# Patient Record
Sex: MALE | Race: WHITE | NOT HISPANIC OR LATINO | Employment: FULL TIME | URBAN - METROPOLITAN AREA
[De-identification: names, ages, dates, MRNs, and addresses within clinical notes are randomized per-mention and may not be internally consistent; named-entity substitution may affect disease eponyms.]

---

## 2017-01-05 ENCOUNTER — TELEPHONE (OUTPATIENT)
Dept: PULMONOLOGY | Facility: CLINIC | Age: 32
End: 2017-01-05

## 2017-01-05 NOTE — TELEPHONE ENCOUNTER
Prior Authorization Retail Medication Request  Medication/Dose: Vancomycin nebs  Diagnosis and ICD code: E84.0 CF   New/Renewal/Insurance Change PA:   Previously Tried and Failed Therapies:     Insurance ID (if provided):   Insurance Phone (if provided):     Any additional info from fax request:    Prior auth due to  the beginning of February. Patient has asked that we work on prior auth now so there is no interruption in care.    If you received a fax notification from an outside Pharmacy:  Pharmacy Name:  Pharmacy #:  Pharmacy Fax:

## 2017-01-10 NOTE — TELEPHONE ENCOUNTER
[facility] Prior Authorization Team   Phone: [phone]  Fax: [phone]    PA Initiation    Medication: Vancomycin nebs  Insurance Company: [employer]  Fax Number: [phone]    Phone Number: [phone]  Pharmacy Filling the Rx: [address]  Filling Pharmacy Phone: [phone]  Filling Pharmacy Fax: [phone]  Start Date: 1/10/2017    ALSO FAXED CLINIC NOTES.

## 2017-01-11 NOTE — TELEPHONE ENCOUNTER
Prior Authorization Approval    Authorization Effective Date: 9/1/2016  Authorization Expiration Date: 9/1/2017  Medication: Vancomycin nebs- approved  Approved Dose/Quantity:   Reference #:     Insurance Company: [employer]  Expected CoPay: $50.00     CoPay Card Available:      Foundation Assistance Needed:    Which Pharmacy is filling the prescription (Not needed for infusion/clinic administered): [facility] - [place] [address]      Pt has been notified of approval and he will be contacting his pharmacy to set up delivery

## 2017-01-16 ENCOUNTER — TRANSFERRED RECORDS (OUTPATIENT)
Dept: HEALTH INFORMATION MANAGEMENT | Facility: CLINIC | Age: 32
End: 2017-01-16

## 2017-02-03 DIAGNOSIS — E84.8 DIABETES MELLITUS RELATED TO CF (CYSTIC FIBROSIS) (H): Primary | ICD-10-CM

## 2017-02-03 DIAGNOSIS — E08.9 DIABETES MELLITUS RELATED TO CF (CYSTIC FIBROSIS) (H): Primary | ICD-10-CM

## 2017-04-13 DIAGNOSIS — E84.0 CYSTIC FIBROSIS WITH PULMONARY MANIFESTATIONS (H): ICD-10-CM

## 2017-04-14 RX ORDER — PANCRELIPASE 60000; 12000; 38000 [USP'U]/1; [USP'U]/1; [USP'U]/1
CAPSULE, DELAYED RELEASE PELLETS ORAL
Qty: 900 EACH | Refills: 3 | Status: SHIPPED | OUTPATIENT
Start: 2017-04-14 | End: 2017-04-17

## 2017-04-14 NOTE — PROGRESS NOTES
[facility]  April 17, 2017         Assessment and Plan:   Dilan Nassar is a 31 year old male with cystic fibrosis, pancreatic insufficiency and CFRD who is seen today in clinic for follow up. In the past few months, has completed course of vancomycin, imipenem and bactrim. Started on fluconazole at last visit.      1. CF lung disease: although PFTs are decreased, patient notes he has not symptoms other than the shortness of breath and chest tightness he gets each month he's on vancomycin. He is sure that PFTs reflect these symptoms and not illness. Vesting BID. Previous cultures + for MRSA, Klebsiella, Steno and Achromobacter, most recently Steno only. Discussed treatment with oral antibiotics with the patient and at this time, he would like to stop the vanco nebs only and f/u in one month and he does not feel he is sick.   - Continue nebulizers (albuterol and pulmozymeand vest therapy  - On chronic azithromycin     2. Exocrine pancreatic insufficiency: denies symptoms of malabsorption. Weight continues to improve, BMI 20.2 today.  - Continue pancreatic enzymes and vitamin supplementation     3. CFRD: AIC of 6.7 on 12/20/16. Saw Endocrine and has been keeping better track of BS, typically 90s int he am and 140-160s two hours post prandial.   - Continue Lantus 13 U  - F/u with Endocrine as scheduled  - Routine diabetic eye exam in 2017     4. Anxiety/depression: mood is stable/better. Not currently on medication. Doesn't feel ill today.      5. Elevated alk phos: X 1 year in EPIC. No imaging. Was supposed to get US previously, but has had to reschedule.   - Liver US at next f/u  - Continue Ursodiol     RTC: in 1 month with routine spirometry  Annual studies due: September 2017     Jyothi Warren PA-C  Pulmonary, Allergy, Critical Care and Sleep Medicine        Interval History:   Patient notes that every month he does vanco nebs he has shortness of breath and  chest tightness. Is pretty sure that's why his PFTs are down today. Feels good otherwise. On the vanco months, notices shortness of breath at rest and with stairs. Cough at baseline, minimal sputum, light green/gray. No blood. Denies chest pain, fever, chills or sinus pain/congestions.     No new GI symptoms and stools at his baseline.          Review of Systems:   Please see HPI. Otherwise, complete 10 point ROS negative.           Past Medical and Surgical History:     Past Medical History:   Diagnosis Date     Cystic fibrosis with pulmonary manifestations (H)     /[id number]     Past Surgical History:   Procedure Laterality Date     APPENDECTOMY OPEN  1999    Appendicitis      SINUS SURGERY  1990's    h/o many sinus infections           Family History:     Family History   Problem Relation Age of Onset     DIABETES Mother      Unknown type     Prostate Cancer Paternal Grandfather      LUNG DISEASE Other      Negative     DIABETES Maternal Aunt      unknown type     DIABETES Maternal Uncle      unknown type     DIABETES Maternal Grandmother      unknown type     Coronary Artery Disease Paternal Grandmother             Social History:     Social History     Social History     Marital status: Single     Spouse name: N/A     Number of children: N/A     Years of education: N/A     Occupational History     State Representative [employer]     Financial Counselor      Social History Main Topics     Smoking status: Never Smoker     Smokeless tobacco: Former User     Alcohol use 6.0 - 8.4 oz/week     10 - 14 Standard drinks or equivalent per week      Comment: 2 drinks per night 5X/wk     Drug use: No     Sexual activity: Yes     Partners: Female     Birth control/ protection: Pull-out method, Condom     Other Topics Concern     Parent/Sibling W/ Cabg, Mi Or Angioplasty Before 65f 55m? Yes     Social History Narrative    Lives alone in apartment in [place], [place]. 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         Nov 2015.  His main political issue is higher education.  His girlfriend is looking for work as an .  He eats 3 square meals per day, most of which are cooked by him or his girlfriend.  Gets outside to walk or run 20 minutes about 5x per week.  Works fairly regular daytime hours, often from home.            Medications:     Current Outpatient Prescriptions   Medication     amylase-lipase-protease (CREON) 58969 UNITS CPEP     CREON 01161 UNITS CPEP per EC capsule     insulin pen needle (CLICKFINE PEN NEEDLES) 32G X 4 MM     multivitamins CF formula (MVW COMPLETE FORMULATION) CAPS capsule     albuterol (2.5 MG/3ML) 0.083% neb solution     azithromycin (ZITHROMAX) 500 MG tablet     Cholecalciferol 5000 UNITS TABS     fluticasone (FLOVENT HFA) 110 MCG/ACT Inhaler     fluticasone (FLONASE) 50 MCG/ACT spray     insulin glargine (LANTUS SOLOSTAR) 100 UNIT/ML injection     phytonadione (MEPHYTON) 5 MG tablet     ursodiol (ACTIGALL) 300 MG capsule     albuterol (PROAIR HFA/PROVENTIL HFA/VENTOLIN HFA) 108 (90 BASE) MCG/ACT Inhaler     dornase alpha (PULMOZYME) 1 MG/ML nebulizer solution     ranitidine (ZANTAC) 75 MG tablet     water for injection sterile SOLN     Syringe/Needle, Disp, 18G X 1\" 6 ML MISC     order for DME     loratadine (CLARITIN) 10 MG tablet     blood glucose monitoring (FREESTYLE LITE) test strip     blood glucose monitoring (FREESTYLE) lancets     No current facility-administered medications for this visit.             Physical Exam:   /79  Pulse 93  Temp 97.8  F (36.6  C) (Oral)  Resp 16  Ht 1.626 m (5' 4\")  Wt 53.7 kg (118 lb 6.2 oz)  SpO2 93%  BMI 20.32 kg/m2    GENERAL: alert, NAD  HEENT: NCAT, EOMI, anicteric sclera, dried blood in left nare, mild bilateral edema; canals and TMs clear; no oral mucosal edema or erythema  Neck: no cervical or supraclavicular adenopathy  Respiratory: moderate airflow  CV: " RRR, S1S2, no murmurs noted  Abdomen: normoactive BS, soft and non tender without organomegaly  Lymph: no edema, + digital clubbing  Neuro: AAO X 3, CN 2-12 grossly intact  Psychiatric: normal affect, good eye contact  Skin: no rash, jaundice or lesions on limited exam         Data:   All laboratory and imaging data reviewed.      Cystic Fibrosis Culture  Specimen Description   Date Value Ref Range Status   12/22/2016 Sputum  Final   12/22/2016 Sputum  Final   11/11/2016 Sputum  Final    Culture Micro   Date Value Ref Range Status   12/22/2016 (A)  Final    Heavy growth Normal mami  Light growth Stenotrophomonas maltophilia  Light growth Aspergillus fumigatus     12/22/2016 (A)  Final    Aspergillus fumigatus isolated  No additional fungi cultured after 4 weeks incubation     11/11/2016 (A)  Final    Moderate growth Candida albicans / dubliniensis Candida albicans and Candida   dubliniensis are not routinely speciated Susceptibility testing not routinely   done  Light growth Aspergillus fumigatus  Moderate growth Stenotrophomonas maltophilia          PFT interpretation:  Maneuver: valid and meets ATS guidelines  Severe obstruction with decreased FEV1 and FEV1/FVC  FEV1 decreased 400 cc from previous  The decrease in FVC may represent air trapping v. restrictive physiology.  Lung volumes would be necessary to determine.

## 2017-04-17 ENCOUNTER — OFFICE VISIT (OUTPATIENT)
Dept: PULMONOLOGY | Facility: CLINIC | Age: 32
End: 2017-04-17
Attending: PHYSICIAN ASSISTANT
Payer: COMMERCIAL

## 2017-04-17 VITALS
DIASTOLIC BLOOD PRESSURE: 79 MMHG | RESPIRATION RATE: 16 BRPM | HEIGHT: 64 IN | TEMPERATURE: 97.8 F | SYSTOLIC BLOOD PRESSURE: 119 MMHG | WEIGHT: 118.39 LBS | OXYGEN SATURATION: 93 % | BODY MASS INDEX: 20.21 KG/M2 | HEART RATE: 93 BPM

## 2017-04-17 DIAGNOSIS — A49.02 MRSA INFECTION: ICD-10-CM

## 2017-04-17 DIAGNOSIS — K86.81 EXOCRINE PANCREATIC INSUFFICIENCY: ICD-10-CM

## 2017-04-17 DIAGNOSIS — E84.0 CYSTIC FIBROSIS WITH PULMONARY MANIFESTATIONS (H): ICD-10-CM

## 2017-04-17 DIAGNOSIS — E08.9 DIABETES MELLITUS DUE TO CYSTIC FIBROSIS (H): ICD-10-CM

## 2017-04-17 DIAGNOSIS — E84.9 CF (CYSTIC FIBROSIS) (H): ICD-10-CM

## 2017-04-17 DIAGNOSIS — E84.9 CYSTIC FIBROSIS (H): ICD-10-CM

## 2017-04-17 DIAGNOSIS — E84.9 DIABETES MELLITUS DUE TO CYSTIC FIBROSIS (H): ICD-10-CM

## 2017-04-17 LAB
ERYTHROCYTE [DISTWIDTH] IN BLOOD BY AUTOMATED COUNT: 12.8 % (ref 10–15)
HCT VFR BLD AUTO: 40.7 % (ref 40–53)
HGB BLD-MCNC: 14.3 G/DL (ref 13.3–17.7)
MCH RBC QN AUTO: 30.8 PG (ref 26.5–33)
MCHC RBC AUTO-ENTMCNC: 35.1 G/DL (ref 31.5–36.5)
MCV RBC AUTO: 88 FL (ref 78–100)
PLATELET # BLD AUTO: 214 10E9/L (ref 150–450)
RBC # BLD AUTO: 4.65 10E12/L (ref 4.4–5.9)
WBC # BLD AUTO: 9.4 10E9/L (ref 4–11)

## 2017-04-17 PROCEDURE — 87077 CULTURE AEROBIC IDENTIFY: CPT | Performed by: INTERNAL MEDICINE

## 2017-04-17 PROCEDURE — 85027 COMPLETE CBC AUTOMATED: CPT | Performed by: PHYSICIAN ASSISTANT

## 2017-04-17 PROCEDURE — 36415 COLL VENOUS BLD VENIPUNCTURE: CPT | Performed by: PHYSICIAN ASSISTANT

## 2017-04-17 PROCEDURE — 87186 SC STD MICRODIL/AGAR DIL: CPT | Performed by: INTERNAL MEDICINE

## 2017-04-17 PROCEDURE — 87070 CULTURE OTHR SPECIMN AEROBIC: CPT | Performed by: INTERNAL MEDICINE

## 2017-04-17 PROCEDURE — 99212 OFFICE O/P EST SF 10 MIN: CPT | Mod: ZF

## 2017-04-17 PROCEDURE — 87107 FUNGI IDENTIFICATION MOLD: CPT | Performed by: INTERNAL MEDICINE

## 2017-04-17 RX ORDER — PANCRELIPASE 60000; 12000; 38000 [USP'U]/1; [USP'U]/1; [USP'U]/1
5-6 CAPSULE, DELAYED RELEASE PELLETS ORAL
Qty: 900 CAPSULE | Refills: 3 | Status: SHIPPED | OUTPATIENT
Start: 2017-04-17 | End: 2018-01-03

## 2017-04-17 ASSESSMENT — PAIN SCALES - GENERAL: PAINLEVEL: NO PAIN (0)

## 2017-04-17 NOTE — PATIENT INSTRUCTIONS
Cystic Fibrosis Self-Care Plan       Patient: Dilan aNssar   MRN: [id number]   Clinic Date: April 17, 2017     RECOMMENDATIONS:  1. Continue nebulizers and vest therapy, twice daily vesting is okay.  2. Stop the vancomycin nebs.  3. Call if you start to have symptoms before your next f/u.  4. You'll need to fast (nothing to eat/drink) prior to your ultrasound.     Annual Studies:   IGG   Date Value Ref Range Status   10/20/2016 1500 695 - 1620 mg/dL Final     No results found for: INS  There are no preventive care reminders to display for this patient.    Pulmonary Function Tests  FEV1: amount of air you can blow out in 1 second  FVC: total amount of air you can take in and blow out    Your Goals:         PFT Latest Ref Rng & Units 4/17/2017   FVC L 2.84   FEV1 L 1.51   FVC% % 65   FEV1% % 41          Airway Clearance: The Most Important Way to Keep Your Lungs Healthy  Vest Settings:    Hill-Rom Frequencies: 8, 9, 10 Pressure 10 Then, Frequencies 18, 19, 20 Pressure 6      RespirTech: Quick Start with Pressure of     Do each frequency for 5 minutes; Deflate vest after each frequency & cough 3 times before beginning the next setting.    Vest and Neb Therapy should be done 2 times/day.    Good Nutrition Can Improve Lung Function and Overall Health     Take ALL of your vitamins with food     Take 1/2 of your enzymes before EVERY meal/snack and the other 1/2 mid-meal/snack    Wt Readings from Last 3 Encounters:   04/17/17 53.7 kg (118 lb 6.2 oz)   12/22/16 52.7 kg (116 lb 3.2 oz)   12/20/16 52.2 kg (115 lb)       Body mass index is 20.32 kg/(m^2).         National CF Foundation Recommendations for BMI in CF Adults: Women: at least 22 Men: at least 23        Controlling Blood Sugars Helps Prevent Lung Infections & Improves Nutrition  Test blood sugar:     In the morning before eating (goal is )     2 hours after a meal (goal is less than 150)     When pre-meal glucose is greater than 150 add correction      At bedtime (if less than 100 eat a snack with 15 grams of carbohydrates  Last A1C Results:   Hemoglobin A1C   Date Value Ref Range Status   12/20/2016 6.7 % Final         If diabetic, measure A1C every 6 months. Goal is under 7%.    Staying Healthy    Research: If you are interested in learning about research opportunities or have questions, please contact Sonali Altamirano at [phone] or [email or web address].       Foundation: Comp[employer] is a personalized resource service to help you with the insurance, financial, legal and other issues you are facing.  It's free, confidential and available to anyone with CF.  Ask your  for more information or contact [employer] directly at 718-COMPASS (036-8368) or compass@cff.org, or learn more at cff.org/compass.       CF Nurse Line:  Giovanny Pimentel: [phone]   Audra Smart, RT: [phone]     Daniela Whitt and Tamika Perez , Dieticians: [phone]   Meliza Cueva, Diabetes Nurse: [phone]    Areli Hodgson: [phone] or Carisa Berumen [phone], Social Workers   [email or web address]

## 2017-04-17 NOTE — NURSING NOTE
Chief Complaint   Patient presents with     Cystic Fibrosis     Follow up on Guillermo and his CF     Tino Kenney CMA at 9:54 AM on 4/17/2017

## 2017-04-17 NOTE — MR AVS SNAPSHOT
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 Continue nebulizers and vest therapy, twice daily vesting is okay.  2. Stop the vancomycin nebs.  3. Call if you start to have symptoms before your next f/u.  4. You'll need to fast (nothing to eat/drink) prior to your ultrasound.     Annual Studies:   IGG   Date Value Ref Range Status   10/20/2016 1500 695 - 1620 mg/dL Final     No results found for: INS  There are no preventive care reminders to display for this patient.    Pulmonary Function Tests  FEV1: amount of air you can blow out in 1 second  FVC: total amount of air you can take in and blow out    Your Goals:         PFT Latest Ref Rng & Units 4/17/2017   FVC L 2.84   FEV1 L 1.51   FVC% % 65   FEV1% % 41          Airway Clearance: The Most Important Way to Keep Your Lungs Healthy  Vest Settings:    Hill-Rom Frequencies: 8, 9, 10 Pressure 10 Then, Frequencies 18, 19, 20 Pressure 6      RespirTech: Quick Start with Pressure of     Do each frequency for 5 minutes; Deflate vest after each frequency & cough 3 times before beginning the next setting.    Vest and Neb Therapy should be done 2 times/day.    Good Nutrition Can Improve Lung Function and Overall Health     Take ALL of your vitamins with food      Take 1/2 of your enzymes before EVERY meal/snack and the other 1/2 mid-meal/snack    Wt Readings from Last 3 Encounters:   04/17/17 53.7 kg (118 lb 6.2 oz)   12/22/16 52.7 kg (116 lb 3.2 oz)   12/20/16 52.2 kg (115 lb)       Body mass index is 20.32 kg/(m^2).         National CF Foundation Recommendations for BMI in CF Adults: Women: at least 22 Men: at least 23        Controlling Blood Sugars Helps Prevent Lung Infections & Improves Nutrition  Test blood sugar:     In the morning before eating (goal is )     2 hours after a meal (goal is less than 150)     When pre-meal glucose is greater than 150 add correction     At bedtime (if less than 100 eat a snack with 15 grams of carbohydrates  Last A1C Results:   Hemoglobin A1C   Date Value Ref Range Status   12/20/2016 6.7 % Final         If diabetic, measure A1C every 6 months. Goal is under 7%.    Staying Healthy    Research: If you are interested in learning about research opportunities or have questions, please contact Sonali Altamirano at [phone] or [email or web address].      CF Foundation: Compass is a personalized resource service to help you with the insurance, financial, legal and other issues you are facing.  It's free, confidential and available to anyone with CF.  Ask your  for more information or contact Compass directly at 549-COMPASS ([phone]) or compass@cff.org, or learn more at cff.org/compass.       CF Nurse Line:  Giovanny Pimentel: [phone]   Audra Smart, RT: [phone]     Daniela Perez , Dieticians: [phone]   Meliza Cueva, Diabetes Nurse: [phone]    Areli Hodgson: [phone] or Carisa Berumen [phone], Social Workers   [email or web address]                        Follow-ups after your visit        Follow-up notes from your care team     Return in about 4 weeks (around 5/15/2017), or Please schedule liver ultrasound prior to appointment. .      Your next 10 appointments  already scheduled     May 24, 2017  9:00 AM CDT   US ABDOMEN/PELVIS DUPLEX COMPLETE with UCUS2   [facility] US ([facility])    [address] [facility] [address] [phone]           Please bring a list of your medicines (including vitamins, minerals and over-the-counter drugs). Also, tell your doctor about any allergies you may have. Wear comfortable clothes and leave your valuables at home.  Adults: No eating or drinking for 8 hours before the exam. You may take medicine with a small sip of water.  Children: - Children 6+ years: No food or drink for 6 hours before exam. - Children 1-5 years: No food or drink for 4 hours before exam. - Infants, breast-fed: may have breast milk up to 2 hours before exam. - Infants, formula: may have bottle until 4 hours before exam.  Please call the Imaging Department at your exam site with any questions.            May 24, 2017 10:00 AM CDT   CF LOOP with  PFL CF   [facility] Pulmonary Function Testing ([facility])    [address] [address]   [phone]            May 24, 2017 10:20 AM CDT   (Arrive by 10:05 AM)   RETURN CYSTIC FIBROSIS VISIT with Jyothi Warren PA-C   [facility] ([facility])    [address] [place] [address]   [phone]            May 30, 2017  8:00 AM CDT   (Arrive by 7:45 AM)   RETURN CYSTIC FIBROSIS VISIT with Edison Miranda MD   [facility] ([facility])    [address] [place] [address]   [phone]              Future tests that were ordered for you today     Open Future Orders        Priority Expected Expires Ordered    Cystic Fibrosis Culture Aerob Bacterial Routine 5/1/2017 5/22/2017 4/17/2017    Spirometry, Breathing Capacity Routine 5/1/2017  "5/22/2017 4/17/2017            Who to contact     If you have questions or need follow up information about today's clinic visit or your schedule please contact [facility] directly at [phone].  Normal or non-critical lab and imaging results will be communicated to you by TelePharmhart, letter or phone within 4 business days after the clinic has received the results. If you do not hear from us within 7 days, please contact the clinic through PROTEIN LOUNGEt or phone. If you have a critical or abnormal lab result, we will notify you by phone as soon as possible.  Submit refill requests through Suitey or call your pharmacy and they will forward the refill request to us. Please allow 3 business days for your refill to be completed.          Additional Information About Your Visit        Suitey Information     Suitey gives you secure access to your electronic health record. If you see a primary care provider, you can also send messages to your care team and make appointments. If you have questions, please call your primary care clinic.  If you do not have a primary care provider, please call [phone] and they will assist you.        Care EveryWhere ID     This is your Care EveryWhere ID. This could be used by other organizations to access your [facility] medical records  [id number]        Your Vitals Were     Pulse Temperature Respirations Height Pulse Oximetry BMI (Body Mass Index)    93 97.8  F (36.6  C) (Oral) 16 1.626 m (5' 4\") 93% 20.32 kg/m2       Blood Pressure from Last 3 Encounters:   04/17/17 119/79   12/22/16 125/83   12/20/16 130/78    Weight from Last 3 Encounters:   04/17/17 53.7 kg (118 lb 6.2 oz)   12/22/16 52.7 kg (116 lb 3.2 oz)   12/20/16 52.2 kg (115 lb)                 Today's Medication Changes          These changes are accurate as of: 4/17/17 10:54 AM.  If you have any questions, ask your nurse or doctor.               These medicines have changed or have updated " prescriptions.        Dose/Directions    * CREON 66371 UNITS Cpep   This may have changed:  Another medication with the same name was added. Make sure you understand how and when to take each.   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 Make sure you understand how and when to take each.   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 Continue taking this medication, and follow the directions you see here.   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 Read the directions carefully, and ask your doctor or other care provider to review them with you.      Stop taking these medicines if you haven't already. Please contact your care team if you have questions.     vancomycin 100 mg/mL vial   Commonly known as:  VANCOCIN   Stopped by:  Jyothi Warren PA-C                Where to get your medicines      These medications were sent to [facility] [address] [facility] [address]  [address] [facility], [facility] [address]    Hours:  TRANSPLANT PHONE NUMBER [phone] Phone:  [phone]     amylase-lipase-protease 66068 UNITS Cpep         These medications were sent to [facility] [address] [address]     Phone:  [phone]     CREON 17341 UNITS Cpep                Primary Care Provider Office Phone # Fax #    Atilio Aaron Nieto [phone]        [facility] [address] [place] [address]  [facility] [address]        Thank you!     Thank you for choosing [facility]  for your care. Our goal is always to provide you with excellent care. Hearing back from our patients is one way we can continue to improve our services. Please take a few minutes to complete the written survey that you may receive in the mail after your visit with us. Thank you!             Your Updated Medication List - Protect others around you: Learn how to safely use, store and throw away your medicines at www.disposemymeds.org.          This list is accurate as of: 4/17/17 10:54 AM.  Always use your most recent med list.                   Brand Name Dispense Instructions for use    * albuterol (2.5 MG/3ML) 0.083% neb solution     540 mL    Take 1 vial (2.5 mg) by nebulization 3 times daily       * albuterol 108 (90 BASE) MCG/ACT Inhaler    PROAIR HFA/PROVENTIL  HFA/VENTOLIN HFA    1 Inhaler    Inhale 2 puffs into the lungs 2 times daily Following your vancomycin nebulizer treatment.       azithromycin 500 MG tablet    ZITHROMAX    75 tablet    Take 1 tablet (500 mg) by mouth Every Mon, Wed, Fri Morning       blood glucose monitoring lancets     2 Box    Use to test blood sugar 4 times daily or as directed.       blood glucose monitoring test strip    FREESTYLE LITE    120 each    Use to test blood 4 times a day       Cholecalciferol 5000 UNITS Tabs     30 tablet    Take 5,000 Units by mouth daily       * CREON 71260 UNITS Cpep   Generic drug:  amylase-lipase-protease     900 capsule    Take 5-6 capsules (60,000-72,000 Units) by mouth 3 times daily (with meals)       * amylase-lipase-protease 52918 UNITS Cpep    CREON    600 each    TAKE 5-6 CAPSULES (60,000-72,000) BY MOUTH THREE TIMES A DAY WITH MEALS       dornase alpha 1 MG/ML neb solution    PULMOZYME    150 mL    Inhale 2.5 mg into the lungs 2 times daily       fluticasone 110 MCG/ACT Inhaler    FLOVENT HFA    1 Inhaler    INHALE 1 PUFF INTO THE LUNGS TWO TIMES A DAY       fluticasone 50 MCG/ACT spray    FLONASE    48 g    SPRAY 2 SPRAYS INTO BOTH NOSTRILS DAILY       insulin glargine 100 UNIT/ML injection    LANTUS SOLOSTAR    9 mL    Inject 12 Units Subcutaneous every morning       insulin pen needle 32G X 4 MM    CLICKFINE PEN NEEDLES    100 each    1 time per day.       loratadine 10 MG tablet    CLARITIN    30 tablet    Take 1 tablet (10 mg) by mouth daily as needed for allergies       multivitamins CF formula Caps capsule     60 capsule    Take 1 capsule by mouth 2 times daily       order for DME     4 kit    Equipment being ordered: Nebulizer Supplies. Please dispense four (4) Ruby LC Plus nebulizer kits and four (4) RUBY face masks for this patient with Cystic Fibrosis. Patient requires additional nebulizer supplies due to his need to use multiple sterile neb cups for his inhaled medications that may not be  "mixed together.       phytonadione 5 MG tablet    MEPHYTON    16 tablet    TAKE ONE TABLET (5MG) BY MOUTH ONCE A WEEK       ranitidine 75 MG tablet    ZANTAC    30 tablet    Take 1 tablet (75 mg) by mouth daily as needed for heartburn       Syringe/Needle (Disp) 18G X 1\" 6 ML Misc     60 each    1 each 2 times daily       ursodiol 300 MG capsule    ACTIGALL    180 capsule    TAKE 1 CAPSULE (300MG ) BY MOUTH TWO TIMES A DAY       water for injection sterile Soln     250 mL    Inhale 4 mLs into the lungs 2 times daily       * Notice:  This list has 4 medication(s) that are the same as other medications prescribed for you. Read the directions carefully, and ask your doctor or other care provider to review them with you.      "

## 2017-04-17 NOTE — LETTER
4/17/2017       RE: Dilan Nassar  [address]  [facility] [address]     Dear Colleague,    Thank you for referring your patient, Dilan Nassar, to the [facility] at [facility]. Please see a copy of my visit note below.    [facility]  April 17, 2017         Assessment and Plan:   Dilan Nassar is a 31 year old male with cystic fibrosis, pancreatic insufficiency and CFRD who is seen today in clinic for follow up. In the past few months, has completed course of vancomycin, imipenem and bactrim. Started on fluconazole at last visit.      1. CF lung disease: although PFTs are decreased, patient notes he has not symptoms other than the shortness of breath and chest tightness he gets each month he's on vancomycin. He is sure that PFTs reflect these symptoms and not illness. Vesting BID. Previous cultures + for MRSA, Klebsiella, Steno and Achromobacter, most recently Steno only. Discussed treatment with oral antibiotics with the patient and at this time, he would like to stop the vanco nebs only and f/u in one month and he does not feel he is sick.   - Continue nebulizers (albuterol and pulmozymeand vest therapy  - On chronic azithromycin     2. Exocrine pancreatic insufficiency: denies symptoms of malabsorption. Weight continues to improve, BMI 20.2 today.  - Continue pancreatic enzymes and vitamin supplementation     3. CFRD: AIC of 6.7 on 12/20/16. Saw Endocrine and has been keeping better track of BS, typically 90s int he am and 140-160s two hours post prandial.   - Continue Lantus 13 U  - F/u with Endocrine as scheduled  - Routine diabetic eye exam in 2017     4. Anxiety/depression: mood is stable/better. Not currently on medication. Doesn't feel ill today.      5. Elevated alk phos: X 1 year in EPIC. No imaging. Was supposed to get US previously, but has had to reschedule.   -  Liver US at next f/u  - Continue Ursodiol     RTC: in 1 month with routine spirometry  Annual studies due: September 2017     Jyothi Warren PA-C  Pulmonary, Allergy, Critical Care and Sleep Medicine        Interval History:   Patient notes that every month he does vanco nebs he has shortness of breath and chest tightness. Is pretty sure that's why his PFTs are down today. Feels good otherwise. On the vanco months, notices shortness of breath at rest and with stairs. Cough at baseline, minimal sputum, light green/gray. No blood. Denies chest pain, fever, chills or sinus pain/congestions.     No new GI symptoms and stools at his baseline.          Review of Systems:   Please see HPI. Otherwise, complete 10 point ROS negative.           Past Medical and Surgical History:     Past Medical History:   Diagnosis Date     Cystic fibrosis with pulmonary manifestations (H)     /[id number]     Past Surgical History:   Procedure Laterality Date     APPENDECTOMY OPEN  1999    Appendicitis      SINUS SURGERY  1990's    h/o many sinus infections           Family History:     Family History   Problem Relation Age of Onset     DIABETES Mother      Unknown type     Prostate Cancer Paternal Grandfather      LUNG DISEASE Other      Negative     DIABETES Maternal Aunt      unknown type     DIABETES Maternal Uncle      unknown type     DIABETES Maternal Grandmother      unknown type     Coronary Artery Disease Paternal Grandmother             Social History:     Social History     Social History     Marital status: Single     Spouse name: N/A     Number of children: N/A     Years of education: N/A     Occupational History     State Representative [employer]     Financial Counselor      Social History Main Topics     Smoking status: Never Smoker     Smokeless tobacco: Former User     Alcohol use 6.0 - 8.4 oz/week     10 - 14 Standard drinks or equivalent per week      Comment: 2 drinks per night 5X/wk     Drug use: No     Sexual  "activity: Yes     Partners: Female     Birth control/ protection: Pull-out method, Condom     Other Topics Concern     Parent/Sibling W/ Cabg, Mi Or Angioplasty Before 65f 55m? Yes     Social History Narrative    Lives alone in apartment in [place], [place]. 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         Nov 2015.  His main political issue is higher education.  His girlfriend is looking for work as an .  He eats 3 square meals per day, most of which are cooked by him or his girlfriend.  Gets outside to walk or run 20 minutes about 5x per week.  Works fairly regular daytime hours, often from home.            Medications:     Current Outpatient Prescriptions   Medication     amylase-lipase-protease (CREON) 97832 UNITS CPEP     CREON 74191 UNITS CPEP per EC capsule     insulin pen needle (Bold TechnologiesFINE PEN NEEDLES) 32G X 4 MM     multivitamins CF formula (MVW COMPLETE FORMULATION) CAPS capsule     albuterol (2.5 MG/3ML) 0.083% neb solution     azithromycin (ZITHROMAX) 500 MG tablet     Cholecalciferol 5000 UNITS TABS     fluticasone (FLOVENT HFA) 110 MCG/ACT Inhaler     fluticasone (FLONASE) 50 MCG/ACT spray     insulin glargine (LANTUS SOLOSTAR) 100 UNIT/ML injection     phytonadione (MEPHYTON) 5 MG tablet     ursodiol (ACTIGALL) 300 MG capsule     albuterol (PROAIR HFA/PROVENTIL HFA/VENTOLIN HFA) 108 (90 BASE) MCG/ACT Inhaler     dornase alpha (PULMOZYME) 1 MG/ML nebulizer solution     ranitidine (ZANTAC) 75 MG tablet     water for injection sterile SOLN     Syringe/Needle, Disp, 18G X 1\" 6 ML MISC     order for DME     loratadine (CLARITIN) 10 MG tablet     blood glucose monitoring (FREESTYLE LITE) test strip     blood glucose monitoring (FREESTYLE) lancets     No current facility-administered medications for this visit.             Physical Exam:   /79  Pulse 93  Temp 97.8  F (36.6  C) (Oral)  Resp 16  Ht 1.626 m (5' 4\")  Wt " 53.7 kg (118 lb 6.2 oz)  SpO2 93%  BMI 20.32 kg/m2    GENERAL: alert, NAD  HEENT: NCAT, EOMI, anicteric sclera, dried blood in left nare, mild bilateral edema; canals and TMs clear; no oral mucosal edema or erythema  Neck: no cervical or supraclavicular adenopathy  Respiratory: moderate airflow  CV: RRR, S1S2, no murmurs noted  Abdomen: normoactive BS, soft and non tender without organomegaly  Lymph: no edema, + digital clubbing  Neuro: AAO X 3, CN 2-12 grossly intact  Psychiatric: normal affect, good eye contact  Skin: no rash, jaundice or lesions on limited exam         Data:   All laboratory and imaging data reviewed.      Cystic Fibrosis Culture  Specimen Description   Date Value Ref Range Status   12/22/2016 Sputum  Final   12/22/2016 Sputum  Final   11/11/2016 Sputum  Final    Culture Micro   Date Value Ref Range Status   12/22/2016 (A)  Final    Heavy growth Normal mami  Light growth Stenotrophomonas maltophilia  Light growth Aspergillus fumigatus     12/22/2016 (A)  Final    Aspergillus fumigatus isolated  No additional fungi cultured after 4 weeks incubation     11/11/2016 (A)  Final    Moderate growth Candida albicans / dubliniensis Candida albicans and Candida   dubliniensis are not routinely speciated Susceptibility testing not routinely   done  Light growth Aspergillus fumigatus  Moderate growth Stenotrophomonas maltophilia          PFT interpretation:  Maneuver: valid and meets ATS guidelines  Severe obstruction with decreased FEV1 and FEV1/FVC  FEV1 decreased 400 cc from previous  The decrease in FVC may represent air trapping v. restrictive physiology.  Lung volumes would be necessary to determine.    Again, thank you for allowing me to participate in the care of your patient.      Sincerely,    Jyothi Warren PA-C

## 2017-04-22 LAB
BACTERIA SPEC CULT: ABNORMAL
MICRO REPORT STATUS: ABNORMAL
MICROORGANISM SPEC CULT: ABNORMAL
SPECIMEN SOURCE: ABNORMAL

## 2017-05-02 LAB
EXPTIME-PRE: 9.16 SEC
FEF2575-%PRED-PRE: 12 %
FEF2575-PRE: 0.48 L/SEC
FEF2575-PRED: 3.79 L/SEC
FEFMAX-%PRED-PRE: 73 %
FEFMAX-PRE: 6.58 L/SEC
FEFMAX-PRED: 8.99 L/SEC
FEV1-%PRED-PRE: 41 %
FEV1-PRE: 1.51 L
FEV1FEV6-PRE: 59 %
FEV1FEV6-PRED: 83 %
FEV1FVC-PRE: 53 %
FEV1FVC-PRED: 83 %
FIFMAX-PRE: 5.35 L/SEC
FVC-%PRED-PRE: 65 %
FVC-PRE: 2.84 L
FVC-PRED: 4.36 L

## 2017-05-22 DIAGNOSIS — E84.0 CYSTIC FIBROSIS WITH PULMONARY EXACERBATION (H): Primary | ICD-10-CM

## 2017-05-26 DIAGNOSIS — E84.9 CF (CYSTIC FIBROSIS) (H): Primary | ICD-10-CM

## 2017-05-30 ENCOUNTER — OFFICE VISIT (OUTPATIENT)
Dept: ENDOCRINOLOGY | Facility: CLINIC | Age: 32
End: 2017-05-30
Attending: PHYSICIAN ASSISTANT
Payer: COMMERCIAL

## 2017-05-30 VITALS
OXYGEN SATURATION: 96 % | HEIGHT: 64 IN | SYSTOLIC BLOOD PRESSURE: 112 MMHG | HEART RATE: 81 BPM | RESPIRATION RATE: 18 BRPM | DIASTOLIC BLOOD PRESSURE: 71 MMHG | BODY MASS INDEX: 20.14 KG/M2 | WEIGHT: 118 LBS | TEMPERATURE: 97.9 F

## 2017-05-30 DIAGNOSIS — E84.9 CF (CYSTIC FIBROSIS) (H): Primary | ICD-10-CM

## 2017-05-30 DIAGNOSIS — E84.9 DIABETES MELLITUS DUE TO CYSTIC FIBROSIS (H): ICD-10-CM

## 2017-05-30 DIAGNOSIS — A49.02 MRSA INFECTION: ICD-10-CM

## 2017-05-30 DIAGNOSIS — E84.0 CYSTIC FIBROSIS WITH PULMONARY MANIFESTATIONS (H): ICD-10-CM

## 2017-05-30 DIAGNOSIS — E84.9 CYSTIC FIBROSIS (H): ICD-10-CM

## 2017-05-30 DIAGNOSIS — E08.9 DIABETES MELLITUS DUE TO CYSTIC FIBROSIS (H): ICD-10-CM

## 2017-05-30 DIAGNOSIS — K86.81 EXOCRINE PANCREATIC INSUFFICIENCY: ICD-10-CM

## 2017-05-30 PROCEDURE — 83036 HEMOGLOBIN GLYCOSYLATED A1C: CPT | Mod: ZF | Performed by: INTERNAL MEDICINE

## 2017-05-30 PROCEDURE — 99212 OFFICE O/P EST SF 10 MIN: CPT | Mod: ZF

## 2017-05-30 ASSESSMENT — PAIN SCALES - GENERAL: PAINLEVEL: NO PAIN (0)

## 2017-05-30 NOTE — MR AVS SNAPSHOT
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    I will have Juli review the food log with you today.    You are having frequent low glucose especially in the morning. We are concerned about potential erroneous readings from your glucose meter, which maybe 5 years old now.  As discussed we will try to get a new glucose meter for you. Please send glucose reading after 2-3 days.  Decrease Lantus to 10 units daily in the morning.     Your recent vitamin D level and DXA scan looked ok.      See you back in 3-4 months          Follow-ups after your visit        Follow-up notes from your care team     Return for f/u with me in 3 months.      Your next 10 appointments already scheduled     May 31, 2017  7:00 AM CDT   US ABDOMEN/PELVIS DUPLEX COMPLETE with UCUS1   [facility] ([facility])    [address] [address] [phone]           Please bring a list of your medicines (including vitamins, minerals and over-the-counter drugs). Also, tell your doctor about any allergies you may have. Wear comfortable clothes and leave your valuables at home.  Adults: No eating or drinking for 8 hours before the exam. You may take medicine with a small sip of water.  Children: - Children 6+ years: No food or drink for 6 hours before exam. - Children 1-5 years:  No food or drink for 4 hours before exam. - Infants, breast-fed: may have breast milk up to 2 hours before exam. - Infants, formula: may have bottle until 4 hours before exam.  Please call the Imaging Department at your exam site with any questions.            May 31, 2017  8:30 AM CDT   CF LOOP with UC PFL CF   [facility] Pulmonary Function Testing ([facility])    [address] [address] [phone]            May 31, 2017  8:40 AM CDT   (Arrive by 8:25 AM)   RETURN CYSTIC FIBROSIS VISIT with Jyothi Warren PA-C   [facility] ([facility])    [address] [address] [phone]              Who to contact     If you have questions or need follow up information about today's clinic visit or your schedule please contact [facility] directly at [phone].  Normal or non-critical lab and imaging results will be communicated to you by 20:20 Mobilehart, letter or phone within 4 business days after the clinic has received the results. If you do not hear from us within 7 days, please contact the clinic through [employer] or phone. If you have a critical or abnormal lab result, we will notify you by phone as soon as possible.  Submit refill requests through [employer] or call your pharmacy and they will forward the refill request to us. Please allow 3 business days for your refill to be completed.          Additional Information About Your Visit        Incap Information     [employer] gives you secure access to your electronic health record. If you see a primary care provider, you can also send messages to your care team and make appointments. If you have questions, please call your primary care clinic.  If you do not have a primary care provider, please call [phone] and they will assist you.        Care EveryWhere ID     This is your Care  "EveryWhere ID. This could be used by other organizations to access your [facility] medical records  [id number]        Your Vitals Were     Pulse Temperature Respirations Height Pulse Oximetry BMI (Body Mass Index)    81 97.9  F (36.6  C) (Oral) 18 1.626 m (5' 4\") 96% 20.25 kg/m2       Blood Pressure from Last 3 Encounters:   05/30/17 112/71   04/17/17 119/79   12/22/16 125/83    Weight from Last 3 Encounters:   05/30/17 53.5 kg (118 lb)   04/17/17 53.7 kg (118 lb 6.2 oz)   12/22/16 52.7 kg (116 lb 3.2 oz)              We Performed the Following     Hemoglobin A1c POCT          Where to get your medicines      These medications were sent to [facility] [address] [facility]  [address] [facility] [place] [address]     Phone:  [phone]     insulin glargine 100 UNIT/ML injection          Primary Care Provider Office Phone # Fax #    Atilio Aaron Nieto [phone]        [facility] [address] [facility] [address]  [facility] [address]        Thank you!     Thank you for choosing [facility]  for your care. Our goal is always to provide you with excellent care. Hearing back from our patients is one way we can continue to improve our services. Please take a few minutes to complete the written survey that you may receive in the mail after your visit with us. Thank you!             Your Updated Medication List - Protect others around you: Learn how to safely use, store and throw away your medicines at www.disposemymeds.org.          This list is accurate as of: 5/30/17  9:25 AM.  Always use your most recent med list.                   Brand Name Dispense Instructions for use    * albuterol (2.5 MG/3ML) 0.083% neb solution     540 mL    Take 1 vial (2.5 mg) by nebulization 3 times daily       * albuterol 108 (90 BASE) MCG/ACT Inhaler    PROAIR HFA/PROVENTIL HFA/VENTOLIN HFA    1 Inhaler    Inhale 2 puffs into the lungs 2 times daily Following your " vancomycin nebulizer treatment.       azithromycin 500 MG tablet    ZITHROMAX    75 tablet    Take 1 tablet (500 mg) by mouth Every Mon, Wed, Fri Morning       blood glucose monitoring lancets     2 Box    Use to test blood sugar 4 times daily or as directed.       blood glucose monitoring test strip    FREESTYLE LITE    120 each    Use to test blood 4 times a day       Cholecalciferol 5000 UNITS Tabs     30 tablet    Take 5,000 Units by mouth daily       * CREON 94125 UNITS Cpep   Generic drug:  amylase-lipase-protease     900 capsule    Take 5-6 capsules (60,000-72,000 Units) by mouth 3 times daily (with meals)       * amylase-lipase-protease 34233 UNITS Cpep    CREON    600 each    TAKE 5-6 CAPSULES (60,000-72,000) BY MOUTH THREE TIMES A DAY WITH MEALS       dornase alpha 1 MG/ML neb solution    PULMOZYME    150 mL    Inhale 2.5 mg into the lungs 2 times daily       fluticasone 110 MCG/ACT Inhaler    FLOVENT HFA    1 Inhaler    INHALE 1 PUFF INTO THE LUNGS TWO TIMES A DAY       fluticasone 50 MCG/ACT spray    FLONASE    48 g    SPRAY 2 SPRAYS INTO BOTH NOSTRILS DAILY       insulin glargine 100 UNIT/ML injection    LANTUS SOLOSTAR    15 mL    Inject 12 Units Subcutaneous every morning       insulin pen needle 32G X 4 MM    CLICKFINE PEN NEEDLES    100 each    1 time per day.       loratadine 10 MG tablet    CLARITIN    30 tablet    Take 1 tablet (10 mg) by mouth daily as needed for allergies       multivitamins CF formula Caps capsule     60 capsule    Take 1 capsule by mouth 2 times daily       order for DME     4 kit    Equipment being ordered: Nebulizer Supplies. Please dispense four (4) Ruby LC Plus nebulizer kits and four (4) RUBY face masks for this patient with Cystic Fibrosis. Patient requires additional nebulizer supplies due to his need to use multiple sterile neb cups for his inhaled medications that may not be mixed together.       phytonadione 5 MG tablet    MEPHYTON    16 tablet    TAKE ONE TABLET  "(5MG) BY MOUTH ONCE A WEEK       ranitidine 75 MG tablet    ZANTAC    30 tablet    Take 1 tablet (75 mg) by mouth daily as needed for heartburn       Syringe/Needle (Disp) 18G X 1\" 6 ML Misc     60 each    1 each 2 times daily       ursodiol 300 MG capsule    ACTIGALL    180 capsule    TAKE 1 CAPSULE (300MG ) BY MOUTH TWO TIMES A DAY       water for injection sterile Soln     250 mL    Inhale 4 mLs into the lungs 2 times daily       * Notice:  This list has 4 medication(s) that are the same as other medications prescribed for you. Read the directions carefully, and ask your doctor or other care provider to review them with you.      "

## 2017-05-30 NOTE — PROGRESS NOTES
[facility]  May 31, 2017         Assessment and Plan:   Dilan Nassar is a 31 year old male with cystic fibrosis, pancreatic insufficiency and CFRD who is seen today in clinic for follow up. In the past few months, has completed course of vancomycin, imipenem and bactrim. Started on fluconazole at last visit.       1. CF lung disease: doing well, at baseline without dyspnea and mainly dry cough. Has been very busy with work, vesting once/day for the last week, but typically BID. Did not tolerate vancomycin nebs. PFTs today improved to near his recent best (still below values from 11/15-2/16). Previous cultures + for MRSA, Klebsiella, Steno and Achromobacter. No acute issues at this time.  - Continue nebulizers, inhaler and vest therapy  - On chronic azithromycin    2. Elevated alk phos: since 10/15 per our EPIC charting with US today demonstrating coarse echotexture of the liver with hypertrophy of the caudate lobe and left lobe concerning for possible cirrhosis, no lesions, patent vasculature.   - MELD labs and viral hepatitis labs  - Continue Ursodiol  - Referral to Dr. Medina      3. Exocrine pancreatic insufficiency: denies symptoms of malabsorption. Weight is low with BMI of 19.8. Discussed increased supplements and snacks  - Continue pancreatic enzymes and vitamin supplementation      4. CFRD: AIC of 6.7 on 12/20/16. Saw Endocrine yesterday and was told his meter is broken. No symtpoms.   - Continue Lantus 12 U  - F/u with Endocrine as scheduled  - Routine diabetic eye exam in 2017      5. Anxiety/depression: mood is stable, does have periods where he feels down, mainly related to his health status. Not currently on medication and is not interested in Psychiatry or psychology referral.      RTC: in 3 months with routine spirometry  Annual studies due: September 2017--ordered      Jyothi Warren PA-C  Pulmonary, Allergy, Critical Care and Sleep  Medicine        Interval History:   Feeling pretty good. No chest tightness or shortness of breath. Cough is mainly dry, more productive in the am. Sputum is dark green/brown, no streaking or blood. Vesting BID.     No nausea, vomiting or abdominal cramps. Stools are twice/day.          Review of Systems:   Please see HPI. Otherwise, complete 10 point ROS negative.           Past Medical and Surgical History:     Past Medical History:   Diagnosis Date     Cystic fibrosis with pulmonary manifestations (H)     /[id number]     Past Surgical History:   Procedure Laterality Date     APPENDECTOMY OPEN  1999    Appendicitis      SINUS SURGERY  1990's    h/o many sinus infections           Family History:     Family History   Problem Relation Age of Onset     DIABETES Mother      Unknown type     Prostate Cancer Paternal Grandfather      LUNG DISEASE Other      Negative     DIABETES Maternal Aunt      unknown type     DIABETES Maternal Uncle      unknown type     DIABETES Maternal Grandmother      unknown type     Coronary Artery Disease Paternal Grandmother             Social History:     Social History     Social History     Marital status: Single     Spouse name: N/A     Number of children: N/A     Years of education: N/A     Occupational History     State Representative [employer]     Financial Counselor      Social History Main Topics     Smoking status: Never Smoker     Smokeless tobacco: Former User     Alcohol use 6.0 - 8.4 oz/week     10 - 14 Standard drinks or equivalent per week      Comment: 2 drinks per night 5X/wk     Drug use: No     Sexual activity: Yes     Partners: Female     Birth control/ protection: Pull-out method, Condom     Other Topics Concern     Parent/Sibling W/ Cabg, Mi Or Angioplasty Before 65f 55m? Yes     Social History Narrative    Lives alone in apartment in [place], [place]. 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   "       Nov 2015.  His main political issue is higher education.  His girlfriend is looking for work as an .  He eats 3 square meals per day, most of which are cooked by him or his girlfriend.  Gets outside to walk or run 20 minutes about 5x per week.  Works fairly regular daytime hours, often from home.            Medications:     Current Outpatient Prescriptions   Medication     insulin glargine (LANTUS SOLOSTAR) 100 UNIT/ML injection     CREON 14545 UNITS CPEP per EC capsule     amylase-lipase-protease (CREON) 66491 UNITS CPEP     insulin pen needle (CLICKFINE PEN NEEDLES) 32G X 4 MM     multivitamins CF formula (MVW COMPLETE FORMULATION) CAPS capsule     albuterol (2.5 MG/3ML) 0.083% neb solution     azithromycin (ZITHROMAX) 500 MG tablet     Cholecalciferol 5000 UNITS TABS     fluticasone (FLOVENT HFA) 110 MCG/ACT Inhaler     fluticasone (FLONASE) 50 MCG/ACT spray     phytonadione (MEPHYTON) 5 MG tablet     ursodiol (ACTIGALL) 300 MG capsule     albuterol (PROAIR HFA/PROVENTIL HFA/VENTOLIN HFA) 108 (90 BASE) MCG/ACT Inhaler     dornase alpha (PULMOZYME) 1 MG/ML nebulizer solution     ranitidine (ZANTAC) 75 MG tablet     water for injection sterile SOLN     Syringe/Needle, Disp, 18G X 1\" 6 ML MISC     order for DME     loratadine (CLARITIN) 10 MG tablet     blood glucose monitoring (FREESTYLE LITE) test strip     blood glucose monitoring (FREESTYLE) lancets     No current facility-administered medications for this visit.             Physical Exam:   /82 (BP Location: Right arm, Patient Position: Chair, Cuff Size: Adult Regular)  Pulse 86  Ht 1.63 m (5' 4.17\")  Wt 52.6 kg (115 lb 15.4 oz)  SpO2 95%  BMI 19.8 kg/m2    GENERAL: alert, NAD  HEENT: NCAT, EOMI, anicteric sclera, no nasal edema or erythema; canals and TMs clear; no oral mucosal edema or erythema  Neck: no cervical or supraclavicular adenopathy  Respiratory: good air flow, no crackles, rhonchi or wheezing  CV: RRR, S1S2, no " murmurs noted  Abdomen: normoactive BS, soft and non tender without organomegaly  Lymph: no edema, + digital clubbing  Neuro: AAO X 3, CN 2-12 grossly intact  Psychiatric: normal affect, good eye contact  Skin: no rash, jaundice or lesions on limited exam         Data:   All laboratory and imaging data reviewed.      Cystic Fibrosis Culture  Specimen Description   Date Value Ref Range Status   04/17/2017 Sputum  Final   12/22/2016 Sputum  Final   12/22/2016 Sputum  Final    Culture Micro   Date Value Ref Range Status   04/17/2017 (A)  Final    Light growth Normal mami  Light growth Aspergillus fumigatus  Moderate growth Stenotrophomonas maltophilia  Moderate growth Klebsiella oxytoca Susceptibility testing not routinely done     12/22/2016 (A)  Final    Heavy growth Normal mami  Light growth Stenotrophomonas maltophilia  Light growth Aspergillus fumigatus     12/22/2016 (A)  Final    Aspergillus fumigatus isolated  No additional fungi cultured after 4 weeks incubation          PFT interpretation:  Maneuver: valid and meets ATS guidelines  Moderate severe obstruction with decreased FEV1 and FEV1/FVC  Compared to prior: FEV1 improved 400 cc, but still below recent best (FEV1 of 2.2-2.4)

## 2017-05-30 NOTE — PROGRESS NOTES
CF Endocrinology Return Consultation:  Diabetes  :   Patient: Dilan Nassar MRN# [id number]   YOB: 1985 Age: 31 year old   Date of Visit: 5/30/2017  Dear Dr. Atilio Nieto:    I had the pleasure of seeing your patient, Dilan Nassar in the [facility], [facility] , on 5/30/2017 for a return consultation regarding CFRD.           Problem list:     Patient Active Problem List    Diagnosis Date Noted     Methicillin resistant Staphylococcus aureus infection 08/16/2016     Priority: Medium     Cystic fibrosis with pulmonary exacerbation (H) 10/23/2015     Priority: Medium     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) 10/16/2015     Priority: Medium     Diabetes mellitus related to cystic fibrosis (H) 10/16/2015     Priority: Medium     Exocrine pancreatic insufficiency 02/13/2015     Priority: Medium     Vitamin D deficiency 02/13/2015     Priority: Medium     Problem list name updated by automated process. Provider to review       Gout 02/13/2015     Priority: Medium     Problem list name updated by automated process. Provider to review       Allergic rhinitis 02/13/2015     Priority: Medium     Problem list name updated by automated process. Provider to review       Intestinal malabsorption 02/13/2015     Priority: Medium     Problem list name updated by automated process. Provider to review       Cystic fibrosis with pulmonary manifestations      Priority: Medium     H/o Pseudomonas, MRSA and Stenotrophomonas in sputum cultures.    Genotype:  01/23/2008   PORT CF   [id number]                HPI:   Dilan is a 31 year old male with Cystic Fibrosis Related Diabetes Mellitus (CFRD).    I have reviewed the available past laboratory evaluations, imaging studies, and medical records available to me at this visit. I have reviewed  Dilan'height and weight.    History was obtained from the patient and the medical record.  I have reviewed the notes of the pulmonary  care team entered into the medical record since I last saw the patient.    I have read and interpreted the data from the patient glucose and food records  He did not bring glucose meter      A1c:  Today s hemoglobin A1c: 6%  Previous two HbA1c results:   Lab Results   Component Value Date    A1C 6.7 12/20/2016    A1C 7.5 10/20/2016      Result was discussed at today's visit.     Current insulin regimen:   Injectable Insulin: Glargine (Lantus): 12 units  AM    Insulin administration site(s): abd    Weight is stable  His food diary show BG couples of time in the 20-30's and remain in the 30-40 range 2 hour post meal. Patient denies any symptoms and does not appear to be concerned about these. He reports frequently getting error readings on his meter.     Family history and social history were reviewed and updated from my previous visit.          Past Medical History:     Past Medical History:   Diagnosis Date     Cystic fibrosis with pulmonary manifestations (H)     /3659delc            Past Surgical History:     Past Surgical History:   Procedure Laterality Date     APPENDECTOMY OPEN  1999    Appendicitis      SINUS SURGERY  1990's    h/o many sinus infections               Social History:     Social History     Social History Narrative    Lives alone in apartment in [place], [place]. 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         Nov 2015.  His main political issue is higher education.  His girlfriend is looking for work as an .  He eats 3 square meals per day, most of which are cooked by him or his girlfriend.  Gets outside to walk or run 20 minutes about 5x per week.  Works fairly regular daytime hours, often from home.              Family History:     Family History   Problem Relation Age of Onset     DIABETES Mother      Unknown type     Prostate Cancer Paternal Grandfather      LUNG DISEASE Other      Negative     DIABETES  Maternal Aunt      unknown type     DIABETES Maternal Uncle      unknown type     DIABETES Maternal Grandmother      unknown type     Coronary Artery Disease Paternal Grandmother             Allergies:     Allergies   Allergen Reactions     Mold      Molds & Smuts Itching             Medications:     Current Outpatient Rx   Medication Sig Dispense Refill     CREON 10883 UNITS CPEP per EC capsule Take 5-6 capsules (60,000-72,000 Units) by mouth 3 times daily (with meals) 900 capsule 3     amylase-lipase-protease (CREON) 84703 UNITS CPEP TAKE 5-6 CAPSULES (60,000-72,000) BY MOUTH THREE TIMES A DAY WITH MEALS 600 each 1     insulin pen needle (CLICKFINE PEN NEEDLES) 32G X 4 MM 1 time per day. 100 each 12     multivitamins CF formula (MVW COMPLETE FORMULATION) CAPS capsule Take 1 capsule by mouth 2 times daily 60 capsule 11     albuterol (2.5 MG/3ML) 0.083% neb solution Take 1 vial (2.5 mg) by nebulization 3 times daily 540 mL 6     azithromycin (ZITHROMAX) 500 MG tablet Take 1 tablet (500 mg) by mouth Every Mon, Wed, Fri Morning 75 tablet 1     Cholecalciferol 5000 UNITS TABS Take 5,000 Units by mouth daily 30 tablet 1     fluticasone (FLOVENT HFA) 110 MCG/ACT Inhaler INHALE 1 PUFF INTO THE LUNGS TWO TIMES A DAY 1 Inhaler 3     fluticasone (FLONASE) 50 MCG/ACT spray SPRAY 2 SPRAYS INTO BOTH NOSTRILS DAILY 48 g 3     insulin glargine (LANTUS SOLOSTAR) 100 UNIT/ML injection Inject 12 Units Subcutaneous every morning 9 mL 3     phytonadione (MEPHYTON) 5 MG tablet TAKE ONE TABLET (5MG) BY MOUTH ONCE A WEEK 16 tablet 3     ursodiol (ACTIGALL) 300 MG capsule TAKE 1 CAPSULE (300MG ) BY MOUTH TWO TIMES A  capsule 3     albuterol (PROAIR HFA/PROVENTIL HFA/VENTOLIN HFA) 108 (90 BASE) MCG/ACT Inhaler Inhale 2 puffs into the lungs 2 times daily Following your vancomycin nebulizer treatment. 1 Inhaler 3     dornase alpha (PULMOZYME) 1 MG/ML nebulizer solution Inhale 2.5 mg into the lungs 2 times daily 150 mL 11      "ranitidine (ZANTAC) 75 MG tablet Take 1 tablet (75 mg) by mouth daily as needed for heartburn 30 tablet      water for injection sterile SOLN Inhale 4 mLs into the lungs 2 times daily 250 mL 5     Syringe/Needle, Disp, 18G X 1\" 6 ML MISC 1 each 2 times daily 60 each 5     order for DME Equipment being ordered: Nebulizer Supplies. Please dispense four (4) Ruby LC Plus nebulizer kits and four (4) RUBY face masks for this patient with Cystic Fibrosis. Patient requires additional nebulizer supplies due to his need to use multiple sterile neb cups for his inhaled medications that may not be mixed together. 4 kit 12     loratadine (CLARITIN) 10 MG tablet Take 1 tablet (10 mg) by mouth daily as needed for allergies 30 tablet      blood glucose monitoring (FREESTYLE LITE) test strip Use to test blood 4 times a day 120 each 12     blood glucose monitoring (FREESTYLE) lancets Use to test blood sugar 4 times daily or as directed. 2 Box 12             Review of Systems:     Comprehensive ROS negative other than the symptoms noted above in the HPI.          Physical Exam:   Blood pressure 112/71, pulse 81, temperature 97.9  F (36.6  C), temperature source Oral, resp. rate 18, height 1.626 m (5' 4\"), weight 53.5 kg (118 lb), SpO2 96 %.  Normalized stature-for-age data not available for patients older than 20 years.  Height: 5' 4\", Normalized stature-for-age data not available for patients older than 20 years.  Weight: 118 lbs 0 oz, Normalized weight-for-age data not available for patients older than 20 years.  BMI: Body mass index is 20.25 kg/(m^2)., Normalized BMI data available only for age 0 to 20 years.      CONSTITUTIONAL:   Awake, alert, and in no apparent distress.  HEAD: Normocephalic, without obvious abnormality.  EYES: Lids and lashes normal, sclera clear, conjunctiva normal.  ENT: external ears without lesions  NECK: Supple, symmetrical, trachea midline.  THYROID: symmetric, not enlarged and no " tenderness.  HEMATOLOGIC/LYMPHATIC: No cervical lymphadenopathy.  LUNGS: No increased work of breathing, clear to auscultation  with good air entry  CARDIOVASCULAR: Regular rate and rhythm, no murmurs.  ABDOMEN: Soft, non-distended, non-tender, no masses palpated, no hepatosplenomegally.  NEUROLOGIC:No focal deficits noted.   PSYCHIATRIC: Cooperative, no agitation.  SKIN: Insulin administration sites intact without lipohypertrophy. No acanthosis nigricans.  MUSCULOSKELETAL:  Full range of motion noted.  Motor strength and tone are normal.          Laboratory results:     TSH   Date Value Ref Range Status   10/20/2016 2.50 0.40 - 4.00 mU/L Final     Testosterone Total   Date Value Ref Range Status   10/20/2016 513 240 - 950 ng/dL Final     Comment:     This test was developed and its performance characteristics determined by the   [facility],  Special Chemistry Laboratory. It has   not been cleared or approved by the FDA. The laboratory is regulated under   CLIA   as qualified to perform high-complexity testing. This test is used for   clinical   purposes. It should not be regarded as investigational or for research.       Cholesterol   Date Value Ref Range Status   10/20/2016 104 <200 mg/dL Final     Albumin Urine mg/L   Date Value Ref Range Status   10/20/2016 7 mg/L Final     Triglycerides   Date Value Ref Range Status   10/20/2016 35 <150 mg/dL Final     HDL Cholesterol   Date Value Ref Range Status   10/20/2016 62 >39 mg/dL Final     LDL Cholesterol Calculated   Date Value Ref Range Status   10/20/2016 35 <100 mg/dL Final     Comment:     Desirable:       <100 mg/dl     Non HDL Cholesterol   Date Value Ref Range Status   10/20/2016 42 <130 mg/dL Final     Lab Results   Component Value Date    A1C 6.7 12/20/2016    A1C 7.5 10/20/2016    A1C 6.8 09/30/2016    A1C 6.5 11/13/2015    A1C 6.9 10/23/2015    No results found for: HEMOGLOBINA1        CF  Diabetes Health Maintenance    Date of  Diabetes Diagnosis: ~ 2012    Special Notes (if any):     Date Last Eye Exam: April 2017     Date Last Dental Appointment: every 6 months    Dates of Episodes Severe* Hypoglycemia (month/year, cumulative, ongoing, assess each visit): none   *Severe=patient unconscious, seizure, unable to help self    Last 25-Vitamin D (every year): 36 (oct 2016)    Last DXA, lowest Z-score (every 2 years): -0.3       ?Bisphosphonates (yes/no):     Last Urine Microalbumin (every year):      No results found for: MICROALBUMIN    Last Testosterone:   Testosterone Total   Date Value Ref Range Status   10/20/2016 513 240 - 950 ng/dL Final     Comment:     This test was developed and its performance characteristics determined by the   [facility],  Special Chemistry Laboratory. It has   not been cleared or approved by the FDA. The laboratory is regulated under   CLIA   as qualified to perform high-complexity testing. This test is used for   clinical   purposes. It should not be regarded as investigational or for research.        On testosterone therapy (yes/no)?     Date of Last Diabetes Visit:         Assessment and Plan:   Dilan is a 31 year old male with CFRD  A1C is 6%  Does not check regularly but over last 5 month several AM reading in the hypoglycemia range. He denies ever feeling any low symptoms.  Concern about meter issue and if these readings are correct. Will get him a new meter   Check BG 4-6 time daily with new meter and send data for review.     Diabetes is a complicated and dangerous illness which requires intensive monitoring and treatment to prevent both short-term and long-term consequences to various organs. Insulin therapy is life-saving, but is also associated with life-threatening toxicity (hypoglycemia).  Careful and continuous attention to balancing glucose levels, activity, diet and insulin dosage is necessary.    I have reviewed this plan with the patient and with the diabetes nurse  educator, who will communicate with the patient between visits for insulin adjustment.    Patient Instructions   Nice meeting you today Guillerom.    I will have Juli review the food log with you today.    You are having frequent low glucose especially in the morning. We are concerned about potential erroneous readings from your glucose meter, which maybe 5 years old now.  As discussed we will try to get a new glucose meter for you. Please send glucose reading after 2-3 days.  Decrease Lantus to 10 units daily in the morning.     Your recent vitamin D level and DXA scan looked ok.      See you back in 3-4 months      Thank you for allowing me to participate in the care of your patient.  Please do not hesitate to call with questions or concerns.    Sincerely,    JOSE Headley JORDAN MATTHEW

## 2017-05-30 NOTE — PATIENT INSTRUCTIONS
Nice meeting you today Guillermo.    I will have Juli review the food log with you today.    You are having frequent low glucose especially in the morning. We are concerned about potential erroneous readings from your glucose meter, which maybe 5 years old now.  As discussed we will try to get a new glucose meter for you. Please send glucose reading after 2-3 days.  Decrease Lantus to 10 units daily in the morning.     Your recent vitamin D level and DXA scan looked ok.      See you back in 3-4 months

## 2017-05-30 NOTE — NURSING NOTE
Chief Complaint   Patient presents with     RECHECK     Follow up on Guillermo and his CFRD     Tino Kenney CMA at 8:19 AM on 5/30/2017

## 2017-05-30 NOTE — LETTER
5/30/2017       RE: Dilan Nassar  [address]  [address]  [facility] [id number]     Dear Colleague,    Thank you for referring your patient, Dilan Nassar, to the [facility] FOR LUNG SCIENCE AND HEALTH at [facility]. Please see a copy of my visit note below.    CF Endocrinology Return Consultation:  Diabetes  :   Patient: Dilan Nassar MRN# [id number]   YOB: 1985 Age: 31 year old   Date of Visit: 5/30/2017  Dear Dr. Atilio Nieto:    I had the pleasure of seeing your patient, Dilan Nassar in the [facility], [facility] , on 5/30/2017 for a return consultation regarding CFRD.           Problem list:     Patient Active Problem List    Diagnosis Date Noted     Methicillin resistant Staphylococcus aureus infection 08/16/2016     Priority: Medium     Cystic fibrosis with pulmonary exacerbation (H) 10/23/2015     Priority: Medium     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) 10/16/2015     Priority: Medium     Diabetes mellitus related to cystic fibrosis (H) 10/16/2015     Priority: Medium     Exocrine pancreatic insufficiency 02/13/2015     Priority: Medium     Vitamin D deficiency 02/13/2015     Priority: Medium     Problem list name updated by automated process. Provider to review       Gout 02/13/2015     Priority: Medium     Problem list name updated by automated process. Provider to review       Allergic rhinitis 02/13/2015     Priority: Medium     Problem list name updated by automated process. Provider to review       Intestinal malabsorption 02/13/2015     Priority: Medium     Problem list name updated by automated process. Provider to review       Cystic fibrosis with pulmonary manifestations      Priority: Medium     H/o Pseudomonas, MRSA and Stenotrophomonas in sputum cultures.    Genotype:  01/23/2008   PORT CF   [id number]                HPI:   Dilan is a 31 year old male with Cystic Fibrosis Related Diabetes  Mell[place] (CFRD).    I have reviewed the available past laboratory evaluations, imaging studies, and medical records available to me at this visit. I have reviewed  Dilan'height and weight.    History was obtained from the patient and the medical record.  I have reviewed the notes of the pulmonary care team entered into the medical record since I last saw the patient.    I have read and interpreted the data from the patient glucose and food records  He did not bring glucose meter      A1c:  Today s hemoglobin A1c: 6%  Previous two HbA1c results:   Lab Results   Component Value Date    A1C 6.7 12/20/2016    A1C 7.5 10/20/2016      Result was discussed at today's visit.     Current insulin regimen:   Injectable Insulin: Glargine (Lantus): 12 units  AM    Insulin administration site(s): abd    Weight is stable  His food diary show BG couples of time in the 20-30's and remain in the 30-40 range 2 hour post meal. Patient denies any symptoms and does not appear to be concerned about these. He reports frequently getting error readings on his meter.     Family history and social history were reviewed and updated from my previous visit.          Past Medical History:     Past Medical History:   Diagnosis Date     Cystic fibrosis with pulmonary manifestations (H)     /3659delc            Past Surgical History:     Past Surgical History:   Procedure Laterality Date     APPENDECTOMY OPEN  1999    Appendicitis      SINUS SURGERY  1990's    h/o many sinus infections               Social History:     Social History     Social History Narrative    Lives alone in apartment in [place], [place]. 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         Nov 2015.  His main political issue is higher education.  His girlfriend is looking for work as an .  He eats 3 square meals per day, most of which are cooked by him or his girlfriend.  Gets outside to walk  or run 20 minutes about 5x per week.  Works fairly regular daytime hours, often from home.              Family History:     Family History   Problem Relation Age of Onset     DIABETES Mother      Unknown type     Prostate Cancer Paternal Grandfather      LUNG DISEASE Other      Negative     DIABETES Maternal Aunt      unknown type     DIABETES Maternal Uncle      unknown type     DIABETES Maternal Grandmother      unknown type     Coronary Artery Disease Paternal Grandmother             Allergies:     Allergies   Allergen Reactions     Mold      Molds & Smuts Itching             Medications:     Current Outpatient Rx   Medication Sig Dispense Refill     CREON 56029 UNITS CPEP per EC capsule Take 5-6 capsules (60,000-72,000 Units) by mouth 3 times daily (with meals) 900 capsule 3     amylase-lipase-protease (CREON) 57776 UNITS CPEP TAKE 5-6 CAPSULES (60,000-72,000) BY MOUTH THREE TIMES A DAY WITH MEALS 600 each 1     insulin pen needle (FlicstartFINE PEN NEEDLES) 32G X 4 MM 1 time per day. 100 each 12     multivitamins CF formula (MVW COMPLETE FORMULATION) CAPS capsule Take 1 capsule by mouth 2 times daily 60 capsule 11     albuterol (2.5 MG/3ML) 0.083% neb solution Take 1 vial (2.5 mg) by nebulization 3 times daily 540 mL 6     azithromycin (ZITHROMAX) 500 MG tablet Take 1 tablet (500 mg) by mouth Every Mon, Wed, Fri Morning 75 tablet 1     Cholecalciferol 5000 UNITS TABS Take 5,000 Units by mouth daily 30 tablet 1     fluticasone (FLOVENT HFA) 110 MCG/ACT Inhaler INHALE 1 PUFF INTO THE LUNGS TWO TIMES A DAY 1 Inhaler 3     fluticasone (FLONASE) 50 MCG/ACT spray SPRAY 2 SPRAYS INTO BOTH NOSTRILS DAILY 48 g 3     insulin glargine (LANTUS SOLOSTAR) 100 UNIT/ML injection Inject 12 Units Subcutaneous every morning 9 mL 3     phytonadione (MEPHYTON) 5 MG tablet TAKE ONE TABLET (5MG) BY MOUTH ONCE A WEEK 16 tablet 3     ursodiol (ACTIGALL) 300 MG capsule TAKE 1 CAPSULE (300MG ) BY MOUTH TWO TIMES A  capsule 3      "albuterol (PROAIR HFA/PROVENTIL HFA/VENTOLIN HFA) 108 (90 BASE) MCG/ACT Inhaler Inhale 2 puffs into the lungs 2 times daily Following your vancomycin nebulizer treatment. 1 Inhaler 3     dornase alpha (PULMOZYME) 1 MG/ML nebulizer solution Inhale 2.5 mg into the lungs 2 times daily 150 mL 11     ranitidine (ZANTAC) 75 MG tablet Take 1 tablet (75 mg) by mouth daily as needed for heartburn 30 tablet      water for injection sterile SOLN Inhale 4 mLs into the lungs 2 times daily 250 mL 5     Syringe/Needle, Disp, 18G X 1\" 6 ML MISC 1 each 2 times daily 60 each 5     order for DME Equipment being ordered: Nebulizer Supplies. Please dispense four (4) Ruby LC Plus nebulizer kits and four (4) RUBY face masks for this patient with Cystic Fibrosis. Patient requires additional nebulizer supplies due to his need to use multiple sterile neb cups for his inhaled medications that may not be mixed together. 4 kit 12     loratadine (CLARITIN) 10 MG tablet Take 1 tablet (10 mg) by mouth daily as needed for allergies 30 tablet      blood glucose monitoring (FREESTYLE LITE) test strip Use to test blood 4 times a day 120 each 12     blood glucose monitoring (FREESTYLE) lancets Use to test blood sugar 4 times daily or as directed. 2 Box 12             Review of Systems:     Comprehensive ROS negative other than the symptoms noted above in the HPI.          Physical Exam:   Blood pressure 112/71, pulse 81, temperature 97.9  F (36.6  C), temperature source Oral, resp. rate 18, height 1.626 m (5' 4\"), weight 53.5 kg (118 lb), SpO2 96 %.  Normalized stature-for-age data not available for patients older than 20 years.  Height: 5' 4\", Normalized stature-for-age data not available for patients older than 20 years.  Weight: 118 lbs 0 oz, Normalized weight-for-age data not available for patients older than 20 years.  BMI: Body mass index is 20.25 kg/(m^2)., Normalized BMI data available only for age 0 to 20 years.      CONSTITUTIONAL:   Awake, " alert, and in no apparent distress.  HEAD: Normocephalic, without obvious abnormality.  EYES: Lids and lashes normal, sclera clear, conjunctiva normal.  ENT: external ears without lesions  NECK: Supple, symmetrical, trachea midline.  THYROID: symmetric, not enlarged and no tenderness.  HEMATOLOGIC/LYMPHATIC: No cervical lymphadenopathy.  LUNGS: No increased work of breathing, clear to auscultation  with good air entry  CARDIOVASCULAR: Regular rate and rhythm, no murmurs.  ABDOMEN: Soft, non-distended, non-tender, no masses palpated, no hepatosplenomegally.  NEUROLOGIC:No focal deficits noted.   PSYCHIATRIC: Cooperative, no agitation.  SKIN: Insulin administration sites intact without lipohypertrophy. No acanthosis nigricans.  MUSCULOSKELETAL:  Full range of motion noted.  Motor strength and tone are normal.          Laboratory results:     TSH   Date Value Ref Range Status   10/20/2016 2.50 0.40 - 4.00 mU/L Final     Testosterone Total   Date Value Ref Range Status   10/20/2016 513 240 - 950 ng/dL Final     Comment:     This test was developed and its performance characteristics determined by the   [facility],  Special Chemistry Laboratory. It has   not been cleared or approved by the FDA. The laboratory is regulated under   CLIA   as qualified to perform high-complexity testing. This test is used for   clinical   purposes. It should not be regarded as investigational or for research.       Cholesterol   Date Value Ref Range Status   10/20/2016 104 <200 mg/dL Final     Albumin Urine mg/L   Date Value Ref Range Status   10/20/2016 7 mg/L Final     Triglycerides   Date Value Ref Range Status   10/20/2016 35 <150 mg/dL Final     HDL Cholesterol   Date Value Ref Range Status   10/20/2016 62 >39 mg/dL Final     LDL Cholesterol Calculated   Date Value Ref Range Status   10/20/2016 35 <100 mg/dL Final     Comment:     Desirable:       <100 mg/dl     Non HDL Cholesterol   Date Value Ref Range  Status   10/20/2016 42 <130 mg/dL Final     Lab Results   Component Value Date    A1C 6.7 12/20/2016    A1C 7.5 10/20/2016    A1C 6.8 09/30/2016    A1C 6.5 11/13/2015    A1C 6.9 10/23/2015    No results found for: HEMOGLOBINA1        CF  Diabetes Health Maintenance    Date of Diabetes Diagnosis: ~ 2012    Special Notes (if any):     Date Last Eye Exam: April 2017     Date Last Dental Appointment: every 6 months    Dates of Episodes Severe* Hypoglycemia (month/year, cumulative, ongoing, assess each visit): none   *Severe=patient unconscious, seizure, unable to help self    Last 25-Vitamin D (every year): 36 (oct 2016)    Last DXA, lowest Z-score (every 2 years): -0.3       ?Bisphosphonates (yes/no):     Last Urine Microalbumin (every year):      No results found for: MICROALBUMIN    Last Testosterone:   Testosterone Total   Date Value Ref Range Status   10/20/2016 513 240 - 950 ng/dL Final     Comment:     This test was developed and its performance characteristics determined by the   [facility],  Special Chemistry Laboratory. It has   not been cleared or approved by the FDA. The laboratory is regulated under   CLIA   as qualified to perform high-complexity testing. This test is used for   clinical   purposes. It should not be regarded as investigational or for research.        On testosterone therapy (yes/no)?     Date of Last Diabetes Visit:         Assessment and Plan:   Dilan is a 31 year old male with CFRD  A1C is 6%  Does not check regularly but over last 5 month several AM reading in the hypoglycemia range. He denies ever feeling any low symptoms.  Concern about meter issue and if these readings are correct. Will get him a new meter   Check BG 4-6 time daily with new meter and send data for review.     Diabetes is a complicated and dangerous illness which requires intensive monitoring and treatment to prevent both short-term and long-term consequences to various organs. Insulin  therapy is life-saving, but is also associated with life-threatening toxicity (hypoglycemia).  Careful and continuous attention to balancing glucose levels, activity, diet and insulin dosage is necessary.    I have reviewed this plan with the patient and with the diabetes nurse educator, who will communicate with the patient between visits for insulin adjustment.    Patient Instructions   Nice meeting you today Guillermo.    I will have Juli review the food log with you today.    You are having frequent low glucose especially in the morning. We are concerned about potential erroneous readings from your glucose meter, which maybe 5 years old now.  As discussed we will try to get a new glucose meter for you. Please send glucose reading after 2-3 days.  Decrease Lantus to 10 units daily in the morning.     Your recent vitamin D level and DXA scan looked ok.      See you back in 3-4 months      Thank you for allowing me to participate in the care of your patient.  Please do not hesitate to call with questions or concerns.    Sincerely,    JOSE Headley JORDAN MATTHEW

## 2017-05-31 ENCOUNTER — HOSPITAL ENCOUNTER (OUTPATIENT)
Dept: PHYSICAL THERAPY | Facility: CLINIC | Age: 32
Setting detail: THERAPIES SERIES
End: 2017-05-31
Payer: COMMERCIAL

## 2017-05-31 ENCOUNTER — OFFICE VISIT (OUTPATIENT)
Dept: PULMONOLOGY | Facility: CLINIC | Age: 32
End: 2017-05-31
Attending: PHYSICIAN ASSISTANT
Payer: COMMERCIAL

## 2017-05-31 VITALS
HEIGHT: 64 IN | DIASTOLIC BLOOD PRESSURE: 82 MMHG | OXYGEN SATURATION: 95 % | BODY MASS INDEX: 19.8 KG/M2 | WEIGHT: 115.96 LBS | HEART RATE: 86 BPM | SYSTOLIC BLOOD PRESSURE: 116 MMHG

## 2017-05-31 DIAGNOSIS — R74.8 ELEVATED ALKALINE PHOSPHATASE LEVEL: ICD-10-CM

## 2017-05-31 DIAGNOSIS — E84.0 CYSTIC FIBROSIS WITH PULMONARY MANIFESTATIONS (H): ICD-10-CM

## 2017-05-31 DIAGNOSIS — A49.02 METHICILLIN RESISTANT STAPHYLOCOCCUS AUREUS INFECTION: ICD-10-CM

## 2017-05-31 DIAGNOSIS — E08.9 DIABETES MELLITUS RELATED TO CYSTIC FIBROSIS (H): ICD-10-CM

## 2017-05-31 DIAGNOSIS — K86.81 EXOCRINE PANCREATIC INSUFFICIENCY: Chronic | ICD-10-CM

## 2017-05-31 DIAGNOSIS — E84.0 CYSTIC FIBROSIS WITH PULMONARY EXACERBATION (H): ICD-10-CM

## 2017-05-31 DIAGNOSIS — E84.0 CYSTIC FIBROSIS WITH PULMONARY MANIFESTATIONS (H): Primary | ICD-10-CM

## 2017-05-31 DIAGNOSIS — E55.9 VITAMIN D DEFICIENCY: ICD-10-CM

## 2017-05-31 DIAGNOSIS — J30.89 ALLERGIC RHINITIS DUE TO OTHER ALLERGIC TRIGGER, UNSPECIFIED RHINITIS SEASONALITY: ICD-10-CM

## 2017-05-31 DIAGNOSIS — E84.8 DIABETES MELLITUS RELATED TO CYSTIC FIBROSIS (H): ICD-10-CM

## 2017-05-31 LAB
ALBUMIN SERPL-MCNC: 3.6 G/DL (ref 3.4–5)
ALP SERPL-CCNC: 157 U/L (ref 40–150)
ALT SERPL W P-5'-P-CCNC: 34 U/L (ref 0–70)
ANION GAP SERPL CALCULATED.3IONS-SCNC: 8 MMOL/L (ref 3–14)
AST SERPL W P-5'-P-CCNC: 24 U/L (ref 0–45)
BILIRUB SERPL-MCNC: 0.4 MG/DL (ref 0.2–1.3)
BUN SERPL-MCNC: 15 MG/DL (ref 7–30)
CALCIUM SERPL-MCNC: 9.2 MG/DL (ref 8.5–10.1)
CHLORIDE SERPL-SCNC: 107 MMOL/L (ref 94–109)
CO2 SERPL-SCNC: 25 MMOL/L (ref 20–32)
CREAT SERPL-MCNC: 0.72 MG/DL (ref 0.66–1.25)
GFR SERPL CREATININE-BSD FRML MDRD: ABNORMAL ML/MIN/1.7M2
GLUCOSE SERPL-MCNC: 200 MG/DL (ref 70–99)
HAV IGG SER QL IA: NORMAL
HBV CORE AB SERPL QL IA: NONREACTIVE
HBV CORE IGM SERPL QL IA: NORMAL
HBV SURFACE AB SERPL IA-ACNC: 0.32 M[IU]/ML
HBV SURFACE AG SERPL QL IA: NONREACTIVE
HCV AB SERPL QL IA: NORMAL
HIV 1+2 AB+HIV1 P24 AG SERPL QL IA: NORMAL
INR PPP: 1.1 (ref 0.86–1.14)
POTASSIUM SERPL-SCNC: 4.4 MMOL/L (ref 3.4–5.3)
PROT SERPL-MCNC: 7.9 G/DL (ref 6.8–8.8)
SODIUM SERPL-SCNC: 140 MMOL/L (ref 133–144)

## 2017-05-31 PROCEDURE — 87186 SC STD MICRODIL/AGAR DIL: CPT | Performed by: INTERNAL MEDICINE

## 2017-05-31 PROCEDURE — 99211 OFF/OP EST MAY X REQ PHY/QHP: CPT | Mod: 25

## 2017-05-31 PROCEDURE — 87070 CULTURE OTHR SPECIMN AEROBIC: CPT | Performed by: INTERNAL MEDICINE

## 2017-05-31 PROCEDURE — 86706 HEP B SURFACE ANTIBODY: CPT | Performed by: PHYSICIAN ASSISTANT

## 2017-05-31 PROCEDURE — 87077 CULTURE AEROBIC IDENTIFY: CPT | Performed by: INTERNAL MEDICINE

## 2017-05-31 ASSESSMENT — PAIN SCALES - GENERAL: PAINLEVEL: NO PAIN (0)

## 2017-05-31 NOTE — MR AVS SNAPSHOT
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 Continue nebulizers and vest therapy.   2. Work on gaining weight with increased high protein/high calorie snacks.  3. Annual studies and appointment with Dr. Medina at the next f/u.  4. Enjoy your [employer]!    Annual Studies:   IGG   Date Value Ref Range Status   10/20/2016 1500 695 - 1620 mg/dL Final     No results found for: INS  There are no preventive care reminders to display for this patient.    Pulmonary Function Tests  FEV1: amount of air you can blow out in 1 second  FVC: total amount of air you can take in and blow out    Your Goals:         PFT Latest Ref Rng & Units 4/17/2017   FVC L 2.84   FEV1 L 1.51   FVC% % 65   FEV1% % 41          Airway Clearance: The Most Important Way to Keep Your Lungs Healthy  Vest Settings:    Hill-Rom Frequencies: 8, 9, 10 Pressure 10 Then, Frequencies 18, 19, 20 Pressure 6      RespirTech: Quick Start with Pressure of     Do each frequency for 5 minutes; Deflate vest after each frequency & cough 3 times before beginning the next setting.    Vest and Neb Therapy should be done  2-3 times/day.    Good Nutrition Can Improve Lung Function and Overall Health     Take ALL of your vitamins with food     Take 1/2 of your enzymes before EVERY meal/snack and the other 1/2 mid-meal/snack    Wt Readings from Last 3 Encounters:   05/30/17 53.5 kg (118 lb)   04/17/17 53.7 kg (118 lb 6.2 oz)   12/22/16 52.7 kg (116 lb 3.2 oz)       There is no height or weight on file to calculate BMI.         National CF Foundation Recommendations for BMI in CF Adults: Women: at least 22 Men: at least 23        Controlling Blood Sugars Helps Prevent Lung Infections & Improves Nutrition  Test blood sugar:     In the morning before eating (goal is )     2 hours after a meal (goal is less than 150)     When pre-meal glucose is greater than 150 add correction     At bedtime (if less than 100 eat a snack with 15 grams of carbohydrates  Last A1C Results:   Hemoglobin A1C   Date Value Ref Range Status   12/20/2016 6.7 % Final         If diabetic, measure A1C every 6 months. Goal is under 7%.    Staying Healthy    Research: If you are interested in learning about research opportunities or have questions, please contact Sonali Altamirano at [phone] or [email or web address].      CF Foundation: Compass is a personalized resource service to help you with the insurance, financial, legal and other issues you are facing.  It's free, confidential and available to anyone with CF.  Ask your  for more information or contact Compass directly at [facility] ([phone]) or compass@cff.org, or learn more at [email or web address].       CF Nurse Line:  Giovanny Pimentel: [phone]   Audra Smart, RT: [phone]     Daniela Whitt and Tamika Perez , Dieticians: [phone]   Meliza Cueva, Diabetes Nurse: [phone]    Areli Hodgson: [phone] or Carisa Berumen [phone], Social Workers   [email or web address]                          Follow-ups after your visit        Additional Services      GASTROENTEROLOGY ADULT REF CONSULT ONLY       Preferred Location:  H/o CF with findings suggestive of cirrhosis on US. To be scheduled with Dr. Terry      Please be aware that coverage of these services is subject to the terms and limitations of your health insurance plan.  Call member services at your health plan with any benefit or coverage questions.  Any procedures must be performed at a [facility] facility OR coordinated by your clinic's referral office.    Please bring the following with you to your appointment:    (1) Any X-Rays, CTs or MRIs which have been performed.  Contact the facility where they were done to arrange for  prior to your scheduled appointment.    (2) List of current medications   (3) This referral request   (4) Any documents/labs given to you for this referral                  Follow-up notes from your care team     Return in about 3 months (around 8/31/2017), or Please send down for labs, needs appt wt Dr. Medina in 3 months too with annuals studies.      Your next 10 appointments already scheduled     May 31, 2017 10:00 AM CDT   Lab with  LAB   [facility] ([facility])    [address] [place] [id number]   [phone]            Sep 08, 2017  9:00 AM CDT   Lab with  LAB   [facility] ([facility])    [address] [place] [address]   [phone]            Sep 08, 2017  9:20 AM CDT   (Arrive by 9:05 AM)   NEW CYSTIC FIBROSIS VISIT with Dick Medina MD   [facility] ([facility])    [address] [place] [address]   [phone]            Sep 08, 2017 10:00 AM CDT   CF LOOP with  PFL CF   [facility] Pulmonary Function Testing ([facility])    [address] [place] [address]   [phone]            Sep 08, 2017 10:50 AM CDT   (Arrive by  10:35 AM)   RETURN CYSTIC FIBROSIS VISIT with Atilio Nieto MD   [facility] ([facility])    [address] [facility]  [address] [facility] [address] [phone]              Future tests that were ordered for you today     Open Future Orders        Priority Expected Expires Ordered    Hepatitis Antibody A IgG Routine 5/31/2017 5/31/2017 5/31/2017    Hepatitis B core antibody IgM Routine 5/31/2017 5/31/2017 5/31/2017    Hepatitis B Surface Antibody Routine 5/31/2017 5/31/2017 5/31/2017    CBC with platelets differential Routine 8/16/2017 8/30/2017 5/31/2017    Erythrocyte sedimentation rate auto Routine 8/16/2017 8/30/2017 5/31/2017    Routine UA with microscopic Routine 8/16/2017 8/30/2017 5/31/2017    Albumin Random Urine Quantitative Routine 8/16/2017 8/30/2017 5/31/2017    Nocardia culture Routine 8/16/2017 8/30/2017 5/31/2017    Fungus Culture, non-blood Routine 8/16/2017 8/30/2017 5/31/2017    AFB Stain Non Blood Routine 8/16/2017 8/30/2017 5/31/2017    AFB Culture Non Blood Routine 8/16/2017 8/30/2017 5/31/2017    Cystic Fibrosis Culture Aerob Bacterial Routine 8/16/2017 8/30/2017 5/31/2017    Spirometry, Breathing Capacity Routine 8/16/2017 8/30/2017 5/31/2017    X-ray Chest 2 vws* Routine 8/16/2017 8/30/2017 5/31/2017    ALT Routine 8/16/2017 8/30/2017 5/31/2017    Basic metabolic panel Routine 8/16/2017 8/30/2017 5/31/2017    Lipid Profile Routine 8/16/2017 8/30/2017 5/31/2017    Albumin level Routine 8/16/2017 8/30/2017 5/31/2017    Alkaline phosphatase Routine 8/16/2017 8/30/2017 5/31/2017    AST Routine 8/16/2017 8/30/2017 5/31/2017    Iron Routine 8/16/2017 8/30/2017 5/31/2017    GGT Routine 8/16/2017 8/30/2017 5/31/2017    Hemoglobin A1c Routine 8/16/2017 8/30/2017 5/31/2017    IgA Routine 8/16/2017 8/30/2017 5/31/2017    IgG Routine 8/16/2017 8/30/2017 5/31/2017    IgM Routine 8/16/2017 8/30/2017 5/31/2017    IgE Routine 8/16/2017  8/30/2017 5/31/2017    INR Routine 8/16/2017 8/30/2017 5/31/2017    Magnesium Routine 8/16/2017 8/30/2017 5/31/2017    Phosphorus Routine 8/16/2017 8/30/2017 5/31/2017    Testosterone total  Routine 8/16/2017 8/30/2017 5/31/2017    Protein total Routine 8/16/2017 8/30/2017 5/31/2017    TSH with free T4 reflex Routine 8/16/2017 8/30/2017 5/31/2017    Vitamin A Routine 8/16/2017 8/30/2017 5/31/2017    Vitamin E Routine 8/16/2017 8/30/2017 5/31/2017    Vitamin D Deficiency Routine 8/16/2017 8/30/2017 5/31/2017    HIV Antigen Antibody Combo Routine 5/31/2017 5/31/2017 5/31/2017    Hepatitis B surface antigen Routine 5/31/2017 5/31/2017 5/31/2017    Hepatitis B core antibody Routine 5/31/2017 5/31/2017 5/31/2017    GASTROENTEROLOGY ADULT REF CONSULT ONLY Routine 5/31/2017 9/29/2017 5/31/2017    INR Routine 5/31/2017 5/31/2017 5/31/2017    Comprehensive metabolic panel Routine 5/31/2017 5/31/2017 5/31/2017    Hepatitis C antibody Routine 5/31/2017 5/31/2017 5/31/2017            Who to contact     If you have questions or need follow up information about today's clinic visit or your schedule please contact [facility] directly at [phone].  Normal or non-critical lab and imaging results will be communicated to you by ClasesDhart, letter or phone within 4 business days after the clinic has received the results. If you do not hear from us within 7 days, please contact the clinic through ClasesDhart or phone. If you have a critical or abnormal lab result, we will notify you by phone as soon as possible.  Submit refill requests through SoCloz or call your pharmacy and they will forward the refill request to us. Please allow 3 business days for your refill to be completed.          Additional Information About Your Visit        ClasesDharDegordian Information     SoCloz gives you secure access to your electronic health record. If you see a primary care provider, you can also send messages to your care team and  "make appointments. If you have questions, please call your primary care clinic.  If you do not have a primary care provider, please call [phone] and they will assist you.        Care EveryWhere ID     This is your Care EveryWhere ID. This could be used by other organizations to access your [facility] medical records  [id number]        Your Vitals Were     Pulse Height Pulse Oximetry BMI (Body Mass Index)          86 1.63 m (5' 4.17\") 95% 19.8 kg/m2         Blood Pressure from Last 3 Encounters:   05/31/17 116/82   05/30/17 112/71   04/17/17 119/79    Weight from Last 3 Encounters:   05/31/17 52.6 kg (115 lb 15.4 oz)   05/30/17 53.5 kg (118 lb)   04/17/17 53.7 kg (118 lb 6.2 oz)              We Performed the Following     Cystic Fibrosis Culture Aerob Bacterial        Primary Care Provider Office Phone # Fax #    Atilio Aaron Nieto [phone]        [facility] [address] [facility] [address]  [facility] [address]        Thank you!     Thank you for choosing [facility]  for your care. Our goal is always to provide you with excellent care. Hearing back from our patients is one way we can continue to improve our services. Please take a few minutes to complete the written survey that you may receive in the mail after your visit with us. Thank you!             Your Updated Medication List - Protect others around you: Learn how to safely use, store and throw away your medicines at www.disposemymeds.org.          This list is accurate as of: 5/31/17  9:56 AM.  Always use your most recent med list.                   Brand Name Dispense Instructions for use    * albuterol (2.5 MG/3ML) 0.083% neb solution     540 mL    Take 1 vial (2.5 mg) by nebulization 3 times daily       * albuterol 108 (90 BASE) MCG/ACT Inhaler    PROAIR HFA/PROVENTIL HFA/VENTOLIN HFA    1 Inhaler    Inhale 2 puffs into the lungs 2 times daily Following your vancomycin nebulizer treatment.       " azithromycin 500 MG tablet    ZITHROMAX    75 tablet    Take 1 tablet (500 mg) by mouth Every Mon, Wed, Fri Morning       blood glucose monitoring lancets     2 Box    Use to test blood sugar 4 times daily or as directed.       blood glucose monitoring test strip    FREESTYLE LITE    120 each    Use to test blood 4 times a day       Cholecalciferol 5000 UNITS Tabs     30 tablet    Take 5,000 Units by mouth daily       * CREON 30052 UNITS Cpep   Generic drug:  amylase-lipase-protease     900 capsule    Take 5-6 capsules (60,000-72,000 Units) by mouth 3 times daily (with meals)       * amylase-lipase-protease 14457 UNITS Cpep    CREON    600 each    TAKE 5-6 CAPSULES (60,000-72,000) BY MOUTH THREE TIMES A DAY WITH MEALS       dornase alpha 1 MG/ML neb solution    PULMOZYME    150 mL    Inhale 2.5 mg into the lungs 2 times daily       fluticasone 110 MCG/ACT Inhaler    FLOVENT HFA    1 Inhaler    INHALE 1 PUFF INTO THE LUNGS TWO TIMES A DAY       fluticasone 50 MCG/ACT spray    FLONASE    48 g    SPRAY 2 SPRAYS INTO BOTH NOSTRILS DAILY       insulin glargine 100 UNIT/ML injection    LANTUS SOLOSTAR    15 mL    Inject 12 Units Subcutaneous every morning       insulin pen needle 32G X 4 MM    CLICKFINE PEN NEEDLES    100 each    1 time per day.       loratadine 10 MG tablet    CLARITIN    30 tablet    Take 1 tablet (10 mg) by mouth daily as needed for allergies       multivitamins CF formula Caps capsule     60 capsule    Take 1 capsule by mouth 2 times daily       order for [facility]     4 kit    Equipment being ordered: Nebulizer Supplies. Please dispense four (4) Ruby LC Plus nebulizer kits and four (4) RUBY face masks for this patient with Cystic Fibrosis. Patient requires additional nebulizer supplies due to his need to use multiple sterile neb cups for his inhaled medications that may not be mixed together.       phytonadione 5 MG tablet    MEPHYTON    16 tablet    TAKE ONE TABLET (5MG) BY MOUTH ONCE A WEEK        "ranitidine 75 MG tablet    ZANTAC    30 tablet    Take 1 tablet (75 mg) by mouth daily as needed for heartburn       Syringe/Needle (Disp) 18G X 1\" 6 ML Misc     60 each    1 each 2 times daily       ursodiol 300 MG capsule    ACTIGALL    180 capsule    TAKE 1 CAPSULE (300MG ) BY MOUTH TWO TIMES A DAY       water for injection sterile Soln     250 mL    Inhale 4 mLs into the lungs 2 times daily       * Notice:  This list has 4 medication(s) that are the same as other medications prescribed for you. Read the directions carefully, and ask your doctor or other care provider to review them with you.      "

## 2017-05-31 NOTE — LETTER
5/31/2017       RE: Dilan Nassar  [address]  [address]  [facility] [address]     Dear Colleague,    Thank you for referring your patient, Dilan Nassar, to the [facility] at [facility]. Please see a copy of my visit note below.    [facility]  May 31, 2017         Assessment and Plan:   Dilan Nassar is a 31 year old male with cystic fibrosis, pancreatic insufficiency and CFRD who is seen today in clinic for follow up. In the past few months, has completed course of vancomycin, imipenem and bactrim. Started on fluconazole at last visit.       1. CF lung disease: doing well, at baseline without dyspnea and mainly dry cough. Has been very busy with work, vesting once/day for the last week, but typically BID. Did not tolerate vancomycin nebs. PFTs today improved to near his recent best (still below values from 11/15-2/16). Previous cultures + for MRSA, Klebsiella, Steno and Achromobacter. No acute issues at this time.  - Continue nebulizers, inhaler and vest therapy  - On chronic azithromycin    2. Elevated alk phos: since 10/15 per our EPIC charting with US today demonstrating coarse echotexture of the liver with hypertrophy of the caudate lobe and left lobe concerning for possible cirrhosis, no lesions, patent vasculature.   - MELD labs and viral hepatitis labs  - Continue Ursodiol  - Referral to Dr. Medina      3. Exocrine pancreatic insufficiency: denies symptoms of malabsorption. Weight is low with BMI of 19.8. Discussed increased supplements and snacks  - Continue pancreatic enzymes and vitamin supplementation      4. CFRD: AIC of 6.7 on 12/20/16. Saw Endocrine yesterday and was told his meter is broken. No symtpoms.   - Continue Lantus 12 U  - F/u with Endocrine as scheduled  - Routine diabetic eye exam in 2017      5. Anxiety/depression: mood is stable, does have periods where he  feels down, mainly related to his health status. Not currently on medication and is not interested in Psychiatry or psychology referral.      RTC: in 3 months with routine spirometry  Annual studies due: September 2017--ordered      Jyothi Warren PA-C  Pulmonary, Allergy, Critical Care and Sleep Medicine        Interval History:   Feeling pretty good. No chest tightness or shortness of breath. Cough is mainly dry, more productive in the am. Sputum is dark green/brown, no streaking or blood. Vesting BID.     No nausea, vomiting or abdominal cramps. Stools are twice/day.          Review of Systems:   Please see HPI. Otherwise, complete 10 point ROS negative.           Past Medical and Surgical History:     Past Medical History:   Diagnosis Date     Cystic fibrosis with pulmonary manifestations (H)     /[id number]     Past Surgical History:   Procedure Laterality Date     APPENDECTOMY OPEN  1999    Appendicitis      SINUS SURGERY  1990's    h/o many sinus infections           Family History:     Family History   Problem Relation Age of Onset     DIABETES Mother      Unknown type     Prostate Cancer Paternal Grandfather      LUNG DISEASE Other      Negative     DIABETES Maternal Aunt      unknown type     DIABETES Maternal Uncle      unknown type     DIABETES Maternal Grandmother      unknown type     Coronary Artery Disease Paternal Grandmother             Social History:     Social History     Social History     Marital status: Single     Spouse name: N/A     Number of children: N/A     Years of education: N/A     Occupational History     State Representative [employer]     Financial Counselor      Social History Main Topics     Smoking status: Never Smoker     Smokeless tobacco: Former User     Alcohol use 6.0 - 8.4 oz/week     10 - 14 Standard drinks or equivalent per week      Comment: 2 drinks per night 5X/wk     Drug use: No     Sexual activity: Yes     Partners: Female     Birth control/ protection:  "Pull-out method, Condom     Other Topics Concern     Parent/Sibling W/ Cabg, Mi Or Angioplasty Before 65f 55m? Yes     Social History Narrative    Lives alone in apartment in [place], [place]. 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         Nov 2015.  His main political issue is higher education.  His girlfriend is looking for work as an .  He eats 3 square meals per day, most of which are cooked by him or his girlfriend.  Gets outside to walk or run 20 minutes about 5x per week.  Works fairly regular daytime hours, often from home.            Medications:     Current Outpatient Prescriptions   Medication     insulin glargine (LANTUS SOLOSTAR) 100 UNIT/ML injection     CREON 34547 UNITS CPEP per EC capsule     amylase-lipase-protease (CREON) 08617 UNITS CPEP     insulin pen needle (LK FREEMANFINE PEN NEEDLES) 32G X 4 MM     multivitamins CF formula (MVW COMPLETE FORMULATION) CAPS capsule     albuterol (2.5 MG/3ML) 0.083% neb solution     azithromycin (ZITHROMAX) 500 MG tablet     Cholecalciferol 5000 UNITS TABS     fluticasone (FLOVENT HFA) 110 MCG/ACT Inhaler     fluticasone (FLONASE) 50 MCG/ACT spray     phytonadione (MEPHYTON) 5 MG tablet     ursodiol (ACTIGALL) 300 MG capsule     albuterol (PROAIR HFA/PROVENTIL HFA/VENTOLIN HFA) 108 (90 BASE) MCG/ACT Inhaler     dornase alpha (PULMOZYME) 1 MG/ML nebulizer solution     ranitidine (ZANTAC) 75 MG tablet     water for injection sterile SOLN     Syringe/Needle, Disp, 18G X 1\" 6 ML MISC     order for DME     loratadine (CLARITIN) 10 MG tablet     blood glucose monitoring (FREESTYLE LITE) test strip     blood glucose monitoring (FREESTYLE) lancets     No current facility-administered medications for this visit.             Physical Exam:   /82 (BP Location: Right arm, Patient Position: Chair, Cuff Size: Adult Regular)  Pulse 86  Ht 1.63 m (5' 4.17\")  Wt 52.6 kg (115 lb 15.4 oz)  SpO2 " 95%  BMI 19.8 kg/m2    GENERAL: alert, NAD  HEENT: NCAT, EOMI, anicteric sclera, no nasal edema or erythema; canals and TMs clear; no oral mucosal edema or erythema  Neck: no cervical or supraclavicular adenopathy  Respiratory: good air flow, no crackles, rhonchi or wheezing  CV: RRR, S1S2, no murmurs noted  Abdomen: normoactive BS, soft and non tender without organomegaly  Lymph: no edema, + digital clubbing  Neuro: AAO X 3, CN 2-12 grossly intact  Psychiatric: normal affect, good eye contact  Skin: no rash, jaundice or lesions on limited exam         Data:   All laboratory and imaging data reviewed.      Cystic Fibrosis Culture  Specimen Description   Date Value Ref Range Status   04/17/2017 Sputum  Final   12/22/2016 Sputum  Final   12/22/2016 Sputum  Final    Culture Micro   Date Value Ref Range Status   04/17/2017 (A)  Final    Light growth Normal mami  Light growth Aspergillus fumigatus  Moderate growth Stenotrophomonas maltophilia  Moderate growth Klebsiella oxytoca Susceptibility testing not routinely done     12/22/2016 (A)  Final    Heavy growth Normal mami  Light growth Stenotrophomonas maltophilia  Light growth Aspergillus fumigatus     12/22/2016 (A)  Final    Aspergillus fumigatus isolated  No additional fungi cultured after 4 weeks incubation          PFT interpretation:  Maneuver: valid and meets ATS guidelines  Moderate severe obstruction with decreased FEV1 and FEV1/FVC  Compared to prior: FEV1 improved 400 cc, but still below recent best (FEV1 of 2.2-2.4)      Again, thank you for allowing me to participate in the care of your patient.      Sincerely,    Jyothi Warren PA-C

## 2017-05-31 NOTE — PATIENT INSTRUCTIONS
Cystic Fibrosis Self-Care Plan       Patient: Dilan Nassar   MRN: [id number]   Clinic Date: May 31, 2017     RECOMMENDATIONS:  1. Continue nebulizers and vest therapy.   2. Work on gaining weight with increased high protein/high calorie snacks.  3. Annual studies and appointment with Dr. Medina at the next f/u.  4. Enjoy your concerts!    Annual Studies:   IGG   Date Value Ref Range Status   10/20/2016 1500 695 - 1620 mg/dL Final     No results found for: INS  There are no preventive care reminders to display for this patient.    Pulmonary Function Tests  FEV1: amount of air you can blow out in 1 second  FVC: total amount of air you can take in and blow out    Your Goals:         PFT Latest Ref Rng & Units 4/17/2017   FVC L 2.84   FEV1 L 1.51   FVC% % 65   FEV1% % 41          Airway Clearance: The Most Important Way to Keep Your Lungs Healthy  Vest Settings:    Hill-Rom Frequencies: 8, 9, 10 Pressure 10 Then, Frequencies 18, 19, 20 Pressure 6      RespirTech: Quick Start with Pressure of     Do each frequency for 5 minutes; Deflate vest after each frequency & cough 3 times before beginning the next setting.    Vest and Neb Therapy should be done 2-3 times/day.    Good Nutrition Can Improve Lung Function and Overall Health     Take ALL of your vitamins with food     Take 1/2 of your enzymes before EVERY meal/snack and the other 1/2 mid-meal/snack    Wt Readings from Last 3 Encounters:   05/30/17 53.5 kg (118 lb)   04/17/17 53.7 kg (118 lb 6.2 oz)   12/22/16 52.7 kg (116 lb 3.2 oz)       There is no height or weight on file to calculate BMI.         National CF Foundation Recommendations for BMI in CF Adults: Women: at least 22 Men: at least 23        Controlling Blood Sugars Helps Prevent Lung Infections & Improves Nutrition  Test blood sugar:     In the morning before eating (goal is )     2 hours after a meal (goal is less than 150)     When pre-meal glucose is greater than 150 add correction     At  bedtime (if less than 100 eat a snack with 15 grams of carbohydrates  Last A1C Results:   Hemoglobin A1C   Date Value Ref Range Status   12/20/2016 6.7 % Final         If diabetic, measure A1C every 6 months. Goal is under 7%.    Staying Healthy    Research: If you are interested in learning about research opportunities or have questions, please contact Sonali Altamirano at [phone] or [email or web address].       Foundation: Compass is a personalized resource service to help you with the insurance, financial, legal and other issues you are facing.  It's free, confidential and available to anyone with CF.  Ask your  for more information or contact Compass directly at 948-COMPASS ([phone]) or compass@cff.org, or learn more at cff.org/compass.       CF Nurse Line:  Giovanny Pimentel: [phone]   Audra Smart, RT: [phone]     Daniela Whitt and Tamika Perez , Dieticians: [phone]   Meliza Cueva, Diabetes Nurse: [phone]    Areli Hodgson: [phone] or Carisa Berumen [phone], Social Workers   [email or web address]

## 2017-05-31 NOTE — NURSING NOTE
Chief Complaint   Patient presents with     Cystic Fibrosis     Return Visit- one month f/u    Juli Mckenzie at 8:50 AM on 5/31/2017

## 2017-05-31 NOTE — PROGRESS NOTES
05/31/17 0900   Quick Adds   Type of Visit (CF Screen only)   General Information   Referring Physician Jyothi Warren PA-C   Orders Evaluate and Treat as Indicated   Order Date 05/29/17   Medical Diagnosis Cystic Fibrosis   Surgical/Medical history reviewed Yes   Pertinent history of current problem (include personal factors and/or comorbidities that impact the POC) Pt reports no concerns that PT would address   Prior level of function comment PT: Pt is I with all mobility, works as a State Senator. Pt reports he typically likes to exercise 4-5x/week biking, running and incorporates some free weights a few days a week as well. Pt reports he has not been exercising as much recently as he has been very busy in session.    Patient role/Employment history Employed   Living environment [facility]   General Information Comments PT: Pt agreeable to PT CF screen. Pt reports no incontinence of urine. Educated pt on PT's ability to provide treatment and education to improve incontinence and lessen the effects on daily activities and mobility. Pt verbalized understanding of available treatment and declined the need to address this with PT at this time.   Fall Risk Screen   Fall screen completed by PT   Per patient - Fall 2 or more times in past year? No   Per patient - Fall with injury in past year? No   Is patient a fall risk? No   Pain   Patient currently in pain No   Cognitive Status Examination   Orientation orientation to person, place and time   Level of Consciousness alert   Follows Commands and Answers Questions 100% of the time   Personal Safety and Judgment intact   Memory intact   Posture   Posture Comments PT: Pt demonstrates good posture with mild shoulder protraction and no scapular winging. Pt's thoracic spine is in neutral alignment and cervical spine is in neutral alignment.   Range of Motion (ROM)   ROM Comment PT: Pt able to demonstrate full shoulder forward flexion while still maintaining  neutral spinal positioning, and with no pain. Pt achieves forward bend with finger tips 20cm from floor. Pt demonstrates upper thoracic excursion as measured from base humeral head to bottom last rib, and lower thoracic excursion as measured from bottom last rib to greater trochanter. Lateral trunk flexion to L: upper excursion 3cm and lower excursion 4cm. Lateral trunk flexion to R: upper excursion 3cm and lower excursion 3cm.   Strength   Strength Comments PT: Pt able to demonstrate a plank and maintain position for 1:29 minutes:seconds. Pt demonstrates moderately protracted scapular positioning, neutral spinal alignment and neutral hip alignment. With fatigue, pt began to demonstrate change in spinal positioning moving into very mild ext, at 0:45 minutes:seconds.  Pt demonstrates 3 sit ups in hooklying position. Pt initially achieves 60% of full range of motion, hands positioned behind head. Pt initiates the sit up utilizing abd strength and no spinal flexion.   Clinical Impression   Criteria for Skilled Therapeutic Interventions Met no problems identified which require skilled intervention  (screen only completed)   Risk & Benefits of therapy have been explained Yes   Patient, Family & other staff in agreement with plan of care Yes

## 2017-05-31 NOTE — MR AVS SNAPSHOT
After Visit Summary   5/31/2017    Dilan Nassar    MRN: [id number]           Patient Information     Date Of Birth          1985        Visit Information        Provider Department      5/31/2017  8:40 AM Komal Knowles PT [facility] Physical Therapy Outpatient Cystic Fibrosis Clinic         Follow-ups after your visit        Your next 10 appointments already scheduled     Sep 08, 2017  9:00 AM CDT   Lab with  LAB    [facility] Lab ([facility])    [address]  [address] [facility] [address]   [phone]            Sep 08, 2017  9:20 AM CDT   (Arrive by 9:05 AM)   NEW CYSTIC FIBROSIS VISIT with Dick Medina MD   [facility] ([facility])    [address] [place] [address]  [address] [facility] [address]   [phone]            Sep 08, 2017 10:00 AM CDT   CF LOOP with  PFL CF   [facility] Pulmonary Function Testing ([facility])    [address] [place] [address]  [address] [facility] [address]   [phone]            Sep 08, 2017 10:50 AM CDT   (Arrive by 10:35 AM)   RETURN CYSTIC FIBROSIS VISIT with Atilio Nieto MD   [facility] ([facility])    [address] [place] [address]  [address] [facility] [address]   [phone]              Future tests that were ordered for you today     Open Future Orders        Priority Expected Expires Ordered    Hepatitis Antibody A IgG Routine 5/31/2017 5/31/2017 5/31/2017    Hepatitis B core antibody IgM Routine 5/31/2017 5/31/2017 5/31/2017    Hepatitis B Surface Antibody Routine 5/31/2017 5/31/2017 5/31/2017    CBC with platelets differential Routine 8/16/2017 8/30/2017 5/31/2017    Erythrocyte sedimentation rate auto Routine 8/16/2017 8/30/2017 5/31/2017    Routine UA with microscopic Routine 8/16/2017 8/30/2017 5/31/2017    Albumin Random Urine Quantitative Routine 8/16/2017  8/30/2017 5/31/2017    Nocardia culture Routine 8/16/2017 8/30/2017 5/31/2017    Fungus Culture, non-blood Routine 8/16/2017 8/30/2017 5/31/2017    AFB Stain Non Blood Routine 8/16/2017 8/30/2017 5/31/2017    AFB Culture Non Blood Routine 8/16/2017 8/30/2017 5/31/2017    Cystic Fibrosis Culture Aerob Bacterial Routine 8/16/2017 8/30/2017 5/31/2017    Spirometry, Breathing Capacity Routine 8/16/2017 8/30/2017 5/31/2017    X-ray Chest 2 vws* Routine 8/16/2017 8/30/2017 5/31/2017    ALT Routine 8/16/2017 8/30/2017 5/31/2017    Basic metabolic panel Routine 8/16/2017 8/30/2017 5/31/2017    Lipid Profile Routine 8/16/2017 8/30/2017 5/31/2017    Albumin level Routine 8/16/2017 8/30/2017 5/31/2017    Alkaline phosphatase Routine 8/16/2017 8/30/2017 5/31/2017    AST Routine 8/16/2017 8/30/2017 5/31/2017    Iron Routine 8/16/2017 8/30/2017 5/31/2017    GGT Routine 8/16/2017 8/30/2017 5/31/2017    Hemoglobin A1c Routine 8/16/2017 8/30/2017 5/31/2017    IgA Routine 8/16/2017 8/30/2017 5/31/2017    IgG Routine 8/16/2017 8/30/2017 5/31/2017    IgM Routine 8/16/2017 8/30/2017 5/31/2017    IgE Routine 8/16/2017 8/30/2017 5/31/2017    INR Routine 8/16/2017 8/30/2017 5/31/2017    Magnesium Routine 8/16/2017 8/30/2017 5/31/2017    Phosphorus Routine 8/16/2017 8/30/2017 5/31/2017    Testosterone total  Routine 8/16/2017 8/30/2017 5/31/2017    Protein total Routine 8/16/2017 8/30/2017 5/31/2017    TSH with free T4 reflex Routine 8/16/2017 8/30/2017 5/31/2017    Vitamin A Routine 8/16/2017/2017 8/30/2017 5/31/2017    Vitamin E Routine 8/16/2017 8/30/2017 5/31/2017    Vitamin D Deficiency Routine 8/16/2017 8/30/2017 5/31/2017    HIV Antigen Antibody Combo Routine 5/31/2017 5/31/2017 5/31/2017    Hepatitis B surface antigen Routine 5/31/2017 5/31/2017 5/31/2017    Hepatitis B core antibody Routine 5/31/2017 5/31/2017 5/31/2017    GASTROENTEROLOGY ADULT REF CONSULT ONLY Routine 5/31/2017 9/29/2017 5/31/2017    INR Routine 5/31/2017 5/31/2017  5/31/2017    Comprehensive metabolic panel Routine 5/31/2017 5/31/2017 5/31/2017    Hepatitis C antibody Routine 5/31/2017 5/31/2017 5/31/2017            Who to contact     If you have questions or need follow up information about today's clinic visit or your schedule please contact [facility] PHYSICAL THERAPY OUTPATIENT CYSTIC FIBROSIS CLINIC directly at [phone].  Normal or non-critical lab and imaging results will be communicated to you by [employer], letter or phone within 4 business days after the clinic has received the results. If you do not hear from us within 7 days, please contact the clinic through [employer] or phone. If you have a critical or abnormal lab result, we will notify you by phone as soon as possible.  Submit refill requests through [employer] or call your pharmacy and they will forward the refill request to us. Please allow 3 business days for your refill to be completed.          Additional Information About Your Visit        [employer] Information     [employer] gives you secure access to your electronic health record. If you see a primary care provider, you can also send messages to your care team and make appointments. If you have questions, please call your primary care clinic.  If you do not have a primary care provider, please call [phone] and they will assist you.        Care EveryWhere ID     This is your Care EveryWhere ID. This could be used by other organizations to access your [facility] medical records  [id number]         Blood Pressure from Last 3 Encounters:   05/31/17 116/82   05/30/17 112/71   04/17/17 119/79    Weight from Last 3 Encounters:   05/31/17 52.6 kg (115 lb 15.4 oz)   05/30/17 53.5 kg (118 lb)   04/17/17 53.7 kg (118 lb 6.2 oz)              Today, you had the following     No orders found for display       Primary Care Provider Office Phone # Fax #    Atilio Nieto [phone]        [facility] [address] [facility] [address]  [facility] [address]    "     Thank you!     Thank you for choosing [facility] PHYSICAL THERAPY OUTPATIENT CYSTIC FIBROSIS CLINIC  for your care. Our goal is always to provide you with excellent care. Hearing back from our patients is one way we can continue to improve our services. Please take a few minutes to complete the written survey that you may receive in the mail after your visit with us. Thank you!             Your Updated Medication List - Protect others around you: Learn how to safely use, store and throw away your medicines at www.disposemymeds.org.      TAKE these medications        Brand Name Dispense Instructions for use    blood glucose monitoring lancets     2 Box    Use to test blood sugar 4 times daily or as directed.       blood glucose monitoring test strip    FREESTYLE LITE    120 each    Use to test blood 4 times a day       insulin pen needle 32G X 4 MM    CLICKFINE PEN NEEDLES    100 each    1 time per day.       order for DME     4 kit    Equipment being ordered: Nebulizer Supplies. Please dispense four (4) Ruby LC Plus nebulizer kits and four (4) RUBY face masks for this patient with Cystic Fibrosis. Patient requires additional nebulizer supplies due to his need to use multiple sterile neb cups for his inhaled medications that may not be mixed together.       Syringe/Needle (Disp) 18G X 1\" 6 ML Misc     60 each    1 each 2 times daily         ASK your doctor about these medications        Brand Name Dispense Instructions for use    * albuterol (2.5 MG/3ML) 0.083% neb solution     540 mL    Take 1 vial (2.5 mg) by nebulization 3 times daily       * albuterol 108 (90 BASE) MCG/ACT Inhaler    PROAIR HFA/PROVENTIL HFA/VENTOLIN HFA    1 Inhaler    Inhale 2 puffs into the lungs 2 times daily Following your vancomycin nebulizer treatment.       azithromycin 500 MG tablet    ZITHROMAX    75 tablet    Take 1 tablet (500 mg) by mouth Every Mon, Wed, Fri Morning       Cholecalciferol 5000 UNITS Tabs     30 tablet    Take 5,000 " Units by mouth daily       * CREON 97881 UNITS Cpep   Generic drug:  amylase-lipase-protease     900 capsule    Take 5-6 capsules (60,000-72,000 Units) by mouth 3 times daily (with meals)       * amylase-lipase-protease 82848 UNITS Cpep    CREON    600 each    TAKE 5-6 CAPSULES (60,000-72,000) BY MOUTH THREE TIMES A DAY WITH MEALS       dornase alpha 1 MG/ML neb solution    PULMOZYME    150 mL    Inhale 2.5 mg into the lungs 2 times daily       fluticasone 110 MCG/ACT Inhaler    FLOVENT HFA    1 Inhaler    INHALE 1 PUFF INTO THE LUNGS TWO TIMES A DAY       fluticasone 50 MCG/ACT spray    FLONASE    48 g    SPRAY 2 SPRAYS INTO BOTH NOSTRILS DAILY       insulin glargine 100 UNIT/ML injection    LANTUS SOLOSTAR    15 mL    Inject 12 Units Subcutaneous every morning       loratadine 10 MG tablet    CLARITIN    30 tablet    Take 1 tablet (10 mg) by mouth daily as needed for allergies       multivitamins CF formula Caps capsule     60 capsule    Take 1 capsule by mouth 2 times daily       phytonadione 5 MG tablet    MEPHYTON    16 tablet    TAKE ONE TABLET (5MG) BY MOUTH ONCE A WEEK       ranitidine 75 MG tablet    ZANTAC    30 tablet    Take 1 tablet (75 mg) by mouth daily as needed for heartburn       ursodiol 300 MG capsule    ACTIGALL    180 capsule    TAKE 1 CAPSULE (300MG ) BY MOUTH TWO TIMES A DAY       water for injection sterile Soln     250 mL    Inhale 4 mLs into the lungs 2 times daily       * Notice:  This list has 4 medication(s) that are the same as other medications prescribed for you. Read the directions carefully, and ask your doctor or other care provider to review them with you.

## 2017-06-27 LAB
EXPTIME-PRE: 12.13 SEC
FEF2575-%PRED-PRE: 16 %
FEF2575-PRE: 0.64 L/SEC
FEF2575-PRED: 3.79 L/SEC
FEFMAX-%PRED-PRE: 74 %
FEFMAX-PRE: 6.67 L/SEC
FEFMAX-PRED: 8.99 L/SEC
FEV1-%PRED-PRE: 52 %
FEV1-PRE: 1.9 L
FEV1FEV6-PRE: 58 %
FEV1FEV6-PRED: 83 %
FEV1FVC-PRE: 52 %
FEV1FVC-PRED: 83 %
FIFMAX-PRE: 6.08 L/SEC
FVC-%PRED-PRE: 83 %
FVC-PRE: 3.65 L
FVC-PRED: 4.36 L

## 2017-08-02 DIAGNOSIS — E84.9 CF (CYSTIC FIBROSIS) (H): ICD-10-CM

## 2017-08-03 RX ORDER — ALLOPURINOL 300 MG/1
TABLET ORAL
Qty: 90 TABLET | Refills: 3 | Status: SHIPPED | OUTPATIENT
Start: 2017-08-03 | End: 2018-08-07

## 2017-09-13 NOTE — PROGRESS NOTES
[facility]  September 15, 2017         Assessment and Plan:   Dilan Nassar is a 31 year old male with cystic fibrosis, pancreatic insufficiency and CFRD who is seen today in clinic for follow up. In the past few months, has completed course of vancomycin, imipenem and bactrim. Started on fluconazole at last visit.       1. CF lung disease: doing well over the last few months, did have one episode where he thought he inhaled some mold and had an increased productive cough after that, but overall improving. Still with slightly more chest congestion that prior to that episode. Vesting BID-TID if able, not very physically active. PFTs today improved to his recent best. Previous cultures + for MRSA, Klebsiella, Steno and Achromobacter. No acute issues at this time.  - Continue nebulizers, inhaler and vest therapy  - On chronic azithromycin  - Increase physical activity     2. Elevated alk phos: since 10/15 per our EPIC charting with US demonstrating coarse echotexture of the liver with hypertrophy of the caudate lobe and left lobe concerning for possible cirrhosis, no lesions, patent vasculature. Viral serologies from May WNL. LFTs normalized on labs today.   - Continue Ursodiol  - Defer referral to GI for now      3. Exocrine pancreatic insufficiency: denies symptoms of malabsorption. Weight is low, but stable. BMI below goal of 23.   - Continue pancreatic enzymes and vitamin supplementation      4. CFRD: AIC pending for today. Hasn't been check BS as frequently as he should, recently a 59, a 91 and a 100. Asymptomatic with the low sugars.   - Continue Lantus 12 U  - Routine diabetic eye exam in 2017      5. 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 Not currently on medication.     6. HCM: reviewed labs and imaging with patient: CBC, CMP, lipid panel and TSH WNL. UA WNL as well. CXR reviewed by me demonstrates  stable changes of cystic fibrosis. Vitamin levels, testosterone, A1C and IgGs pending.     7. CF related sinus disease: no current symptoms.   - Continue Flonase and Claritin      RTC: in 3 months with routine spirometry  Annual studies due: September 2018      Jyothi Warren PA-C  Pulmonary, Allergy, Critical Care and Sleep Medicine        Interval History:   Started jack. Feeling pretty good, only concern is that in late July, he was digging in a shed and since then, he's felt more junky. Cough at basline, more productive in the am, greenish sputum. No streaking or blood. No shortness of breath, can walk up to the 3rd floor. No chest pain, some occasional congestion. Vesting BID, some TID. No fever, chills or allergies.             Review of Systems:   Please see HPI. Otherwise, complete 10 point ROS negative.           Past Medical and Surgical History:     Past Medical History:   Diagnosis Date     Cystic fibrosis with pulmonary manifestations (H)     /[id number]     Past Surgical History:   Procedure Laterality Date     APPENDECTOMY OPEN  1999    Appendicitis      SINUS SURGERY  1990's    h/o many sinus infections           Family History:     Family History   Problem Relation Age of Onset     DIABETES Mother      Unknown type     Prostate Cancer Paternal Grandfather      LUNG DISEASE Other      Negative     DIABETES Maternal Aunt      unknown type     DIABETES Maternal Uncle      unknown type     DIABETES Maternal Grandmother      unknown type     Coronary Artery Disease Paternal Grandmother             Social History:     Social History     Social History     Marital status: Single     Spouse name: N/A     Number of children: N/A     Years of education: N/A     Occupational History     State Representative [employer]     Financial Counselor      Social History Main Topics     Smoking status: Never Smoker     Smokeless tobacco: Former User     Alcohol use 6.0 - 8.4 oz/week     10 - 14 Standard drinks  "or equivalent per week      Comment: 2 drinks per night 5X/wk     Drug use: No     Sexual activity: Yes     Partners: Female     Birth control/ protection: Pull-out method, Condom     Other Topics Concern     Parent/Sibling W/ Cabg, Mi Or Angioplasty Before 65f 55m? Yes     Social History Narrative    Lives alone in apartment in [place], [place]. 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         Nov 2015.  His main political issue is higher education.  His girlfriend is looking for work as an .  He eats 3 square meals per day, most of which are cooked by him or his girlfriend.  Gets outside to walk or run 20 minutes about 5x per week.  Works fairly regular daytime hours, often from home.            Medications:     Current Outpatient Prescriptions   Medication     allopurinol (ZYLOPRIM) 300 MG tablet     insulin glargine (LANTUS SOLOSTAR) 100 UNIT/ML injection     CREON 55286 UNITS CPEP per EC capsule     amylase-lipase-protease (CREON) 14661 UNITS CPEP     insulin pen needle (United Dental CareFINE PEN NEEDLES) 32G X 4 MM     multivitamins CF formula (MVW COMPLETE FORMULATION) CAPS capsule     albuterol (2.5 MG/3ML) 0.083% neb solution     azithromycin (ZITHROMAX) 500 MG tablet     Cholecalciferol 5000 UNITS TABS     fluticasone (FLOVENT HFA) 110 MCG/ACT Inhaler     fluticasone (FLONASE) 50 MCG/ACT spray     phytonadione (MEPHYTON) 5 MG tablet     ursodiol (ACTIGALL) 300 MG capsule     albuterol (PROAIR HFA/PROVENTIL HFA/VENTOLIN HFA) 108 (90 BASE) MCG/ACT Inhaler     dornase alpha (PULMOZYME) 1 MG/ML nebulizer solution     ranitidine (ZANTAC) 75 MG tablet     Syringe/Needle, Disp, 18G X 1\" 6 ML MISC     loratadine (CLARITIN) 10 MG tablet     blood glucose monitoring (FREESTYLE LITE) test strip     blood glucose monitoring (FREESTYLE) lancets     order for DME     No current facility-administered medications for this visit.             Physical " "Exam:   /61 (BP Location: Right arm, Patient Position: Chair, Cuff Size: Adult Regular)  Pulse 82  Resp 16  Ht 1.63 m (5' 4.17\")  Wt 52.6 kg (115 lb 15.4 oz)  SpO2 95%  BMI 19.8 kg/m2    GENERAL: alert, NAD  HEENT: NCAT, EOMI, anicteric sclera, no nasal edema or erythema; canals and TMs clear; no oral mucosal edema or erythema  Neck: no cervical or supraclavicular adenopathy  Respiratory: good air flow, no crackles, rhonchi or wheezing  CV: RRR, S1S2, no murmurs noted  Abdomen: normoactive BS, soft and non tender  Lymph: no edema, + digital clubbing  Neuro: AAO X 3, CN 2-12 grossly intact  Psychiatric: normal affect, good eye contact  Skin: no rash, jaundice or lesions on limited exam         Data:   All laboratory and imaging data reviewed.      Cystic Fibrosis Culture  Specimen Description   Date Value Ref Range Status   05/31/2017 Sputum  Final   04/17/2017 Sputum  Final   12/22/2016 Sputum  Final    Culture Micro   Date Value Ref Range Status   05/31/2017 (A)  Final    Heavy growth Normal mami  Light growth Klebsiella oxytoca  Moderate growth Stenotrophomonas maltophilia  Light growth Strain 2 Klebsiella oxytoca     04/17/2017 (A)  Final    Light growth Normal mami  Light growth Aspergillus fumigatus  Moderate growth Stenotrophomonas maltophilia  Moderate growth Klebsiella oxytoca Susceptibility testing not routinely done     12/22/2016 (A)  Final    Heavy growth Normal mami  Light growth Stenotrophomonas maltophilia  Light growth Aspergillus fumigatus          PFT interpretation:  Maneuver: valid and meets ATS guidelines  Moderate severe obstruction with decreased FEV1/FVC and FEV1  Compared to prior: FEV1 improved 4% from prior, still below his best          "

## 2017-09-15 ENCOUNTER — OFFICE VISIT (OUTPATIENT)
Dept: PULMONOLOGY | Facility: CLINIC | Age: 32
End: 2017-09-15
Attending: PHYSICIAN ASSISTANT
Payer: COMMERCIAL

## 2017-09-15 ENCOUNTER — ALLIED HEALTH/NURSE VISIT (OUTPATIENT)
Dept: CARE COORDINATION | Facility: CLINIC | Age: 32
End: 2017-09-15

## 2017-09-15 VITALS
RESPIRATION RATE: 16 BRPM | WEIGHT: 115.96 LBS | OXYGEN SATURATION: 95 % | DIASTOLIC BLOOD PRESSURE: 61 MMHG | HEIGHT: 64 IN | HEART RATE: 82 BPM | SYSTOLIC BLOOD PRESSURE: 139 MMHG | BODY MASS INDEX: 19.8 KG/M2

## 2017-09-15 DIAGNOSIS — E84.0 CYSTIC FIBROSIS WITH PULMONARY EXACERBATION (H): ICD-10-CM

## 2017-09-15 DIAGNOSIS — Z13.9 RISK AND FUNCTIONAL ASSESSMENT: Primary | ICD-10-CM

## 2017-09-15 DIAGNOSIS — E08.9 DIABETES MELLITUS RELATED TO CYSTIC FIBROSIS (H): ICD-10-CM

## 2017-09-15 DIAGNOSIS — E84.0 CYSTIC FIBROSIS WITH PULMONARY MANIFESTATIONS (H): ICD-10-CM

## 2017-09-15 DIAGNOSIS — E84.9 DIABETES MELLITUS DUE TO CYSTIC FIBROSIS (H): ICD-10-CM

## 2017-09-15 DIAGNOSIS — E84.0 CYSTIC FIBROSIS WITH PULMONARY MANIFESTATIONS (H): Primary | ICD-10-CM

## 2017-09-15 DIAGNOSIS — E55.9 VITAMIN D DEFICIENCY: ICD-10-CM

## 2017-09-15 DIAGNOSIS — E08.9 DIABETES MELLITUS DUE TO CYSTIC FIBROSIS (H): ICD-10-CM

## 2017-09-15 DIAGNOSIS — R74.8 ELEVATED ALKALINE PHOSPHATASE LEVEL: ICD-10-CM

## 2017-09-15 DIAGNOSIS — A49.02 METHICILLIN RESISTANT STAPHYLOCOCCUS AUREUS INFECTION: ICD-10-CM

## 2017-09-15 DIAGNOSIS — K86.81 EXOCRINE PANCREATIC INSUFFICIENCY: Chronic | ICD-10-CM

## 2017-09-15 DIAGNOSIS — J30.9 ALLERGIC RHINITIS DUE TO ALLERGEN: ICD-10-CM

## 2017-09-15 DIAGNOSIS — E84.8 DIABETES MELLITUS RELATED TO CYSTIC FIBROSIS (H): ICD-10-CM

## 2017-09-15 LAB
ALBUMIN SERPL-MCNC: 3.4 G/DL (ref 3.4–5)
ALBUMIN UR-MCNC: NEGATIVE MG/DL
ALP SERPL-CCNC: 137 U/L (ref 40–150)
ALT SERPL W P-5'-P-CCNC: 26 U/L (ref 0–70)
ANION GAP SERPL CALCULATED.3IONS-SCNC: 6 MMOL/L (ref 3–14)
APPEARANCE UR: ABNORMAL
AST SERPL W P-5'-P-CCNC: 18 U/L (ref 0–45)
BASOPHILS # BLD AUTO: 0 10E9/L (ref 0–0.2)
BASOPHILS NFR BLD AUTO: 0.5 %
BILIRUB UR QL STRIP: NEGATIVE
BUN SERPL-MCNC: 18 MG/DL (ref 7–30)
CALCIUM SERPL-MCNC: 8.6 MG/DL (ref 8.5–10.1)
CHLORIDE SERPL-SCNC: 107 MMOL/L (ref 94–109)
CHOLEST SERPL-MCNC: 79 MG/DL
CO2 SERPL-SCNC: 26 MMOL/L (ref 20–32)
COLOR UR AUTO: YELLOW
CREAT SERPL-MCNC: 0.81 MG/DL (ref 0.66–1.25)
CREAT UR-MCNC: 299 MG/DL
DEPRECATED CALCIDIOL+CALCIFEROL SERPL-MC: 28 UG/L (ref 20–75)
DIFFERENTIAL METHOD BLD: NORMAL
EOSINOPHIL # BLD AUTO: 0.2 10E9/L (ref 0–0.7)
EOSINOPHIL NFR BLD AUTO: 2 %
ERYTHROCYTE [DISTWIDTH] IN BLOOD BY AUTOMATED COUNT: 12.5 % (ref 10–15)
ERYTHROCYTE [SEDIMENTATION RATE] IN BLOOD BY WESTERGREN METHOD: 10 MM/H (ref 0–15)
GFR SERPL CREATININE-BSD FRML MDRD: >90 ML/MIN/1.7M2
GGT SERPL-CCNC: 22 U/L (ref 0–75)
GLUCOSE SERPL-MCNC: 92 MG/DL (ref 70–99)
GLUCOSE UR STRIP-MCNC: NEGATIVE MG/DL
HBA1C MFR BLD: 6.1 % (ref 4.3–6)
HCT VFR BLD AUTO: 41.4 % (ref 40–53)
HDLC SERPL-MCNC: 60 MG/DL
HGB BLD-MCNC: 14.7 G/DL (ref 13.3–17.7)
HGB UR QL STRIP: NEGATIVE
IMM GRANULOCYTES # BLD: 0 10E9/L (ref 0–0.4)
IMM GRANULOCYTES NFR BLD: 0.2 %
INR PPP: 1.08 (ref 0.86–1.14)
IRON SERPL-MCNC: 65 UG/DL (ref 35–180)
KETONES UR STRIP-MCNC: NEGATIVE MG/DL
LDLC SERPL CALC-MCNC: 14 MG/DL
LEUKOCYTE ESTERASE UR QL STRIP: NEGATIVE
LYMPHOCYTES # BLD AUTO: 1.7 10E9/L (ref 0.8–5.3)
LYMPHOCYTES NFR BLD AUTO: 21.1 %
MAGNESIUM SERPL-MCNC: 1.9 MG/DL (ref 1.6–2.3)
MCH RBC QN AUTO: 31.3 PG (ref 26.5–33)
MCHC RBC AUTO-ENTMCNC: 35.5 G/DL (ref 31.5–36.5)
MCV RBC AUTO: 88 FL (ref 78–100)
MICROALBUMIN UR-MCNC: 27 MG/L
MICROALBUMIN/CREAT UR: 9.13 MG/G CR (ref 0–17)
MONOCYTES # BLD AUTO: 0.6 10E9/L (ref 0–1.3)
MONOCYTES NFR BLD AUTO: 6.8 %
MUCOUS THREADS #/AREA URNS LPF: PRESENT /LPF
NEUTROPHILS # BLD AUTO: 5.6 10E9/L (ref 1.6–8.3)
NEUTROPHILS NFR BLD AUTO: 69.4 %
NITRATE UR QL: NEGATIVE
NONHDLC SERPL-MCNC: 20 MG/DL
NRBC # BLD AUTO: 0 10*3/UL
NRBC BLD AUTO-RTO: 0 /100
PH UR STRIP: 5 PH (ref 5–7)
PHOSPHATE SERPL-MCNC: 2.8 MG/DL (ref 2.5–4.5)
PLATELET # BLD AUTO: 196 10E9/L (ref 150–450)
POTASSIUM SERPL-SCNC: 3.4 MMOL/L (ref 3.4–5.3)
PROT SERPL-MCNC: 7.5 G/DL (ref 6.8–8.8)
RBC # BLD AUTO: 4.69 10E12/L (ref 4.4–5.9)
RBC #/AREA URNS AUTO: <1 /HPF (ref 0–2)
SODIUM SERPL-SCNC: 140 MMOL/L (ref 133–144)
SOURCE: ABNORMAL
SP GR UR STRIP: 1.03 (ref 1–1.03)
TRIGL SERPL-MCNC: 31 MG/DL
TSH SERPL DL<=0.005 MIU/L-ACNC: 2.04 MU/L (ref 0.4–4)
UROBILINOGEN UR STRIP-MCNC: 0 MG/DL (ref 0–2)
WBC # BLD AUTO: 8.1 10E9/L (ref 4–11)
WBC #/AREA URNS AUTO: <1 /HPF (ref 0–2)

## 2017-09-15 PROCEDURE — 82977 ASSAY OF GGT: CPT | Performed by: PHYSICIAN ASSISTANT

## 2017-09-15 PROCEDURE — 84446 ASSAY OF VITAMIN E: CPT | Performed by: PHYSICIAN ASSISTANT

## 2017-09-15 PROCEDURE — 87015 SPECIMEN INFECT AGNT CONCNTJ: CPT | Performed by: INTERNAL MEDICINE

## 2017-09-15 PROCEDURE — 85652 RBC SED RATE AUTOMATED: CPT | Performed by: PHYSICIAN ASSISTANT

## 2017-09-15 PROCEDURE — 84075 ASSAY ALKALINE PHOSPHATASE: CPT | Performed by: PHYSICIAN ASSISTANT

## 2017-09-15 PROCEDURE — 82784 ASSAY IGA/IGD/IGG/IGM EACH: CPT | Performed by: PHYSICIAN ASSISTANT

## 2017-09-15 PROCEDURE — 87077 CULTURE AEROBIC IDENTIFY: CPT | Performed by: INTERNAL MEDICINE

## 2017-09-15 PROCEDURE — 84460 ALANINE AMINO (ALT) (SGPT): CPT | Performed by: PHYSICIAN ASSISTANT

## 2017-09-15 PROCEDURE — 87107 FUNGI IDENTIFICATION MOLD: CPT | Performed by: INTERNAL MEDICINE

## 2017-09-15 PROCEDURE — 87186 SC STD MICRODIL/AGAR DIL: CPT | Performed by: INTERNAL MEDICINE

## 2017-09-15 PROCEDURE — 81001 URINALYSIS AUTO W/SCOPE: CPT | Performed by: PHYSICIAN ASSISTANT

## 2017-09-15 PROCEDURE — 84443 ASSAY THYROID STIM HORMONE: CPT | Performed by: PHYSICIAN ASSISTANT

## 2017-09-15 PROCEDURE — 87116 MYCOBACTERIA CULTURE: CPT | Performed by: INTERNAL MEDICINE

## 2017-09-15 PROCEDURE — 99212 OFFICE O/P EST SF 10 MIN: CPT | Mod: ZF

## 2017-09-15 PROCEDURE — 80061 LIPID PANEL: CPT | Performed by: PHYSICIAN ASSISTANT

## 2017-09-15 PROCEDURE — 84403 ASSAY OF TOTAL TESTOSTERONE: CPT | Performed by: PHYSICIAN ASSISTANT

## 2017-09-15 PROCEDURE — 97803 MED NUTRITION INDIV SUBSEQ: CPT | Mod: ZF | Performed by: DIETITIAN, REGISTERED

## 2017-09-15 PROCEDURE — 84590 ASSAY OF VITAMIN A: CPT | Performed by: PHYSICIAN ASSISTANT

## 2017-09-15 PROCEDURE — 36415 COLL VENOUS BLD VENIPUNCTURE: CPT | Performed by: PHYSICIAN ASSISTANT

## 2017-09-15 PROCEDURE — 85025 COMPLETE CBC W/AUTO DIFF WBC: CPT | Performed by: PHYSICIAN ASSISTANT

## 2017-09-15 PROCEDURE — 83036 HEMOGLOBIN GLYCOSYLATED A1C: CPT | Performed by: PHYSICIAN ASSISTANT

## 2017-09-15 PROCEDURE — 84450 TRANSFERASE (AST) (SGOT): CPT | Performed by: PHYSICIAN ASSISTANT

## 2017-09-15 PROCEDURE — 87206 SMEAR FLUORESCENT/ACID STAI: CPT | Performed by: INTERNAL MEDICINE

## 2017-09-15 PROCEDURE — 83540 ASSAY OF IRON: CPT | Performed by: PHYSICIAN ASSISTANT

## 2017-09-15 PROCEDURE — 82306 VITAMIN D 25 HYDROXY: CPT | Performed by: PHYSICIAN ASSISTANT

## 2017-09-15 PROCEDURE — 80069 RENAL FUNCTION PANEL: CPT | Performed by: PHYSICIAN ASSISTANT

## 2017-09-15 PROCEDURE — 82043 UR ALBUMIN QUANTITATIVE: CPT | Performed by: PHYSICIAN ASSISTANT

## 2017-09-15 PROCEDURE — 83735 ASSAY OF MAGNESIUM: CPT | Performed by: PHYSICIAN ASSISTANT

## 2017-09-15 PROCEDURE — 85610 PROTHROMBIN TIME: CPT | Performed by: PHYSICIAN ASSISTANT

## 2017-09-15 PROCEDURE — 84155 ASSAY OF PROTEIN SERUM: CPT | Performed by: PHYSICIAN ASSISTANT

## 2017-09-15 PROCEDURE — 87070 CULTURE OTHR SPECIMN AEROBIC: CPT | Performed by: INTERNAL MEDICINE

## 2017-09-15 PROCEDURE — 82785 ASSAY OF IGE: CPT | Performed by: PHYSICIAN ASSISTANT

## 2017-09-15 RX ORDER — LORATADINE 10 MG/1
10 TABLET ORAL DAILY
Qty: 30 TABLET | COMMUNITY
Start: 2017-09-15 | End: 2019-12-19

## 2017-09-15 ASSESSMENT — PAIN SCALES - GENERAL: PAINLEVEL: NO PAIN (0)

## 2017-09-15 NOTE — LETTER
9/15/2017       RE: Dilan Nassar  [address]  [address]  [facility] [address]     Dear Colleague,    Thank you for referring your patient, Dilan Nassar, to the [facility] at [facility]. Please see a copy of my visit note below.    [facility]  September 15, 2017         Assessment and Plan:   Dilan Nassar is a 31 year old male with cystic fibrosis, pancreatic insufficiency and CFRD who is seen today in clinic for follow up. In the past few months, has completed course of vancomycin, imipenem and bactrim. Started on fluconazole at last visit.       1. CF lung disease: doing well over the last few months, did have one episode where he thought he inhaled some mold and had an increased productive cough after that, but overall improving. Still with slightly more chest congestion that prior to that episode. Vesting BID-TID if able, not very physically active. PFTs today improved to his recent best. Previous cultures + for MRSA, Klebsiella, Steno and Achromobacter. No acute issues at this time.  - Continue nebulizers, inhaler and vest therapy  - On chronic azithromycin  - Increase physical activity     2. Elevated alk phos: since 10/15 per our EPIC charting with US demonstrating coarse echotexture of the liver with hypertrophy of the caudate lobe and left lobe concerning for possible cirrhosis, no lesions, patent vasculature. Viral serologies from May WNL. LFTs normalized on labs today.   - Continue Ursodiol  - Defer referral to GI for now      3. Exocrine pancreatic insufficiency: denies symptoms of malabsorption. Weight is low, but stable. BMI below goal of 23.   - Continue pancreatic enzymes and vitamin supplementation      4. CFRD: AIC pending for today. Hasn't been check BS as frequently as he should, recently a 59, a 91 and a 100. Asymptomatic with the low sugars.   - Continue Lantus 12  U  - Routine diabetic eye exam in 2017      5. 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 Not currently on medication.     6. HCM: reviewed labs and imaging with patient: CBC, CMP, lipid panel and TSH WNL. UA WNL as well. CXR reviewed by me demonstrates stable changes of cystic fibrosis. Vitamin levels, testosterone, A1C and IgGs pending.     7. CF related sinus disease: no current symptoms.   - Continue Flonase and Claritin      RTC: in 3 months with routine spirometry  Annual studies due: September 2018      Jyothi Warren PA-C  Pulmonary, Allergy, Critical Care and Sleep Medicine        Interval History:   Started jack. Feeling pretty good, only concern is that in late July, he was digging in a shed and since then, he's felt more junky. Cough at basline, more productive in the am, greenish sputum. No streaking or blood. No shortness of breath, can walk up to the 3rd floor. No chest pain, some occasional congestion. Vesting BID, some TID. No fever, chills or allergies.             Review of Systems:   Please see HPI. Otherwise, complete 10 point ROS negative.           Past Medical and Surgical History:     Past Medical History:   Diagnosis Date     Cystic fibrosis with pulmonary manifestations (H)     /[id number]     Past Surgical History:   Procedure Laterality Date     APPENDECTOMY OPEN  1999    Appendicitis      SINUS SURGERY  1990's    h/o many sinus infections           Family History:     Family History   Problem Relation Age of Onset     DIABETES Mother      Unknown type     Prostate Cancer Paternal Grandfather      LUNG DISEASE Other      Negative     DIABETES Maternal Aunt      unknown type     DIABETES Maternal Uncle      unknown type     DIABETES Maternal Grandmother      unknown type     Coronary Artery Disease Paternal Grandmother             Social History:     Social History     Social History     Marital status: Single     Spouse name: N/A     Number of  children: N/A     Years of education: N/A     Occupational History     State Representative [employer]     Financial Counselor      Social History Main Topics     Smoking status: Never Smoker     Smokeless tobacco: Former User     Alcohol use 6.0 - 8.4 oz/week     10 - 14 Standard drinks or equivalent per week      Comment: 2 drinks per night 5X/wk     Drug use: No     Sexual activity: Yes     Partners: Female     Birth control/ protection: Pull-out method, Condom     Other Topics Concern     Parent/Sibling W/ Cabg, Mi Or Angioplasty Before 65f 55m? Yes     Social History Narrative    Lives alone in apartment in [place], [place]. 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         Nov 2015.  His main political issue is higher education.  His girlfriend is looking for work as an .  He eats 3 square meals per day, most of which are cooked by him or his girlfriend.  Gets outside to walk or run 20 minutes about 5x per week.  Works fairly regular daytime hours, often from home.            Medications:     Current Outpatient Prescriptions   Medication     allopurinol (ZYLOPRIM) 300 MG tablet     insulin glargine (LANTUS SOLOSTAR) 100 UNIT/ML injection     CREON 15306 UNITS CPEP per EC capsule     amylase-lipase-protease (CREON) 05169 UNITS CPEP     insulin pen needle (CLICKFINE PEN NEEDLES) 32G X 4 MM     multivitamins CF formula (MVW COMPLETE FORMULATION) CAPS capsule     albuterol (2.5 MG/3ML) 0.083% neb solution     azithromycin (ZITHROMAX) 500 MG tablet     Cholecalciferol 5000 UNITS TABS     fluticasone (FLOVENT HFA) 110 MCG/ACT Inhaler     fluticasone (FLONASE) 50 MCG/ACT spray     phytonadione (MEPHYTON) 5 MG tablet     ursodiol (ACTIGALL) 300 MG capsule     albuterol (PROAIR HFA/PROVENTIL HFA/VENTOLIN HFA) 108 (90 BASE) MCG/ACT Inhaler     dornase alpha (PULMOZYME) 1 MG/ML nebulizer solution     ranitidine (ZANTAC) 75 MG tablet  "    Syringe/Needle, Disp, 18G X 1\" 6 ML MISC     loratadine (CLARITIN) 10 MG tablet     blood glucose monitoring (FREESTYLE LITE) test strip     blood glucose monitoring (FREESTYLE) lancets     order for DME     No current facility-administered medications for this visit.             Physical Exam:   /61 (BP Location: Right arm, Patient Position: Chair, Cuff Size: Adult Regular)  Pulse 82  Resp 16  Ht 1.63 m (5' 4.17\")  Wt 52.6 kg (115 lb 15.4 oz)  SpO2 95%  BMI 19.8 kg/m2    GENERAL: alert, NAD  HEENT: NCAT, EOMI, anicteric sclera, no nasal edema or erythema; canals and TMs clear; no oral mucosal edema or erythema  Neck: no cervical or supraclavicular adenopathy  Respiratory: good air flow, no crackles, rhonchi or wheezing  CV: RRR, S1S2, no murmurs noted  Abdomen: normoactive BS, soft and non tender  Lymph: no edema, + digital clubbing  Neuro: AAO X 3, CN 2-12 grossly intact  Psychiatric: normal affect, good eye contact  Skin: no rash, jaundice or lesions on limited exam         Data:   All laboratory and imaging data reviewed.      Cystic Fibrosis Culture  Specimen Description   Date Value Ref Range Status   05/31/2017 Sputum  Final   04/17/2017 Sputum  Final   12/22/2016 Sputum  Final    Culture Micro   Date Value Ref Range Status   05/31/2017 (A)  Final    Heavy growth Normal mami  Light growth Klebsiella oxytoca  Moderate growth Stenotrophomonas maltophilia  Light growth Strain 2 Klebsiella oxytoca     04/17/2017 (A)  Final    Light growth Normal mami  Light growth Aspergillus fumigatus  Moderate growth Stenotrophomonas maltophilia  Moderate growth Klebsiella oxytoca Susceptibility testing not routinely done     12/22/2016 (A)  Final    Heavy growth Normal mami  Light growth Stenotrophomonas maltophilia  Light growth Aspergillus fumigatus          PFT interpretation:  Maneuver: valid and meets ATS guidelines  Moderate severe obstruction with decreased FEV1/FVC and FEV1  Compared to prior: FEV1 " improved 4% from prior, still below his best            RT Documentation:    Spent 15 minutes of face to face time with patient discussing current alternitives to traditional Vest therapy treatments. Devices like the AffloVest and Hague were discussed. Pros and cons, insurance reimbursement concerns (current device was obtained in 8/2013, different insurer), fit, trials, etc discussed.     Patient is interested in learning more about the Hague system when our center is ready to start those trial fittings and ordering. Audra Smart provided with this information for discussions moving forward.      CF Annual Nutrition Assessment    Reason for Assessment  Assessed during Dr. Atilio Nieto clinic r/t increased nutrition risk with diagnosis of CF per protocol.    Nutrition Significant PMH  Moderate Lung Disease   Pancreatic Insufficient   CFRD    Social Assessment  Living situation: Owns a home in [place], lives alone.   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  Food Insecurity:  None    Anthropometric Assessment  Height: 163 cm  IBW based on BMI 22 for females and 23 for males per CF Foundation recs: 61 kg  Today's Weight: 52.6 kg (actual weight)   %IBW: 86%  Body mass index is 19.8 kg/(m^2).   Current weight is considered: Normal BMI; however, not a CF goal BMI     Wt Readings from Last 10 Encounters:   09/15/17 52.6 kg (115 lb 15.4 oz)   05/31/17 52.6 kg (115 lb 15.4 oz)   05/30/17 53.5 kg (118 lb)   04/17/17 53.7 kg (118 lb 6.2 oz)   12/22/16 52.7 kg (116 lb 3.2 oz)   12/20/16 52.2 kg (115 lb)   12/20/16 52.2 kg (115 lb)   11/11/16 52.5 kg (115 lb 11.9 oz)   10/20/16 50.8 kg (112 lb)   09/30/16 50.8 kg (112 lb)     Weight Status: Guillermo's weight has trended up about 2 kg over the past year.  Not actively trying for weight gain other than his usual habits, however adherent with enzymes and diet recommendations. His weight remains less than ideal for CF lung function which he is aware  of.     Physical Activity/Exercise:  No formal exercise regimen per pt, tries to remain active in his daily life.     MALNUTRITION  Patient does not meet 2 of the criteria necessary for diagnosing malnutrition.    Pancreatic Enzymes  Brand:  Creon [id number]  Dosin-6 with meals (~18 caps per day)    High dose providing 1369 units lipase/kg/meal which is within the recommended range per CF Foundation to inhibit fibrosing colonopathy.    Are you taking enzymes as recommended: Yes   Signs of Malabsorption:  No  Enzyme Program:  None - has previously looked into [employer] but can't remember if he fully signed up.  Interested in the benefits, particularly copay assistance, will likely resume membership after today's visit.     Diet History and Assessment  Diet Preferences/Allergies/Intolerances:  Regular    Intake Recall/Comments:  3 meals and ~1 snack per day, has a good appetite in general.     Breakfast: Bowl of cereal with milk  Lunch: At work, usually a sandwich or piece of pizza or something similar (on-the-go)  Dinner: At home, chicken or other protein with a side dish or two.  Occasionally eats at restaurants or fast food.   Snacks: Once/day has a protein bar (afternoon).  Does not usually snack more than this.    Calcium: 3-4 servings of milk per day, cheese in meals  Salt: Adequate, no sx inadequate intake per pt  Hydration:  Not discussed this visit  Supplements:  Yes, daily protein bar    Estimated Energy and Protein Needs  Estimation based on weight maintenance vs gain with Moderate lung disease and pancreatic insufficiency.     BEE: ~1400  6298-1036 kcals/day =  200-225% BEE   g protein/day = 1.5-2 g/kg    Laboratory Assessment    Vitamin A   Date Value Ref Range Status   10/20/2016 0.43  Final     Comment:     Reference range: 0.30 to 1.20  Unit: mg/L       Vitamin D Deficiency screening   Date Value Ref Range Status   09/15/2017 28 20 - 75 ug/L Final     Comment:     Season, race, dietary intake,  and treatment affect the concentration of   25-hydroxy-Vitamin D. Values may decrease during winter months and increase   during summer months. Values 20-29 ug/L may indicate Vitamin D insufficiency   and values <20 ug/L may indicate Vitamin D deficiency.  Vitamin D determination is routinely performed by an immunoassay specific for   25 hydroxyvitamin D3.  If an individual is on vitamin D2 (ergocalciferol)   supplementation, please specify 25 OH vitamin D2 and D3 level determination by   LCMSMS test VITD23.       Vitamin E   Date Value Ref Range Status   10/20/2016 6.4  Final     Comment:     Reference range: 5.5 to 18.0  Unit: mg/L  (Note)  Test developed and characteristics determined by [employer]. See Compliance Statement B: [employer]/CS       Iron   Date Value Ref Range Status   09/15/2017 65 35 - 180 ug/dL Final     Cholesterol   Date Value Ref Range Status   09/15/2017 79 <200 mg/dL Final     Triglycerides   Date Value Ref Range Status   09/15/2017 31 <150 mg/dL Final     HDL Cholesterol   Date Value Ref Range Status   09/15/2017 60 >39 mg/dL Final     LDL Cholesterol Calculated   Date Value Ref Range Status   09/15/2017 14 <100 mg/dL Final     Comment:     Desirable:       <100 mg/dl       DEXA: (Last Sept 2016) Normal result    Current Vitamin/Mineral Prescriptions: AquADEK (chewable) 2 tablets, Vitamin D 5000 International Units and Vitamin K (Mephyton) once weekly    Comments:  Annual studies done today, no vitamin level results back at time of visit.     CF Related Diabetes Evaluation  Hgb A1C: 6.1%   Date: Today  FSBG range: Fairly normal per pt  Insulin: Yes    Insulin Regimen: Lantus 12 units q AM  Carbohydrate Counting: Yes      -Sees Dr. 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  INTERVENTIONS/RECOMMENDATIONS  1) Congratulated pt on improved A1c and weight gain, encouraged him to continue his progress with high kcal/protein diet in addition to excellent insulin and enzyme delivery.    2) Reviewed available annual studies, excellent improvement in iron level, good lipid panel.  No other nutrition/vitamin labs available at the time of our visit, will contact pt via tritrue if any abnormal levels return.  He is willing to adjust his supplement regimen as needed.   3) Reviewed [employer] Forward program benefits and provided pt with information for re-sign up.  Will follow.     Patient Understanding: Good  Expected Compliance: Good  Follow-Up Plans: Per protocol    GOALS:  1) Weight maintenance vs gain to BMI of 23 kg/m2 or above.   2) 100% compliance with prescribed enzymes, vitamin/mineral supplements and insulin therapy.     FOLLOW-UP/MONITORING:  Visit patient within 12 month(s) for annual review    Time Spent In Face-to-Face Patient Interactions: 15 minutes    Daniela Whitt MS, RD, LD (pager [phone])  Cystic Fibrosis/Lung Transplant Dietitian      -Available Tues-Fri 8 AM-4:30 PM. On Mondays please contact pager [phone] (Tamika Perez RD) and on weekends/holidays contact coverage dietitian at pager [phone] (inpatient use only).     Again, thank you for allowing me to participate in the care of your patient.      Sincerely,    Jyothi Warren PA-C

## 2017-09-15 NOTE — NURSING NOTE
Chief Complaint   Patient presents with     Cystic Fibrosis     Patient is being seen for CF follow up      Barbra Morrissey CMA at 10:15 AM on 9/15/2017

## 2017-09-15 NOTE — PROGRESS NOTES
RT Documentation:    Spent 15 minutes of face to face time with patient discussing current alternitives to traditional Vest therapy treatments. Devices like the AffloVest and Niobrara were discussed. Pros and cons, insurance reimbursement concerns (current device was obtained in 8/2013, different insurer), fit, trials, etc discussed.     Patient is interested in learning more about the Niobrara system when our center is ready to start those trial fittings and ordering. Audra Smart provided with this information for discussions moving forward.

## 2017-09-15 NOTE — PATIENT INSTRUCTIONS
Cystic Fibrosis Self-Care Plan       Patient: Dilan Nassar   MRN: [id number]   Clinic Date: September 15, 2017     RECOMMENDATIONS:  1. Continue nebulizers and vest therapy. Keep up the great work!  2. Try and increase physical activity--current guidelines are 30 minutes 5 times/week.    3. Enjoy the semester teaching!    Annual Studies:   IGG   Date Value Ref Range Status   10/20/2016 1500 695 - 1620 mg/dL Final     No results found for: INS  There are no preventive care reminders to display for this patient.    Pulmonary Function Tests  FEV1: amount of air you can blow out in 1 second  FVC: total amount of air you can take in and blow out    Your Goals:         PFT Latest Ref Rng & Units 9/15/2017   FVC L 3.79   FEV1 L 2.04   FVC% % 87   FEV1% % 56          Airway Clearance: The Most Important Way to Keep Your Lungs Healthy  Vest Settings:    Hill-Rom Frequencies: 8, 9, 10 Pressure 10 Then, Frequencies 18, 19, 20 Pressure 6      RespirTech: Quick Start with Pressure of     Do each frequency for 5 minutes; Deflate vest after each frequency & cough 3 times before beginning the next setting.    Vest and Neb Therapy should be done 2-3 times/day.    Good Nutrition Can Improve Lung Function and Overall Health     Take ALL of your vitamins with food     Take 1/2 of your enzymes before EVERY meal/snack and the other 1/2 mid-meal/snack    Wt Readings from Last 3 Encounters:   09/15/17 52.6 kg (115 lb 15.4 oz)   05/31/17 52.6 kg (115 lb 15.4 oz)   05/30/17 53.5 kg (118 lb)       Body mass index is 19.8 kg/(m^2).         National CF Foundation Recommendations for BMI in CF Adults: Women: at least 22 Men: at least 23        Controlling Blood Sugars Helps Prevent Lung Infections & Improves Nutrition  Test blood sugar:     In the morning before eating (goal is )     2 hours after a meal (goal is less than 150)     When pre-meal glucose is greater than 150 add correction     At bedtime (if less than 100 eat a snack  with 15 grams of carbohydrates  Last A1C Results:   Hemoglobin A1C   Date Value Ref Range Status   12/20/2016 6.7 % Final         If diabetic, measure A1C every 6 months. Goal is under 7%.    Staying Healthy    Research: If you are interested in learning about research opportunities or have questions, please contact Sonali Altamirano at [phone] or [email or web address].       Foundation: Compass is a personalized resource service to help you with the insurance, financial, legal and other issues you are facing.  It's free, confidential and available to anyone with CF.  Ask your  for more information or contact Compass directly at 924-[facility] ([phone]) or compass@cff.org, or learn more at cff.org/compass.       CF Nurse Line:  Giovanny Pimentel: [phone]   Audra Smart, RT: [phone]     Daniela Whitt and Tamika Perez , Dieticians: [phone]   Meliza Cueva, Diabetes Nurse: [phone]    Areli Hodgson: [phone] or Carisa Berumen [phone], Social Workers   [email or web address]

## 2017-09-15 NOTE — MR AVS SNAPSHOT
After Visit Summary   9/15/2017    Dilan Nassar    MRN: [id number]           Patient Information     Date Of Birth          1985        Visit Information        Provider Department      9/15/2017 10:00 AM Jyothi Warren PA-C [facility]        Today's Diagnoses     Cystic fibrosis with pulmonary manifestations    -  1    Exocrine pancreatic insufficiency          Care Instructions    Cystic Fibrosis Self-Care Plan       Patient: Dilan Nassar   MRN: [id number]   Clinic Date: September 15, 2017     RECOMMENDATIONS:  1. Continue nebulizers and vest therapy. Keep up the great work!  2. Try and increase physical activity--current guidelines are 30 minutes 5 times/week.    3. Enjoy the semester teaching!    Annual Studies:   IGG   Date Value Ref Range Status   10/20/2016 1500 695 - 1620 mg/dL Final     No results found for: INS  There are no preventive care reminders to display for this patient.    Pulmonary Function Tests  FEV1: amount of air you can blow out in 1 second  FVC: total amount of air you can take in and blow out    Your Goals:         PFT Latest Ref Rng & Units 9/15/2017   FVC L 3.79   FEV1 L 2.04   FVC% % 87   FEV1% % 56          Airway Clearance: The Most Important Way to Keep Your Lungs Healthy  Vest Settings:    Hill-Rom Frequencies: 8, 9, 10 Pressure 10 Then, Frequencies 18, 19, 20 Pressure 6      RespirTech: Quick Start with Pressure of     Do each frequency for 5 minutes; Deflate vest after each frequency & cough 3 times before beginning the next setting.    Vest and Neb Therapy should be done 2-3 times/day.    Good Nutrition Can Improve Lung Function and Overall Health     Take ALL of your vitamins with food     Take 1/2 of your enzymes before EVERY meal/snack and the other 1/2 mid-meal/snack    Wt Readings from Last 3 Encounters:   09/15/17 52.6 kg (115 lb 15.4 oz)   05/31/17 52.6 kg (115 lb 15.4 oz)   05/30/17 53.5 kg (118 lb)        Body mass index is 19.8 kg/(m^2).         National CF Foundation Recommendations for BMI in CF Adults: Women: at least 22 Men: at least 23        Controlling Blood Sugars Helps Prevent Lung Infections & Improves Nutrition  Test blood sugar:     In the morning before eating (goal is )     2 hours after a meal (goal is less than 150)     When pre-meal glucose is greater than 150 add correction     At bedtime (if less than 100 eat a snack with 15 grams of carbohydrates  Last A1C Results:   Hemoglobin A1C   Date Value Ref Range Status   12/20/2016 6.7 % Final         If diabetic, measure A1C every 6 months. Goal is under 7%.    Staying Healthy    Research: If you are interested in learning about research opportunities or have questions, please contact Sonali Altamirano at [phone] or [email or web address].      CF Foundation: Compass is a personalized resource service to help you with the insurance, financial, legal and other issues you are facing.  It's free, confidential and available to anyone with CF.  Ask your  for more information or contact Compass directly at 675-COMPASS ([phone]) or compass@cff.org, or learn more at cff.org/compass.       CF Nurse Line:  Lou Pimentel and Dianna: [phone]   Audra Smart, RT: [phone]     Daniela Whitt and Tamika Perez , Dieticians: [phone]   Meliza Cueva, Diabetes Nurse: [phone]    Areli Hodgson: [phone] or Carisa Berumen [phone], Social Workers   [email or web address]                          Follow-ups after your visit        Follow-up notes from your care team     Return in about 3 months (around 12/15/2017), or F/u with Dr. Miranda as well.      Your next 10 appointments already scheduled     Dec 19, 2017  9:30 AM CST   PFT VISIT with  [facility] Pul[facility] Function [facility] ([facility])    [address] [place] [id number] [phone]            Dec 19, 2017  10:00 AM CST   (Arrive by 9:45 AM)   RETURN CYSTIC FIBROSIS VISIT with Edison Miranda MD   [facility] ([facility])    [address] [place] [id number] [phone]            Dec 19, 2017 10:50 AM CST   (Arrive by 10:35 AM)   RETURN CYSTIC FIBROSIS VISIT with Jyothi Warren PA-C   [facility] ([facility])    [address] [place] [id number] [phone]              Future tests that were ordered for you today     Open Future Orders        Priority Expected Expires Ordered    Cystic Fibrosis Culture Aerob Bacterial Routine 12/1/2017 12/15/2017 9/15/2017    Spirometry, Breathing Capacity Routine 12/1/2017 12/15/2017 9/15/2017            Who to contact     If you have questions or need follow up information about today's clinic visit or your schedule please contact [facility] directly at [phone].  Normal or non-critical lab and imaging results will be communicated to you by Dashbookhart, letter or phone within 4 business days after the clinic has received the results. If you do not hear from us within 7 days, please contact the clinic through BetKl[employer] or phone. If you have a critical or abnormal lab result, we will notify you by phone as soon as possible.  Submit refill requests through Bet[employer]ub or call your pharmacy and they will forward the refill request to us. Please allow 3 business days for your refill to be completed.          Additional Information About Your Visit        BetKlub Information     [employer] gives you secure access to your electronic health record. If you see a primary care provider, you can also send messages to your care team and make appointments. If you have questions, please call your primary care clinic.  If you do not have a primary care provider, please call [phone] and they  "will assist you.        Care EveryWhere ID     This is your Care EveryWhere ID. This could be used by other organizations to access your [place] medical records  [id number]        Your Vitals Were     Pulse Respirations Height Pulse Oximetry BMI (Body Mass Index)       82 16 1.63 m (5' 4.17\") 95% 19.8 kg/m2        Blood Pressure from Last 3 Encounters:   09/15/17 139/61   05/31/17 116/82   05/30/17 112/71    Weight from Last 3 Encounters:   09/15/17 52.6 kg (115 lb 15.4 oz)   05/31/17 52.6 kg (115 lb 15.4 oz)   05/30/17 53.5 kg (118 lb)                 Today's Medication Changes          These changes are accurate as of: 9/15/17 12:15 PM.  If you have any questions, ask your nurse or doctor.               These medicines have changed or have updated prescriptions.        Dose/Directions    loratadine 10 MG tablet   Commonly known as:  CLARITIN   This may have changed:    - when to take this  - reasons to take this   Changed by:  Jyothi Warren PA-C        Dose:  10 mg   Take 1 tablet (10 mg) by mouth daily   Quantity:  30 tablet   Refills:  0                Primary Care Provider Office Phone # Fax #    Aitlio Aaron Nieto [phone]       [address] [place] [id number]        Equal Access to Services     ALTAGRACIA GATICA AH: Carlene aponte Sopedro, waaxda luqadaha, qaybta kaalmachris duncan. So [facility] [phone].    ATENCIÓN: Si habla español, tiene a headley disposición servicios gratuitos de asistencia lingüística. Farheen al [phone].    We comply with applicable federal civil rights laws and [place] laws. We do not discriminate on the basis of race, color, national origin, age, disability sex, sexual orientation or gender identity.            Thank you!     Thank you for choosing [facility]  for your care. Our goal is always to provide you with excellent care. Hearing back from our patients " is one way we can continue to improve our services. Please take a few minutes to complete the written survey that you may receive in the mail after your visit with us. Thank you!             Your Updated Medication List - Protect others around you: Learn how to safely use, store and throw away your medicines at www.disposemymeds.org.          This list is accurate as of: 9/15/17 12:15 PM.  Always use your most recent med list.                   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), Cystic fibrosis with pulmonary manifestations (H), Cystic fibrosis with pulmonary manifestations (H), MRSA infection       * CREON 12350 UNITS Cpep   Generic drug:  amylase-lipase-protease     900 capsule    Take 5-6 capsules (60,000-72,000 Units) by mouth 3 times daily (with meals)    Cystic fibrosis with pulmonary manifestations (H), Cystic fibrosis (H), CF (cystic fibrosis) (H), 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), Exocrine pancreatic insufficiency, Cystic fibrosis with pulmonary manifestations (H), MRSA infection       * amylase-lipase-protease 42729 UNITS Cpep    CREON    600 each    TAKE 5-6 CAPSULES (60,000-72,000) BY MOUTH THREE TIMES A DAY WITH MEALS    Cystic fibrosis with pulmonary manifestations (H), Cystic fibrosis (H), CF (cystic fibrosis) (H), 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), Exocrine pancreatic insufficiency, Cystic fibrosis with pulmonary manifestations (H), MRSA infection       dornase alpha 1 MG/ML neb solution    PULMOZYME    150 mL    Inhale 2.5 mg into the lungs 2 times daily    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), Cystic fibrosis with pulmonary manifestations (H), Exocrine pancreatic insufficiency, Cystic fibrosis with pulmonary manifestations  "(H), MRSA infection       insulin glargine 100 UNIT/ML injection    LANTUS SOLOSTAR    15 mL    Inject 12 Units Subcutaneous every morning    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 Please dispense four (4) Ruby LC Plus nebulizer kits and four (4) RUBY face masks for this patient with Cystic Fibrosis. Patient requires additional nebulizer supplies due to his need to use multiple sterile neb cups for his inhaled medications that may not be mixed together.    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), CF (cystic fibrosis) (H), Cystic fibrosis (H), Cystic fibrosis with pulmonary manifestations (H), Exocrine pancreatic insufficiency, Cystic fibrosis with pulmonary manifestations (H), MRSA infection       ranitidine 75 MG tablet    ZANTAC    30 tablet    Take 1 tablet (75 mg) by mouth daily as needed for heartburn        Syringe/Needle (Disp) 18G X 1\" 6 ML Misc     60 each    1 each 2 times daily    Cystic fibrosis with pulmonary " manifestations (H), Exocrine pancreatic insufficiency, MRSA infection       ursodiol 300 MG capsule    ACTIGALL    180 capsule    TAKE 1 CAPSULE (300MG ) BY MOUTH TWO TIMES A DAY    CF (cystic fibrosis) (H), Cystic fibrosis (H), 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 Read the directions carefully, and ask your doctor or other care provider to review them with you.

## 2017-09-15 NOTE — PROGRESS NOTES
CF Annual Nutrition Assessment    Reason for Assessment  Assessed during Dr. Atilio Nieto clinic r/t increased nutrition risk with diagnosis of CF per protocol.    Nutrition Significant PMH  Moderate Lung Disease   Pancreatic Insufficient   CFRD    Social Assessment  Living situation: Owns a home in [place], lives alone.   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  Food Insecurity:  None    Anthropometric Assessment  Height: 163 cm  IBW based on BMI 22 for females and 23 for males per CF Foundation recs: 61 kg  Today's Weight: 52.6 kg (actual weight)   %IBW: 86%  Body mass index is 19.8 kg/(m^2).   Current weight is considered: Normal BMI; however, not a CF goal BMI     Wt Readings from Last 10 Encounters:   09/15/17 52.6 kg (115 lb 15.4 oz)   17 52.6 kg (115 lb 15.4 oz)   17 53.5 kg (118 lb)   17 53.7 kg (118 lb 6.2 oz)   16 52.7 kg (116 lb 3.2 oz)   16 52.2 kg (115 lb)   16 52.2 kg (115 lb)   16 52.5 kg (115 lb 11.9 oz)   10/20/16 50.8 kg (112 lb)   16 50.8 kg (112 lb)     Weight Status: Guillermo's weight has trended up about 2 kg over the past year.  Not actively trying for weight gain other than his usual habits, however adherent with enzymes and diet recommendations. His weight remains less than ideal for CF lung function which he is aware of.     Physical Activity/Exercise:  No formal exercise regimen per pt, tries to remain active in his daily life.     MALNUTRITION  Patient does not meet 2 of the criteria necessary for diagnosing malnutrition.    Pancreatic Enzymes  Brand:  Creon [id number]  Dosin-6 with meals (~18 caps per day)    High dose providing 1369 units lipase/kg/meal which is within the recommended range per CF Foundation to inhibit fibrosing colonopathy.    Are you taking enzymes as recommended: Yes   Signs of Malabsorption:  No  Enzyme Program:  None - has previously looked into [employer] but can't remember if he fully  signed up.  Interested in the benefits, particularly copay assistance, will likely resume membership after today's visit.     Diet History and Assessment  Diet Preferences/Allergies/Intolerances:  Regular    Intake Recall/Comments:  3 meals and ~1 snack per day, has a good appetite in general.     Breakfast: Bowl of cereal with milk  Lunch: At work, usually a sandwich or piece of pizza or something similar (on-the-go)  Dinner: At home, chicken or other protein with a side dish or two.  Occasionally eats at restaurants or fast food.   Snacks: Once/day has a protein bar (afternoon).  Does not usually snack more than this.    Calcium: 3-4 servings of milk per day, cheese in meals  Salt: Adequate, no sx inadequate intake per pt  Hydration:  Not discussed this visit  Supplements:  Yes, daily protein bar    Estimated Energy and Protein Needs  Estimation based on weight maintenance vs gain with Moderate lung disease and pancreatic insufficiency.     BEE: ~1400  6042-2946 kcals/day =  200-225% BEE   g protein/day = 1.5-2 g/kg    Laboratory Assessment    Vitamin A   Date Value Ref Range Status   10/20/2016 0.43  Final     Comment:     Reference range: 0.30 to 1.20  Unit: mg/L       Vitamin D Deficiency screening   Date Value Ref Range Status   09/15/2017 28 20 - 75 ug/L Final     Comment:     Season, race, dietary intake, and treatment affect the concentration of   25-hydroxy-Vitamin D. Values may decrease during winter months and increase   during summer months. Values 20-29 ug/L may indicate Vitamin D insufficiency   and values <20 ug/L may indicate Vitamin D deficiency.  Vitamin D determination is routinely performed by an immunoassay specific for   25 hydroxyvitamin D3.  If an individual is on vitamin D2 (ergocalciferol)   supplementation, please specify 25 OH vitamin D2 and D3 level determination by   LCMSMS test VITD23.       Vitamin E   Date Value Ref Range Status   10/20/2016 6.4  Final     Comment:      Reference range: 5.5 to 18.0  Unit: mg/L  (Note)  Test developed and characteristics determined by [employer]. See Compliance Statement B: [email or web address]       Iron   Date Value Ref Range Status   09/15/2017 65 35 - 180 ug/dL Final     Cholesterol   Date Value Ref Range Status   09/15/2017 79 <200 mg/dL Final     Triglycerides   Date Value Ref Range Status   09/15/2017 31 <150 mg/dL Final     HDL Cholesterol   Date Value Ref Range Status   09/15/2017 60 >39 mg/dL Final     LDL Cholesterol Calculated   Date Value Ref Range Status   09/15/2017 14 <100 mg/dL Final     Comment:     Desirable:       <100 mg/dl       DEXA: (Last Sept 2016) Normal result    Current Vitamin/Mineral Prescriptions: AquADEK (chewable) 2 tablets, Vitamin D 5000 International Units and Vitamin K (Mephyton) once weekly    Comments:  Annual studies done today, no vitamin level results back at time of visit.     CF Related Diabetes Evaluation  Hgb A1C: 6.1%   Date: Today  FSBG range: Fairly normal per pt  Insulin: Yes    Insulin Regimen: Lantus 12 units q AM  Carbohydrate Counting: Yes      -Sees Dr. Miranda/Juli Lyons CDE regularly    NUTRITION DIAGNOSIS  Impaired nutrient utilization r/t CF related diabetes, pancreatic insufficiency and CF hypermetabolism AEB requires PERT, insulin therapy and high kcal/pro diet to maintain nutrition and health status.  INTERVENTIONS/RECOMMENDATIONS  1) Congratulated pt on improved A1c and weight gain, encouraged him to continue his progress with high kcal/protein diet in addition to excellent insulin and enzyme delivery.    2) Reviewed available annual studies, excellent improvement in iron level, good lipid panel.  No other nutrition/vitamin labs available at the time of our visit, will contact pt via [employer] if any abnormal levels return.  He is willing to adjust his supplement regimen as needed.   3) Reviewed [employer] program benefits and provided pt with information for re-sign up.  Will  follow.     Patient Understanding: Good  Expected Compliance: Good  Follow-Up Plans: Per protocol    GOALS:  1) Weight maintenance vs gain to BMI of 23 kg/m2 or above.   2) 100% compliance with prescribed enzymes, vitamin/mineral supplements and insulin therapy.     FOLLOW-UP/MONITORING:  Visit patient within 12 month(s) for annual review    Time Spent In Face-to-Face Patient Interactions: 15 minutes    Daniela Whitt MS, RD, LD (pager [phone])  Cystic Fibrosis/Lung Transplant Dietitian      -Available Tues-Fri 8 AM-4:30 PM. On Mondays please contact pager [phone] (Tamika Perez RD) and on weekends/holidays contact coverage dietitian at pager [phone] (inpatient use only).

## 2017-09-18 LAB
A-TOCOPHEROL VIT E SERPL-MCNC: 5.6 MG/L (ref 5.5–18)
ACID FAST STN SPEC QL: NORMAL
ACID FAST STN SPEC QL: NORMAL
ANNOTATION COMMENT IMP: NORMAL
BETA+GAMMA TOCOPHEROL SERPL-MCNC: 0.8 MG/L (ref 0–6)
IGA SERPL-MCNC: 268 MG/DL (ref 70–380)
IGG SERPL-MCNC: 1340 MG/DL (ref 695–1620)
IGM SERPL-MCNC: 66 MG/DL (ref 60–265)
RETINYL PALMITATE SERPL-MCNC: <0.02 MG/L (ref 0–0.1)
SPECIMEN SOURCE: NORMAL
TESTOST SERPL-MCNC: 358 NG/DL (ref 240–950)
VIT A SERPL-MCNC: 0.35 MG/L (ref 0.3–1.2)

## 2017-09-19 LAB — IGE SERPL-ACNC: <2 KIU/L (ref 0–114)

## 2017-09-20 LAB
BACTERIA SPEC CULT: ABNORMAL
SPECIMEN SOURCE: ABNORMAL

## 2017-09-22 ASSESSMENT — ANXIETY QUESTIONNAIRES
2. NOT BEING ABLE TO STOP OR CONTROL WORRYING: NOT AT ALL
7. FEELING AFRAID AS IF SOMETHING AWFUL MIGHT HAPPEN: NOT AT ALL
3. WORRYING TOO MUCH ABOUT DIFFERENT THINGS: NOT AT ALL
1. FEELING NERVOUS, ANXIOUS, OR ON EDGE: NOT AT ALL
6. BECOMING EASILY ANNOYED OR IRRITABLE: NOT AT ALL
5. BEING SO RESTLESS THAT IT IS HARD TO SIT STILL: NOT AT ALL
GAD7 TOTAL SCORE: 0

## 2017-09-22 ASSESSMENT — PATIENT HEALTH QUESTIONNAIRE - PHQ9
5. POOR APPETITE OR OVEREATING: NOT AT ALL
SUM OF ALL RESPONSES TO PHQ QUESTIONS 1-9: 0

## 2017-09-22 NOTE — PROGRESS NOTES
"Adult Cystic Fibrosis Program  Annual Psychosocial Assessment    Presenting Information:  ALEXANDRIA is a 32-year-old man with cystic fibrosis, presenting in CF clinic for a regular follow up with CF provider, Jyothi Navarro. Met with Guillermo for annual psychosocial assessment.     Living situation:  Guillermo lives in [place], [place]. He purchased a home in November 2015. His girlfriend of 5 years has been living with him for 1 year. They have 1 dog in the home. He reports that he was very diligent about making sure there were no mold issues in prospective homes during his home search process, as he had a mold problem in a previous apartment (4-5 years ago). He denies any concern about his current living situation.     Family Constellation:  Guillermo was raised by his biological parents who are still  and live in [place], [place].  He has 1 sibling(s): an older sister (38 years old) who lives in [place], [place]. He reports a good relationship with her, although he doesn't see her as often as he'd like. She does not have CF.    Social Support:  Guillermo reports \"pretty good\" social support. He has been in a relationship with his girlfriend for over 5 years. He gets along well with family members and draws additional support from friends and co-workers.  He does not have any connections in the CF Community. SW suggested that his girlfriend attend clinic visits with him in the future to learn more about his CF, if he was comfortable.    Adjustment to Illness:  Guillermo was diagnosed with CF in infancy.  He was \"pretty stable\" as a kid with some medical complications. He was hospitalized 3x: 2x for sinoscopies and 1x to have his appendix removed. He notes that in adulthood he was hospitalized in 2013 for a pulmonary exacerbation.    Guillermo describes his current health status as \"at baseline.\"  Clinically, he has significant lung disease, pancreatic insufficiency, and CFRD. He typically does at least 2, but sometimes 3 vest treatment(s) per day.  He " previously reported getting exercise through running 5x/week, but today he notes that he has not been very physically active lately as he has been busy with work.  He reports that his CFRD is well managed.     He reports that he is open about his CF with friends and family, but not with coworkers and acquaintances. He also notes he was pretty private about CF even as a kid.    Health Care Directive:  Guillermo has previously received Health Care Directive education.  This SW reviewed education, including concept/purpose of health care directive, default health care agents (currently his parents) and how to complete a directive.  Guillermo declined needing copies of the forms and is comfortable with his default decision makers at this time.    Education:  Guillermo completed a Bachelors Degree in Political Science at [employer]. He has no current plans for higher education at this point.    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 He generally likes his work and has learned to manage the stress over time. He is not sure if he will plan to run again once his current term is up. 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 His classes are 1x/week for 2 & 1/2 hours at a time. He has been enjoying this very much. He is currently considering his options for employment if he does not decide to run for office again. He would like to avoid a standard 9-5 desk job if at all possible. SW discussed options and made suggestions. He plans to continue to consider moving forward. He is benefits eligible. He denies any current related concerns.    Finances:  Guillermo receives income from wages and is able to meet daily living expenses. He has a pension with the state. He has previously stated that he wished there was some financial assistance for travel to this clinic. This SW encouraged him to call [facility] at the time for further assistance. He did not bring this up today.  "    Insurance:  Guillermo is insured through his employer. He carries a [employer] plan through the state. He did opt to switch to this plan from a BCBS plan last year as he was concerned that this clinic would not be in network following prolonged negotiations between [facility] and [facility]. He is not sure if he will plan to go back ot BCBS plan during open enrollment this year. SW offered to assist in this decision if needed.     Mental Health/Coping:  Guillermo denies any current or past symptoms indicative of mood, anxiety, eating, learning or other mental health disorder. He describes his mood recently as \"positive and stable\" His UBALDO-7 score was a 0 and his PHQ-9 score today was a 0, indicating no symptoms of anxiety or depression. He has acknowledged that coping with his health has been frustrating in the past, but he did not express this today. He does not see a therapist and does not take any MH medication. He feels that his level of stress is manageable. To cope with stress he will process with his girlfriend or his parents.      He identifies spirituality as important in his life, but he does not identify as a Episcopalian person.    Chemical Health:  Guillermo denies tobacco or illicit drug use.  He drinks alcohol, on occasion consuming ~1 beer or less on a weeknight or weekend night. He denies any current/past psychosocial impairment caused by alcohol use.  He is aware of a family history of alcohol abuse, as his uncle began AA when Guillermo was 17 years old.    Leisure Activities/Interests:   Guillermo enjoys listening to and producing music.    Intervention:  -Psychosocial Assessment      Assessment:  Guillermo appeared to be generally open in his responses, and presented with a normal affect. Strengths include stable employment, good support system, stable financial situation, and no history of CD or mental health disorders.  He has struggled with his physical health over the past few years, which was difficult at the time but he appears " to be coping appropriately now. He is planning to consider alternative options for employment following the end of his legislative term. In general, Guillermo seems to be psychosocially stable overall, with access to relevant resources and supports.  No additional concerns expressed or noted.    Plan:  -Re-consult for any psychosocial needs that may arise.    -Complete psychosocial assessment annually.        ROMA Gandhi, [facility]  Adult Cystic Fibrosis   (Pager) [phone]  (Phone) [phone]

## 2017-09-23 ASSESSMENT — ANXIETY QUESTIONNAIRES: GAD7 TOTAL SCORE: 0

## 2017-09-27 LAB
EXPTIME-PRE: 10.49 SEC
FEF2575-%PRED-PRE: 17 %
FEF2575-PRE: 0.67 L/SEC
FEF2575-PRED: 3.78 L/SEC
FEFMAX-%PRED-PRE: 72 %
FEFMAX-PRE: 6.55 L/SEC
FEFMAX-PRED: 8.99 L/SEC
FEV1-%PRED-PRE: 56 %
FEV1-PRE: 2.04 L
FEV1FEV6-PRE: 59 %
FEV1FEV6-PRED: 83 %
FEV1FVC-PRE: 54 %
FEV1FVC-PRED: 83 %
FIFMAX-PRE: 5.66 L/SEC
FVC-%PRED-PRE: 87 %
FVC-PRE: 3.79 L
FVC-PRED: 4.35 L

## 2017-09-28 ENCOUNTER — MYC MEDICAL ADVICE (OUTPATIENT)
Dept: EDUCATION SERVICES | Facility: CLINIC | Age: 32
End: 2017-09-28

## 2017-09-28 DIAGNOSIS — E84.0 CYSTIC FIBROSIS WITH PULMONARY MANIFESTATIONS (H): Primary | ICD-10-CM

## 2017-09-28 RX ORDER — RIMANTADINE HCL 100 MG
100 TABLET ORAL 2 TIMES DAILY
Qty: 60 TABLET | Refills: 6 | Status: SHIPPED | OUTPATIENT
Start: 2017-09-28 | End: 2018-06-22

## 2017-09-29 DIAGNOSIS — E84.9 CF (CYSTIC FIBROSIS) (H): Primary | ICD-10-CM

## 2017-09-29 RX ORDER — CIPROFLOXACIN 750 MG/1
750 TABLET, FILM COATED ORAL 2 TIMES DAILY
Qty: 42 TABLET | Refills: 0 | Status: SHIPPED | OUTPATIENT
Start: 2017-09-29 | End: 2017-10-20

## 2017-10-02 DIAGNOSIS — E84.0 CYSTIC FIBROSIS WITH PULMONARY MANIFESTATIONS (H): ICD-10-CM

## 2017-10-03 DIAGNOSIS — E84.0 CYSTIC FIBROSIS WITH PULMONARY MANIFESTATIONS (H): ICD-10-CM

## 2017-10-04 ENCOUNTER — TELEPHONE (OUTPATIENT)
Dept: PULMONOLOGY | Facility: CLINIC | Age: 32
End: 2017-10-04

## 2017-10-04 NOTE — TELEPHONE ENCOUNTER
Prior Authorization Approval    Authorization Effective Date: 10/4/2013  Authorization Expiration Date: 10/4/2097 This is approved for the lifetime of the patient as long as it is filled at [facility].  Medication: Jameel Podhaler (Approved)  Approved Dose/Quantity: 224 per 28 days  Reference #:     Insurance Company: [employer] - Phone [phone] Fax [phone]  Expected CoPay:       CoPay Card Available:      Foundation Assistance Needed:    Which Pharmacy is filling the prescription (Not needed for infusion/clinic administered): [facility] - [place], [place] - [id number]  Pharmacy Notified:    Patient Notified:

## 2017-11-08 DIAGNOSIS — E84.0 CYSTIC FIBROSIS WITH PULMONARY MANIFESTATIONS (H): ICD-10-CM

## 2017-11-10 RX ORDER — AZITHROMYCIN 250 MG/1
TABLET, FILM COATED ORAL
Qty: 150 TABLET | Refills: 1 | Status: SHIPPED | OUTPATIENT
Start: 2017-11-10 | End: 2018-09-26

## 2017-11-11 LAB
MYCOBACTERIUM SPEC CULT: NORMAL
MYCOBACTERIUM SPEC CULT: NORMAL
SPECIMEN SOURCE: NORMAL

## 2017-12-06 DIAGNOSIS — E84.0 CYSTIC FIBROSIS WITH PULMONARY MANIFESTATIONS (H): ICD-10-CM

## 2017-12-13 NOTE — PROGRESS NOTES
[facility]  December 14, 2017         Assessment and Plan:   Dilan Nassar is a 31 year old male with cystic fibrosis, pancreatic insufficiency and CFRD who is seen today in clinic for follow up. In the past few months, has completed course of vancomycin, imipenem and bactrim. Started on fluconazole at last visit.       1. CF lung disease: feels very stable, cough at baseline, no new shortness of breath, no hemoptysis. Notes he's not been as active as he should be. Vesting BID, tries to do TID when he can. PFTs today stable at his recent best. Previous cultures + for MRSA, Klebsiella, Steno and Achromobacter. No acute issues at this time.  - Continue nebulizers, inhaler and vest therapy  - On chronic azithromycin  - Increase physical activity      2. Elevated alk phos: since 10/15 per our EPIC charting with US demonstrating coarse echotexture of the liver with hypertrophy of the caudate lobe and left lobe concerning for possible cirrhosis, no lesions, patent vasculature. Viral serologies from May WNL. LFTs normalized on annual labs.   - Continue Ursodiol      3. Exocrine pancreatic insufficiency: denies symptoms of malabsorption. Weight is low, but stable. BMI below goal of 23. Vitamin D is slightly low at 28 despite 5000 U of supplemention. GAURAV Lundberg, to see today.   - Continue pancreatic enzymes and vitamin supplementation      4. CFRD: AIC of 6.1 on 9/15/17, improved from prior. Hasn't really been checking BS, if he thinks he's low, he'll just eat something.   - Continue Lantus 12 U  - Routine diabetic eye exam in 2018.      5. CF related sinus disease: no current symptoms. Not using Claritin over the winter.   - Continue Flonase       RTC: in 3 months with routine spirometry  Annual studies due: September 2018  Vaccinations: received influenza at his school      Jyothi Warren PA-C  Pulmonary, Allergy, Critical Care and Sleep Medicine        Interval  History:   Feeling good, stable. Hasn't been too active. Coughs intermittently throughout the day, some sputum production. Sputum is yellowish, no streaking or congestion, tightness or wheezing.   No sinus congestion. Vesting BID, occasionally 3 times/day. Denies nausea, vomiting or abdominal pain.          Review of Systems:   Please see HPI. Otherwise, complete 10 point ROS negative.           Past Medical and Surgical History:     Past Medical History:   Diagnosis Date     Cystic fibrosis with pulmonary manifestations (H)     /[id number]     Past Surgical History:   Procedure Laterality Date     APPENDECTOMY OPEN  1999    Appendicitis      SINUS SURGERY  1990's    h/o many sinus infections           Family History:     Family History   Problem Relation Age of Onset     DIABETES Mother      Unknown type     Prostate Cancer Paternal Grandfather      LUNG DISEASE Other      Negative     DIABETES Maternal Aunt      unknown type     DIABETES Maternal Uncle      unknown type     DIABETES Maternal Grandmother      unknown type     Coronary Artery Disease Paternal Grandmother             Social History:     Social History     Social History     Marital status: Single     Spouse name: N/A     Number of children: N/A     Years of education: N/A     Occupational History     State Representative [employer]     Financial Counselor      Social History Main Topics     Smoking status: Never Smoker     Smokeless tobacco: Former User     Alcohol use 6.0 - 8.4 oz/week     10 - 14 Standard drinks or equivalent per week      Comment: 2 drinks per night 5X/wk     Drug use: No     Sexual activity: Yes     Partners: Female     Birth control/ protection: Pull-out method, Condom     Other Topics Concern     Parent/Sibling W/ Cabg, Mi Or Angioplasty Before 65f 55m? Yes     Social History Narrative    Lives alone in apartment in [place], [place]. He studied political science in college and was elected as a state representative for  "the [employer].         Nov 2015.  His main political issue is higher education.  His girlfriend is looking for work as an .  He eats 3 square meals per day, most of which are cooked by him or his girlfriend.  Gets outside to walk or run 20 minutes about 5x per week.  Works fairly regular daytime hours, often from home.            Medications:     Current Outpatient Prescriptions   Medication     amylase-lipase-protease (CREON) 24429 UNITS CPEP     ursodiol (ACTIGALL) 300 MG capsule     PULMOZYME 1 MG/ML neb solution     azithromycin (ZITHROMAX) 250 MG tablet     tobramycin (MARLEN PODHALER) 28 MG in caps     rimantadine (FLUMADINE) 100 MG tablet     loratadine (CLARITIN) 10 MG tablet     allopurinol (ZYLOPRIM) 300 MG tablet     insulin glargine (LANTUS SOLOSTAR) 100 UNIT/ML injection     CREON 85361 UNITS CPEP per EC capsule     insulin pen needle ([employer]iel Medical[employer] PEN NEEDLES) 32G X 4 MM     multivitamins CF formula (MVW COMPLETE FORMULATION) CAPS capsule     albuterol (2.5 MG/3ML) 0.083% neb solution     Cholecalciferol 5000 UNITS TABS     fluticasone (FLOVENT HFA) 110 MCG/ACT Inhaler     fluticasone (FLONASE) 50 MCG/ACT spray     phytonadione (MEPHYTON) 5 MG tablet     albuterol (PROAIR HFA/PROVENTIL HFA/VENTOLIN HFA) 108 (90 BASE) MCG/ACT Inhaler     ranitidine (ZANTAC) 75 MG tablet     Syringe/Needle, Disp, 18G X 1\" 6 ML MISC     order for DME     blood glucose monitoring (FREESTYLE LITE) test strip     blood glucose monitoring (FREESTYLE) lancets     No current facility-administered medications for this visit.             Physical Exam:   /79  Pulse 79  Resp 16  Ht 1.63 m (5' 4.17\")  Wt 53.1 kg (117 lb 1 oz)  SpO2 96%  BMI 19.99 kg/m2    GENERAL: alert, NAD  HEENT: NCAT, EOMI, anicteric sclera, no nasal edema or erythema; canals and TMs clear; no oral mucosal edema or erythema  Neck: no cervical or supraclavicular adenopathy  Respiratory: good air flow, no crackles, rhonchi or " wheezing  CV: RRR, S1S2, no murmurs noted  Abdomen: normoactive BS, soft and non tender   Lymph: no edema, + digital clubbing  Neuro: AAO X 3, CN 2-12 grossly intact  Psychiatric: normal affect, good eye contact  Skin: no rash, jaundice or lesions on limited exam         Data:   All laboratory and imaging data reviewed.      Cystic Fibrosis Culture  Specimen Description   Date Value Ref Range Status   09/15/2017 Sputum  Final   09/15/2017 Sputum  Final   09/15/2017 Sputum  Final    Culture Micro   Date Value Ref Range Status   09/15/2017 Heavy growth  Normal mami    Final   09/15/2017 Moderate growth  Stenotrophomonas maltophilia   (A)  Final   09/15/2017 Light growth  Pseudomonas aeruginosa   (A)  Final   09/15/2017 (A)  Final    Light growth  Pseudomonas aeruginosa, mucoid strain     09/15/2017 Light growth  Strain 2  Pseudomonas aeruginosa   (A)  Final   09/15/2017 Light growth  Aspergillus fumigatus   (A)  Final   09/15/2017 Culture negative for acid fast bacilli  Final   09/15/2017   Final    Assayed at [facility] [address] [place] [address] [id number] [phone]        PFT interpretation:  Maneuver: valid and meets ATS guidelines  Moderate severe obstruction with decreased FEV1 and FEV1/FVC  Compared to prior: FEV1 of 1.99 is at his recent best

## 2017-12-14 ENCOUNTER — OFFICE VISIT (OUTPATIENT)
Dept: PULMONOLOGY | Facility: CLINIC | Age: 32
End: 2017-12-14
Attending: PHYSICIAN ASSISTANT
Payer: COMMERCIAL

## 2017-12-14 ENCOUNTER — OFFICE VISIT (OUTPATIENT)
Dept: PHARMACY | Facility: CLINIC | Age: 32
End: 2017-12-14
Payer: COMMERCIAL

## 2017-12-14 VITALS
RESPIRATION RATE: 16 BRPM | DIASTOLIC BLOOD PRESSURE: 79 MMHG | SYSTOLIC BLOOD PRESSURE: 126 MMHG | BODY MASS INDEX: 19.99 KG/M2 | WEIGHT: 117.06 LBS | OXYGEN SATURATION: 96 % | HEART RATE: 79 BPM | HEIGHT: 64 IN

## 2017-12-14 DIAGNOSIS — E84.0 CYSTIC FIBROSIS WITH PULMONARY MANIFESTATIONS (H): ICD-10-CM

## 2017-12-14 DIAGNOSIS — J30.9 ALLERGIC RHINITIS: ICD-10-CM

## 2017-12-14 DIAGNOSIS — E84.0 CYSTIC FIBROSIS WITH PULMONARY EXACERBATION (H): ICD-10-CM

## 2017-12-14 DIAGNOSIS — K86.81 EXOCRINE PANCREATIC INSUFFICIENCY: ICD-10-CM

## 2017-12-14 DIAGNOSIS — E84.9 CF (CYSTIC FIBROSIS) (H): ICD-10-CM

## 2017-12-14 DIAGNOSIS — K21.9 GASTROESOPHAGEAL REFLUX DISEASE WITHOUT ESOPHAGITIS: ICD-10-CM

## 2017-12-14 DIAGNOSIS — A49.02 MRSA INFECTION: ICD-10-CM

## 2017-12-14 DIAGNOSIS — E08.9 DIABETES MELLITUS DUE TO CYSTIC FIBROSIS (H): ICD-10-CM

## 2017-12-14 DIAGNOSIS — E08.9 DIABETES MELLITUS RELATED TO CYSTIC FIBROSIS (H): ICD-10-CM

## 2017-12-14 DIAGNOSIS — E84.9 DIABETES MELLITUS DUE TO CYSTIC FIBROSIS (H): ICD-10-CM

## 2017-12-14 DIAGNOSIS — E84.0 CYSTIC FIBROSIS WITH PULMONARY MANIFESTATIONS (H): Primary | ICD-10-CM

## 2017-12-14 DIAGNOSIS — E84.8 DIABETES MELLITUS RELATED TO CYSTIC FIBROSIS (H): ICD-10-CM

## 2017-12-14 DIAGNOSIS — E84.9 CYSTIC FIBROSIS (H): ICD-10-CM

## 2017-12-14 PROCEDURE — 87107 FUNGI IDENTIFICATION MOLD: CPT | Performed by: INTERNAL MEDICINE

## 2017-12-14 PROCEDURE — 99605 MTMS BY PHARM NP 15 MIN: CPT | Performed by: PHARMACIST

## 2017-12-14 PROCEDURE — 87186 SC STD MICRODIL/AGAR DIL: CPT | Performed by: INTERNAL MEDICINE

## 2017-12-14 PROCEDURE — 87070 CULTURE OTHR SPECIMN AEROBIC: CPT | Performed by: INTERNAL MEDICINE

## 2017-12-14 PROCEDURE — 99212 OFFICE O/P EST SF 10 MIN: CPT | Mod: ZF

## 2017-12-14 PROCEDURE — 87077 CULTURE AEROBIC IDENTIFY: CPT | Performed by: INTERNAL MEDICINE

## 2017-12-14 RX ORDER — URSODIOL 300 MG/1
CAPSULE ORAL
Qty: 180 CAPSULE | Refills: 3 | Status: SHIPPED | OUTPATIENT
Start: 2017-12-14 | End: 2018-01-03

## 2017-12-14 ASSESSMENT — PAIN SCALES - GENERAL: PAINLEVEL: NO PAIN (0)

## 2017-12-14 NOTE — PATIENT INSTRUCTIONS
Cystic Fibrosis Self-Care Plan       Patient: Dilan Nassar   MRN: [id number]   Clinic Date: December 14, 2017     RECOMMENDATIONS:  1. Continue nebulizers and vest therapy.   2. Have a great trip to [place]!    Annual Studies:   IGG   Date Value Ref Range Status   09/15/2017 1340 695 - 1620 mg/dL Final     No results found for: INS  There are no preventive care reminders to display for this patient.    Pulmonary Function Tests  FEV1: amount of air you can blow out in 1 second  FVC: total amount of air you can take in and blow out    Your Goals:         PFT Latest Ref Rng & Units 9/15/2017   FVC L 3.79   FEV1 L 2.04   FVC% % 87   FEV1% % 56          Airway Clearance: The Most Important Way to Keep Your Lungs Healthy  Vest Settings:    Hill-Rom Frequencies: 8, 9, 10 Pressure 10 Then, Frequencies 18, 19, 20 Pressure 6      RespirTech: Quick Start with Pressure of     Do each frequency for 5 minutes; Deflate vest after each frequency & cough 3 times before beginning the next setting.    Vest and Neb Therapy should be done 2-3 times/day.    Good Nutrition Can Improve Lung Function and Overall Health     Take ALL of your vitamins with food     Take 1/2 of your enzymes before EVERY meal/snack and the other 1/2 mid-meal/snack    Wt Readings from Last 3 Encounters:   09/15/17 52.6 kg (115 lb 15.4 oz)   05/31/17 52.6 kg (115 lb 15.4 oz)   05/30/17 53.5 kg (118 lb)       There is no height or weight on file to calculate BMI.         National CF Foundation Recommendations for BMI in CF Adults: Women: at least 22 Men: at least 23        Controlling Blood Sugars Helps Prevent Lung Infections & Improves Nutrition  Test blood sugar:     In the morning before eating (goal is )     2 hours after a meal (goal is less than 150)     When pre-meal glucose is greater than 150 add correction     At bedtime (if less than 100 eat a snack with 15 grams of carbohydrates  Last A1C Results:   Hemoglobin A1C   Date Value Ref Range  Status   09/15/2017 6.1 (H) 4.3 - 6.0 % Final         If diabetic, measure A1C every 6 months. Goal is under 7%.    Staying Healthy    Research: If you are interested in learning about research opportunities or have questions, please contact Sonali Altamirano at [phone] or [email or web address].       Foundation: [facility] is a personalized resource service to help you with the insurance, financial, legal and other issues you are facing.  It's free, confidential and available to anyone with CF.  Ask your  for more information or contact [facility] directly at 014-COMPASS ([phone]) or compass@cff.org, or learn more at cff.org/compass.       CF Nurse Line:  Giovanny Pimentel: [phone]   Audra Smart, RT: [phone]     Daniela Whitt and Tamika Perez , Dieticians: [phone]     Lisset Root, Diabetes Nurse: [phone]    Kathie Truong: [phone] or Carisa Berumen [phone], Social Workers   [email or web address]

## 2017-12-14 NOTE — PROGRESS NOTES
SUBJECTIVE/OBJECTIVE:                           Dilan Nassar is a 32 year old male coming in for an initial visit for Medication Therapy Management.  He was referred to me from Jyothi Warren PA-C as part of a routine CF visit.     Chief Complaint: routine clinic visit/medication review    Allergies/ADRs: Reviewed in Epic  Tobacco: No tobacco use  Alcohol: 1-3 beverages / week    PMH: Reviewed in Epic    Medication Adherence/Access  The patient misses their medication very few times per week.    Patient is responsible for his own medications.   Adherence/Compliance is described as good, per patient report  Medication barriers: no issues reported by patient.  The patient fills Pulmozyme and Jameel at [facility].  Other medications are filled at the [facility] pharmacy    Has the patient been offered to fill at [facility] [place]? Yes - however the patient is restricted to filling their prescriptions at a different pharmacy.  Other programs or coupons used: has been offered [employer] [facility] but is not interested in enrolling.     CF:    Chronic inhaled medications include: albuterol, Pulmozyme, Jameel Podhaler, Flovent  Chronic oral medications include: azithromycin, ursodiol (for CF liver disease)  Pulmonary symptoms are stable  PFTs are stable, FEV1 54%  Cultures: Sputum cultures have grown Pseudomonas, MRSA, Klebsiella, Stenotrophomonas, and Achromobacter  Exacerbation status: no exacerbation    Pancreatic Insufficiency/Nutrition: Pancreatic enzyme replacement with Creon 67734 units.  Patient is taking 5-6 capsules with meals   Weight and BMI are stable  Vitamins include: MVW, cholecalciferol, Mephyton    Sinusitis/Allergies: Well controlled with Flonase and loratadine when needed    CFRD: Takes Lantus 12 units daily.  Does not consistently check blood sugar at home but no symptoms of hyper- or hypoglycemia.  Last A1C was 6.1.    GERD: Well controlled with ranitidine.    ASSESSMENT:                             Current medications  were reviewed today.     Medication Adherence/Access: no adherence issues identified.  Patient's insurance will change and he will have to fill Pulmozyme and Jameel Podhaler at [facility] starting in 2018.  He will need new prescriptions; he has a letter at home and will contact the CF office to let us know where the new prescriptions need to be sent.  Patient was also given information about Pulmozyme and Jameel Podhaler co-pay cards.  He is not sure he wants to enroll but appreciated the information and will contact me if he has any questions or needs help enrolling.    CF: Stable. Patient would benefit from no changes at this time.    Pancreatic Insufficiency/Nutrition: Needs Improvement.  Patient would benefit from a change in vitamin regimen due to low vitamin D.    Sinusitis/Allergies: Stable, no changes.    CFRD: Stable, no changes.  Patient would benefit from checking his blood sugars more consistently at home.    GERD: Stable, no changes.    PLAN:                            Per RD recommendations, change vitamins to [id number] 1 capsule BID.  Patient will be enrolled in the CF vitamin fund.    I spent 15 minutes with this patient today.    Will follow up in 1 year or sooner if needed.    The patient declined a summary of these recommendations as an after visit summary.     Gwen Murray PharmD  CF Medication Therapy Management Pharmacist  [facility]  [phone]

## 2017-12-14 NOTE — MR AVS SNAPSHOT
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)        Gastroesophageal reflux disease without esophagitis           Follow-ups after your visit        Your next 10 appointments already scheduled     Mar 14, 2018  9:00 AM CDT   CF LOOP with UC PFL CF   [facility] Pulmonary Function Testing ([facility])    [address] [address] [phone]            Mar 14, 2018  9:30 AM CDT   (Arrive by 9:15 AM)   RETURN CYSTIC FIBROSIS VISIT with Jyothi Warren PA-C   [facility] ([facility])    [address] [place] [id number] [phone]              Future tests that were ordered for you today     Open Future Orders        Priority Expected Expires Ordered    Cystic Fibrosis Culture Aerob Bacterial Routine 3/1/2018 3/15/2018 12/14/2017    Spirometry, Breathing Capacity Routine 3/1/2018 3/15/2018 12/14/2017            Who to contact     If you have questions or need follow up information about today's clinic visit or your schedule please contact [facility] CYSTIC FIBROSIS C[facility] directly at [phone].  Normal or non-critical lab and imaging results will be communicated to you by MyChart, letter or phone within 4 business days after the clinic has received the results. If you do not hear from us within 7 days, please contact the clinic through MyChart or phone. If you have a critical or abnormal  lab result, we will notify you by phone as soon as possible.  Submit refill requests through [employer] or call your pharmacy and they will forward the refill request to us. Please allow 3 business days for your refill to be completed.          Additional Information About Your Visit        [employer] Information     [employer] gives you secure access to your electronic health record. If you see a primary care provider, you can also send messages to your care team and make appointments. If you have questions, please call your primary care clinic.  If you do not have a primary care provider, please call [phone] and they will assist you.        Care EveryWhere ID     This is your Care EveryWhere ID. This could be used by other organizations to access your [facility] medical records  [id number]         Blood Pressure from Last 3 Encounters:   12/14/17 126/79   09/15/17 139/61   05/31/17 116/82    Weight from Last 3 Encounters:   12/14/17 117 lb 1 oz (53.1 kg)   09/15/17 115 lb 15.4 oz (52.6 kg)   05/31/17 115 lb 15.4 oz (52.6 kg)              Today, you had the following     No orders found for display         Today's Medication Changes          These changes are accurate as of: 12/14/17  3:37 PM.  If you have any questions, ask your nurse or doctor.               These medicines have changed or have updated prescriptions.        Dose/Directions    azithromycin 250 MG tablet   Commonly known as:  ZITHROMAX   This may have changed:  Another medication with the same name was removed. Continue taking this medication, and follow the directions you see here.   Used for:  Cystic fibrosis with pulmonary manifestations (H)   Changed by:  Atilio Nieto MD        TAKE TWO TABLETS(500MG) BY MOUTH EVERY MONDAY, WEDNESDAY AND FRIDAY MORNING   Quantity:  150 tablet   Refills:  1         Stop taking these medicines if you haven't already. Please contact your care team if you have questions.     Cholecalciferol 5000 UNITS Tabs    Stopped by:  Jyothi Warren PA-C                Where to get your medicines      These medications were sent to [facility] - [place], [place] - [address]  [address] [facility], [place] [address]     Phone:  [phone]     amylase-lipase-protease 83421 UNITS Cpep    ursodiol 300 MG capsule         Some of these will need a paper prescription and others can be bought over the counter.  Ask your nurse if you have questions.     Bring a paper prescription for each of these medications     multivitamin CF formula softgel cap                Primary Care Provider Office Phone # Fax #    Atilio Aaron Nieto [phone]       [address] [place] [id number]        Equal Access to Services     ALTAGRACIA GATICA : Hadii delvis coxo Sopedro, waaxda luqadaha, qaybta kaalmada adeegyada, chris lechuga. So [facility] [phone].    ATENCIÓN: Si habla español, tiene a headley disposición servicios gratuitos de asistencia lingüística. [facility] [phone].    We comply with applicable federal civil rights laws and [place] laws. We do not discriminate on the basis of race, color, national origin, age, disability, sex, sexual orientation, or gender identity.            Thank you!     Thank you for choosing [facility]STIC FIBROSIS [facility]ULT [facility]  for your care. Our goal is always to provide you with excellent care. Hearing back from our patients is one way we can continue to improve our services. Please take a few minutes to complete the written survey that you may receive in the mail after your visit with us. Thank you!             Your Updated Medication List - Protect others around you: Learn how to safely use, store and throw away your medicines at www.disposemymeds.org.          This list is accurate as of: 12/14/17  3:37 PM.  Always use your most recent med list.                   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    Cystic fibrosis (H)       allopurinol 300 MG tablet    ZYLOPRIM    90 tablet    TAKE ONE TABLET (300MG) BY MOUTH DAILY    CF (cystic fibrosis) (H)       azithromycin 250 MG tablet    ZITHROMAX    150 tablet    TAKE TWO TABLETS(500MG) BY MOUTH EVERY MONDAY, WEDNESDAY AND FRIDAY MORNING    Cystic fibrosis with pulmonary manifestations (H)       blood glucose monitoring lancets     2 Box    Use to test blood sugar 4 times daily or as directed.    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), Exocrine pancreatic insufficiency, Cystic fibrosis with pulmonary manifestations (H), MRSA infection       * amylase-lipase-protease 32047 UNITS Cpep    CREON    600 each    TAKE 5-6 CAPSULES (60,000-72,000) BY MOUTH THREE TIMES A DAY WITH MEALS    Cystic fibrosis with pulmonary manifestations (H), Cystic fibrosis (H), CF (cystic fibrosis) (H), 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), Exocrine  pancreatic insufficiency, Cystic fibrosis with pulmonary manifestations (H), MRSA infection       fluticasone 110 MCG/ACT Inhaler    FLOVENT HFA    1 Inhaler    INHALE 1 PUFF INTO THE LUNGS TWO TIMES A DAY    Cystic fibrosis with pulmonary manifestations (H), Cystic fibrosis (H), CF (cystic fibrosis) (H), 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), Exocrine pancreatic insufficiency, Cystic fibrosis with pulmonary manifestations (H), MRSA infection       fluticasone 50 MCG/ACT spray    FLONASE    48 g    SPRAY 2 SPRAYS INTO BOTH NOSTRILS DAILY    CF (cystic fibrosis) (H), Cystic fibrosis (H), 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), Cystic fibrosis with pulmonary manifestations (H), Exocrine pancreatic insufficiency, Cystic fibrosis with pulmonary manifestations (H), MRSA infection       insulin glargine 100 UNIT/ML injection    LANTUS SOLOSTAR    15 mL    Inject 12 Units Subcutaneous every morning    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 Please dispense four (4) Ruby LC Plus nebulizer kits and four (4) RUBY face masks for this patient with Cystic Fibrosis. Patient requires additional nebulizer supplies due to his  "need to use multiple sterile neb cups for his inhaled medications that may not be mixed together.    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    Cystic fibrosis with pulmonary manifestations (H)       ranitidine 75 MG tablet    ZANTAC    30 tablet    Take 1 tablet (75 mg) by mouth daily as needed for heartburn        rimantadine 100 MG tablet    FLUMADINE    60 tablet    Take 1 tablet (100 mg) by mouth 2 times daily Take during flu season Oct 1st-March 31st    Cystic fibrosis with pulmonary manifestations (H)       Syringe/Needle (Disp) 18G X 1\" 6 ML Misc     60 each    1 each 2 times daily    Cystic fibrosis with pulmonary manifestations (H), Exocrine pancreatic insufficiency, MRSA infection       tobramycin 28 MG in caps    MARLEN PODHALER    224 capsule    Inhale 4 capsules (112 mg) into the lungs 2 times daily 1 month on, 1 month off    Cystic fibrosis with pulmonary manifestations (H)       ursodiol 300 MG capsule    ACTIGALL    180 capsule    TAKE 1 CAPSULE (300MG ) BY MOUTH TWO TIMES A DAY    CF (cystic fibrosis) (H), Cystic fibrosis (H), 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 Read the directions carefully, and ask your doctor or other care provider to review them with you.      "

## 2017-12-14 NOTE — LETTER
12/14/2017       RE: Dilan Nassar  [address]  [address]  [facility] [facility] [address]     Dear Colleague,    Thank you for referring your patient, Dilan Nassar, to the [facility] at [facility]. Please see a copy of my visit note below.    [facility]  December 14, 2017         Assessment and Plan:   Dilan Nassar is a 31 year old male with cystic fibrosis, pancreatic insufficiency and CFRD who is seen today in clinic for follow up. In the past few months, has completed course of vancomycin, imipenem and bactrim. Started on fluconazole at last visit.       1. CF lung disease: feels very stable, cough at baseline, no new shortness of breath, no hemoptysis. Notes he's not been as active as he should be. Vesting BID, tries to do TID when he can. PFTs today stable at his recent best. Previous cultures + for MRSA, Klebsiella, Steno and Achromobacter. No acute issues at this time.  - Continue nebulizers, inhaler and vest therapy  - On chronic azithromycin  - Increase physical activity      2. Elevated alk phos: since 10/15 per our EPIC charting with US demonstrating coarse echotexture of the liver with hypertrophy of the caudate lobe and left lobe concerning for possible cirrhosis, no lesions, patent vasculature. Viral serologies from May WNL. LFTs normalized on annual labs.   - Continue Ursodiol      3. Exocrine pancreatic insufficiency: denies symptoms of malabsorption. Weight is low, but stable. BMI below goal of 23. Vitamin D is slightly low at 28 despite 5000 U of supplemention. GAURAV Lundberg, to see today.   - Continue pancreatic enzymes and vitamin supplementation      4. CFRD: AIC of 6.1 on 9/15/17, improved from prior. Hasn't really been checking BS, if he thinks he's low, he'll just eat something.   - Continue Lantus 12 U  - Routine diabetic eye exam in 2018.      5. CF related sinus disease: no  current symptoms. Not using Claritin over the winter.   - Continue Flonase       RTC: in 3 months with routine spirometry  Annual studies due: September 2018  Vaccinations: received influenza at his school      Jyothi Warren PA-C  Pulmonary, Allergy, Critical Care and Sleep Medicine        Interval History:   Feeling good, stable. Hasn't been too active. Coughs intermittently throughout the day, some sputum production. Sputum is yellowish, no streaking or congestion, tightness or wheezing.   No sinus congestion. Vesting BID, occasionally 3 times/day. Denies nausea, vomiting or abdominal pain.          Review of Systems:   Please see HPI. Otherwise, complete 10 point ROS negative.           Past Medical and Surgical History:     Past Medical History:   Diagnosis Date     Cystic fibrosis with pulmonary manifestations (H)     /[id number]     Past Surgical History:   Procedure Laterality Date     APPENDECTOMY OPEN  1999    Appendicitis      SINUS SURGERY  1990's    h/o many sinus infections           Family History:     Family History   Problem Relation Age of Onset     DIABETES Mother      Unknown type     Prostate Cancer Paternal Grandfather      LUNG DISEASE Other      Negative     DIABETES Maternal Aunt      unknown type     DIABETES Maternal Uncle      unknown type     DIABETES Maternal Grandmother      unknown type     Coronary Artery Disease Paternal Grandmother             Social History:     Social History     Social History     Marital status: Single     Spouse name: N/A     Number of children: N/A     Years of education: N/A     Occupational History     State Representative [employer]     Financial Counselor      Social History Main Topics     Smoking status: Never Smoker     Smokeless tobacco: Former User     Alcohol use 6.0 - 8.4 oz/week     10 - 14 Standard drinks or equivalent per week      Comment: 2 drinks per night 5X/wk     Drug use: No     Sexual activity: Yes     Partners: Female     Birth  "control/ protection: Pull-out method, Condom     Other Topics Concern     Parent/Sibling W/ Cabg, Mi Or Angioplasty Before 65f 55m? Yes     Social History Narrative    Lives alone in apartment in [place], [place]. 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         Nov 2015.  His main political issue is higher education.  His girlfriend is looking for work as an .  He eats 3 square meals per day, most of which are cooked by him or his girlfriend.  Gets outside to walk or run 20 minutes about 5x per week.  Works fairly regular daytime hours, often from home.            Medications:     Current Outpatient Prescriptions   Medication     amylase-lipase-protease (CREON) 51999 UNITS CPEP     ursodiol (ACTIGALL) 300 MG capsule     PULMOZYME 1 MG/ML neb solution     azithromycin (ZITHROMAX) 250 MG tablet     tobramycin (MARLEN PODHALER) 28 MG in caps     rimantadine (FLUMADINE) 100 MG tablet     loratadine (CLARITIN) 10 MG tablet     allopurinol (ZYLOPRIM) 300 MG tablet     insulin glargine (LANTUS SOLOSTAR) 100 UNIT/ML injection     CREON 91102 UNITS CPEP per EC capsule     insulin pen needle (MumumÃ­oFINE PEN NEEDLES) 32G X 4 MM     multivitamins CF formula (MVW COMPLETE FORMULATION) CAPS capsule     albuterol (2.5 MG/3ML) 0.083% neb solution     Cholecalciferol 5000 UNITS TABS     fluticasone (FLOVENT HFA) 110 MCG/ACT Inhaler     fluticasone (FLONASE) 50 MCG/ACT spray     phytonadione (MEPHYTON) 5 MG tablet     albuterol (PROAIR HFA/PROVENTIL HFA/VENTOLIN HFA) 108 (90 BASE) MCG/ACT Inhaler     ranitidine (ZANTAC) 75 MG tablet     Syringe/Needle, Disp, 18G X 1\" 6 ML MISC     order for DME     blood glucose monitoring (FREESTYLE LITE) test strip     blood glucose monitoring (FREESTYLE) lancets     No current facility-administered medications for this visit.             Physical Exam:   /79  Pulse 79  Resp 16  Ht 1.63 m (5' 4.17\")  Wt 53.1 kg " (117 lb 1 oz)  SpO2 96%  BMI 19.99 kg/m2    GENERAL: alert, NAD  HEENT: NCAT, EOMI, anicteric sclera, no nasal edema or erythema; canals and TMs clear; no oral mucosal edema or erythema  Neck: no cervical or supraclavicular adenopathy  Respiratory: good air flow, no crackles, rhonchi or wheezing  CV: RRR, S1S2, no murmurs noted  Abdomen: normoactive BS, soft and non tender   Lymph: no edema, + digital clubbing  Neuro: AAO X 3, CN 2-12 grossly intact  Psychiatric: normal affect, good eye contact  Skin: no rash, jaundice or lesions on limited exam         Data:   All laboratory and imaging data reviewed.      Cystic Fibrosis Culture  Specimen Description   Date Value Ref Range Status   09/15/2017 Sputum  Final   09/15/2017 Sputum  Final   09/15/2017 Sputum  Final    Culture Micro   Date Value Ref Range Status   09/15/2017 Heavy growth  Normal mami    Final   09/15/2017 Moderate growth  Stenotrophomonas maltophilia   (A)  Final   09/15/2017 Light growth  Pseudomonas aeruginosa   (A)  Final   09/15/2017 (A)  Final    Light growth  Pseudomonas aeruginosa, mucoid strain     09/15/2017 Light growth  Strain 2  Pseudomonas aeruginosa   (A)  Final   09/15/2017 Light growth  Aspergillus fumigatus   (A)  Final   09/15/2017 Culture negative for acid fast bacilli  Final   09/15/2017   Final    Assayed at [facility] [address] [place] [address] [phone]        PFT interpretation:  Maneuver: valid and meets ATS guidelines  Moderate severe obstruction with decreased FEV1 and FEV1/FVC  Compared to prior: FEV1 of 1.99 is at his recent best                Nutrition Note    Reason for Visit:  Vitamin Levels    Date: 9/15/17  Vitamin A- wnl  Vitamin D- 28 (low)  Vitamin E- 5.6 wnl    Current Regimen:  2 chewable AquADEKs + Vitamin D 5000 units.   Comments: pt reports not taking vitamins consisently, frequently misses doses. Reports he has not yet signed up for Care Forward program, does not think he will do this.      Interventions/Recommendations:  1) Pt is currently paying for all vitamins out-of-pocket. Discussed option to switch to a CF-specific MVI that contains higher amounts of Vitamin D. Will switch pt to MVW Complete  - 2 softgels/day which will provide 6000 units Vitamin D per day. Updated prescription and sent referral to [facility]. Encouraged adherence to regimen as this will be delivered directly to pt's home. Plan to re-check levels in 2-3 months. May need to increase to MVW Complete  if no improvement.     Will follow.     Tamika Perez RD, LD  Cystic Fibrosis/Lung Transplant Dietitian  Pager [phone]  On weekends/holidays contact coverage RD at [phone] (inpatient use only)       Again, thank you for allowing me to participate in the care of your patient.      Sincerely,    Jyothi Warren PA-C

## 2017-12-14 NOTE — MR AVS SNAPSHOT
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 Continue nebulizers and vest therapy.   2. Have a great trip to [place]!    Annual Studies:   IGG   Date Value Ref Range Status   09/15/2017 1340 695 - 1620 mg/dL Final     No results found for: INS  There are no preventive care reminders to display for this patient.    Pulmonary Function Tests  FEV1: amount of air you can blow out in 1 second  FVC: total amount of air you can take in and blow out    Your Goals:         PFT Latest Ref Rng & Units 9/15/2017   FVC L 3.79   FEV1 L 2.04   FVC% % 87   FEV1% % 56          Airway Clearance: The Most Important Way to Keep Your Lungs Healthy  Vest Settings:    Hill-Rom Frequencies: 8, 9, 10 Pressure 10 Then, Frequencies 18, 19, 20 Pressure 6      RespirTech: Quick Start with Pressure of     Do each frequency for 5 minutes; Deflate vest after each frequency & cough 3 times before beginning the next setting.    Vest and Neb Therapy should be done 2-3 times/day.    Good Nutrition Can Improve Lung Function and Overall Health     Take ALL of your vitamins with food     Take 1/2 of your enzymes before EVERY meal/snack and the other 1/2 mid-meal/snack    Wt Readings from Last 3 Encounters:   09/15/17 52.6 kg (115 lb  15.4 oz)   05/31/17 52.6 kg (115 lb 15.4 oz)   05/30/17 53.5 kg (118 lb)       There is no height or weight on file to calculate BMI.         National CF Foundation Recommendations for BMI in CF Adults: Women: at least 22 Men: at least 23        Controlling Blood Sugars Helps Prevent Lung Infections & Improves Nutrition  Test blood sugar:     In the morning before eating (goal is )     2 hours after a meal (goal is less than 150)     When pre-meal glucose is greater than 150 add correction     At bedtime (if less than 100 eat a snack with 15 grams of carbohydrates  Last A1C Results:   Hemoglobin A1C   Date Value Ref Range Status   09/15/2017 6.1 (H) 4.3 - 6.0 % Final         If diabetic, measure A1C every 6 months. Goal is under 7%.    Staying Healthy    Research: If you are interested in learning about research opportunities or have questions, please contact Sonali Altamirano at [phone] or [email or web address].      CF Foundation: Compass is a personalized resource service to help you with the insurance, financial, legal and other issues you are facing.  It's free, confidential and available to anyone with CF.  Ask your  for more information or contact Compass directly at [phone] ([phone]) or compass@cff.org, or learn more at cff.[email or web address].       CF Nurse Line:  Giovanny Pimentel: [phone]   Audra Smart, RT: [phone]     Daniela Perez , Dieticians: [phone]     Lisset Root, Diabetes Nurse: [phone]    Kathie Truong: [phone] or Carisa Berumen [phone], Social Workers   [email or web address]            Follow-ups after your visit        Follow-up notes from your care team     Return in about 3 months (around 3/14/2018).      Your next 10 appointments already scheduled     Mar 14, 2018  9:00 AM CDT   CF LOOP with UC PFL CF   [facility] Pul[facility] Function Testing ([facility])    [address] [place] [address]  Floor  [facility] [id number] [phone]            Mar 14, 2018  9:30 AM CDT   (Arrive by 9:15 AM)   RETURN CYSTIC FIBROSIS VISIT with Jyothi Warren PA-C   [facility] ([facility])    [address] [facility]  3rd [facility] [id number] [phone]              Future tests that were ordered for you today     Open Future Orders        Priority Expected Expires Ordered    Cystic Fibrosis Culture Aerob Bacterial Routine 3/1/2018 3/15/2018 12/14/2017    Spirometry, Breathing Capacity Routine 3/1/2018 3/15/2018 12/14/2017            Who to contact     If you have questions or need follow up information about today's clinic visit or your schedule please contact [facility] directly at [phone].  Normal or non-critical lab and imaging results will be communicated to you by MyChart, letter or phone within 4 business days after the clinic has received the results. If you do not hear from us within 7 days, please contact the clinic through [facility] or phone. If you have a critical or abnormal lab result, we will notify you by phone as soon as possible.  Submit refill requests through [employer] or call your pharmacy and they will forward the refill request to us. Please allow 3 business days for your refill to be completed.          Additional Information About Your Visit        MyChart Information     [employer] gives you secure access to your electronic health record. If you see a primary care provider, you can also send messages to your care team and make appointments. If you have questions, please call your primary care clinic.  If you do not have a primary care provider, please call [phone] and they will assist you.        Care EveryWhere ID     This is your Care EveryWhere ID. This could be used by other organizations to access your [facility] medical records  [id number]        Your Vitals Were     Pulse  "Respirations Height Pulse Oximetry BMI (Body Mass Index)       79 16 1.63 m (5' 4.17\") 96% 19.99 kg/m2        Blood Pressure from Last 3 Encounters:   12/14/17 126/79   09/15/17 139/61   05/31/17 116/82    Weight from Last 3 Encounters:   12/14/17 53.1 kg (117 lb 1 oz)   09/15/17 52.6 kg (115 lb 15.4 oz)   05/31/17 52.6 kg (115 lb 15.4 oz)                 Today's Medication Changes          These changes are accurate as of: 12/14/17 11:43 AM.  If you have any questions, ask your nurse or doctor.               These medicines have changed or have updated prescriptions.        Dose/Directions    azithromycin 250 MG tablet   Commonly known as:  ZITHROMAX   This may have changed:  Another medication with the same name was removed. Continue taking this medication, and follow the directions you see here.   Used for:  Cystic fibrosis with pulmonary manifestations (H)   Changed by:  Atilio Nieto MD        TAKE TWO TABLETS(500MG) BY MOUTH EVERY MONDAY, WEDNESDAY AND FRIDAY MORNING   Quantity:  150 tablet   Refills:  1         Stop taking these medicines if you haven't already. Please contact your care team if you have questions.     Cholecalciferol 5000 UNITS Tabs   Stopped by:  Jyothi Warren PA-C                Where to get your medicines      These medications were sent to [facility], [place] - [address] [facility]  [address] [place] [address]     Phone:  [phone]     amylase-lipase-protease 57908 UNITS Cpep    ursodiol 300 MG capsule         Some of these will need a paper prescription and others can be bought over the counter.  Ask your nurse if you have questions.     Bring a paper prescription for each of these medications     multivitamin CF formula softgel cap                Primary Care Provider Office Phone # Fax #    Atilio Nieto [phone]       [address] [place] [address]        Equal Access to Services     [facility] " GAAR : Hadii aad lillian fadi Ji, wagageda luqadaha, qaybta kashagufta hsu, waxpatria diego haytrisha barreranicholelissette winter . So [facility] [phone].    ATENCIÓN: Si habla zeinab, tiene a headley disposición servicios gratuitos de asistencia lingüística. Llame al [phone].    We comply with applicable federal civil rights laws and [place] laws. We do not discriminate on the basis of race, color, national origin, age, disability, sex, sexual orientation, or gender identity.            Thank you!     Thank you for choosing [facility]  for your care. Our goal is always to provide you with excellent care. Hearing back from our patients is one way we can continue to improve our services. Please take a few minutes to complete the written survey that you may receive in the mail after your visit with us. Thank you!             Your Updated Medication List - Protect others around you: Learn how to safely use, store and throw away your medicines at www.disposemymeds.org.          This list is accurate as of: 12/14/17 11:43 AM.  Always use your most recent med list.                   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    Cystic fibrosis (H)       allopurinol 300 MG tablet    ZYLOPRIM    90 tablet    TAKE ONE TABLET (300MG) BY MOUTH DAILY    CF (cystic fibrosis) (H)       azithromycin 250 MG tablet    ZITHROMAX    150 tablet    TAKE  TWO TABLETS(500MG) BY MOUTH EVERY MONDAY, WEDNESDAY AND FRIDAY MORNING    Cystic fibrosis with pulmonary manifestations (H)       blood glucose monitoring lancets     2 Box    Use to test blood sugar 4 times daily or as directed.    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), Exocrine pancreatic insufficiency, Cystic fibrosis with pulmonary manifestations (H), MRSA infection       * amylase-lipase-protease 34596 UNITS Cpep    CREON    600 each    TAKE 5-6 CAPSULES (60,000-72,000) BY MOUTH THREE TIMES A DAY WITH MEALS    Cystic fibrosis with pulmonary manifestations (H), Cystic fibrosis (H), CF (cystic fibrosis) (H), 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), Exocrine pancreatic insufficiency, Cystic fibrosis with pulmonary manifestations (H), MRSA infection       fluticasone 110 MCG/ACT Inhaler    FLOVENT HFA    1 Inhaler    INHALE 1 PUFF INTO THE LUNGS TWO TIMES A DAY    Cystic fibrosis with pulmonary manifestations (H), Cystic fibrosis (H), CF (cystic fibrosis) (H), 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), Exocrine pancreatic insufficiency, Cystic fibrosis with pulmonary manifestations (H), MRSA infection       fluticasone 50 MCG/ACT spray    FLONASE    48 g    SPRAY 2 SPRAYS INTO BOTH NOSTRILS DAILY    CF (cystic fibrosis) (H), Cystic fibrosis (H), 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), Cystic fibrosis with pulmonary manifestations (H), Exocrine pancreatic insufficiency, Cystic fibrosis with pulmonary manifestations (H),  MRSA infection       insulin glargine 100 UNIT/ML injection    LANTUS SOLOSTAR    15 mL    Inject 12 Units Subcutaneous every morning    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 Please dispense four (4) Ruby LC Plus nebulizer kits and four (4) RUBY face masks for this patient with Cystic Fibrosis. Patient requires additional nebulizer supplies due to his need to use multiple sterile neb cups for his inhaled medications that may not be mixed together.    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    Cystic fibrosis with pulmonary manifestations (H)       ranitidine 75 MG tablet    ZANTAC    30 tablet    Take 1 tablet (75 mg) by mouth daily as needed for  "heartburn        rimantadine 100 MG tablet    FLUMADINE    60 tablet    Take 1 tablet (100 mg) by mouth 2 times daily Take during flu season Oct 1st-March 31st    Cystic fibrosis with pulmonary manifestations (H)       Syringe/Needle (Disp) 18G X 1\" 6 ML Misc     60 each    1 each 2 times daily    Cystic fibrosis with pulmonary manifestations (H), Exocrine pancreatic insufficiency, MRSA infection       tobramycin 28 MG in caps    MARLEN PODHALER    224 capsule    Inhale 4 capsules (112 mg) into the lungs 2 times daily 1 month on, 1 month off    Cystic fibrosis with pulmonary manifestations (H)       ursodiol 300 MG capsule    ACTIGALL    180 capsule    TAKE 1 CAPSULE (300MG ) BY MOUTH TWO TIMES A DAY    CF (cystic fibrosis) (H), Cystic fibrosis (H), 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 Read the directions carefully, and ask your doctor or other care provider to review them with you.      "

## 2017-12-14 NOTE — NURSING NOTE
Chief Complaint   Patient presents with     Cystic Fibrosis     Follow up on Guillermo and his CF     Tino Kenney CMA at 10:36 AM on 12/14/2017

## 2017-12-18 NOTE — PROGRESS NOTES
Nutrition Note    Reason for Visit:  Vitamin Levels    Date: 9/15/17  Vitamin A- wnl  Vitamin D- 28 (low)  Vitamin E- 5.6 wnl    Current Regimen:  2 chewable AquADEKs + Vitamin D 5000 units.   Comments: pt reports not taking vitamins consisently, frequently misses doses. Reports he has not yet signed up for Care Forward program, does not think he will do this.     Interventions/Recommendations:  1) Pt is currently paying for all vitamins out-of-pocket. Discussed option to switch to a CF-specific MVI that contains higher amounts of Vitamin D. Will switch pt to MVW Complete  - 2 softgels/day which will provide 6000 units Vitamin D per day. Updated prescription and sent referral to [facility]. Encouraged adherence to regimen as this will be delivered directly to pt's home. Plan to re-check levels in 2-3 months. May need to increase to MVW Complete  if no improvement.     Will follow.     Tamika Perez RD, LD  Cystic Fibrosis/Lung Transplant Dietitian  Pager [phone]  On weekends/holidays contact coverage RD at [phone] (inpatient use only)

## 2017-12-19 ENCOUNTER — MYC MEDICAL ADVICE (OUTPATIENT)
Dept: ENDOCRINOLOGY | Facility: CLINIC | Age: 32
End: 2017-12-19

## 2017-12-19 DIAGNOSIS — E84.0 CYSTIC FIBROSIS WITH PULMONARY MANIFESTATIONS (H): ICD-10-CM

## 2017-12-19 DIAGNOSIS — E08.9 DIABETES MELLITUS DUE TO CYSTIC FIBROSIS (H): Primary | ICD-10-CM

## 2017-12-19 DIAGNOSIS — E84.9 DIABETES MELLITUS DUE TO CYSTIC FIBROSIS (H): Primary | ICD-10-CM

## 2017-12-19 NOTE — TELEPHONE ENCOUNTER
James Miranda,  See Guillermo's note.  Which basal insulin do you prefer?  I looked back at your last note and he was having some hypoglycemia.    Let me know and I will order it.  Thanks!  Lisset

## 2017-12-20 RX ORDER — INSULIN GLARGINE 100 [IU]/ML
INJECTION, SOLUTION SUBCUTANEOUS
Qty: 15 ML | Refills: 3 | Status: SHIPPED | OUTPATIENT
Start: 2017-12-20 | End: 2018-12-26

## 2017-12-22 ENCOUNTER — MYC MEDICAL ADVICE (OUTPATIENT)
Dept: ENDOCRINOLOGY | Facility: CLINIC | Age: 32
End: 2017-12-22

## 2017-12-23 LAB
BACTERIA SPEC CULT: ABNORMAL
SPECIMEN SOURCE: ABNORMAL

## 2017-12-29 LAB
EXPTIME-PRE: 10.03 SEC
FEF2575-%PRED-PRE: 19 %
FEF2575-PRE: 0.73 L/SEC
FEF2575-PRED: 3.77 L/SEC
FEFMAX-%PRED-PRE: 76 %
FEFMAX-PRE: 6.88 L/SEC
FEFMAX-PRED: 8.99 L/SEC
FEV1-%PRED-PRE: 54 %
FEV1-PRE: 1.99 L
FEV1FEV6-PRE: 58 %
FEV1FEV6-PRED: 83 %
FEV1FVC-PRE: 53 %
FEV1FVC-PRED: 83 %
FIFMAX-PRE: 6.02 L/SEC
FVC-%PRED-PRE: 85 %
FVC-PRE: 3.72 L
FVC-PRED: 4.35 L

## 2018-01-02 ENCOUNTER — TELEPHONE (OUTPATIENT)
Dept: PULMONOLOGY | Facility: CLINIC | Age: 33
End: 2018-01-02

## 2018-01-02 NOTE — PROGRESS NOTES
[facility]  [facility]  January 3, 2018         Assessment and Plan:   Dilan Nassar is a 31 year old male with cystic fibrosis, pancreatic insufficiency and CFRD who is seen today in clinic for sick visit.       1. Mild exacerbation of CF lung disease: likely precipitated by viral etiology, notes increased frequency of cough, not more productive and wheezing. Denies congestion or dyspena. Sating 93% in clinic today. Ongoing chills as well, no fever with Tm 100.0. Hemoptysis per below. Vesting TID, didn't do PFTs today secondary to recent blood.  Previous cultures + for MRSA, Klebsiella, Steno and Ps A. Will treat for mild exacerbation with oral antibiotics and increased airway clearance.  - RVP and rapid influenza today  - Start levaqin and Bactrim X 21 days  - Hold azithromycin while on levaquin  - Continue nebulizers, inhaler and vest therapy, continue TID vesting  - On george podhaler    2. Hemoptysis: Sunday and Monday, unsure of the amount but notes chunks of blood, more so with vesting on Monday. Streaking mainly yesterday, improved today. Did not take extra vitamin K.   - Continue 5 mg vitamin K once/week  - Take vitamin K 5 mg daily X 3 days with recurrent hemoptysis      3. Elevated alk phos: since 10/15 per our EPIC charting with US demonstrating coarse echotexture of the liver with hypertrophy of the caudate lobe and left lobe concerning for possible cirrhosis, no lesions, patent vasculature. Viral serologies from May WNL. LFTs normalized on annual labs.   - Continue Ursodiol      4. Exocrine pancreatic insufficiency: denies symptoms of malabsorption. Weight is low, but stable. BMI below goal of 23.   - Continue pancreatic enzymes and vitamin supplementation      5. CFRD: AIC of 6.1 on 9/15/17. Not checking BS.   - Continue Lantus 12 U  - Routine diabetic eye exam in 2018.       6. CF related sinus disease: denies current symptoms. Not using Claritin over the  "winter.   - Continue Flonase       RTC: as schedueld with routine spirometry  Annual studies due: September 2018  Vaccinations: received influenza at his school      Jyothi Warren PA-C  Pulmonary, Allergy, Critical Care and Sleep Medicine         Interval History:   Caught a \"flu bug\" on the plane right home on Friday. Had a fever and headache initially, but headache is better. Coughing more, Sunday-Monday had some blood, noticed streaking yesterday, then a little more blood today. T of 99.4 and 100.0, some chills too. No sinus pain congestion or sore throat. No shortness of breath, coughing more, production is about the same. Sputum is mainly green. Sunday night was hemoptysis, unsure on amount. On Monday, it was the worst with vesting, \"chunks\" of blood. Denies congestion or tightness. Feels wheezy when lying down vesting. No GI complaints. Stools are normal. Vesting TID.          Review of Systems:   Please see HPI. Otherwise, complete 10 point ROS negative.           Past Medical and Surgical History:     Past Medical History:   Diagnosis Date     Cystic fibrosis with pulmonary manifestations (H)     /[id number]     Past Surgical History:   Procedure Laterality Date     APPENDECTOMY OPEN  1999    Appendicitis      SINUS SURGERY  1990's    h/o many sinus infections           Family History:     Family History   Problem Relation Age of Onset     DIABETES Mother      Unknown type     Prostate Cancer Paternal Grandfather      LUNG DISEASE Other      Negative     DIABETES Maternal Aunt      unknown type     DIABETES Maternal Uncle      unknown type     DIABETES Maternal Grandmother      unknown type     Coronary Artery Disease Paternal Grandmother             Social History:     Social History     Social History     Marital status: Single     Spouse name: N/A     Number of children: N/A     Years of education: N/A     Occupational History     State Representative [employer]     Financial Counselor  "     Social History Main Topics     Smoking status: Never Smoker     Smokeless tobacco: Former User     Alcohol use 6.0 - 8.4 oz/week     10 - 14 Standard drinks or equivalent per week      Comment: 2 drinks per night 5X/wk     Drug use: No     Sexual activity: Yes     Partners: Female     Birth control/ protection: Pull-out method, Condom     Other Topics Concern     Parent/Sibling W/ Cabg, Mi Or Angioplasty Before 65f 55m? Yes     Social History Narrative    Lives alone in apartment in [place], [place]. 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         Nov 2015.  His main political issue is higher education.  His girlfriend is looking for work as an .  He eats 3 square meals per day, most of which are cooked by him or his girlfriend.  Gets outside to walk or run 20 minutes about 5x per week.  Works fairly regular daytime hours, often from home.            Medications:     Current Outpatient Prescriptions   Medication     levofloxacin (LEVAQUIN) 750 MG tablet     sulfamethoxazole-trimethoprim (BACTRIM DS/SEPTRA DS) 800-160 MG per tablet     CREON 25443 UNITS CPEP per EC capsule     ursodiol (ACTIGALL) 300 MG capsule     fluticasone (FLONASE) 50 MCG/ACT spray     BASAGLAR 100 UNIT/ML injection     dornase alpha (PULMOZYME) 1 MG/ML neb solution     tobramycin (MARLEN PODHALER) 28 MG in caps     amylase-lipase-protease (CREON) 98071 UNITS CPEP     multivitamin CF formula (MVW COMPLETE FORMULATION ) softgel cap     azithromycin (ZITHROMAX) 250 MG tablet     rimantadine (FLUMADINE) 100 MG tablet     loratadine (CLARITIN) 10 MG tablet     allopurinol (ZYLOPRIM) 300 MG tablet     insulin glargine (LANTUS SOLOSTAR) 100 UNIT/ML injection     insulin pen needle (CLICKFINE PEN NEEDLES) 32G X 4 MM     albuterol (2.5 MG/3ML) 0.083% neb solution     fluticasone (FLOVENT HFA) 110 MCG/ACT Inhaler     phytonadione (MEPHYTON) 5 MG tablet     albuterol  "(PROAIR HFA/PROVENTIL HFA/VENTOLIN HFA) 108 (90 BASE) MCG/ACT Inhaler     ranitidine (ZANTAC) 75 MG tablet     Syringe/Needle, Disp, 18G X 1\" 6 ML MISC     blood glucose monitoring (FREESTYLE LITE) test strip     blood glucose monitoring (FREESTYLE) lancets     order for DME     No current facility-administered medications for this visit.             Physical Exam:   /74  Pulse 87  Resp 18  Ht 1.626 m (5' 4\")  Wt 53.1 kg (117 lb)  SpO2 93%  BMI 20.08 kg/m2    GENERAL: alert, NAD  HEENT: NCAT, EOMI, anicteric sclera, mild nasal edema on left; no oral mucosal edema or erythema  Neck: no cervical or supraclavicular adenopathy  Respiratory: good air flow, no crackles, rhonchi or wheezing  CV: RRR, S1S2, no murmurs noted  Abdomen: normoactive BS, soft and non tender  Lymph: no edema, + digital clubbing  Neuro: AAO X 3, CN 2-12 grossly intact  Psychiatric: normal affect, good eye contact  Skin: no rash, jaundice or lesions on limited exam         Data:   All laboratory and imaging data reviewed.      Cystic Fibrosis Culture  Specimen Description   Date Value Ref Range Status   12/14/2017 Sputum  Final   09/15/2017 Sputum  Final   09/15/2017 Sputum  Final   09/15/2017 Sputum  Final    Culture Micro   Date Value Ref Range Status   12/14/2017 Heavy growth  Normal mami    Final   12/14/2017 Moderate growth  Stenotrophomonas maltophilia   (A)  Final   12/14/2017 (A)  Final    Moderate growth  Strain 2  Stenotrophomonas maltophilia     12/14/2017 Light growth  Aspergillus fumigatus   (A)  Final          mild: Increased vest/bronchodilator/execise, Oral: non-quinolone and Oral quinolone        "
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
## 2018-01-03 ENCOUNTER — OFFICE VISIT (OUTPATIENT)
Dept: PULMONOLOGY | Facility: CLINIC | Age: 33
End: 2018-01-03
Attending: PHYSICIAN ASSISTANT
Payer: COMMERCIAL

## 2018-01-03 VITALS
WEIGHT: 117 LBS | OXYGEN SATURATION: 93 % | RESPIRATION RATE: 18 BRPM | DIASTOLIC BLOOD PRESSURE: 74 MMHG | HEIGHT: 64 IN | BODY MASS INDEX: 19.97 KG/M2 | HEART RATE: 87 BPM | SYSTOLIC BLOOD PRESSURE: 113 MMHG

## 2018-01-03 DIAGNOSIS — E84.9 DIABETES MELLITUS DUE TO CYSTIC FIBROSIS (H): ICD-10-CM

## 2018-01-03 DIAGNOSIS — E84.0 CYSTIC FIBROSIS WITH PULMONARY EXACERBATION (H): ICD-10-CM

## 2018-01-03 DIAGNOSIS — K86.81 EXOCRINE PANCREATIC INSUFFICIENCY: ICD-10-CM

## 2018-01-03 DIAGNOSIS — E08.9 DIABETES MELLITUS DUE TO CYSTIC FIBROSIS (H): ICD-10-CM

## 2018-01-03 DIAGNOSIS — E84.0 CYSTIC FIBROSIS WITH PULMONARY MANIFESTATIONS (H): ICD-10-CM

## 2018-01-03 DIAGNOSIS — E84.9 CF (CYSTIC FIBROSIS) (H): ICD-10-CM

## 2018-01-03 DIAGNOSIS — A49.02 MRSA INFECTION: ICD-10-CM

## 2018-01-03 DIAGNOSIS — E84.9 CYSTIC FIBROSIS (H): Primary | ICD-10-CM

## 2018-01-03 LAB
FLUAV+FLUBV AG SPEC QL: NEGATIVE
FLUAV+FLUBV AG SPEC QL: NEGATIVE
SPECIMEN SOURCE: NORMAL

## 2018-01-03 PROCEDURE — 87107 FUNGI IDENTIFICATION MOLD: CPT | Performed by: INTERNAL MEDICINE

## 2018-01-03 PROCEDURE — 87186 SC STD MICRODIL/AGAR DIL: CPT | Performed by: INTERNAL MEDICINE

## 2018-01-03 PROCEDURE — 87804 INFLUENZA ASSAY W/OPTIC: CPT | Mod: 91 | Performed by: PHYSICIAN ASSISTANT

## 2018-01-03 PROCEDURE — G0463 HOSPITAL OUTPT CLINIC VISIT: HCPCS | Mod: ZF

## 2018-01-03 PROCEDURE — 87077 CULTURE AEROBIC IDENTIFY: CPT | Performed by: INTERNAL MEDICINE

## 2018-01-03 PROCEDURE — 87633 RESP VIRUS 12-25 TARGETS: CPT | Performed by: PHYSICIAN ASSISTANT

## 2018-01-03 PROCEDURE — 87070 CULTURE OTHR SPECIMN AEROBIC: CPT | Performed by: INTERNAL MEDICINE

## 2018-01-03 RX ORDER — FLUTICASONE PROPIONATE 50 MCG
SPRAY, SUSPENSION (ML) NASAL
Qty: 48 G | Refills: 3 | Status: SHIPPED | OUTPATIENT
Start: 2018-01-03 | End: 2019-04-18

## 2018-01-03 RX ORDER — PANCRELIPASE 60000; 12000; 38000 [USP'U]/1; [USP'U]/1; [USP'U]/1
5-6 CAPSULE, DELAYED RELEASE PELLETS ORAL
Qty: 900 CAPSULE | Refills: 3 | Status: SHIPPED | OUTPATIENT
Start: 2018-01-03 | End: 2018-09-26

## 2018-01-03 RX ORDER — SULFAMETHOXAZOLE/TRIMETHOPRIM 800-160 MG
1 TABLET ORAL 3 TIMES DAILY
Qty: 63 TABLET | Refills: 0 | Status: SHIPPED | OUTPATIENT
Start: 2018-01-03 | End: 2018-01-24

## 2018-01-03 RX ORDER — LEVOFLOXACIN 750 MG/1
750 TABLET, FILM COATED ORAL DAILY
Qty: 21 TABLET | Refills: 0 | Status: SHIPPED | OUTPATIENT
Start: 2018-01-03 | End: 2018-01-24

## 2018-01-03 RX ORDER — URSODIOL 300 MG/1
CAPSULE ORAL
Qty: 180 CAPSULE | Refills: 3 | Status: SHIPPED | OUTPATIENT
Start: 2018-01-03 | End: 2018-02-15

## 2018-01-03 ASSESSMENT — PAIN SCALES - GENERAL: PAINLEVEL: NO PAIN (0)

## 2018-01-03 NOTE — PATIENT INSTRUCTIONS
Cystic Fibrosis Self-Care Plan       Patient: Dilan Nassar   MRN: [id number]   Clinic Date: January 3, 2018     RECOMMENDATIONS:  1. Continue nebulizers and vest therapy. Continue vesting 3 times/day for the next week.   2. Take levaquin 2 hours before or 2 hours after your multivitamin, magnesium and iron supplementation, take 1 tablet daily X 21 days.  3. Do not take azithromycin while on levaquin.  4. Start Bactrim 1 tablet 3 times/day X 21 days.  5. If you notice hemoptysis again (enmanuel blood), take vitamin K 5 mg daily X 3 days.  6. Hold Pulmozyme with enmanuel blood and call the office.     Annual Studies:   IGG   Date Value Ref Range Status   09/15/2017 1340 695 - 1620 mg/dL Final     No results found for: INS  There are no preventive care reminders to display for this patient.    Pulmonary Function Tests  FEV1: amount of air you can blow out in 1 second  FVC: total amount of air you can take in and blow out    Your Goals:         PFT Latest Ref Rng & Units 12/14/2017   FVC L 3.72   FEV1 L 1.99   FVC% % 85   FEV1% % 54          Airway Clearance: The Most Important Way to Keep Your Lungs Healthy  Vest Settings:    Hill-Rom Frequencies: 8, 9, 10 Pressure 10 Then, Frequencies 18, 19, 20 Pressure 6      RespirTech: Quick Start with Pressure of     Do each frequency for 5 minutes; Deflate vest after each frequency & cough 3 times before beginning the next setting.    Vest and Neb Therapy should be done 3 times/day.    Good Nutrition Can Improve Lung Function and Overall Health     Take ALL of your vitamins with food     Take 1/2 of your enzymes before EVERY meal/snack and the other 1/2 mid-meal/snack    Wt Readings from Last 3 Encounters:   01/03/18 53.1 kg (117 lb)   12/14/17 53.1 kg (117 lb 1 oz)   09/15/17 52.6 kg (115 lb 15.4 oz)       Body mass index is 20.08 kg/(m^2).         National CF Foundation Recommendations for BMI in CF Adults: Women: at least 22 Men: at least 23        Controlling Blood Sugars  Helps Prevent Lung Infections & Improves Nutrition  Test blood sugar:     In the morning before eating (goal is )     2 hours after a meal (goal is less than 150)     When pre-meal glucose is greater than 150 add correction     At bedtime (if less than 100 eat a snack with 15 grams of carbohydrates  Last A1C Results:   Hemoglobin A1C   Date Value Ref Range Status   09/15/2017 6.1 (H) 4.3 - 6.0 % Final         If diabetic, measure A1C every 6 months. Goal is under 7%.    Staying Healthy    Research: If you are interested in learning about research opportunities or have questions, please contact Sonali Altamirano at [phone] or [email or web address].      CF Foundation: Compass is a personalized resource service to help you with the insurance, financial, legal and other issues you are facing.  It's free, confidential and available to anyone with CF.  Ask your  for more information or contact Compass directly at 9[facility] ([phone]) or compass@cff.org, or learn more at cff.org/compass.       CF Nurse Line:  Giovanny Pimentel: [phone]   Audra Smart, RT: [phone]     Daniela Whitt and Tamika Perez , Dieticians: [phone]     Lisset Root, Diabetes Nurse: [phone]    Kathie Truong: [phone] or Carisa Berumen [phone], Social Workers   [email or web address]

## 2018-01-03 NOTE — MR AVS SNAPSHOT
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 Continue nebulizers and vest therapy. Continue vesting 3 times/day for the next week.   2. Take levaquin 2 hours before or 2 hours after your multivitamin, magnesium and iron supplementation, take 1 tablet daily X 21 days.  3. Do not take azithromycin while on levaquin.  4. Start Bactrim 1 tablet 3 times/day X 21 days.  5. If you notice hemoptysis again (enmanuel blood), take vitamin K 5 mg daily X 3 days.  6. Hold Pulmozyme with enmanuel blood and call the office.     Annual Studies:   IGG   Date Value Ref Range Status   09/15/2017 1340 695 - 1620 mg/dL Final     No results found for: INS  There are no preventive care reminders to display for this patient.    Pulmonary Function Tests  FEV1: amount of air you can blow out in 1 second  FVC: total amount of air you can take in and blow out    Your Goals:         PFT Latest Ref Rng & Units 12/14/2017   FVC L 3.72   FEV1 L 1.99   FVC% % 85   FEV1% % 54          Airway Clearance: The Most Important Way to Keep Your Lungs Healthy  Vest Settings:    Hill-Rom Frequencies: 8, 9, 10 Pressure 10 Then, Frequencies 18, 19, 20 Pressure 6      RespirTech: Quick Start with Pressure of     Do each frequency for 5 minutes; Deflate  vest after each frequency & cough 3 times before beginning the next setting.    Vest and Neb Therapy should be done 3 times/day.    Good Nutrition Can Improve Lung Function and Overall Health     Take ALL of your vitamins with food     Take 1/2 of your enzymes before EVERY meal/snack and the other 1/2 mid-meal/snack    Wt Readings from Last 3 Encounters:   01/03/18 53.1 kg (117 lb)   12/14/17 53.1 kg (117 lb 1 oz)   09/15/17 52.6 kg (115 lb 15.4 oz)       Body mass index is 20.08 kg/(m^2).         National CF Foundation Recommendations for BMI in CF Adults: Women: at least 22 Men: at least 23        Controlling Blood Sugars Helps Prevent Lung Infections & Improves Nutrition  Test blood sugar:     In the morning before eating (goal is )     2 hours after a meal (goal is less than 150)     When pre-meal glucose is greater than 150 add correction     At bedtime (if less than 100 eat a snack with 15 grams of carbohydrates  Last A1C Results:   Hemoglobin A1C   Date Value Ref Range Status   09/15/2017 6.1 (H) 4.3 - 6.0 % Final         If diabetic, measure A1C every 6 months. Goal is under 7%.    Staying Healthy    Research: If you are interested in learning about research opportunities or have questions, please contact Sonali Altamirano at [phone] or [email or web address].      CF Foundation: Compass is a personalized resource service to help you with the insurance, financial, legal and other issues you are facing.  It's free, confidential and available to anyone with CF.  Ask your  for more information or contact Compass directly at [phone]-[phone] ([phone]) or compass@cff.org, or learn more at cff.org/compass.       CF Nurse Line:  Lou Pimentel and Dianna: [phone]   Audra Smart, RT: [phone]     Daniela Whitt and Tamika Perez , Dieticians: [phone]     Lisset Root, Diabetes Nurse: [phone]    Kathie Truong: [phone] or Carisa Berumen [phone], Social Workers    [email or web address]            Follow-ups after your visit        Your next 10 appointments already scheduled     Mar 14, 2018  9:00 AM CDT   CF LOOP with CHILO PFL CF   [facility] Pulmonary Function Testing ([facility])    [address] [address] [phone]            Mar 14, 2018  9:30 AM CDT   (Arrive by 9:15 AM)   RETURN CYSTIC FIBROSIS VISIT with Jyothi Warren PA-C   [facility] ([facility])    [address] [place] [address] [phone]              Who to contact     If you have questions or need follow up information about today's clinic visit or your schedule please contact [facility] directly at [phone].  Normal or non-critical lab and imaging results will be communicated to you by MyChart, letter or phone within 4 business days after the clinic has received the results. If you do not hear from us within 7 days, please contact the clinic through [employer] or phone. If you have a critical or abnormal lab result, we will notify you by phone as soon as possible.  Submit refill requests through HipSwap or call your pharmacy and they will forward the refill request to us. Please allow 3 business days for your refill to be completed.          Additional Information About Your Visit        MyChart Information     HipSwap gives you secure access to your electronic health record. If you see a primary care provider, you can also send messages to your care team and make appointments. If you have questions, please call your primary care clinic.  If you do not have a primary care provider, please call [phone] and they will assist you.        Care EveryWhere ID     This is your Care EveryWhere ID. This could be used by other organizations to access your [facility] medical records  [id number]        Your Vitals Were     Pulse  "Respirations Height Pulse Oximetry BMI (Body Mass Index)       87 18 1.626 m (5' 4\") 93% 20.08 kg/m2        Blood Pressure from Last 3 Encounters:   01/03/18 113/74   12/14/17 126/79   09/15/17 139/61    Weight from Last 3 Encounters:   01/03/18 53.1 kg (117 lb)   12/14/17 53.1 kg (117 lb 1 oz)   09/15/17 52.6 kg (115 lb 15.4 oz)              We Performed the Following     Influenza A/B antigen     Respiratory Virus Panel by PCR          Today's Medication Changes          These changes are accurate as of: 1/3/18  8:36 AM.  If you have any questions, ask your nurse or doctor.               Start taking these medicines.        Dose/Directions    levofloxacin 750 MG tablet   Commonly known as:  LEVAQUIN   Used for:  Cystic fibrosis (H)   Started by:  Jyothi Warren PA-C        Dose:  750 mg   Take 1 tablet (750 mg) by mouth daily for 21 days   Quantity:  21 tablet   Refills:  0       sulfamethoxazole-trimethoprim 800-160 MG per tablet   Commonly known as:  BACTRIM DS/SEPTRA DS   Used for:  Cystic fibrosis (H)   Started by:  Jyothi Warren PA-C        Dose:  1 tablet   Take 1 tablet by mouth 3 times daily for 21 days   Quantity:  63 tablet   Refills:  0            Where to get your medicines      These medications [facility] [address]   [address] [facility] [address]     Phone:  [phone]     CREON [address]66 UNITS Cpep    fluticasone 50 MCG/ACT spray    levofloxacin 750 MG tablet    sulfamethoxazole-trimethoprim 800-160 MG per tablet    ursodiol 300 MG capsule                Primary Care Provider Office Phone # Fax #    Atilio Aaron Nieto [phone]       [address] [place] [address]        Equal Access to Services     ALTAGRACIA GATICA AH: Carlene Ji, charanjit jones, qamynor kashagufta hsu, chris wilkinson [facility] [phone].    ATENCIÓN: Si eloisa renee " headley disposición servicios gratuitos de asistencia lingüística. Farheen yadav [phone].    We comply with applicable federal civil rights laws and [place] laws. We do not discriminate on the basis of race, color, national origin, age, disability, sex, sexual orientation, or gender identity.            Thank you!     Thank you for choosing [facility]  for your care. Our goal is always to provide you with excellent care. Hearing back from our patients is one way we can continue to improve our services. Please take a few minutes to complete the written survey that you may receive in the mail after your visit with us. Thank you!             Your Updated Medication List - Protect others around you: Learn how to safely use, store and throw away your medicines at www.disposemymeds.org.          This list is accurate as of: 1/3/18  8:36 AM.  Always use your most recent med list.                   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    Cystic fibrosis (H)       allopurinol 300 MG tablet    ZYLOPRIM    90 tablet    TAKE ONE TABLET (300MG) BY MOUTH DAILY    CF (cystic fibrosis) (H)       * amylase-lipase-protease 78159 UNITS Cpep    CREON    600 each    TAKE 5-6 CAPSULES (60,000-72,000) BY MOUTH THREE TIMES A DAY WITH MEALS    Cystic fibrosis with pulmonary manifestations (H), Cystic fibrosis (H), CF (cystic fibrosis) (H), Diabetes  mellitus due to cystic fibrosis (H), Exocrine pancreatic insufficiency, Cystic fibrosis with pulmonary manifestations (H), MRSA infection       * CREON 49569 UNITS Cpep   Generic drug:  amylase-lipase-protease     900 capsule    Take 5-6 capsules (60,000-72,000 Units) by mouth 3 times daily (with meals)    Cystic fibrosis with pulmonary manifestations (H), Cystic fibrosis (H), CF (cystic fibrosis) (H), 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    Diabetes mellitus related to CF (cystic fibrosis) (H)       blood glucose monitoring test strip    FREESTYLE LITE    120 each    Use to test blood 4 times a day    Cystic fibrosis with pulmonary manifestations (H)       dornase alpha 1 MG/ML neb solution    PULMOZYME    150 mL    INHALE THE CONTENTS OF 1 VIAL (2.5MG) VIA NEBULIZER TWICE DAILY.    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), Cystic fibrosis with pulmonary manifestations (H), Exocrine pancreatic insufficiency, Cystic fibrosis with pulmonary manifestations (H), MRSA  infection       * insulin glargine 100 UNIT/ML injection    LANTUS SOLOSTAR    15 mL    Inject 12 Units Subcutaneous every morning    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 Please dispense four (4) Ruby LC Plus nebulizer kits and four (4) RUBY face masks for this patient with Cystic Fibrosis. Patient requires additional nebulizer supplies due to his need to use multiple sterile neb cups for his inhaled medications that may not be mixed together.    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), CF (cystic fibrosis) (H), Cystic fibrosis (H), Cystic fibrosis with pulmonary manifestations (H), Exocrine pancreatic insufficiency, Cystic fibrosis with pulmonary manifestations (H), MRSA infection       ranitidine 75 MG tablet    ZANTAC    30  "tablet    Take 1 tablet (75 mg) by mouth daily as needed for heartburn        rimantadine 100 MG tablet    FLUMADINE    60 tablet    Take 1 tablet (100 mg) by mouth 2 times daily Take during flu season Oct 1st-March 31st    Cystic fibrosis with pulmonary manifestations (H)       sulfamethoxazole-trimethoprim 800-160 MG per tablet    BACTRIM DS/SEPTRA DS    63 tablet    Take 1 tablet by mouth 3 times daily for 21 days    Cystic fibrosis (H)       Syringe/Needle (Disp) 18G X 1\" 6 ML Misc     60 each    1 each 2 times daily    Cystic fibrosis with pulmonary manifestations (H), Exocrine pancreatic insufficiency, MRSA infection       tobramycin 28 MG in caps    MARLEN PODHALER    224 capsule    Inhale 4 capsules (112 mg) into the lungs 2 times daily 1 month on, 1 month off    Cystic fibrosis with pulmonary manifestations (H)       ursodiol 300 MG capsule    ACTIGALL    180 capsule    TAKE 1 CAPSULE (300MG ) BY MOUTH TWO TIMES A DAY    CF (cystic fibrosis) (H), Cystic fibrosis (H), 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 Read the directions carefully, and ask your doctor or other care provider to review them with you.      "

## 2018-01-03 NOTE — NURSING NOTE
Chief Complaint   Patient presents with     Cystic Fibrosis     Follow up on Guillermo and his CF      Tino Kenney CMA at 8:15 AM on 1/3/2018

## 2018-01-03 NOTE — LETTER
1/3/2018       RE: Dilan Nassar  [address]  [address]  [place] [address]     Dear Colleague,    Thank you for referring your patient, Dilan Nassar, to the [facility] at [facility]. Please see a copy of my visit note below.    [facility]  January 3, 2018         Assessment and Plan:   Dilan Nassar is a 31 year old male with cystic fibrosis, pancreatic insufficiency and CFRD who is seen today in clinic for sick visit.       1. Mild exacerbation of CF lung disease: likely precipitated by viral etiology, notes increased frequency of cough, not more productive and wheezing. Denies congestion or dyspena. Sating 93% in clinic today. Ongoing chills as well, no fever with Tm 100.0. Hemoptysis per below. Vesting TID, didn't do PFTs today secondary to recent blood.  Previous cultures + for MRSA, Klebsiella, Steno and Ps A. Will treat for mild exacerbation with oral antibiotics and increased airway clearance.  - RVP and rapid influenza today  - Start levaqin and Bactrim X 21 days  - Hold azithromycin while on levaquin  - Continue nebulizers, inhaler and vest therapy, continue TID vesting  - On george podhaler    2. Hemoptysis: Sunday and Monday, unsure of the amount but notes chunks of blood, more so with vesting on Monday. Streaking mainly yesterday, improved today. Did not take extra vitamin K.   - Continue 5 mg vitamin K once/week  - Take vitamin K 5 mg daily X 3 days with recurrent hemoptysis      3. Elevated alk phos: since 10/15 per our EPIC charting with US demonstrating coarse echotexture of the liver with hypertrophy of the caudate lobe and left lobe concerning for possible cirrhosis, no lesions, patent vasculature. Viral serologies from May WNL. LFTs normalized on annual labs.   - Continue Ursodiol      4. Exocrine pancreatic insufficiency: denies symptoms of malabsorption. Weight is  "low, but stable. BMI below goal of 23.   - Continue pancreatic enzymes and vitamin supplementation      5. CFRD: AIC of 6.1 on 9/15/17. Not checking BS.   - Continue Lantus 12 U  - Routine diabetic eye exam in 2018.       6. CF related sinus disease: denies current symptoms. Not using Claritin over the winter.   - Continue Flonase       RTC: as schedueld with routine spirometry  Annual studies due: September 2018  Vaccinations: received influenza at his school      Jyothi Warren PA-C  Pulmonary, Allergy, Critical Care and Sleep Medicine         Interval History:   Caught a \"flu bug\" on the plane right home on Friday. Had a fever and headache initially, but headache is better. Coughing more, Sunday-Monday had some blood, noticed streaking yesterday, then a little more blood today. T of 99.4 and 100.0, some chills too. No sinus pain congestion or sore throat. No shortness of breath, coughing more, production is about the same. Sputum is mainly green. Sunday night was hemoptysis, unsure on amount. On Monday, it was the worst with vesting, \"chunks\" of blood. Denies congestion or tightness. Feels wheezy when lying down vesting. No GI complaints. Stools are normal. Vesting TID.          Review of Systems:   Please see HPI. Otherwise, complete 10 point ROS negative.           Past Medical and Surgical History:     Past Medical History:   Diagnosis Date     Cystic fibrosis with pulmonary manifestations (H)     /[id number]     Past Surgical History:   Procedure Laterality Date     APPENDECTOMY OPEN  1999    Appendicitis      SINUS SURGERY  1990's    h/o many sinus infections           Family History:     Family History   Problem Relation Age of Onset     DIABETES Mother      Unknown type     Prostate Cancer Paternal Grandfather      LUNG DISEASE Other      Negative     DIABETES Maternal Aunt      unknown type     DIABETES Maternal Uncle      unknown type     DIABETES Maternal Grandmother      unknown type     Coronary " Artery Disease Paternal Grandmother             Social History:     Social History     Social History     Marital status: Single     Spouse name: N/A     Number of children: N/A     Years of education: N/A     Occupational History     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 Yes     Social History Narrative    Lives alone in apartment in [place], [place]. 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         Nov 2015.  His main political issue is higher education.  His girlfriend is looking for work as an .  He eats 3 square meals per day, most of which are cooked by him or his girlfriend.  Gets outside to walk or run 20 minutes about 5x per week.  Works fairly regular daytime hours, often from home.            Medications:     Current Outpatient Prescriptions   Medication     levofloxacin (LEVAQUIN) 750 MG tablet     sulfamethoxazole-trimethoprim (BACTRIM DS/SEPTRA DS) 800-160 MG per tablet     CREON 55423 UNITS CPEP per EC capsule     ursodiol (ACTIGALL) 300 MG capsule     fluticasone (FLONASE) 50 MCG/ACT spray     BASAGLAR 100 UNIT/ML injection     dornase alpha (PULMOZYME) 1 MG/ML neb solution     tobramycin (MARLEN PODHALER) 28 MG in caps     amylase-lipase-protease (CREON) 66823 UNITS CPEP     multivitamin CF formula (MVW COMPLETE FORMULATION ) softgel cap     azithromycin (ZITHROMAX) 250 MG tablet     rimantadine (FLUMADINE) 100 MG tablet     loratadine (CLARITIN) 10 MG tablet      "allopurinol (ZYLOPRIM) 300 MG tablet     insulin glargine (LANTUS SOLOSTAR) 100 UNIT/ML injection     insulin pen needle (CLICKFINE PEN NEEDLES) 32G X 4 MM     albuterol (2.5 MG/3ML) 0.083% neb solution     fluticasone (FLOVENT HFA) 110 MCG/ACT Inhaler     phytonadione (MEPHYTON) 5 MG tablet     albuterol (PROAIR HFA/PROVENTIL HFA/VENTOLIN HFA) 108 (90 BASE) MCG/ACT Inhaler     ranitidine (ZANTAC) 75 MG tablet     Syringe/Needle, Disp, 18G X 1\" 6 ML MISC     blood glucose monitoring (FREESTYLE LITE) test strip     blood glucose monitoring (FREESTYLE) lancets     order for DME     No current facility-administered medications for this visit.             Physical Exam:   /74  Pulse 87  Resp 18  Ht 1.626 m (5' 4\")  Wt 53.1 kg (117 lb)  SpO2 93%  BMI 20.08 kg/m2    GENERAL: alert, NAD  HEENT: NCAT, EOMI, anicteric sclera, mild nasal edema on left; no oral mucosal edema or erythema  Neck: no cervical or supraclavicular adenopathy  Respiratory: good air flow, no crackles, rhonchi or wheezing  CV: RRR, S1S2, no murmurs noted  Abdomen: normoactive BS, soft and non tender  Lymph: no edema, + digital clubbing  Neuro: AAO X 3, CN 2-12 grossly intact  Psychiatric: normal affect, good eye contact  Skin: no rash, jaundice or lesions on limited exam         Data:   All laboratory and imaging data reviewed.      Cystic Fibrosis Culture  Specimen Description   Date Value Ref Range Status   12/14/2017 Sputum  Final   09/15/2017 Sputum  Final   09/15/2017 Sputum  Final   09/15/2017 Sputum  Final    Culture Micro   Date Value Ref Range Status   12/14/2017 Heavy growth  Normal mami    Final   12/14/2017 Moderate growth  Stenotrophomonas maltophilia   (A)  Final   12/14/2017 (A)  Final    Moderate growth  Strain 2  Stenotrophomonas maltophilia     12/14/2017 Light growth  Aspergillus fumigatus   (A)  Final          mild: Increased vest/bronchodilator/execise, Oral: non-quinolone and Oral quinolone    Again, thank you for " allowing me to participate in the care of your patient.      Sincerely,    Jyothi Warren PA-C

## 2018-01-04 LAB
FLUAV H1 2009 PAND RNA SPEC QL NAA+PROBE: NEGATIVE
FLUAV H1 RNA SPEC QL NAA+PROBE: NEGATIVE
FLUAV H3 RNA SPEC QL NAA+PROBE: POSITIVE
FLUAV RNA SPEC QL NAA+PROBE: POSITIVE
FLUBV RNA SPEC QL NAA+PROBE: NEGATIVE
HADV DNA SPEC QL NAA+PROBE: NEGATIVE
HADV DNA SPEC QL NAA+PROBE: NEGATIVE
HMPV RNA SPEC QL NAA+PROBE: NEGATIVE
HPIV1 RNA SPEC QL NAA+PROBE: NEGATIVE
HPIV2 RNA SPEC QL NAA+PROBE: NEGATIVE
HPIV3 RNA SPEC QL NAA+PROBE: NEGATIVE
MICROBIOLOGIST REVIEW: ABNORMAL
RHINOVIRUS RNA SPEC QL NAA+PROBE: NEGATIVE
RSV RNA SPEC QL NAA+PROBE: NEGATIVE
RSV RNA SPEC QL NAA+PROBE: NEGATIVE
SPECIMEN SOURCE: ABNORMAL

## 2018-01-08 ENCOUNTER — CARE COORDINATION (OUTPATIENT)
Dept: PULMONOLOGY | Facility: CLINIC | Age: 33
End: 2018-01-08

## 2018-01-08 DIAGNOSIS — E84.0 CYSTIC FIBROSIS WITH PULMONARY MANIFESTATIONS (H): Primary | ICD-10-CM

## 2018-01-08 LAB
BACTERIA SPEC CULT: ABNORMAL
SPECIMEN SOURCE: ABNORMAL

## 2018-01-08 RX ORDER — OSELTAMIVIR PHOSPHATE 75 MG/1
75 CAPSULE ORAL 2 TIMES DAILY
Qty: 10 CAPSULE | Refills: 0 | Status: SHIPPED | OUTPATIENT
Start: 2018-01-08 | End: 2018-06-22

## 2018-01-08 NOTE — PROGRESS NOTES
Status update:  Feeling quite a bit better. Since Friday has not experienced fever or chills. Cough is improving.    PLAN:  Will take Tamiflu as ordered.

## 2018-01-09 DIAGNOSIS — E84.0 CYSTIC FIBROSIS WITH PULMONARY MANIFESTATIONS (H): ICD-10-CM

## 2018-01-09 DIAGNOSIS — E84.9 CF (CYSTIC FIBROSIS) (H): ICD-10-CM

## 2018-01-09 DIAGNOSIS — E84.9 DIABETES MELLITUS DUE TO CYSTIC FIBROSIS (H): ICD-10-CM

## 2018-01-09 DIAGNOSIS — K86.81 EXOCRINE PANCREATIC INSUFFICIENCY: ICD-10-CM

## 2018-01-09 DIAGNOSIS — E08.9 DIABETES MELLITUS DUE TO CYSTIC FIBROSIS (H): ICD-10-CM

## 2018-01-09 DIAGNOSIS — E84.9 CYSTIC FIBROSIS (H): ICD-10-CM

## 2018-01-09 DIAGNOSIS — A49.02 MRSA INFECTION: ICD-10-CM

## 2018-01-09 RX ORDER — PHYTONADIONE 5 MG/1
TABLET ORAL
Qty: 16 TABLET | Refills: 3 | Status: SHIPPED | OUTPATIENT
Start: 2018-01-09 | End: 2019-01-25

## 2018-01-17 DIAGNOSIS — E84.0 CYSTIC FIBROSIS WITH PULMONARY MANIFESTATIONS (H): ICD-10-CM

## 2018-01-17 DIAGNOSIS — K86.81 EXOCRINE PANCREATIC INSUFFICIENCY: ICD-10-CM

## 2018-01-17 DIAGNOSIS — E84.9 CYSTIC FIBROSIS (H): ICD-10-CM

## 2018-01-17 DIAGNOSIS — E84.9 DIABETES MELLITUS DUE TO CYSTIC FIBROSIS (H): ICD-10-CM

## 2018-01-17 DIAGNOSIS — E08.9 DIABETES MELLITUS DUE TO CYSTIC FIBROSIS (H): ICD-10-CM

## 2018-01-17 DIAGNOSIS — E84.9 CF (CYSTIC FIBROSIS) (H): ICD-10-CM

## 2018-01-17 DIAGNOSIS — A49.02 MRSA INFECTION: ICD-10-CM

## 2018-01-17 RX ORDER — ALBUTEROL SULFATE 0.83 MG/ML
1 SOLUTION RESPIRATORY (INHALATION) 3 TIMES DAILY
Qty: 540 ML | Refills: 6 | Status: SHIPPED | OUTPATIENT
Start: 2018-01-17 | End: 2018-05-09

## 2018-02-01 DIAGNOSIS — E08.9 DIABETES MELLITUS DUE TO CYSTIC FIBROSIS (H): ICD-10-CM

## 2018-02-01 DIAGNOSIS — E84.0 CYSTIC FIBROSIS WITH PULMONARY MANIFESTATIONS (H): ICD-10-CM

## 2018-02-01 DIAGNOSIS — K86.81 EXOCRINE PANCREATIC INSUFFICIENCY: ICD-10-CM

## 2018-02-01 DIAGNOSIS — A49.02 MRSA INFECTION: ICD-10-CM

## 2018-02-01 DIAGNOSIS — E84.9 DIABETES MELLITUS DUE TO CYSTIC FIBROSIS (H): ICD-10-CM

## 2018-02-01 DIAGNOSIS — E84.9 CF (CYSTIC FIBROSIS) (H): ICD-10-CM

## 2018-02-01 DIAGNOSIS — E84.9 CYSTIC FIBROSIS (H): ICD-10-CM

## 2018-02-01 RX ORDER — FLUTICASONE PROPIONATE 110 UG/1
AEROSOL, METERED RESPIRATORY (INHALATION)
Qty: 1 INHALER | Refills: 3 | Status: SHIPPED | OUTPATIENT
Start: 2018-02-01 | End: 2018-12-26

## 2018-02-06 ENCOUNTER — TELEPHONE (OUTPATIENT)
Dept: PHARMACY | Facility: CLINIC | Age: 33
End: 2018-02-06

## 2018-02-06 NOTE — TELEPHONE ENCOUNTER
PA Initiation    Medication: MARLEN PODHALER (PENDING)  Insurance Company: [employer] - Phone [phone] Fax [phone]  Pharmacy Filling the Rx: [facility] - Karen VELAZQUEZ  Filling Pharmacy Phone:    Filling Pharmacy Fax:    Start Date: 2/6/2018

## 2018-02-07 NOTE — TELEPHONE ENCOUNTER
PRIOR AUTHORIZATION DENIED    Medication: MARLEN PODHALER (APPEAL PENDING)    Denial Date: 2/7/2018    Denial Rational: Please see denial letter    Appeal Information:

## 2018-02-07 NOTE — TELEPHONE ENCOUNTER
Medication Appeal Initiation    We have initiated an appeal for the requested medication:  Medication: MARLEN PODHALER (APPEAL PENDING)  Appeal Start Date:  2/7/2018  Insurance Company: [employer] - Phone [phone] Fax [phone]  Comments:  Sent LMN along with chart notes to insurance marked urgent

## 2018-02-08 NOTE — TELEPHONE ENCOUNTER
Spoke with Guillermo, let him know that appeal is pending. If denied we will try for 2nd level appeal, once that is denied we can try for free drug. He stated he would talk to his HR department as well.

## 2018-02-09 NOTE — TELEPHONE ENCOUNTER
Spoke with Mary Anne, they stated that a peer to peer would have to go through the plan.    [facility] and [employer] insurance phone #[phone]. Left message with Nadira Christian with the state insurance office to call back to see of a peer to peer is available.

## 2018-02-12 ENCOUNTER — TELEPHONE (OUTPATIENT)
Dept: PULMONOLOGY | Facility: CLINIC | Age: 33
End: 2018-02-12

## 2018-02-12 NOTE — TELEPHONE ENCOUNTER
"CF Clinic RT:  Patient reports his neb machine \"blew up\" and isn't working. I have called [facility] for a repair/ replacement for a vios machine (Radha Mccabe)  I called back to ask them to rush the order since Guillermo is leaving town today and will return tomorrow and he's been without his medication. He was interested in getting one from a local DME, but we discussed the quality of other machines versus the vios and the warranty provided with [facility]. He was willing to wait 1 more day. I asked [facility] to rush deliver it by tomorrow afternoon when Guillermo will get back from his work trip. We will continue to support adequate airway clearance.   "

## 2018-02-12 NOTE — TELEPHONE ENCOUNTER
Problem: Patient cannot afford medication     Medication: Jameel Podhaler     Pharmacy: Does not apply/not a pharmacy issue    Intervention: Enrolled patient in free drug program    Result: Issue resolved    Additional Information:    Received completed form from patient, manually faxed to [facility]

## 2018-02-15 ENCOUNTER — TELEPHONE (OUTPATIENT)
Dept: PHARMACY | Facility: CLINIC | Age: 33
End: 2018-02-15

## 2018-02-15 DIAGNOSIS — E84.0 CYSTIC FIBROSIS WITH PULMONARY MANIFESTATIONS (H): ICD-10-CM

## 2018-02-15 DIAGNOSIS — E84.9 CF (CYSTIC FIBROSIS) (H): ICD-10-CM

## 2018-02-15 DIAGNOSIS — E84.9 CYSTIC FIBROSIS (H): ICD-10-CM

## 2018-02-15 DIAGNOSIS — E84.8 DIABETES MELLITUS RELATED TO CF (CYSTIC FIBROSIS) (H): ICD-10-CM

## 2018-02-15 DIAGNOSIS — A49.02 MRSA INFECTION: ICD-10-CM

## 2018-02-15 DIAGNOSIS — E84.9 CF (CYSTIC FIBROSIS) (H): Primary | ICD-10-CM

## 2018-02-15 DIAGNOSIS — E84.9 DIABETES MELLITUS DUE TO CYSTIC FIBROSIS (H): ICD-10-CM

## 2018-02-15 DIAGNOSIS — K86.81 EXOCRINE PANCREATIC INSUFFICIENCY: ICD-10-CM

## 2018-02-15 DIAGNOSIS — E08.9 DIABETES MELLITUS RELATED TO CF (CYSTIC FIBROSIS) (H): ICD-10-CM

## 2018-02-15 DIAGNOSIS — E08.9 DIABETES MELLITUS DUE TO CYSTIC FIBROSIS (H): ICD-10-CM

## 2018-02-15 RX ORDER — PEN NEEDLE, DIABETIC 32GX 5/32"
NEEDLE, DISPOSABLE MISCELLANEOUS
Qty: 100 EACH | Refills: 12 | Status: SHIPPED | OUTPATIENT
Start: 2018-02-15 | End: 2019-02-21

## 2018-02-15 RX ORDER — URSODIOL 300 MG/1
CAPSULE ORAL
Qty: 180 CAPSULE | Refills: 3 | Status: SHIPPED | OUTPATIENT
Start: 2018-02-15 | End: 2019-04-18

## 2018-02-15 RX ORDER — TOBRAMYCIN INHALATION SOLUTION 300 MG/5ML
300 INHALANT RESPIRATORY (INHALATION) 2 TIMES DAILY
Qty: 280 ML | Refills: 5 | Status: SHIPPED | OUTPATIENT
Start: 2018-02-15 | End: 2018-02-20

## 2018-02-15 NOTE — TELEPHONE ENCOUNTER
PA Initiation    Medication: Tobramycin neb soloution (pending)  Insurance Company: [employer] - Phone [phone] Fax [phone]  Pharmacy Filling the Rx: [facility] - Karen VELAZQUEZ  Filling Pharmacy Phone:    Filling Pharmacy Fax:    Start Date: 2/15/2018    Patient hoping to fill this while we work on appeal for Jameel podhaler.

## 2018-02-20 DIAGNOSIS — E84.9 CF (CYSTIC FIBROSIS) (H): ICD-10-CM

## 2018-02-20 RX ORDER — TOBRAMYCIN INHALATION SOLUTION 300 MG/5ML
300 INHALANT RESPIRATORY (INHALATION) 2 TIMES DAILY
Qty: 280 ML | Refills: 5 | Status: SHIPPED | OUTPATIENT
Start: 2018-02-20 | End: 2018-06-22

## 2018-03-01 DIAGNOSIS — E84.9 CF (CYSTIC FIBROSIS) (H): Primary | ICD-10-CM

## 2018-03-14 ENCOUNTER — OFFICE VISIT (OUTPATIENT)
Dept: PULMONOLOGY | Facility: CLINIC | Age: 33
End: 2018-03-14
Attending: PHYSICIAN ASSISTANT
Payer: COMMERCIAL

## 2018-03-14 ENCOUNTER — HOSPITAL ENCOUNTER (OUTPATIENT)
Dept: PHYSICAL THERAPY | Facility: CLINIC | Age: 33
Setting detail: THERAPIES SERIES
End: 2018-03-14
Attending: PHYSICIAN ASSISTANT
Payer: COMMERCIAL

## 2018-03-14 VITALS
OXYGEN SATURATION: 97 % | RESPIRATION RATE: 16 BRPM | SYSTOLIC BLOOD PRESSURE: 135 MMHG | WEIGHT: 117.06 LBS | HEART RATE: 90 BPM | BODY MASS INDEX: 20.09 KG/M2 | DIASTOLIC BLOOD PRESSURE: 85 MMHG

## 2018-03-14 DIAGNOSIS — E84.0 CYSTIC FIBROSIS WITH PULMONARY EXACERBATION (H): ICD-10-CM

## 2018-03-14 DIAGNOSIS — E84.9 CF (CYSTIC FIBROSIS) (H): Primary | ICD-10-CM

## 2018-03-14 DIAGNOSIS — K86.89 PANCREATIC INSUFFICIENCY: ICD-10-CM

## 2018-03-14 DIAGNOSIS — E55.9 VITAMIN D DEFICIENCY: ICD-10-CM

## 2018-03-14 PROCEDURE — G0463 HOSPITAL OUTPT CLINIC VISIT: HCPCS | Mod: ZF

## 2018-03-14 PROCEDURE — 87186 SC STD MICRODIL/AGAR DIL: CPT | Performed by: INTERNAL MEDICINE

## 2018-03-14 PROCEDURE — 87070 CULTURE OTHR SPECIMN AEROBIC: CPT | Performed by: INTERNAL MEDICINE

## 2018-03-14 PROCEDURE — 87077 CULTURE AEROBIC IDENTIFY: CPT | Performed by: INTERNAL MEDICINE

## 2018-03-14 ASSESSMENT — PAIN SCALES - GENERAL: PAINLEVEL: NO PAIN (0)

## 2018-03-14 NOTE — LETTER
3/14/2018       RE: Dilan Nassar  [address]  [address]  [place] [address]     Dear Colleague,    Thank you for referring your patient, Dilan Nassar, to the [facility] at [facility]. Please see a copy of my visit note below.    [facility]  March 14, 2018         Assessment and Plan:   Dilan Nassar is a 31 year old male with cystic fibrosis, pancreatic insufficiency and CFRD who is seen today in clinic for sick visit.       1. CF lung disease: doing very well, cough at baseline, mainly productive in the am of yellow sputum clears out after vesting. No dyspnea, congestion or tightness. Not very physically active, but [place] daily. Sating 97% in clinic today. Vesting BID. PFTs improved 8% to near his best in recent years. Previous cultures + for MRSA, Klebsiella, Steno and Ps A. No evidence of exacerbation at this time.   - Continue nebulizers, inhaler and vest therapy  - On chronic azithromycin   - Continue george nebs every other month     2. Hemoptysis: no further issues.   - Continue 5 mg vitamin K once/week      3. Elevated alk phos: since 10/15 per our EPIC charting with US demonstrating coarse echotexture of the liver with hypertrophy of the caudate lobe and left lobe concerning for possible cirrhosis, no lesions, patent vasculature. Viral serologies from May WNL. LFTs normalized on annual labs.   - Continue Ursodiol      4. Exocrine pancreatic insufficiency: denies symptoms of malabsorption. Weight is low, but stable. BMI below goal of 23.   - Continue pancreatic enzymes and vitamin supplementation      5. CFRD: AIC of 6.1 on 9/15/17. Last few days, morning have been good, typically in the 70-80s, usually 120-180 after meals.   - Continue Lantus 12 U  - Routine diabetic eye exam in 2018.       6. CF related sinus disease: denies current symptoms. Not  using Claritin over the winter.   - Continue Flonase       RTC: in 3 months with routine spirometry  Annual studies due: September 2018    Jyothi Warren PA-C  Pulmonary, Allergy, Critical Care and Sleep Medicine        Interval History:   Feeling pretty good, cough is baseline, productive in the am and darker, clear by the time he gets in the shower. No congestion, tightness or shortness of breath. Taking the stair, no dyspnea. No fever or chills or other illness. No chest pain or palpitations. No Gi complaints. Stools are normal. Vesting BID.          Review of Systems:   Please see HPI. Otherwise, complete 10 point ROS negative.           Past Medical and Surgical History:     Past Medical History:   Diagnosis Date     Cystic fibrosis with pulmonary manifestations (H)     /[id number]     Past Surgical History:   Procedure Laterality Date     APPENDECTOMY OPEN  1999    Appendicitis      SINUS SURGERY  1990's    h/o many sinus infections           Family History:     Family History   Problem Relation Age of Onset     DIABETES Mother      Unknown type     Prostate Cancer Paternal Grandfather      LUNG DISEASE Other      Negative     DIABETES Maternal Aunt      unknown type     DIABETES Maternal Uncle      unknown type     DIABETES Maternal Grandmother      unknown type     Coronary Artery Disease Paternal Grandmother             Social History:     Social History     Social History     Marital status: Single     Spouse name: N/A     Number of children: N/A     Years of education: N/A     Occupational History     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 Yes     Social History Narrative    Lives alone in apartment in [place], [place]. 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         Nov 2015.  His main political issue is higher education.  His girlfriend is looking for work as an .  He eats 3 square meals per day, most of which are cooked by him or his girlfriend.  Gets outside to walk or run 20 minutes about 5x per week.  Works fairly regular daytime hours, often from home.            Medications:     Current Outpatient Prescriptions   Medication     tobramycin, PF, (MARLEN) 300 MG/5ML neb solution     BD YON U/F 32G X 4 MM insulin pen needle     ursodiol (ACTIGALL) 300 MG capsule     fluticasone (FLOVENT HFA) 110 MCG/ACT Inhaler     albuterol (2.5 MG/3ML) 0.083% neb solution     phytonadione (MEPHYTON) 5 MG tablet     CREON 11737 UNITS CPEP per EC capsule     fluticasone (FLONASE) 50 MCG/ACT spray     BASAGLAR 100 UNIT/ML injection     dornase alpha (PULMOZYME) 1 MG/ML neb solution     amylase-lipase-protease (CREON) 30726 UNITS CPEP     multivitamin CF formula (MVW COMPLETE FORMULATION ) softgel cap     azithromycin (ZITHROMAX) 250 MG tablet     rimantadine (FLUMADINE) 100 MG tablet     loratadine (CLARITIN) 10 MG tablet     allopurinol (ZYLOPRIM) 300 MG tablet     ranitidine (ZANTAC) 75 MG tablet     Syringe/Needle, Disp, 18G X 1\" 6 ML MISC     blood glucose monitoring (FREESTYLE LITE) test strip     blood glucose monitoring (FREESTYLE) lancets     oseltamivir (TAMIFLU) 75 MG capsule     tobramycin (MARLEN PODHALER) 28 MG in caps     [DISCONTINUED] insulin glargine (LANTUS SOLOSTAR) 100 UNIT/ML injection     albuterol (PROAIR HFA/PROVENTIL HFA/VENTOLIN HFA) 108 (90 BASE) MCG/ACT Inhaler     order for DME     No current facility-administered medications for this visit.             Physical Exam: "   /85 (BP Location: Right arm, Patient Position: Chair, Cuff Size: Adult Regular)  Pulse 90  Resp 16  Wt 53.1 kg (117 lb 1 oz)  SpO2 97%  BMI 20.09 kg/m2    GENERAL: alert, NAD  HEENT: NCAT, EOMI, anicteric sclera, no nasal edema or erythema; canals and TMs clear; no oral mucosal edema or erythema  Neck: no cervical or supraclavicular adenopathy  Respiratory: good air flow, no crackles, rhonchi or wheezing  CV: RRR, S1S2, no murmurs noted  Abdomen: normoactive BS, soft and non tender   Lymph: no edema, + digital clubbing  Neuro: AAO X 3, CN 2-12 grossly intact  Psychiatric: normal affect, good eye contact  Skin: no rash, jaundice or lesions on limited exam         Data:   All laboratory and imaging data reviewed.      Cystic Fibrosis Culture  Specimen Description   Date Value Ref Range Status   01/03/2018 Sputum  Final   12/14/2017 Sputum  Final   09/15/2017 Sputum  Final   09/15/2017 Sputum  Final   09/15/2017 Sputum  Final    Culture Micro   Date Value Ref Range Status   01/03/2018 Heavy growth  Normal mami    Final   01/03/2018 Moderate growth  Stenotrophomonas maltophilia   (A)  Final   01/03/2018 Light growth  Aspergillus fumigatus   (A)  Final        PFT interpretation:  Maneuver: valid and meets ATS guidelines  Moderate severe obstruction with decreased FEV1 and FEV1/FVC  Compared to prior: FEV1 of 2.24 is 250 cc above his recent best  The decrease in FVC may represent air trapping v. restrictive physiology.  Lung volumes would be necessary to determine.      Again, thank you for allowing me to participate in the care of your patient.      Sincerely,    Jyothi Warren PA-C

## 2018-03-14 NOTE — PROGRESS NOTES
Nutrition Note    Reason for Visit:  Vitamin D Follow-Up    Pt now receiving MVW Complete  from [facility]), has been taking this prescription consistently. Cannot do labs today due to time crunch but will do them next visit.     Interventions/Recommendations:  Vitamin D level ordered for future encounter.  Continue same vitamin prescriptions for now, will follow. Reviewed education on refill process with pt 1:1, he demonstrated a good understanding of this information.       Daniela Whitt MS, RD, LD (pager [phone])  Cystic Fibrosis/Lung Transplant Dietitian      -Available Tues-Fri 8 AM-4:30 PM. On Mondays please contact pager [phone] (Tamika Perez RD) and on weekends/holidays contact coverage dietitian at pager [phone] (inpatient use only).

## 2018-03-14 NOTE — PATIENT INSTRUCTIONS
Cystic Fibrosis Self-Care Plan       Patient: Dilan Nassar   MRN: [id number]   Clinic Date: March 14, 2018     RECOMMENDATIONS:  1. Continue nebulizers and vest therapy.   2. Continue george every other month. Call us if/when you need a refill depending on the pod haler.  3. Start exercising, aim for 30 minutes, 4-5 days/week.     Annual Studies:   IGG   Date Value Ref Range Status   09/15/2017 1340 695 - 1620 mg/dL Final     No results found for: INS  There are no preventive care reminders to display for this patient.    Pulmonary Function Tests  FEV1: amount of air you can blow out in 1 second  FVC: total amount of air you can take in and blow out    Your Goals:         PFT Latest Ref Rng & Units 3/14/2018   FVC L 4.10   FEV1 L 2.24   FVC% % 94   FEV1% % 62          Airway Clearance: The Most Important Way to Keep Your Lungs Healthy  Vest Settings:    Hill-Rom Frequencies: 8, 9, 10 Pressure 10 Then, Frequencies 18, 19, 20 Pressure 6      RespirTech: Quick Start with Pressure of     Do each frequency for 5 minutes; Deflate vest after each frequency & cough 3 times before beginning the next setting.    Vest and Neb Therapy should be done 2-3 times/day.    Good Nutrition Can Improve Lung Function and Overall Health     Take ALL of your vitamins with food     Take 1/2 of your enzymes before EVERY meal/snack and the other 1/2 mid-meal/snack    Wt Readings from Last 3 Encounters:   01/03/18 53.1 kg (117 lb)   12/14/17 53.1 kg (117 lb 1 oz)   09/15/17 52.6 kg (115 lb 15.4 oz)       There is no height or weight on file to calculate BMI.         National CF Foundation Recommendations for BMI in CF Adults: Women: at least 22 Men: at least 23        Controlling Blood Sugars Helps Prevent Lung Infections & Improves Nutrition  Test blood sugar:     In the morning before eating (goal is )     2 hours after a meal (goal is less than 150)     When pre-meal glucose is greater than 150 add correction     At bedtime (if  less than 100 eat a snack with 15 grams of carbohydrates  Last A1C Results:   Hemoglobin A1C   Date Value Ref Range Status   09/15/2017 6.1 (H) 4.3 - 6.0 % Final         If diabetic, measure A1C every 6 months. Goal is under 7%.    Staying Healthy    Research: If you are interested in learning about research opportunities or have questions, please contact Sonali Altamirano at [phone] or [email or web address].       Foundation: [employer] is a personalized resource service to help you with the insurance, financial, legal and other issues you are facing.  It's free, confidential and available to anyone with CF.  Ask your  for more information or contact [employer] directly at [facility] ([phone]) or compass@cff.org, or learn more at cff.org/compass.       CF Nurse Line:  Giovanny Pimentel: [phone]   Audra Smart, RT: [phone]     Daniela Whitt and Tamika Perez , Dieticians: [phone]     Lisset Root, Diabetes Nurse: [phone]    Kathie Truong: [phone] or Carisa Berumen [phone], Social Workers   [email or web address]

## 2018-03-14 NOTE — MR AVS SNAPSHOT
After Visit Summary   3/14/2018    Dilan Nassar    MRN: [id number]           Patient Information     Date Of Birth          1985        Visit Information        Provider Department      3/14/2018 9:30 AM Jyothi Warren PA-C [facility]        Today's Diagnoses     Cystic fibrosis with pulmonary exacerbation (H)          Care Instructions    Cystic Fibrosis Self-Care Plan       Patient: Dilan Nassar   MRN: [id number]   Clinic Date: March 14, 2018     RECOMMENDATIONS:  1. Continue nebulizers and vest therapy.   2. Continue george every other month. Call us if/when you need a refill depending on the pod haler.  3. Start exercising, aim for 30 minutes, 4-5 days/week.     Annual Studies:   IGG   Date Value Ref Range Status   09/15/2017 1340 695 - 1620 mg/dL Final     No results found for: INS  There are no preventive care reminders to display for this patient.    Pulmonary Function Tests  FEV1: amount of air you can blow out in 1 second  FVC: total amount of air you can take in and blow out    Your Goals:         PFT Latest Ref Rng & Units 3/14/2018   FVC L 4.10   FEV1 L 2.24   FVC% % 94   FEV1% % 62          Airway Clearance: The Most Important Way to Keep Your Lungs Healthy  Vest Settings:    Hill-Rom Frequencies: 8, 9, 10 Pressure 10 Then, Frequencies 18, 19, 20 Pressure 6      RespirTech: Quick Start with Pressure of     Do each frequency for 5 minutes; Deflate vest after each frequency & cough 3 times before beginning the next setting.    Vest and Neb Therapy should be done 2-3 times/day.    Good Nutrition Can Improve Lung Function and Overall Health     Take ALL of your vitamins with food     Take 1/2 of your enzymes before EVERY meal/snack and the other 1/2 mid-meal/snack    Wt Readings from Last 3 Encounters:   01/03/18 53.1 kg (117 lb)   12/14/17 53.1 kg (117 lb 1 oz)   09/15/17 52.6 kg (115 lb 15.4 oz)       There is no height or weight on file to  calculate BMI.         National CF Foundation Recommendations for BMI in CF Adults: Women: at least 22 Men: at least 23        Controlling Blood Sugars Helps Prevent Lung Infections & Improves Nutrition  Test blood sugar:     In the morning before eating (goal is )     2 hours after a meal (goal is less than 150)     When pre-meal glucose is greater than 150 add correction     At bedtime (if less than 100 eat a snack with 15 grams of carbohydrates  Last A1C Results:   Hemoglobin A1C   Date Value Ref Range Status   09/15/2017 6.1 (H) 4.3 - 6.0 % Final         If diabetic, measure A1C every 6 months. Goal is under 7%.    Staying Healthy    Research: If you are interested in learning about research opportunities or have questions, please contact Sonali Altamirano at [phone] or [email or web address].      CF Foundation: Compass is a personalized resource service to help you with the insurance, financial, legal and other issues you are facing.  It's free, confidential and available to anyone with CF.  Ask your  for more information or contact Compass directly at [facility] [phone]) or compass@cff.org, or learn more at cff.org/compass.       CF Nurse Line:  Lou Pimentel and Dianna: [phone]   Audra Smart, RT: [phone]     Daniela Whitt and Tamika Perez , Dieticians: [phone]     Lisset Root, Diabetes Nurse: [phone]    Kathie Truong: [phone] or Carisa Berumen [phone], Social Workers   [email or web address]            Follow-ups after your visit        Follow-up notes from your care team     Return in about 3 months (around 6/14/2018).      Your next 10 appointments already scheduled     Jun 14, 2018 11:00 AM CDT   CF LOOP with  [facility] Pulmonary Function Testing ([facility])    [address] [place] [id number] [phone]            Jun 14, 2018 11:20 AM CDT   (Arrive by 11:05 AM)   RETURN CYSTIC  FIBROSIS VISIT with Jyothi Warren PA-C   [facility] ([facility])    [address] [address]  [address] [place] [id number] [phone]              Future tests that were ordered for you today     Open Future Orders        Priority Expected Expires Ordered    Cystic Fibrosis Culture Aerob Bacterial Routine 5/30/2018 6/13/2018 3/14/2018    Spirometry, Breathing Capacity Routine 5/30/2018 6/13/2018 3/14/2018            Who to contact     If you have questions or need follow up information about today's clinic visit or your schedule please contact [facility] directly at [phone].  Normal or non-critical lab and imaging results will be communicated to you by Bizzukahart, letter or phone within 4 business days after the clinic has received the results. If you do not hear from us within 7 days, please contact the clinic through BrewDogt or phone. If you have a critical or abnormal lab result, we will notify you by phone as soon as possible.  Submit refill requests through Woop!Wear or call your pharmacy and they will forward the refill request to us. Please allow 3 business days for your refill to be completed.          Additional Information About Your Visit        [employer] Information     Woop!Wear gives you secure access to your electronic health record. If you see a primary care provider, you can also send messages to your care team and make appointments. If you have questions, please call your primary care clinic.  If you do not have a primary care provider, please call [phone] and they will assist you.        Care EveryWhere ID     This is your Care EveryWhere ID. This could be used by other organizations to access your [facility] medical records  [id number]        Your Vitals Were     Pulse Respirations Pulse Oximetry BMI (Body Mass Index)          90 16 97% 20.09 kg/m2         Blood Pressure from Last 3  Encounters:   03/14/18 135/85   01/03/18 113/74   12/14/17 126/79    Weight from Last 3 Encounters:   03/14/18 53.1 kg (117 lb 1 oz)   01/03/18 53.1 kg (117 lb)   12/14/17 53.1 kg (117 lb 1 oz)              We Performed the Following     Cystic Fibrosis Culture Aerob Bacterial          Today's Medication Changes          These changes are accurate as of 3/14/18 10:22 AM.  If you have any questions, ask your nurse or doctor.               These medicines have changed or have updated prescriptions.        Dose/Directions    BASAGLAR 100 UNIT/ML injection   This may have changed:  Another medication with the same name was removed. Continue taking this medication, and follow the directions you see here.   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 So [facility] [place] [phone].    ATENCIÓN: Si habla español, tiene a headley disposición servicios gratuitos de asistencia lingüística. [facility] [phone].    We comply with applicable federal civil rights laws and [place] laws. We do not discriminate on the basis of race, color, national origin, age, disability, sex, sexual orientation, or gender identity.            Thank you!     Thank you for choosing [facility]  for your care. Our goal is always to provide you with excellent care. Hearing back from our patients is one way we can continue to improve our services. Please take a few minutes to complete the written survey that you may receive in the mail after your visit with us. Thank you!              Your Updated Medication List - Protect others around you: Learn how to safely use, store and throw away your medicines at www.disposemymeds.org.          This list is accurate as of 3/14/18 10:22 AM.  Always use your most recent med list.                   Brand Name Dispense Instructions for use Diagnosis    * albuterol 108 (90 BASE) MCG/ACT Inhaler    PROAIR HFA/PROVENTIL HFA/VENTOLIN HFA    1 Inhaler    Inhale 2 puffs into the lungs 2 times daily Following your vancomycin nebulizer treatment.    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), Exocrine pancreatic insufficiency, Cystic fibrosis with pulmonary manifestations (H), MRSA infection       allopurinol 300 MG tablet    ZYLOPRIM    90 tablet    TAKE ONE TABLET (300MG) BY MOUTH DAILY    CF (cystic fibrosis) (H)       * amylase-lipase-protease 76213 UNITS Cpep    CREON    600 each    TAKE 5-6 CAPSULES (60,000-72,000) BY MOUTH THREE TIMES A DAY WITH MEALS    Cystic fibrosis with pulmonary manifestations (H), Cystic fibrosis (H), CF (cystic fibrosis) (H), 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), Exocrine pancreatic insufficiency, Cystic fibrosis with pulmonary manifestations (H), MRSA infection       * CREON 86505 UNITS Cpep   Generic drug:  amylase-lipase-protease     900 capsule    Take 5-6 capsules (60,000-72,000 Units) by mouth 3 times daily (with meals)    Cystic fibrosis with pulmonary manifestations (H), Cystic fibrosis (H), CF (cystic fibrosis) (H), 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), Exocrine pancreatic insufficiency, Cystic fibrosis with pulmonary manifestations (H), MRSA infection       azithromycin 250 MG tablet    ZITHROMAX    150 tablet    TAKE TWO TABLETS(500MG) BY MOUTH EVERY MONDAY, WEDNESDAY AND  FRIDAY MORNING    Cystic fibrosis with pulmonary manifestations (H)       BASAGLAR 100 UNIT/ML injection     15 mL    Use 12 units daily as directed    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    Diabetes mellitus related to CF (cystic fibrosis) (H)       blood glucose monitoring test strip    FREESTYLE LITE    120 each    Use to test blood 4 times a day    Cystic fibrosis with pulmonary manifestations (H)       dornase alpha 1 MG/ML neb solution    PULMOZYME    150 mL    INHALE THE CONTENTS OF 1 VIAL (2.5MG) VIA NEBULIZER TWICE DAILY.    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), Cystic fibrosis with pulmonary manifestations (H), Exocrine pancreatic insufficiency, Cystic fibrosis with pulmonary manifestations (H), MRSA infection       loratadine 10 MG tablet    CLARITIN    30 tablet    Take 1 tablet (10 mg) by mouth daily        multivitamin CF formula softgel cap     60 capsule    Take 1 capsule by mouth 2 times daily    Cystic fibrosis with pulmonary manifestations (H), Cystic fibrosis (H), CF (cystic fibrosis) (H), 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 Please dispense four (4) Ruby LC Plus nebulizer kits and four (4) RUBY face masks for this patient with Cystic Fibrosis. Patient requires additional nebulizer supplies due to his need to use multiple sterile neb cups for his inhaled medications that may not be mixed together.    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), CF (cystic fibrosis) (H), Cystic fibrosis (H), Cystic fibrosis with pulmonary manifestations (H), Exocrine pancreatic insufficiency, Cystic fibrosis with pulmonary manifestations (H), MRSA infection       ranitidine 75 MG tablet    ZANTAC    30 tablet    Take 1 tablet (75 mg) by mouth daily as needed for heartburn        rimantadine 100 MG tablet    FLUMADINE    60 tablet    Take 1 tablet (100 mg) by mouth 2 times daily Take during flu season Oct 1st-March 31st    Cystic fibrosis with pulmonary manifestations (H)       Syringe/Needle (Disp) 18G X 1\" 6 ML Misc     60 each    1 each 2 times daily    Cystic fibrosis with pulmonary manifestations (H), Exocrine pancreatic insufficiency, MRSA infection       * tobramycin 28 MG in caps    MARLEN PODHALER    224 capsule    Inhale 4 capsules (112 mg) into the lungs 2 times daily 1 month on, 1 month off    Cystic fibrosis with pulmonary manifestations (H)       * tobramycin (PF) 300 MG/5ML neb solution    MARLEN    280 mL    Take 5 mLs (300 mg) by nebulization 2 times daily 28 days on, 28 days off.    CF (cystic fibrosis) (H)       ursodiol 300 MG capsule    ACTIGALL    180 capsule    TAKE 1 CAPSULE (300MG ) BY MOUTH TWO TIMES A DAY    CF (cystic fibrosis) (H), Cystic fibrosis (H), Diabetes mellitus due " to cystic fibrosis (H), Cystic fibrosis with pulmonary manifestations (H), Exocrine pancreatic insufficiency, Cystic fibrosis with pulmonary manifestations (H), MRSA infection       * Notice:  This list has 6 medication(s) that are the same as other medications prescribed for you. Read the directions carefully, and ask your doctor or other care provider to review them with you.

## 2018-03-14 NOTE — PROGRESS NOTES
03/14/18 1000   General Information   Referring Physician Jyothi Warren PA-C   Orders Evaluate and Treat as Indicated   Order Date 03/01/18   Medical Diagnosis Cystic Fibrosis   Surgical/Medical history reviewed Yes   Pertinent history of current problem (include personal factors and/or comorbidities that impact the POC) PT: PT presents with very mildly decreased posture and some decrease in activity tolerance, pt reports more due to time constraints than inability to exercise. Pt works as a State Rep, is in busy season, reports he is a little more active toward summer.     Prior level of function comment Pt is I wtih all mobility, I/ADLs   Patient role/Employment history Employed   General Information Comments PT: Pt presents with impaired pulmonary function due to Cystic Fibrosis, very mildly impaired posture and mildly decreased activity/exercise tolerance, leading to decreased functional lung capacity and decreased airway clearance. Educated pt on role of PT in CF clinic: PT available to provide education and treatment for aerobic conditioning and strengthening HEP, postural reeducation to improve pulmonary function, incontinence treatment and prevention, pain managment associated with CF and strategies to reduce effects of osteoporosis.  Goal of PT is to increase quality of life, improve pulmonary function and decrease hospitalizations. Pt educated on personalized care plan including postural re-education and aerobic exercise education PT would address with pt, Pt declined the need for PT at this time mostly due to time constraints and felt he likely would not have time to work on right now.   Fall Risk Screen   Fall screen completed by PT   Have you fallen 2 or more times in the past year? No   Have you fallen and had an injury in the past year? No   Is patient a fall risk? No   Pain   Patient currently in pain No   Cognitive Status Examination   Orientation orientation to person, place and time    Level of Consciousness alert   Follows Commands and Answers Questions 100% of the time   Personal Safety and Judgment intact   Memory intact   Observation   Observation PT: Pt agreeable to PT CF screen. Pt reports no incontinence of urine. Educated pt on PT's ability to provide treatment and education to improve incontinence and lessen the effects on daily activities and mobility. Pt verbalized understanding of available treatment and declined the need to address this with PT at this time.   Posture   Posture Comments PT: Pt demonstrates very mildly impaired posture with mild shoulder protraction and no scapular winging. Pt's thoracic spine is in neutral alignment and cervical spine is in very mildly extended (flat) alignment.   Range of Motion (ROM)   ROM Comment PT: Pt able to demonstrate full shoulder forward flexion, but only to 165 deg while still maintaining neutral spinal positioning, and with no pain. Pt does demonstrate compensatory cervical flesion beginning at 165 deg of shoulder flexion. Pt achieves forward bend with finger tips 12 cm from floor.   Clinical Impression   Criteria for Skilled Therapeutic Interventions Met patient/family refuse skilled intervention at this time   Patient, Family & other staff in agreement with plan of care Yes

## 2018-03-14 NOTE — PROGRESS NOTES
[facility]  [facility]  March 14, 2018         Assessment and Plan:   Dilan Nassar is a 31 year old male with cystic fibrosis, pancreatic insufficiency and CFRD who is seen today in clinic for sick visit.       1. CF lung disease: doing very well, cough at baseline, mainly productive in the am of yellow sputum clears out after vesting. No dyspnea, congestion or tightness. Not very physically active, but [place]. Sating 97% in clinic today. Vesting BID. PFTs improved 8% to near his best in recent years. Previous cultures + for MRSA, Klebsiella, Steno and Ps A. No evidence of exacerbation at this time.   - Continue nebulizers, inhaler and vest therapy  - On chronic azithromycin   - Continue george nebs every other month     2. Hemoptysis: no further issues.   - Continue 5 mg vitamin K once/week      3. Elevated alk phos: since 10/15 per our EPIC charting with US demonstrating coarse echotexture of the liver with hypertrophy of the caudate lobe and left lobe concerning for possible cirrhosis, no lesions, patent vasculature. Viral serologies from May WNL. LFTs normalized on annual labs.   - Continue Ursodiol      4. Exocrine pancreatic insufficiency: denies symptoms of malabsorption. Weight is low, but stable. BMI below goal of 23.   - Continue pancreatic enzymes and vitamin supplementation      5. CFRD: AIC of 6.1 on 9/15/17. Last few days, morning have been good, typically in the 70-80s, usually 120-180 after meals.   - Continue Lantus 12 U  - Routine diabetic eye exam in 2018.       6. CF related sinus disease: denies current symptoms. Not using Claritin over the winter.   - Continue Flonase       RTC: in 3 months with routine spirometry  Annual studies due: September 2018    Jyothi Warren PA-C  Pulmonary, Allergy, Critical Care and Sleep Medicine        Interval History:   Feeling pretty good, cough is baseline, productive in the am and  darker, clear by the time he gets in the shower. No congestion, tightness or shortness of breath. Taking the stair, no dyspnea. No fever or chills or other illness. No chest pain or palpitations. No Gi complaints. Stools are normal. Vesting BID.          Review of Systems:   Please see HPI. Otherwise, complete 10 point ROS negative.           Past Medical and Surgical History:     Past Medical History:   Diagnosis Date     Cystic fibrosis with pulmonary manifestations (H)     /[id number]     Past Surgical History:   Procedure Laterality Date     APPENDECTOMY OPEN  1999    Appendicitis      SINUS SURGERY  1990's    h/o many sinus infections           Family History:     Family History   Problem Relation Age of Onset     DIABETES Mother      Unknown type     Prostate Cancer Paternal Grandfather      LUNG DISEASE Other      Negative     DIABETES Maternal Aunt      unknown type     DIABETES Maternal Uncle      unknown type     DIABETES Maternal Grandmother      unknown type     Coronary Artery Disease Paternal Grandmother             Social History:     Social History     Social History     Marital status: Single     Spouse name: N/A     Number of children: N/A     Years of education: N/A     Occupational History     State Representative [employer]     Financial Counselor      Social History Main Topics     Smoking status: Never Smoker     Smokeless tobacco: Former User     Alcohol use 6.0 - 8.4 oz/week     10 - 14 Standard drinks or equivalent per week      Comment: 2 drinks per night 5X/wk     Drug use: No     Sexual activity: Yes     Partners: Female     Birth control/ protection: Pull-out method, Condom     Other Topics Concern     Parent/Sibling W/ Cabg, Mi Or Angioplasty Before 65f 55m? Yes     Social History Narrative    Lives alone in apartment in [place], [place]. 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         Nov 2015.  His main  "political issue is higher education.  His girlfriend is looking for work as an .  He eats 3 square meals per day, most of which are cooked by him or his girlfriend.  Gets outside to walk or run 20 minutes about 5x per week.  Works fairly regular daytime hours, often from home.            Medications:     Current Outpatient Prescriptions   Medication     tobramycin, PF, (MARLEN) 300 MG/5ML neb solution     BD YON U/F 32G X 4 MM insulin pen needle     ursodiol (ACTIGALL) 300 MG capsule     fluticasone (FLOVENT HFA) 110 MCG/ACT Inhaler     albuterol (2.5 MG/3ML) 0.083% neb solution     phytonadione (MEPHYTON) 5 MG tablet     CREON 42670 UNITS CPEP per EC capsule     fluticasone (FLONASE) 50 MCG/ACT spray     BASAGLAR 100 UNIT/ML injection     dornase alpha (PULMOZYME) 1 MG/ML neb solution     amylase-lipase-protease (CREON) 22238 UNITS CPEP     multivitamin CF formula (MVW COMPLETE FORMULATION ) softgel cap     azithromycin (ZITHROMAX) 250 MG tablet     rimantadine (FLUMADINE) 100 MG tablet     loratadine (CLARITIN) 10 MG tablet     allopurinol (ZYLOPRIM) 300 MG tablet     ranitidine (ZANTAC) 75 MG tablet     Syringe/Needle, Disp, 18G X 1\" 6 ML MISC     blood glucose monitoring (FREESTYLE LITE) test strip     blood glucose monitoring (FREESTYLE) lancets     oseltamivir (TAMIFLU) 75 MG capsule     tobramycin (MARLEN PODHALER) 28 MG in caps     [DISCONTINUED] insulin glargine (LANTUS SOLOSTAR) 100 UNIT/ML injection     albuterol (PROAIR HFA/PROVENTIL HFA/VENTOLIN HFA) 108 (90 BASE) MCG/ACT Inhaler     order for DME     No current facility-administered medications for this visit.             Physical Exam:   /85 (BP Location: Right arm, Patient Position: Chair, Cuff Size: Adult Regular)  Pulse 90  Resp 16  Wt 53.1 kg (117 lb 1 oz)  SpO2 97%  BMI 20.09 kg/m2    GENERAL: alert, NAD  HEENT: NCAT, EOMI, anicteric sclera, no nasal edema or erythema; canals and TMs clear; no oral mucosal edema or " erythema  Neck: no cervical or supraclavicular adenopathy  Respiratory: good air flow, no crackles, rhonchi or wheezing  CV: RRR, S1S2, no murmurs noted  Abdomen: normoactive BS, soft and non tender   Lymph: no edema, + digital clubbing  Neuro: AAO X 3, CN 2-12 grossly intact  Psychiatric: normal affect, good eye contact  Skin: no rash, jaundice or lesions on limited exam         Data:   All laboratory and imaging data reviewed.      Cystic Fibrosis Culture  Specimen Description   Date Value Ref Range Status   01/03/2018 Sputum  Final   12/14/2017 Sputum  Final   09/15/2017 Sputum  Final   09/15/2017 Sputum  Final   09/15/2017 Sputum  Final    Culture Micro   Date Value Ref Range Status   01/03/2018 Heavy growth  Normal mami    Final   01/03/2018 Moderate growth  Stenotrophomonas maltophilia   (A)  Final   01/03/2018 Light growth  Aspergillus fumigatus   (A)  Final        PFT interpretation:  Maneuver: valid and meets ATS guidelines  Moderate severe obstruction with decreased FEV1 and FEV1/FVC  Compared to prior: FEV1 of 2.24 is 250 cc above his recent best  The decrease in FVC may represent air trapping v. restrictive physiology.  Lung volumes would be necessary to determine.

## 2018-03-14 NOTE — NURSING NOTE
Chief Complaint   Patient presents with     Cystic Fibrosis     Patient is being seen for CF follow up      Barbra Morrissey CMA at 9:33 AM on 3/14/2018

## 2018-03-19 LAB
BACTERIA SPEC CULT: ABNORMAL
SPECIMEN SOURCE: ABNORMAL

## 2018-03-20 LAB
EXPTIME-PRE: 10.91 SEC
FEF2575-%PRED-PRE: 24 %
FEF2575-PRE: 0.91 L/SEC
FEF2575-PRED: 3.76 L/SEC
FEFMAX-%PRED-PRE: 78 %
FEFMAX-PRE: 7.09 L/SEC
FEFMAX-PRED: 8.99 L/SEC
FEV1-%PRED-PRE: 62 %
FEV1-PRE: 2.24 L
FEV1FEV6-PRE: 59 %
FEV1FEV6-PRED: 83 %
FEV1FVC-PRE: 55 %
FEV1FVC-PRED: 81 %
FIFMAX-PRE: 6.59 L/SEC
FVC-%PRED-PRE: 94 %
FVC-PRE: 4.1 L
FVC-PRED: 4.34 L

## 2018-04-11 NOTE — TELEPHONE ENCOUNTER
MEDICATION APPEAL DENIED    Medication: MARLEN PODHALER (APPEAL DENIED) Free drug    Denial Date:  04/06/2018    Denial Rational: External found denial upheld. Marlen Podhaler was not found to be medically necessary.    Second Level Appeal Information:     Emailed [employer] application to fill out and send back.

## 2018-04-16 NOTE — TELEPHONE ENCOUNTER
Received application from [employer], reviewed and sent out to [employer]. Will call tomorrow to ensure they received.

## 2018-04-19 NOTE — TELEPHONE ENCOUNTER
Per call to [employer], they did not receive the application yet. However, the rep stated they receive about 1,200 faxes per day and it can take 2 to 4 business days to have it uploaded in the system.

## 2018-05-04 NOTE — TELEPHONE ENCOUNTER
Per call to [employer], the case is still pending. Rep stated normal verification takes 48 hours, but they are experiencing such an influx of applications she stated it could add on 2 to 4 days. She stated she hoped there would be a determination by Monday. Called and spoke to [employer]. He asked that if it takes longer than Monday to see if they would bridge him with a podhaler.

## 2018-05-09 DIAGNOSIS — E08.9 DIABETES MELLITUS DUE TO CYSTIC FIBROSIS (H): ICD-10-CM

## 2018-05-09 DIAGNOSIS — A49.02 MRSA INFECTION: ICD-10-CM

## 2018-05-09 DIAGNOSIS — E84.9 CYSTIC FIBROSIS (H): ICD-10-CM

## 2018-05-09 DIAGNOSIS — E84.9 DIABETES MELLITUS DUE TO CYSTIC FIBROSIS (H): ICD-10-CM

## 2018-05-09 DIAGNOSIS — E84.0 CYSTIC FIBROSIS WITH PULMONARY MANIFESTATIONS (H): ICD-10-CM

## 2018-05-09 DIAGNOSIS — K86.81 EXOCRINE PANCREATIC INSUFFICIENCY: ICD-10-CM

## 2018-05-09 DIAGNOSIS — E84.9 CF (CYSTIC FIBROSIS) (H): ICD-10-CM

## 2018-05-09 RX ORDER — ALBUTEROL SULFATE 90 UG/1
2 AEROSOL, METERED RESPIRATORY (INHALATION) 2 TIMES DAILY
Qty: 1 INHALER | Refills: 3 | Status: SHIPPED | OUTPATIENT
Start: 2018-05-09 | End: 2021-02-05

## 2018-05-09 RX ORDER — ALBUTEROL SULFATE 0.83 MG/ML
1 SOLUTION RESPIRATORY (INHALATION) 3 TIMES DAILY
Qty: 540 ML | Refills: 6 | Status: SHIPPED | OUTPATIENT
Start: 2018-05-09 | End: 2019-07-23

## 2018-06-20 NOTE — PROGRESS NOTES
[facility]  [facility]  June 22, 2018         Assessment and Plan:   Dilan Nassar is a 31 year old male with cystic fibrosis, pancreatic insufficiency and CFRD who is seen today in clinic for routine follow up.       1. CF lung disease: notes some increased chest congestion and tightness, but states he also hasn't been vesting BID for the last few weeks. No dyspnea, cough is baseline. Sating 94% in clinic today.  PFTs down 6%, still near his baseline. Previous cultures + for MRSA, Klebsiella, Steno and Ps A. No evidence of exacerbation at this time, but rather, patient feels his PFTs are down due to inadequate airway clearance.   - Continue nebulizers, inhaler and vest therapy, resume consistent BID vesting  - On chronic azithromycin   - Continue goerge podhaler month on/off, current on      2. Hemoptysis: no recent complaints.   - Continue 5 mg vitamin K once/week      3. CF related liver disease: since 10/15 per our EPIC charting with US 5/17 demonstrating coarse echotexture of the liver with hypertrophy of the caudate lobe and left lobe concerning for possible cirrhosis, no lesions, patent vasculature. LFTs normalized on annual labs 9/17.   - Continue Ursodiol      4. Exocrine pancreatic insufficiency: denies symptoms of malabsorption. Weight is up today, BMI is 20.81.   - Continue pancreatic enzymes and vitamin supplementation      5. CFRD: AIC of 6.1 on 9/15/17. BS in the 80-90s in the am, 150-180 after meals.   - Continue Lantus 12 U  - Routine diabetic eye exam in 2019      6. CF related sinus disease: denies current symptoms. Back on Claritin during allergy season  - Continue Flonase and Claritn      RTC: in 3 months with routine spirometry  Annual studies due: September 2018 (ordered for f/u including DEXA)     Jyothi Warren PA-C  Pulmonary, Allergy, Critical Care and Sleep Medicine        Interval History:   Doing yard work, workout around the house. Been  feeling well, good energy. Cough is baseline, productive in the am. NO streaking or blood. No congestion, did feel a bit more tight with his PFTs. Hasn't been vesting as much as usual, getting in only morning vest. No GI complaints.          Review of Systems:   Please see HPI. Otherwise, complete 10 point ROS negative.           Past Medical and Surgical History:     Past Medical History:   Diagnosis Date     Cystic fibrosis with pulmonary manifestations (H)     /[id number]     Past Surgical History:   Procedure Laterality Date     APPENDECTOMY OPEN  1999    Appendicitis      SINUS SURGERY  1990's    h/o many sinus infections           Family History:     Family History   Problem Relation Age of Onset     Diabetes Mother      Unknown type     Prostate Cancer Paternal Grandfather      LUNG DISEASE Other      Negative     Diabetes Maternal Aunt      unknown type     Diabetes Maternal Uncle      unknown type     Diabetes Maternal Grandmother      unknown type     Coronary Artery Disease Paternal Grandmother             Social History:     Social History     Social History     Marital status: Single     Spouse name: N/A     Number of children: N/A     Years of education: N/A     Occupational History     State Representative [employer]     Financial Counselor      Social History Main Topics     Smoking status: Never Smoker     Smokeless tobacco: Former User     Alcohol use 6.0 - 8.4 oz/week     10 - 14 Standard drinks or equivalent per week      Comment: 2 drinks per night 5X/wk     Drug use: No     Sexual activity: Yes     Partners: Female     Birth control/ protection: Pull-out method, Condom     Other Topics Concern     Parent/Sibling W/ Cabg, Mi Or Angioplasty Before 65f 55m? Yes     Social History Narrative    Lives alone in apartment in [place], [place]. 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.         Nov 2015.  His main political issue is  "higher education.  His girlfriend is looking for work as an .  He eats 3 square meals per day, most of which are cooked by him or his girlfriend.  Gets outside to walk or run 20 minutes about 5x per week.  Works fairly regular daytime hours, often from home.            Medications:     Current Outpatient Prescriptions   Medication     albuterol (2.5 MG/3ML) 0.083% neb solution     albuterol (PROAIR HFA/PROVENTIL HFA/VENTOLIN HFA) 108 (90 Base) MCG/ACT Inhaler     allopurinol (ZYLOPRIM) 300 MG tablet     amylase-lipase-protease (CREON) 99357 UNITS CPEP     azithromycin (ZITHROMAX) 250 MG tablet     BASAGLAR 100 UNIT/ML injection     BD YON U/F 32G X 4 MM insulin pen needle     blood glucose monitoring (FREESTYLE LITE) test strip     blood glucose monitoring (FREESTYLE) lancets     CREON 85850 UNITS CPEP per EC capsule     dornase alpha (PULMOZYME) 1 MG/ML neb solution     fluticasone (FLONASE) 50 MCG/ACT spray     fluticasone (FLOVENT HFA) 110 MCG/ACT Inhaler     loratadine (CLARITIN) 10 MG tablet     multivitamin CF formula (MVW COMPLETE FORMULATION ) softgel cap     phytonadione (MEPHYTON) 5 MG tablet     ranitidine (ZANTAC) 75 MG tablet     Syringe/Needle, Disp, 18G X 1\" 6 ML MISC     tobramycin (MARLEN PODHALER) 28 MG in caps     ursodiol (ACTIGALL) 300 MG capsule     order for DME     No current facility-administered medications for this visit.             Physical Exam:   /80  Pulse 84  Resp 16  Ht 1.63 m (5' 4.17\")  Wt 55.3 kg (121 lb 14.6 oz)  SpO2 94%  BMI 20.81 kg/m2    GENERAL: alert, NAD  HEENT: NCAT, EOMI, anicteric sclera, purulent drainage in right nare; canals and TMs clear; no oral mucosal edema or erythema  Neck: no cervical or supraclavicular adenopathy  Respiratory: good air flow, few scattered crackles in HATTIE  CV: RRR, S1S2, no murmurs noted  Abdomen: normoactive BS, soft and non tender   Lymph: no edema, + digital clubbing  Neuro: AAO X 3, CN 2-12 grossly " intact  Psychiatric: normal affect, good eye contact  Skin: no rash, jaundice or lesions on limited exam         Data:   All laboratory and imaging data reviewed.      Cystic Fibrosis Culture  Specimen Description   Date Value Ref Range Status   03/14/2018 Sputum  Final   01/03/2018 Sputum  Final   12/14/2017 Sputum  Final    Culture Micro   Date Value Ref Range Status   03/14/2018 Heavy growth  Normal mami to date    Final   03/14/2018 Light growth  Pseudomonas aeruginosa   (A)  Final   03/14/2018 Light growth  Strain 2  Pseudomonas aeruginosa   (A)  Final        PFT interpretation:  Maneuver: valid and meets ATS guidelines  Moderate severe obstruction with decreased FEV1 and FEV1/FVC  Compared to prior: FEV1 of 2.03 is 210 cc below prior

## 2018-06-22 ENCOUNTER — OFFICE VISIT (OUTPATIENT)
Dept: PULMONOLOGY | Facility: CLINIC | Age: 33
End: 2018-06-22
Attending: PHYSICIAN ASSISTANT
Payer: COMMERCIAL

## 2018-06-22 VITALS
SYSTOLIC BLOOD PRESSURE: 126 MMHG | OXYGEN SATURATION: 94 % | WEIGHT: 121.91 LBS | DIASTOLIC BLOOD PRESSURE: 80 MMHG | BODY MASS INDEX: 20.81 KG/M2 | HEART RATE: 84 BPM | RESPIRATION RATE: 16 BRPM | HEIGHT: 64 IN

## 2018-06-22 DIAGNOSIS — E84.8 DIABETES MELLITUS RELATED TO CYSTIC FIBROSIS (H): ICD-10-CM

## 2018-06-22 DIAGNOSIS — K86.81 EXOCRINE PANCREATIC INSUFFICIENCY: Chronic | ICD-10-CM

## 2018-06-22 DIAGNOSIS — E08.9 DIABETES MELLITUS DUE TO CYSTIC FIBROSIS (H): ICD-10-CM

## 2018-06-22 DIAGNOSIS — E08.9 DIABETES MELLITUS RELATED TO CYSTIC FIBROSIS (H): ICD-10-CM

## 2018-06-22 DIAGNOSIS — E84.0 CYSTIC FIBROSIS WITH PULMONARY MANIFESTATIONS (H): Primary | ICD-10-CM

## 2018-06-22 DIAGNOSIS — E84.9 DIABETES MELLITUS DUE TO CYSTIC FIBROSIS (H): ICD-10-CM

## 2018-06-22 DIAGNOSIS — E55.9 VITAMIN D DEFICIENCY: ICD-10-CM

## 2018-06-22 DIAGNOSIS — E84.0 CYSTIC FIBROSIS WITH PULMONARY MANIFESTATIONS (H): ICD-10-CM

## 2018-06-22 DIAGNOSIS — M10.9 GOUT, UNSPECIFIED CAUSE, UNSPECIFIED CHRONICITY, UNSPECIFIED SITE: Primary | ICD-10-CM

## 2018-06-22 DIAGNOSIS — E84.0 CYSTIC FIBROSIS WITH PULMONARY EXACERBATION (H): ICD-10-CM

## 2018-06-22 PROCEDURE — 87077 CULTURE AEROBIC IDENTIFY: CPT | Performed by: INTERNAL MEDICINE

## 2018-06-22 PROCEDURE — 87107 FUNGI IDENTIFICATION MOLD: CPT | Performed by: INTERNAL MEDICINE

## 2018-06-22 PROCEDURE — 97803 MED NUTRITION INDIV SUBSEQ: CPT | Mod: 59 | Performed by: DIETITIAN, REGISTERED

## 2018-06-22 PROCEDURE — 87186 SC STD MICRODIL/AGAR DIL: CPT | Performed by: INTERNAL MEDICINE

## 2018-06-22 PROCEDURE — G0463 HOSPITAL OUTPT CLINIC VISIT: HCPCS | Mod: ZF

## 2018-06-22 PROCEDURE — 87070 CULTURE OTHR SPECIMN AEROBIC: CPT | Performed by: INTERNAL MEDICINE

## 2018-06-22 ASSESSMENT — PAIN SCALES - GENERAL: PAINLEVEL: NO PAIN (0)

## 2018-06-22 NOTE — MR AVS SNAPSHOT
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 Continue nebulizers and vest therapy.   2. Resume vesting TWICE/DAY!!  3. Good luck with the start of campaign season!    Annual Studies:   IGG   Date Value Ref Range Status   09/15/2017 1340 695 - 1620 mg/dL Final     No results found for: INS  There are no preventive care reminders to display for this patient.    Pulmonary Function Tests  FEV1: amount of air you can blow out in 1 second  FVC: total amount of air you can take in and blow out    Your Goals:         PFT Latest Ref Rng & Units 6/22/2018   FVC L 3.69   FEV1 L 2.03   FVC% % 84   FEV1% % 56          Airway Clearance: The Most Important Way to Keep Your Lungs Healthy  Vest Settings:    Hill-Rom Frequencies: 8, 9, 10 Pressure 10 Then, Frequencies 18, 19, 20 Pressure 6      RespirTech: Quick Start with Pressure of     Do each frequency for 5 minutes; Deflate vest after each frequency & cough 3 times before beginning the next setting.    Vest and Neb Therapy should be done 2 times/day.    Good Nutrition Can Improve Lung Function and Overall Health     Take ALL of your vitamins with food     Take 1/2 of your enzymes before EVERY meal/snack and the other 1/2 mid-meal/snack    Wt Readings from Last 3 Encounters:   06/22/18 55.3 kg (121 lb 14.6 oz)   03/14/18 53.1 kg (117 lb 1 oz)   01/03/18 53.1 kg (117  lb)       Body mass index is 20.81 kg/(m^2).         National CF Foundation Recommendations for BMI in CF Adults: Women: at least 22 Men: at least 23        Controlling Blood Sugars Helps Prevent Lung Infections & Improves Nutrition  Test blood sugar:     In the morning before eating (goal is )     2 hours after a meal (goal is less than 150)     When pre-meal glucose is greater than 150 add correction     At bedtime (if less than 100 eat a snack with 15 grams of carbohydrates  Last A1C Results:   Hemoglobin A1C   Date Value Ref Range Status   09/15/2017 6.1 (H) 4.3 - 6.0 % Final         If diabetic, measure A1C every 6 months. Goal is under 7%.    Staying Healthy    Research: If you are interested in learning about research opportunities or have questions, please contact Sonali Altamirano at [phone] or [email or web address].      CF Foundation: Compass is a personalized resource service to help you with the insurance, financial, legal and other issues you are facing.  It's free, confidential and available to anyone with CF.  Ask your  for more information or contact Compass directly at 487-COMPASS ([phone]) or compass@cff.org, or learn more at [email or web address].       CF Nurse Line:  Giovanny Pimentel: [phone]   Audra Smart, RT: [phone]     Daniela Whitt and Tamika Perez , Dieticians: [phone]     Lisset Root, Diabetes Nurse: [phone]    Kathie Truong: [phone] or Carisa Berumen [phone], Social Workers   [email or web address]            Follow-ups after your visit        Follow-up notes from your care team     Return in about 3 months (around 9/22/2018), or Please schedule annual studies including DEXA.      Your next 10 appointments already scheduled     Sep 27, 2018 10:30 AM CDT   Lab with  LAB    Health Lab ([facility])    [address] [place] [id number]   [phone]            Sep 27, 2018 10:45 AM  CDT   XR CHEST 2 VIEWS with UCXR1   [facility] ([facility])    [address]  1st Floor  [facility] [id number] [phone]           Please bring a list of your current medicines to your exam. (Include vitamins, minerals and over-thecounter medicines.) Leave your valuables at home.  Tell your doctor if there is a chance you may be pregnant.  You do not need to do anything special for this exam.            Sep 27, 2018 11:00 AM CDT   CF LOOP with  PFL CF   [facility] Function Testing ([facility])    [address]  3rd Floor  [facility] [id number] [phone]            Sep 27, 2018 11:20 AM CDT   (Arrive by 11:05 AM)   RETURN CYSTIC FIBROSIS VISIT with Jyothi Warren PA-C   [facility] ([facility])    [address]  [address]  [facility] [id number] [phone]              Future tests that were ordered for you today     Open Future Orders        Priority Expected Expires Ordered    Alkaline phosphatase Routine 9/7/2018 10/12/2018 6/22/2018    AST Routine 9/7/2018 10/12/2018 6/22/2018    Iron Routine 9/7/2018 10/12/2018 6/22/2018    GGT Routine 9/7/2018 10/12/2018 6/22/2018    Hemoglobin A1c Routine 9/7/2018 10/12/2018 6/22/2018    IgA Routine 9/7/2018 10/12/2018 6/22/2018    IgG Routine 9/7/2018 10/12/2018 6/22/2018    IgM Routine 9/7/2018 10/12/2018 6/22/2018    IgE Routine 9/7/2018 10/12/2018 6/22/2018    INR Routine 9/7/2018 10/12/2018 6/22/2018    Magnesium Routine 9/7/2018 10/12/2018 6/22/2018    Phosphorus Routine 9/7/2018 10/12/2018 6/22/2018    Protein total Routine 9/7/2018 10/12/2018 6/22/2018    TSH with free T4 reflex Routine 9/7/2018 10/12/2018 6/22/2018    Vitamin A Routine 9/7/2018 10/12/2018 6/22/2018    Vitamin E Routine 9/7/2018 10/12/2018 6/22/2018    Vitamin D Deficiency Routine 9/7/2018 10/12/2018 6/22/2018    CBC  with platelets differential Routine 9/7/2018 10/12/2018 6/22/2018    Erythrocyte sedimentation rate auto Routine 9/7/2018 10/12/2018 6/22/2018    Routine UA with microscopic Routine 9/7/2018 10/12/2018 6/22/2018    Albumin Random Urine Quantitative with Creat Ratio Routine 9/7/2018 10/12/2018 6/22/2018    Nocardia culture Routine 9/7/2018 10/12/2018 6/22/2018    Fungus Culture, non-blood Routine 9/7/2018 10/12/2018 6/22/2018    AFB Stain Non Blood Routine 9/7/2018 10/12/2018 6/22/2018    AFB Culture Non Blood Routine 9/7/2018 10/12/2018 6/22/2018    Cystic Fibrosis Culture Aerob Bacterial Routine 9/7/2018 10/12/2018 6/22/2018    Spirometry, Breathing Capacity Routine 9/7/2018 10/12/2018 6/22/2018    X-ray Chest 2 vws* Routine 9/7/2018 10/12/2018 6/22/2018    Dexa hip/pelvis/spine* Routine 9/7/2018 10/12/2018 6/22/2018    EBV DNA PCR Quantitative Whole Blood Routine 9/7/2018 10/12/2018 6/22/2018    CMV DNA quantification Routine 9/7/2018 10/12/2018 6/22/2018    ALT Routine 9/7/2018 10/12/2018 6/22/2018    Basic metabolic panel Routine 9/7/2018 10/12/2018 6/22/2018    Lipid Profile Routine 9/7/2018 10/12/2018 6/22/2018    Albumin level Routine 9/7/2018 10/12/2018 6/22/2018            Who to contact     If you have questions or need follow up information about today's clinic visit or your schedule please contact [facility] directly at [phone].  Normal or non-critical lab and imaging results will be communicated to you by MyChart, letter or phone within 4 business days after the clinic has received the results. If you do not hear from us within 7 days, please contact the clinic through MyChart or phone. If you have a critical or abnormal lab result, we will notify you by phone as soon as possible.  Submit refill requests through [employer] or call your pharmacy and they will forward the refill request to us. Please allow 3 business days for your refill to be completed.           "Additional Information About Your Visit        [employer] Information     [employer] gives you secure access to your electronic health record. If you see a primary care provider, you can also send messages to your care team and make appointments. If you have questions, please call your primary care clinic.  If you do not have a primary care provider, please call [phone] and they will assist you.        Care EveryWhere ID     This is your Care EveryWhere ID. This could be used by other organizations to access your [place] medical records  [id number]        Your Vitals Were     Pulse Respirations Height Pulse Oximetry BMI (Body Mass Index)       84 16 1.63 m (5' 4.17\") 94% 20.81 kg/m2        Blood Pressure from Last 3 Encounters:   06/22/18 126/80   03/14/18 135/85   01/03/18 113/74    Weight from Last 3 Encounters:   06/22/18 55.3 kg (121 lb 14.6 oz)   03/14/18 53.1 kg (117 lb 1 oz)   01/03/18 53.1 kg (117 lb)              We Performed the Following     Cystic Fibrosis Culture Aerob Bacterial          Today's Medication Changes          These changes are accurate as of 6/22/18 11:20 AM.  If you have any questions, ask your nurse or doctor.               These medicines have changed or have updated prescriptions.        Dose/Directions    tobramycin 28 MG in caps   Commonly known as:  MARLEN PODHALER   This may have changed:  Another medication with the same name was removed. Continue taking this medication, and follow the directions you see here.   Used for:  Cystic fibrosis with pulmonary manifestations (H)   Changed by:  Jyothi Warren PA-C        Dose:  4 capsule   Inhale 4 capsules (112 mg) into the lungs 2 times daily 1 month on, 1 month off   Quantity:  224 capsule   Refills:  5         Stop taking these medicines if you haven't already. Please contact your care team if you have questions.     oseltamivir 75 MG capsule   Commonly known as:  TAMIFLU   Stopped by:  Jyothi Warren PA-C           " rimantadine 100 MG tablet   Commonly known as:  FLUMADINE   Stopped by:  Jyothi Warren PA-C                    Primary Care Provider Office Phone # Fax #    Atilio Aaron Nieto [phone]       [address] [place] [address]        Equal Access to Services     [facility] GAVI : Hadii aad ku hadasho Soomaali, waaxda luqadaha, qaybta kaalmada adeegyada, waxay timmyin hayaan adeyoon shellielissette winter . So [facility] [phone].    ATENCIÓN: Si habla español, tiene a headley disposición servicios gratuitos de asistencia lingüística. [facility] [phone].    We comply with applicable federal civil rights laws and [place] laws. We do not discriminate on the basis of race, color, national origin, age, disability, sex, sexual orientation, or gender identity.            Thank you!     Thank you for choosing [facility]  for your care. Our goal is always to provide you with excellent care. Hearing back from our patients is one way we can continue to improve our services. Please take a few minutes to complete the written survey that you may receive in the mail after your visit with us. Thank you!             Your Updated Medication List - Protect others around you: Learn how to safely use, store and throw away your medicines at www.disposemymeds.org.          This list is accurate as of 6/22/18 11:20 AM.  Always use your most recent med list.                   Brand Name Dispense Instructions for use Diagnosis    * albuterol (2.5 MG/3ML) 0.083% neb solution     540 mL    Take 1 vial (2.5 mg) by nebulization 3 times daily    Cystic fibrosis with pulmonary manifestations (H), Cystic fibrosis (H), CF (cystic fibrosis) (H), 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), Exocrine pancreatic insufficiency, Cystic fibrosis with pulmonary manifestations (H), MRSA infection       * albuterol 108 (90 Base) MCG/ACT Inhaler    PROAIR HFA/PROVENTIL HFA/VENTOLIN HFA    1 Inhaler     Inhale 2 puffs into the lungs 2 times daily Following your vancomycin nebulizer treatment.    Cystic fibrosis (H)       allopurinol 300 MG tablet    ZYLOPRIM    90 tablet    TAKE ONE TABLET (300MG) BY MOUTH DAILY    CF (cystic fibrosis) (H)       * amylase-lipase-protease 08414 units Cpep    CREON    600 each    TAKE 5-6 CAPSULES (60,000-72,000) BY MOUTH THREE TIMES A DAY WITH MEALS    Cystic fibrosis with pulmonary manifestations (H), Cystic fibrosis (H), CF (cystic fibrosis) (H), 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), Exocrine pancreatic insufficiency, Cystic fibrosis with pulmonary manifestations (H), MRSA infection       * CREON 84654 units Cpep   Generic drug:  amylase-lipase-protease     900 capsule    Take 5-6 capsules (60,000-72,000 Units) by mouth 3 times daily (with meals)    Cystic fibrosis with pulmonary manifestations (H), Cystic fibrosis (H), CF (cystic fibrosis) (H), 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), Exocrine pancreatic insufficiency, Cystic fibrosis with pulmonary manifestations (H), MRSA infection       azithromycin 250 MG tablet    ZITHROMAX    150 tablet    TAKE TWO TABLETS(500MG) BY MOUTH EVERY MONDAY, WEDNESDAY AND FRIDAY MORNING    Cystic fibrosis with pulmonary manifestations (H)       BASAGLAR 100 UNIT/ML injection     15 mL    Use 12 units daily as directed    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    Diabetes mellitus related to CF (cystic fibrosis) (H)       blood glucose monitoring test strip    FREESTYLE LITE    120 each    Use to test blood 4 times a day    Cystic fibrosis with pulmonary manifestations (H)       dornase alpha 1 MG/ML neb solution    PULMOZYME    150 mL    INHALE THE CONTENTS OF 1 VIAL  (2.5MG) VIA NEBULIZER TWICE DAILY.    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), Cystic fibrosis with pulmonary manifestations (H), Exocrine pancreatic insufficiency, Cystic fibrosis with pulmonary manifestations (H), MRSA infection       loratadine 10 MG tablet    CLARITIN    30 tablet    Take 1 tablet (10 mg) by mouth daily        multivitamin CF formula softgel cap     60 capsule    Take 1 capsule by mouth 2 times daily    Cystic fibrosis with pulmonary manifestations (H), Cystic fibrosis (H), CF (cystic fibrosis) (H), 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), Exocrine pancreatic insufficiency       order for [facility]     4 kit    Equipment being ordered: Nebulizer Supplies. Please dispense four (4) Ruby LC Plus nebulizer kits and four (4) RUBY face masks for this patient with Cystic Fibrosis. Patient requires additional nebulizer supplies due to his need to use multiple sterile neb cups for his inhaled medications that may not be mixed together.    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), CF (cystic fibrosis) (H), Cystic fibrosis (H), Cystic fibrosis with pulmonary manifestations (H), Exocrine pancreatic insufficiency, Cystic fibrosis with pulmonary manifestations (H), MRSA infection       ranitidine 75 MG tablet    ZANTAC    30  "tablet    Take 1 tablet (75 mg) by mouth daily as needed for heartburn        Syringe/Needle (Disp) 18G X 1\" 6 ML Misc     60 each    1 each 2 times daily    Cystic fibrosis with pulmonary manifestations (H), Exocrine pancreatic insufficiency, MRSA infection       tobramycin 28 MG in caps    MARLEN PODHALER    224 capsule    Inhale 4 capsules (112 mg) into the lungs 2 times daily 1 month on, 1 month off    Cystic fibrosis with pulmonary manifestations (H)       ursodiol 300 MG capsule    ACTIGALL    180 capsule    TAKE 1 CAPSULE (300MG ) BY MOUTH TWO TIMES A DAY    CF (cystic fibrosis) (H), Cystic fibrosis (H), 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 Read the directions carefully, and ask your doctor or other care provider to review them with you.      "

## 2018-06-22 NOTE — PATIENT INSTRUCTIONS
Cystic Fibrosis Self-Care Plan       Patient: Dilan Nassar   MRN: [id number]   Clinic Date: June 22, 2018     RECOMMENDATIONS:  1. Continue nebulizers and vest therapy.   2. Resume vesting TWICE/DAY!!  3. Good luck with the start of campaign season!    Annual Studies:   IGG   Date Value Ref Range Status   09/15/2017 1340 695 - 1620 mg/dL Final     No results found for: INS  There are no preventive care reminders to display for this patient.    Pulmonary Function Tests  FEV1: amount of air you can blow out in 1 second  FVC: total amount of air you can take in and blow out    Your Goals:         PFT Latest Ref Rng & Units 6/22/2018   FVC L 3.69   FEV1 L 2.03   FVC% % 84   FEV1% % 56          Airway Clearance: The Most Important Way to Keep Your Lungs Healthy  Vest Settings:    Hill-Rom Frequencies: 8, 9, 10 Pressure 10 Then, Frequencies 18, 19, 20 Pressure 6      RespirTech: Quick Start with Pressure of     Do each frequency for 5 minutes; Deflate vest after each frequency & cough 3 times before beginning the next setting.    Vest and Neb Therapy should be done 2 times/day.    Good Nutrition Can Improve Lung Function and Overall Health     Take ALL of your vitamins with food     Take 1/2 of your enzymes before EVERY meal/snack and the other 1/2 mid-meal/snack    Wt Readings from Last 3 Encounters:   06/22/18 55.3 kg (121 lb 14.6 oz)   03/14/18 53.1 kg (117 lb 1 oz)   01/03/18 53.1 kg (117 lb)       Body mass index is 20.81 kg/(m^2).         National CF Foundation Recommendations for BMI in CF Adults: Women: at least 22 Men: at least 23        Controlling Blood Sugars Helps Prevent Lung Infections & Improves Nutrition  Test blood sugar:     In the morning before eating (goal is )     2 hours after a meal (goal is less than 150)     When pre-meal glucose is greater than 150 add correction     At bedtime (if less than 100 eat a snack with 15 grams of carbohydrates  Last A1C Results:   Hemoglobin A1C   Date  Value Ref Range Status   09/15/2017 6.1 (H) 4.3 - 6.0 % Final         If diabetic, measure A1C every 6 months. Goal is under 7%.    Staying Healthy    Research: If you are interested in learning about research opportunities or have questions, please contact Sonali Altamirano at [phone] or [email or web address].       Foundation: Compass is a personalized resource service to help you with the insurance, financial, legal and other issues you are facing.  It's free, confidential and available to anyone with CF.  Ask your  for more information or contact Compass directly at 659-COMPASS ([phone]) or compass@cff.org, or learn more at cff.org/compass.       CF Nurse Line:  Giovanny Pimentel: [phone]   Audra Smart, RT: [phone]     Daniela Whitt and Tamika Perez , Dieticians: [phone]     Lisset Root, Diabetes Nurse: [phone]    Kathie Truong: [phone] or Carisa Berumen [phone], Social Workers   [email or web address]

## 2018-06-22 NOTE — PROGRESS NOTES
CF Annual Nutrition Assessment    Reason for Assessment  Assessed during Jyothi Warren PA-C clinic r/t increased nutrition risk with diagnosis of CF per protocol.    Nutrition Significant PMH  Moderate Lung Disease   Pancreatic Insufficient   CFRD    Social Assessment  Living situation: Owns a home in [facility], living with girlfriend.   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  Food Insecurity:  None    Anthropometric Assessment  Height: 163 cm  IBW based on BMI 22 for females and 23 for males per CF Foundation recs: 61 kg / 134#  Today's Weight: 55.3 kg (actual weight)   %IBW: 91%  Body mass index is 20.81 kg/(m^2).   Current weight is considered: Normal BMI; however, not at CF goal BMI. No malnutrition.   Weight up 2 kg x 3 months which is an additional 2 kg gain from his previous UBW 51 kg in 2016. Not actively trying for weight gain, although thinks he may have been eating more now that work is out of session. Weight remains less than ideal for CF lung function which he is aware of.     Wt Readings from Last 10 Encounters:   18 55.3 kg (121 lb 14.6 oz)   18 53.1 kg (117 lb 1 oz)   18 53.1 kg (117 lb)   17 53.1 kg (117 lb 1 oz)   09/15/17 52.6 kg (115 lb 15.4 oz)   17 52.6 kg (115 lb 15.4 oz)   17 53.5 kg (118 lb)   17 53.7 kg (118 lb 6.2 oz)   16 52.7 kg (116 lb 3.2 oz)   16 52.2 kg (115 lb)     Physical Activity/Exercise:  No formal exercise regimen per pt, tries to remain active in his daily life.     Pancreatic Enzymes  Brand:  Creon [id number] - not interested in larger capsule size at this time, aware this option exists.   Dosin with meals and 2-3 with snacks (~18 caps per day)    High dose providing 1090 units lipase/kg/meal which is within the recommended range per CF Foundation to inhibit fibrosing colonopathy.    Are you taking enzymes as recommended: Yes   Signs of Malabsorption:  No  Enzyme Program:  None - has been given  information in the past but not interested in enrollment.     Diet History and Assessment  Diet Preferences/Allergies/Intolerances:  Regular    Intake Recall/Comments:  3 meals and 1-2 snacks per day, has a good appetite in general. Eats out a few times per week. Girlfriend does majority of cooking and grocery shopping.  Feels he is eating better now that work is no longer busy, felt he did not have enough time to eat at baseline.     Breakfast: Bowl of cereal with milk  Lunch: At work, usually a sandwich or piece of pizza or something similar (on-the-go)  Dinner: At home, chicken or other protein with a side dish or two.  Occasionally eats at restaurants or fast food.   Snacks: protein bar in afternoon; also has popcorn or snickers candy at HS    Snacks: Once/day has a protein bar (afternoon).  Does not usually snack more than this.    Calcium: 3-4 servings of milk per day, cheese in meals  Salt: Adequate, no sx inadequate intake per pt  Hydration:  Not discussed this visit  Supplements:  Yes, daily protein bar    Estimated Energy and Protein Needs  Estimation based on weight maintenance vs gain with Moderate lung disease and pancreatic insufficiency.     BEE: ~1400  9176-2675 kcals/day =  200-225% BEE   g protein/day = 1.5-2 g/kg    Laboratory Assessment    Vitamin A   Date Value Ref Range Status   09/15/2017 0.35 0.30 - 1.20 mg/L Final     Vitamin D Deficiency screening   Date Value Ref Range Status   09/15/2017 28 20 - 75 ug/L Final     Comment:     Season, race, dietary intake, and treatment affect the concentration of   25-hydroxy-Vitamin D. Values may decrease during winter months and increase   during summer months. Values 20-29 ug/L may indicate Vitamin D insufficiency   and values <20 ug/L may indicate Vitamin D deficiency.  Vitamin D determination is routinely performed by an immunoassay specific for   25 hydroxyvitamin D3.  If an individual is on vitamin D2 (ergocalciferol)   supplementation,  please specify 25 OH vitamin D2 and D3 level determination by   LCMSMS test [id number].       Vitamin E   Date Value Ref Range Status   09/15/2017 5.6 5.5 - 18.0 mg/L Final     Comment:     (Note)  Test developed and characteristics determined by [facility]. See Compliance Statement B: [email or web address]       Iron   Date Value Ref Range Status   09/15/2017 65 35 - 180 ug/dL Final     Cholesterol   Date Value Ref Range Status   09/15/2017 79 <200 mg/dL Final     Triglycerides   Date Value Ref Range Status   09/15/2017 31 <150 mg/dL Final     HDL Cholesterol   Date Value Ref Range Status   09/15/2017 60 >39 mg/dL Final     LDL Cholesterol Calculated   Date Value Ref Range Status   09/15/2017 14 <100 mg/dL Final     Comment:     Desirable:       <100 mg/dl       DEXA: (Last Sept 2016) Normal result    Current Vitamin/Mineral Prescriptions: MVW Complete D3,000 - 2 softgels per day  Comments: [facility]     CF Related Diabetes Evaluation  Hgb A1C: 6.1%   Date: 9/15/17  FSBG range: not checking frequently; denied concerns with hypoglycemia unless if he skips meals or increased activity  Insulin: Yes    Insulin Regimen: Lantus 12 units q AM  Carbohydrate Counting: No     -Sees Dr. Miranda/Juli Lynos CDE regularly    NUTRITION DIAGNOSIS  Impaired nutrient utilization r/t CF related diabetes, pancreatic insufficiency and CF hypermetabolism AEB requires PERT, insulin therapy and high kcal/pro diet to maintain nutrition and health status.  INTERVENTIONS/RECOMMENDATIONS  Discussed current nutritional status, reviewed weight trends, goals, and oral intakes with Guillermo. States he feels he is doing well from a nutrition standpoint at this time and did not have any specific concerns. Encouraged pt to continue with current insulin regimen and vitamin/mineral supplementation. Plan to obtain annual studies at next clinic visit and adjust supplement regimen as needed.     Patient Understanding: Good  Expected  Compliance: Good  Follow-Up Plans: Per protocol    GOALS:  1) Weight maintenance vs gain to BMI of 23 kg/m2 or above.   2) 100% compliance with prescribed enzymes, vitamin/mineral supplements and insulin therapy.     FOLLOW-UP/MONITORING:  Visit patient within 12 month(s)     Time Spent In Face-to-Face Patient Interactions: 15 minutes    Tamika Perez RD, LD  Cystic Fibrosis/Lung Transplant Dietitian  Pager [phone]  On weekends/holidays contact coverage RD at [phone] (inpatient use only)

## 2018-06-22 NOTE — LETTER
6/22/2018       RE: Dilan Nassar  [address]  [place] [address]     Dear Colleague,    Thank you for referring your patient, Dilan Nassar, to the [facility] at [facility]. Please see a copy of my visit note below.    [facility]  June 22, 2018         Assessment and Plan:   Dilan Nassar is a 31 year old male with cystic fibrosis, pancreatic insufficiency and CFRD who is seen today in clinic for routine follow up.       1. CF lung disease: notes some increased chest congestion and tightness, but states he also hasn't been vesting BID for the last few weeks. No dyspnea, cough is baseline. Sating 94% in clinic today.  PFTs down 6%, still near his baseline. Previous cultures + for MRSA, Klebsiella, Steno and Ps A. No evidence of exacerbation at this time, but rather, patient feels his PFTs are down due to inadequate airway clearance.   - Continue nebulizers, inhaler and vest therapy, resume consistent BID vesting  - On chronic azithromycin   - Continue george podhaler month on/off, current on      2. Hemoptysis: no recent complaints.   - Continue 5 mg vitamin K once/week      3. CF related liver disease: since 10/15 per our EPIC charting with US 5/17 demonstrating coarse echotexture of the liver with hypertrophy of the caudate lobe and left lobe concerning for possible cirrhosis, no lesions, patent vasculature. LFTs normalized on annual labs 9/17.   - Continue Ursodiol      4. Exocrine pancreatic insufficiency: denies symptoms of malabsorption. Weight is up today, BMI is 20.81.   - Continue pancreatic enzymes and vitamin supplementation      5. CFRD: AIC of 6.1 on 9/15/17. BS in the 80-90s in the am, 150-180 after meals.   - Continue Lantus 12 U  - Routine diabetic eye exam in 2019      6. CF related sinus disease: denies current symptoms. Back on Claritin during allergy  season  - Continue Flonase and Claritn      RTC: in 3 months with routine spirometry  Annual studies due: September 2018 (ordered for f/u including DEXA)     Jyothi Warren PA-C  Pulmonary, Allergy, Critical Care and Sleep Medicine        Interval History:   Doing yard work, workout around the house. Been feeling well, good energy. Cough is baseline, productive in the am. NO streaking or blood. No congestion, did feel a bit more tight with his PFTs. Hasn't been vesting as much as usual, getting in only morning vest. No GI complaints.          Review of Systems:   Please see HPI. Otherwise, complete 10 point ROS negative.           Past Medical and Surgical History:     Past Medical History:   Diagnosis Date     Cystic fibrosis with pulmonary manifestations (H)     /[id number]     Past Surgical History:   Procedure Laterality Date     APPENDECTOMY OPEN  1999    Appendicitis      SINUS SURGERY  1990's    h/o many sinus infections           Family History:     Family History   Problem Relation Age of Onset     Diabetes Mother      Unknown type     Prostate Cancer Paternal Grandfather      LUNG DISEASE Other      Negative     Diabetes Maternal Aunt      unknown type     Diabetes Maternal Uncle      unknown type     Diabetes Maternal Grandmother      unknown type     Coronary Artery Disease Paternal Grandmother             Social History:     Social History     Social History     Marital status: Single     Spouse name: N/A     Number of children: N/A     Years of education: N/A     Occupational History     State Representative [employer]     Financial Counselor      Social History Main Topics     Smoking status: Never Smoker     Smokeless tobacco: Former User     Alcohol use 6.0 - 8.4 oz/week     10 - 14 Standard drinks or equivalent per week      Comment: 2 drinks per night 5X/wk     Drug use: No     Sexual activity: Yes     Partners: Female     Birth control/ protection: Pull-out method, Condom     Other  "Topics Concern     Parent/Sibling W/ Cabg, Mi Or Angioplasty Before 65f 55m? Yes     Social History Narrative    Lives alone in apartment in [place], [place]. He studied political science in college and was elected as a state representative for the city of [place].         Nov 2015.  His main political issue is higher education.  His girlfriend is looking for work as an .  He eats 3 square meals per day, most of which are cooked by him or his girlfriend.  Gets outside to walk or run 20 minutes about 5x per week.  Works fairly regular daytime hours, often from home.            Medications:     Current Outpatient Prescriptions   Medication     albuterol (2.5 MG/3ML) 0.083% neb solution     albuterol (PROAIR HFA/PROVENTIL HFA/VENTOLIN HFA) 108 (90 Base) MCG/ACT Inhaler     allopurinol (ZYLOPRIM) 300 MG tablet     amylase-lipase-protease (CREON) 22102 UNITS CPEP     azithromycin (ZITHROMAX) 250 MG tablet     BASAGLAR 100 UNIT/ML injection     BD YON U/F 32G X 4 MM insulin pen needle     blood glucose monitoring (FREESTYLE LITE) test strip     blood glucose monitoring (FREESTYLE) lancets     CREON 98345 UNITS CPEP per EC capsule     dornase alpha (PULMOZYME) 1 MG/ML neb solution     fluticasone (FLONASE) 50 MCG/ACT spray     fluticasone (FLOVENT HFA) 110 MCG/ACT Inhaler     loratadine (CLARITIN) 10 MG tablet     multivitamin CF formula (MVW COMPLETE FORMULATION ) softgel cap     phytonadione (MEPHYTON) 5 MG tablet     ranitidine (ZANTAC) 75 MG tablet     Syringe/Needle, Disp, 18G X 1\" 6 ML MISC     tobramycin (MARLEN PODHALER) 28 MG in caps     ursodiol (ACTIGALL) 300 MG capsule     order for DME     No current facility-administered medications for this visit.             Physical Exam:   /80  Pulse 84  Resp 16  Ht 1.63 m (5' 4.17\")  Wt 55.3 kg (121 lb 14.6 oz)  SpO2 94%  BMI 20.81 kg/m2    GENERAL: alert, NAD  HEENT: NCAT, EOMI, anicteric sclera, purulent drainage in right nare; canals and " TMs clear; no oral mucosal edema or erythema  Neck: no cervical or supraclavicular adenopathy  Respiratory: good air flow, few scattered crackles in HATTIE  CV: RRR, S1S2, no murmurs noted  Abdomen: normoactive BS, soft and non tender   Lymph: no edema, + digital clubbing  Neuro: AAO X 3, CN 2-12 grossly intact  Psychiatric: normal affect, good eye contact  Skin: no rash, jaundice or lesions on limited exam         Data:   All laboratory and imaging data reviewed.      Cystic Fibrosis Culture  Specimen Description   Date Value Ref Range Status   03/14/2018 Sputum  Final   01/03/2018 Sputum  Final   12/14/2017 Sputum  Final    Culture Micro   Date Value Ref Range Status   03/14/2018 Heavy growth  Normal mami to date    Final   03/14/2018 Light growth  Pseudomonas aeruginosa   (A)  Final   03/14/2018 Light growth  Strain 2  Pseudomonas aeruginosa   (A)  Final        PFT interpretation:  Maneuver: valid and meets ATS guidelines  Moderate severe obstruction with decreased FEV1 and FEV1/FVC  Compared to prior: FEV1 of 2.03 is 210 cc below prior              CF Annual Nutrition Assessment    Reason for Assessment  Assessed during Jyothi Warren PA-C clinic r/t increased nutrition risk with diagnosis of CF per protocol.    Nutrition Significant PMH  Moderate Lung Disease   Pancreatic Insufficient   CFRD    Social Assessment  Living situation: Owns a home in [facility], living with girlfriend.   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  Food Insecurity:  None    Anthropometric Assessment  Height: 163 cm  IBW based on BMI 22 for females and 23 for males per CF Foundation recs: 61 kg / 134#  Today's Weight: 55.3 kg (actual weight)   %IBW: 91%  Body mass index is 20.81 kg/(m^2).   Current weight is considered: Normal BMI; however, not at CF goal BMI. No malnutrition.   Weight up 2 kg x 3 months which is an additional 2 kg gain from his previous UBW 51 kg in 2016. Not actively trying for weight gain,  although thinks he may have been eating more now that work is out of session. Weight remains less than ideal for CF lung function which he is aware of.     Wt Readings from Last 10 Encounters:   18 55.3 kg (121 lb 14.6 oz)   18 53.1 kg (117 lb 1 oz)   18 53.1 kg (117 lb)   17 53.1 kg (117 lb 1 oz)   09/15/17 52.6 kg (115 lb 15.4 oz)   17 52.6 kg (115 lb 15.4 oz)   17 53.5 kg (118 lb)   17 53.7 kg (118 lb 6.2 oz)   16 52.7 kg (116 lb 3.2 oz)   16 52.2 kg (115 lb)     Physical Activity/Exercise:  No formal exercise regimen per pt, tries to remain active in his daily life.     Pancreatic Enzymes  Brand:  Creon [id number] - not interested in larger capsule size at this time, aware this option exists.   Dosin with meals and 2-3 with snacks (~18 caps per day)    High dose providing 1090 units lipase/kg/meal which is within the recommended range per CF Foundation to inhibit fibrosing colonopathy.    Are you taking enzymes as recommended: Yes   Signs of Malabsorption:  No  Enzyme Program:  None - has been given information in the past but not interested in enrollment.     Diet History and Assessment  Diet Preferences/Allergies/Intolerances:  Regular    Intake Recall/Comments:  3 meals and 1-2 snacks per day, has a good appetite in general. Eats out a few times per week. Girlfriend does majority of cooking and grocery shopping.  Feels he is eating better now that work is no longer busy, felt he did not have enough time to eat at baseline.     Breakfast: Bowl of cereal with milk  Lunch: At work, usually a sandwich or piece of pizza or something similar (on-the-go)  Dinner: At home, chicken or other protein with a side dish or two.  Occasionally eats at restaurants or fast food.   Snacks: protein bar in afternoon; also has popcorn or snickers candy at HS    Snacks: Once/day has a protein bar (afternoon).  Does not usually snack more than this.    Calcium: 3-4 servings of  milk per day, cheese in meals  Salt: Adequate, no sx inadequate intake per pt  Hydration:  Not discussed this visit  Supplements:  Yes, daily protein bar    Estimated Energy and Protein Needs  Estimation based on weight maintenance vs gain with Moderate lung disease and pancreatic insufficiency.     BEE: ~1400  7675-6350 kcals/day =  200-225% BEE   g protein/day = 1.5-2 g/kg    Laboratory Assessment    Vitamin A   Date Value Ref Range Status   09/15/2017 0.35 0.30 - 1.20 mg/L Final     Vitamin D Deficiency screening   Date Value Ref Range Status   09/15/2017 28 20 - 75 ug/L Final     Comment:     Season, race, dietary intake, and treatment affect the concentration of   25-hydroxy-Vitamin D. Values may decrease during winter months and increase   during summer months. Values 20-29 ug/L may indicate Vitamin D insufficiency   and values <20 ug/L may indicate Vitamin D deficiency.  Vitamin D determination is routinely performed by an immunoassay specific for   25 hydroxyvitamin D3.  If an individual is on vitamin D2 (ergocalciferol)   supplementation, please specify 25 OH vitamin D2 and D3 level determination by   LCMSMS test [id number].       Vitamin E   Date Value Ref Range Status   09/15/2017 5.6 5.5 - 18.0 mg/L Final     Comment:     (Note)  Test developed and characteristics determined by [facility]. See Compliance Statement B: [email or web address]       Iron   Date Value Ref Range Status   09/15/2017 65 35 - 180 ug/dL Final     Cholesterol   Date Value Ref Range Status   09/15/2017 79 <200 mg/dL Final     Triglycerides   Date Value Ref Range Status   09/15/2017 31 <150 mg/dL Final     HDL Cholesterol   Date Value Ref Range Status   09/15/2017 60 >39 mg/dL Final     LDL Cholesterol Calculated   Date Value Ref Range Status   09/15/2017 14 <100 mg/dL Final     Comment:     Desirable:       <100 mg/dl       DEXA: (Last Sept 2016) Normal result    Current Vitamin/Mineral Prescriptions: MVW Complete D3,000 - 2  softgels per day  Comments: Obtains from [facility].     CF Related Diabetes Evaluation  Hgb A1C: 6.1%   Date: 9/15/17  FSBG range: not checking frequently; denied concerns with hypoglycemia unless if he skips meals or increased activity  Insulin: Yes    Insulin Regimen: Lantus 12 units q AM  Carbohydrate Counting: No     -Sees Dr. Miranda/Juli Lyons CDE regularly    NUTRITION DIAGNOSIS  Impaired nutrient utilization r/t CF related diabetes, pancreatic insufficiency and CF hypermetabolism AEB requires PERT, insulin therapy and high kcal/pro diet to maintain nutrition and health status.  INTERVENTIONS/RECOMMENDATIONS  Discussed current nutritional status, reviewed weight trends, goals, and oral intakes with Guillermo. States he feels he is doing well from a nutrition standpoint at this time and did not have any specific concerns. Encouraged pt to continue with current insulin regimen and vitamin/mineral supplementation. Plan to obtain annual studies at next clinic visit and adjust supplement regimen as needed.     Patient Understanding: Good  Expected Compliance: Good  Follow-Up Plans: Per protocol    GOALS:  1) Weight maintenance vs gain to BMI of 23 kg/m2 or above.   2) 100% compliance with prescribed enzymes, vitamin/mineral supplements and insulin therapy.     FOLLOW-UP/MONITORING:  Visit patient within 12 month(s)     Time Spent In Face-to-Face Patient Interactions: 15 minutes    Tamika Perez RD, LD  Cystic Fibrosis/Lung Transplant Dietitian  Pager [phone]  On weekends/holidays contact coverage RD at [phone] (inpatient use only)           Respiratory Therapist Note:    Vest    Brand: Hill-Rom - traditional   Settings: Hill Rom: Frequencies 8, 9, 10 at pressure 10 then frequencies 18, 19, 20 at pressure 6.   Cough Pause: Yes.    Vest Garment Size: Adult Small   Last Fitting Date: 2018   Frequency of therapy: 7 times per week   Concerns: needs to increase therapy to X 2 per day    Exercise:  none    Alternative Airway Clearance:       Nebulized Medications   Bronchodilators: Albuterol   Mucolytic: Pulmozyme   Antibiotics:    Additional Inhaled Medications: ICS   Spacer Use:      Review Cleaning: Yes. Soap and boil.    Education and Transition Information   Correct order of inhaled medications: Yes   Mechanism of Action of inhaled medications: Yes   Frequency of inhaled medications: Yes   Dosage of inhaled medications: Yes   Other:     Home Care:   Nebulizer Cups (Brand/Type): Ruby   Nebulizer Compressor    Year Purchased: 2018   Home Care Company:     [facility], Phone: [phone], Fax: [phone]    Oxygen:    Pulmonary Rehab   Site:    Date Completed:     Plan of Care and Goals for next visit: use his Aerobika during downtime,I also had him try the [place] and he will get back to me whether he wants it.        Again, thank you for allowing me to participate in the care of your patient.      Sincerely,    Jyothi Warren PA-C

## 2018-06-25 NOTE — PROGRESS NOTES
Respiratory Therapist Note:    Vest    Brand: Hill-Rom - traditional   Settings: Hill Rom: Frequencies 8, 9, 10 at pressure 10 then frequencies 18, 19, 20 at pressure 6.   Cough Pause: Yes.    Vest Garment Size: Adult Small   Last Fitting Date: 2018   Frequency of therapy: 7 times per week   Concerns: needs to increase therapy to X 2 per day    Exercise: none    Alternative Airway Clearance:       Nebulized Medications   Bronchodilators: Albuterol   Mucolytic: Pulmozyme   Antibiotics:    Additional Inhaled Medications: ICS   Spacer Use:      Review Cleaning: Yes. Soap and boil.    Education and Transition Information   Correct order of inhaled medications: Yes   Mechanism of Action of inhaled medications: Yes   Frequency of inhaled medications: Yes   Dosage of inhaled medications: Yes   Other:     Home Care:   Nebulizer Cups (Brand/Type): Ruby   Nebulizer Compressor    Year Purchased: 2018   Home Care Company:     [facility], Phone: [phone], Fax: [phone]    Oxygen:    Pulmonary Rehab   Site:    Date Completed:     Plan of Care and Goals for next visit: use his Aerobika during downtime,I also had him try the [place] and he will get back to me whether he wants it.

## 2018-06-27 LAB
BACTERIA SPEC CULT: ABNORMAL
SPECIMEN SOURCE: ABNORMAL

## 2018-08-07 DIAGNOSIS — E84.9 CF (CYSTIC FIBROSIS) (H): ICD-10-CM

## 2018-08-07 RX ORDER — ALLOPURINOL 300 MG/1
TABLET ORAL
Qty: 90 TABLET | Refills: 3 | Status: SHIPPED | OUTPATIENT
Start: 2018-08-07 | End: 2019-08-08

## 2018-08-08 LAB
EXPTIME-PRE: 10.7 SEC
FEF2575-%PRED-PRE: 19 %
FEF2575-PRE: 0.72 L/SEC
FEF2575-PRED: 3.75 L/SEC
FEFMAX-%PRED-PRE: 75 %
FEFMAX-PRE: 6.75 L/SEC
FEFMAX-PRED: 8.99 L/SEC
FEV1-%PRED-PRE: 56 %
FEV1-PRE: 2.03 L
FEV1FEV6-PRE: 60 %
FEV1FEV6-PRED: 83 %
FEV1FVC-PRE: 55 %
FEV1FVC-PRED: 81 %
FIFMAX-PRE: 5.68 L/SEC
FVC-%PRED-PRE: 84 %
FVC-PRE: 3.69 L
FVC-PRED: 4.34 L

## 2018-09-20 DIAGNOSIS — E84.0 CYSTIC FIBROSIS WITH PULMONARY MANIFESTATIONS (H): ICD-10-CM

## 2018-09-20 DIAGNOSIS — A49.02 MRSA INFECTION: ICD-10-CM

## 2018-09-20 DIAGNOSIS — E84.9 CF (CYSTIC FIBROSIS) (H): ICD-10-CM

## 2018-09-20 DIAGNOSIS — E84.9 CYSTIC FIBROSIS (H): ICD-10-CM

## 2018-09-20 DIAGNOSIS — K86.81 EXOCRINE PANCREATIC INSUFFICIENCY: ICD-10-CM

## 2018-09-20 DIAGNOSIS — E08.9 DIABETES MELLITUS DUE TO CYSTIC FIBROSIS (H): ICD-10-CM

## 2018-09-20 DIAGNOSIS — E84.9 DIABETES MELLITUS DUE TO CYSTIC FIBROSIS (H): ICD-10-CM

## 2018-09-24 NOTE — PROGRESS NOTES
[facility]  September 26, 2018         Assessment and Plan:   Dilan Nassar is a 31 year old male with cystic fibrosis, pancreatic insufficiency and CFRD who is seen today in clinic for routine follow up.       1. CF lung disease: no pulmonary complaints, has been walking a few hours three or so days/week campaigning and has been trying to be more consistent with vesting. Vesting 10-12 times/week. Sating 94% in clinic today. PFTs improved to baseline, slightly below recent best. Previous cultures + for MRSA, Klebsiella, Steno and Ps A. No evidence of exacerbation at this time  - Continue nebulizers, inhaler and vest therapy, resume consistent BID vesting  - On chronic azithromycin   - Continue george podhaler month on/off  - Will call the CF Office if he wants to trial [place] at his next visit      2. Hemoptysis: no recent complaints.   - Continue 5 mg vitamin K once/week      3. CF related liver disease: since 10/15 per our EPIC charting with US 5/17 demonstrating coarse echotexture of the liver with hypertrophy of the caudate lobe and left lobe concerning for possible cirrhosis, no lesions, patent vasculature. LFTs stable today with elevated alk phos, but normal enzymes.  - Continue Ursodiol      4. Exocrine pancreatic insufficiency: denies symptoms of malabsorption. Weight down slightly today, BMI is 20.32, remains below Foundation goal.   - Continue pancreatic enzymes and vitamin supplementation      5. CFRD: AIC of 5.8 today. BS have been stable, hasn't been checking them as frequently as he should. Typically under 100 in the am. Minimal 200s, but BS of 216 today, ate something prior to labs.   - Continue Lantus 12 U  - Routine diabetic eye exam in 2019      6. CF related sinus disease: denies current symptoms.   - Continue Flonase and Claritin    7. Isolated thrombocytopenia: plts of 90K today, no new medications or supplements, stable liver function,  no evidence of infection.  - Recheck CBC in two weeks, if low, will draw smear and refer to Hematology    7. HCM: CBC and CMP reviewed with the patient. Labs per above. Normal cholesterol panel, normal iorn and TSH. Normal UA. Multiple labs pending. CXR reviewed by me today demonstrates stable changes of CF.       RTC: in 3 months with routine spirometry  Annual studies due: September 2019 (DEXA pending for next f/u)      Jyothi Warren PA-C  Pulmonary, Allergy, Critical Care and Sleep Medicine         Interval History:   Feeling well, getting in a lot of walking, a couple hours a few days/week. Cough is baseline, no streaking or hemoptysis. Vesting about 10-12/week. No GI complaints. Stools are stable.          Review of Systems:   Please see HPI. Otherwise, complete 10 point ROS negative.           Past Medical and Surgical History:     Past Medical History:   Diagnosis Date     Cystic fibrosis with pulmonary manifestations (H)     /[id number]     Past Surgical History:   Procedure Laterality Date     APPENDECTOMY OPEN  1999    Appendicitis      SINUS SURGERY  1990's    h/o many sinus infections           Family History:     Family History   Problem Relation Age of Onset     Diabetes Mother      Unknown type     Prostate Cancer Paternal Grandfather      LUNG DISEASE Other      Negative     Diabetes Maternal Aunt      unknown type     Diabetes Maternal Uncle      unknown type     Diabetes Maternal Grandmother      unknown type     Coronary Artery Disease Paternal Grandmother             Social History:     Social History     Social History     Marital status: Single     Spouse name: N/A     Number of children: N/A     Years of education: N/A     Occupational History     State Representative [employer]     Financial Counselor      Social History Main Topics     Smoking status: Never Smoker     Smokeless tobacco: Former User     Alcohol use 6.0 - 8.4 oz/week     10 - 14 Standard drinks or equivalent per week  "     Comment: 2 drinks per night 5X/wk     Drug use: No     Sexual activity: Yes     Partners: Female     Birth control/ protection: Pull-out method, Condom     Other Topics Concern     Parent/Sibling W/ Cabg, Mi Or Angioplasty Before 65f 55m? Yes     Social History Narrative    Lives alone in apartment in [place], [place]. 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         Nov 2015.  His main political issue is higher education.  His girlfriend is looking for work as an .  He eats 3 square meals per day, most of which are cooked by him or his girlfriend.  Gets outside to walk or run 20 minutes about 5x per week.  Works fairly regular daytime hours, often from home.            Medications:     Current Outpatient Prescriptions   Medication     azithromycin (ZITHROMAX) 250 MG tablet     albuterol (2.5 MG/3ML) 0.083% neb solution     albuterol (PROAIR HFA/PROVENTIL HFA/VENTOLIN HFA) 108 (90 Base) MCG/ACT Inhaler     allopurinol (ZYLOPRIM) 300 MG tablet     amylase-lipase-protease (CREON) 89541 units CPEP     BASAGLAR 100 UNIT/ML injection     BD YON U/F 32G X 4 MM insulin pen needle     blood glucose monitoring (FREESTYLE LITE) test strip     blood glucose monitoring (FREESTYLE) lancets     dornase alpha (PULMOZYME) 1 MG/ML neb solution     fluticasone (FLONASE) 50 MCG/ACT spray     fluticasone (FLOVENT HFA) 110 MCG/ACT Inhaler     loratadine (CLARITIN) 10 MG tablet     multivitamin CF formula (MVW COMPLETE FORMULATION ) softgel cap     order for DME     phytonadione (MEPHYTON) 5 MG tablet     ranitidine (ZANTAC) 75 MG tablet     Syringe/Needle, Disp, 18G X 1\" 6 ML MISC     tobramycin (MARLEN PODHALER) 28 MG in caps     ursodiol (ACTIGALL) 300 MG capsule     No current facility-administered medications for this visit.             Physical Exam:   /82 (BP Location: Right arm, Patient Position: Chair, Cuff Size: Adult Regular)  " "Pulse 93  Resp 16  Ht 1.63 m (5' 4.17\")  Wt 54 kg (119 lb 0.8 oz)  SpO2 94%  BMI 20.32 kg/m2    GENERAL: alert, NAD  HEENT: NCAT, EOMI, anicteric sclera, canals and TMs clear; no oral mucosal edema or erythema  Neck: no cervical or supraclavicular adenopathy  Respiratory: good air flow, no crackles, rhonchi; slight wheeze that cleared with a few deep breaths  CV: RRR, S1S2, no murmurs noted  Abdomen: normoactive BS, soft and non tender   Lymph: no edema, + digital clubbing  Neuro: AAO X 3, CN 2-12 grossly intact  Psychiatric: normal affect, good eye contact  Skin: no rash, jaundice or lesions on limited exam         Data:   All laboratory and imaging data reviewed.      Cystic Fibrosis Culture  Specimen Description   Date Value Ref Range Status   06/22/2018 Sputum  Final   03/14/2018 Sputum  Final   01/03/2018 Sputum  Final    Culture Micro   Date Value Ref Range Status   06/22/2018 Moderate growth  Normal mami    Final   06/22/2018 (A)  Final    Heavy growth  Pseudomonas aeruginosa, mucoid strain     06/22/2018 Heavy growth  Pseudomonas aeruginosa   (A)  Final   06/22/2018 Moderate growth  Aspergillus fumigatus   (A)  Final   06/22/2018 Heavy growth  Strain 2  Pseudomonas aeruginosa   (A)  Final   06/22/2018 (A)  Final    Moderate growth  Strain 3  Pseudomonas aeruginosa          PFT interpretation:  Maneuver: valid and meets ATS guidelines  Moderate severe obstruction with decreased FEV1 and FEV1/FVC  Compared to prior: FEV1 of 2.10 is 70 ml above prior        "

## 2018-09-26 ENCOUNTER — RADIANT APPOINTMENT (OUTPATIENT)
Dept: GENERAL RADIOLOGY | Facility: CLINIC | Age: 33
End: 2018-09-26
Attending: PHYSICIAN ASSISTANT
Payer: COMMERCIAL

## 2018-09-26 ENCOUNTER — ALLIED HEALTH/NURSE VISIT (OUTPATIENT)
Dept: CARE COORDINATION | Facility: CLINIC | Age: 33
End: 2018-09-26

## 2018-09-26 ENCOUNTER — OFFICE VISIT (OUTPATIENT)
Dept: PULMONOLOGY | Facility: CLINIC | Age: 33
End: 2018-09-26
Attending: PHYSICIAN ASSISTANT
Payer: COMMERCIAL

## 2018-09-26 VITALS
HEART RATE: 93 BPM | WEIGHT: 119.05 LBS | BODY MASS INDEX: 20.32 KG/M2 | HEIGHT: 64 IN | RESPIRATION RATE: 16 BRPM | OXYGEN SATURATION: 94 % | DIASTOLIC BLOOD PRESSURE: 82 MMHG | SYSTOLIC BLOOD PRESSURE: 123 MMHG

## 2018-09-26 DIAGNOSIS — M10.9 GOUT, UNSPECIFIED CAUSE, UNSPECIFIED CHRONICITY, UNSPECIFIED SITE: ICD-10-CM

## 2018-09-26 DIAGNOSIS — E84.0 CYSTIC FIBROSIS WITH PULMONARY EXACERBATION (H): ICD-10-CM

## 2018-09-26 DIAGNOSIS — Z13.9 RISK AND FUNCTIONAL ASSESSMENT: Primary | ICD-10-CM

## 2018-09-26 DIAGNOSIS — E84.9 DIABETES MELLITUS DUE TO CYSTIC FIBROSIS (H): ICD-10-CM

## 2018-09-26 DIAGNOSIS — E84.0 CYSTIC FIBROSIS WITH PULMONARY EXACERBATION (H): Primary | ICD-10-CM

## 2018-09-26 DIAGNOSIS — E08.9 DIABETES MELLITUS DUE TO CYSTIC FIBROSIS (H): ICD-10-CM

## 2018-09-26 DIAGNOSIS — E84.0 CYSTIC FIBROSIS OF THE LUNG (H): Primary | ICD-10-CM

## 2018-09-26 DIAGNOSIS — E84.0 CYSTIC FIBROSIS WITH PULMONARY MANIFESTATIONS (H): ICD-10-CM

## 2018-09-26 LAB
ALBUMIN SERPL-MCNC: 3.5 G/DL (ref 3.4–5)
ALBUMIN UR-MCNC: NEGATIVE MG/DL
ALP SERPL-CCNC: 160 U/L (ref 40–150)
ALT SERPL W P-5'-P-CCNC: 37 U/L (ref 0–70)
ANION GAP SERPL CALCULATED.3IONS-SCNC: 9 MMOL/L (ref 3–14)
APPEARANCE UR: NORMAL
AST SERPL W P-5'-P-CCNC: 21 U/L (ref 0–45)
BASOPHILS # BLD AUTO: 0 10E9/L (ref 0–0.2)
BASOPHILS NFR BLD AUTO: 0.5 %
BILIRUB UR QL STRIP: NEGATIVE
BUN SERPL-MCNC: 13 MG/DL (ref 7–30)
CALCIUM SERPL-MCNC: 8.6 MG/DL (ref 8.5–10.1)
CHLORIDE SERPL-SCNC: 106 MMOL/L (ref 94–109)
CHOLEST SERPL-MCNC: 105 MG/DL
CO2 SERPL-SCNC: 24 MMOL/L (ref 20–32)
COLOR UR AUTO: YELLOW
CREAT SERPL-MCNC: 0.93 MG/DL (ref 0.66–1.25)
CREAT UR-MCNC: 157 MG/DL
DEPRECATED CALCIDIOL+CALCIFEROL SERPL-MC: 28 UG/L (ref 20–75)
DIFFERENTIAL METHOD BLD: ABNORMAL
EOSINOPHIL # BLD AUTO: 0.2 10E9/L (ref 0–0.7)
EOSINOPHIL NFR BLD AUTO: 2.3 %
ERYTHROCYTE [DISTWIDTH] IN BLOOD BY AUTOMATED COUNT: 12.3 % (ref 10–15)
ERYTHROCYTE [SEDIMENTATION RATE] IN BLOOD BY WESTERGREN METHOD: 8 MM/H (ref 0–15)
GFR SERPL CREATININE-BSD FRML MDRD: >90 ML/MIN/1.7M2
GGT SERPL-CCNC: 29 U/L (ref 0–75)
GLUCOSE SERPL-MCNC: 216 MG/DL (ref 70–99)
GLUCOSE UR STRIP-MCNC: NEGATIVE MG/DL
HBA1C MFR BLD: 5.8 % (ref 0–5.6)
HCT VFR BLD AUTO: 41.1 % (ref 40–53)
HDLC SERPL-MCNC: 62 MG/DL
HGB BLD-MCNC: 14.2 G/DL (ref 13.3–17.7)
HGB UR QL STRIP: NEGATIVE
IGA SERPL-MCNC: 225 MG/DL (ref 70–380)
IGG SERPL-MCNC: 1320 MG/DL (ref 695–1620)
IGM SERPL-MCNC: 58 MG/DL (ref 60–265)
IMM GRANULOCYTES # BLD: 0 10E9/L (ref 0–0.4)
IMM GRANULOCYTES NFR BLD: 0.1 %
INR PPP: 0.99 (ref 0.86–1.14)
IRON SERPL-MCNC: 62 UG/DL (ref 35–180)
KETONES UR STRIP-MCNC: NEGATIVE MG/DL
LDLC SERPL CALC-MCNC: 36 MG/DL
LEUKOCYTE ESTERASE UR QL STRIP: NEGATIVE
LYMPHOCYTES # BLD AUTO: 1.6 10E9/L (ref 0.8–5.3)
LYMPHOCYTES NFR BLD AUTO: 21.4 %
MAGNESIUM SERPL-MCNC: 1.6 MG/DL (ref 1.6–2.3)
MCH RBC QN AUTO: 31.1 PG (ref 26.5–33)
MCHC RBC AUTO-ENTMCNC: 34.5 G/DL (ref 31.5–36.5)
MCV RBC AUTO: 90 FL (ref 78–100)
MICROALBUMIN UR-MCNC: 12 MG/L
MICROALBUMIN/CREAT UR: 7.45 MG/G CR (ref 0–17)
MONOCYTES # BLD AUTO: 0.5 10E9/L (ref 0–1.3)
MONOCYTES NFR BLD AUTO: 6.6 %
NEUTROPHILS # BLD AUTO: 5.1 10E9/L (ref 1.6–8.3)
NEUTROPHILS NFR BLD AUTO: 69.1 %
NITRATE UR QL: NEGATIVE
NONHDLC SERPL-MCNC: 44 MG/DL
NRBC # BLD AUTO: 0 10*3/UL
NRBC BLD AUTO-RTO: 0 /100
PH UR STRIP: 5 PH (ref 5–7)
PHOSPHATE SERPL-MCNC: 2.9 MG/DL (ref 2.5–4.5)
PLATELET # BLD AUTO: 90 10E9/L (ref 150–450)
POTASSIUM SERPL-SCNC: 3.9 MMOL/L (ref 3.4–5.3)
PROT SERPL-MCNC: 7.2 G/DL (ref 6.8–8.8)
RBC # BLD AUTO: 4.56 10E12/L (ref 4.4–5.9)
RBC #/AREA URNS AUTO: 1 /HPF (ref 0–2)
SODIUM SERPL-SCNC: 139 MMOL/L (ref 133–144)
SOURCE: NORMAL
SP GR UR STRIP: 1.02 (ref 1–1.03)
TRIGL SERPL-MCNC: 38 MG/DL
TSH SERPL DL<=0.005 MIU/L-ACNC: 1.48 MU/L (ref 0.4–4)
UROBILINOGEN UR STRIP-MCNC: 0 MG/DL (ref 0–2)
WBC # BLD AUTO: 7.4 10E9/L (ref 4–11)
WBC #/AREA URNS AUTO: 2 /HPF (ref 0–5)

## 2018-09-26 PROCEDURE — 85610 PROTHROMBIN TIME: CPT | Performed by: PHYSICIAN ASSISTANT

## 2018-09-26 PROCEDURE — 81001 URINALYSIS AUTO W/SCOPE: CPT | Performed by: PHYSICIAN ASSISTANT

## 2018-09-26 PROCEDURE — 85652 RBC SED RATE AUTOMATED: CPT | Performed by: PHYSICIAN ASSISTANT

## 2018-09-26 PROCEDURE — 84443 ASSAY THYROID STIM HORMONE: CPT | Performed by: PHYSICIAN ASSISTANT

## 2018-09-26 PROCEDURE — 84590 ASSAY OF VITAMIN A: CPT | Performed by: PHYSICIAN ASSISTANT

## 2018-09-26 PROCEDURE — 84446 ASSAY OF VITAMIN E: CPT | Performed by: PHYSICIAN ASSISTANT

## 2018-09-26 PROCEDURE — 84155 ASSAY OF PROTEIN SERUM: CPT | Performed by: PHYSICIAN ASSISTANT

## 2018-09-26 PROCEDURE — 82785 ASSAY OF IGE: CPT | Performed by: PHYSICIAN ASSISTANT

## 2018-09-26 PROCEDURE — 82784 ASSAY IGA/IGD/IGG/IGM EACH: CPT | Performed by: PHYSICIAN ASSISTANT

## 2018-09-26 PROCEDURE — 82977 ASSAY OF GGT: CPT | Performed by: PHYSICIAN ASSISTANT

## 2018-09-26 PROCEDURE — 80069 RENAL FUNCTION PANEL: CPT | Performed by: PHYSICIAN ASSISTANT

## 2018-09-26 PROCEDURE — 85025 COMPLETE CBC W/AUTO DIFF WBC: CPT | Performed by: PHYSICIAN ASSISTANT

## 2018-09-26 PROCEDURE — 36415 COLL VENOUS BLD VENIPUNCTURE: CPT | Performed by: PHYSICIAN ASSISTANT

## 2018-09-26 PROCEDURE — 87799 DETECT AGENT NOS DNA QUANT: CPT | Performed by: PHYSICIAN ASSISTANT

## 2018-09-26 PROCEDURE — 82043 UR ALBUMIN QUANTITATIVE: CPT | Performed by: PHYSICIAN ASSISTANT

## 2018-09-26 PROCEDURE — 84075 ASSAY ALKALINE PHOSPHATASE: CPT | Performed by: PHYSICIAN ASSISTANT

## 2018-09-26 PROCEDURE — 87206 SMEAR FLUORESCENT/ACID STAI: CPT | Performed by: PHYSICIAN ASSISTANT

## 2018-09-26 PROCEDURE — 84460 ALANINE AMINO (ALT) (SGPT): CPT | Performed by: PHYSICIAN ASSISTANT

## 2018-09-26 PROCEDURE — 83036 HEMOGLOBIN GLYCOSYLATED A1C: CPT | Performed by: PHYSICIAN ASSISTANT

## 2018-09-26 PROCEDURE — 80061 LIPID PANEL: CPT | Performed by: PHYSICIAN ASSISTANT

## 2018-09-26 PROCEDURE — 87015 SPECIMEN INFECT AGNT CONCNTJ: CPT | Performed by: PHYSICIAN ASSISTANT

## 2018-09-26 PROCEDURE — 83540 ASSAY OF IRON: CPT | Performed by: PHYSICIAN ASSISTANT

## 2018-09-26 PROCEDURE — 87116 MYCOBACTERIA CULTURE: CPT | Performed by: PHYSICIAN ASSISTANT

## 2018-09-26 PROCEDURE — 83735 ASSAY OF MAGNESIUM: CPT | Performed by: PHYSICIAN ASSISTANT

## 2018-09-26 PROCEDURE — 82306 VITAMIN D 25 HYDROXY: CPT | Performed by: PHYSICIAN ASSISTANT

## 2018-09-26 PROCEDURE — G0463 HOSPITAL OUTPT CLINIC VISIT: HCPCS | Mod: ZF

## 2018-09-26 PROCEDURE — 84450 TRANSFERASE (AST) (SGOT): CPT | Performed by: PHYSICIAN ASSISTANT

## 2018-09-26 RX ORDER — AZITHROMYCIN 250 MG/1
500 TABLET, FILM COATED ORAL
Qty: 150 TABLET | Refills: 1 | Status: SHIPPED | OUTPATIENT
Start: 2018-09-26 | End: 2018-11-15

## 2018-09-26 ASSESSMENT — PAIN SCALES - GENERAL: PAINLEVEL: NO PAIN (0)

## 2018-09-26 NOTE — PATIENT INSTRUCTIONS
Cystic Fibrosis Self-Care Plan       Patient: Dilan Nassar   MRN: [id number]   Clinic Date: September 26, 2018     RECOMMENDATIONS:  1. Continue nebulizers and vest therapy. Increase vesting to twice/day.  2. Recheck labs in 2 weeks at [place].  3. If you decide you want to try [facility] nebs (aztreonam), call the CF Office before your appointment, so we can set up a test dose.     Annual Studies:   IGG   Date Value Ref Range Status   09/15/2017 1340 695 - 1620 mg/dL Final     No results found for: INS  There are no preventive care reminders to display for this patient.    Pulmonary Function Tests  FEV1: amount of air you can blow out in 1 second  FVC: total amount of air you can take in and blow out    Your Goals:         PFT Latest Ref Rng & Units 9/26/2018   FVC L 3.88   FEV1 L 2.10   FVC% % 89   FEV1% % 58          Airway Clearance: The Most Important Way to Keep Your Lungs Healthy  Vest Settings:    Hill-[place] Frequencies: 8, 9, 10 Pressure 10 Then, Frequencies 18, 19, 20 Pressure 6      RespirTech: Quick Start with Pressure of     Do each frequency for 5 minutes; Deflate vest after each frequency & cough 3 times before beginning the next setting.    Vest and Neb Therapy should be done 2 times/day.    Good Nutrition Can Improve Lung Function and Overall Health     Take ALL of your vitamins with food     Take 1/2 of your enzymes before EVERY meal/snack and the other 1/2 mid-meal/snack    Wt Readings from Last 3 Encounters:   09/26/18 54 kg (119 lb 0.8 oz)   06/22/18 55.3 kg (121 lb 14.6 oz)   03/14/18 53.1 kg (117 lb 1 oz)       Body mass index is 20.32 kg/(m^2).         National CF Foundation Recommendations for BMI in CF Adults: Women: at least 22 Men: at least 23        Controlling Blood Sugars Helps Prevent Lung Infections & Improves Nutrition  Test blood sugar:     In the morning before eating (goal is )     2 hours after a meal (goal is less than 150)     When pre-meal glucose is greater than 150  add correction     At bedtime (if less than 100 eat a snack with 15 grams of carbohydrates  Last A1C Results:   Hemoglobin A1C   Date Value Ref Range Status   09/26/2018 5.8 (H) 0 - 5.6 % Final     Comment:     Normal <5.7% Prediabetes 5.7-6.4%  Diabetes 6.5% or higher - adopted from ADA   consensus guidelines.           If diabetic, measure A1C every 6 months. Goal is under 7%.    Staying Healthy    Research: If you are interested in learning about research opportunities or have questions, please contact Sonali Altamirano at [phone] or [email or web address].       Foundation: Compass is a personalized resource service to help you with the insurance, financial, legal and other issues you are facing.  It's free, confidential and available to anyone with CF.  Ask your  for more information or contact Compass directly at [facility] ([phone]) or compass@cff.org, or learn more at cff.org/compass.       CF Nurse Line:  Giovanny Pimentel: [phone]   Audra Smart, RT: [phone]     Daniela Whitt and Tamika Perez , Dieticians: [phone]     Lisset Root, Diabetes Nurse: [phone]    Kathie Truong: [phone] or Carisa Berumen [phone], Social Workers   [email or web address]

## 2018-09-26 NOTE — NURSING NOTE
Chief Complaint   Patient presents with     RECHECK     Cystic Fibrosis     Marisela Delgado CMA

## 2018-09-26 NOTE — MR AVS SNAPSHOT
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 Continue nebulizers and vest therapy. Increase vesting to twice/day.  2. Recheck labs in 2 weeks at [place].  3. If you decide you want to try [facility] nebs (aztreonam), call the CF Office before your appointment, so we can set up a test dose.     Annual Studies:   IGG   Date Value Ref Range Status   09/15/2017 1340 695 - 1620 mg/dL Final     No results found for: INS  There are no preventive care reminders to display for this patient.    Pulmonary Function Tests  FEV1: amount of air you can blow out in 1 second  FVC: total amount of air you can take in and blow out    Your Goals:         PFT Latest Ref Rng & Units 9/26/2018   FVC L 3.88   FEV1 L 2.10   FVC% % 89   FEV1% % 58          Airway Clearance: The Most Important Way to Keep Your Lungs Healthy  Vest Settings:    Hill-Rom Frequencies: 8, 9, 10 Pressure 10 Then, Frequencies 18, 19, 20 Pressure 6      RespirTech: Quick Start with Pressure of     Do each frequency for 5 minutes; Deflate vest after each frequency & cough 3 times before beginning the next setting.    Vest and Neb Therapy should be done 2 times/day.    Good Nutrition Can Improve Lung Function and Overall Health     Take ALL of your vitamins  with food     Take 1/2 of your enzymes before EVERY meal/snack and the other 1/2 mid-meal/snack    Wt Readings from Last 3 Encounters:   09/26/18 54 kg (119 lb 0.8 oz)   06/22/18 55.3 kg (121 lb 14.6 oz)   03/14/18 53.1 kg (117 lb 1 oz)       Body mass index is 20.32 kg/(m^2).         National CF Foundation Recommendations for BMI in CF Adults: Women: at least 22 Men: at least 23        Controlling Blood Sugars Helps Prevent Lung Infections & Improves Nutrition  Test blood sugar:     In the morning before eating (goal is )     2 hours after a meal (goal is less than 150)     When pre-meal glucose is greater than 150 add correction     At bedtime (if less than 100 eat a snack with 15 grams of carbohydrates  Last A1C Results:   Hemoglobin A1C   Date Value Ref Range Status   09/26/2018 5.8 (H) 0 - 5.6 % Final     Comment:     Normal <5.7% Prediabetes 5.7-6.4%  Diabetes 6.5% or higher - adopted from ADA   consensus guidelines.           If diabetic, measure A1C every 6 months. Goal is under 7%.    Staying Healthy    Research: If you are interested in learning about research opportunities or have questions, please contact Sonali Altamirano at [phone] or [email or web address].      CF Foundation: Compass is a personalized resource service to help you with the insurance, financial, legal and other issues you are facing.  It's free, confidential and available to anyone with CF.  Ask your  for more information or contact Compass directly at 6[facility] ([phone]) or compass@cff.org, or learn more at cff.org/compass.       CF Nurse Line:  Giovanny Pimentel: [phone]   Audra Smart, RT: [phone]     Daniela Whitt and Tamika Perez , Dieticians: [phone]     Lisset Root, Diabetes Nurse: [phone]    Kathie Truong: [phone] or Carisa Berumen [phone], Social Workers   [email or web address]            Follow-ups after your visit        Follow-up notes from your care team      "Return in about 3 months (around 12/26/2018), or DEXA at the same visit.      Future tests that were ordered for you today     Open Future Orders        Priority Expected Expires Ordered    DX Hip/Pelvis/Spine Routine  1/11/2019 9/26/2018    Cystic Fibrosis Culture Aerob Bacterial Routine 12/12/2018 1/11/2019 9/26/2018    Spirometry, Breathing Capacity Routine 12/12/2018 1/11/2019 9/26/2018            Who to contact     If you have questions or need follow up information about today's clinic visit or your schedule please contact [facility] directly at [phone].  Normal or non-critical lab and imaging results will be communicated to you by Klevostihart, letter or phone within 4 business days after the clinic has received the results. If you do not hear from us within 7 days, please contact the clinic through [facility] or phone. If you have a critical or abnormal lab result, we will notify you by phone as soon as possible.  Submit refill requests through iMER or call your pharmacy and they will forward the refill request to us. Please allow 3 business days for your refill to be completed.          Additional Information About Your Visit        iMER Information     iMER gives you secure access to your electronic health record. If you see a primary care provider, you can also send messages to your care team and make appointments. If you have questions, please call your primary care clinic.  If you do not have a primary care provider, please call [phone] and they will assist you.        Care EveryWhere ID     This is your Care EveryWhere ID. This could be used by other organizations to access your [place] medical records  [id number]        Your Vitals Were     Pulse Respirations Height Pulse Oximetry BMI (Body Mass Index)       93 16 1.63 m (5' 4.17\") 94% 20.32 kg/m2        Blood Pressure from Last 3 Encounters:   09/26/18 123/82   06/22/18 126/80   03/14/18 135/85    Weight " from Last 3 Encounters:   09/26/18 54 kg (119 lb 0.8 oz)   06/22/18 55.3 kg (121 lb 14.6 oz)   03/14/18 53.1 kg (117 lb 1 oz)              We Performed the Following     AFB Culture Non Blood     AFB Stain Non Blood          Today's Medication Changes          These changes are accurate as of 9/26/18 12:25 PM.  If you have any questions, ask your nurse or doctor.               These medicines have changed or have updated prescriptions.        Dose/Directions    amylase-lipase-protease 49963 units Cpep   Commonly known as:  CREON   This may have changed:  Another medication with the same name was removed. Continue taking this medication, and follow the directions you see here.   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), Cystic fibrosis of the lung (H)   Changed by:  Jyothi Warren PA-C        Dose:  500 mg   Take 2 tablets (500 mg) by mouth Every Mon, Wed, Fri Morning   Quantity:  150 tablet   Refills:  1            Where to get your medicines      These medications were sent to [facility], [place] - [address] [facility]  [address] [facility] [place] [address]     Phone:  [phone]     azithromycin 250 MG tablet                Primary Care Provider Office Phone # Fax #    Atilio Walker  MD G[place] [phone]       [address] [place] [address] [address]  [facility] [address]        Equal Access to Services     ALTAGRACIA GATICA : Hadii aad ku hadnilsahelen Ji, charanjit jones, danyellemynor aguirremasusana billysarahsusana, chris timmyin hayaadorene billyyoon hollynicholelissette lechuga. So [facility] [phone].    ATENCIÓN: Si habla español, tiene a headley disposición servicios gratuitos de asistencia lingüística. Llame al [phone].    We comply with applicable federal civil rights laws and [place] laws. We do not discriminate on the basis of race, color, national origin, age, disability, sex, sexual orientation, or gender identity.            Thank you!     Thank you for choosing [facility]  for your care. Our goal is always to provide you with excellent care. Hearing back from our patients is one way we can continue to improve our services. Please take a few minutes to complete the written survey that you may receive in the mail after your visit with us. Thank you!             Your Updated Medication List - Protect others around you: Learn how to safely use, store and throw away your medicines at www.disposemymeds.org.          This list is accurate as of 9/26/18 12:25 PM.  Always use your most recent med list.                   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), Exocrine pancreatic insufficiency, Cystic fibrosis with pulmonary manifestations (H), MRSA infection       azithromycin 250 MG tablet    ZITHROMAX    150 tablet    Take 2 tablets (500 mg) by mouth Every Mon, Wed, Fri Morning    Cystic fibrosis with pulmonary manifestations (H), Cystic fibrosis with pulmonary exacerbation (H), Gout, unspecified cause, unspecified chronicity, unspecified site, 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    Diabetes mellitus related to CF (cystic fibrosis) (H)       blood glucose monitoring test strip    FREESTYLE LITE    120 each    Use to test blood 4 times a day    Cystic fibrosis with pulmonary manifestations (H)       dornase alpha 1 MG/ML neb solution    PULMOZYME    150 mL    INHALE THE CONTENTS OF 1 VIAL (2.5MG) VIA NEBULIZER TWICE DAILY.    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), Cystic fibrosis with pulmonary manifestations (H), Exocrine pancreatic insufficiency, Cystic fibrosis with pulmonary manifestations (H), MRSA infection       loratadine 10 MG tablet    CLARITIN    30 tablet    Take 1 tablet (10 mg) by mouth daily        multivitamin CF formula softgel cap     60 capsule    Take 1 capsule by mouth 2 times daily    Cystic fibrosis with pulmonary manifestations (H), Cystic fibrosis (H), CF (cystic fibrosis) (H), 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 Please dispense four (4) Ruby LC Plus nebulizer kits and four (4) RUBY face masks for this patient with Cystic Fibrosis. Patient requires additional nebulizer supplies due to his need to use multiple sterile neb cups for his inhaled medications that may not be mixed together.    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), CF (cystic fibrosis) (H), Cystic fibrosis (H), Cystic fibrosis with pulmonary manifestations (H), Exocrine pancreatic insufficiency, Cystic fibrosis with pulmonary manifestations (H), MRSA infection       ranitidine 75 MG tablet    ZANTAC    30 tablet    Take 1 tablet (75 mg) by mouth daily as needed for heartburn        Syringe/Needle (Disp) 18G X 1\" 6 ML Misc     60 each    1 each 2 times daily    Cystic fibrosis with pulmonary manifestations (H), Exocrine pancreatic insufficiency, MRSA infection       tobramycin 28 MG in caps    MARLEN PODHALER    224 capsule    Inhale 4 capsules (112 mg) into the lungs 2 times daily 1 month on, 1 month off    Cystic " fibrosis with pulmonary manifestations (H)       ursodiol 300 MG capsule    ACTIGALL    180 capsule    TAKE 1 CAPSULE (300MG ) BY MOUTH TWO TIMES A DAY    CF (cystic fibrosis) (H), Cystic fibrosis (H), 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 Read the directions carefully, and ask your doctor or other care provider to review them with you.

## 2018-09-26 NOTE — LETTER
9/26/2018       RE: Dilan Nassar  [address]  [facility] [address]     Dear Colleague,    Thank you for referring your patient, Dilan Nassar, to the [facility] at [facility]. Please see a copy of my visit note below.    [facility]  September 26, 2018         Assessment and Plan:   Dilan Nassar is a 31 year old male with cystic fibrosis, pancreatic insufficiency and CFRD who is seen today in clinic for routine follow up.       1. CF lung disease: no pulmonary complaints, has been walking a few hours three or so days/week campaigning and has been trying to be more consistent with vesting. Vesting 10-12 times/week. Sating 94% in clinic today. PFTs improved to baseline, slightly below recent best. Previous cultures + for MRSA, Klebsiella, Steno and Ps A. No evidence of exacerbation at this time  - Continue nebulizers, inhaler and vest therapy, resume consistent BID vesting  - On chronic azithromycin   - Continue george podhaler month on/off  - Will call the CF Office if he wants to trial [place] at his next visit      2. Hemoptysis: no recent complaints.   - Continue 5 mg vitamin K once/week      3. CF related liver disease: since 10/15 per our EPIC charting with US 5/17 demonstrating coarse echotexture of the liver with hypertrophy of the caudate lobe and left lobe concerning for possible cirrhosis, no lesions, patent vasculature. LFTs stable today with elevated alk phos, but normal enzymes.  - Continue Ursodiol      4. Exocrine pancreatic insufficiency: denies symptoms of malabsorption. Weight down slightly today, BMI is 20.32, remains below Foundation goal.   - Continue pancreatic enzymes and vitamin supplementation      5. CFRD: AIC of 5.8 today. BS have been stable, hasn't been checking them as frequently as he should. Typically under 100 in the am. Minimal 200s, but BS of 216  today, ate something prior to labs.   - Continue Lantus 12 U  - Routine diabetic eye exam in 2019      6. CF related sinus disease: denies current symptoms.   - Continue Flonase and Claritin    7. Isolated thrombocytopenia: plts of 90K today, no new medications or supplements, stable liver function, no evidence of infection.  - Recheck CBC in two weeks, if low, will draw smear and refer to Hematology    7. HCM: CBC and CMP reviewed with the patient. Labs per above. Normal cholesterol panel, normal iorn and TSH. Normal UA. Multiple labs pending. CXR reviewed by me today demonstrates stable changes of CF.       RTC: in 3 months with routine spirometry  Annual studies due: September 2019 (DEXA pending for next f/u)      Jyothi Warren PA-C  Pulmonary, Allergy, Critical Care and Sleep Medicine         Interval History:   Feeling well, getting in a lot of walking, a couple hours a few days/week. Cough is baseline, no streaking or hemoptysis. Vesting about 10-12/week. No GI complaints. Stools are stable.          Review of Systems:   Please see HPI. Otherwise, complete 10 point ROS negative.           Past Medical and Surgical History:     Past Medical History:   Diagnosis Date     Cystic fibrosis with pulmonary manifestations (H)     /[id number]     Past Surgical History:   Procedure Laterality Date     APPENDECTOMY OPEN  1999    Appendicitis      SINUS SURGERY  1990's    h/o many sinus infections           Family History:     Family History   Problem Relation Age of Onset     Diabetes Mother      Unknown type     Prostate Cancer Paternal Grandfather      LUNG DISEASE Other      Negative     Diabetes Maternal Aunt      unknown type     Diabetes Maternal Uncle      unknown type     Diabetes Maternal Grandmother      unknown type     Coronary Artery Disease Paternal Grandmother             Social History:     Social History     Social History     Marital status: Single     Spouse name: N/A     Number of children: N/A      Years of education: N/A     Occupational History     State Representative [employer]     Financial Counselor      Social History Main Topics     Smoking status: Never Smoker     Smokeless tobacco: Former User     Alcohol use 6.0 - 8.4 oz/week     10 - 14 Standard drinks or equivalent per week      Comment: 2 drinks per night 5X/wk     Drug use: No     Sexual activity: Yes     Partners: Female     Birth control/ protection: Pull-out method, Condom     Other Topics Concern     Parent/Sibling W/ Cabg, Mi Or Angioplasty Before 65f 55m? Yes     Social History Narrative    Lives alone in apartment in [place], [place]. 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         Nov 2015.  His main political issue is higher education.  His girlfriend is looking for work as an .  He eats 3 square meals per day, most of which are cooked by him or his girlfriend.  Gets outside to walk or run 20 minutes about 5x per week.  Works fairly regular daytime hours, often from home.            Medications:     Current Outpatient Prescriptions   Medication     azithromycin (ZITHROMAX) 250 MG tablet     albuterol (2.5 MG/3ML) 0.083% neb solution     albuterol (PROAIR HFA/PROVENTIL HFA/VENTOLIN HFA) 108 (90 Base) MCG/ACT Inhaler     allopurinol (ZYLOPRIM) 300 MG tablet     amylase-lipase-protease (CREON) 77417 units CPEP     BASAGLAR 100 UNIT/ML injection     BD YON U/F 32G X 4 MM insulin pen needle     blood glucose monitoring (FREESTYLE LITE) test strip     blood glucose monitoring (FREESTYLE) lancets     dornase alpha (PULMOZYME) 1 MG/ML neb solution     fluticasone (FLONASE) 50 MCG/ACT spray     fluticasone (FLOVENT HFA) 110 MCG/ACT Inhaler     loratadine (CLARITIN) 10 MG tablet     multivitamin CF formula (MVW COMPLETE FORMULATION ) softgel cap     order for DME     phytonadione (MEPHYTON) 5 MG tablet     ranitidine (ZANTAC) 75 MG tablet      "Syringe/Needle, Disp, 18G X 1\" 6 ML MISC     tobramycin (MARLEN PODHALER) 28 MG in caps     ursodiol (ACTIGALL) 300 MG capsule     No current facility-administered medications for this visit.             Physical Exam:   /82 (BP Location: Right arm, Patient Position: Chair, Cuff Size: Adult Regular)  Pulse 93  Resp 16  Ht 1.63 m (5' 4.17\")  Wt 54 kg (119 lb 0.8 oz)  SpO2 94%  BMI 20.32 kg/m2    GENERAL: alert, NAD  HEENT: NCAT, EOMI, anicteric sclera, canals and TMs clear; no oral mucosal edema or erythema  Neck: no cervical or supraclavicular adenopathy  Respiratory: good air flow, no crackles, rhonchi; slight wheeze that cleared with a few deep breaths  CV: RRR, S1S2, no murmurs noted  Abdomen: normoactive BS, soft and non tender   Lymph: no edema, + digital clubbing  Neuro: AAO X 3, CN 2-12 grossly intact  Psychiatric: normal affect, good eye contact  Skin: no rash, jaundice or lesions on limited exam         Data:   All laboratory and imaging data reviewed.      Cystic Fibrosis Culture  Specimen Description   Date Value Ref Range Status   06/22/2018 Sputum  Final   03/14/2018 Sputum  Final   01/03/2018 Sputum  Final    Culture Micro   Date Value Ref Range Status   06/22/2018 Moderate growth  Normal mami    Final   06/22/2018 (A)  Final    Heavy growth  Pseudomonas aeruginosa, mucoid strain     06/22/2018 Heavy growth  Pseudomonas aeruginosa   (A)  Final   06/22/2018 Moderate growth  Aspergillus fumigatus   (A)  Final   06/22/2018 Heavy growth  Strain 2  Pseudomonas aeruginosa   (A)  Final   06/22/2018 (A)  Final    Moderate growth  Strain 3  Pseudomonas aeruginosa          PFT interpretation:  Maneuver: valid and meets ATS guidelines  Moderate severe obstruction with decreased FEV1 and FEV1/FVC  Compared to prior: FEV1 of 2.10 is 70 ml above prior      Again, thank you for allowing me to participate in the care of your patient.      Sincerely,    Jyothi Warren PA-C      "

## 2018-09-27 LAB
CMV DNA SPEC NAA+PROBE-ACNC: NORMAL [IU]/ML
CMV DNA SPEC NAA+PROBE-LOG#: NORMAL {LOG_IU}/ML
EBV DNA # SPEC NAA+PROBE: NORMAL {COPIES}/ML
EBV DNA SPEC NAA+PROBE-LOG#: NORMAL {LOG_COPIES}/ML
IGE SERPL-ACNC: <2 KIU/L (ref 0–114)
SPECIMEN SOURCE: NORMAL

## 2018-09-28 LAB
A-TOCOPHEROL VIT E SERPL-MCNC: 10.1 MG/L (ref 5.5–18)
ANNOTATION COMMENT IMP: NORMAL
BETA+GAMMA TOCOPHEROL SERPL-MCNC: 0.3 MG/L (ref 0–6)
RETINYL PALMITATE SERPL-MCNC: <0.02 MG/L (ref 0–0.1)
VIT A SERPL-MCNC: 0.48 MG/L (ref 0.3–1.2)

## 2018-09-29 LAB
ACID FAST STN SPEC QL: NORMAL
SPECIMEN SOURCE: NORMAL

## 2018-10-03 ASSESSMENT — ANXIETY QUESTIONNAIRES
5. BEING SO RESTLESS THAT IT IS HARD TO SIT STILL: NOT AT ALL
GAD7 TOTAL SCORE: 0
2. NOT BEING ABLE TO STOP OR CONTROL WORRYING: NOT AT ALL
3. WORRYING TOO MUCH ABOUT DIFFERENT THINGS: NOT AT ALL
6. BECOMING EASILY ANNOYED OR IRRITABLE: NOT AT ALL
7. FEELING AFRAID AS IF SOMETHING AWFUL MIGHT HAPPEN: NOT AT ALL
1. FEELING NERVOUS, ANXIOUS, OR ON EDGE: NOT AT ALL

## 2018-10-03 ASSESSMENT — PATIENT HEALTH QUESTIONNAIRE - PHQ9: 5. POOR APPETITE OR OVEREATING: NOT AT ALL

## 2018-10-03 NOTE — PROGRESS NOTES
"Adult Cystic Fibrosis Program  Annual Psychosocial Assessment    Presenting Information:  ALEXANDRIA is a 33-year-old man with cystic fibrosis, presenting in CF clinic for a regular follow up with CF provider, Jyothi Navarro. Met with Guillermo for annual psychosocial assessment.     Living situation:  Guillermo lives in [place], [place]. He purchased a home in November 2015. His girlfriend of 6 years has been living with him for 2 years. They have 1 dog in the home. He denies any concern about his current living situation.     Family Constellation:  Guillermo was raised by his biological parents who are still  and live in [place], [place].  He has 1 sibling(s): an older sister (39 years old) who lives in [place], [place]. He has reported a good relationship with her, although he doesn't see her as often as he'd like. She does not have CF.    Social Support:  Guillermo reports \"pretty good\" social support. He has been in a relationship with his girlfriend for over 6 years. He gets along well with family members and draws additional support from friends and co-workers.  He does not have any connections in the CF Community.    Adjustment to Illness:  Guillermo was diagnosed with CF in infancy.  He was \"pretty stable\" as a kid with some medical complications. He was hospitalized 3x: 2x for sinoscopies and 1x to have his appendix removed. He notes that in adulthood he was hospitalized in 2013 for a pulmonary exacerbation.    Guillermo describes his current health status as \"at baseline.\"  Clinically, he has significant lung disease, pancreatic insufficiency, and CFRD. He typically does 2 vest treatments per day. He previously reported getting exercise through running 5x/week, but today he notes that he has been busy running for re-election in November. 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    He reports that he is open about his CF with friends and family, but not with coworkers and acquaintances. He also " "notes he was pretty private about CF even as a kid.    Health Care Directive:  Guillermo has previously received Health Care Directive education.  This SW reviewed education, including concept/purpose of health care directive, default health care agents (currently his parents) and how to complete a directive.  Guillermo declined needing copies of the forms and is comfortable with his default decision makers at this time.    Education:  Guillermo completed a Bachelors Degree in Political Science at [employer]. He has no current plans for higher education at this point.    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 He generally likes his work and has learned to manage the stress over time. He is running for re-election this November. He notes that he is confident that he will win again. He is benefits eligible. He denies any current related concerns. 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 He is not currently teaching this course and denied plans to do this again in the near future.    Finances:  Guillermo receives income from wages and is able to meet daily living expenses. He has a pension with the [employer]. He denied any related concerns today.     Insurance:  Guillermo is insured through his employer. He carries a BCBS plan through the [employer]. He reports that the coverage is good and it is affordable for him. He denied related concerns today.     Mental Health/Coping:  Guillermo denies any current or past symptoms indicative of mood, anxiety, eating, learning or other mental health disorder. He describes his mood recently as \"positive and stable\". His UBALDO-7 score was a 0 and his PHQ-9 score today was a 0, indicating no symptoms of anxiety or depression. He does not see a therapist and does not take any MH medication. He feels that his level of stress is manageable. To cope with stress he will process with his girlfriend or his parents.      He identifies spirituality " as important in his life, but he does not identify as a Synagogue person.    Chemical Health:  Guillermo denies tobacco use, regular exposure to second hand smoke or illicit drug use.  He drinks alcohol, 3-5x/week consuming 1 drink per occasion. He denies any current/past psychosocial impairment caused by alcohol use.  He is aware of a family history of alcohol abuse, as his uncle began AA when Guillermo was 17 years old.    Leisure Activities/Interests:   Guillermo enjoys listening to and producing music.    Intervention:  -Psychosocial Assessment      Assessment:  Guillermo appeared to be generally open in his responses, and presented with a normal affect. Strengths include stable employment, good support system, stable financial situation, and no history of CD or mental health disorders.  He is currently running for re-election to the state legislature. In general, Guillermo seems to be psychosocially stable overall, with access to relevant resources and supports.  No additional concerns expressed or noted.    Plan:  -Re-consult for any psychosocial needs that may arise.    -Complete psychosocial assessment annually.      ROMA Gandhi, [facility]  Adult Cystic Fibrosis   (Pager) [phone]  (Phone) [phone]

## 2018-10-04 ASSESSMENT — ANXIETY QUESTIONNAIRES: GAD7 TOTAL SCORE: 0

## 2018-10-04 ASSESSMENT — PATIENT HEALTH QUESTIONNAIRE - PHQ9: SUM OF ALL RESPONSES TO PHQ QUESTIONS 1-9: 0

## 2018-10-08 LAB
EXPTIME-PRE: 11.51 SEC
FEF2575-%PRED-PRE: 19 %
FEF2575-PRE: 0.75 L/SEC
FEF2575-PRED: 3.74 L/SEC
FEFMAX-%PRED-PRE: 75 %
FEFMAX-PRE: 6.81 L/SEC
FEFMAX-PRED: 8.98 L/SEC
FEV1-%PRED-PRE: 58 %
FEV1-PRE: 2.1 L
FEV1FEV6-PRE: 60 %
FEV1FEV6-PRED: 82 %
FEV1FVC-PRE: 54 %
FEV1FVC-PRED: 81 %
FIFMAX-PRE: 5.78 L/SEC
FVC-%PRED-PRE: 89 %
FVC-PRE: 3.88 L
FVC-PRED: 4.34 L

## 2018-11-14 DIAGNOSIS — E84.0 CYSTIC FIBROSIS WITH PULMONARY MANIFESTATIONS (H): ICD-10-CM

## 2018-11-14 RX ORDER — AZITHROMYCIN 250 MG/1
TABLET, FILM COATED ORAL
Qty: 150 TABLET | Refills: 1 | Status: SHIPPED | OUTPATIENT
Start: 2018-11-14 | End: 2019-01-03

## 2018-11-15 DIAGNOSIS — E84.0 CYSTIC FIBROSIS OF THE LUNG (H): ICD-10-CM

## 2018-11-15 DIAGNOSIS — E08.9 DIABETES MELLITUS DUE TO CYSTIC FIBROSIS (H): ICD-10-CM

## 2018-11-15 DIAGNOSIS — E84.9 DIABETES MELLITUS DUE TO CYSTIC FIBROSIS (H): ICD-10-CM

## 2018-11-15 DIAGNOSIS — E84.0 CYSTIC FIBROSIS WITH PULMONARY MANIFESTATIONS (H): ICD-10-CM

## 2018-11-15 DIAGNOSIS — E84.0 CYSTIC FIBROSIS WITH PULMONARY EXACERBATION (H): ICD-10-CM

## 2018-11-15 DIAGNOSIS — M10.9 GOUT, UNSPECIFIED CAUSE, UNSPECIFIED CHRONICITY, UNSPECIFIED SITE: ICD-10-CM

## 2018-11-15 RX ORDER — AZITHROMYCIN 250 MG/1
500 TABLET, FILM COATED ORAL
Qty: 150 TABLET | Refills: 1 | Status: SHIPPED | OUTPATIENT
Start: 2018-11-16 | End: 2019-12-02

## 2018-11-17 LAB
MYCOBACTERIUM SPEC CULT: NORMAL
MYCOBACTERIUM SPEC CULT: NORMAL
SPECIMEN SOURCE: NORMAL

## 2018-12-04 ENCOUNTER — TELEPHONE (OUTPATIENT)
Dept: PULMONOLOGY | Facility: CLINIC | Age: 33
End: 2018-12-04

## 2018-12-05 ENCOUNTER — TELEPHONE (OUTPATIENT)
Dept: PULMONOLOGY | Facility: CLINIC | Age: 33
End: 2018-12-05

## 2018-12-05 NOTE — TELEPHONE ENCOUNTER
PA Initiation    Medication: dornase alpha (PULMOZYME) 1 MG/ML neb solution (pending)  Insurance Company: [employer] - Phone [phone] Fax [phone]  Pharmacy Filling the Rx: [facility] [address]  Filling Pharmacy Phone:    Filling Pharmacy Fax:    Start Date: 12/5/2018

## 2018-12-06 NOTE — TELEPHONE ENCOUNTER
Faxed application to [employer] along with copy of RX, insurance denial letter, and letter of medical necessity as required.

## 2018-12-07 NOTE — TELEPHONE ENCOUNTER
Prior Authorization Approval    Authorization Effective Date: 12/4/2018  Authorization Expiration Date: 12/4/2020  Medication: dornase alpha (PULMOZYME) 1 MG/ML neb solution (APPROVED)  Approved Dose/Quantity: 75 PER 28 DAYS  Reference #:     Insurance Company: [employer] - Phone [phone] Fax [phone]  Expected CoPay:       CoPay Card Available: Yes   Foundation Assistance Needed:    Which Pharmacy is filling the prescription (Not needed for infusion/clinic administered): [facility]  Pharmacy Notified: No  Patient Notified: No

## 2018-12-26 ENCOUNTER — MYC REFILL (OUTPATIENT)
Dept: PULMONOLOGY | Facility: CLINIC | Age: 33
End: 2018-12-26

## 2018-12-26 DIAGNOSIS — E84.0 CYSTIC FIBROSIS WITH PULMONARY MANIFESTATIONS (H): ICD-10-CM

## 2018-12-26 DIAGNOSIS — K86.81 EXOCRINE PANCREATIC INSUFFICIENCY: ICD-10-CM

## 2018-12-26 DIAGNOSIS — E84.9 CF (CYSTIC FIBROSIS) (H): ICD-10-CM

## 2018-12-26 DIAGNOSIS — E08.9 DIABETES MELLITUS DUE TO CYSTIC FIBROSIS (H): ICD-10-CM

## 2018-12-26 DIAGNOSIS — A49.02 MRSA INFECTION: ICD-10-CM

## 2018-12-26 DIAGNOSIS — E84.9 DIABETES MELLITUS DUE TO CYSTIC FIBROSIS (H): ICD-10-CM

## 2018-12-26 DIAGNOSIS — E84.9 CYSTIC FIBROSIS (H): ICD-10-CM

## 2018-12-26 RX ORDER — FLUTICASONE PROPIONATE 110 UG/1
AEROSOL, METERED RESPIRATORY (INHALATION)
Qty: 1 INHALER | Refills: 3 | Status: SHIPPED | OUTPATIENT
Start: 2018-12-26 | End: 2019-10-28

## 2019-01-03 ENCOUNTER — OFFICE VISIT (OUTPATIENT)
Dept: PHARMACY | Facility: CLINIC | Age: 34
End: 2019-01-03
Payer: COMMERCIAL

## 2019-01-03 ENCOUNTER — OFFICE VISIT (OUTPATIENT)
Dept: PULMONOLOGY | Facility: CLINIC | Age: 34
End: 2019-01-03
Attending: PHYSICIAN ASSISTANT
Payer: COMMERCIAL

## 2019-01-03 VITALS
BODY MASS INDEX: 20.4 KG/M2 | SYSTOLIC BLOOD PRESSURE: 134 MMHG | HEIGHT: 64 IN | DIASTOLIC BLOOD PRESSURE: 87 MMHG | WEIGHT: 119.49 LBS | RESPIRATION RATE: 16 BRPM | HEART RATE: 62 BPM | OXYGEN SATURATION: 95 %

## 2019-01-03 DIAGNOSIS — E08.9 DIABETES MELLITUS DUE TO CYSTIC FIBROSIS (H): ICD-10-CM

## 2019-01-03 DIAGNOSIS — E84.0 CYSTIC FIBROSIS WITH PULMONARY MANIFESTATIONS (H): Primary | ICD-10-CM

## 2019-01-03 DIAGNOSIS — E08.9 DIABETES MELLITUS RELATED TO CYSTIC FIBROSIS (H): ICD-10-CM

## 2019-01-03 DIAGNOSIS — M10.9 GOUT, UNSPECIFIED CAUSE, UNSPECIFIED CHRONICITY, UNSPECIFIED SITE: ICD-10-CM

## 2019-01-03 DIAGNOSIS — E84.0 CYSTIC FIBROSIS WITH PULMONARY EXACERBATION (H): ICD-10-CM

## 2019-01-03 DIAGNOSIS — E84.9 DIABETES MELLITUS DUE TO CYSTIC FIBROSIS (H): ICD-10-CM

## 2019-01-03 DIAGNOSIS — E84.8 DIABETES MELLITUS RELATED TO CYSTIC FIBROSIS (H): ICD-10-CM

## 2019-01-03 DIAGNOSIS — E84.0 CYSTIC FIBROSIS WITH PULMONARY MANIFESTATIONS (H): ICD-10-CM

## 2019-01-03 DIAGNOSIS — E84.0 CYSTIC FIBROSIS OF THE LUNG (H): ICD-10-CM

## 2019-01-03 DIAGNOSIS — K86.81 EXOCRINE PANCREATIC INSUFFICIENCY: ICD-10-CM

## 2019-01-03 LAB
BASOPHILS # BLD AUTO: 0.1 10E9/L (ref 0–0.2)
BASOPHILS NFR BLD AUTO: 0.7 %
DIFFERENTIAL METHOD BLD: NORMAL
EOSINOPHIL # BLD AUTO: 0.3 10E9/L (ref 0–0.7)
EOSINOPHIL NFR BLD AUTO: 2.9 %
ERYTHROCYTE [DISTWIDTH] IN BLOOD BY AUTOMATED COUNT: 12.7 % (ref 10–15)
EXPTIME-PRE: 12.43 SEC
FEF2575-%PRED-PRE: 21 %
FEF2575-PRE: 0.82 L/SEC
FEF2575-PRED: 3.72 L/SEC
FEFMAX-%PRED-PRE: 77 %
FEFMAX-PRE: 6.94 L/SEC
FEFMAX-PRED: 8.98 L/SEC
FEV1-%PRED-PRE: 61 %
FEV1-PRE: 2.22 L
FEV1FEV6-PRE: 61 %
FEV1FEV6-PRED: 82 %
FEV1FVC-PRE: 56 %
FEV1FVC-PRED: 81 %
FIFMAX-PRE: 6.56 L/SEC
FVC-%PRED-PRE: 92 %
FVC-PRE: 3.99 L
FVC-PRED: 4.33 L
HCT VFR BLD AUTO: 45.5 % (ref 40–53)
HGB BLD-MCNC: 15.2 G/DL (ref 13.3–17.7)
IMM GRANULOCYTES # BLD: 0 10E9/L (ref 0–0.4)
IMM GRANULOCYTES NFR BLD: 0.2 %
LYMPHOCYTES # BLD AUTO: 1.9 10E9/L (ref 0.8–5.3)
LYMPHOCYTES NFR BLD AUTO: 20 %
MCH RBC QN AUTO: 30.6 PG (ref 26.5–33)
MCHC RBC AUTO-ENTMCNC: 33.4 G/DL (ref 31.5–36.5)
MCV RBC AUTO: 92 FL (ref 78–100)
MONOCYTES # BLD AUTO: 0.7 10E9/L (ref 0–1.3)
MONOCYTES NFR BLD AUTO: 7 %
NEUTROPHILS # BLD AUTO: 6.6 10E9/L (ref 1.6–8.3)
NEUTROPHILS NFR BLD AUTO: 69.2 %
NRBC # BLD AUTO: 0 10*3/UL
NRBC BLD AUTO-RTO: 0 /100
PLATELET # BLD AUTO: 190 10E9/L (ref 150–450)
RBC # BLD AUTO: 4.97 10E12/L (ref 4.4–5.9)
WBC # BLD AUTO: 9.6 10E9/L (ref 4–11)

## 2019-01-03 PROCEDURE — 99605 MTMS BY PHARM NP 15 MIN: CPT | Performed by: PHARMACIST

## 2019-01-03 PROCEDURE — G0463 HOSPITAL OUTPT CLINIC VISIT: HCPCS | Mod: ZF

## 2019-01-03 PROCEDURE — 87107 FUNGI IDENTIFICATION MOLD: CPT | Performed by: PHYSICIAN ASSISTANT

## 2019-01-03 PROCEDURE — 36415 COLL VENOUS BLD VENIPUNCTURE: CPT | Performed by: PHYSICIAN ASSISTANT

## 2019-01-03 PROCEDURE — 87077 CULTURE AEROBIC IDENTIFY: CPT | Performed by: PHYSICIAN ASSISTANT

## 2019-01-03 PROCEDURE — 87070 CULTURE OTHR SPECIMN AEROBIC: CPT | Performed by: PHYSICIAN ASSISTANT

## 2019-01-03 PROCEDURE — 87186 SC STD MICRODIL/AGAR DIL: CPT | Performed by: PHYSICIAN ASSISTANT

## 2019-01-03 PROCEDURE — 85025 COMPLETE CBC W/AUTO DIFF WBC: CPT | Performed by: PHYSICIAN ASSISTANT

## 2019-01-03 ASSESSMENT — PAIN SCALES - GENERAL: PAINLEVEL: NO PAIN (0)

## 2019-01-03 ASSESSMENT — MIFFLIN-ST. JEOR: SCORE: 1390.75

## 2019-01-03 NOTE — PROGRESS NOTES
SUBJECTIVE/OBJECTIVE:                           Dilan Nassar is a 33 year old male coming in for routine clinic visit.  His/her primary pulmonologist is Dr. Nieot.    Allergies/ADRs: Reviewed in Epic  Tobacco: No tobacco use  Alcohol: 10 or more beverages /week  PMH: Reviewed in Epic  CF Genotype: [id number]/[id number]    Medication Adherence  Medication adherence flowsheet 1/3/2019   Patient medication administration: Responsible for own medications   Patient estimated adherence level: %   Pharmacist assessment of adherence: Good   Patient reported doses missed per week: 0-1   Pharmacy MPR: Not available   Facilitators to medication adherence  Schedule/routine   Patient reported barriers to medication adherence  No issues identified   No adherence issues.  Jameel Podhaler is very helpful in maintaining good adherence to tobramycin.    Medication Access  Medication access flowsheet 1/3/2019   Number of pharmacies used: 2   Pharmacy: [facility]   Enrolled in [facility]? No   Programs or coupons used: [employer] co-pay card;Jameel [employer]Care Plus   Patient reported barriers to accessing medications: No issues reported by patient   Obtains free Jameel Podhaler through [employer] since his insurance will not cover it.  Doesn't like using a specialty pharmacy but is restricted by his insurance.     CF  Inhaled medications   Bronchodilator: albuterol   Mucolytic: [employer]mozyme  Antibiotic: Jameel Podhaler  Other: Flovent inhaler, Flonase inhaler  Oral medications   Azithromycin: taking  CFTR modulator: Not Indicated   Other: ursodiol for CF liver disease, loratadine as needed for allergies  Pulmonary symptoms are stable  PFTs are increased  Current FEV1 61%  Cultures: sputum cultures grow Pseudomonas  Current exacerbation: no    Pancreatic Insufficiency/Nutrition: Pancreatic enzyme replacement with Creon 36269 units.  Patient is taking 5-6 capsules with meals. Patient is not experiencing sign/symptoms of  "malabsorption.  Acid Reducer: Zantac (ranitidine) 75 mg daily as needed  Weight and BMI are stable  Vitamins include: [id number] 1 BID (has not been taking consistently), Mephyton weekly    Lab Results   Component Value Date    VITDT 28 09/26/2018    VITDT 28 09/15/2017     CFRD: Taking 12 units daily of Basaglar, but has not been checking his blood sugars.  A1c today is 5.8.  Due for follow-up with endocrinologist.    Gout: On allopurinol 300 mg daily.  Has not had any flares in the last couple years.    PFTs:    Weight/BMI:  Estimated body mass index is 20.4 kg/m  as calculated from the following:    Height as of an earlier encounter on 1/3/19: 5' 4.17\" (1.63 m).    Weight as of an earlier encounter on 1/3/19: 119 lb 7.8 oz (54.2 kg).      ASSESSMENT:                             Current medications were reviewed today.     CF: Stable.     Pancreatic Insufficiency/Nutrition: Needs Improvement.  Patient would benefit from improved adherence to MVW vitamins.  He is good at remembering the morning dose but forgets the evening dose.    CFRD: Stable    Gout: Stable      PLAN:                            Continue current medications and nebulizers.    Work on taking 2 capsules of [id number] daily.  Consider putting a bottle next to your bed to take at bedtime or next to your toothbrush to take when you brush your teeth at night, to help you remember the second dose.    I spent 15 minutes with this patient today.      Will follow up in 1 year or sooner if needed.    The patient was provided a summary of these recommendations in the AVS from CF care team visit.    Gwen Murray PharmD  CF Medication Therapy Management Pharmacist  [facility]  [phone]  "

## 2019-01-03 NOTE — LETTER
1/3/2019       RE: Dilan Nassar  [address]  [facility] [address]     Dear Colleague,    Thank you for referring your patient, Dilan Nassar, to the [facility] at [facility]. Please see a copy of my visit note below.    [facility]  January 3, 2019         Assessment and Plan:   Dilan Nassar is a 31 year old male with cystic fibrosis, pancreatic insufficiency and CFRD who is seen today in clinic for routine follow up.       1. CF lung disease:  had a URI early December, but that has resolved. Denies pulmonary complaints, notes his cough is baseline. Hasn't been as active as prior, vesting BID. Sating 95% in clinic today. PFTs today improved to his recent best. Previous cultures + for MRSA, Klebsiella, Steno and Ps A. No evidence of exacerbation at this time.  - Continue nebulizers, inhaler and vest therapy  - On chronic azithromycin   - Continue george podhaler month on/off  - Start exercising      2. Hemoptysis: no recent complaints.   - Continue 5 mg vitamin K once/week      3. CF related liver disease: since 10/15 per our EPIC charting with US 5/17 demonstrating coarse echotexture of the liver with hypertrophy of the caudate lobe and left lobe concerning for possible cirrhosis, no lesions, patent vasculature. LFTs stable  with elevated alk phos, but normal enzymes.  - Continue Ursodiol      4. Exocrine pancreatic insufficiency: denies symptoms of malabsorption. Weight  is stable, but BMI is still below Foundation goal.   - Continue pancreatic enzymes and vitamin supplementation      5. CFRD: AIC of 5.8 today. Hasn't been checking his BS recently.   - Continue Lantus 12 U  - F/u with Dr. Miranda and routine diabetic eye exam as scheduled       6. CF related sinus disease: denies current symptoms.   - Not currently using Claritin, continue Flonase     7. Isolated  thrombocytopenia: plts of 90K at last vist, no new medications or supplements, stable liver function, no evidence of infection. Did not get labs retested.  - CBC today    8. HCM: DEXA pending for next week      RTC: in 3 months with routine spirometry  Annual studies due: September 2019 (DEXA pending to next wee)  Vaccinations: completed influenza at OSH      Jyothi Warren PA-C  Pulmonary, Allergy, Critical Care and Sleep Medicine         Interval History:   Feeling good, had a cold coming back from [place] early December. No fever during that time. Not doing much physical activity currently, but feels cough is baseline, no congestion or tightness. Vesting BID, did increase to TID when he had a cold. NO GI complaints.          Review of Systems:   Please see HPI. Otherwise, complete 10 point ROS negative.           Past Medical and Surgical History:     Past Medical History:   Diagnosis Date     Cystic fibrosis with pulmonary manifestations (H)     /3659delc     Past Surgical History:   Procedure Laterality Date     APPENDECTOMY OPEN  1999    Appendicitis      SINUS SURGERY  1990's    h/o many sinus infections           Family History:     Family History   Problem Relation Age of Onset     Diabetes Mother         Unknown type     Prostate Cancer Paternal Grandfather      LUNG DISEASE Other         Negative     Diabetes Maternal Aunt         unknown type     Diabetes Maternal Uncle         unknown type     Diabetes Maternal Grandmother         unknown type     Coronary Artery Disease Paternal Grandmother             Social History:     Social History     Socioeconomic History     Marital status: Single     Spouse name: Not on file     Number of children: Not on file     Years of education: Not on file     Highest education level: Not on file   Social Needs     Financial resource strain: Not on file     Food insecurity - worry: Not on file     Food insecurity - inability: Not on file     Transportation needs -  "medical: Not on file     Transportation needs - non-medical: Not on file   Occupational History     Occupation: State Representative     Employer: [employer]     Occupation: Financial Counselor   Tobacco Use     Smoking status: Never Smoker     Smokeless tobacco: Former User   Substance and Sexual Activity     Alcohol use: Yes     Alcohol/week: 6.0 - 8.4 oz     Types: 10 - 14 Standard drinks or equivalent per week     Comment: 2 drinks per night 5X/wk     Drug use: No     Sexual activity: Yes     Partners: Female     Birth control/protection: Pull-out method, Condom   Other Topics Concern     Parent/sibling w/ CABG, MI or angioplasty before 65F 55M? Yes   Social History Narrative    Lives alone in apartment in [place], [place]. He studied political science in college and was elected as a state representative for the city of [place].         Nov 2015.  His main political issue is higher education.  His girlfriend is looking for work as an .  He eats 3 square meals per day, most of which are cooked by him or his girlfriend.  Gets outside to walk or run 20 minutes about 5x per week.  Works fairly regular daytime hours, often from home.            Medications:     Current Outpatient Medications   Medication     albuterol (2.5 MG/3ML) 0.083% neb solution     allopurinol (ZYLOPRIM) 300 MG tablet     amylase-lipase-protease (CREON) 82732 units CPEP     azithromycin (ZITHROMAX) 250 MG tablet     BD YON U/F 32G X 4 MM insulin pen needle     dornase alpha (PULMOZYME) 1 MG/ML neb solution     fluticasone (FLONASE) 50 MCG/ACT spray     fluticasone (FLOVENT HFA) 110 MCG/ACT inhaler     insulin glargine (BASAGLAR KWIKPEN) 100 UNIT/ML pen     multivitamin CF formula (MVW COMPLETE FORMULATION ) softgel cap     phytonadione (MEPHYTON) 5 MG tablet     ranitidine (ZANTAC) 75 MG tablet     Syringe/Needle, Disp, 18G X 1\" 6 ML MISC     tobramycin (MARLEN PODHALER) 28 MG in caps     ursodiol (ACTIGALL) 300 MG " "capsule     albuterol (PROAIR HFA/PROVENTIL HFA/VENTOLIN HFA) 108 (90 Base) MCG/ACT Inhaler     blood glucose monitoring (FREESTYLE LITE) test strip     blood glucose monitoring (FREESTYLE) lancets     loratadine (CLARITIN) 10 MG tablet     order for DME     No current facility-administered medications for this visit.             Physical Exam:   /87 (BP Location: Right arm, Patient Position: Chair, Cuff Size: Adult Regular)   Pulse 62   Resp 16   Ht 1.63 m (5' 4.17\")   Wt 54.2 kg (119 lb 7.8 oz)   SpO2 95%   BMI 20.40 kg/m       GENERAL: alert, NAD  HEENT: NCAT, EOMI, anicteric sclera, canals and TMs clear; no oral mucosal edema or erythema  Neck: no cervical or supraclavicular adenopathy  Respiratory: good air flow, no crackles, rhonchi or wheezing  CV: RRR, S1S2, no murmurs noted  Abdomen: normoactive BS, soft and non tender  Lymph: no edema, + digital clubbing  Neuro: AAO X 3, CN 2-12 grossly intact  Psychiatric: normal affect, good eye contact  Skin: no rash, jaundice or lesions on limited exam         Data:   All laboratory and imaging data reviewed.      Cystic Fibrosis Culture  Specimen Description   Date Value Ref Range Status   09/26/2018 Sputum  Final   09/26/2018 Sputum  Final   06/22/2018 Sputum  Final    Culture Micro   Date Value Ref Range Status   09/26/2018 Culture negative for acid fast bacilli  Final   09/26/2018   Final    Assayed at [facility] [address] [place] [address] [phone]   06/22/2018 Moderate growth  Normal mami    Final   06/22/2018 (A)  Final    Heavy growth  Pseudomonas aeruginosa, mucoid strain     06/22/2018 Heavy growth  Pseudomonas aeruginosa   (A)  Final   06/22/2018 Moderate growth  Aspergillus fumigatus   (A)  Final   06/22/2018 Heavy growth  Strain 2  Pseudomonas aeruginosa   (A)  Final   06/22/2018 (A)  Final    Moderate growth  Strain 3  Pseudomonas aeruginosa          PFT interpretation:  Maneuver: valid and meets ATS guidelines  Moderate " obstruction with decreased FEV1 and FEV/FVC  Compared to prior: FEV1 of 2.22 is 120 ml above prior          Again, thank you for allowing me to participate in the care of your patient.      Sincerely,    Jyothi Warren PA-C

## 2019-01-03 NOTE — NURSING NOTE
Chief Complaint   Patient presents with     RECHECK     Cystic Fibrosis 3 month follow up      Marisela Delgado CMA

## 2019-01-03 NOTE — PATIENT INSTRUCTIONS
Cystic Fibrosis Self-Care Plan       Patient: Dilan Nassar   MRN: [id number]   Clinic Date: January 3, 2019     RECOMMENDATIONS:  1. Continue nebulizers and vest therapy.   2. Start exercising!  3. Increase your multivitamin to twice/day. We will recheck a vitamin D level in 3 months.  4. Lab today to check your platelets.     Annual Studies:   IGG   Date Value Ref Range Status   09/26/2018 1,320 695 - 1,620 mg/dL Final     No results found for: INS  There are no preventive care reminders to display for this patient.    Pulmonary Function Tests  FEV1: amount of air you can blow out in 1 second  FVC: total amount of air you can take in and blow out    Your Goals:         PFT Latest Ref Rng & Units 1/3/2019   FVC L 3.99   FEV1 L 2.22   FVC% % 92   FEV1% % 61          Airway Clearance: The Most Important Way to Keep Your Lungs Healthy  Vest Settings:    Hill-Rom Frequencies: 8, 9, 10 Pressure 10 Then, Frequencies 18, 19, 20 Pressure 6      RespirTech: Quick Start with Pressure of     Do each frequency for 5 minutes; Deflate vest after each frequency & cough 3 times before beginning the next setting.    Vest and Neb Therapy should be done 2 times/day.    Good Nutrition Can Improve Lung Function and Overall Health     Take ALL of your vitamins with food     Take 1/2 of your enzymes before EVERY meal/snack and the other 1/2 mid-meal/snack    Wt Readings from Last 3 Encounters:   01/03/19 54.2 kg (119 lb 7.8 oz)   09/26/18 54 kg (119 lb 0.8 oz)   06/22/18 55.3 kg (121 lb 14.6 oz)       Body mass index is 20.4 kg/m .         National CF Foundation Recommendations for BMI in CF Adults: Women: at least 22 Men: at least 23        Controlling Blood Sugars Helps Prevent Lung Infections & Improves Nutrition  Test blood sugar:     In the morning before eating (goal is )     2 hours after a meal (goal is less than 150)     When pre-meal glucose is greater than 150 add correction     At bedtime (if less than 100 eat a  snack with 15 grams of carbohydrates  Last A1C Results:   Hemoglobin A1C   Date Value Ref Range Status   09/26/2018 5.8 (H) 0 - 5.6 % Final     Comment:     Normal <5.7% Prediabetes 5.7-6.4%  Diabetes 6.5% or higher - adopted from ADA   consensus guidelines.           If diabetic, measure A1C every 6 months. Goal is under 7%.    Staying Healthy    Research: If you are interested in learning about research opportunities or have questions, please contact Sonali Altamirano at [phone] or [email or web address].       Foundation: Compass is a personalized resource service to help you with the insurance, financial, legal and other issues you are facing.  It's free, confidential and available to anyone with CF.  Ask your  for more information or contact Compass directly at [facility] ([phone]) or compass@cff.org, or learn more at cff.org/compass.       CF Nurse Line:  Giovanny Pimentel: [phone]   Audra Smart, RT: [phone]     Daniela Whitt and Tamika Perez , Dieticians: [phone]     Lisset Root, Diabetes Nurse: [phone]    Kathie Truong: [phone] or Carisa Berumen [phone], Social Workers   [email or web address]

## 2019-01-04 ENCOUNTER — TELEPHONE (OUTPATIENT)
Dept: PULMONOLOGY | Facility: CLINIC | Age: 34
End: 2019-01-04

## 2019-01-04 NOTE — TELEPHONE ENCOUNTER
Please send refill rx of mvw complete  caps (epic won't let me select)  Thank you very kindly!  Tari Ordonez CPhT  [facility]

## 2019-01-08 DIAGNOSIS — E84.9 CF (CYSTIC FIBROSIS) (H): Primary | ICD-10-CM

## 2019-01-08 LAB
BACTERIA SPEC CULT: ABNORMAL
SPECIMEN SOURCE: ABNORMAL

## 2019-01-08 RX ORDER — PEDIATRIC MULTIVIT 61/D3/VIT K 1500-800
1 CAPSULE ORAL 2 TIMES DAILY
Qty: 60 CAPSULE | Refills: 11 | Status: SHIPPED | OUTPATIENT
Start: 2019-01-08 | End: 2019-12-19

## 2019-01-09 ENCOUNTER — ANCILLARY PROCEDURE (OUTPATIENT)
Dept: BONE DENSITY | Facility: CLINIC | Age: 34
End: 2019-01-09
Attending: PHYSICIAN ASSISTANT
Payer: COMMERCIAL

## 2019-01-09 DIAGNOSIS — E84.0 CYSTIC FIBROSIS WITH PULMONARY MANIFESTATIONS (H): ICD-10-CM

## 2019-01-09 DIAGNOSIS — E08.9 DIABETES MELLITUS DUE TO CYSTIC FIBROSIS (H): ICD-10-CM

## 2019-01-09 DIAGNOSIS — E84.0 CYSTIC FIBROSIS OF THE LUNG (H): ICD-10-CM

## 2019-01-09 DIAGNOSIS — M10.9 GOUT, UNSPECIFIED CAUSE, UNSPECIFIED CHRONICITY, UNSPECIFIED SITE: ICD-10-CM

## 2019-01-09 DIAGNOSIS — E84.0 CYSTIC FIBROSIS WITH PULMONARY EXACERBATION (H): ICD-10-CM

## 2019-01-09 DIAGNOSIS — E84.9 DIABETES MELLITUS DUE TO CYSTIC FIBROSIS (H): ICD-10-CM

## 2019-01-25 DIAGNOSIS — E84.9 CYSTIC FIBROSIS (H): ICD-10-CM

## 2019-01-25 DIAGNOSIS — E08.9 DIABETES MELLITUS DUE TO CYSTIC FIBROSIS (H): ICD-10-CM

## 2019-01-25 DIAGNOSIS — E84.9 CF (CYSTIC FIBROSIS) (H): ICD-10-CM

## 2019-01-25 DIAGNOSIS — K86.81 EXOCRINE PANCREATIC INSUFFICIENCY: ICD-10-CM

## 2019-01-25 DIAGNOSIS — E84.0 CYSTIC FIBROSIS WITH PULMONARY MANIFESTATIONS (H): ICD-10-CM

## 2019-01-25 DIAGNOSIS — A49.02 MRSA INFECTION: ICD-10-CM

## 2019-01-25 DIAGNOSIS — E84.9 DIABETES MELLITUS DUE TO CYSTIC FIBROSIS (H): ICD-10-CM

## 2019-01-25 RX ORDER — PHYTONADIONE 5 MG/1
TABLET ORAL
Qty: 16 TABLET | Refills: 3 | Status: SHIPPED | OUTPATIENT
Start: 2019-01-25 | End: 2020-02-05

## 2019-01-29 ENCOUNTER — DOCUMENTATION ONLY (OUTPATIENT)
Dept: CARE COORDINATION | Facility: CLINIC | Age: 34
End: 2019-01-29

## 2019-02-21 ENCOUNTER — MYC REFILL (OUTPATIENT)
Dept: PULMONOLOGY | Facility: CLINIC | Age: 34
End: 2019-02-21

## 2019-02-21 DIAGNOSIS — E84.8 DIABETES MELLITUS RELATED TO CF (CYSTIC FIBROSIS) (H): ICD-10-CM

## 2019-02-21 DIAGNOSIS — E08.9 DIABETES MELLITUS RELATED TO CF (CYSTIC FIBROSIS) (H): ICD-10-CM

## 2019-02-22 RX ORDER — PEN NEEDLE, DIABETIC 32GX 5/32"
NEEDLE, DISPOSABLE MISCELLANEOUS
Qty: 100 EACH | Refills: 12 | Status: SHIPPED | OUTPATIENT
Start: 2019-02-22 | End: 2020-09-19

## 2019-03-26 ENCOUNTER — MYC REFILL (OUTPATIENT)
Dept: PULMONOLOGY | Facility: CLINIC | Age: 34
End: 2019-03-26

## 2019-03-26 DIAGNOSIS — A49.02 MRSA INFECTION: ICD-10-CM

## 2019-03-26 DIAGNOSIS — E84.0 CYSTIC FIBROSIS WITH PULMONARY MANIFESTATIONS (H): ICD-10-CM

## 2019-03-26 DIAGNOSIS — E84.9 CF (CYSTIC FIBROSIS) (H): ICD-10-CM

## 2019-03-26 DIAGNOSIS — K86.81 EXOCRINE PANCREATIC INSUFFICIENCY: ICD-10-CM

## 2019-03-26 DIAGNOSIS — E84.9 CYSTIC FIBROSIS (H): ICD-10-CM

## 2019-03-26 DIAGNOSIS — E84.9 DIABETES MELLITUS DUE TO CYSTIC FIBROSIS (H): ICD-10-CM

## 2019-03-26 DIAGNOSIS — E08.9 DIABETES MELLITUS DUE TO CYSTIC FIBROSIS (H): ICD-10-CM

## 2019-04-18 ENCOUNTER — MYC REFILL (OUTPATIENT)
Dept: PULMONOLOGY | Facility: CLINIC | Age: 34
End: 2019-04-18

## 2019-04-18 DIAGNOSIS — K86.81 EXOCRINE PANCREATIC INSUFFICIENCY: ICD-10-CM

## 2019-04-18 DIAGNOSIS — E84.0 CYSTIC FIBROSIS WITH PULMONARY MANIFESTATIONS (H): ICD-10-CM

## 2019-04-18 DIAGNOSIS — E84.9 DIABETES MELLITUS DUE TO CYSTIC FIBROSIS (H): ICD-10-CM

## 2019-04-18 DIAGNOSIS — E84.9 CYSTIC FIBROSIS (H): ICD-10-CM

## 2019-04-18 DIAGNOSIS — A49.02 MRSA INFECTION: ICD-10-CM

## 2019-04-18 DIAGNOSIS — E84.9 CF (CYSTIC FIBROSIS) (H): ICD-10-CM

## 2019-04-18 DIAGNOSIS — E08.9 DIABETES MELLITUS DUE TO CYSTIC FIBROSIS (H): ICD-10-CM

## 2019-04-18 RX ORDER — URSODIOL 300 MG/1
CAPSULE ORAL
Qty: 180 CAPSULE | Refills: 3 | Status: SHIPPED | OUTPATIENT
Start: 2019-04-18 | End: 2020-06-03

## 2019-04-18 RX ORDER — FLUTICASONE PROPIONATE 50 MCG
SPRAY, SUSPENSION (ML) NASAL
Qty: 48 G | Refills: 3 | Status: SHIPPED | OUTPATIENT
Start: 2019-04-18 | End: 2020-06-15

## 2019-04-29 NOTE — TELEPHONE ENCOUNTER
Received fax from [employer] stating pt needs to re-enroll to continue with this program, unfortunately they do not sponsor this program any longer and will have to go through [facility] to receive free drug. Spoke with rep and she stated they will fax out new forms to fill out to keep him enrolled. Their customer service # is [phone]. Will fill out once received.   No complaints No complaints No complaints No complaints

## 2019-06-04 ENCOUNTER — OFFICE VISIT (OUTPATIENT)
Dept: ENDOCRINOLOGY | Facility: CLINIC | Age: 34
End: 2019-06-04
Payer: COMMERCIAL

## 2019-06-04 VITALS
HEIGHT: 64 IN | DIASTOLIC BLOOD PRESSURE: 85 MMHG | SYSTOLIC BLOOD PRESSURE: 125 MMHG | WEIGHT: 120.1 LBS | BODY MASS INDEX: 20.51 KG/M2 | HEART RATE: 76 BPM

## 2019-06-04 DIAGNOSIS — E08.9 DIABETES MELLITUS DUE TO CYSTIC FIBROSIS (H): Primary | ICD-10-CM

## 2019-06-04 DIAGNOSIS — E84.9 DIABETES MELLITUS DUE TO CYSTIC FIBROSIS (H): Primary | ICD-10-CM

## 2019-06-04 LAB — HBA1C MFR BLD: 6 % (ref 4.3–6)

## 2019-06-04 ASSESSMENT — MIFFLIN-ST. JEOR: SCORE: 1390.77

## 2019-06-04 ASSESSMENT — PAIN SCALES - GENERAL: PAINLEVEL: NO PAIN (0)

## 2019-06-04 NOTE — LETTER
6/4/2019       RE: Dilan Nassar  [address]  [address]  [place] [address]     Dear Colleague,    Thank you for referring your patient, Dilan Nassar, to the [facility] ENDOCRINOLOGY at [facility] acute [facility]. Please see a copy of my visit note below.    CF Endocrinology Return Consultation:  Diabetes  :   Patient: Dilan Nassar MRN# [id number]   YOB: 1985 Age: 33 year old   Date of Visit: 6/4/2019  Dear Dr. Atilio Nieto:    I had the pleasure of seeing your patient, Dilan Nassar in the CF Endocrinology Clinic, [facility] , on 6/4/2019 for a return consultation .           Problem list:   MRSA  Cystic fibrosis  Diabetes mellitus  Exocrine pancreatic insufficiency  Vitamin D deficiency  Gout  Intestinal malabsorption         HPI:   Dilan is a 33 year old male with Cystic Fibrosis Related Diabetes Mellitus (CFRD).    I have reviewed the available past laboratory evaluations, imaging studies, and medical records available to me at this visit. I have reviewed  Dilan'height and weight.    History was obtained from the patient and the medical record.  I have reviewed the notes of the pulmonary care team entered into the medical record since I last saw the patient.    I have read and interpreted the data from the patient glucose downloads..    He feels stable from a pulmonary standpoint. He feels he is doing well with diabetes. He is still taking Basaglar, currently 12 unit(s) in the morning without any other insulin. He has gotten a new meter since his last visit. He is still getting lows and occasionally has error messages. He normally checks his blood glucose in the morning between 8 and 10 am. He has morning blood glucose of 75 before breakfast and a low of 36 two hours later. He ate cheerios, toast, a banana, and peanut butter. He did not feel any symptoms of lows . The last time he felt shaking/diophoresis was around 6 months ago. Reports he is more  likely to have  lows with symptoms, if has not eaten for some time. Denies loss of consciousness.    A1c:  Today s hemoglobin A1c: 6.0  Previous two HbA1c results:   Lab Results   Component Value Date    A1C 5.8 09/26/2018    A1C 6.1 09/15/2017      Result was discussed at today's visit.     Current insulin regimen:   Basaglar 12 unit(s) daily    Family history and social history were reviewed and updated from my previous visit.          Past Medical History:   Cystic fibrosis          Past Surgical History:   Appendectomy  Sinus surgery            Social History:   Unmarried  Non smoker  Former smokeless tobacco user           Family History:   Mother: liver disease  Paternal grandfather: prostate cancer  Maternal aunt: diabetes mellitus  Maternal uncle: diabetes mellitus  Maternal grandmother: diabetes mellitus  Paternal grandmother: coronary artery disease         Allergies:     Allergies   Allergen Reactions     Mold      Molds & Smuts Itching          Medications:     Current Outpatient Medications:      albuterol (2.5 MG/3ML) 0.083% neb solution, Take 1 vial (2.5 mg) by nebulization 3 times daily (Patient taking differently: Take 1 vial by nebulization 2 times daily ), Disp: 540 mL, Rfl: 6     allopurinol (ZYLOPRIM) 300 MG tablet, TAKE ONE TABLET (300MG) BY MOUTH DAILY, Disp: 90 tablet, Rfl: 3     amylase-lipase-protease (CREON) 19862 units CPEP, TAKE 5-6 CAPSULES (60,000-72,000) BY MOUTH THREE TIMES A DAY WITH MEALS, Disp: 600 each, Rfl: 1     azithromycin (ZITHROMAX) 250 MG tablet, Take 2 tablets (500 mg) by mouth Every Mon, Wed, Fri Morning, Disp: 150 tablet, Rfl: 1     BD YON U/F 32G X 4 MM insulin pen needle, USE ONE TIME PER DAY, Disp: 100 each, Rfl: 12     blood glucose monitoring (FREESTYLE LITE) test strip, Use to test blood 4 times a day, Disp: 120 each, Rfl: 12     blood glucose monitoring (FREESTYLE) lancets, Use to test blood sugar 4 times daily or as directed., Disp: 2 Box, Rfl: 12     dornase  "alpha (PULMOZYME) 1 MG/ML neb solution, INHALE CONTENTS OF ONE VIAL (2.5MG) VIA NEBULIZER TWICE DAILY. KEEP REFRIGERATED UNTIL USE., Disp: 150 mL, Rfl: 11     fluticasone (FLONASE) 50 MCG/ACT nasal spray, SPRAY 2 SPRAYS INTO BOTH NOSTRILS DAILY, Disp: 48 g, Rfl: 3     fluticasone (FLOVENT HFA) 110 MCG/ACT inhaler, INHALE 1 PUFF INTO THE LUNGS TWO TIMES A DAY, Disp: 1 Inhaler, Rfl: 3     insulin glargine (BASAGLAR KWIKPEN) 100 UNIT/ML pen, Use 12 units daily as directed, Disp: 15 mL, Rfl: 1     insulin pen needle (BD YON U/F) 32G X 4 MM miscellaneous, Use 3 pen needles daily or as directed., Disp: 100 each, Rfl: 12     loratadine (CLARITIN) 10 MG tablet, Take 1 tablet (10 mg) by mouth daily, Disp: 30 tablet, Rfl:      multivitamin CF formula (MVW COMPLETE FORMULATION ) softgel cap, Take 1 capsule by mouth 2 times daily, Disp: 60 capsule, Rfl: 11     multivitamins CF formula  (MVW COMPLETE FORMULATION ) CAPS capsule, Take 1 capsule by mouth 2 times daily, Disp: 60 capsule, Rfl: 11     phytonadione (MEPHYTON) 5 MG tablet, TAKE ONE TABLET (5MG) BY MOUTH ONCE A WEEK, Disp: 16 tablet, Rfl: 3     ranitidine (ZANTAC) 75 MG tablet, Take 1 tablet (75 mg) by mouth daily as needed for heartburn, Disp: 30 tablet, Rfl:      Syringe/Needle, Disp, 18G X 1\" 6 ML MISC, 1 each 2 times daily, Disp: 60 each, Rfl: 5     tobramycin (MARLEN PODHALER) 28 MG in caps, Inhale 4 capsules (112 mg) into the lungs 2 times daily 1 month on, 1 month off, Disp: 224 capsule, Rfl: 5     ursodiol (ACTIGALL) 300 MG capsule, TAKE 1 CAPSULE (300MG ) BY MOUTH TWO TIMES A DAY, Disp: 180 capsule, Rfl: 3     albuterol (PROAIR HFA/PROVENTIL HFA/VENTOLIN HFA) 108 (90 Base) MCG/ACT Inhaler, Inhale 2 puffs into the lungs 2 times daily Following your vancomycin nebulizer treatment. (Patient not taking: Reported on 1/3/2019), Disp: 1 Inhaler, Rfl: 3     order for DME, Equipment being ordered: Nebulizer Supplies. Please dispense four (4) Ruby LC Plus " "nebulizer kits and four (4) REBEL face masks for this patient with Cystic Fibrosis. Patient requires additional nebulizer supplies due to his need to use multiple sterile neb cups for his inhaled medications that may not be mixed together., Disp: 4 kit, Rfl: 12           Review of Systems:     Comprehensive ROS negative other than the symptoms noted above in the HPI.          Physical Exam:   Blood pressure 125/85, pulse 76, height 1.626 m (5' 4\"), weight 54.5 kg (120 lb 1.6 oz).  Height: 5' 4\", Normalized stature-for-age data not available for patients older than 20 years.  Weight: 120 lbs 1.6 oz, Normalized weight-for-age data not available for patients older than 20 years.  BMI: Body mass index is 20.62 kg/m ., Normalized BMI data available only for age 0 to 20 years.      CONSTITUTIONAL:   Awake, alert, and in no apparent distress.  HEAD: Normocephalic, without obvious abnormality.  EYES:  sclera clear, conjunctiva normal.  ENT: oral pharynx with moist mucus membranes.  NECK: Supple, symmetrical, trachea midline.  THYROID: symmetric, not enlarged and no tenderness.  HEMATOLOGIC/LYMPHATIC: No cervical lymphadenopathy.  LUNGS: No increased work of breathing, clear to auscultation  with good air entry  CARDIOVASCULAR: Regular rate and rhythm, no murmurs.  ABDOMEN: Soft, non-distended, non-tender  NEUROLOGIC:No focal deficits noted.   PSYCHIATRIC: Cooperative, no agitation.  SKIN: Insulin administration sites intact without lipohypertrophy.   FEET:  Normal        Laboratory results:     TSH   Date Value Ref Range Status   09/26/2018 1.48 0.40 - 4.00 mU/L Final     Testosterone Total   Date Value Ref Range Status   09/15/2017 358 240 - 950 ng/dL Final     Comment:     This test was developed and its performance characteristics determined by the   [facility],  Special Chemistry Laboratory. It has   not been cleared or approved by the FDA. The laboratory is regulated under   CLIA as qualified " to perform high-complexity testing. This test is used for   clinical purposes. It should not be regarded as investigational or for   research.       Cholesterol   Date Value Ref Range Status   09/26/2018 105 <200 mg/dL Final     Albumin Urine mg/L   Date Value Ref Range Status   09/26/2018 12 mg/L Final     Triglycerides   Date Value Ref Range Status   09/26/2018 38 <150 mg/dL Final     HDL Cholesterol   Date Value Ref Range Status   09/26/2018 62 >39 mg/dL Final     LDL Cholesterol Calculated   Date Value Ref Range Status   09/26/2018 36 <100 mg/dL Final     Comment:     Desirable:       <100 mg/dl     Non HDL Cholesterol   Date Value Ref Range Status   09/26/2018 44 <130 mg/dL Final     Lab Results   Component Value Date    A1C 5.8 09/26/2018    A1C 6.1 09/15/2017    A1C 6.7 12/20/2016    A1C 7.5 10/20/2016    A1C 6.8 09/30/2016         Diabetes Health Maintenance    Date of Diabetes Diagnosis: ~ 2012     Special Notes (if any):      Date Last Eye Exam: April 2017     Date Last Dental Appointment: every 6 months    Dates of Episodes Severe* Hypoglycemia (month/year, cumulative, ongoing, assess each visit):    *Severe=patient unconscious, seizure, unable to help self    Last 25-Vitamin D (every year): 28      Last DXA, lowest Z-score (every 2 years): Most negative and valid T score of -0.9     Last Urine Microalbumin (every year): 7.45 September 2018    Last Testosterone:   Testosterone Total   Date Value Ref Range Status   09/15/2017 358 240 - 950 ng/dL Final     Comment:     This test was developed and its performance characteristics determined by the   [facility],  Special Chemistry Laboratory. It has   not been cleared or approved by the FDA. The laboratory is regulated under   CLIA as qualified to perform high-complexity testing. This test is used for   clinical purposes. It should not be regarded as investigational or for   research.              Assessment and Plan:   Dilan  is a 33 year old male with cystic fibrosis related diabetes mellitus.     He is having lows. He is on Basaglar 12 unit(s) daily without short acting insulin. He doesn't check his blood glucose regularly, but in his log he has readings of 47 and 36. He was asymptomatic with these lows. Reduced his Basaglar to 8 unit(s) in the morning.  Discussed using CGM, he would consider the pro version if diabetes education is able to place this for him. Other wise we will check FSBG before and 2 hr after meals for 3 days and send data for review.  It's uncertain whether his lows are from reactive hypoglycemia or from his long acting insulin.    Diabetes is a complicated and dangerous illness which requires intensive monitoring and treatment to prevent both short-term and long-term consequences to various organs. Insulin therapy is life-saving, but is also associated with life-threatening toxicity (hypoglycemia).  Careful and continuous attention to balancing glucose levels, activity, diet and insulin dosage is necessary.    I have reviewed this plan with the patient and with the diabetes nurse educator, who will communicate with the patient between visits for insulin adjustment.    Thank you for allowing me to participate in the care of your patient.  Please do not hesitate to call with questions or concerns.    Sincerely,    JOSE Headley  Department of Endocrinology  [facility]    LAURYN CHILDS    >50% of 30 minute visit spent in face to face counseling, education and coordination of care related to options for better glycemic control as well as preventing, detecting, and treating hypoglycemia.       Scribe Disclosure:  I, Vicente Enriquez, am serving as a scribe to document services personally performed by Edison Miranda MD at this visit, based upon the provider's statements to me. All documentation has been reviewed by the aforementioned provider prior to being entered into the official medical  record.    Portions of this medical record were completed by a scribe. UPON MY REVIEW AND AUTHENTICATION BY ELECTRONIC SIGNATURE, this confirms (a) I performed the applicable clinical services, and (b) the record is accurate.   JOSE Headley

## 2019-06-04 NOTE — PROGRESS NOTES
CF Endocrinology Return Consultation:  Diabetes  :   Patient: Dilan Nassar MRN# [id number]   YOB: 1985 Age: 33 year old   Date of Visit: 6/4/2019  Dear Dr. Atilio Nieto:    I had the pleasure of seeing your patient, Dilan Nassar in the CF Endocrinology Clinic, [facility] , on 6/4/2019 for a return consultation .           Problem list:   MRSA  Cystic fibrosis  Diabetes mellitus  Exocrine pancreatic insufficiency  Vitamin D deficiency  Gout  Intestinal malabsorption         HPI:   Dilan is a 33 year old male with Cystic Fibrosis Related Diabetes Mellitus (CFRD).    I have reviewed the available past laboratory evaluations, imaging studies, and medical records available to me at this visit. I have reviewed  Dilan'height and weight.    History was obtained from the patient and the medical record.  I have reviewed the notes of the pulmonary care team entered into the medical record since I last saw the patient.    I have read and interpreted the data from the patient glucose downloads..    He feels stable from a pulmonary standpoint. He feels he is doing well with diabetes. He is still taking Basaglar, currently 12 unit(s) in the morning without any other insulin. He has gotten a new meter since his last visit. He is still getting lows and occasionally has error messages. He normally checks his blood glucose in the morning between 8 and 10 am. He has morning blood glucose of 75 before breakfast and a low of 36 two hours later. He ate cheerios, toast, a banana, and peanut butter. He did not feel any symptoms of lows . The last time he felt shaking/diophoresis was around 6 months ago. Reports he is more likely to have  lows with symptoms, if has not eaten for some time. Denies loss of consciousness.    A1c:  Today s hemoglobin A1c: 6.0  Previous two HbA1c results:   Lab Results   Component Value Date    A1C 5.8 09/26/2018    A1C 6.1 09/15/2017      Result was discussed at  today's visit.     Current insulin regimen:   Basaglar 12 unit(s) daily    Family history and social history were reviewed and updated from my previous visit.          Past Medical History:   Cystic fibrosis          Past Surgical History:   Appendectomy  Sinus surgery            Social History:   Unmarried  Non smoker  Former smokeless tobacco user           Family History:   Mother: liver disease  Paternal grandfather: prostate cancer  Maternal aunt: diabetes mellitus  Maternal uncle: diabetes mellitus  Maternal grandmother: diabetes mellitus  Paternal grandmother: coronary artery disease         Allergies:     Allergies   Allergen Reactions     Mold      Molds & Smuts Itching          Medications:     Current Outpatient Medications:      albuterol (2.5 MG/3ML) 0.083% neb solution, Take 1 vial (2.5 mg) by nebulization 3 times daily (Patient taking differently: Take 1 vial by nebulization 2 times daily ), Disp: 540 mL, Rfl: 6     allopurinol (ZYLOPRIM) 300 MG tablet, TAKE ONE TABLET (300MG) BY MOUTH DAILY, Disp: 90 tablet, Rfl: 3     amylase-lipase-protease (CREON) 37371 units CPEP, TAKE 5-6 CAPSULES (60,000-72,000) BY MOUTH THREE TIMES A DAY WITH MEALS, Disp: 600 each, Rfl: 1     azithromycin (ZITHROMAX) 250 MG tablet, Take 2 tablets (500 mg) by mouth Every Mon, Wed, Fri Morning, Disp: 150 tablet, Rfl: 1     BD YON U/F 32G X 4 MM insulin pen needle, USE ONE TIME PER DAY, Disp: 100 each, Rfl: 12     blood glucose monitoring (FREESTYLE LITE) test strip, Use to test blood 4 times a day, Disp: 120 each, Rfl: 12     blood glucose monitoring (FREESTYLE) lancets, Use to test blood sugar 4 times daily or as directed., Disp: 2 Box, Rfl: 12     dornase alpha (PULMOZYME) 1 MG/ML neb solution, INHALE CONTENTS OF ONE VIAL (2.5MG) VIA NEBULIZER TWICE DAILY. KEEP REFRIGERATED UNTIL USE., Disp: 150 mL, Rfl: 11     fluticasone (FLONASE) 50 MCG/ACT nasal spray, SPRAY 2 SPRAYS INTO BOTH NOSTRILS DAILY, Disp: 48 g, Rfl: 3      "fluticasone (FLOVENT HFA) 110 MCG/ACT inhaler, INHALE 1 PUFF INTO THE LUNGS TWO TIMES A DAY, Disp: 1 Inhaler, Rfl: 3     insulin glargine (BASAGLAR KWIKPEN) 100 UNIT/ML pen, Use 12 units daily as directed, Disp: 15 mL, Rfl: 1     insulin pen needle (BD YON U/F) 32G X 4 MM miscellaneous, Use 3 pen needles daily or as directed., Disp: 100 each, Rfl: 12     loratadine (CLARITIN) 10 MG tablet, Take 1 tablet (10 mg) by mouth daily, Disp: 30 tablet, Rfl:      multivitamin CF formula (MVW COMPLETE FORMULATION ) softgel cap, Take 1 capsule by mouth 2 times daily, Disp: 60 capsule, Rfl: 11     multivitamins CF formula  (MVW COMPLETE FORMULATION ) CAPS capsule, Take 1 capsule by mouth 2 times daily, Disp: 60 capsule, Rfl: 11     phytonadione (MEPHYTON) 5 MG tablet, TAKE ONE TABLET (5MG) BY MOUTH ONCE A WEEK, Disp: 16 tablet, Rfl: 3     ranitidine (ZANTAC) 75 MG tablet, Take 1 tablet (75 mg) by mouth daily as needed for heartburn, Disp: 30 tablet, Rfl:      Syringe/Needle, Disp, 18G X 1\" 6 ML MISC, 1 each 2 times daily, Disp: 60 each, Rfl: 5     tobramycin (MARLEN PODHALER) 28 MG in caps, Inhale 4 capsules (112 mg) into the lungs 2 times daily 1 month on, 1 month off, Disp: 224 capsule, Rfl: 5     ursodiol (ACTIGALL) 300 MG capsule, TAKE 1 CAPSULE (300MG ) BY MOUTH TWO TIMES A DAY, Disp: 180 capsule, Rfl: 3     albuterol (PROAIR HFA/PROVENTIL HFA/VENTOLIN HFA) 108 (90 Base) MCG/ACT Inhaler, Inhale 2 puffs into the lungs 2 times daily Following your vancomycin nebulizer treatment. (Patient not taking: Reported on 1/3/2019), Disp: 1 Inhaler, Rfl: 3     order for DME, Equipment being ordered: Nebulizer Supplies. Please dispense four (4) Ruby LC Plus nebulizer kits and four (4) RUBY face masks for this patient with Cystic Fibrosis. Patient requires additional nebulizer supplies due to his need to use multiple sterile neb cups for his inhaled medications that may not be mixed together., Disp: 4 kit, Rfl: 12           " "Review of Systems:     Comprehensive ROS negative other than the symptoms noted above in the HPI.          Physical Exam:   Blood pressure 125/85, pulse 76, height 1.626 m (5' 4\"), weight 54.5 kg (120 lb 1.6 oz).  Height: 5' 4\", Normalized stature-for-age data not available for patients older than 20 years.  Weight: 120 lbs 1.6 oz, Normalized weight-for-age data not available for patients older than 20 years.  BMI: Body mass index is 20.62 kg/m ., Normalized BMI data available only for age 0 to 20 years.      CONSTITUTIONAL:   Awake, alert, and in no apparent distress.  HEAD: Normocephalic, without obvious abnormality.  EYES:  sclera clear, conjunctiva normal.  ENT: oral pharynx with moist mucus membranes.  NECK: Supple, symmetrical, trachea midline.  THYROID: symmetric, not enlarged and no tenderness.  HEMATOLOGIC/LYMPHATIC: No cervical lymphadenopathy.  LUNGS: No increased work of breathing, clear to auscultation  with good air entry  CARDIOVASCULAR: Regular rate and rhythm, no murmurs.  ABDOMEN: Soft, non-distended, non-tender  NEUROLOGIC:No focal deficits noted.   PSYCHIATRIC: Cooperative, no agitation.  SKIN: Insulin administration sites intact without lipohypertrophy.   FEET:  Normal        Laboratory results:     TSH   Date Value Ref Range Status   09/26/2018 1.48 0.40 - 4.00 mU/L Final     Testosterone Total   Date Value Ref Range Status   09/15/2017 358 240 - 950 ng/dL Final     Comment:     This test was developed and its performance characteristics determined by the   [facility],  Special Chemistry Laboratory. It has   not been cleared or approved by the FDA. The laboratory is regulated under   CLIA as qualified to perform high-complexity testing. This test is used for   clinical purposes. It should not be regarded as investigational or for   research.       Cholesterol   Date Value Ref Range Status   09/26/2018 105 <200 mg/dL Final     Albumin Urine mg/L   Date Value Ref Range " Status   09/26/2018 12 mg/L Final     Triglycerides   Date Value Ref Range Status   09/26/2018 38 <150 mg/dL Final     HDL Cholesterol   Date Value Ref Range Status   09/26/2018 62 >39 mg/dL Final     LDL Cholesterol Calculated   Date Value Ref Range Status   09/26/2018 36 <100 mg/dL Final     Comment:     Desirable:       <100 mg/dl     Non HDL Cholesterol   Date Value Ref Range Status   09/26/2018 44 <130 mg/dL Final     Lab Results   Component Value Date    A1C 5.8 09/26/2018    A1C 6.1 09/15/2017    A1C 6.7 12/20/2016    A1C 7.5 10/20/2016    A1C 6.8 09/30/2016       CF  Diabetes Health Maintenance    Date of Diabetes Diagnosis: ~ 2012     Special Notes (if any):      Date Last Eye Exam: April 2017     Date Last Dental Appointment: every 6 months    Dates of Episodes Severe* Hypoglycemia (month/year, cumulative, ongoing, assess each visit):    *Severe=patient unconscious, seizure, unable to help self    Last 25-Vitamin D (every year): 28      Last DXA, lowest Z-score (every 2 years): Most negative and valid T score of -0.9     Last Urine Microalbumin (every year): 7.45 September 2018    Last Testosterone:   Testosterone Total   Date Value Ref Range Status   09/15/2017 358 240 - 950 ng/dL Final     Comment:     This test was developed and its performance characteristics determined by the   [facility],  Special Chemistry Laboratory. It has   not been cleared or approved by the FDA. The laboratory is regulated under   CLIA as qualified to perform high-complexity testing. This test is used for   clinical purposes. It should not be regarded as investigational or for   research.              Assessment and Plan:   Dilan is a 33 year old male with cystic fibrosis related diabetes mellitus.     He is having lows. He is on Basaglar 12 unit(s) daily without short acting insulin. He doesn't check his blood glucose regularly, but in his log he has readings of 47 and 36. He was asymptomatic  with these lows. Reduced his Basaglar to 8 unit(s) in the morning.  Discussed using CGM, he would consider the pro version if diabetes education is able to place this for him. Other wise we will check FSBG before and 2 hr after meals for 3 days and send data for review.  It's uncertain whether his lows are from reactive hypoglycemia or from his long acting insulin.    Diabetes is a complicated and dangerous illness which requires intensive monitoring and treatment to prevent both short-term and long-term consequences to various organs. Insulin therapy is life-saving, but is also associated with life-threatening toxicity (hypoglycemia).  Careful and continuous attention to balancing glucose levels, activity, diet and insulin dosage is necessary.    I have reviewed this plan with the patient and with the diabetes nurse educator, who will communicate with the patient between visits for insulin adjustment.    Thank you for allowing me to participate in the care of your patient.  Please do not hesitate to call with questions or concerns.    Sincerely,    JOSE Headley  Department of Endocrinology  [facility]    CC  LAURYN DUNBAR      >50% of 30 minute visit spent in face to face counseling, education and coordination of care related to options for better glycemic control as well as preventing, detecting, and treating hypoglycemia.         Scribe Disclosure:  I, Vicente Enriquez, am serving as a scribe to document services personally performed by Edison Miranda MD at this visit, based upon the provider's statements to me. All documentation has been reviewed by the aforementioned provider prior to being entered into the official medical record.    Portions of this medical record were completed by a scribe. UPON MY REVIEW AND AUTHENTICATION BY ELECTRONIC SIGNATURE, this confirms (a) I performed the applicable clinical services, and (b) the record is accurate.     JOSE Headley

## 2019-06-05 ENCOUNTER — ALLIED HEALTH/NURSE VISIT (OUTPATIENT)
Dept: CARE COORDINATION | Facility: CLINIC | Age: 34
End: 2019-06-05

## 2019-06-05 ENCOUNTER — OFFICE VISIT (OUTPATIENT)
Dept: PULMONOLOGY | Facility: CLINIC | Age: 34
End: 2019-06-05
Attending: PHYSICIAN ASSISTANT
Payer: COMMERCIAL

## 2019-06-05 ENCOUNTER — TELEPHONE (OUTPATIENT)
Dept: ENDOCRINOLOGY | Facility: CLINIC | Age: 34
End: 2019-06-05

## 2019-06-05 VITALS
BODY MASS INDEX: 20.51 KG/M2 | HEART RATE: 96 BPM | SYSTOLIC BLOOD PRESSURE: 123 MMHG | WEIGHT: 120.15 LBS | OXYGEN SATURATION: 94 % | DIASTOLIC BLOOD PRESSURE: 79 MMHG | HEIGHT: 64 IN | RESPIRATION RATE: 16 BRPM

## 2019-06-05 DIAGNOSIS — Z71.9 ENCOUNTER FOR COUNSELING: Primary | ICD-10-CM

## 2019-06-05 DIAGNOSIS — E84.9 DIABETES MELLITUS DUE TO CYSTIC FIBROSIS (H): ICD-10-CM

## 2019-06-05 DIAGNOSIS — E08.9 DIABETES MELLITUS DUE TO CYSTIC FIBROSIS (H): ICD-10-CM

## 2019-06-05 DIAGNOSIS — E84.9 CYSTIC FIBROSIS (H): Primary | ICD-10-CM

## 2019-06-05 DIAGNOSIS — E84.9 CF (CYSTIC FIBROSIS) (H): Primary | ICD-10-CM

## 2019-06-05 PROCEDURE — G0463 HOSPITAL OUTPT CLINIC VISIT: HCPCS | Mod: ZF

## 2019-06-05 PROCEDURE — 87077 CULTURE AEROBIC IDENTIFY: CPT | Performed by: PHYSICIAN ASSISTANT

## 2019-06-05 PROCEDURE — 87070 CULTURE OTHR SPECIMN AEROBIC: CPT | Performed by: PHYSICIAN ASSISTANT

## 2019-06-05 PROCEDURE — 87186 SC STD MICRODIL/AGAR DIL: CPT | Performed by: PHYSICIAN ASSISTANT

## 2019-06-05 RX ORDER — INSULIN GLARGINE 100 [IU]/ML
8 INJECTION, SOLUTION SUBCUTANEOUS DAILY
Qty: 15 ML | Refills: 1 | COMMUNITY
Start: 2019-06-05 | End: 2019-08-27

## 2019-06-05 ASSESSMENT — PAIN SCALES - GENERAL: PAINLEVEL: NO PAIN (0)

## 2019-06-05 ASSESSMENT — MIFFLIN-ST. JEOR: SCORE: 1391

## 2019-06-05 NOTE — PATIENT INSTRUCTIONS
Cystic Fibrosis Self-Care Plan       Patient: Dilan Nassar   MRN: [id number]   Clinic Date: June 5, 2019     RECOMMENDATIONS:  1. Continue nebulizers and vest therapy.   2. Consider adding aztreonam (need to mix), Cayston (brand aztreonam, own nebulizer machine) or colymycin (usually 3rd option, and you have to still have to mix it). Call the office if you decide you want to do a test dose at next visit.     Annual Studies:   IGG   Date Value Ref Range Status   09/26/2018 1,320 695 - 1,620 mg/dL Final     No results found for: INS  There are no preventive care reminders to display for this patient.    Pulmonary Function Tests  FEV1: amount of air you can blow out in 1 second  FVC: total amount of air you can take in and blow out    Your Goals:         PFT Latest Ref Rng & Units 6/5/2019   FVC L 3.88   FEV1 L 2.07   FVC% % 89   FEV1% % 57          Airway Clearance: The Most Important Way to Keep Your Lungs Healthy  Vest Settings:    Hill-Rom Frequencies: 8, 9, 10 Pressure 10 Then, Frequencies 18, 19, 20 Pressure 6      RespirTech: Quick Start with Pressure of     Do each frequency for 5 minutes; Deflate vest after each frequency & cough 3 times before beginning the next setting.    Vest and Neb Therapy should be done 2-3 times/day.    Good Nutrition Can Improve Lung Function and Overall Health     Take ALL of your vitamins with food     Take 1/2 of your enzymes before EVERY meal/snack and the other 1/2 mid-meal/snack    Wt Readings from Last 3 Encounters:   06/05/19 54.5 kg (120 lb 2.4 oz)   06/04/19 54.5 kg (120 lb 1.6 oz)   01/03/19 54.2 kg (119 lb 7.8 oz)       Body mass index is 20.62 kg/m .         National CF Foundation Recommendations for BMI in CF Adults: Women: at least 22 Men: at least 23        Controlling Blood Sugars Helps Prevent Lung Infections & Improves Nutrition  Test blood sugar:     In the morning before eating (goal is )     2 hours after a meal (goal is less than 150)     When  pre-meal glucose is greater than 150 add correction     At bedtime (if less than 100 eat a snack with 15 grams of carbohydrates  Last A1C Results:   Hemoglobin A1C   Date Value Ref Range Status   09/26/2018 5.8 (H) 0 - 5.6 % Final     Comment:     Normal <5.7% Prediabetes 5.7-6.4%  Diabetes 6.5% or higher - adopted from ADA   consensus guidelines.           If diabetic, measure A1C every 6 months. Goal is under 7%.    Staying Healthy    Research: If you are interested in learning about research opportunities or have questions, please contact Sonali Altamirano at [phone] or [email or web address].       Foundation: Compass is a personalized resource service to help you with the insurance, financial, legal and other issues you are facing.  It's free, confidential and available to anyone with CF.  Ask your  for more information or contact Compass directly at [facility] ([phone]) or compass@cff.org, or learn more at cff.org/compass.       CF Nurse Line:  Nadya Pimentel and Dianna: [phone]   Audra Smart, RT: [phone]     Daniela Whitt and Tamika Perez , Dieticians: [phone]     Lisset Root, Diabetes Nurse: [phone]    Kathie Truong: [phone] or Carisa Berumen [phone], Social Workers   [email or web address]

## 2019-06-05 NOTE — LETTER
6/5/2019       RE: Dilan Nassar  [address]  [place] [place] [address]     Dear Colleague,    Thank you for referring your patient, Dilan Nassar, to the [facility] at [facility] Please see a copy of my visit note below.    [facility]  June 5, 2019         Assessment and Plan:   Dilan Nassar is a 31 year old male with cystic fibrosis, pancreatic insufficiency and CFRD who is seen today in clinic for routine follow up.       1. CF lung disease: overall, feeling at baseline. Cough is productive in the am, no congestion or dyspnea. Sating 94% on room air; vesting BID. PFTs down slightly today, still within his baseline. Previous cultures + for MRSA, Klebsiella, Steno and Ps A. No evidence of exacerbation at this time. Discussed addition of Cayston (not sure he wants another neb machine) or aztreonam; Guillermo will think about it.   - Continue nebulizers, inhaler and vest therapy  - On chronic azithromycin   - Continue george podhaler month on/off  - F/u on addition of aztreonam      2. Hemoptysis: no recent complaints.   - Continue vitamin K once/week      3. CF related liver disease: since 10/15 per our EPIC charting with US 5/17 demonstrating coarse echotexture of the liver with hypertrophy of the caudate lobe and left lobe concerning for possible cirrhosis, no lesions, patent vasculature. LFTs stable with elevated alk phos, but normal enzymes.  - Continue Ursodiol      4. Exocrine pancreatic insufficiency: denies symptoms of malabsorption. Weight is stable, but BMI at 20.62 is still below Foundation goal.   - Continue pancreatic enzymes and vitamin supplementation      5. CFRD: recent AIC of 6.0 on 6/4/19. Recently had BS that were quite low, 47 and 36 and asymptomatic. Saw Dr. Miranda yesterday and they discussed CGM, as she wasn't sure if patient's glucometer was working. We attempted  to get CGM placed today without success. Did get Guillermo a new glucometer.   - Continue Basaglar, decreased to 8 U  - F/u with Dr. Miranda and routine diabetic eye exam as scheduled       6. CF related sinus disease: denies current symptoms.   -  Continue Flonase and Claritin    7. HCM: reviewed DEXA today, Z scores okay, no evidence of CF related low bone mass      RTC: in 3 months with routine spirometry  Annual studies due: September 2019 (ordered for next f/u)      Jyothi Warren PA-C  Pulmonary, Allergy, Critical Care and Sleep Medicine        Interval History:   Overall, feeling well. No recent illness, had a cold a few times, but resolve on it's own. Cough is baseline, productive in the am. Has recently started running for the last week, hoping to get out every other days. No congestion or tightness. Vesting BID. No nausea or bloating.          Review of Systems:   Please see HPI. Otherwise, complete 10 point ROS negative.           Past Medical and Surgical History:     Past Medical History:   Diagnosis Date     Cystic fibrosis with pulmonary manifestations (H)     /[id number]     Past Surgical History:   Procedure Laterality Date     APPENDECTOMY OPEN  1999    Appendicitis      SINUS SURGERY  1990's    h/o many sinus infections           Family History:     Family History   Problem Relation Age of Onset     Diabetes Mother         Unknown type     Prostate Cancer Paternal Grandfather      LUNG DISEASE Other         Negative     Diabetes Maternal Aunt         unknown type     Diabetes Maternal Uncle         unknown type     Diabetes Maternal Grandmother         unknown type     Coronary Artery Disease Paternal Grandmother             Social History:     Social History     Socioeconomic History     Marital status: Single     Spouse name: Not on file     Number of children: Not on file     Years of education: Not on file     Highest education level: Not on file   Occupational History     Occupation: State  Representative     Employer: [employer]     Occupation: Financial Counselor   Social Needs     Financial resource strain: Not on file     Food insecurity:     Worry: Not on file     Inability: Not on file     Transportation needs:     Medical: Not on file     Non-medical: Not on file   Tobacco Use     Smoking status: Never Smoker     Smokeless tobacco: Former User   Substance and Sexual Activity     Alcohol use: Yes     Alcohol/week: 6.0 - 8.4 oz     Types: 10 - 14 Standard drinks or equivalent per week     Comment: 2 drinks per night 5X/wk     Drug use: No     Sexual activity: Yes     Partners: Female     Birth control/protection: Pull-out method, Condom   Lifestyle     Physical activity:     Days per week: Not on file     Minutes per session: Not on file     Stress: Not on file   Relationships     Social connections:     Talks on phone: Not on file     Gets together: Not on file     Attends Church service: Not on file     Active member of club or organization: Not on file     Attends meetings of clubs or organizations: Not on file     Relationship status: Not on file     Intimate partner violence:     Fear of current or ex partner: Not on file     Emotionally abused: Not on file     Physically abused: Not on file     Forced sexual activity: Not on file   Other Topics Concern     Parent/sibling w/ CABG, MI or angioplasty before 65F 55M? Yes   Social History Narrative    Lives alone in apartment in [place], [place]. 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  His main political issue is higher education.  His girlfriend is looking for work as an .  He eats 3 square meals per day, most of which are cooked by him or his girlfriend.  Gets outside to walk or run 20 minutes about 5x per week.  Works fairly regular daytime hours, often from home.            Medications:     Current Outpatient Medications  "  Medication     insulin glargine (BASAGLAR KWIKPEN) 100 UNIT/ML pen     albuterol (2.5 MG/3ML) 0.083% neb solution     albuterol (PROAIR HFA/PROVENTIL HFA/VENTOLIN HFA) 108 (90 Base) MCG/ACT Inhaler     allopurinol (ZYLOPRIM) 300 MG tablet     amylase-lipase-protease (CREON) 90570 units CPEP     azithromycin (ZITHROMAX) 250 MG tablet     BD YON U/F 32G X 4 MM insulin pen needle     blood glucose monitoring (FREESTYLE LITE) test strip     blood glucose monitoring (FREESTYLE) lancets     dornase alpha (PULMOZYME) 1 MG/ML neb solution     fluticasone (FLONASE) 50 MCG/ACT nasal spray     fluticasone (FLOVENT HFA) 110 MCG/ACT inhaler     insulin pen needle (BD YON U/F) 32G X 4 MM miscellaneous     loratadine (CLARITIN) 10 MG tablet     multivitamins CF formula  (MVW COMPLETE FORMULATION ) CAPS capsule     order for DME     phytonadione (MEPHYTON) 5 MG tablet     ranitidine (ZANTAC) 75 MG tablet     Syringe/Needle, Disp, 18G X 1\" 6 ML MISC     tobramycin (MARLEN PODHALER) 28 MG in caps     ursodiol (ACTIGALL) 300 MG capsule     No current facility-administered medications for this visit.             Physical Exam:   /79 (BP Location: Right arm, Patient Position: Chair, Cuff Size: Adult Regular)   Pulse 96   Resp 16   Ht 1.626 m (5' 4\")   Wt 54.5 kg (120 lb 2.4 oz)   SpO2 94%   BMI 20.62 kg/m       GENERAL: alert, NAD  HEENT: NCAT, EOMI, anicteric sclera, canals and TMs clear; no oral mucosal edema or erythema  Neck: no cervical or supraclavicular adenopathy  Respiratory: good air flow, few scattered crackles in bilateral bases  CV: RRR, S1S2, no murmurs noted  Abdomen: normoactive BS, soft and non tender  Lymph: no edema, + digital clubbing  Neuro: AAO X 3, CN 2-12 grossly intact, 5/5 bilateral  strength and dorsiflexion  Psychiatric: normal affect, good eye contact  Skin: no rash, jaundice or lesions on limited exam         Data:   All laboratory and imaging data reviewed.      Cystic Fibrosis " Culture  Specimen Description   Date Value Ref Range Status   01/03/2019 Sputum  Final   09/26/2018 Sputum  Final   09/26/2018 Sputum  Final    Culture Micro   Date Value Ref Range Status   01/03/2019 Moderate growth  Normal mami    Final   01/03/2019 Moderate growth  Pseudomonas aeruginosa   (A)  Final   01/03/2019 Light growth  Strain 2  Pseudomonas aeruginosa   (A)  Final   01/03/2019 Single colony  Aspergillus fumigatus   (A)  Final        PFT interpretation:  Maneuver: valid and meets ATS guidelines  Moderate severe obstruction with decreased FEV1 and FEV1/FVC  Compared to prior: FEV1 of 2.07 is 150 ml below prior, but still within his baseline            Again, thank you for allowing me to participate in the care of your patient.      Sincerely,    Jyothi Warren PA-C

## 2019-06-05 NOTE — PROGRESS NOTES
Adult Cystic Fibrosis Program  Social Work Clinic Consult    Data:   Met with Dilan to review psychosocial needs and provide counseling as needed.    Introduced self as new CF SW and provided contact info. 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 He denies any SW needs at this time but is aware of how to reach SW if needs arise.    Intervention:  -Introduction to SW and SW role    Assessment: Guillermo had a normal affect and was receptive to SW stopping by. He is aware of how to get a hold of SW if needs arise.    Plan:   -Continue to assist with psychosocial concern(s).  -Continue to follow through regular clinic consult.  -Continue to follow for any psychosocial needs that may arise.  -Complete full psychosocial assessment annually.     ROMA Lakhani, [facility]  Adult Cystic Fibrosis   Ph: [phone], Pager: [phone]

## 2019-06-06 ENCOUNTER — CARE COORDINATION (OUTPATIENT)
Dept: EDUCATION SERVICES | Facility: CLINIC | Age: 34
End: 2019-06-06

## 2019-06-06 DIAGNOSIS — E84.9 DIABETES MELLITUS DUE TO CYSTIC FIBROSIS (H): Primary | ICD-10-CM

## 2019-06-06 DIAGNOSIS — E08.9 DIABETES MELLITUS DUE TO CYSTIC FIBROSIS (H): Primary | ICD-10-CM

## 2019-06-06 NOTE — PROGRESS NOTES
Diabetes Education Note:    Follow up call to Guillermo.   He left the clinic with a meter yesterday, but unfortunately the brand isn't covered by his insurance and he would end up paying out of pocket for the test strips, so order was placed for a meter and test strips that would be covered by his insurance.      Reviewed testing with him.  Per Dr. Miranda, he is going to test before and 2 hours after each meal for the next few days.  Reviewed how to do this.  Instructed to test two hours after the beginning of the meal.  He states that he will forward his results through [employer] to Dr. Miranda.  He will not be back in the  until September for follow up visit with Dr. Miranda.      Encouraged to call if he is running into any problems or has questions.        FILOMENA Weaver, RN, CDE  Diabetes   [facility]  [facility] and [facility] Room 7-519  [address] [place] [id number]  Email:  [email or web address]  Phone:  [phone]

## 2019-06-10 LAB
BACTERIA SPEC CULT: ABNORMAL
EXPTIME-PRE: 13.68 SEC
FEF2575-%PRED-PRE: 18 %
FEF2575-PRE: 0.68 L/SEC
FEF2575-PRED: 3.74 L/SEC
FEFMAX-%PRED-PRE: 74 %
FEFMAX-PRE: 6.69 L/SEC
FEFMAX-PRED: 8.99 L/SEC
FEV1-%PRED-PRE: 57 %
FEV1-PRE: 2.07 L
FEV1FEV6-PRE: 60 %
FEV1FEV6-PRED: 83 %
FEV1FVC-PRE: 53 %
FEV1FVC-PRED: 83 %
FIFMAX-PRE: 5.87 L/SEC
FVC-%PRED-PRE: 89 %
FVC-PRE: 3.88 L
FVC-PRED: 4.34 L
SPECIMEN SOURCE: ABNORMAL

## 2019-06-11 ENCOUNTER — MYC REFILL (OUTPATIENT)
Dept: PULMONOLOGY | Facility: CLINIC | Age: 34
End: 2019-06-11

## 2019-06-11 DIAGNOSIS — E84.9 CYSTIC FIBROSIS (H): ICD-10-CM

## 2019-06-11 DIAGNOSIS — E84.0 CYSTIC FIBROSIS WITH PULMONARY MANIFESTATIONS (H): ICD-10-CM

## 2019-06-11 DIAGNOSIS — E84.9 DIABETES MELLITUS DUE TO CYSTIC FIBROSIS (H): ICD-10-CM

## 2019-06-11 DIAGNOSIS — A49.02 MRSA INFECTION: ICD-10-CM

## 2019-06-11 DIAGNOSIS — E08.9 DIABETES MELLITUS DUE TO CYSTIC FIBROSIS (H): ICD-10-CM

## 2019-06-11 DIAGNOSIS — K86.81 EXOCRINE PANCREATIC INSUFFICIENCY: ICD-10-CM

## 2019-06-11 DIAGNOSIS — E84.9 CF (CYSTIC FIBROSIS) (H): ICD-10-CM

## 2019-07-18 ENCOUNTER — TELEPHONE (OUTPATIENT)
Dept: EDUCATION SERVICES | Facility: CLINIC | Age: 34
End: 2019-07-18

## 2019-07-18 NOTE — TELEPHONE ENCOUNTER
Call made to Guillermo to discuss his elevated post-breakfast glucose readings and diet versus meal time insulin. Guillermo did not answer, so a voice message was left asking him to return the call at his earliest convenience.     Scarlett Noel RD, LD, CDE  7/18/2019   11:16 AM

## 2019-07-23 DIAGNOSIS — A49.02 MRSA INFECTION: ICD-10-CM

## 2019-07-23 DIAGNOSIS — E84.9 CYSTIC FIBROSIS (H): ICD-10-CM

## 2019-07-23 DIAGNOSIS — K86.81 EXOCRINE PANCREATIC INSUFFICIENCY: ICD-10-CM

## 2019-07-23 DIAGNOSIS — E84.9 DIABETES MELLITUS DUE TO CYSTIC FIBROSIS (H): ICD-10-CM

## 2019-07-23 DIAGNOSIS — E08.9 DIABETES MELLITUS DUE TO CYSTIC FIBROSIS (H): ICD-10-CM

## 2019-07-23 DIAGNOSIS — E84.0 CYSTIC FIBROSIS WITH PULMONARY MANIFESTATIONS (H): ICD-10-CM

## 2019-07-23 DIAGNOSIS — E84.9 CF (CYSTIC FIBROSIS) (H): ICD-10-CM

## 2019-07-23 RX ORDER — ALBUTEROL SULFATE 0.83 MG/ML
2.5 SOLUTION RESPIRATORY (INHALATION)
Qty: 60 VIAL | Refills: 11 | Status: SHIPPED | OUTPATIENT
Start: 2019-07-23 | End: 2020-08-13

## 2019-07-25 ENCOUNTER — TELEPHONE (OUTPATIENT)
Dept: EDUCATION SERVICES | Facility: CLINIC | Age: 34
End: 2019-07-25

## 2019-07-25 NOTE — TELEPHONE ENCOUNTER
"Call returned to Guillermo to discuss elevated glucose readings after breakfast (see recent [employer] message to Dr. Miranda).   Guillermo tells me that he has carb counted in the past and that he knows how to do it. Used labels and has the CFF CFRD book that he received during CF clinic. Reviewed carb counting with Guillermo over the phone today including identifying carbohydrate containing foods, how to use a nutrition facts label, reviewed written and online or sadaf based sources and how to use an insulin to carbohydrate ratio.  Guillermo tells me that he is not keen on starting meal time insulin with breakfast as \"it would be just another thing to do\". His carb intake at breakfast is approximately 130 grams, lunch is 60g and dinner is 120 grams according to recall and typical intake which he provided over the phone. I reinforced that it is important that he not decrease his calorie or carbohydrate intake, but that he could distribute his carbs more evenly between meals and snacks, without eating less over the course of the day. Guillermo tells me that he would rather not make any changes to his breakfast. Meal time insulin at breakfast seems to be the better option for Guillermo. He states that he would like to think about it and will call the Endo clinic when he decides what he would like to do. Message routed to Dr. Miranda.    Scarlett Noel, RD, LD, CDE      "

## 2019-08-08 ENCOUNTER — MYC REFILL (OUTPATIENT)
Dept: PULMONOLOGY | Facility: CLINIC | Age: 34
End: 2019-08-08

## 2019-08-08 DIAGNOSIS — E84.9 CF (CYSTIC FIBROSIS) (H): ICD-10-CM

## 2019-08-08 RX ORDER — ALLOPURINOL 300 MG/1
TABLET ORAL
Qty: 90 TABLET | Refills: 3 | Status: SHIPPED | OUTPATIENT
Start: 2019-08-08 | End: 2020-07-30

## 2019-08-27 ENCOUNTER — MYC REFILL (OUTPATIENT)
Dept: PULMONOLOGY | Facility: CLINIC | Age: 34
End: 2019-08-27

## 2019-08-27 DIAGNOSIS — E84.9 DIABETES MELLITUS DUE TO CYSTIC FIBROSIS (H): ICD-10-CM

## 2019-08-27 DIAGNOSIS — E08.9 DIABETES MELLITUS DUE TO CYSTIC FIBROSIS (H): ICD-10-CM

## 2019-08-27 RX ORDER — INSULIN GLARGINE 100 [IU]/ML
8 INJECTION, SOLUTION SUBCUTANEOUS DAILY
Qty: 15 ML | Refills: 1 | Status: SHIPPED | OUTPATIENT
Start: 2019-08-27 | End: 2019-12-19

## 2019-09-05 NOTE — PROGRESS NOTES
"[facility]  September 9, 2019         Assessment and Plan:   Dilan Nassar is a 34 year old male with cystic fibrosis, pancreatic insufficiency and CFRD who is seen today in clinic for routine follow up.       1. CF lung disease: overall, feeling at baseline. Intermittently productive cough, at baseline. No other complaints, does physical activity 2-3 days/week for 20-30 minutes. Sating 95% on room air; vesting BID. PFTs down slightly today, still within his baseline. Previous cultures + for MRSA, Klebsiella, Steno, Ps A and A. Fumigatus. No evidence of exacerbation at this time. Again discussed small changes that could improve his lung function including addition of [place] or aztreonam and better blood sugar control and he would continue to like to \"think about it.\"  - Continue nebulizers, inhaler and vest therapy  - On chronic azithromycin   - Continue george podhaler month on/off  - Add aztreonam??      2. Hemoptysis: no recent complaints.   - Continue vitamin K once/week      3. CF related liver disease: since 10/15 per our EPIC charting with US 5/17 demonstrating coarse echotexture of the liver with hypertrophy of the caudate lobe and left lobe concerning for possible cirrhosis, no lesions, patent vasculature. LFTs stable with elevated alk phos, but normal enzymes on labs today.   - Continue Ursodiol      4. Exocrine pancreatic insufficiency: denies symptoms of malabsorption. Weight is stable, BMI remains below Foundation goal. GAURAV Lundberg, to see today.   - Continue pancreatic enzymes and vitamin supplementation      5. CFRD: recent AIC of 6.1 on today. No longer having low BS with checking, didn't bring his meter with him. Does note occasionally feeling low blood sugars, thinks it was happening session when he would forget to eat. In the past week, has been 90s in the am, Dr. Miranda has recommended some mealtime insulin, possibly breakfast only, which " "he has not done. BS this am of 248 about 45 minutes after eating. Suspect his pulmonary function could improve with better control of BS.   - Continue Basaglar 8 U  - F/u with Dr. Miranda and routine diabetic eye exam as scheduled       6. CF related sinus disease: denies current symptoms.   - Continue Flonase and Claritin    7. HCM: reviewed labs with patient today including normal WBC and hgb with slightly low platelets of 145K. Normal BMP, LFTs per above. Normal cholesterol panel, TGs and TSH. UA WNL. Multiple labs and CXR pending.      RTC: in 3 months with routine spirometry  Annual studies due: September 2020      Jyothi Warren PA-C  Pulmonary, Allergy, Critical Care and Sleep Medicine        Interval History:   Feeling fine for the last few months. Trying to do a little running 20 minutes and 30 minutes biking a few times/week. Cough is baseline, more productive in the am, no congestion, tightness or wheezing. Vesting BID. Usually using the Tonica during session, it's \"okay>\" feels it's uncomfortable, not sure if effective.          Review of Systems:   Please see HPI. Otherwise, complete 10 point ROS negative.           Past Medical and Surgical History:     Past Medical History:   Diagnosis Date     Cystic fibrosis with pulmonary manifestations (H)     /[id number]     Past Surgical History:   Procedure Laterality Date     APPENDECTOMY OPEN  1999    Appendicitis      SINUS SURGERY  1990's    h/o many sinus infections           Family History:     Family History   Problem Relation Age of Onset     Diabetes Mother         Unknown type     Prostate Cancer Paternal Grandfather      LUNG DISEASE Other         Negative     Diabetes Maternal Aunt         unknown type     Diabetes Maternal Uncle         unknown type     Diabetes Maternal Grandmother         unknown type     Coronary Artery Disease Paternal Grandmother             Social History:     Social History     Socioeconomic History     Marital status: " Single     Spouse name: Not on file     Number of children: Not on file     Years of education: Not on file     Highest education level: Not on file   Occupational History     Occupation: State Representative     Employer: [employer]     Occupation: Financial Counselor   Social Needs     Financial resource strain: Not on file     Food insecurity:     Worry: Not on file     Inability: Not on file     Transportation needs:     Medical: Not on file     Non-medical: Not on file   Tobacco Use     Smoking status: Never Smoker     Smokeless tobacco: Former User   Substance and Sexual Activity     Alcohol use: Yes     Alcohol/week: 6.0 - 8.4 oz     Types: 10 - 14 Standard drinks or equivalent per week     Comment: 2 drinks per night 5X/wk     Drug use: No     Sexual activity: Yes     Partners: Female     Birth control/protection: Pull-out method, Condom   Lifestyle     Physical activity:     Days per week: Not on file     Minutes per session: Not on file     Stress: Not on file   Relationships     Social connections:     Talks on phone: Not on file     Gets together: Not on file     Attends Yarsanism service: Not on file     Active member of club or organization: Not on file     Attends meetings of clubs or organizations: Not on file     Relationship status: Not on file     Intimate partner violence:     Fear of current or ex partner: Not on file     Emotionally abused: Not on file     Physically abused: Not on file     Forced sexual activity: Not on file   Other Topics Concern     Parent/sibling w/ CABG, MI or angioplasty before 65F 55M? Yes   Social History Narrative    Lives alone in apartment in [place], [place]. 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         Nov 2015.  His main political issue is higher education.  His girlfriend is looking for work as an .  He eats 3 square meals per day, most of which are cooked by him or his  "girlfriend.  Gets outside to walk or run 20 minutes about 5x per week.  Works fairly regular daytime hours, often from home.            Medications:     Current Outpatient Medications   Medication     albuterol (PROVENTIL) (2.5 MG/3ML) 0.083% neb solution     allopurinol (ZYLOPRIM) 300 MG tablet     amylase-lipase-protease (CREON) 88111 units CPEP     azithromycin (ZITHROMAX) 250 MG tablet     BD YON U/F 32G X 4 MM insulin pen needle     blood glucose (NO BRAND SPECIFIED) test strip     blood glucose monitoring (FREESTYLE LITE) test strip     blood glucose monitoring (FREESTYLE) lancets     blood glucose monitoring (NO BRAND SPECIFIED) meter device kit     dornase alpha (PULMOZYME) 1 MG/ML neb solution     fluticasone (FLONASE) 50 MCG/ACT nasal spray     fluticasone (FLOVENT HFA) 110 MCG/ACT inhaler     insulin glargine (BASAGLAR KWIKPEN) 100 UNIT/ML pen     insulin pen needle (BD YON U/F) 32G X 4 MM miscellaneous     loratadine (CLARITIN) 10 MG tablet     multivitamins CF formula  (MVW COMPLETE FORMULATION ) CAPS capsule     phytonadione (MEPHYTON) 5 MG tablet     Syringe/Needle, Disp, 18G X 1\" 6 ML MISC     tobramycin (MARLEN PODHALER) 28 MG in caps     ursodiol (ACTIGALL) 300 MG capsule     albuterol (PROAIR HFA/PROVENTIL HFA/VENTOLIN HFA) 108 (90 Base) MCG/ACT Inhaler     order for DME     No current facility-administered medications for this visit.             Physical Exam:   /85   Pulse 91   Resp 16   Ht 1.626 m (5' 4\")   Wt 54 kg (119 lb)   SpO2 95%   BMI 20.43 kg/m      GENERAL: alert, NAD  HEENT: NCAT, EOMI, anicteric sclera, canals and TMs clear; no oral mucosal edema or erythema  Neck: no cervical or supraclavicular adenopathy  Respiratory: moderate airflow, scattered bibasilar crackles  CV: RRR, S1S2, no murmurs noted  Abdomen: normoactive BS, soft and non tender  Lymph: no edema, + digital clubbing  Neuro: AAO X 3, CN 2-12 grossly intact  Psychiatric: normal affect, good eye " contact  Skin: no rash, jaundice or lesions on limited exam         Data:   All laboratory and imaging data reviewed.      Cystic Fibrosis Culture  Specimen Description   Date Value Ref Range Status   06/05/2019 Sputum  Final   01/03/2019 Sputum  Final   09/26/2018 Sputum  Final   09/26/2018 Sputum  Final    Culture Micro   Date Value Ref Range Status   06/05/2019 Heavy growth  Normal mami    Final   06/05/2019 (A)  Final    Light growth  Pseudomonas aeruginosa, mucoid strain     06/05/2019 Light growth  Pseudomonas aeruginosa   (A)  Final   06/05/2019 Light growth  Strain 2  Pseudomonas aeruginosa   (A)  Final   06/05/2019 Light growth  Stenotrophomonas maltophilia   (A)  Final   06/05/2019 Single colony  Aspergillus fumigatus   (A)  Final        PFT interpretation:  Maneuver: valid and meets ATS guidelines  Moderate severe obstruction with decreased FEV1 and FEV1/FVC  Compared to prior: FEV1 of 2.01 is 60 ml blow prior

## 2019-09-09 ENCOUNTER — ALLIED HEALTH/NURSE VISIT (OUTPATIENT)
Dept: CARE COORDINATION | Facility: CLINIC | Age: 34
End: 2019-09-09

## 2019-09-09 ENCOUNTER — OFFICE VISIT (OUTPATIENT)
Dept: PULMONOLOGY | Facility: CLINIC | Age: 34
End: 2019-09-09
Attending: PHYSICIAN ASSISTANT
Payer: COMMERCIAL

## 2019-09-09 ENCOUNTER — ANCILLARY PROCEDURE (OUTPATIENT)
Dept: GENERAL RADIOLOGY | Facility: CLINIC | Age: 34
End: 2019-09-09
Attending: PHYSICIAN ASSISTANT
Payer: COMMERCIAL

## 2019-09-09 VITALS
OXYGEN SATURATION: 95 % | SYSTOLIC BLOOD PRESSURE: 135 MMHG | HEIGHT: 64 IN | HEART RATE: 91 BPM | RESPIRATION RATE: 16 BRPM | WEIGHT: 119 LBS | BODY MASS INDEX: 20.32 KG/M2 | DIASTOLIC BLOOD PRESSURE: 85 MMHG

## 2019-09-09 DIAGNOSIS — E84.9 CF (CYSTIC FIBROSIS) (H): Primary | ICD-10-CM

## 2019-09-09 DIAGNOSIS — E84.9 DIABETES MELLITUS DUE TO CYSTIC FIBROSIS (H): ICD-10-CM

## 2019-09-09 DIAGNOSIS — E84.9 CYSTIC FIBROSIS (H): ICD-10-CM

## 2019-09-09 DIAGNOSIS — Z13.9 RISK AND FUNCTIONAL ASSESSMENT: Primary | ICD-10-CM

## 2019-09-09 DIAGNOSIS — E08.9 DIABETES MELLITUS DUE TO CYSTIC FIBROSIS (H): ICD-10-CM

## 2019-09-09 DIAGNOSIS — E84.0 CYSTIC FIBROSIS WITH PULMONARY EXACERBATION (H): ICD-10-CM

## 2019-09-09 DIAGNOSIS — K86.81 EXOCRINE PANCREATIC INSUFFICIENCY: Chronic | ICD-10-CM

## 2019-09-09 LAB
ALBUMIN SERPL-MCNC: 3.5 G/DL (ref 3.4–5)
ALBUMIN UR-MCNC: NEGATIVE MG/DL
ALP SERPL-CCNC: 164 U/L (ref 40–150)
ALT SERPL W P-5'-P-CCNC: 39 U/L (ref 0–70)
ANION GAP SERPL CALCULATED.3IONS-SCNC: 6 MMOL/L (ref 3–14)
APPEARANCE UR: ABNORMAL
AST SERPL W P-5'-P-CCNC: 24 U/L (ref 0–45)
BASOPHILS # BLD AUTO: 0.1 10E9/L (ref 0–0.2)
BASOPHILS NFR BLD AUTO: 0.7 %
BILIRUB UR QL STRIP: NEGATIVE
BUN SERPL-MCNC: 17 MG/DL (ref 7–30)
CALCIUM SERPL-MCNC: 9.1 MG/DL (ref 8.5–10.1)
CHLORIDE SERPL-SCNC: 105 MMOL/L (ref 94–109)
CHOLEST SERPL-MCNC: 108 MG/DL
CO2 SERPL-SCNC: 25 MMOL/L (ref 20–32)
COLOR UR AUTO: YELLOW
CREAT SERPL-MCNC: 0.81 MG/DL (ref 0.66–1.25)
CREAT UR-MCNC: 158 MG/DL
DEPRECATED CALCIDIOL+CALCIFEROL SERPL-MC: 26 UG/L (ref 20–75)
DIFFERENTIAL METHOD BLD: ABNORMAL
EOSINOPHIL # BLD AUTO: 0.3 10E9/L (ref 0–0.7)
EOSINOPHIL NFR BLD AUTO: 2.9 %
ERYTHROCYTE [DISTWIDTH] IN BLOOD BY AUTOMATED COUNT: 12.5 % (ref 10–15)
ERYTHROCYTE [SEDIMENTATION RATE] IN BLOOD BY WESTERGREN METHOD: 7 MM/H (ref 0–15)
EXPTIME-PRE: 12.24 SEC
FEF2575-%PRED-PRE: 17 %
FEF2575-PRE: 0.65 L/SEC
FEF2575-PRED: 3.71 L/SEC
FEFMAX-%PRED-PRE: 74 %
FEFMAX-PRE: 6.67 L/SEC
FEFMAX-PRED: 8.98 L/SEC
FEV1-%PRED-PRE: 56 %
FEV1-PRE: 2.01 L
FEV1FEV6-PRE: 58 %
FEV1FEV6-PRED: 82 %
FEV1FVC-PRE: 53 %
FEV1FVC-PRED: 83 %
FIFMAX-PRE: 6.82 L/SEC
FVC-%PRED-PRE: 88 %
FVC-PRE: 3.81 L
FVC-PRED: 4.32 L
GFR SERPL CREATININE-BSD FRML MDRD: >90 ML/MIN/{1.73_M2}
GGT SERPL-CCNC: 26 U/L (ref 0–75)
GLUCOSE SERPL-MCNC: 248 MG/DL (ref 70–99)
GLUCOSE UR STRIP-MCNC: NEGATIVE MG/DL
HBA1C MFR BLD: 6.1 % (ref 0–5.6)
HCT VFR BLD AUTO: 42 % (ref 40–53)
HDLC SERPL-MCNC: 65 MG/DL
HGB BLD-MCNC: 14.7 G/DL (ref 13.3–17.7)
HGB UR QL STRIP: NEGATIVE
IGG SERPL-MCNC: 1330 MG/DL (ref 695–1620)
IMM GRANULOCYTES # BLD: 0 10E9/L (ref 0–0.4)
IMM GRANULOCYTES NFR BLD: 0.2 %
INR PPP: 1.06 (ref 0.86–1.14)
IRON SERPL-MCNC: 84 UG/DL (ref 35–180)
KETONES UR STRIP-MCNC: NEGATIVE MG/DL
LDLC SERPL CALC-MCNC: 31 MG/DL
LEUKOCYTE ESTERASE UR QL STRIP: NEGATIVE
LYMPHOCYTES # BLD AUTO: 1.8 10E9/L (ref 0.8–5.3)
LYMPHOCYTES NFR BLD AUTO: 21.4 %
MAGNESIUM SERPL-MCNC: 1.6 MG/DL (ref 1.6–2.3)
MCH RBC QN AUTO: 31.9 PG (ref 26.5–33)
MCHC RBC AUTO-ENTMCNC: 35 G/DL (ref 31.5–36.5)
MCV RBC AUTO: 91 FL (ref 78–100)
MICROALBUMIN UR-MCNC: 10 MG/L
MICROALBUMIN/CREAT UR: 6.15 MG/G CR (ref 0–17)
MONOCYTES # BLD AUTO: 0.7 10E9/L (ref 0–1.3)
MONOCYTES NFR BLD AUTO: 8.2 %
MUCOUS THREADS #/AREA URNS LPF: PRESENT /LPF
NEUTROPHILS # BLD AUTO: 5.7 10E9/L (ref 1.6–8.3)
NEUTROPHILS NFR BLD AUTO: 66.6 %
NITRATE UR QL: NEGATIVE
NONHDLC SERPL-MCNC: 42 MG/DL
NRBC # BLD AUTO: 0 10*3/UL
NRBC BLD AUTO-RTO: 0 /100
PH UR STRIP: 5 PH (ref 5–7)
PHOSPHATE SERPL-MCNC: 3 MG/DL (ref 2.5–4.5)
PLATELET # BLD AUTO: 145 10E9/L (ref 150–450)
POTASSIUM SERPL-SCNC: 4 MMOL/L (ref 3.4–5.3)
PROT SERPL-MCNC: 7.2 G/DL (ref 6.8–8.8)
RBC # BLD AUTO: 4.61 10E12/L (ref 4.4–5.9)
RBC #/AREA URNS AUTO: 0 /HPF (ref 0–2)
SODIUM SERPL-SCNC: 136 MMOL/L (ref 133–144)
SOURCE: ABNORMAL
SP GR UR STRIP: 1.02 (ref 1–1.03)
TRIGL SERPL-MCNC: 56 MG/DL
TSH SERPL DL<=0.005 MIU/L-ACNC: 3.24 MU/L (ref 0.4–4)
UROBILINOGEN UR STRIP-MCNC: 0 MG/DL (ref 0–2)
WBC # BLD AUTO: 8.5 10E9/L (ref 4–11)
WBC #/AREA URNS AUTO: 0 /HPF (ref 0–5)

## 2019-09-09 PROCEDURE — 85025 COMPLETE CBC W/AUTO DIFF WBC: CPT | Performed by: PHYSICIAN ASSISTANT

## 2019-09-09 PROCEDURE — 85610 PROTHROMBIN TIME: CPT | Performed by: PHYSICIAN ASSISTANT

## 2019-09-09 PROCEDURE — 84443 ASSAY THYROID STIM HORMONE: CPT | Performed by: PHYSICIAN ASSISTANT

## 2019-09-09 PROCEDURE — 84590 ASSAY OF VITAMIN A: CPT | Performed by: PHYSICIAN ASSISTANT

## 2019-09-09 PROCEDURE — 81001 URINALYSIS AUTO W/SCOPE: CPT | Performed by: PHYSICIAN ASSISTANT

## 2019-09-09 PROCEDURE — 87107 FUNGI IDENTIFICATION MOLD: CPT | Performed by: PHYSICIAN ASSISTANT

## 2019-09-09 PROCEDURE — 82784 ASSAY IGA/IGD/IGG/IGM EACH: CPT | Performed by: PHYSICIAN ASSISTANT

## 2019-09-09 PROCEDURE — 83735 ASSAY OF MAGNESIUM: CPT | Performed by: PHYSICIAN ASSISTANT

## 2019-09-09 PROCEDURE — 85652 RBC SED RATE AUTOMATED: CPT | Performed by: PHYSICIAN ASSISTANT

## 2019-09-09 PROCEDURE — 87070 CULTURE OTHR SPECIMN AEROBIC: CPT | Performed by: PHYSICIAN ASSISTANT

## 2019-09-09 PROCEDURE — 84155 ASSAY OF PROTEIN SERUM: CPT | Performed by: PHYSICIAN ASSISTANT

## 2019-09-09 PROCEDURE — 97803 MED NUTRITION INDIV SUBSEQ: CPT | Mod: ZF | Performed by: DIETITIAN, REGISTERED

## 2019-09-09 PROCEDURE — 82306 VITAMIN D 25 HYDROXY: CPT | Performed by: PHYSICIAN ASSISTANT

## 2019-09-09 PROCEDURE — 87186 SC STD MICRODIL/AGAR DIL: CPT | Performed by: PHYSICIAN ASSISTANT

## 2019-09-09 PROCEDURE — 80069 RENAL FUNCTION PANEL: CPT | Performed by: PHYSICIAN ASSISTANT

## 2019-09-09 PROCEDURE — 80061 LIPID PANEL: CPT | Performed by: PHYSICIAN ASSISTANT

## 2019-09-09 PROCEDURE — 82785 ASSAY OF IGE: CPT | Performed by: PHYSICIAN ASSISTANT

## 2019-09-09 PROCEDURE — 87077 CULTURE AEROBIC IDENTIFY: CPT | Performed by: PHYSICIAN ASSISTANT

## 2019-09-09 PROCEDURE — 83540 ASSAY OF IRON: CPT | Performed by: PHYSICIAN ASSISTANT

## 2019-09-09 PROCEDURE — G0463 HOSPITAL OUTPT CLINIC VISIT: HCPCS | Mod: 25

## 2019-09-09 PROCEDURE — 84075 ASSAY ALKALINE PHOSPHATASE: CPT | Performed by: PHYSICIAN ASSISTANT

## 2019-09-09 PROCEDURE — 84460 ALANINE AMINO (ALT) (SGPT): CPT | Performed by: PHYSICIAN ASSISTANT

## 2019-09-09 PROCEDURE — 84446 ASSAY OF VITAMIN E: CPT | Performed by: PHYSICIAN ASSISTANT

## 2019-09-09 PROCEDURE — 82977 ASSAY OF GGT: CPT | Performed by: PHYSICIAN ASSISTANT

## 2019-09-09 PROCEDURE — 84450 TRANSFERASE (AST) (SGOT): CPT | Performed by: PHYSICIAN ASSISTANT

## 2019-09-09 PROCEDURE — 36415 COLL VENOUS BLD VENIPUNCTURE: CPT | Performed by: PHYSICIAN ASSISTANT

## 2019-09-09 PROCEDURE — 83036 HEMOGLOBIN GLYCOSYLATED A1C: CPT | Performed by: PHYSICIAN ASSISTANT

## 2019-09-09 PROCEDURE — 84403 ASSAY OF TOTAL TESTOSTERONE: CPT | Performed by: PHYSICIAN ASSISTANT

## 2019-09-09 PROCEDURE — 82043 UR ALBUMIN QUANTITATIVE: CPT | Performed by: PHYSICIAN ASSISTANT

## 2019-09-09 ASSESSMENT — ANXIETY QUESTIONNAIRES
2. NOT BEING ABLE TO STOP OR CONTROL WORRYING: NOT AT ALL
GAD7 TOTAL SCORE: 0
6. BECOMING EASILY ANNOYED OR IRRITABLE: NOT AT ALL
7. FEELING AFRAID AS IF SOMETHING AWFUL MIGHT HAPPEN: NOT AT ALL
1. FEELING NERVOUS, ANXIOUS, OR ON EDGE: NOT AT ALL
3. WORRYING TOO MUCH ABOUT DIFFERENT THINGS: NOT AT ALL
5. BEING SO RESTLESS THAT IT IS HARD TO SIT STILL: NOT AT ALL

## 2019-09-09 ASSESSMENT — MIFFLIN-ST. JEOR: SCORE: 1380.78

## 2019-09-09 ASSESSMENT — PAIN SCALES - GENERAL: PAINLEVEL: NO PAIN (0)

## 2019-09-09 ASSESSMENT — PATIENT HEALTH QUESTIONNAIRE - PHQ9
SUM OF ALL RESPONSES TO PHQ QUESTIONS 1-9: 0
5. POOR APPETITE OR OVEREATING: NOT AT ALL

## 2019-09-09 NOTE — PROGRESS NOTES
CF Annual Nutrition Assessment    Reason for Assessment  Assessed during Jyothi Warren PA-C clinic r/t increased nutrition risk with diagnosis of CF per protocol.    Nutrition Significant PMH  Moderate Lung Disease   Pancreatic Insufficient   CFRD    Social Assessment  Living situation: Owns a home in [place] [facility], living with girlfriend.   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  Food Insecurity:  None    Anthropometric Assessment  Height: 162.6 cm  IBW based on BMI 22 for females and 23 for males per CF Foundation recs: 60.8 kg / 134#  Today's Weight: 54 kg (actual weight)   %IBW: 89%  Body mass index is 20.43 kg/m .   Current weight is considered: Normal BMI; however, not at CF goal BMI. No malnutrition.     Wt Readings from Last 10 Encounters:   19 54 kg (119 lb)   19 54.5 kg (120 lb 2.4 oz)   19 54.5 kg (120 lb 1.6 oz)   19 54.2 kg (119 lb 7.8 oz)   18 54 kg (119 lb 0.8 oz)   18 55.3 kg (121 lb 14.6 oz)   18 53.1 kg (117 lb 1 oz)   18 53.1 kg (117 lb)   17 53.1 kg (117 lb 1 oz)   09/15/17 52.6 kg (115 lb 15.4 oz)     Weight Status: Weight relatively stable 54-55 kg over the last year. Weight remains below CFF recs however has been able to maintain at higher weight than previous baseline 51 kg in 2016.     Physical Activity/Exercise: No formal exercise regimen per pt, tries to remain active in his daily life with some biking/running.     Pancreatic Enzymes  Brand:  Creon [id number]   Dosin with meals and 2-3 with snacks = 1110 units lipase/kg/day  Estimated Daily Intake: ~18 caps = 4000 units lipase/kg/day      Are you taking enzymes as recommended: Yes   Signs of Malabsorption:  No  Enzyme Program:  None - has been given information in the past but not interested in enrollment.     Diet History and Assessment  Diet Preferences/Allergies/Intolerances:  Regular    Intake Recall/Comments: 3 meals and 1-2 snacks per day, has a good  appetite in general. Eats out a few times per week. Girlfriend does majority of cooking and grocery shopping. No significant changes in PO over the last year.     Breakfast: Bowl of cereal with milk  Lunch: At work, usually a sandwich or piece of pizza or something similar (on-the-go)  Dinner: At home, chicken or other protein with a side dish or two.  Occasionally eats at restaurants or fast food.   Snacks: protein bar in afternoon; also has popcorn or snickers candy at HS    Calcium: 3-4 servings of milk per day, cheese in meals  Salt: Adequate, no sx inadequate intake per pt  Hydration: Adequate total fluid intake although minimal water; mostly juice and 1 can regular soda  Supplements: Yes, daily protein bar    Estimated Energy and Protein Needs  Estimation based on weight maintenance vs gain with Moderate lung disease and pancreatic insufficiency.     BEE: ~1400  2819-5898 kcals/day =  200-225% BEE   g protein/day = 1.5-2 g/kg    Laboratory Assessment    Annual studies obtained today; vitamin levels still pending at time of clinic visit.     Vitamin A   Date Value Ref Range Status   09/26/2018 0.48 0.30 - 1.20 mg/L Final     Vitamin D Deficiency screening   Date Value Ref Range Status   09/26/2018 28 20 - 75 ug/L Final     Comment:     Season, race, dietary intake, and treatment affect the concentration of   25-hydroxy-Vitamin D. Values may decrease during winter months and increase   during summer months. Values 20-29 ug/L may indicate Vitamin D insufficiency   and values <20 ug/L may indicate Vitamin D deficiency.  Vitamin D determination is routinely performed by an immunoassay specific for   25 hydroxyvitamin D3.  If an individual is on vitamin D2 (ergocalciferol)   supplementation, please specify 25 OH vitamin D2 and D3 level determination by   LCMSMS test [id number].       Vitamin E   Date Value Ref Range Status   09/26/2018 10.1 5.5 - 18.0 mg/L Final     Comment:     (Note)  Test developed and  characteristics determined by [facility]. See Compliance Statement B: [email or web address]       Iron   Date Value Ref Range Status   09/09/2019 84 35 - 180 ug/dL Final     Cholesterol   Date Value Ref Range Status   09/09/2019 108 <200 mg/dL Final     Triglycerides   Date Value Ref Range Status   09/09/2019 56 <150 mg/dL Final     HDL Cholesterol   Date Value Ref Range Status   09/09/2019 65 >39 mg/dL Final     LDL Cholesterol Calculated   Date Value Ref Range Status   09/09/2019 31 <100 mg/dL Final     Comment:     Desirable:       <100 mg/dl     DEXA: 2019, lowest z-score -0.9    Current Vitamin/Mineral Prescriptions: MVW Complete D3,000 - 2 softgels per day  Comments: Obtains from [facility]. No concerns with $10 per bottle.     CF Related Diabetes Evaluation  Hgb A1C: 6.1%   Date: 9/9/19  FSBG range: not checking frequently  Insulin: Yes    Insulin Regimen: Basaglar 8 units q AM (decreased from 12 units last Feb)  Carbohydrate Counting: No     -Sees Dr. Miranda/endo team. Last visit 6/4/19. Recommended insulin coverage with breakfast given hyperglycemia however pt declines at this time.     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  INTERVENTIONS/RECOMMENDATIONS  Discussed current nutritional status, reviewed weight trends, goals, and oral intakes with Guillermo. States he feels he is doing well from a nutrition standpoint at this time and did not have any specific concerns. Encouraged pt to continue with current enzyme regimen given no GI concerns. Discussed blood sugar control and recommendations for carbohydrate coverage with breakfast. Noted that pt is consuming large amounts of orange juice and encouraged him to switch to water or other beverage without simple sugars. Although this is a source of calories for patient, discussed that he can  replace this with other options containing protein/fat/complex carbs.   Annual study labs obtained today. Pt prefers a phone call if any changes are needed to his vitamin/mineral supplementation.     Patient Understanding: Good  Expected Compliance: Good  Follow-Up Plans: Per protocol    GOALS:  1) Weight maintenance vs gain to BMI of 23 kg/m2 or above.   2) 100% compliance with prescribed enzymes, vitamin/mineral supplements and insulin therapy.     FOLLOW-UP/MONITORING:  Visit patient within 12 month(s) or sooner per pt/MD request    Time Spent In Face-to-Face Patient Interactions: 15 minutes      Tamika Perez RD, LD  Cystic Fibrosis/Lung Transplant Dietitian  Pager [phone]  On weekends/holidays contact coverage RD at [phone] (inpatient use only)

## 2019-09-09 NOTE — NURSING NOTE
Chief Complaint   Patient presents with     Cystic Fibrosis     Follow up on Guillermo and his CF     Tino Kenney, CMA CMA at 8:56 AM on 9/9/2019

## 2019-09-09 NOTE — PROGRESS NOTES
"Adult Cystic Fibrosis Program  Annual Psychosocial Assessment    Presenting Information:  ALEXANDRIA is a 34-year-old man with cystic fibrosis, presenting in CF clinic for a regular follow up with CF provider, Jyothi Navarro. Met with Guillermo for annual psychosocial assessment.     Living situation:  Guillermo lives in [place], [place]. He purchased a home in November 2015. His girlfriend of 7 years has been living with him for 3 years. They have 1 dog in the home. He denies any concern about his current living situation.      Family Constellation:  Guillermo was raised by his biological parents who are still  and live in [place], [place].  He has 1 sibling(s): an older sister who lives in [place], [place]. He has reported a good relationship with all of his family members. His sister does not have CF.     Social Support:  Guillermo reports \" good\" social support. He has been in a relationship with his girlfriend for over 7 years. He gets along well with family members and draws additional support from friends and co-workers.  He does not have any connections in the CF Community.     Adjustment to Illness:  Guillermo was diagnosed with CF in infancy.  He was \"pretty stable\" as a kid with some medical complications. He was hospitalized 3x: 2x for sinoscopies and 1x to have his appendix removed. He notes that in adulthood he was hospitalized in 2013 for a pulmonary exacerbation.     Guillermo describes his current health status as \"stable\".  Clinically, he has significant lung disease, pancreatic insufficiency, and CFRD. He typically does 2 vest treatments per day. He runs about 2-3 times a week and enjoys going for bike rides as well.      He reports that he is open about his CF with friends and family, but not with coworkers and acquaintances. He also notes he was pretty private about CF even as a kid.     Health Care Directive:  Guillermo has previously received Health Care Directive education.  This SW reviewed education, including concept/purpose of health " care directive, default health care agents (currently his parents) and how to complete a directive.  Guillermo declined needing copies of the forms and is comfortable with his default decision makers at this time. Encouraged him to reach out to  if he would like to have his girlfriend or anyone else become more involved in his care or be his decision maker.     Education:  Guillermo completed a Bachelors Degree in Political Science at [employer]. He has no current plans for higher education at this point.     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 He reports somewhat liking his role, but maybe being interested in a change in the future. He is not sure what this would look like at this time but is exploring his options. He works about   30 hours a week outside of legislative session, and 40+ hours during session (which will start in February). He reports being able to work from home which he enjoys. He is benefits eligible. He denies any current related concerns. 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    Guillermo's girlfriend works full time as well for the State [employer] office in [place], [place].     Finances:  Guillermo receives income from wages and is able to meet daily living expenses. He has a pension with the [employer]. He denied any related concerns today.      Insurance:  Guillermo is insured through his employer. He carries a BCBS plan through the [employer] [facility]. He reports that the coverage is good and it is affordable for him. He has to use a mail order pharmacy for some of his medications which he finds inconvenient. He is not able to get these from a regular pharmacy. He denies having trouble accessing these meds and is able to take them on time but finds the process overall inconvenient. He denied other insurance related concerns today.      Mental Health/Coping:  Guillermo denies any current or past symptoms  "indicative of mood, anxiety, eating, learning or other mental health disorder. He describes his mood recently as \"stable\".     UBALDO-7 score: 0, indicating an absence of anxiety symptoms.  PHQ-9 score: 0, indicating an absence of depression symptoms. He does not see a therapist and does not take any MH medication. He feels that his stress levels are manageable especially outside of legislative session. To cope with stress he will process with his girlfriend or his parents.      He identifies spirituality as important in his life, but he does not identify as a Oriental orthodox person.     Chemical Health:  Guillermo denies tobacco use, regular exposure to second hand smoke or illicit drug use.  He drinks alcohol, 1/week consuming 2-3 drinks. He denies any current/past psychosocial impairment caused by alcohol use.  He is aware of a family history of alcohol abuse, as his uncle began AA when Guillermo was 17 years old.     Leisure Activities/Interests:   Guillermo enjoys listening to and producing music, and going for bike rides.     Intervention:  -Introduction to   -Psychosocial Assessment  -Health Care Directive education  -Supportive counseling/psycho-education  -Motivational interviewing    Assessment:  Guillermo had a normal affect and appeared to be open in his responses. He is considering a change in career but is not sure what this would be. He reports manageable stress levels. He has adequate social support from his family and girlfriend. He denies a history of any mental health of chemical dependency concerns.    Guillermo seems to be psychosocially stable overall, with access to relevant resources and supports.  No concerns expressed/noted.    Plan:  Re-consult for any psychosocial needs that may arise.    Complete psychosocial assessment annually.  Continue to follow for regular clinic consult.    ROMA Lakhani, [facility]  Adult Cystic Fibrosis   Ph: [phone], Pager: [phone]            "

## 2019-09-09 NOTE — PATIENT INSTRUCTIONS
Cystic Fibrosis Self-Care Plan       Patient: Dilan Nassar   MRN: [id number]   Clinic Date: September 9, 2019     RECOMMENDATIONS:  1. Continue nebulizers and vest therapy.   2. Try and increase your physical activity to 30 minutes 4-5 days/week.  3. Continue to think about the addition of aztreonam nebs on your george off month.  4. Please get an influenza vaccine in early October.     Annual Studies:   IGG   Date Value Ref Range Status   09/26/2018 1,320 695 - 1,620 mg/dL Final     No results found for: INS  There are no preventive care reminders to display for this patient.    Pulmonary Function Tests  FEV1: amount of air you can blow out in 1 second  FVC: total amount of air you can take in and blow out    Your Goals:         PFT Latest Ref Rng & Units 6/5/2019   FVC L 3.88   FEV1 L 2.07   FVC% % 89   FEV1% % 57          Airway Clearance: The Most Important Way to Keep Your Lungs Healthy  Vest Settings:    Hill-[place] Frequencies: 8, 9, 10 Pressure 10 Then, Frequencies 18, 19, 20 Pressure 6      RespirTech: Quick Start with Pressure of     Do each frequency for 5 minutes; Deflate vest after each frequency & cough 3 times before beginning the next setting.    Vest and Neb Therapy should be done 2 times/day.    Good Nutrition Can Improve Lung Function and Overall Health     Take ALL of your vitamins with food     Take 1/2 of your enzymes before EVERY meal/snack and the other 1/2 mid-meal/snack    Wt Readings from Last 3 Encounters:   06/05/19 54.5 kg (120 lb 2.4 oz)   06/04/19 54.5 kg (120 lb 1.6 oz)   01/03/19 54.2 kg (119 lb 7.8 oz)       There is no height or weight on file to calculate BMI.         National CF Foundation Recommendations for BMI in CF Adults: Women: at least 22 Men: at least 23        Controlling Blood Sugars Helps Prevent Lung Infections & Improves Nutrition  Test blood sugar:     In the morning before eating (goal is )     2 hours after a meal (goal is less than 150)     When pre-meal  glucose is greater than 150 add correction     At bedtime (if less than 100 eat a snack with 15 grams of carbohydrates  Last A1C Results:   Hemoglobin A1C   Date Value Ref Range Status   09/26/2018 5.8 (H) 0 - 5.6 % Final     Comment:     Normal <5.7% Prediabetes 5.7-6.4%  Diabetes 6.5% or higher - adopted from ADA   consensus guidelines.           If diabetic, measure A1C every 6 months. Goal is under 7%.    Staying Healthy    Research: If you are interested in learning about research opportunities or have questions, please contact Sonali Altamirano at [phone] or [email or web address].       Foundation: Compass is a personalized resource service to help you with the insurance, financial, legal and other issues you are facing.  It's free, confidential and available to anyone with CF.  Ask your  for more information or contact Compass directly at 438-[facility] ([phone]) or compass@cff.org, or learn more at cff.org/compass.       CF Nurse Line:  Nadya Pimentel and Dianna: [phone]   Audra Smart, RT: [phone]     Daniela Whitt and Tamika Perez , Dieticians: [phone]     Lisset Root, Diabetes Nurse: [phone]    Kathie Truong: [phone] or Kailey Mixon at [phone], Social Workers   [email or web address]

## 2019-09-09 NOTE — LETTER
"9/9/2019       RE: Dilan Nassar  [address] [place] [id number]     Dear Colleague,    Thank you for referring your patient, Dilan Nassar, to the [facility] at [facility]. Please see a copy of my visit note below.    [facility]  September 9, 2019         Assessment and Plan:   Dilan Nassar is a 34 year old male with cystic fibrosis, pancreatic insufficiency and CFRD who is seen today in clinic for routine follow up.       1. CF lung disease: overall, feeling at baseline. Intermittently productive cough, at baseline. No other complaints, does physical activity 2-3 days/week for 20-30 minutes. Sating 95% on room air; vesting BID. PFTs down slightly today, still within his baseline. Previous cultures + for MRSA, Klebsiella, Steno, Ps A and A. Fumigatus. No evidence of exacerbation at this time. Again discussed small changes that could improve his lung function including addition of [place] or aztreonam and better blood sugar control and he would continue to like to \"think about it.\"  - Continue nebulizers, inhaler and vest therapy  - On chronic azithromycin   - Continue george podhaler month on/off  - Add aztreonam??      2. Hemoptysis: no recent complaints.   - Continue vitamin K once/week      3. CF related liver disease: since 10/15 per our EPIC charting with US 5/17 demonstrating coarse echotexture of the liver with hypertrophy of the caudate lobe and left lobe concerning for possible cirrhosis, no lesions, patent vasculature. LFTs stable with elevated alk phos, but normal enzymes on labs today.   - Continue Ursodiol      4. Exocrine pancreatic insufficiency: denies symptoms of malabsorption. Weight is stable, BMI remains below Foundation goal. GAURAV Lundberg, to see today.   - Continue pancreatic enzymes and vitamin supplementation      5. CFRD: recent AIC of 6.1 on today. No longer " "having low BS with checking, didn't bring his meter with him. Does note occasionally feeling low blood sugars, thinks it was happening session when he would forget to eat. In the past week, has been 90s in the am, Dr. Miranda has recommended some mealtime insulin, possibly breakfast only, which he has not done. BS this am of 248 about 45 minutes after eating. Suspect his pulmonary function could improve with better control of BS.   - Continue Basaglar 8 U  - F/u with Dr. Miranda and routine diabetic eye exam as scheduled       6. CF related sinus disease: denies current symptoms.   - Continue Flonase and Claritin    7. HCM: reviewed labs with patient today including normal WBC and hgb with slightly low platelets of 145K. Normal BMP, LFTs per above. Normal cholesterol panel, TGs and TSH. UA WNL. Multiple labs and CXR pending.      RTC: in 3 months with routine spirometry  Annual studies due: September 2020      Jyothi Warren PA-C  Pulmonary, Allergy, Critical Care and Sleep Medicine        Interval History:   Feeling fine for the last few months. Trying to do a little running 20 minutes and 30 minutes biking a few times/week. Cough is baseline, more productive in the am, no congestion, tightness or wheezing. Vesting BID. Usually using the [place] during session, it's \"okay>\" feels it's uncomfortable, not sure if effective.          Review of Systems:   Please see HPI. Otherwise, complete 10 point ROS negative.           Past Medical and Surgical History:     Past Medical History:   Diagnosis Date     Cystic fibrosis with pulmonary manifestations (H)     /[id number]     Past Surgical History:   Procedure Laterality Date     APPENDECTOMY OPEN  1999    Appendicitis      SINUS SURGERY  1990's    h/o many sinus infections           Family History:     Family History   Problem Relation Age of Onset     Diabetes Mother         Unknown type     Prostate Cancer Paternal Grandfather      LUNG DISEASE Other         Negative     " Diabetes Maternal Aunt         unknown type     Diabetes Maternal Uncle         unknown type     Diabetes Maternal Grandmother         unknown type     Coronary Artery Disease Paternal Grandmother             Social History:     Social History     Socioeconomic History     Marital status: Single     Spouse name: Not on file     Number of children: Not on file     Years of education: Not on file     Highest education level: Not on file   Occupational History     Occupation: State Representative     Employer: [employer]     Occupation: Financial Counselor   Social Needs     Financial resource strain: Not on file     Food insecurity:     Worry: Not on file     Inability: Not on file     Transportation needs:     Medical: Not on file     Non-medical: Not on file   Tobacco Use     Smoking status: Never Smoker     Smokeless tobacco: Former User   Substance and Sexual Activity     Alcohol use: Yes     Alcohol/week: 6.0 - 8.4 oz     Types: 10 - 14 Standard drinks or equivalent per week     Comment: 2 drinks per night 5X/wk     Drug use: No     Sexual activity: Yes     Partners: Female     Birth control/protection: Pull-out method, Condom   Lifestyle     Physical activity:     Days per week: Not on file     Minutes per session: Not on file     Stress: Not on file   Relationships     Social connections:     Talks on phone: Not on file     Gets together: Not on file     Attends Roman Catholic service: Not on file     Active member of club or organization: Not on file     Attends meetings of clubs or organizations: Not on file     Relationship status: Not on file     Intimate partner violence:     Fear of current or ex partner: Not on file     Emotionally abused: Not on file     Physically abused: Not on file     Forced sexual activity: Not on file   Other Topics Concern     Parent/sibling w/ CABG, MI or angioplasty before 65F 55M? Yes   Social History Narrative    Lives alone in apartment in [place], [place]. 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         Nov 2015.  His main political issue is higher education.  His girlfriend is looking for work as an .  He eats 3 square meals per day, most of which are cooked by him or his girlfriend.  Gets outside to walk or run 20 minutes about 5x per week.  Works fairly regular daytime hours, often from home.            Medications:     Current Outpatient Medications   Medication     albuterol (PROVENTIL) (2.5 MG/3ML) 0.083% neb solution     allopurinol (ZYLOPRIM) 300 MG tablet     amylase-lipase-protease (CREON) 74602 units CPEP     azithromycin (ZITHROMAX) 250 MG tablet     BD YON U/F 32G X 4 MM insulin pen needle     blood glucose (NO BRAND SPECIFIED) test strip     blood glucose monitoring (FREESTYLE LITE) test strip     blood glucose monitoring (FREESTYLE) lancets     blood glucose monitoring (NO BRAND SPECIFIED) meter device kit     dornase alpha (PULMOZYME) 1 MG/ML neb solution     fluticasone (FLONASE) 50 MCG/ACT nasal spray     fluticasone (FLOVENT HFA) 110 MCG/ACT inhaler     insulin glargine (BASAGLAR KWIKPEN) 100 UNIT/ML pen     insulin pen needle (BD YON U/F) 32G X 4 MM miscellaneous     loratadine (CLARITIN) 10 MG tablet     multivitamins CF formula  (MVW COMPLETE FORMULATION ) CAPS capsule     phytonadione (MEPHYTON) 5 MG tablet     Syringe/Needle, Disp, 18G X 1\" 6 ML MISC     tobramycin (MARLEN PODHALER) 28 MG in caps     ursodiol (ACTIGALL) 300 MG capsule     albuterol (PROAIR HFA/PROVENTIL HFA/VENTOLIN HFA) 108 (90 Base) MCG/ACT Inhaler     order for DME     No current facility-administered medications for this visit.             Physical Exam:   /85   Pulse 91   Resp 16   Ht 1.626 m (5' 4\")   Wt 54 kg (119 lb)   SpO2 95%   BMI 20.43 kg/m       GENERAL: alert, NAD  HEENT: NCAT, EOMI, anicteric sclera, canals and TMs clear; no oral mucosal edema or erythema  Neck: no " cervical or supraclavicular adenopathy  Respiratory: moderate airflow, scattered bibasilar crackles  CV: RRR, S1S2, no murmurs noted  Abdomen: normoactive BS, soft and non tender  Lymph: no edema, + digital clubbing  Neuro: AAO X 3, CN 2-12 grossly intact  Psychiatric: normal affect, good eye contact  Skin: no rash, jaundice or lesions on limited exam         Data:   All laboratory and imaging data reviewed.      Cystic Fibrosis Culture  Specimen Description   Date Value Ref Range Status   06/05/2019 Sputum  Final   01/03/2019 Sputum  Final   09/26/2018 Sputum  Final   09/26/2018 Sputum  Final    Culture Micro   Date Value Ref Range Status   06/05/2019 Heavy growth  Normal mami    Final   06/05/2019 (A)  Final    Light growth  Pseudomonas aeruginosa, mucoid strain     06/05/2019 Light growth  Pseudomonas aeruginosa   (A)  Final   06/05/2019 Light growth  Strain 2  Pseudomonas aeruginosa   (A)  Final   06/05/2019 Light growth  Stenotrophomonas maltophilia   (A)  Final   06/05/2019 Single colony  Aspergillus fumigatus   (A)  Final        PFT interpretation:  Maneuver: valid and meets ATS guidelines  Moderate severe obstruction with decreased FEV1 and FEV1/FVC  Compared to prior: FEV1 of 2.01 is 60 ml blow prior            CF Annual Nutrition Assessment    Reason for Assessment  Assessed during Jyothi Warren PA-C clinic r/t increased nutrition risk with diagnosis of CF per protocol.    Nutrition Significant PMH  Moderate Lung Disease   Pancreatic Insufficient   CFRD    Social Assessment  Living situation: Owns a home in [place], living with girlfriend.   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  Food Insecurity:  None    Anthropometric Assessment  Height: 162.6 cm  IBW based on BMI 22 for females and 23 for males per CF Foundation recs: 60.8 kg / 134#  Today's Weight: 54 kg (actual weight)   %IBW: 89%  Body mass index is 20.43 kg/m .   Current weight is considered: Normal BMI; however, not  at CF goal BMI. No malnutrition.     Wt Readings from Last 10 Encounters:   19 54 kg (119 lb)   19 54.5 kg (120 lb 2.4 oz)   19 54.5 kg (120 lb 1.6 oz)   19 54.2 kg (119 lb 7.8 oz)   18 54 kg (119 lb 0.8 oz)   18 55.3 kg (121 lb 14.6 oz)   18 53.1 kg (117 lb 1 oz)   18 53.1 kg (117 lb)   17 53.1 kg (117 lb 1 oz)   09/15/17 52.6 kg (115 lb 15.4 oz)     Weight Status: Weight relatively stable 54-55 kg over the last year. Weight remains below CFF recs however has been able to maintain at higher weight than previous baseline 51 kg in 2016.     Physical Activity/Exercise: No formal exercise regimen per pt, tries to remain active in his daily life with some biking/running.     Pancreatic Enzymes  Brand:  Creon [id number]   Dosin with meals and 2-3 with snacks = 1110 units lipase/kg/day  Estimated Daily Intake: ~18 caps = 4000 units lipase/kg/day      Are you taking enzymes as recommended: Yes   Signs of Malabsorption:  No  Enzyme Program:  None - has been given information in the past but not interested in enrollment.     Diet History and Assessment  Diet Preferences/Allergies/Intolerances:  Regular    Intake Recall/Comments: 3 meals and 1-2 snacks per day, has a good appetite in general. Eats out a few times per week. Girlfriend does majority of cooking and grocery shopping. No significant changes in PO over the last year.     Breakfast: Bowl of cereal with milk  Lunch: At work, usually a sandwich or piece of pizza or something similar (on-the-go)  Dinner: At home, chicken or other protein with a side dish or two.  Occasionally eats at restaurants or fast food.   Snacks: protein bar in afternoon; also has popcorn or snickers candy at HS    Calcium: 3-4 servings of milk per day, cheese in meals  Salt: Adequate, no sx inadequate intake per pt  Hydration: Adequate total fluid intake although minimal water; mostly juice and 1 can regular soda  Supplements: Yes, daily  protein bar    Estimated Energy and Protein Needs  Estimation based on weight maintenance vs gain with Moderate lung disease and pancreatic insufficiency.     BEE: ~1400  3359-2573 kcals/day =  200-225% BEE   g protein/day = 1.5-2 g/kg    Laboratory Assessment    Annual studies obtained today; vitamin levels still pending at time of clinic visit.     Vitamin A   Date Value Ref Range Status   09/26/2018 0.48 0.30 - 1.20 mg/L Final     Vitamin D Deficiency screening   Date Value Ref Range Status   09/26/2018 28 20 - 75 ug/L Final     Comment:     Season, race, dietary intake, and treatment affect the concentration of   25-hydroxy-Vitamin D. Values may decrease during winter months and increase   during summer months. Values 20-29 ug/L may indicate Vitamin D insufficiency   and values <20 ug/L may indicate Vitamin D deficiency.  Vitamin D determination is routinely performed by an immunoassay specific for   25 hydroxyvitamin D3.  If an individual is on vitamin D2 (ergocalciferol)   supplementation, please specify 25 OH vitamin D2 and D3 level determination by   LCMSMS test [id number].       Vitamin E   Date Value Ref Range Status   09/26/2018 10.1 5.5 - 18.0 mg/L Final     Comment:     (Note)  Test developed and characteristics determined by [employer]. See Compliance Statement B: [email or web address]       Iron   Date Value Ref Range Status   09/09/2019 84 35 - 180 ug/dL Final     Cholesterol   Date Value Ref Range Status   09/09/2019 108 <200 mg/dL Final     Triglycerides   Date Value Ref Range Status   09/09/2019 56 <150 mg/dL Final     HDL Cholesterol   Date Value Ref Range Status   09/09/2019 65 >39 mg/dL Final     LDL Cholesterol Calculated   Date Value Ref Range Status   09/09/2019 31 <100 mg/dL Final     Comment:     Desirable:       <100 mg/dl     DEXA: 2019, lowest z-score -0.9    Current Vitamin/Mineral Prescriptions: MVW Complete D3,000 - 2 softgels per day  Comments: Obtains from [facility]. No concerns with  $10 per bottle.     CF Related Diabetes Evaluation  Hgb A1C: 6.1%   Date: 9/9/19  FSBG range: not checking frequently  Insulin: Yes    Insulin Regimen: Basaglar 8 units q AM (decreased from 12 units last Feb)  Carbohydrate Counting: No     -Sees Dr. Miranda/endo team. Last visit 6/4/19. Recommended insulin coverage with breakfast given hyperglycemia however pt declines at this time.     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  INTERVENTIONS/RECOMMENDATIONS  Discussed current nutritional status, reviewed weight trends, goals, and oral intakes with Guillermo. States he feels he is doing well from a nutrition standpoint at this time and did not have any specific concerns. Encouraged pt to continue with current enzyme regimen given no GI concerns. Discussed blood sugar control and recommendations for carbohydrate coverage with breakfast. Noted that pt is consuming large amounts of orange juice and encouraged him to switch to water or other beverage without simple sugars. Although this is a source of calories for patient, discussed that he can replace this with other options containing protein/fat/complex carbs.   Annual study labs obtained today. Pt prefers a phone call if any changes are needed to his vitamin/mineral supplementation.     Patient Understanding: Good  Expected Compliance: Good  Follow-Up Plans: Per protocol    GOALS:  1) Weight maintenance vs gain to BMI of 23 kg/m2 or above.   2) 100% compliance with prescribed enzymes, vitamin/mineral supplements and insulin therapy.     FOLLOW-UP/MONITORING:  Visit patient within 12 month(s) or sooner per pt/MD request    Time Spent In Face-to-Face Patient Interactions: 15 minutes      Tamika Perez RD, LD  Cystic Fibrosis/Lung Transplant Dietitian  Pager [phone]  On weekends/holidays contact  coverage RD at [phone] (inpatient use only)             Again, thank you for allowing me to participate in the care of your patient.      Sincerely,    Jyothi Warren PA-C

## 2019-09-10 ASSESSMENT — ANXIETY QUESTIONNAIRES: GAD7 TOTAL SCORE: 0

## 2019-09-11 LAB
A-TOCOPHEROL VIT E SERPL-MCNC: 9.6 MG/L (ref 5.5–18)
ANNOTATION COMMENT IMP: NORMAL
BETA+GAMMA TOCOPHEROL SERPL-MCNC: 0.3 MG/L (ref 0–6)
IGE SERPL-ACNC: <2 KIU/L (ref 0–114)
RETINYL PALMITATE SERPL-MCNC: <0.02 MG/L (ref 0–0.1)
TESTOST SERPL-MCNC: 516 NG/DL (ref 240–950)
VIT A SERPL-MCNC: 0.53 MG/L (ref 0.3–1.2)

## 2019-09-14 LAB
BACTERIA SPEC CULT: ABNORMAL
SPECIMEN SOURCE: ABNORMAL

## 2019-09-24 ENCOUNTER — MYC MEDICAL ADVICE (OUTPATIENT)
Dept: NUTRITION | Facility: CLINIC | Age: 34
End: 2019-09-24

## 2019-10-22 ENCOUNTER — TELEPHONE (OUTPATIENT)
Dept: EDUCATION SERVICES | Facility: CLINIC | Age: 34
End: 2019-10-22

## 2019-10-22 NOTE — TELEPHONE ENCOUNTER
"No Juice no fruit  DATE RIGHT AWAY IN MORNING AFTER BREAKFAST NOTES       09/11  189        09/12  216        09/13 104         09/14 90 172        09/15 81 104 pate, eggs and wheat toast w/ peanut butter   09/18  186                    No Juice  DATE RIGHT AWAY IN MORNING AFTER BREAKFAST NOTES       09/20 82 125        09/21  92 pate, eggs and wheat toast w/ peanut butter   09/23  178        09/25  149        09/26  153        09/27 82                     Orange Juice  DATE RIGHT AWAY IN MORNING AFTER BREAKFAST NOTES   09/30  137    10/01 87 184    10/02 86 173    10/03  103    10/04 82     10/05  208      Grape Juice  DATE RIGHT AWAY IN MORNING AFTER BREAKFAST NOTES       10/06  101 pate, eggs and wheat toast w/ peanut butter   10/07  161        10/08 97 165        10/09 81 99          Email received from patient:    Scarlett,   Thank you for getting back to me, and providing me with this email address. During my Sept. appointment to see Jyothi Warren, I spoke with the dietician who suggested replacing juice in the morning with milk. I've since been tracking my morning and after breakfast sugars with changes to what I eat for breakfast. I've prepared my sugar results as an [employer] spreadsheet attached to this email. My normal breakfast is two bowls of Cheerios with whole milk, and a piece of wheat toast with peanut butter (unless otherwise noted in the spreadsheet). I have four different worksheets on the [employer] document labeled \"NO JUICE NO FRUIT\", \"NO JUICE\", \"ORANGE JUICE\" and \"GRAPE JUICE\". On the worksheets labeled \"NO JUICE\", \"ORANGE JUICE\" and \"GRAPE JUICE\" I included a banana at breakfast everyday.   I also attached a form I received from the [employer] yesterday for Dr. Miranda to fill out so I can continue my driving privileges. The last time Dr. Miranda filled out this form, he indicated it should be reviewed in 2 years. As I've not had any driving incidents since I've been required to submit this form in " 2013, I respectfully request he at least do the same (2 years) or more (3 or 4 years). If you would like to return the completed form to me, I can submit it to the [place] [employer].    Thank you,  Guillermo Nassar   [phone]    Readings reviewed with Dr. Miranda. No changes to plan at this time.

## 2019-10-23 ENCOUNTER — TELEPHONE (OUTPATIENT)
Dept: ENDOCRINOLOGY | Facility: CLINIC | Age: 34
End: 2019-10-23

## 2019-10-23 NOTE — TELEPHONE ENCOUNTER
DMV form faxed X 2 to DMV and copy mailed to patient. Mireille Bragg RN on 10/23/2019 at 5:32 PM

## 2019-10-28 ENCOUNTER — MYC REFILL (OUTPATIENT)
Dept: PULMONOLOGY | Facility: CLINIC | Age: 34
End: 2019-10-28

## 2019-10-28 DIAGNOSIS — K86.81 EXOCRINE PANCREATIC INSUFFICIENCY: ICD-10-CM

## 2019-10-28 DIAGNOSIS — E84.0 CYSTIC FIBROSIS WITH PULMONARY MANIFESTATIONS (H): ICD-10-CM

## 2019-10-28 DIAGNOSIS — E84.9 CF (CYSTIC FIBROSIS) (H): ICD-10-CM

## 2019-10-28 DIAGNOSIS — E08.9 DIABETES MELLITUS DUE TO CYSTIC FIBROSIS (H): ICD-10-CM

## 2019-10-28 DIAGNOSIS — E84.9 CYSTIC FIBROSIS (H): ICD-10-CM

## 2019-10-28 DIAGNOSIS — A49.02 MRSA INFECTION: ICD-10-CM

## 2019-10-28 DIAGNOSIS — E84.9 DIABETES MELLITUS DUE TO CYSTIC FIBROSIS (H): ICD-10-CM

## 2019-10-28 RX ORDER — FLUTICASONE PROPIONATE 110 UG/1
AEROSOL, METERED RESPIRATORY (INHALATION)
Qty: 1 INHALER | Refills: 3 | Status: SHIPPED | OUTPATIENT
Start: 2019-10-28 | End: 2020-08-13

## 2019-12-02 ENCOUNTER — MYC REFILL (OUTPATIENT)
Dept: PULMONOLOGY | Facility: CLINIC | Age: 34
End: 2019-12-02

## 2019-12-02 DIAGNOSIS — M10.9 GOUT, UNSPECIFIED CAUSE, UNSPECIFIED CHRONICITY, UNSPECIFIED SITE: ICD-10-CM

## 2019-12-02 DIAGNOSIS — E84.0 CYSTIC FIBROSIS OF THE LUNG (H): ICD-10-CM

## 2019-12-02 DIAGNOSIS — E08.9 DIABETES MELLITUS DUE TO CYSTIC FIBROSIS (H): ICD-10-CM

## 2019-12-02 DIAGNOSIS — E84.9 DIABETES MELLITUS DUE TO CYSTIC FIBROSIS (H): ICD-10-CM

## 2019-12-02 DIAGNOSIS — E84.0 CYSTIC FIBROSIS WITH PULMONARY MANIFESTATIONS (H): ICD-10-CM

## 2019-12-02 DIAGNOSIS — E84.0 CYSTIC FIBROSIS WITH PULMONARY EXACERBATION (H): ICD-10-CM

## 2019-12-02 RX ORDER — AZITHROMYCIN 250 MG/1
500 TABLET, FILM COATED ORAL
Qty: 150 TABLET | Refills: 1 | Status: SHIPPED | OUTPATIENT
Start: 2019-12-02 | End: 2020-12-02

## 2019-12-12 ENCOUNTER — TELEPHONE (OUTPATIENT)
Dept: PULMONOLOGY | Facility: CLINIC | Age: 34
End: 2019-12-12

## 2019-12-12 DIAGNOSIS — E84.9 CF (CYSTIC FIBROSIS) (H): Primary | ICD-10-CM

## 2019-12-16 NOTE — PROGRESS NOTES
[facility] acute hospital for [facility] and [facility]  December 19, 2019         Assessment and Plan:   Dilan Nassar is a 34 year old male with cystic fibrosis, pancreatic insufficiency and CFRD who is seen today in clinic for routine follow up.       1. CF lung disease: feeling well, has increased his airway clearance to twice/day and has noticed improvement. Also has been working on improved use of his george podhaler.Sating 95% on room air; vesting BID. PFTs improved 9% to his recent best. Previous cultures + for MRSA, Klebsiella, Steno, Ps A and A. Fumigatus. No evidence of exacerbation at this time.  - Continue nebulizers, inhaler and vest therapy  - On chronic azithromycin   - Continue george podhaler month on/off  - Start exercising    2. Hemoptysis: no recent issues.   - Continue vitamin K once/week      3. 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 US 5/17 demonstrating coarse echotexture of the liver with hypertrophy of the caudate lobe and left lobe concerning for possible cirrhosis, no lesions, patent vasculature.  - Continue Ursodiol      4. Exocrine pancreatic insufficiency: denies symptoms of malabsorption. Weight is stable, BMI remains below Foundation goal.  - Continue pancreatic enzymes and vitamin supplementation      5. CFRD: recent AIC of 6.1 on 9/9/19. Has cut back some of his am carbohydrates, notes his BS are lower in the am.   - Continue Basaglar 8 U  - F/u with Dr. Miranda and routine diabetic eye exam      6. CF related sinus disease: denies current symptoms.   - Continue Flonase and Claritin    7. CFTR: modulation: patient is a candidate for Trikafta and is interested in medication. Discussed medication, administration, mechanism, side effects and monitoring. Gwen HUDSON to see as well.  - Hepatic panel and CK  - Start Trikafta when able      RTC: in 3 months with routine spirometry  Annual studies due: September 2020  Vaccinations: completed influenza      Jyothi  Briana CURRY  Pulmonary, Allergy, Critical Care and Sleep Medicine        Interval History:   Staying around here for holiday with [place] and her family. Has been more Oriental orthodox about vesting 2 times/day. Feeling better than normal, working hard with the george, holding it it. Coughing baseline, not a lot of production. No congestion or tightness. Hasn't been exercising as much as during the late summer/fall. No sinus congestion, no GI complaints, no change in tools.          Review of Systems:   Please see HPI. Otherwise, complete 10 point ROS negative.           Past Medical and Surgical History:     Past Medical History:   Diagnosis Date     Cystic fibrosis with pulmonary manifestations (H)     /[id number]     Past Surgical History:   Procedure Laterality Date     APPENDECTOMY OPEN  1999    Appendicitis      SINUS SURGERY  1990's    h/o many sinus infections           Family History:     Family History   Problem Relation Age of Onset     Diabetes Mother         Unknown type     Prostate Cancer Paternal Grandfather      LUNG DISEASE Other         Negative     Diabetes Maternal Aunt         unknown type     Diabetes Maternal Uncle         unknown type     Diabetes Maternal Grandmother         unknown type     Coronary Artery Disease Paternal Grandmother             Social History:     Social History     Socioeconomic History     Marital status: Single     Spouse name: Not on file     Number of children: Not on file     Years of education: Not on file     Highest education level: Not on file   Occupational History     Occupation: State Representative     Employer: [employer]     Occupation: Financial Counselor   Social Needs     Financial resource strain: Not on file     Food insecurity:     Worry: Not on file     Inability: Not on file     Transportation needs:     Medical: Not on file     Non-medical: Not on file   Tobacco Use     Smoking status: Never Smoker     Smokeless tobacco: Former User   Substance  and Sexual Activity     Alcohol use: Yes     Alcohol/week: 10.0 - 14.0 standard drinks     Types: 10 - 14 Standard drinks or equivalent per week     Comment: 2 drinks per night 5X/wk     Drug use: No     Sexual activity: Yes     Partners: Female     Birth control/protection: Pull-out method, Condom   Lifestyle     Physical activity:     Days per week: Not on file     Minutes per session: Not on file     Stress: Not on file   Relationships     Social connections:     Talks on phone: Not on file     Gets together: Not on file     Attends Sabianist service: Not on file     Active member of club or organization: Not on file     Attends meetings of clubs or organizations: Not on file     Relationship status: Not on file     Intimate partner violence:     Fear of current or ex partner: Not on file     Emotionally abused: Not on file     Physically abused: Not on file     Forced sexual activity: Not on file   Other Topics Concern     Parent/sibling w/ CABG, MI or angioplasty before 65F 55M? Yes   Social History Narrative    Owns a home in [place], [place] with his girlfriend of ~7 years. 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            Medications:     Current Outpatient Medications   Medication     elexacaftor-tezacaftor-ivacaftor & ivacaftor (TRIKAFTA) 100-50-75 & 150 MG tablet pack     insulin glargine (BASAGLAR KWIKPEN) 100 UNIT/ML pen     loratadine (CLARITIN) 10 MG tablet     mvw complete formulation (SOFTGELS ) capsule     albuterol (PROAIR HFA/PROVENTIL HFA/VENTOLIN HFA) 108 (90 Base) MCG/ACT Inhaler     albuterol (PROVENTIL) (2.5 MG/3ML) 0.083% neb solution     allopurinol (ZYLOPRIM) 300 MG tablet     amylase-lipase-protease (CREON) 40886 units CPEP     azithromycin (ZITHROMAX) 250 MG tablet     BD YON U/F 32G X 4 MM insulin pen needle     blood glucose (NO BRAND SPECIFIED) test strip     blood glucose monitoring (FREESTYLE LITE) test strip     blood glucose monitoring (FREESTYLE) lancets     blood  "glucose monitoring (NO BRAND SPECIFIED) meter device kit     dornase alpha (PULMOZYME) 1 MG/ML neb solution     fluticasone (FLONASE) 50 MCG/ACT nasal spray     fluticasone (FLOVENT HFA) 110 MCG/ACT inhaler     insulin pen needle (BD YON U/F) 32G X 4 MM miscellaneous     order for DME     phytonadione (MEPHYTON) 5 MG tablet     Syringe/Needle, Disp, 18G X 1\" 6 ML MISC     tobramycin (MARLEN PODHALER) 28 MG in caps     ursodiol (ACTIGALL) 300 MG capsule     No current facility-administered medications for this visit.             Physical Exam:   /74   Pulse 87   Ht 1.63 m (5' 4.17\")   Wt 53.6 kg (118 lb 2.7 oz)   SpO2 96%   BMI 20.17 kg/m      GENERAL: alert, NAD  HEENT: NCAT, EOMI, anicteric sclera, canals and TMs clear; no oral mucosal edema or erythema  Neck: no cervical or supraclavicular adenopathy  Respiratory: moderate airflow, scattered apical crackles, otherwise clear  CV: RRR, S1S2, no murmurs noted  Abdomen: normoactive BS, soft   Lymph: no edema, + digital clubbing  Neuro: AAO X 3, CN 2-12 grossly intact  Psychiatric: normal affect, good eye contact  Skin: no rash, jaundice or lesions on limited exam         Data:   All laboratory and imaging data reviewed.      Cystic Fibrosis Culture  Specimen Description   Date Value Ref Range Status   09/09/2019 Sputum  Final   06/05/2019 Sputum  Final   01/03/2019 Sputum  Final    Culture Micro   Date Value Ref Range Status   09/09/2019 Light growth  Normal mami    Final   09/09/2019 Moderate growth  Pseudomonas aeruginosa   (A)  Final   09/09/2019 (A)  Final    Moderate growth  Strain 2  Pseudomonas aeruginosa     09/09/2019 (A)  Final    Moderate growth  Strain 3  Pseudomonas aeruginosa     09/09/2019 (A)  Final    Moderate growth  Strain 4  Pseudomonas aeruginosa     09/09/2019 Light growth  Aspergillus fumigatus   (A)  Final        PFT interpretation:  Maneuver: valid and meets ATS guidelines  Moderate obstruction with decreased FEV1 and " FEV1/FVC  Compared to prior: FEV1 of 2.35 is 340 ml above prior

## 2019-12-19 ENCOUNTER — HOSPITAL ENCOUNTER (OUTPATIENT)
Dept: PHYSICAL THERAPY | Facility: CLINIC | Age: 34
Setting detail: THERAPIES SERIES
End: 2019-12-19
Attending: PHYSICIAN ASSISTANT
Payer: COMMERCIAL

## 2019-12-19 ENCOUNTER — TELEPHONE (OUTPATIENT)
Dept: PULMONOLOGY | Facility: CLINIC | Age: 34
End: 2019-12-19

## 2019-12-19 ENCOUNTER — OFFICE VISIT (OUTPATIENT)
Dept: PULMONOLOGY | Facility: CLINIC | Age: 34
End: 2019-12-19
Attending: PHYSICIAN ASSISTANT
Payer: COMMERCIAL

## 2019-12-19 ENCOUNTER — OFFICE VISIT (OUTPATIENT)
Dept: PHARMACY | Facility: CLINIC | Age: 34
End: 2019-12-19
Payer: COMMERCIAL

## 2019-12-19 VITALS
OXYGEN SATURATION: 96 % | BODY MASS INDEX: 20.17 KG/M2 | DIASTOLIC BLOOD PRESSURE: 74 MMHG | HEIGHT: 64 IN | HEART RATE: 87 BPM | SYSTOLIC BLOOD PRESSURE: 120 MMHG | WEIGHT: 118.17 LBS

## 2019-12-19 DIAGNOSIS — E84.9 CYSTIC FIBROSIS (H): ICD-10-CM

## 2019-12-19 DIAGNOSIS — E84.9 CYSTIC FIBROSIS (H): Primary | ICD-10-CM

## 2019-12-19 DIAGNOSIS — K86.81 EXOCRINE PANCREATIC INSUFFICIENCY: ICD-10-CM

## 2019-12-19 DIAGNOSIS — E84.9 DIABETES MELLITUS DUE TO CYSTIC FIBROSIS (H): ICD-10-CM

## 2019-12-19 DIAGNOSIS — E08.9 DIABETES MELLITUS DUE TO CYSTIC FIBROSIS (H): ICD-10-CM

## 2019-12-19 DIAGNOSIS — E84.0 CYSTIC FIBROSIS WITH PULMONARY MANIFESTATIONS (H): Primary | ICD-10-CM

## 2019-12-19 LAB
ALBUMIN SERPL-MCNC: 3.6 G/DL (ref 3.4–5)
ALP SERPL-CCNC: 170 U/L (ref 40–150)
ALT SERPL W P-5'-P-CCNC: 38 U/L (ref 0–70)
AST SERPL W P-5'-P-CCNC: 20 U/L (ref 0–45)
BILIRUB DIRECT SERPL-MCNC: 0.2 MG/DL (ref 0–0.2)
BILIRUB SERPL-MCNC: 0.3 MG/DL (ref 0.2–1.3)
CK SERPL-CCNC: 122 U/L (ref 30–300)
PROT SERPL-MCNC: 7.7 G/DL (ref 6.8–8.8)

## 2019-12-19 PROCEDURE — 82550 ASSAY OF CK (CPK): CPT | Performed by: PHYSICIAN ASSISTANT

## 2019-12-19 PROCEDURE — 99207 ZZC NO CHARGE LOS: CPT | Performed by: PHARMACIST

## 2019-12-19 PROCEDURE — 80076 HEPATIC FUNCTION PANEL: CPT | Performed by: PHYSICIAN ASSISTANT

## 2019-12-19 PROCEDURE — 87077 CULTURE AEROBIC IDENTIFY: CPT | Performed by: PHYSICIAN ASSISTANT

## 2019-12-19 PROCEDURE — 97161 PT EVAL LOW COMPLEX 20 MIN: CPT | Mod: GP

## 2019-12-19 PROCEDURE — 97110 THERAPEUTIC EXERCISES: CPT | Mod: GP

## 2019-12-19 PROCEDURE — 87070 CULTURE OTHR SPECIMN AEROBIC: CPT | Performed by: PHYSICIAN ASSISTANT

## 2019-12-19 PROCEDURE — 87186 SC STD MICRODIL/AGAR DIL: CPT | Performed by: PHYSICIAN ASSISTANT

## 2019-12-19 PROCEDURE — 87107 FUNGI IDENTIFICATION MOLD: CPT | Performed by: PHYSICIAN ASSISTANT

## 2019-12-19 PROCEDURE — 36415 COLL VENOUS BLD VENIPUNCTURE: CPT | Performed by: PHYSICIAN ASSISTANT

## 2019-12-19 PROCEDURE — G0463 HOSPITAL OUTPT CLINIC VISIT: HCPCS | Mod: 25,ZF

## 2019-12-19 RX ORDER — LORATADINE 10 MG/1
10 TABLET ORAL DAILY PRN
Qty: 30 TABLET | COMMUNITY
Start: 2019-12-19 | End: 2020-06-24

## 2019-12-19 RX ORDER — PEDIATRIC MULTIVIT 61/D3/VIT K 1500-800
2 CAPSULE ORAL DAILY
Qty: 60 CAPSULE | Refills: 11 | Status: SHIPPED | OUTPATIENT
Start: 2019-12-19 | End: 2021-01-07

## 2019-12-19 RX ORDER — INSULIN GLARGINE 100 [IU]/ML
8 INJECTION, SOLUTION SUBCUTANEOUS DAILY
Qty: 15 ML | Refills: 1 | COMMUNITY
Start: 2019-12-19 | End: 2020-07-07

## 2019-12-19 ASSESSMENT — PAIN SCALES - GENERAL: PAINLEVEL: NO PAIN (0)

## 2019-12-19 ASSESSMENT — MIFFLIN-ST. JEOR: SCORE: 1379.75

## 2019-12-19 NOTE — LETTER
12/19/2019       RE: Dilan Nassar  [address] [place] [id number]     Dear Colleague,    Thank you for referring your patient, Dilan Nassar, to the [facility] L[facility]G [facility] at [facility]. Please see a copy of my visit note below.    [facility]  December 19, 2019         Assessment and Plan:   Dilan Nassar is a 34 year old male with cystic fibrosis, pancreatic insufficiency and CFRD who is seen today in clinic for routine follow up.       1. CF lung disease: feeling well, has increased his airway clearance to twice/day and has noticed improvement. Also has been working on improved use of his george podhaler.Sating 95% on room air; vesting BID. PFTs improved 9% to his recent best. Previous cultures + for MRSA, Klebsiella, Steno, Ps A and A. Fumigatus. No evidence of exacerbation at this time.  - Continue nebulizers, inhaler and vest therapy  - On chronic azithromycin   - Continue george podhaler month on/off  - Start exercising    2. Hemoptysis: no recent issues.   - Continue vitamin K once/week      3. 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 US 5/17 demonstrating coarse echotexture of the liver with hypertrophy of the caudate lobe and left lobe concerning for possible cirrhosis, no lesions, patent vasculature.  - Continue Ursodiol      4. Exocrine pancreatic insufficiency: denies symptoms of malabsorption. Weight is stable, BMI remains below Foundation goal.  - Continue pancreatic enzymes and vitamin supplementation      5. CFRD: recent AIC of 6.1 on 9/9/19. Has cut back some of his am carbohydrates, notes his BS are lower in the am.   - Continue Basaglar 8 U  - F/u with Dr. Miranda and routine diabetic eye exam      6. CF related sinus disease: denies current symptoms.   - Continue Flonase and Claritin    7. CFTR: modulation: patient is a candidate for Trikafta and is interested  in medication. Discussed medication, administration, mechanism, side effects and monitoring. Gwen HUDSON to see as well.  - Hepatic panel and CK  - Start Trikafta when able      RTC: in 3 months with routine spirometry  Annual studies due: September 2020  Vaccinations: completed influenza      Jyothi [place] PA-C  Pulmonary, Allergy, Critical Care and Sleep Medicine        Interval History:   Staying around here for holiday with [place] and her family. Has been more Zoroastrianism about vesting 2 times/day. Feeling better than normal, working hard with the george, holding it it. Coughing baseline, not a lot of production. No congestion or tightness. Hasn't been exercising as much as during the late summer/fall. No sinus congestion, no GI complaints, no change in tools.          Review of Systems:   Please see HPI. Otherwise, complete 10 point ROS negative.           Past Medical and Surgical History:     Past Medical History:   Diagnosis Date     Cystic fibrosis with pulmonary manifestations (H)     /[id number]     Past Surgical History:   Procedure Laterality Date     APPENDECTOMY OPEN  1999    Appendicitis      SINUS SURGERY  1990's    h/o many sinus infections           Family History:     Family History   Problem Relation Age of Onset     Diabetes Mother         Unknown type     Prostate Cancer Paternal Grandfather      LUNG DISEASE Other         Negative     Diabetes Maternal Aunt         unknown type     Diabetes Maternal Uncle         unknown type     Diabetes Maternal Grandmother         unknown type     Coronary Artery Disease Paternal Grandmother             Social History:     Social History     Socioeconomic History     Marital status: Single     Spouse name: Not on file     Number of children: Not on file     Years of education: Not on file     Highest education level: Not on file   Occupational History     Occupation: State Representative     Employer: [employer]     Occupation: Financial Counselor   Social  Needs     Financial resource strain: Not on file     Food insecurity:     Worry: Not on file     Inability: Not on file     Transportation needs:     Medical: Not on file     Non-medical: Not on file   Tobacco Use     Smoking status: Never Smoker     Smokeless tobacco: Former User   Substance and Sexual Activity     Alcohol use: Yes     Alcohol/week: 10.0 - 14.0 standard drinks     Types: 10 - 14 Standard drinks or equivalent per week     Comment: 2 drinks per night 5X/wk     Drug use: No     Sexual activity: Yes     Partners: Female     Birth control/protection: Pull-out method, Condom   Lifestyle     Physical activity:     Days per week: Not on file     Minutes per session: Not on file     Stress: Not on file   Relationships     Social connections:     Talks on phone: Not on file     Gets together: Not on file     Attends Anglican service: Not on file     Active member of club or organization: Not on file     Attends meetings of clubs or organizations: Not on file     Relationship status: Not on file     Intimate partner violence:     Fear of current or ex partner: Not on file     Emotionally abused: Not on file     Physically abused: Not on file     Forced sexual activity: Not on file   Other Topics Concern     Parent/sibling w/ CABG, MI or angioplasty before 65F 55M? Yes   Social History Narrative    Owns a home in [place], [place] with his girlfriend of ~7 years. 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            Medications:     Current Outpatient Medications   Medication     elexacaftor-tezacaftor-ivacaftor & ivacaftor (TRIKAFTA) 100-50-75 & 150 MG tablet pack     insulin glargine (BASAGLAR KWIKPEN) 100 UNIT/ML pen     loratadine (CLARITIN) 10 MG tablet     mvw complete formulation (SOFTGELS ) capsule     albuterol (PROAIR HFA/PROVENTIL HFA/VENTOLIN HFA) 108 (90 Base) MCG/ACT Inhaler     albuterol (PROVENTIL) (2.5 MG/3ML) 0.083% neb solution     allopurinol (ZYLOPRIM) 300 MG tablet      "amylase-lipase-protease (CREON) 76424 units CPEP     azithromycin (ZITHROMAX) 250 MG tablet     BD YON U/F 32G X 4 MM insulin pen needle     blood glucose (NO BRAND SPECIFIED) test strip     blood glucose monitoring (FREESTYLE LITE) test strip     blood glucose monitoring (FREESTYLE) lancets     blood glucose monitoring (NO BRAND SPECIFIED) meter device kit     dornase alpha (PULMOZYME) 1 MG/ML neb solution     fluticasone (FLONASE) 50 MCG/ACT nasal spray     fluticasone (FLOVENT HFA) 110 MCG/ACT inhaler     insulin pen needle (BD YON U/F) 32G X 4 MM miscellaneous     order for DME     phytonadione (MEPHYTON) 5 MG tablet     Syringe/Needle, Disp, 18G X 1\" 6 ML MISC     tobramycin (MARLEN PODHALER) 28 MG in caps     ursodiol (ACTIGALL) 300 MG capsule     No current facility-administered medications for this visit.             Physical Exam:   /74   Pulse 87   Ht 1.63 m (5' 4.17\")   Wt 53.6 kg (118 lb 2.7 oz)   SpO2 96%   BMI 20.17 kg/m       GENERAL: alert, NAD  HEENT: NCAT, EOMI, anicteric sclera, canals and TMs clear; no oral mucosal edema or erythema  Neck: no cervical or supraclavicular adenopathy  Respiratory: moderate airflow, scattered apical crackles, otherwise clear  CV: RRR, S1S2, no murmurs noted  Abdomen: normoactive BS, soft   Lymph: no edema, + digital clubbing  Neuro: AAO X 3, CN 2-12 grossly intact  Psychiatric: normal affect, good eye contact  Skin: no rash, jaundice or lesions on limited exam         Data:   All laboratory and imaging data reviewed.      Cystic Fibrosis Culture  Specimen Description   Date Value Ref Range Status   09/09/2019 Sputum  Final   06/05/2019 Sputum  Final   01/03/2019 Sputum  Final    Culture Micro   Date Value Ref Range Status   09/09/2019 Light growth  Normal mami    Final   09/09/2019 Moderate growth  Pseudomonas aeruginosa   (A)  Final   09/09/2019 (A)  Final    Moderate growth  Strain 2  Pseudomonas aeruginosa     09/09/2019 (A)  Final    Moderate " growth  Strain 3  Pseudomonas aeruginosa     09/09/2019 (A)  Final    Moderate growth  Strain 4  Pseudomonas aeruginosa     09/09/2019 Light growth  Aspergillus fumigatus   (A)  Final        PFT interpretation:  Maneuver: valid and meets ATS guidelines  Moderate obstruction with decreased FEV1 and FEV1/FVC  Compared to prior: FEV1 of 2.35 is 340 ml above prior            Nutrition Note    Reason for Visit: Low Vitamin D    Annual study labs completed 9/9/19  Vitamin A- 0.53 WNL  Vitamin D- 26, slightly low with goal for >30  Vitamin E- 9.6 WNL    Currently taking MVW Complete D3,000 - 2 caps per day from [facility].     Interventions/Recommendations:  Discussed vitamin levels and goal to improve Vit D. Changed prescription to MVW Complete D5,000 - 2 caps per day. Pt agreeable to plan.       Tamika Perez RD, LD  Cystic Fibrosis/Lung Transplant Dietitian  Pager [phone]  On weekends/holidays contact coverage RD at [phone] (inpatient use only)           Again, thank you for allowing me to participate in the care of your patient.      Sincerely,    Jyothi [place] PA-C

## 2019-12-19 NOTE — PROGRESS NOTES
Nutrition Note    Reason for Visit: Low Vitamin D    Annual study labs completed 9/9/19  Vitamin A- 0.53 WNL  Vitamin D- 26, slightly low with goal for >30  Vitamin E- 9.6 WNL    Currently taking MVW Complete D3,000 - 2 caps per day from [facility].     Interventions/Recommendations:  Discussed vitamin levels and goal to improve Vit D. Changed prescription to MVW Complete D5,000 - 2 caps per day. Pt agreeable to plan.       Tamika Perez RD, LD  Cystic Fibrosis/Lung Transplant Dietitian  Pager [phone]  On weekends/holidays contact coverage RD at [phone] (inpatient use only)

## 2019-12-19 NOTE — PROGRESS NOTES
Therapy Management:                                                    Dilan Nassar is a 34 year old male coming in for a therapy management visit.  He was referred to me from Jyothi [place] PA-C.     Reason for Consult: Trikafta initiation    Discussion:      Patient is eligible for treatment with CFTR modulator therapy. Most appropriate choice for patient of currently available CFTR modulators is: elexacaftor/tezacaftor/ivacaftor based on age, CFTR mutation genotype, past medical history and current medications. Patient was previously naive to CFTR modulator.    Recommended dose two orange elexacaftor 100mg/tezacaftor 50mg/ivacaftor 75mg in the morning and one blue ivacaftor 150mg tablet in the evening     Drug interactions: none     Dose should be given with age-appropriate fat containing food. Provided appropriate examples of fat-containing foods to patient (e.g. Whole milk, cheese, avocado, peanut butter).    Patient will not need dilated eye exam prior to initiation.    Baseline LFTs and CK drawn and are within normal limits Recommend continuing to monitor LFTs and CK quarterly for the first year of treatment then annually therafter.  Additional recommended monitoring: none.    Educated patient on potential side effects, including headache, GI disturbances, rash and increased respiratory symptoms during the first few days of taking the medication. Education provided on how to administer medication, what to do if a dose is missed, monitoring prior to and while on therapy, medications/foods to avoid, and expected benefits.  Discussed with patient how to obtain CFTR modulator from specialty pharmacy and informed patient they will need to bring home supply if hospitalized. Patient was engaged in teaching and verbalized understanding.    Patient enrolled in [facility], paperwork signed in clinic today .      Plan:  1. We will work on obtaining insurance coverage for Trikafta.  Once it is approved we will send it to  [facility], or the specialty pharmacy required by your insurance.  2. Once you receive the medication start taking Trikfata 2 orange tablets in the morning and 1 blue tablet in the evening, 12 hours apart with fat-containing food.    Will follow-up in 3 months to assess response to Trikafta.    Gwen AlbertD  CF Medication Therapy Management Pharmacist  [facility]  [phone]

## 2019-12-19 NOTE — TELEPHONE ENCOUNTER
PA Initiation    Medication: Trikafta- PENDING  Insurance Company: [employer] - Phone [phone] Fax [phone]  Pharmacy Filling the Rx: [facility] - [place], [place] - [facility] [address]  Filling Pharmacy Phone:    Filling Pharmacy Fax:    Start Date: 12/19/2019

## 2019-12-19 NOTE — PATIENT INSTRUCTIONS
Cystic Fibrosis Self-Care Plan       Patient: Dilan Nassar   MRN: [id number]   Clinic Date: December 19, 2019     RECOMMENDATIONS:  1. Continue nebulizers and vest therapy. Keep up great work with vesting!  2. Low Vitamin D level with annual studies. Changed prescription from MVW Complete D3,000 to MVW Complete D5,000 - 2 caps per day at [facility] for extra Vitamin D.   3. Start Trikafta when it's appropriate for your schedule.  4. Start exercising!! And enjoy the holidays!    Annual Studies:   IGG   Date Value Ref Range Status   09/09/2019 1,330 695 - 1,620 mg/dL Final     No results found for: INS  There are no preventive care reminders to display for this patient.    Pulmonary Function Tests  FEV1: amount of air you can blow out in 1 second  FVC: total amount of air you can take in and blow out    Your Goals:         PFT Latest Ref Rng & Units 12/19/2019   FVC L 4.17   FEV1 L 2.35   FVC% % 96   FEV1% % 65          Airway Clearance: The Most Important Way to Keep Your Lungs Healthy  Vest Settings:    Hill-Rom Frequencies: 8, 9, 10 Pressure 10 Then, Frequencies 18, 19, 20 Pressure 6      RespirTech: Quick Start with Pressure of     Do each frequency for 5 minutes; Deflate vest after each frequency & cough 3 times before beginning the next setting.    Vest and Neb Therapy should be done 2 times/day.    Good Nutrition Can Improve Lung Function and Overall Health     Take ALL of your vitamins with food     Take 1/2 of your enzymes before EVERY meal/snack and the other 1/2 mid-meal/snack    Wt Readings from Last 3 Encounters:   12/19/19 53.6 kg (118 lb 2.7 oz)   09/09/19 54 kg (119 lb)   06/05/19 54.5 kg (120 lb 2.4 oz)       Body mass index is 20.17 kg/m .         National CF Foundation Recommendations for BMI in CF Adults: Women: at least 22 Men: at least 23        Controlling Blood Sugars Helps Prevent Lung Infections & Improves Nutrition  Test blood sugar:     In the morning before eating  (goal is )     2 hours after a meal (goal is less than 150)     When pre-meal glucose is greater than 150 add correction     At bedtime (if less than 100 eat a snack with 15 grams of carbohydrates  Last A1C Results:   Hemoglobin A1C   Date Value Ref Range Status   09/09/2019 6.1 (H) 0 - 5.6 % Final     Comment:     Normal <5.7% Prediabetes 5.7-6.4%  Diabetes 6.5% or higher - adopted from ADA   consensus guidelines.           If diabetic, measure A1C every 6 months. Goal is under 7%.    Staying Healthy    Research: If you are interested in learning about research opportunities or have questions, please contact Sonali Altamirano at [phone] or [email or web address].       Foundation: Compass is a personalized resource service to help you with the insurance, financial, legal and other issues you are facing.  It's free, confidential and available to anyone with CF.  Ask your  for more information or contact Compass directly at [facility] (877-8194) or compass@cff.org, or learn more at cff.org/compass.       CF Nurse Line:  Nadya Pimentel and Dianna: [phone]   Audra Smart, RT: [phone]     Daniela Whitt and Tamika Perez , Dieticians: [phone]     Lisset Root, Diabetes Nurse: [phone]    Kathie Truong: [phone] or Kailey Mixon at [phone], Social Workers   [email or web address]

## 2019-12-19 NOTE — NURSING NOTE
Chief Complaint   Patient presents with     Follow Up     3mo fu     Vitals were taken and medications were reconciled.     CHRIS Crocker

## 2019-12-20 LAB
EXPTIME-PRE: 15.61 SEC
FEF2575-%PRED-PRE: 23 %
FEF2575-PRE: 0.88 L/SEC
FEF2575-PRED: 3.71 L/SEC
FEFMAX-%PRED-PRE: 80 %
FEFMAX-PRE: 7.19 L/SEC
FEFMAX-PRED: 8.98 L/SEC
FEV1-%PRED-PRE: 65 %
FEV1-PRE: 2.35 L
FEV1FEV6-PRE: 61 %
FEV1FEV6-PRED: 82 %
FEV1FVC-PRE: 56 %
FEV1FVC-PRED: 83 %
FIFMAX-PRE: 6.97 L/SEC
FVC-%PRED-PRE: 96 %
FVC-PRE: 4.17 L
FVC-PRED: 4.32 L

## 2019-12-23 NOTE — TELEPHONE ENCOUNTER
Prior Authorization Approval    Authorization Effective Date: 12/20/2019  Authorization Expiration Date: 12/13/2020  Medication: elexacaftor-tezacaftor-ivacaftor & ivacaftor (TRIKAFTA) 100-50-75 & 150 MG tablet pack (APPROVED)  Approved Dose/Quantity: 84 per 28 days  Reference #:     Insurance Company: [facility] - Phone [phone] Fax [phone]  Expected CoPay:       CoPay Card Available: Yes    Foundation Assistance Needed:    Which Pharmacy is filling the prescription (Not needed for infusion/clinic administered): [facility] - [place], [place] - [address]  Pharmacy Notified: Yes  Patient Notified: Yes

## 2019-12-24 LAB
BACTERIA SPEC CULT: ABNORMAL
SPECIMEN SOURCE: ABNORMAL

## 2019-12-30 NOTE — PROGRESS NOTES
Respiratory Therapist Note:    Vest    Brand: Hill-Rom - traditional Hill Rom: Frequencies 8, 9, 10 at pressure 10 then frequencies 18, 19, 20 at pressure 6. and Hill-Rom - Cedar Frequencies: 13-15, intensity 8-10   Cough Pause: Cough Pause; Yes   Vest Garment Size: Adult Small   Last Fitting Date: 2019   Frequency of therapy: 12-14 times per week   Concerns: none    Exercise (purposeful and aerobic for >20 minutes each session): Yes - amount : runs/walks 3-4   Does this qualify as additional airway clearance: Yes    Alternative Airway Clearance:       Nebulized Medications   Bronchodilators: Albuterol   Mucolytic: Pulmozyme   Antibiotics: MARLEN   Additional Inhaled Medications: MDI   Spacer Use: yes     Review Cleaning: Yes. Soap and boil.    Education and Transition Information   Correct order of inhaled medications: Yes   Mechanism of Action of inhaled medications: Yes   Frequency of inhaled medications: Yes   Dosage of inhaled medications: Yes   Other: none    Home Care:   Nebulizer Cups (Brand/Type): Ruby   Nebulizer Compressor    Year Purchased: 2018    [facility], Phone: [phone], Fax: [phone]   Nebulizer Supply Company:     [facility], Phone: [phone], Fax: [phone]    Oxygen:        P[facility]hab   Site:    Date Completed:     Plan of Care and Goals for next visit: Keep up the good work

## 2020-01-03 DIAGNOSIS — E84.0 CYSTIC FIBROSIS WITH PULMONARY MANIFESTATIONS (H): ICD-10-CM

## 2020-02-05 ENCOUNTER — MYC REFILL (OUTPATIENT)
Dept: PULMONOLOGY | Facility: CLINIC | Age: 35
End: 2020-02-05

## 2020-02-05 DIAGNOSIS — E84.0 CYSTIC FIBROSIS WITH PULMONARY MANIFESTATIONS (H): ICD-10-CM

## 2020-02-05 DIAGNOSIS — E84.9 DIABETES MELLITUS DUE TO CYSTIC FIBROSIS (H): ICD-10-CM

## 2020-02-05 DIAGNOSIS — K86.81 EXOCRINE PANCREATIC INSUFFICIENCY: ICD-10-CM

## 2020-02-05 DIAGNOSIS — A49.02 MRSA INFECTION: ICD-10-CM

## 2020-02-05 DIAGNOSIS — E84.9 CYSTIC FIBROSIS (H): ICD-10-CM

## 2020-02-05 DIAGNOSIS — E08.9 DIABETES MELLITUS DUE TO CYSTIC FIBROSIS (H): ICD-10-CM

## 2020-02-05 DIAGNOSIS — E84.9 CF (CYSTIC FIBROSIS) (H): ICD-10-CM

## 2020-02-06 RX ORDER — PHYTONADIONE 5 MG/1
TABLET ORAL
Qty: 16 TABLET | Refills: 3 | Status: SHIPPED | OUTPATIENT
Start: 2020-02-06 | End: 2021-03-09

## 2020-03-10 ENCOUNTER — HEALTH MAINTENANCE LETTER (OUTPATIENT)
Age: 35
End: 2020-03-10

## 2020-03-12 DIAGNOSIS — E84.0 CYSTIC FIBROSIS WITH PULMONARY MANIFESTATIONS (H): ICD-10-CM

## 2020-03-12 RX ORDER — TOBRAMYCIN 28 MG/1
4 CAPSULE ORAL; RESPIRATORY (INHALATION) 2 TIMES DAILY
Qty: 224 CAPSULE | Refills: 5 | Status: SHIPPED | OUTPATIENT
Start: 2020-03-12 | End: 2021-02-05

## 2020-03-17 DIAGNOSIS — E84.9 CYSTIC FIBROSIS (H): ICD-10-CM

## 2020-03-17 DIAGNOSIS — K86.81 EXOCRINE PANCREATIC INSUFFICIENCY: ICD-10-CM

## 2020-03-17 DIAGNOSIS — A49.02 MRSA INFECTION: ICD-10-CM

## 2020-03-17 DIAGNOSIS — E84.9 DIABETES MELLITUS DUE TO CYSTIC FIBROSIS (H): ICD-10-CM

## 2020-03-17 DIAGNOSIS — E84.0 CYSTIC FIBROSIS WITH PULMONARY MANIFESTATIONS (H): ICD-10-CM

## 2020-03-17 DIAGNOSIS — E08.9 DIABETES MELLITUS DUE TO CYSTIC FIBROSIS (H): ICD-10-CM

## 2020-03-17 DIAGNOSIS — E84.9 CF (CYSTIC FIBROSIS) (H): ICD-10-CM

## 2020-03-31 ENCOUNTER — MYC REFILL (OUTPATIENT)
Dept: PULMONOLOGY | Facility: CLINIC | Age: 35
End: 2020-03-31

## 2020-03-31 DIAGNOSIS — E84.8 DIABETES MELLITUS RELATED TO CF (CYSTIC FIBROSIS) (H): ICD-10-CM

## 2020-03-31 DIAGNOSIS — E08.9 DIABETES MELLITUS RELATED TO CF (CYSTIC FIBROSIS) (H): ICD-10-CM

## 2020-04-01 RX ORDER — PEN NEEDLE, DIABETIC 32GX 5/32"
NEEDLE, DISPOSABLE MISCELLANEOUS
Qty: 100 EACH | Refills: 2 | Status: SHIPPED | OUTPATIENT
Start: 2020-04-01 | End: 2021-02-02

## 2020-04-01 NOTE — TELEPHONE ENCOUNTER
Last Clinic Visit: 6/4/19, with recommended follow up in 3 months, no visits scheduled.  90 day jose RX provided with messages on RX and mychart message to schedule follow up

## 2020-04-09 ENCOUNTER — TELEPHONE (OUTPATIENT)
Dept: PULMONOLOGY | Facility: CLINIC | Age: 35
End: 2020-04-09

## 2020-04-09 ENCOUNTER — DOCUMENTATION ONLY (OUTPATIENT)
Dept: CARE COORDINATION | Facility: CLINIC | Age: 35
End: 2020-04-09

## 2020-04-09 NOTE — TELEPHONE ENCOUNTER
"Called patient to screen for current symptoms and change upcoming appointment to virtual/telephone visit. Patient declined visit stating he is feeling well and does not feel he needs to do a virtual visit. Writer informed him that he is due for his [facility] lab monitoring blood work to be completed, informed him that they can be arranged to be completed locally (lives in [place], [place]). Patient stated that he does not wish to go into any health care setting right now, might consider it when \"things settle down a little bit\". Writer informed patient that I would notify his provider and if she feels strongly about getting labs drawn she can contact him. For now, we will cancel upcoming appointment on 4/13.   Kimberley Casper RN  "

## 2020-04-10 ENCOUNTER — TELEPHONE (OUTPATIENT)
Dept: PULMONOLOGY | Facility: CLINIC | Age: 35
End: 2020-04-10

## 2020-04-10 ENCOUNTER — TRANSFERRED RECORDS (OUTPATIENT)
Dept: HEALTH INFORMATION MANAGEMENT | Facility: CLINIC | Age: 35
End: 2020-04-10

## 2020-04-10 NOTE — TELEPHONE ENCOUNTER
Called patient to follow up on his reluctance to get his quarterly lab. No answer, left message requesting patient call writer back to discuss.   Kimberley Casper RN

## 2020-04-10 NOTE — TELEPHONE ENCOUNTER
Guillermo called writer back re: need for lab monitoring while on Trikafta. After notifying patient that our pulmonary doctors have decided quarterly monitoring is imperative and will discontinue refills if patients are not following through on labs draws patient was then agreeable to getting labs drawn. Writer faxed orders to [facility] in [place] per patient's request (ph: [phone], fax: [phone]). Patient verbalized understanding and stated he would go get labs drawn.

## 2020-04-13 ENCOUNTER — CLINICAL UPDATE (OUTPATIENT)
Dept: PHARMACY | Facility: CLINIC | Age: 35
End: 2020-04-13
Payer: COMMERCIAL

## 2020-04-13 DIAGNOSIS — E84.0 CYSTIC FIBROSIS WITH PULMONARY MANIFESTATIONS (H): Primary | ICD-10-CM

## 2020-04-13 PROCEDURE — 99207 ZZC NO CHARGE LOS: CPT | Performed by: PHARMACIST

## 2020-04-13 NOTE — PROGRESS NOTES
"Clinical Update:                                                    At the request of Jyothi Warren PA-C, a chart review was conducted for Dilan Kohlidorene.    Reason for Chart Review: Lab review     Discussion: Guillermo had his quarterly Trikafta monitoring labs drawn at a local facility.  Results will be scanned into Epic.    CK = 312 (up from 122 in December)  Direct bilirubin 0.5 (up from 0.2)  Total bilirubin normal at 0.8  Other results WNL    Guillermo cancelled his virtual visit with Jyothi, stating it was \"not necessary\". Jyothi says this is OK for now, but if Guillermo wants to continue Trikafta he does need to have the appropriate lab monitoring done every 3 months for the first year.    Plan:  1. Continue Trikafta  2. Repeat labs in 2 months, per Jyothi Murray PharmD  CF Medication Therapy Management Pharmacist  [facility]  [phone]      "

## 2020-04-29 ENCOUNTER — MYC REFILL (OUTPATIENT)
Dept: PULMONOLOGY | Facility: CLINIC | Age: 35
End: 2020-04-29

## 2020-04-29 DIAGNOSIS — E84.9 DIABETES MELLITUS DUE TO CYSTIC FIBROSIS (H): ICD-10-CM

## 2020-04-29 DIAGNOSIS — A49.02 MRSA INFECTION: ICD-10-CM

## 2020-04-29 DIAGNOSIS — E84.0 CYSTIC FIBROSIS WITH PULMONARY MANIFESTATIONS (H): ICD-10-CM

## 2020-04-29 DIAGNOSIS — E84.9 CYSTIC FIBROSIS (H): ICD-10-CM

## 2020-04-29 DIAGNOSIS — E84.9 CF (CYSTIC FIBROSIS) (H): ICD-10-CM

## 2020-04-29 DIAGNOSIS — E08.9 DIABETES MELLITUS DUE TO CYSTIC FIBROSIS (H): ICD-10-CM

## 2020-04-29 DIAGNOSIS — K86.81 EXOCRINE PANCREATIC INSUFFICIENCY: ICD-10-CM

## 2020-06-03 ENCOUNTER — MYC REFILL (OUTPATIENT)
Dept: PULMONOLOGY | Facility: CLINIC | Age: 35
End: 2020-06-03

## 2020-06-03 DIAGNOSIS — E84.9 DIABETES MELLITUS DUE TO CYSTIC FIBROSIS (H): ICD-10-CM

## 2020-06-03 DIAGNOSIS — E84.0 CYSTIC FIBROSIS WITH PULMONARY MANIFESTATIONS (H): ICD-10-CM

## 2020-06-03 DIAGNOSIS — E84.9 CYSTIC FIBROSIS (H): ICD-10-CM

## 2020-06-03 DIAGNOSIS — K86.81 EXOCRINE PANCREATIC INSUFFICIENCY: ICD-10-CM

## 2020-06-03 DIAGNOSIS — A49.02 MRSA INFECTION: ICD-10-CM

## 2020-06-03 DIAGNOSIS — E08.9 DIABETES MELLITUS DUE TO CYSTIC FIBROSIS (H): ICD-10-CM

## 2020-06-03 DIAGNOSIS — E84.9 CF (CYSTIC FIBROSIS) (H): ICD-10-CM

## 2020-06-03 RX ORDER — URSODIOL 300 MG/1
CAPSULE ORAL
Qty: 180 CAPSULE | Refills: 3 | Status: SHIPPED | OUTPATIENT
Start: 2020-06-03 | End: 2021-02-05

## 2020-06-15 ENCOUNTER — TELEPHONE (OUTPATIENT)
Dept: PHARMACY | Facility: CLINIC | Age: 35
End: 2020-06-15

## 2020-06-15 NOTE — TELEPHONE ENCOUNTER
LM for patient to remind him he is due for labs per Jyothi Warren.  Orders will be faxed to [facility] in [place].    Gwen AlbertD  CF Medication Therapy Management Pharmacist  [facility]  [phone]

## 2020-06-18 ENCOUNTER — TRANSFERRED RECORDS (OUTPATIENT)
Dept: HEALTH INFORMATION MANAGEMENT | Facility: CLINIC | Age: 35
End: 2020-06-18

## 2020-06-19 ENCOUNTER — CLINICAL UPDATE (OUTPATIENT)
Dept: PHARMACY | Facility: CLINIC | Age: 35
End: 2020-06-19
Payer: COMMERCIAL

## 2020-06-19 DIAGNOSIS — E84.0 CYSTIC FIBROSIS WITH PULMONARY MANIFESTATIONS (H): Primary | ICD-10-CM

## 2020-06-19 LAB
ALBUMIN SERPL-MCNC: 4.3 G/DL
ALP SERPL-CCNC: 110 U/L
ALT SERPL-CCNC: 28 U/L
AST SERPL-CCNC: 29 U/L
BILIRUB SERPL-MCNC: 0.9 MG/DL
BILIRUBIN DIRECT: 0.4 MG/DL
CK TOTAL: 174 U/L
PROT SERPL-MCNC: 6.9 G/DL

## 2020-06-19 PROCEDURE — 99207 ZZC NO CHARGE LOS: CPT | Performed by: PHARMACIST

## 2020-06-19 NOTE — PROGRESS NOTES
Reviewed most recent CK and hepatic panel, all normal ok to re-check in 3 months for Trikafta monitoring.     Pt notified via [employer].    Roosevelt HaskinsD  CF Clinic Pharmacist  Phone: [phone]  E-mail: [email or web address]

## 2020-06-22 NOTE — PROGRESS NOTES
Dilan Nassar is a 34 year old male who is being evaluated via a billable video visit.        1. CF lung disease: no pulmonary complaints, feels clear with minimal cough since starting Trikafta. Trying to do a bit more exercising at home. N Previous cultures + for MRSA, Klebsiella, Steno, Ps A and A. Fumigatus. No evidence of exacerbation at this time.  - Continue nebulizers, inhaler and vest therapy  - On chronic azithromycin   - Continue george podhaler month on/off     2. Hemoptysis: no recent issues.   - Continue vitamin K once/week      3. 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 US 5/17 demonstrating coarse echotexture of the liver with hypertrophy of the caudate lobe and left lobe concerning for possible cirrhosis, no lesions, patent vasculature. LFTs 6/18/20 WNL.   - Continue Ursodiol      4. Exocrine pancreatic insufficiency: denies symptoms of malabsorption, does have some occasionally cramping in the morning that resolves in about a 1/2 hour.  - Make sure to hydrate; encourage miralax PRN   - Continue pancreatic enzymes and vitamin supplementation      5. CFRD: recent AIC of 6.1 on 9/9/19. BS have been okay, was checking them a few weeks ago. Overdue for Endocrine visit  - Continue Basaglar 8 U  - F/u with Dr. Miranda and routine diabetic eye exam      6. CF related sinus disease: denies current symptoms.   - Continue Flonase      7. CFTR: modulation: patient started on Trikafta at his last visit and since that time has been doing well. Labs from 6/18/20 WNL.  - Continue Trikafta.      RTC: in 3 months with routine spirometry  Annual studies due: September 2020 (ordered for next visit, no DEXA)      Jyothi Warren PA-C  Pulmonary, Allergy, Critical Care and Sleep Medicine    Interval history: patient is doing well. Has been in [place] since mid March, getting outside, walking and doing minor exercising at home, riding bike. Since starting Trikafta in January patient is much more clear. No  "congestion or dyspea, no bloating or gas, did have some constipation initially, which has resolved. About half days per week, Guillermo will notice some cramps for about a 1/2 hour, then they go away. Vesting BID.     GENERAL: Healthy, alert and no distress  EYES: Eyes grossly normal to inspection.  No discharge or erythema, or obvious scleral/conjunctival abnormalities.  RESP: No audible wheeze, cough, or visible cyanosis.  No visible retractions or increased work of breathing.    SKIN: Visible skin clear. No significant rash, abnormal pigmentation or lesions.  NEURO: Cranial nerves grossly intact.  Mentation and speech appropriate for age.  PSYCH: Mentation appears normal, affect normal/bright, judgement and insight intact, normal speech and appearance well-groomed.    The patient has been notified of following:     \"This video visit will be conducted via a call between you and your physician/provider. We have found that certain health care needs can be provided without the need for an in-person physical exam.  This service lets us provide the care you need with a video conversation.  If a prescription is necessary we can send it directly to your pharmacy.  If lab work is needed we can place an order for that and you can then stop by our lab to have the test done at a later time.    Video visits are billed at different rates depending on your insurance coverage.  Please reach out to your insurance provider with any questions.    If during the course of the call the physician/provider feels a video visit is not appropriate, you will not be charged for this service.\"    Patient has given verbal consent for Video visit? Yes    Will anyone else be joining your video visit? No    Video-Visit Details    Type of service:  Video Visit    Video Start Time: 9:20 AM  Video End Time:9:43 AM    Originating Location (pt. Location): Home    Distant Location (provider location):  [facility]     Platform " used for Video Visit: [employer]

## 2020-06-24 ENCOUNTER — VIRTUAL VISIT (OUTPATIENT)
Dept: PULMONOLOGY | Facility: CLINIC | Age: 35
End: 2020-06-24
Attending: PHYSICIAN ASSISTANT
Payer: COMMERCIAL

## 2020-06-24 DIAGNOSIS — E08.9 DIABETES MELLITUS DUE TO CYSTIC FIBROSIS (H): Primary | ICD-10-CM

## 2020-06-24 DIAGNOSIS — E84.9 CF (CYSTIC FIBROSIS) (H): Primary | ICD-10-CM

## 2020-06-24 DIAGNOSIS — E84.9 CYSTIC FIBROSIS (H): ICD-10-CM

## 2020-06-24 DIAGNOSIS — K86.81 EXOCRINE PANCREATIC INSUFFICIENCY: ICD-10-CM

## 2020-06-24 DIAGNOSIS — E84.9 DIABETES MELLITUS DUE TO CYSTIC FIBROSIS (H): Primary | ICD-10-CM

## 2020-06-24 PROCEDURE — 97803 MED NUTRITION INDIV SUBSEQ: CPT | Mod: TEL,ZF | Performed by: DIETITIAN, REGISTERED

## 2020-06-24 NOTE — LETTER
6/24/2020         RE: Dilan Nassar  [address] [place] [id number]        Dear Colleague,    Thank you for referring your patient, Dilan Nassar, to the [facility]. Please see a copy of my visit note below.    Dilan Nassar is a 34 year old male who is being evaluated via a billable video visit.        1. CF lung disease: no pulmonary complaints, feels clear with minimal cough since starting Trikafta. Trying to do a bit more exercising at home. N Previous cultures + for MRSA, Klebsiella, Steno, Ps A and A. Fumigatus. No evidence of exacerbation at this time.  - Continue nebulizers, inhaler and vest therapy  - On chronic azithromycin   - Continue george podhaler month on/off     2. Hemoptysis: no recent issues.   - Continue vitamin K once/week      3. 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 US 5/17 demonstrating coarse echotexture of the liver with hypertrophy of the caudate lobe and left lobe concerning for possible cirrhosis, no lesions, patent vasculature. LFTs 6/18/20 WNL.   - Continue Ursodiol      4. Exocrine pancreatic insufficiency: denies symptoms of malabsorption, does have some occasionally cramping in the morning that resolves in about a 1/2 hour.  - Make sure to hydrate; encourage miralax PRN   - Continue pancreatic enzymes and vitamin supplementation      5. CFRD: recent AIC of 6.1 on 9/9/19. BS have been okay, was checking them a few weeks ago. Overdue for Endocrine visit  - Continue Basaglar 8 U  - F/u with Dr. Miranda and routine diabetic eye exam      6. CF related sinus disease: denies current symptoms.   - Continue Flonase      7. CFTR: modulation: patient started on Trikafta at his last visit and since that time has been doing well. Labs from 6/18/20 WNL.  - Continue Trikafta.      RTC: in 3 months with routine spirometry  Annual studies due: September 2020 (ordered for next visit, no DEXA)      Jyothi Warren PA-C  Pulmonary, Allergy,  "Critical Care and Sleep Medicine    Interval history: patient is doing well. Has been in [place] since mid March, getting outside, walking and doing minor exercising at home, riding bike. Since starting Trikafta in January patient is much more clear. No congestion or dyspea, no bloating or gas, did have some constipation initially, which has resolved. About half days per week, Guillermo will notice some cramps for about a 1/2 hour, then they go away. Vesting BID.     GENERAL: Healthy, alert and no distress  EYES: Eyes grossly normal to inspection.  No discharge or erythema, or obvious scleral/conjunctival abnormalities.  RESP: No audible wheeze, cough, or visible cyanosis.  No visible retractions or increased work of breathing.    SKIN: Visible skin clear. No significant rash, abnormal pigmentation or lesions.  NEURO: Cranial nerves grossly intact.  Mentation and speech appropriate for age.  PSYCH: Mentation appears normal, affect normal/bright, judgement and insight intact, normal speech and appearance well-groomed.    The patient has been notified of following:     \"This video visit will be conducted via a call between you and your physician/provider. We have found that certain health care needs can be provided without the need for an in-person physical exam.  This service lets us provide the care you need with a video conversation.  If a prescription is necessary we can send it directly to your pharmacy.  If lab work is needed we can place an order for that and you can then stop by our lab to have the test done at a later time.    Video visits are billed at different rates depending on your insurance coverage.  Please reach out to your insurance provider with any questions.    If during the course of the call the physician/provider feels a video visit is not appropriate, you will not be charged for this service.\"    Patient has given verbal consent for Video visit? Yes    Will anyone else be joining your video visit? " No    Video-Visit Details    Type of service:  Video Visit    Video Start Time: 9:20 AM  Video End Time:9:43 AM    Originating Location (pt. Location): Home    Distant Location (provider location):  [facility]     Platform used for Video Visit: [employer]      Again, thank you for allowing me to participate in the care of your patient.        Sincerely,        Jyothi Warren PA-C

## 2020-06-24 NOTE — LETTER
"    6/24/2020         RE: Dilan Nassar  [address] [place] [id number]        Dear Colleague,    Thank you for referring your patient, Dilan Nassar, to the [facility]. Please see a copy of my visit note below.    CF Annual Nutrition Assessment    Reason for Assessment  Assessed during [facility] r/t increased nutrition risk with diagnosis of CF per protocol    Dilan Nassar is a 34 year old male who is being evaluated via a billable telephone visit.      The patient has been notified of following:     \"This telephone visit will be conducted via a call between you and your physician/provider. We have found that certain health care needs can be provided without the need for a physical exam.  This service lets us provide the care you need with a short phone conversation.  If a prescription is necessary we can send it directly to your pharmacy.  If lab work is needed we can place an order for that and you can then stop by our lab to have the test done at a later time.    Telephone visits are billed at different rates depending on your insurance coverage. During this emergency period, for some insurers they may be billed the same as an in-person visit.  Please reach out to your insurance provider with any questions.    If during the course of the call the physician/provider feels a telephone visit is not appropriate, you will not be charged for this service.\"    Patient has given verbal consent for Telephone visit?  Yes    Nutrition Significant PMH  Moderate Lung Disease   Pancreatic Insufficient   CFRD    Social Assessment  Living situation: Owns a home in [place], living with girlfriend.   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 Weight remains below CFF recs however has been able to maintain at higher weight than previous baseline 51 kg in 2016.   Wt Readings from Last 10 Encounters:   19 53.6 kg (118 lb 2.7 oz)   19 54 kg (119 lb)   19 54.5 kg (120 lb 2.4 oz)   19 54.5 kg (120 lb 1.6 oz)   19 54.2 kg (119 lb 7.8 oz)   18 54 kg (119 lb 0.8 oz)   18 55.3 kg (121 lb 14.6 oz)   18 53.1 kg (117 lb 1 oz)   18 53.1 kg (117 lb)   17 53.1 kg (117 lb 1 oz)     Physical Activity/Exercise:  Pt does body wt strength for 30 min and walks dog for 30 min daily, this is more regimented then previously     MALNUTRITION  % Intake:  No decreased intake noted  % Weight Loss:  None noted  Subcutaneous Fat Loss:  Unable to assess d/t telehealth visit   Muscle Loss:  Unable to assess d/t telehealth visit   Fluid Accumulation/Edema:  None noted  Malnutrition Diagnosis: Patient does not meet two of the above criteria necessary for diagnosing malnutrition    Pancreatic Enzymes  Brand:  Creon [id number]  Dosin with meals, 3 with snacks  Are you taking enzymes as recommended:Yes     High dose providing 1090 units lipase/kg/meal which is within the recommended range per CF Foundation to inhibit fibrosing colonopathy    Signs of Malabsorption:  Mostly no, but was struggling with some abdominal pain/cramping a few days/week in the morning before taking his AM Trikafta dose. He has been unable to identify anything that makes it worse or better but has notice that it's become significantly less frequent.   Enzyme Program: Insurance covered 100% of enzymes     Diet History and Assessment  Diet Preferences/Allergies/Intolerances: Regular   Intake Recall/Comments: Guillermo  eats TID meals and minimal snacks.   B - several bowls of cereal with toast and a banana  L - ham sandwich with cheese and mayen, cottage cheese, fruit  D - 4-6 oz meat + veggies   Snacks - rare but may be popcorn     Calcium: multiple serving of milk in AM with cereal, cheese throughout the day (likely adequate)   Salt: no sings/symptoms of deficiency   Hydration:  32-40 fl oz water daily, 1 can Coke daily    Supplements:  None    Estimated Energy and Protein Needs  Estimation based on weight maintenance vs gain with Moderate lung disease and pancreatic insufficiency.     BEE: ~1400  7687-7092 kcals/day =  200-225% BEE   g protein/day = 1.5-2 g/kg    Laboratory Assessment    Vitamin A   Date Value Ref Range Status   09/09/2019 0.53 0.30 - 1.20 mg/L Final     Vitamin D Deficiency screening   Date Value Ref Range Status   09/09/2019 26 20 - 75 ug/L Final     Comment:     Season, race, dietary intake, and treatment affect the concentration of   25-hydroxy-Vitamin D. Values may decrease during winter months and increase   during summer months. Values 20-29 ug/L may indicate Vitamin D insufficiency   and values <20 ug/L may indicate Vitamin D deficiency.  Vitamin D determination is routinely performed by an immunoassay specific for   25 hydroxyvitamin D3.  If an individual is on vitamin D2 (ergocalciferol)   supplementation, please specify 25 OH vitamin D2 and D3 level determination by   LCMSMS test VITD23.       Vitamin E   Date Value Ref Range Status   09/09/2019 9.6 5.5 - 18.0 mg/L Final     Comment:     (Note)  Test developed and characteristics determined by [employer]. See Compliance Statement B: [email or web address]       Iron   Date Value Ref Range Status   09/09/2019 84 35 - 180 ug/dL Final     Cholesterol   Date Value Ref Range Status   09/09/2019 108 <200 mg/dL Final     Triglycerides   Date Value Ref Range Status   09/09/2019 56 <150 mg/dL Final     HDL Cholesterol   Date Value Ref Range Status    09/09/2019 65 >39 mg/dL Final     LDL Cholesterol Calculated   Date Value Ref Range Status   09/09/2019 31 <100 mg/dL Final     Comment:     Desirable:       <100 mg/dl     DEXA: 2019, lowest z-score -0.9    Current Vitamin/Mineral Prescription R/T deficiency/malabsorption: MVW Complete D5,000 - 2 softgels per day from [facility], Vit K weekly    CF Related Diabetes Evaluation  Hgb A1C: 6.1%   Date: 9/9/19  FSBG range: not checking frequently  Insulin: Yes    Insulin Regimen: Basaglar 8 units q AM (decreased from 12 units last Feb)  Carbohydrate Counting: No     -Sees Dr. Miranda/endo team. Last visit 6/4/19. Recommended insulin coverage with breakfast given hyperglycemia however pt declines at this time.     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  INTERVENTIONS/RECOMMENDATIONS  PO intake/wt trends:  Reviewed current diet/eating habits and encouraged Guillermo to increase his PO intake to TID + 1-2 snacks given increased physical activity, plateau in wt gain, and lower then recommended BMI per CFF.   Enzymes: Discussed abdominal cramping and encouraged pt to continue monitoring it's severity/frequency. Since it hasn't been an issue lately, continue with current enzyme regimen given no other new signs/symptoms of malabsorption. Consider adding MVW probiotic if abdominal pain/cramping becomes more prevalent again.         Trikafta:  Reviewed intake guidelines for Trikafta doses including 10-12 grams of fat per dose taken with enzymes, also every 12 hour dosing.  This was not new information for Guillermo and he was able to list several fat containing foods he could eat/drink with his dose.    CFRD: Reviewed recommendations from Dr. Miranda with pt and encouraged him to schedule a follow up with Endo.     Patient Understanding: Good  Expected Compliance:  Good  Follow-Up Plans: Per protocol    GOALS:  1) Weight maintenance vs gain to BMI of 23 kg/m2 or above.   2) 100% compliance with prescribed enzymes, vitamin/mineral supplements and insulin therapy.     FOLLOW-UP/MONITORING:  Visit patient within 12 month(s), sooner if requested by pt or provider     Phone Call Duration: 15 min    Maria Antonia Oro RD, LD (pager [phone])  Cystic Fibrosis/Lung Transplant Dietitian      -Available Mon-Wednesday 8 AM-4:30 PM. On Thursdays and Fridays please contact pager [phone] (Tamika Perez RD) and on weekends/holidays contact coverage dietitian at pager [phone] (inpatient use only).       Again, thank you for allowing me to participate in the care of your patient.        Sincerely,        Daniela Whitt RD     None

## 2020-06-24 NOTE — PATIENT INSTRUCTIONS
Cystic Fibrosis Self-Care Plan       Patient: Dilan Nassar   MRN: [id number]   Clinic Date: June 24, 2020     RECOMMENDATIONS:  1. Continue nebulizers and vest therapy. Continue to get out and exercise!  2. Hydrate well and use Miralax PRN to make sure you bowel movements are consistent.  3. Enjoy the rest of the summer!    Annual Studies:   IGG   Date Value Ref Range Status   09/09/2019 1,330 695 - 1,620 mg/dL Final     No results found for: INS  There are no preventive care reminders to display for this patient.    Pulmonary Function Tests  FEV1: amount of air you can blow out in 1 second  FVC: total amount of air you can take in and blow out    Your Goals:         PFT Latest Ref Rng & Units 12/19/2019   FVC L 4.17   FEV1 L 2.35   FVC% % 96   FEV1% % 65          Airway Clearance: The Most Important Way to Keep Your Lungs Healthy  Vest Settings:    Hill-Rom Frequencies: 8, 9, 10 Pressure 10 Then, Frequencies 18, 19, 20 Pressure 6      RespirTech: Quick Start with Pressure of     Do each frequency for 5 minutes; Deflate vest after each frequency & cough 3 times before beginning the next setting.    Vest and Neb Therapy should be done 2 times/day.    Good Nutrition Can Improve Lung Function and Overall Health     Take ALL of your vitamins with food     Take 1/2 of your enzymes before EVERY meal/snack and the other 1/2 mid-meal/snack    Wt Readings from Last 3 Encounters:   12/19/19 53.6 kg (118 lb 2.7 oz)   09/09/19 54 kg (119 lb)   06/05/19 54.5 kg (120 lb 2.4 oz)       There is no height or weight on file to calculate BMI.         National CF Foundation Recommendations for BMI in CF Adults: Women: at least 22 Men: at least 23        Controlling Blood Sugars Helps Prevent Lung Infections & Improves Nutrition  Test blood sugar:     In the morning before eating (goal is )     2 hours after a meal (goal is less than 150)     When pre-meal glucose is greater than 150 add correction     At bedtime (if less  than 100 eat a snack with 15 grams of carbohydrates  Last A1C Results:   Hemoglobin A1C   Date Value Ref Range Status   09/09/2019 6.1 (H) 0 - 5.6 % Final     Comment:     Normal <5.7% Prediabetes 5.7-6.4%  Diabetes 6.5% or higher - adopted from ADA   consensus guidelines.           If diabetic, measure A1C every 6 months. Goal is under 7%.    Staying Healthy    Research: If you are interested in learning about research opportunities or have questions, please contact Sonali Altamirano at [phone] or [email or web address].       Foundation: Compass is a personalized resource service to help you with the insurance, financial, legal and other issues you are facing.  It's free, confidential and available to anyone with CF.  Ask your  for more information or contact Compass directly at [facility] ([phone]) or compass@cff.org, or learn more at cff.org/compass.       CF Nurse Line: Alla Pimentel: [phone]   Audra Smart, RT: [phone]     Daniela Whitt and Tamika Perez , Dieticians: [phone]     Lisset Root, Diabetes Nurse: [phone]    Kathie Truong: [phone] or Kailey Mixon at [phone], Social Workers   [email or web address]

## 2020-07-06 NOTE — PROGRESS NOTES
"CF Annual Nutrition Assessment    Reason for Assessment  Assessed during [facility] r/t increased nutrition risk with diagnosis of CF per protocol    Dilan Nassar is a 34 year old male who is being evaluated via a billable telephone visit.      The patient has been notified of following:     \"This telephone visit will be conducted via a call between you and your physician/provider. We have found that certain health care needs can be provided without the need for a physical exam.  This service lets us provide the care you need with a short phone conversation.  If a prescription is necessary we can send it directly to your pharmacy.  If lab work is needed we can place an order for that and you can then stop by our lab to have the test done at a later time.    Telephone visits are billed at different rates depending on your insurance coverage. During this emergency period, for some insurers they may be billed the same as an in-person visit.  Please reach out to your insurance provider with any questions.    If during the course of the call the physician/provider feels a telephone visit is not appropriate, you will not be charged for this service.\"    Patient has given verbal consent for Telephone visit?  Yes    Nutrition Significant PMH  Moderate Lung Disease   Pancreatic Insufficient   CFRD    Social Assessment  Living situation: Owns a home in [place], living with girlfriend.   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 Weight " remains below CFF recs however has been able to maintain at higher weight than previous baseline 51 kg in 2016.   Wt Readings from Last 10 Encounters:   19 53.6 kg (118 lb 2.7 oz)   19 54 kg (119 lb)   19 54.5 kg (120 lb 2.4 oz)   19 54.5 kg (120 lb 1.6 oz)   19 54.2 kg (119 lb 7.8 oz)   18 54 kg (119 lb 0.8 oz)   18 55.3 kg (121 lb 14.6 oz)   18 53.1 kg (117 lb 1 oz)   18 53.1 kg (117 lb)   17 53.1 kg (117 lb 1 oz)     Physical Activity/Exercise:  Pt does body wt strength for 30 min and walks dog for 30 min daily, this is more regimented then previously     MALNUTRITION  % Intake:  No decreased intake noted  % Weight Loss:  None noted  Subcutaneous Fat Loss:  Unable to assess d/t telehealth visit   Muscle Loss:  Unable to assess d/t telehealth visit   Fluid Accumulation/Edema:  None noted  Malnutrition Diagnosis: Patient does not meet two of the above criteria necessary for diagnosing malnutrition    Pancreatic Enzymes  Brand:  Creon [id number]  Dosin with meals, 3 with snacks  Are you taking enzymes as recommended:Yes     High dose providing 1090 units lipase/kg/meal which is within the recommended range per CF Foundation to inhibit fibrosing colonopathy    Signs of Malabsorption:  Mostly no, but was struggling with some abdominal pain/cramping a few days/week in the morning before taking his AM Trikafta dose. He has been unable to identify anything that makes it worse or better but has notice that it's become significantly less frequent.   Enzyme Program: Insurance covered 100% of enzymes     Diet History and Assessment  Diet Preferences/Allergies/Intolerances: Regular   Intake Recall/Comments: Guillermo eats TID meals and minimal snacks.   B - several bowls of cereal with toast and a banana  L - ham sandwich with cheese and mayen, cottage cheese, fruit  D - 4-6 oz meat + veggies   Snacks - rare but may be popcorn     Calcium: multiple serving of milk in AM with  cereal, cheese throughout the day (likely adequate)   Salt: no sings/symptoms of deficiency   Hydration:  32-40 fl oz water daily, 1 can Coke daily    Supplements:  None    Estimated Energy and Protein Needs  Estimation based on weight maintenance vs gain with Moderate lung disease and pancreatic insufficiency.     BEE: ~1400  2550-8856 kcals/day =  200-225% BEE   g protein/day = 1.5-2 g/kg    Laboratory Assessment    Vitamin A   Date Value Ref Range Status   09/09/2019 0.53 0.30 - 1.20 mg/L Final     Vitamin D Deficiency screening   Date Value Ref Range Status   09/09/2019 26 20 - 75 ug/L Final     Comment:     Season, race, dietary intake, and treatment affect the concentration of   25-hydroxy-Vitamin D. Values may decrease during winter months and increase   during summer months. Values 20-29 ug/L may indicate Vitamin D insufficiency   and values <20 ug/L may indicate Vitamin D deficiency.  Vitamin D determination is routinely performed by an immunoassay specific for   25 hydroxyvitamin D3.  If an individual is on vitamin D2 (ergocalciferol)   supplementation, please specify 25 OH vitamin D2 and D3 level determination by   LCMSMS test [id number].       Vitamin E   Date Value Ref Range Status   09/09/2019 9.6 5.5 - 18.0 mg/L Final     Comment:     (Note)  Test developed and characteristics determined by [employer]. See Compliance Statement B: [email or web address]       Iron   Date Value Ref Range Status   09/09/2019 84 35 - 180 ug/dL Final     Cholesterol   Date Value Ref Range Status   09/09/2019 108 <200 mg/dL Final     Triglycerides   Date Value Ref Range Status   09/09/2019 56 <150 mg/dL Final     HDL Cholesterol   Date Value Ref Range Status   09/09/2019 65 >39 mg/dL Final     LDL Cholesterol Calculated   Date Value Ref Range Status   09/09/2019 31 <100 mg/dL Final     Comment:     Desirable:       <100 mg/dl     DEXA: 2019, lowest z-score -0.9    Current Vitamin/Mineral Prescription R/T  deficiency/malabsorption: MVW Complete D5,000 - 2 softgels per day from [facility], Vit K weekly    CF Related Diabetes Evaluation  Hgb A1C: 6.1%   Date: 9/9/19  FSBG range: not checking frequently  Insulin: Yes    Insulin Regimen: Basaglar 8 units q AM (decreased from 12 units last Feb)  Carbohydrate Counting: No     -Sees Dr. Miranda/endo team. Last visit 6/4/19. Recommended insulin coverage with breakfast given hyperglycemia however pt declines at this time.     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  INTERVENTIONS/RECOMMENDATIONS  PO intake/wt trends:  Reviewed current diet/eating habits and encouraged Guillermo to increase his PO intake to TID + 1-2 snacks given increased physical activity, plateau in wt gain, and lower then recommended BMI per CFF.   Enzymes: Discussed abdominal cramping and encouraged pt to continue monitoring it's severity/frequency. Since it hasn't been an issue lately, continue with current enzyme regimen given no other new signs/symptoms of malabsorption. Consider adding MVW probiotic if abdominal pain/cramping becomes more prevalent again.         Trikafta:  Reviewed intake guidelines for Trikafta doses including 10-12 grams of fat per dose taken with enzymes, also every 12 hour dosing.  This was not new information for Guillermo and he was able to list several fat containing foods he could eat/drink with his dose.    CFRD: Reviewed recommendations from Dr. Miranda with pt and encouraged him to schedule a follow up with Endo.     Patient Understanding: Good  Expected Compliance: Good  Follow-Up Plans: Per protocol    GOALS:  1) Weight maintenance vs gain to BMI of 23 kg/m2 or above.   2) 100% compliance with prescribed enzymes, vitamin/mineral supplements and insulin therapy.     FOLLOW-UP/MONITORING:  Visit patient within 12  month(s), sooner if requested by pt or provider     Phone Call Duration: 15 min    Maria Antonia Oro RD, LD (pager [phone])  Cystic Fibrosis/Lung Transplant Dietitian      -Available Mon-Wednesday 8 AM-4:30 PM. On Thursdays and Fridays please contact pager [phone] (Tamika Perez RD) and on weekends/holidays contact coverage dietitian at pager [phone] (inpatient use only).

## 2020-07-06 NOTE — PROGRESS NOTES
"Dileep Muhammad, [facility]    Patients Glucose Data was Attachment located on [email or web address] message     Dilan Nassar is a 34 year old male who is being evaluated via a billable video visit.      The patient has been notified of following:     \"This video visit will be conducted via a call between you and your physician/provider. We have found that certain health care needs can be provided without the need for an in-person physical exam.  This service lets us provide the care you need with a video conversation.  If a prescription is necessary we can send it directly to your pharmacy.  If lab work is needed we can place an order for that and you can then stop by our lab to have the test done at a later time.    Video visits are billed at different rates depending on your insurance coverage.  Please reach out to your insurance provider with any questions.    If during the course of the call the physician/provider feels a video visit is not appropriate, you will not be charged for this service.\"    Patient has given verbal consent for Video visit? Yes  How would you like to obtain your AVS? Active DSP  Patient would like the video invitation sent by: Send to e-mail at: [email or web address]  Will anyone else be joining your video visit? No       CF Endocrinology Return Consultation:  Diabetes  :   Patient: Dilan Nassar MRN# [id number]   YOB: 1985 Age: 34 year old   Date of Visit: 07/07/2020  Dear Dr. Atilio Nieto:    I had the pleasure of seeing your patient, Dilan Nassar in the CF Endocrinology Clinic, [facility], for a return consultation .           Problem list:   MRSA  Cystic fibrosis  Diabetes mellitus  Exocrine pancreatic insufficiency  Vitamin D deficiency  Gout  Intestinal malabsorption         HPI:   Dilan is a 33 year old male with Cystic Fibrosis Related Diabetes Mellitus (CFRD).    I have reviewed the available past laboratory evaluations, imaging studies, and medical records " available to me at this visit.   History was obtained from the patient and the medical record.  I have reviewed the notes of the pulmonary care team entered into the medical record since I last saw the patient.  Overall feels well.  Reports pulmonary symptoms much improved after starting Trikafta.  He feels that glucose readings have not changed much and he has not gained weight after starting Trikafta.  He feels overall his glucose readings are stable without any hypoglycemia.  He monitors blood glucose intermittently which were reviewed with him.  Overall glucose readings are mostly in range.  He has seen readings above 200 after eating sugary cereals.    A1c:  Last A1c was 6.1%  Current insulin regimen:   Basaglar 8 unit(s) daily 10 AM          Past Medical History:   Cystic fibrosis          Past Surgical History:   Appendectomy  Sinus surgery            Social History:   Unmarried  Non smoker  Former smokeless tobacco user           Family History:   Mother: liver disease  Paternal grandfather: prostate cancer  Maternal aunt: diabetes mellitus  Maternal uncle: diabetes mellitus  Maternal grandmother: diabetes mellitus  Paternal grandmother: coronary artery disease         Allergies:     Allergies   Allergen Reactions     Mold      Molds & Smuts Itching          Medications:     Current Outpatient Medications:      albuterol (PROAIR HFA/PROVENTIL HFA/VENTOLIN HFA) 108 (90 Base) MCG/ACT Inhaler, Inhale 2 puffs into the lungs 2 times daily Following your vancomycin nebulizer treatment., Disp: 1 Inhaler, Rfl: 3     albuterol (PROVENTIL) (2.5 MG/3ML) 0.083% neb solution, Take 1 vial (2.5 mg) by nebulization 2 times daily, Disp: 60 vial, Rfl: 11     allopurinol (ZYLOPRIM) 300 MG tablet, TAKE ONE TABLET (300MG) BY MOUTH DAILY, Disp: 90 tablet, Rfl: 3     amylase-lipase-protease (CREON) 97273 units CPEP, TAKE 5-6 CAPSULES (60,000-72,000) BY MOUTH THREE TIMES A DAY WITH MEALS, Disp: 600 each, Rfl: 0     azithromycin  "(ZITHROMAX) 250 MG tablet, Take 2 tablets (500 mg) by mouth Every Mon, Wed, Fri Morning, Disp: 150 tablet, Rfl: 1     BD YON U/F 32G X 4 MM insulin pen needle, USE ONE TIME PER DAY, Disp: 100 each, Rfl: 12     blood glucose (NO BRAND SPECIFIED) test strip, Use to test blood sugar 4 times daily or as directed., Disp: 125 each, Rfl: 11     blood glucose monitoring (FREESTYLE LITE) test strip, Use to test blood 4 times a day, Disp: 120 each, Rfl: 12     blood glucose monitoring (FREESTYLE) lancets, Use to test blood sugar 4 times daily or as directed., Disp: 2 Box, Rfl: 12     blood glucose monitoring (NO BRAND SPECIFIED) meter device kit, Use to test blood sugar 4 times daily or as directed., Disp: 1 kit, Rfl: 0     dornase alpha (PULMOZYME) 1 MG/ML neb solution, INHALE CONTENTS OF ONE VIAL (2.5MG) VIA NEBULIZER TWICE DAILY. KEEP REFRIGERATED UNTIL USE., Disp: 150 mL, Rfl: 11     elexacaftor-tezacaftor-ivacaftor & ivacaftor (TRIKAFTA) 100-50-75 & 150 MG tablet pack, Take 2 orange tablets in the morning and 1 light blue tablet in the evening. Swallow whole with fat-containing food., Disp: 84 tablet, Rfl: 11     fluticasone (FLONASE) 50 MCG/ACT nasal spray, SPRAY 2 SPRAYS INTO BOTH NOSTRILS DAILY, Disp: 48 g, Rfl: 3     fluticasone (FLOVENT HFA) 110 MCG/ACT inhaler, INHALE 1 PUFF INTO THE LUNGS TWO TIMES A DAY, Disp: 1 Inhaler, Rfl: 3     insulin glargine (BASAGLAR KWIKPEN) 100 UNIT/ML pen, Inject 8 Units Subcutaneous daily, Disp: 15 mL, Rfl: 1     insulin pen needle (BD YON U/F) 32G X 4 MM miscellaneous, Use 3 pen needles daily or as directed. Please schedule a follow-up appointment with Dr Miranda at [phone], Disp: 100 each, Rfl: 2     mvw complete formulation (SOFTGELS ) capsule, Take 2 capsules by mouth daily, Disp: 60 capsule, Rfl: 11     phytonadione (MEPHYTON) 5 MG tablet, TAKE ONE TABLET (5MG) BY MOUTH ONCE A WEEK, Disp: 16 tablet, Rfl: 3     Syringe/Needle, Disp, 18G X 1\" 6 ML MISC, 1 each 2 times " daily, Disp: 60 each, Rfl: 5     tobramycin (MARLEN PODHALER) 28 MG in caps, Inhale 4 capsules (112 mg) into the lungs 2 times daily 1 month on, 1 month off, Disp: 224 capsule, Rfl: 5     ursodiol (ACTIGALL) 300 MG capsule, TAKE 1 CAPSULE (300MG ) BY MOUTH TWO TIMES A DAY, Disp: 180 capsule, Rfl: 3     order for DME, Equipment being ordered: Nebulizer Supplies. Please dispense four (4) Ruby LC Plus nebulizer kits and four (4) RUBY face masks for this patient with Cystic Fibrosis. Patient requires additional nebulizer supplies due to his need to use multiple sterile neb cups for his inhaled medications that may not be mixed together., Disp: 4 kit, Rfl: 12           Review of Systems:     Comprehensive ROS negative other than the symptoms noted above in the HPI.          Physical Exam:   There were no vitals taken for this visit.  Height: Data Unavailable, Normalized stature-for-age data not available for patients older than 20 years.  Weight: 0 lbs 0 oz, Normalized weight-for-age data not available for patients older than 20 years.  BMI: There is no height or weight on file to calculate BMI., No height and weight on file for this encounter.      CONSTITUTIONAL:   Awake, alert, and in no apparent distress.nder  NEUROLOGIC:No focal deficits noted.   PSYCHIATRIC: Cooperative, no agitation.        Laboratory results:     TSH   Date Value Ref Range Status   09/09/2019 3.24 0.40 - 4.00 mU/L Final     Testosterone Total   Date Value Ref Range Status   09/09/2019 516 240 - 950 ng/dL Final     Comment:     This test was developed and its performance characteristics determined by the   [facility],  Special Chemistry Laboratory. It has   not been cleared or approved by the FDA. The laboratory is regulated under   CLIA as qualified to perform high-complexity testing. This test is used for   clinical purposes. It should not be regarded as investigational or for   research.       Cholesterol   Date Value  Ref Range Status   09/09/2019 108 <200 mg/dL Final     Albumin Urine mg/L   Date Value Ref Range Status   09/09/2019 10 mg/L Final     Triglycerides   Date Value Ref Range Status   09/09/2019 56 <150 mg/dL Final     HDL Cholesterol   Date Value Ref Range Status   09/09/2019 65 >39 mg/dL Final     LDL Cholesterol Calculated   Date Value Ref Range Status   09/09/2019 31 <100 mg/dL Final     Comment:     Desirable:       <100 mg/dl     Non HDL Cholesterol   Date Value Ref Range Status   09/09/2019 42 <130 mg/dL Final     Lab Results   Component Value Date    A1C 5.8 09/26/2018    A1C 6.1 09/15/2017    A1C 6.7 12/20/2016    A1C 7.5 10/20/2016    A1C 6.8 09/30/2016       CF  Diabetes Health Maintenance    Date of Diabetes Diagnosis: ~ 2012     Special Notes (if any):      Date Last Eye Exam: Spring 2019      Date Last Dental Appointment:     Dates of Episodes Severe* Hypoglycemia (month/year, cumulative, ongoing, assess each visit):    *Severe=patient unconscious, seizure, unable to help self    Last 25-Vitamin D (every year): 28      Last DXA, lowest Z-score (every 2 years): Most negative and valid T score of -0.9     Last Urine Microalbumin (every year): 7.45 September 2018    Last Testosterone:   Testosterone Total   Date Value Ref Range Status   09/09/2019 516 240 - 950 ng/dL Final     Comment:     This test was developed and its performance characteristics determined by the   [facility],  Special Chemistry Laboratory. It has   not been cleared or approved by the FDA. The laboratory is regulated under   CLIA as qualified to perform high-complexity testing. This test is used for   clinical purposes. It should not be regarded as investigational or for   research.              Assessment and Plan:   Dilan is a 34 year old male with cystic fibrosis related diabetes mellitus.     CFRD: Overall good control, continue current dose of Basaglar.  He was counseled to contact clinic if he  experiences persistent hyperglycemia or recurrent hypoglycemia.  Glycemic targets were reviewed with him.  A1c and other diabetes-related screening labs will be checked with annual labs in the coming months.    Return to clinic in 1 year or sooner if needed    Note: Chart documentation done in part with Dragon Voice Recognition software. Although reviewed after completion, some word and grammatical errors may remain.  Please consider this when interpreting information in this chart    Video-Visit Details    Type of service:  Video Visit    Video Start Time: 3:35 PM  Video End Time: 3:47 PM    Originating Location (pt. Location): Home    Distant Location (provider location):  [facility] ENDOCRINOLOGY     Platform used for Video Visit: Mamta Miranda MD

## 2020-07-07 ENCOUNTER — VIRTUAL VISIT (OUTPATIENT)
Dept: ENDOCRINOLOGY | Facility: CLINIC | Age: 35
End: 2020-07-07
Payer: COMMERCIAL

## 2020-07-07 DIAGNOSIS — E08.9 DIABETES MELLITUS DUE TO CYSTIC FIBROSIS (H): ICD-10-CM

## 2020-07-07 DIAGNOSIS — E84.9 DIABETES MELLITUS DUE TO CYSTIC FIBROSIS (H): ICD-10-CM

## 2020-07-07 RX ORDER — INSULIN GLARGINE 100 [IU]/ML
8 INJECTION, SOLUTION SUBCUTANEOUS DAILY
Qty: 15 ML | Refills: 11 | Status: SHIPPED | OUTPATIENT
Start: 2020-07-07 | End: 2021-01-19

## 2020-07-07 NOTE — LETTER
7/7/2020       RE: Dilan Nassar  [address] [place] [id number]     Dear Colleague,    Thank you for referring your patient, Dilan Nassar, to the [facility] ENDOCRINOLOGY at [facility]. Please see a copy of my visit note below.    Dileep Muhammad CMA    Patients Glucose Data was Attachment located on [employer] message     Dilan Nassar is a 34 year old male who is being evaluated via a billable video visit.            [facility] Endocrinology Return Consultation:  Diabetes  :   Patient: Dilan Nassar MRN# [id number]   YOB: 1985 Age: 34 year old   Date of Visit: 07/07/2020  Dear Dr. Atilio Nieto:    I had the pleasure of seeing your patient, Dilan Nassar in the [facility], [facility], for a return consultation .           Problem list:   MRSA  Cystic fibrosis  Diabetes mellitus  Exocrine pancreatic insufficiency  Vitamin D deficiency  Gout  Intestinal malabsorption         HPI:   Dilan is a 33 year old male with Cystic Fibrosis Related Diabetes Mellitus (CFRD).    I have reviewed the available past laboratory evaluations, imaging studies, and medical records available to me at this visit.   History was obtained from the patient and the medical record.  I have reviewed the notes of the pulmonary care team entered into the medical record since I last saw the patient.  Overall feels well.  Reports pulmonary symptoms much improved after starting Trikafta.  He feels that glucose readings have not changed much and he has not gained weight after starting Trikafta.  He feels overall his glucose readings are stable without any hypoglycemia.  He monitors blood glucose intermittently which were reviewed with him.  Overall glucose readings are mostly in range.  He has seen readings above 200 after eating sugary cereals.    A1c:  Last A1c was 6.1%  Current insulin regimen:   Basaglar 8 unit(s) daily 10 AM          Past Medical History:    Cystic fibrosis          Past Surgical History:   Appendectomy  Sinus surgery            Social History:   Unmarried  Non smoker  Former smokeless tobacco user           Family History:   Mother: liver disease  Paternal grandfather: prostate cancer  Maternal aunt: diabetes mellitus  Maternal uncle: diabetes mellitus  Maternal grandmother: diabetes mellitus  Paternal grandmother: coronary artery disease         Allergies:     Allergies   Allergen Reactions     Mold      Molds & Smuts Itching          Medications:     Current Outpatient Medications:      albuterol (PROAIR HFA/PROVENTIL HFA/VENTOLIN HFA) 108 (90 Base) MCG/ACT Inhaler, Inhale 2 puffs into the lungs 2 times daily Following your vancomycin nebulizer treatment., Disp: 1 Inhaler, Rfl: 3     albuterol (PROVENTIL) (2.5 MG/3ML) 0.083% neb solution, Take 1 vial (2.5 mg) by nebulization 2 times daily, Disp: 60 vial, Rfl: 11     allopurinol (ZYLOPRIM) 300 MG tablet, TAKE ONE TABLET (300MG) BY MOUTH DAILY, Disp: 90 tablet, Rfl: 3     amylase-lipase-protease (CREON) 35985 units CPEP, TAKE 5-6 CAPSULES (60,000-72,000) BY MOUTH THREE TIMES A DAY WITH MEALS, Disp: 600 each, Rfl: 0     azithromycin (ZITHROMAX) 250 MG tablet, Take 2 tablets (500 mg) by mouth Every Mon, Wed, Fri Morning, Disp: 150 tablet, Rfl: 1     BD YON U/F 32G X 4 MM insulin pen needle, USE ONE TIME PER DAY, Disp: 100 each, Rfl: 12     blood glucose (NO BRAND SPECIFIED) test strip, Use to test blood sugar 4 times daily or as directed., Disp: 125 each, Rfl: 11     blood glucose monitoring (FREESTYLE LITE) test strip, Use to test blood 4 times a day, Disp: 120 each, Rfl: 12     blood glucose monitoring (FREESTYLE) lancets, Use to test blood sugar 4 times daily or as directed., Disp: 2 Box, Rfl: 12     blood glucose monitoring (NO BRAND SPECIFIED) meter device kit, Use to test blood sugar 4 times daily or as directed., Disp: 1 kit, Rfl: 0     dornase alpha (PULMOZYME) 1 MG/ML neb solution, INHALE  "CONTENTS OF ONE VIAL (2.5MG) VIA NEBULIZER TWICE DAILY. KEEP REFRIGERATED UNTIL USE., Disp: 150 mL, Rfl: 11     elexacaftor-tezacaftor-ivacaftor & ivacaftor (TRIKAFTA) 100-50-75 & 150 MG tablet pack, Take 2 orange tablets in the morning and 1 light blue tablet in the evening. Swallow whole with fat-containing food., Disp: 84 tablet, Rfl: 11     fluticasone (FLONASE) 50 MCG/ACT nasal spray, SPRAY 2 SPRAYS INTO BOTH NOSTRILS DAILY, Disp: 48 g, Rfl: 3     fluticasone (FLOVENT HFA) 110 MCG/ACT inhaler, INHALE 1 PUFF INTO THE LUNGS TWO TIMES A DAY, Disp: 1 Inhaler, Rfl: 3     insulin glargine (BASAGLAR KWIKPEN) 100 UNIT/ML pen, Inject 8 Units Subcutaneous daily, Disp: 15 mL, Rfl: 1     insulin pen needle (BD YON U/F) 32G X 4 MM miscellaneous, Use 3 pen needles daily or as directed. Please schedule a follow-up appointment with Dr Miranda at [phone], Disp: 100 each, Rfl: 2     mvw complete formulation (SOFTGELS ) capsule, Take 2 capsules by mouth daily, Disp: 60 capsule, Rfl: 11     phytonadione (MEPHYTON) 5 MG tablet, TAKE ONE TABLET (5MG) BY MOUTH ONCE A WEEK, Disp: 16 tablet, Rfl: 3     Syringe/Needle, Disp, 18G X 1\" 6 ML MISC, 1 each 2 times daily, Disp: 60 each, Rfl: 5     tobramycin (MARLEN PODHALER) 28 MG in caps, Inhale 4 capsules (112 mg) into the lungs 2 times daily 1 month on, 1 month off, Disp: 224 capsule, Rfl: 5     ursodiol (ACTIGALL) 300 MG capsule, TAKE 1 CAPSULE (300MG ) BY MOUTH TWO TIMES A DAY, Disp: 180 capsule, Rfl: 3     order for DME, Equipment being ordered: Nebulizer Supplies. Please dispense four (4) Ruby LC Plus nebulizer kits and four (4) RUBY face masks for this patient with Cystic Fibrosis. Patient requires additional nebulizer supplies due to his need to use multiple sterile neb cups for his inhaled medications that may not be mixed together., Disp: 4 kit, Rfl: 12           Review of Systems:     Comprehensive ROS negative other than the symptoms noted above in the HPI.          " Physical Exam:   There were no vitals taken for this visit.  Height: Data Unavailable, Normalized stature-for-age data not available for patients older than 20 years.  Weight: 0 lbs 0 oz, Normalized weight-for-age data not available for patients older than 20 years.  BMI: There is no height or weight on file to calculate BMI., No height and weight on file for this encounter.      CONSTITUTIONAL:   Awake, alert, and in no apparent distress.nder  NEUROLOGIC:No focal deficits noted.   PSYCHIATRIC: Cooperative, no agitation.        Laboratory results:     TSH   Date Value Ref Range Status   09/09/2019 3.24 0.40 - 4.00 mU/L Final     Testosterone Total   Date Value Ref Range Status   09/09/2019 516 240 - 950 ng/dL Final     Comment:     This test was developed and its performance characteristics determined by the   [facility],  Special Chemistry Laboratory. It has   not been cleared or approved by the FDA. The laboratory is regulated under   CLIA as qualified to perform high-complexity testing. This test is used for   clinical purposes. It should not be regarded as investigational or for   research.       Cholesterol   Date Value Ref Range Status   09/09/2019 108 <200 mg/dL Final     Albumin Urine mg/L   Date Value Ref Range Status   09/09/2019 10 mg/L Final     Triglycerides   Date Value Ref Range Status   09/09/2019 56 <150 mg/dL Final     HDL Cholesterol   Date Value Ref Range Status   09/09/2019 65 >39 mg/dL Final     LDL Cholesterol Calculated   Date Value Ref Range Status   09/09/2019 31 <100 mg/dL Final     Comment:     Desirable:       <100 mg/dl     Non HDL Cholesterol   Date Value Ref Range Status   09/09/2019 42 <130 mg/dL Final     Lab Results   Component Value Date    A1C 5.8 09/26/2018    A1C 6.1 09/15/2017    A1C 6.7 12/20/2016    A1C 7.5 10/20/2016    A1C 6.8 09/30/2016         Diabetes Health Maintenance    Date of Diabetes Diagnosis: ~ 2012     Special Notes (if any):       Date Last Eye Exam: Spring 2019      Date Last Dental Appointment:     Dates of Episodes Severe* Hypoglycemia (month/year, cumulative, ongoing, assess each visit):    *Severe=patient unconscious, seizure, unable to help self    Last 25-Vitamin D (every year): 28      Last DXA, lowest Z-score (every 2 years): Most negative and valid T score of -0.9     Last Urine Microalbumin (every year): 7.45 September 2018    Last Testosterone:   Testosterone Total   Date Value Ref Range Status   09/09/2019 516 240 - 950 ng/dL Final     Comment:     This test was developed and its performance characteristics determined by the   [facility],  Special Chemistry Laboratory. It has   not been cleared or approved by the FDA. The laboratory is regulated under   CLIA as qualified to perform high-complexity testing. This test is used for   clinical purposes. It should not be regarded as investigational or for   research.              Assessment and Plan:   Dilan is a 34 year old male with cystic fibrosis related diabetes mellitus.     CFRD: Overall good control, continue current dose of Basaglar.  He was counseled to contact clinic if he experiences persistent hyperglycemia or recurrent hypoglycemia.  Glycemic targets were reviewed with him.  A1c and other diabetes-related screening labs will be checked with annual labs in the coming months.    Return to clinic in 1 year or sooner if needed    Note: Chart documentation done in part with Dragon Voice Recognition software. Although reviewed after completion, some word and grammatical errors may remain.  Please consider this when interpreting information in this chart    Video-Visit Details    Type of service:  Video Visit    Video Start Time: 3:35 PM  Video End Time: 3:47 PM    Originating Location (pt. Location): Home    Distant Location (provider location):  [facility] ENDOCRINOLOGY     Platform used for Video Visit: Mamta Miranda,  MD

## 2020-09-17 ENCOUNTER — ANCILLARY PROCEDURE (OUTPATIENT)
Dept: GENERAL RADIOLOGY | Facility: CLINIC | Age: 35
End: 2020-09-17
Attending: PHYSICIAN ASSISTANT
Payer: COMMERCIAL

## 2020-09-17 ENCOUNTER — OFFICE VISIT (OUTPATIENT)
Dept: PULMONOLOGY | Facility: CLINIC | Age: 35
End: 2020-09-17
Attending: INTERNAL MEDICINE
Payer: COMMERCIAL

## 2020-09-17 VITALS
HEIGHT: 64 IN | SYSTOLIC BLOOD PRESSURE: 133 MMHG | BODY MASS INDEX: 21.04 KG/M2 | HEART RATE: 71 BPM | OXYGEN SATURATION: 98 % | DIASTOLIC BLOOD PRESSURE: 87 MMHG | TEMPERATURE: 97.6 F | WEIGHT: 123.24 LBS

## 2020-09-17 DIAGNOSIS — E84.0 CYSTIC FIBROSIS WITH PULMONARY MANIFESTATIONS (H): Primary | ICD-10-CM

## 2020-09-17 DIAGNOSIS — E84.9 CF (CYSTIC FIBROSIS) (H): ICD-10-CM

## 2020-09-17 DIAGNOSIS — E84.9 DIABETES MELLITUS DUE TO CYSTIC FIBROSIS (H): ICD-10-CM

## 2020-09-17 DIAGNOSIS — E08.9 DIABETES MELLITUS RELATED TO CYSTIC FIBROSIS (H): ICD-10-CM

## 2020-09-17 DIAGNOSIS — E08.9 DIABETES MELLITUS DUE TO CYSTIC FIBROSIS (H): ICD-10-CM

## 2020-09-17 DIAGNOSIS — E84.8 DIABETES MELLITUS RELATED TO CYSTIC FIBROSIS (H): ICD-10-CM

## 2020-09-17 DIAGNOSIS — K86.81 EXOCRINE PANCREATIC INSUFFICIENCY: ICD-10-CM

## 2020-09-17 DIAGNOSIS — K86.81 EXOCRINE PANCREATIC INSUFFICIENCY: Chronic | ICD-10-CM

## 2020-09-17 LAB
ALBUMIN SERPL-MCNC: 4.1 G/DL (ref 3.4–5)
ALBUMIN UR-MCNC: NEGATIVE MG/DL
ALP SERPL-CCNC: 146 U/L (ref 40–150)
ALT SERPL W P-5'-P-CCNC: 48 U/L (ref 0–70)
ANION GAP SERPL CALCULATED.3IONS-SCNC: 7 MMOL/L (ref 3–14)
APPEARANCE UR: ABNORMAL
AST SERPL W P-5'-P-CCNC: 26 U/L (ref 0–45)
BASOPHILS # BLD AUTO: 0.1 10E9/L (ref 0–0.2)
BASOPHILS NFR BLD AUTO: 0.7 %
BILIRUB DIRECT SERPL-MCNC: 0.2 MG/DL (ref 0–0.2)
BILIRUB SERPL-MCNC: 0.8 MG/DL (ref 0.2–1.3)
BILIRUB UR QL STRIP: NEGATIVE
BUN SERPL-MCNC: 19 MG/DL (ref 7–30)
CALCIUM SERPL-MCNC: 9.1 MG/DL (ref 8.5–10.1)
CHLORIDE SERPL-SCNC: 104 MMOL/L (ref 94–109)
CHOLEST SERPL-MCNC: 134 MG/DL
CK SERPL-CCNC: 175 U/L (ref 30–300)
CO2 SERPL-SCNC: 27 MMOL/L (ref 20–32)
COLOR UR AUTO: YELLOW
CREAT SERPL-MCNC: 0.95 MG/DL (ref 0.66–1.25)
CREAT UR-MCNC: 196 MG/DL
DEPRECATED CALCIDIOL+CALCIFEROL SERPL-MC: 38 UG/L (ref 20–75)
DIFFERENTIAL METHOD BLD: ABNORMAL
EOSINOPHIL # BLD AUTO: 0.3 10E9/L (ref 0–0.7)
EOSINOPHIL NFR BLD AUTO: 3.8 %
ERYTHROCYTE [DISTWIDTH] IN BLOOD BY AUTOMATED COUNT: 12.5 % (ref 10–15)
ERYTHROCYTE [SEDIMENTATION RATE] IN BLOOD BY WESTERGREN METHOD: 4 MM/H (ref 0–15)
EXPTIME-PRE: 15.13 SEC
FEF2575-%PRED-PRE: 34 %
FEF2575-PRE: 1.25 L/SEC
FEF2575-PRED: 3.67 L/SEC
FEFMAX-%PRED-PRE: 81 %
FEFMAX-PRE: 7.27 L/SEC
FEFMAX-PRED: 8.96 L/SEC
FEV1-%PRED-PRE: 79 %
FEV1-PRE: 2.84 L
FEV1FEV6-PRE: 65 %
FEV1FEV6-PRED: 82 %
FEV1FVC-PRE: 61 %
FEV1FVC-PRED: 83 %
FIFMAX-PRE: 7.8 L/SEC
FVC-%PRED-PRE: 108 %
FVC-PRE: 4.68 L
FVC-PRED: 4.31 L
GFR SERPL CREATININE-BSD FRML MDRD: >90 ML/MIN/{1.73_M2}
GGT SERPL-CCNC: 55 U/L (ref 0–75)
GLUCOSE SERPL-MCNC: 151 MG/DL (ref 70–99)
GLUCOSE UR STRIP-MCNC: 50 MG/DL
HBA1C MFR BLD: 5.7 % (ref 0–5.6)
HCT VFR BLD AUTO: 44.5 % (ref 40–53)
HDLC SERPL-MCNC: 83 MG/DL
HGB BLD-MCNC: 15.9 G/DL (ref 13.3–17.7)
HGB UR QL STRIP: NEGATIVE
IMM GRANULOCYTES # BLD: 0 10E9/L (ref 0–0.4)
IMM GRANULOCYTES NFR BLD: 0.1 %
INR PPP: 1.11 (ref 0.86–1.14)
IRON SERPL-MCNC: 71 UG/DL (ref 35–180)
KETONES UR STRIP-MCNC: NEGATIVE MG/DL
LDLC SERPL CALC-MCNC: 33 MG/DL
LEUKOCYTE ESTERASE UR QL STRIP: NEGATIVE
LYMPHOCYTES # BLD AUTO: 1.8 10E9/L (ref 0.8–5.3)
LYMPHOCYTES NFR BLD AUTO: 26.8 %
MAGNESIUM SERPL-MCNC: 2.2 MG/DL (ref 1.6–2.3)
MCH RBC QN AUTO: 33.2 PG (ref 26.5–33)
MCHC RBC AUTO-ENTMCNC: 35.7 G/DL (ref 31.5–36.5)
MCV RBC AUTO: 93 FL (ref 78–100)
MICROALBUMIN UR-MCNC: 12 MG/L
MICROALBUMIN/CREAT UR: 5.92 MG/G CR (ref 0–17)
MONOCYTES # BLD AUTO: 0.4 10E9/L (ref 0–1.3)
MONOCYTES NFR BLD AUTO: 6.4 %
MUCOUS THREADS #/AREA URNS LPF: PRESENT /LPF
NEUTROPHILS # BLD AUTO: 4.2 10E9/L (ref 1.6–8.3)
NEUTROPHILS NFR BLD AUTO: 62.2 %
NITRATE UR QL: NEGATIVE
NONHDLC SERPL-MCNC: 51 MG/DL
NRBC # BLD AUTO: 0 10*3/UL
NRBC BLD AUTO-RTO: 0 /100
PH UR STRIP: 5 PH (ref 5–7)
PHOSPHATE SERPL-MCNC: 2.8 MG/DL (ref 2.5–4.5)
PLATELET # BLD AUTO: 170 10E9/L (ref 150–450)
POTASSIUM SERPL-SCNC: 3.7 MMOL/L (ref 3.4–5.3)
PROT SERPL-MCNC: 7.8 G/DL (ref 6.8–8.8)
RBC # BLD AUTO: 4.79 10E12/L (ref 4.4–5.9)
RBC #/AREA URNS AUTO: 1 /HPF (ref 0–2)
SODIUM SERPL-SCNC: 139 MMOL/L (ref 133–144)
SOURCE: ABNORMAL
SP GR UR STRIP: 1.02 (ref 1–1.03)
TRIGL SERPL-MCNC: 91 MG/DL
TSH SERPL DL<=0.005 MIU/L-ACNC: 1.96 MU/L (ref 0.4–4)
UROBILINOGEN UR STRIP-MCNC: 0 MG/DL (ref 0–2)
WBC # BLD AUTO: 6.8 10E9/L (ref 4–11)
WBC #/AREA URNS AUTO: 1 /HPF (ref 0–5)

## 2020-09-17 PROCEDURE — 25000128 H RX IP 250 OP 636: Mod: ZF | Performed by: INTERNAL MEDICINE

## 2020-09-17 PROCEDURE — 84100 ASSAY OF PHOSPHORUS: CPT | Performed by: PHYSICIAN ASSISTANT

## 2020-09-17 PROCEDURE — 36415 COLL VENOUS BLD VENIPUNCTURE: CPT | Performed by: PHYSICIAN ASSISTANT

## 2020-09-17 PROCEDURE — 82784 ASSAY IGA/IGD/IGG/IGM EACH: CPT | Performed by: PHYSICIAN ASSISTANT

## 2020-09-17 PROCEDURE — 87186 SC STD MICRODIL/AGAR DIL: CPT | Performed by: INTERNAL MEDICINE

## 2020-09-17 PROCEDURE — 90686 IIV4 VACC NO PRSV 0.5 ML IM: CPT | Mod: ZF | Performed by: INTERNAL MEDICINE

## 2020-09-17 PROCEDURE — 82785 ASSAY OF IGE: CPT | Performed by: PHYSICIAN ASSISTANT

## 2020-09-17 PROCEDURE — 84590 ASSAY OF VITAMIN A: CPT | Performed by: PHYSICIAN ASSISTANT

## 2020-09-17 PROCEDURE — 85610 PROTHROMBIN TIME: CPT | Performed by: PHYSICIAN ASSISTANT

## 2020-09-17 PROCEDURE — G0463 HOSPITAL OUTPT CLINIC VISIT: HCPCS | Mod: 25,ZF

## 2020-09-17 PROCEDURE — 87077 CULTURE AEROBIC IDENTIFY: CPT | Performed by: INTERNAL MEDICINE

## 2020-09-17 PROCEDURE — 84403 ASSAY OF TOTAL TESTOSTERONE: CPT | Performed by: PHYSICIAN ASSISTANT

## 2020-09-17 PROCEDURE — 81001 URINALYSIS AUTO W/SCOPE: CPT | Performed by: PHYSICIAN ASSISTANT

## 2020-09-17 PROCEDURE — 84443 ASSAY THYROID STIM HORMONE: CPT | Performed by: PHYSICIAN ASSISTANT

## 2020-09-17 PROCEDURE — 83540 ASSAY OF IRON: CPT | Performed by: PHYSICIAN ASSISTANT

## 2020-09-17 PROCEDURE — 84446 ASSAY OF VITAMIN E: CPT | Performed by: PHYSICIAN ASSISTANT

## 2020-09-17 PROCEDURE — 87070 CULTURE OTHR SPECIMN AEROBIC: CPT | Performed by: INTERNAL MEDICINE

## 2020-09-17 PROCEDURE — 83735 ASSAY OF MAGNESIUM: CPT | Performed by: PHYSICIAN ASSISTANT

## 2020-09-17 PROCEDURE — 83036 HEMOGLOBIN GLYCOSYLATED A1C: CPT | Performed by: PHYSICIAN ASSISTANT

## 2020-09-17 PROCEDURE — 82306 VITAMIN D 25 HYDROXY: CPT | Performed by: PHYSICIAN ASSISTANT

## 2020-09-17 PROCEDURE — 82977 ASSAY OF GGT: CPT | Performed by: PHYSICIAN ASSISTANT

## 2020-09-17 PROCEDURE — 82043 UR ALBUMIN QUANTITATIVE: CPT | Performed by: PHYSICIAN ASSISTANT

## 2020-09-17 PROCEDURE — G0008 ADMIN INFLUENZA VIRUS VAC: HCPCS | Mod: ZF

## 2020-09-17 PROCEDURE — 85025 COMPLETE CBC W/AUTO DIFF WBC: CPT | Performed by: PHYSICIAN ASSISTANT

## 2020-09-17 PROCEDURE — 82550 ASSAY OF CK (CPK): CPT | Performed by: PHYSICIAN ASSISTANT

## 2020-09-17 PROCEDURE — 85652 RBC SED RATE AUTOMATED: CPT | Performed by: PHYSICIAN ASSISTANT

## 2020-09-17 PROCEDURE — 80076 HEPATIC FUNCTION PANEL: CPT | Performed by: PHYSICIAN ASSISTANT

## 2020-09-17 PROCEDURE — 80061 LIPID PANEL: CPT | Performed by: PHYSICIAN ASSISTANT

## 2020-09-17 PROCEDURE — 80048 BASIC METABOLIC PNL TOTAL CA: CPT | Performed by: PHYSICIAN ASSISTANT

## 2020-09-17 RX ADMIN — INFLUENZA A VIRUS A/GUANGDONG-MAONAN/SWL1536/2019 CNIC-1909 (H1N1) ANTIGEN (FORMALDEHYDE INACTIVATED), INFLUENZA A VIRUS A/HONG KONG/2671/2019 (H3N2) ANTIGEN (FORMALDEHYDE INACTIVATED), INFLUENZA B VIRUS B/PHUKET/3073/2013 ANTIGEN (FORMALDEHYDE INACTIVATED), AND INFLUENZA B VIRUS B/WASHINGTON/02/2019 ANTIGEN (FORMALDEHYDE INACTIVATED) 0.5 ML: 15; 15; 15; 15 INJECTION, SUSPENSION INTRAMUSCULAR at 15:05

## 2020-09-17 ASSESSMENT — MIFFLIN-ST. JEOR: SCORE: 1397.75

## 2020-09-17 NOTE — PATIENT INSTRUCTIONS
Cystic Fibrosis Self-Care Plan    RECOMMENDATIONS:   Continue current medication, nebs and vest therapy.   Flu shot today          [facility] [place] Nurse line:  Nadya Pimentel KJ and Kayla [phone]     [facility] [place] Fax Number:      [phone]         Cystic Fibrosis Respiratory Therapists:   Audra Smart              [phone]          Viviana Harden   [phone]  Cystic Fibrosis Dietitians:              Tamika Perez              [phone]                            Daniela Whitt                        [phone]   Cystic Fibrosis Diabetes Nurse:    Lisset Root   [phone]    Cystic Fibrosis Social Workers:     Kathie Bustamante               [phone]                     Kailey Bryant               [phone]  Cystic Fibrosis Pharmacists:           Gwen Murray                               [phone]         Alayna Tan   [phone]  Cystic Fibrosis Genetic Counselor:   Estelle West    [phone]    [facility] [place] website:  [email or web address]         MRN: [id number]   Clinic Date: September 17, 2020   Patient: Dilan Nassar     Annual Studies:   IGG   Date Value Ref Range Status   09/09/2019 1,330 695 - 1,620 mg/dL Final     No results found for: INS  There are no preventive care reminders to display for this patient.    Pulmonary Function Tests  FEV1: amount of air you can blow out in 1 second  FVC: total amount of air you can take in and blow out    Your Goals:         PFT Latest Ref Rng & Units 9/17/2020   FVC L 4.68   FEV1 L 2.84   FVC% % 108   FEV1% % 79          Airway Clearance: The Most Important Way to Keep Your Lungs Healthy  Vest Settings:    Hill-Rom Frequencies: 8, 9, 10 Pressure 10 Then, Frequencies 18, 19, 20 Pressure 6      RespirTech: Quick Start with Pressure of     Do each frequency for 5 minutes; Deflate vest after each frequency & cough 3 times before beginning the next setting.    Vest and Neb Therapy  should be done 2 times/day.    Good Nutrition Can Improve Lung Function and Overall Health     Take ALL of your vitamins with food     Take 1/2 of your enzymes before EVERY meal/snack and the other 1/2 mid-meal/snack    Wt Readings from Last 3 Encounters:   09/17/20 55.9 kg (123 lb 3.8 oz)   12/19/19 53.6 kg (118 lb 2.7 oz)   09/09/19 54 kg (119 lb)       Body mass index is 21.04 kg/m .         National CF Foundation Recommendations for BMI in CF Adults: Women: at least 22 Men: at least 23        Controlling Blood Sugars Helps Prevent Lung Infections & Improves Nutrition  Test blood sugar:     In the morning before eating (goal is )     2 hours after a meal (goal is less than 150)     When pre-meal glucose is greater than 150 add correction     At bedtime (if less than 100 eat a snack with 15 grams of carbohydrates  Last A1C Results:   Hemoglobin A1C   Date Value Ref Range Status   09/17/2020 5.7 (H) 0 - 5.6 % Final     Comment:     Normal <5.7% Prediabetes 5.7-6.4%  Diabetes 6.5% or higher - adopted from ADA   consensus guidelines.           If diabetic, measure A1C every 6 months. Goal: Under 7%    Staying Healthy    Research:  If you are interested in learning about research opportunities or have questions, please contact the CF Research Team at [phone] or [email or web address].      CF Foundation:  Compass is a personalized resource service to help you with the insurance, financial, legal and other issues you are facing.  It's free, confidential and available to anyone with CF.  Ask your  for more information or contact [employer] directly at [phone]-GXTYWIH ([phone]) or compass@cff.org, or learn more at [email or web address].[email or web address].

## 2020-09-17 NOTE — PROGRESS NOTES
Reason for Visit  Dilan Nassar is a 35 year old year old male who is being seen for Follow Up (cf f/u pfts prior)  Attending attestation: I, Atilio Nieto M.D., have seen and examined the patient with Dr. Elver Martínez MD. I have reviewed labs and radiographs. We have discussed the patient and I agree with the findings and plan of care as discussed below.  Patient has felt well since initiating Trikafta.  He has very good exercise tolerance.  Infrequent cough or sputum production.  Decreased chest congestion.  He does note some abdominal discomfort in the morning if he does not take enough that with the evening dose.  Lungs are clear.  Heart sounds are normal.  Abdomen is soft and nontender.  The patient appears to be doing very well from pulmonary standpoint.  He has excellent exercise tolerance.  PFTs were the best they have been in the recent past.  He is oxygenating well.  He will continue his current airway clearance therapy.  He does not appear to be having a pulmonary exacerbation at this time.  He has had an excellent response to Trikafta.  PFTs are the best they have been in the recent past and he has had a marked improvement in his respiratory symptoms.  LFTs and CK are within normal limits.  Continue quarterly monitoring.  Annual studies were completed today.  Hemoglobin A1c is well controlled with current insulin.  Electrolytes, kidney function, LFTs, lipid battery are all unremarkable.  Vitamin D level is adequate.  Vitamin A&E levels are pending.  CBC is unrevealing.  Chest x-ray was reviewed by me there are scattered CF changes most prominent in the upper lung fields but improved from previous.  No acute infiltrate.  No pneumothorax.  Influenza vaccine today.    Follow-up in 3 months with labs, PFTs and sputum culture.    Atilio Nieto MD       Assessment and plan:     Dilan Nassar is a 34 year old male who presents to clinic for follow up.   1. CF lung disease: Pt asymptomatic from  a pulmonary perspective with minimal cough. Improvement after starting Trikafta. PFTs significantly improved with normal FEV1 and FVC. CXR reviewed and appears stable. Previous cultures positive for MRSA, Klebsiella, Steno, Ps A and A. Fumigatus. Plan to continue nebulizer, chronic azithromycin, Pulmozyme, tobramycin month on/off. Does not appear to be in an exacerbation at this time.  2. Previous hx of hemoptysis. No recent recurrence. Continue 5 mg mephyton once a week.   3. CF related liver disease. US 5/17/2020 concerning for cirrhosis. LFTs 9/17 WNL. Patient asymptomatic. Continue Ursodiol.  4. Exocrine pancreatic insufficiency. Patient asymptomatic. On pancreatic enzymes and vitamin supplementation. Uses miralax PRN.   5. CF related diabetes: Excellent control with A1c 9/17 5.7. Pt reporting fasting blood sugar in the 90s. No hypoglycemic episodes. Saw endocrine 7/7/2020, plan was to continue current Basaglar 8 U. Eye exam not yet done this yr. Patient planning on scheduling it.   6. CF related sinus disease: Denies current symptoms. Continue Flonase.   7. CFTR modulation: Patient is a /3659delc. He started on Trikafta 12/20219 with great response. Labs WNL.   8. HM: Patient received influenza vaccine today.   RTC: in 3 months with PFTS, Trikafta labs   Patient seen and discussed w/ Dr. Nieto.  Elver Martínez  Internal Medicine, PGY2  CF HPI  Mr. Nassar is a 30 YOM with cystic fibrosis from [facility]. He has h/o sinusitis, pancreatic exocrine insufficiency, and CF related liver disease. He started trikafta 12/2019 was significant improvement in symptoms.   Last seen 6/24/2020 via virtual visit.   Patient reports that overall he has been doing very well. Denies any SOB or HOLT. Patient is active and takes walks, bikes, and does pushups. He reports cough is significantly improved and minimal. Denies any chest pain. Sinus congestion is also minimal. He is not taking any antihistamine but does use a  "flonase daily.   Patient reporting mild abdominal discomfort in the morning that began after starting Trikafta. He notices it when he hasn't had adequate fat the day before. He is on pancreatic enzymes and overall feels like symptoms are not too bothersome. Denies any N/V. No diarrhea or constipation.   Since last visit, he did see endocrine who recommended no changes to insulin regimen. Fasting blood sugars have been in the 90s. No hypoglycemic episodes . He has not had an eye exam yet this yr.   A complete ROS was otherwise negative except as noted in the HPI.    Current Outpatient Medications   Medication     albuterol (PROAIR HFA/PROVENTIL HFA/VENTOLIN HFA) 108 (90 Base) MCG/ACT Inhaler     albuterol (PROVENTIL) (2.5 MG/3ML) 0.083% neb solution     allopurinol (ZYLOPRIM) 300 MG tablet     amylase-lipase-protease (CREON) 82191 units CPEP     azithromycin (ZITHROMAX) 250 MG tablet     blood glucose (NO BRAND SPECIFIED) test strip     blood glucose monitoring (FREESTYLE) lancets     blood glucose monitoring (NO BRAND SPECIFIED) meter device kit     dornase alpha (PULMOZYME) 1 MG/ML neb solution     elexacaftor-tezacaftor-ivacaftor & ivacaftor (TRIKAFTA) 100-50-75 & 150 MG tablet pack     fluticasone (FLONASE) 50 MCG/ACT nasal spray     fluticasone (FLOVENT HFA) 110 MCG/ACT inhaler     insulin glargine (BASAGLAR KWIKPEN) 100 UNIT/ML pen     insulin pen needle (BD YON U/F) 32G X 4 MM miscellaneous     mvw complete formulation (SOFTGELS ) capsule     phytonadione (MEPHYTON) 5 MG tablet     Syringe/Needle, Disp, 18G X 1\" 6 ML MISC     tobramycin (MARLEN PODHALER) 28 MG in caps     ursodiol (ACTIGALL) 300 MG capsule     BD YON U/F 32G X 4 MM insulin pen needle     blood glucose monitoring (FREESTYLE LITE) test strip     order for DME     No current facility-administered medications for this visit.      Allergies   Allergen Reactions     Mold      Molds & Smuts Itching     Past Medical History:   Diagnosis Date     " Cystic fibrosis with pulmonary manifestations (H)     /3659delc       Past Surgical History:   Procedure Laterality Date     APPENDECTOMY OPEN  1999    Appendicitis      SINUS SURGERY  1990's    h/o many sinus infections       Social History     Socioeconomic History     Marital status: Single     Spouse name: Not on file     Number of children: Not on file     Years of education: Not on file     Highest education level: Not on file   Occupational History     Occupation: State Representative     Employer: [employer]     Occupation: Financial Counselor   Social Needs     Financial resource strain: Not on file     Food insecurity     Worry: Not on file     Inability: Not on file     Transportation needs     Medical: Not on file     Non-medical: Not on file   Tobacco Use     Smoking status: Never Smoker     Smokeless tobacco: Former User   Substance and Sexual Activity     Alcohol use: Yes     Alcohol/week: 10.0 - 14.0 standard drinks     Types: 10 - 14 Standard drinks or equivalent per week     Comment: 2 drinks per night 5X/wk     Drug use: No     Sexual activity: Yes     Partners: Female     Birth control/protection: Pull-out method, Condom   Lifestyle     Physical activity     Days per week: Not on file     Minutes per session: Not on file     Stress: Not on file   Relationships     Social connections     Talks on phone: Not on file     Gets together: Not on file     Attends [employer] service: Not on file     Active member of club or organization: Not on file     Attends meetings of clubs or organizations: Not on file     Relationship status: Not on file     Intimate partner violence     Fear of current or ex partner: Not on file     Emotionally abused: Not on file     Physically abused: Not on file     Forced sexual activity: Not on file   Other Topics Concern     Parent/sibling w/ CABG, MI or angioplasty before 65F 55M? Yes   Social History Narrative    Owns a home in [place], [place] with his girlfriend  "of ~7 years. 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         /87   Pulse 71   Temp 97.6  F (36.4  C) (Oral)   Ht 1.63 m (5' 4.17\")   Wt 55.9 kg (123 lb 3.8 oz)   SpO2 98%   BMI 21.04 kg/m    Body mass index is 21.04 kg/m .  Exam:   GENERAL APPEARANCE: Well developed, well nourished, alert, and in no apparent distress.  EYES: PERRL, EOMI  HENT: Nasal mucosa with no edema and no hyperemia. No nasal polyps.  EARS: Canals clear, TMs normal  MOUTH: Oral mucosa is moist, without any lesions, no tonsillar enlargement, no oropharyngeal exudate.  NECK: supple, no masses, no thyromegaly.  LYMPHATICS: No significant axillary, cervical, or supraclavicular nodes.  RESP: normal percussion, good air flow throughout.  No crackles. No rhonchi. No wheezes.  CV: Normal S1, S2, regular rhythm, normal rate. No murmur.  No rub. No gallop. No LE edema.   ABDOMEN:  Bowel sounds normal, soft, nontender, no HSM or masses.   MS: extremities normal. No clubbing. No cyanosis.  SKIN: no rash on limited exam  NEURO: Mentation intact, speech normal, normal strength and tone, normal gait and stance  PSYCH: mentation appears normal. and affect normal/bright  Results:  Recent Results (from the past 168 hour(s))   Erythrocyte sedimentation rate auto    Collection Time: 09/17/20  1:28 PM   Result Value Ref Range    Sed Rate 4 0 - 15 mm/h   CBC with platelets differential    Collection Time: 09/17/20  1:28 PM   Result Value Ref Range    WBC 6.8 4.0 - 11.0 10e9/L    RBC Count 4.79 4.4 - 5.9 10e12/L    Hemoglobin 15.9 13.3 - 17.7 g/dL    Hematocrit 44.5 40.0 - 53.0 %    MCV 93 78 - 100 fl    MCH 33.2 (H) 26.5 - 33.0 pg    MCHC 35.7 31.5 - 36.5 g/dL    RDW 12.5 10.0 - 15.0 %    Platelet Count 170 150 - 450 10e9/L    Diff Method Automated Method     % Neutrophils 62.2 %    % Lymphocytes 26.8 %    % Monocytes 6.4 %    % Eosinophils 3.8 %    % Basophils 0.7 %    % Immature Granulocytes 0.1 %    Nucleated RBCs 0 0 /100    Absolute " Neutrophil 4.2 1.6 - 8.3 10e9/L    Absolute Lymphocytes 1.8 0.8 - 5.3 10e9/L    Absolute Monocytes 0.4 0.0 - 1.3 10e9/L    Absolute Eosinophils 0.3 0.0 - 0.7 10e9/L    Absolute Basophils 0.1 0.0 - 0.2 10e9/L    Abs Immature Granulocytes 0.0 0 - 0.4 10e9/L    Absolute Nucleated RBC 0.0    TSH with free T4 reflex    Collection Time: 09/17/20  1:28 PM   Result Value Ref Range    TSH 1.96 0.40 - 4.00 mU/L   Phosphorus    Collection Time: 09/17/20  1:28 PM   Result Value Ref Range    Phosphorus 2.8 2.5 - 4.5 mg/dL   Magnesium    Collection Time: 09/17/20  1:28 PM   Result Value Ref Range    Magnesium 2.2 1.6 - 2.3 mg/dL   INR    Collection Time: 09/17/20  1:28 PM   Result Value Ref Range    INR 1.11 0.86 - 1.14   Hemoglobin A1c    Collection Time: 09/17/20  1:28 PM   Result Value Ref Range    Hemoglobin A1C 5.7 (H) 0 - 5.6 %   GGT    Collection Time: 09/17/20  1:28 PM   Result Value Ref Range    GGT 55 0 - 75 U/L   Iron    Collection Time: 09/17/20  1:28 PM   Result Value Ref Range    Iron 71 35 - 180 ug/dL   Lipid Profile    Collection Time: 09/17/20  1:28 PM   Result Value Ref Range    Cholesterol 134 <200 mg/dL    Triglycerides 91 <150 mg/dL    HDL Cholesterol 83 >39 mg/dL    LDL Cholesterol Calculated 33 <100 mg/dL    Non HDL Cholesterol 51 <130 mg/dL   Basic metabolic panel    Collection Time: 09/17/20  1:28 PM   Result Value Ref Range    Sodium 139 133 - 144 mmol/L    Potassium 3.7 3.4 - 5.3 mmol/L    Chloride 104 94 - 109 mmol/L    Carbon Dioxide 27 20 - 32 mmol/L    Anion Gap 7 3 - 14 mmol/L    Glucose 151 (H) 70 - 99 mg/dL    Urea Nitrogen 19 7 - 30 mg/dL    Creatinine 0.95 0.66 - 1.25 mg/dL    GFR Estimate >90 >60 mL/min/[1.73_m2]    GFR Estimate If Black >90 >60 mL/min/[1.73_m2]    Calcium 9.1 8.5 - 10.1 mg/dL   CK total    Collection Time: 09/17/20  1:28 PM   Result Value Ref Range    CK Total 175 30 - 300 U/L   Hepatic panel    Collection Time: 09/17/20  1:28 PM   Result Value Ref Range    Bilirubin Direct  0.2 0.0 - 0.2 mg/dL    Bilirubin Total 0.8 0.2 - 1.3 mg/dL    Albumin 4.1 3.4 - 5.0 g/dL    Protein Total 7.8 6.8 - 8.8 g/dL    Alkaline Phosphatase 146 40 - 150 U/L    ALT 48 0 - 70 U/L    AST 26 0 - 45 U/L   General PFT Lab (Please always keep checked)    Collection Time: 09/17/20  1:37 PM   Result Value Ref Range    FVC-Pred 4.31 L    FVC-Pre 4.68 L    FVC-%Pred-Pre 108 %    FEV1-Pre 2.84 L    FEV1-%Pred-Pre 79 %    FEV1FVC-Pred 83 %    FEV1FVC-Pre 61 %    FEFMax-Pred 8.96 L/sec    FEFMax-Pre 7.27 L/sec    FEFMax-%Pred-Pre 81 %    FEF2575-Pred 3.67 L/sec    FEF2575-Pre 1.25 L/sec    HDG2223-%Pred-Pre 34 %    ExpTime-Pre 15.13 sec    FIFMax-Pre 7.80 L/sec    FEV1FEV6-Pred 82 %    FEV1FEV6-Pre 65 %   Routine UA with microscopic    Collection Time: 09/17/20  2:48 PM   Result Value Ref Range    Color Urine Yellow     Appearance Urine Slightly Cloudy     Glucose Urine 50 (A) NEG^Negative mg/dL    Bilirubin Urine Negative NEG^Negative    Ketones Urine Negative NEG^Negative mg/dL    Specific Gravity Urine 1.024 1.003 - 1.035    Blood Urine Negative NEG^Negative    pH Urine 5.0 5.0 - 7.0 pH    Protein Albumin Urine Negative NEG^Negative mg/dL    Urobilinogen mg/dL 0.0 0.0 - 2.0 mg/dL    Nitrite Urine Negative NEG^Negative    Leukocyte Esterase Urine Negative NEG^Negative    Source Midstream Urine     WBC Urine 1 0 - 5 /HPF    RBC Urine 1 0 - 2 /HPF    Mucous Urine Present (A) NEG^Negative /LPF   Albumin Random Urine Quantitative with Creat Ratio    Collection Time: 09/17/20  2:48 PM   Result Value Ref Range    Creatinine Urine 196 mg/dL    Albumin Urine mg/L 12 mg/L    Albumin Urine mg/g Cr 5.92 0 - 17 mg/g Cr                   Results as noted above.    CXR: stable. No acute findings.     PFT Interpretation:  Mild obstructive ventilatory defect.  Increased from previous.  Best in the recent past.  Valid Maneuver             CF Exacerbation  Absent

## 2020-09-17 NOTE — LETTER
9/17/2020         RE: Dilan Nassar  [address] [place] [id number]        Dear Colleague,    Thank you for referring your patient, Dilan Nassar, to the [facility]. Please see a copy of my visit note below.    Reason for Visit  Dilan Nassar is a 35 year old year old male who is being seen for Follow Up (cf f/u pfts prior)  Attending attestation: I, Atilio Nieto M.D., have seen and examined the patient with Dr. Elver Martínez MD. I have reviewed labs and radiographs. We have discussed the patient and I agree with the findings and plan of care as discussed below.  Patient has felt well since initiating Trikafta.  He has very good exercise tolerance.  Infrequent cough or sputum production.  Decreased chest congestion.  He does note some abdominal discomfort in the morning if he does not take enough that with the evening dose.  Lungs are clear.  Heart sounds are normal.  Abdomen is soft and nontender.  The patient appears to be doing very well from pulmonary standpoint.  He has excellent exercise tolerance.  PFTs were the best they have been in the recent past.  He is oxygenating well.  He will continue his current airway clearance therapy.  He does not appear to be having a pulmonary exacerbation at this time.  He has had an excellent response to Trikafta.  PFTs are the best they have been in the recent past and he has had a marked improvement in his respiratory symptoms.  LFTs and CK are within normal limits.  Continue quarterly monitoring.  Annual studies were completed today.  Hemoglobin A1c is well controlled with current insulin.  Electrolytes, kidney function, LFTs, lipid battery are all unremarkable.  Vitamin D level is adequate.  Vitamin A&E levels are pending.  CBC is unrevealing.  Chest x-ray was reviewed by me there are scattered CF changes most prominent in the upper lung fields but improved from previous.  No acute infiltrate.  No pneumothorax.   Influenza vaccine today.    Follow-up in 3 months with labs, PFTs and sputum culture.    Atilio Nieto MD       Assessment and plan:     Dilan Nassar is a 34 year old male who presents to clinic for follow up.   1. CF lung disease: Pt asymptomatic from a pulmonary perspective with minimal cough. Improvement after starting Trikafta. PFTs significantly improved with normal FEV1 and FVC. CXR reviewed and appears stable. Previous cultures positive for MRSA, Klebsiella, Steno, Ps A and A. Fumigatus. Plan to continue nebulizer, chronic azithromycin, Pulmozyme, tobramycin month on/off. Does not appear to be in an exacerbation at this time.  2. Previous hx of hemoptysis. No recent recurrence. Continue 5 mg mephyton once a week.   3. CF related liver disease. US 5/17/2020 concerning for cirrhosis. LFTs 9/17 WNL. Patient asymptomatic. Continue Ursodiol.  4. Exocrine pancreatic insufficiency. Patient asymptomatic. On pancreatic enzymes and vitamin supplementation. Uses miralax PRN.   5. CF related diabetes: Excellent control with A1c 9/17 5.7. Pt reporting fasting blood sugar in the 90s. No hypoglycemic episodes. Saw endocrine 7/7/2020, plan was to continue current Basaglar 8 U. Eye exam not yet done this yr. Patient planning on scheduling it.   6. CF related sinus disease: Denies current symptoms. Continue Flonase.   7. CFTR modulation: Patient is a /3659delc. He started on Trikafta 12/20219 with great response. Labs WNL.   8. HM: Patient received influenza vaccine today.   RTC: in 3 months with PFTS, Trikafta labs   Patient seen and discussed w/ Dr. Nieto.  Elver Martínez  Internal Medicine, PGY2  CF HPI  Mr. Nassar is a 30 YOM with cystic fibrosis from [facility]. He has h/o sinusitis, pancreatic exocrine insufficiency, and CF related liver disease. He started trikafta 12/2019 was significant improvement in symptoms.   Last seen 6/24/2020 via virtual visit.   Patient reports that overall he has been doing  "very well. Denies any SOB or HOLT. Patient is active and takes walks, bikes, and does pushups. He reports cough is significantly improved and minimal. Denies any chest pain. Sinus congestion is also minimal. He is not taking any antihistamine but does use a flonase daily.   Patient reporting mild abdominal discomfort in the morning that began after starting Trikafta. He notices it when he hasn't had adequate fat the day before. He is on pancreatic enzymes and overall feels like symptoms are not too bothersome. Denies any N/V. No diarrhea or constipation.   Since last visit, he did see endocrine who recommended no changes to insulin regimen. Fasting blood sugars have been in the 90s. No hypoglycemic episodes . He has not had an eye exam yet this yr.   A complete ROS was otherwise negative except as noted in the HPI.    Current Outpatient Medications   Medication     albuterol (PROAIR HFA/PROVENTIL HFA/VENTOLIN HFA) 108 (90 Base) MCG/ACT Inhaler     albuterol (PROVENTIL) (2.5 MG/3ML) 0.083% neb solution     allopurinol (ZYLOPRIM) 300 MG tablet     amylase-lipase-protease (CREON) 09670 units CPEP     azithromycin (ZITHROMAX) 250 MG tablet     blood glucose (NO BRAND SPECIFIED) test strip     blood glucose monitoring (FREESTYLE) lancets     blood glucose monitoring (NO BRAND SPECIFIED) meter device kit     dornase alpha (PULMOZYME) 1 MG/ML neb solution     elexacaftor-tezacaftor-ivacaftor & ivacaftor (TRIKAFTA) 100-50-75 & 150 MG tablet pack     fluticasone (FLONASE) 50 MCG/ACT nasal spray     fluticasone (FLOVENT HFA) 110 MCG/ACT inhaler     insulin glargine (BASAGLAR KWIKPEN) 100 UNIT/ML pen     insulin pen needle (BD YON U/F) 32G X 4 MM miscellaneous     mvw complete formulation (SOFTGELS ) capsule     phytonadione (MEPHYTON) 5 MG tablet     Syringe/Needle, Disp, 18G X 1\" 6 ML MISC     tobramycin (MARLEN PODHALER) 28 MG in caps     ursodiol (ACTIGALL) 300 MG capsule     BD YON U/F 32G X 4 MM insulin pen needle "     blood glucose monitoring (FREESTYLE LITE) test strip     order for DME     No current facility-administered medications for this visit.      Allergies   Allergen Reactions     Mold      Molds & Smuts Itching     Past Medical History:   Diagnosis Date     Cystic fibrosis with pulmonary manifestations (H)     /3659delc       Past Surgical History:   Procedure Laterality Date     APPENDECTOMY OPEN  1999    Appendicitis      SINUS SURGERY  1990's    h/o many sinus infections       Social History     Socioeconomic History     Marital status: Single     Spouse name: Not on file     Number of children: Not on file     Years of education: Not on file     Highest education level: Not on file   Occupational History     Occupation: State Representative     Employer: [employer]     Occupation: Financial Counselor   Social Needs     Financial resource strain: Not on file     Food insecurity     Worry: Not on file     Inability: Not on file     Transportation needs     Medical: Not on file     Non-medical: Not on file   Tobacco Use     Smoking status: Never Smoker     Smokeless tobacco: Former User   Substance and Sexual Activity     Alcohol use: Yes     Alcohol/week: 10.0 - 14.0 standard drinks     Types: 10 - 14 Standard drinks or equivalent per week     Comment: 2 drinks per night 5X/wk     Drug use: No     Sexual activity: Yes     Partners: Female     Birth control/protection: Pull-out method, Condom   Lifestyle     Physical activity     Days per week: Not on file     Minutes per session: Not on file     Stress: Not on file   Relationships     Social connections     Talks on phone: Not on file     Gets together: Not on file     Attends Pentecostalism service: Not on file     Active member of club or organization: Not on file     Attends meetings of clubs or organizations: Not on file     Relationship status: Not on file     Intimate partner violence     Fear of current or ex partner: Not on file     Emotionally  "abused: Not on file     Physically abused: Not on file     Forced sexual activity: Not on file   Other Topics Concern     Parent/sibling w/ CABG, MI or angioplasty before 65F 55M? Yes   Social History Narrative    Owns a home in [place], [place] with his girlfriend of ~7 years. 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         /87   Pulse 71   Temp 97.6  F (36.4  C) (Oral)   Ht 1.63 m (5' 4.17\")   Wt 55.9 kg (123 lb 3.8 oz)   SpO2 98%   BMI 21.04 kg/m    Body mass index is 21.04 kg/m .  Exam:   GENERAL APPEARANCE: Well developed, well nourished, alert, and in no apparent distress.  EYES: PERRL, EOMI  HENT: Nasal mucosa with no edema and no hyperemia. No nasal polyps.  EARS: Canals clear, TMs normal  MOUTH: Oral mucosa is moist, without any lesions, no tonsillar enlargement, no oropharyngeal exudate.  NECK: supple, no masses, no thyromegaly.  LYMPHATICS: No significant axillary, cervical, or supraclavicular nodes.  RESP: normal percussion, good air flow throughout.  No crackles. No rhonchi. No wheezes.  CV: Normal S1, S2, regular rhythm, normal rate. No murmur.  No rub. No gallop. No LE edema.   ABDOMEN:  Bowel sounds normal, soft, nontender, no HSM or masses.   MS: extremities normal. No clubbing. No cyanosis.  SKIN: no rash on limited exam  NEURO: Mentation intact, speech normal, normal strength and tone, normal gait and stance  PSYCH: mentation appears normal. and affect normal/bright  Results:  Recent Results (from the past 168 hour(s))   Erythrocyte sedimentation rate auto    Collection Time: 09/17/20  1:28 PM   Result Value Ref Range    Sed Rate 4 0 - 15 mm/h   CBC with platelets differential    Collection Time: 09/17/20  1:28 PM   Result Value Ref Range    WBC 6.8 4.0 - 11.0 10e9/L    RBC Count 4.79 4.4 - 5.9 10e12/L    Hemoglobin 15.9 13.3 - 17.7 g/dL    Hematocrit 44.5 40.0 - 53.0 %    MCV 93 78 - 100 fl    MCH 33.2 (H) 26.5 - 33.0 pg    MCHC 35.7 31.5 - 36.5 g/dL    RDW 12.5 10.0 - 15.0 % "    Platelet Count 170 150 - 450 10e9/L    Diff Method Automated Method     % Neutrophils 62.2 %    % Lymphocytes 26.8 %    % Monocytes 6.4 %    % Eosinophils 3.8 %    % Basophils 0.7 %    % Immature Granulocytes 0.1 %    Nucleated RBCs 0 0 /100    Absolute Neutrophil 4.2 1.6 - 8.3 10e9/L    Absolute Lymphocytes 1.8 0.8 - 5.3 10e9/L    Absolute Monocytes 0.4 0.0 - 1.3 10e9/L    Absolute Eosinophils 0.3 0.0 - 0.7 10e9/L    Absolute Basophils 0.1 0.0 - 0.2 10e9/L    Abs Immature Granulocytes 0.0 0 - 0.4 10e9/L    Absolute Nucleated RBC 0.0    TSH with free T4 reflex    Collection Time: 09/17/20  1:28 PM   Result Value Ref Range    TSH 1.96 0.40 - 4.00 mU/L   Phosphorus    Collection Time: 09/17/20  1:28 PM   Result Value Ref Range    Phosphorus 2.8 2.5 - 4.5 mg/dL   Magnesium    Collection Time: 09/17/20  1:28 PM   Result Value Ref Range    Magnesium 2.2 1.6 - 2.3 mg/dL   INR    Collection Time: 09/17/20  1:28 PM   Result Value Ref Range    INR 1.11 0.86 - 1.14   Hemoglobin A1c    Collection Time: 09/17/20  1:28 PM   Result Value Ref Range    Hemoglobin A1C 5.7 (H) 0 - 5.6 %   GGT    Collection Time: 09/17/20  1:28 PM   Result Value Ref Range    GGT 55 0 - 75 U/L   Iron    Collection Time: 09/17/20  1:28 PM   Result Value Ref Range    Iron 71 35 - 180 ug/dL   Lipid Profile    Collection Time: 09/17/20  1:28 PM   Result Value Ref Range    Cholesterol 134 <200 mg/dL    Triglycerides 91 <150 mg/dL    HDL Cholesterol 83 >39 mg/dL    LDL Cholesterol Calculated 33 <100 mg/dL    Non HDL Cholesterol 51 <130 mg/dL   Basic metabolic panel    Collection Time: 09/17/20  1:28 PM   Result Value Ref Range    Sodium 139 133 - 144 mmol/L    Potassium 3.7 3.4 - 5.3 mmol/L    Chloride 104 94 - 109 mmol/L    Carbon Dioxide 27 20 - 32 mmol/L    Anion Gap 7 3 - 14 mmol/L    Glucose 151 (H) 70 - 99 mg/dL    Urea Nitrogen 19 7 - 30 mg/dL    Creatinine 0.95 0.66 - 1.25 mg/dL    GFR Estimate >90 >60 mL/min/[1.73_m2]    GFR Estimate If Black >90  >60 mL/min/[1.73_m2]    Calcium 9.1 8.5 - 10.1 mg/dL   CK total    Collection Time: 09/17/20  1:28 PM   Result Value Ref Range    CK Total 175 30 - 300 U/L   Hepatic panel    Collection Time: 09/17/20  1:28 PM   Result Value Ref Range    Bilirubin Direct 0.2 0.0 - 0.2 mg/dL    Bilirubin Total 0.8 0.2 - 1.3 mg/dL    Albumin 4.1 3.4 - 5.0 g/dL    Protein Total 7.8 6.8 - 8.8 g/dL    Alkaline Phosphatase 146 40 - 150 U/L    ALT 48 0 - 70 U/L    AST 26 0 - 45 U/L   General PFT Lab (Please always keep checked)    Collection Time: 09/17/20  1:37 PM   Result Value Ref Range    FVC-Pred 4.31 L    FVC-Pre 4.68 L    FVC-%Pred-Pre 108 %    FEV1-Pre 2.84 L    FEV1-%Pred-Pre 79 %    FEV1FVC-Pred 83 %    FEV1FVC-Pre 61 %    FEFMax-Pred 8.96 L/sec    FEFMax-Pre 7.27 L/sec    FEFMax-%Pred-Pre 81 %    FEF2575-Pred 3.67 L/sec    FEF2575-Pre 1.25 L/sec    DUF1119-%Pred-Pre 34 %    ExpTime-Pre 15.13 sec    FIFMax-Pre 7.80 L/sec    FEV1FEV6-Pred 82 %    FEV1FEV6-Pre 65 %   Routine UA with microscopic    Collection Time: 09/17/20  2:48 PM   Result Value Ref Range    Color Urine Yellow     Appearance Urine Slightly Cloudy     Glucose Urine 50 (A) NEG^Negative mg/dL    Bilirubin Urine Negative NEG^Negative    Ketones Urine Negative NEG^Negative mg/dL    Specific Gravity Urine 1.024 1.003 - 1.035    Blood Urine Negative NEG^Negative    pH Urine 5.0 5.0 - 7.0 pH    Protein Albumin Urine Negative NEG^Negative mg/dL    Urobilinogen mg/dL 0.0 0.0 - 2.0 mg/dL    Nitrite Urine Negative NEG^Negative    Leukocyte Esterase Urine Negative NEG^Negative    Source Midstream Urine     WBC Urine 1 0 - 5 /HPF    RBC Urine 1 0 - 2 /HPF    Mucous Urine Present (A) NEG^Negative /LPF   Albumin Random Urine Quantitative with Creat Ratio    Collection Time: 09/17/20  2:48 PM   Result Value Ref Range    Creatinine Urine 196 mg/dL    Albumin Urine mg/L 12 mg/L    Albumin Urine mg/g Cr 5.92 0 - 17 mg/g Cr                   Results as noted above.    CXR: stable. No  acute findings.     PFT Interpretation:  Mild obstructive ventilatory defect.  Increased from previous.  Best in the recent past.  Valid Maneuver             CF Exacerbation  Absent      Again, thank you for allowing me to participate in the care of your patient.        Sincerely,        Atilio Nieto MD

## 2020-09-17 NOTE — NURSING NOTE
Chief Complaint   Patient presents with     Follow Up     cf f/u pfts prior     Vitals were taken and medications were reconciled.     CHRIS Crocker

## 2020-09-18 LAB — IGG SERPL-MCNC: 1153 MG/DL (ref 610–1616)

## 2020-09-20 LAB
A-TOCOPHEROL VIT E SERPL-MCNC: 9.2 MG/L (ref 5.5–18)
ANNOTATION COMMENT IMP: NORMAL
BETA+GAMMA TOCOPHEROL SERPL-MCNC: 0.2 MG/L (ref 0–6)
IGE SERPL-ACNC: <2 KIU/L (ref 0–114)
RETINYL PALMITATE SERPL-MCNC: 0.02 MG/L (ref 0–0.1)
VIT A SERPL-MCNC: 0.55 MG/L (ref 0.3–1.2)

## 2020-09-21 LAB — TESTOST SERPL-MCNC: 416 NG/DL (ref 240–950)

## 2020-09-22 ENCOUNTER — MYC REFILL (OUTPATIENT)
Dept: PULMONOLOGY | Facility: CLINIC | Age: 35
End: 2020-09-22

## 2020-09-22 DIAGNOSIS — E84.0 CYSTIC FIBROSIS WITH PULMONARY MANIFESTATIONS (H): ICD-10-CM

## 2020-09-22 DIAGNOSIS — K86.81 EXOCRINE PANCREATIC INSUFFICIENCY: ICD-10-CM

## 2020-09-22 DIAGNOSIS — E84.9 DIABETES MELLITUS DUE TO CYSTIC FIBROSIS (H): ICD-10-CM

## 2020-09-22 DIAGNOSIS — E84.9 CYSTIC FIBROSIS (H): ICD-10-CM

## 2020-09-22 DIAGNOSIS — A49.02 MRSA INFECTION: ICD-10-CM

## 2020-09-22 DIAGNOSIS — E08.9 DIABETES MELLITUS DUE TO CYSTIC FIBROSIS (H): ICD-10-CM

## 2020-09-22 DIAGNOSIS — E84.9 CF (CYSTIC FIBROSIS) (H): ICD-10-CM

## 2020-09-23 LAB
BACTERIA SPEC CULT: ABNORMAL
SPECIMEN SOURCE: ABNORMAL

## 2020-09-23 RX ORDER — ALBUTEROL SULFATE 0.83 MG/ML
2.5 SOLUTION RESPIRATORY (INHALATION)
Qty: 60 VIAL | Refills: 11 | Status: SHIPPED | OUTPATIENT
Start: 2020-09-23 | End: 2021-02-05

## 2020-11-04 ENCOUNTER — TELEPHONE (OUTPATIENT)
Dept: PULMONOLOGY | Facility: CLINIC | Age: 35
End: 2020-11-04

## 2020-11-04 NOTE — TELEPHONE ENCOUNTER
PA Initiation    Medication: PULMOZYME PENDING  Insurance Company: [employer] - Phone [phone] Fax [phone]  Pharmacy Filling the Rx: [facility] - [place]  Filling Pharmacy Phone:    Filling Pharmacy Fax:    Start Date: 11/4/2020

## 2020-11-06 NOTE — TELEPHONE ENCOUNTER
Prior Authorization Approval    Authorization Effective Date: 11/5/2020  Authorization Expiration Date: 11/5/2021  Medication: PULMOZYME APPROVED  Approved Dose/Quantity: 75 PER 28 DAYS  Reference #:     Insurance Company: [employer] - [place] [phone] Fax [phone]  Expected CoPay:       CoPay Card Available: Yes    Foundation Assistance Needed:    Which Pharmacy is filling the prescription (Not needed for infusion/clinic administered): [facility] [place]  Pharmacy Notified:    Patient Notified:

## 2020-12-02 ENCOUNTER — MYC REFILL (OUTPATIENT)
Dept: PULMONOLOGY | Facility: CLINIC | Age: 35
End: 2020-12-02

## 2020-12-02 DIAGNOSIS — M10.9 GOUT, UNSPECIFIED CAUSE, UNSPECIFIED CHRONICITY, UNSPECIFIED SITE: ICD-10-CM

## 2020-12-02 DIAGNOSIS — E84.0 CYSTIC FIBROSIS OF THE LUNG (H): ICD-10-CM

## 2020-12-02 DIAGNOSIS — E84.0 CYSTIC FIBROSIS WITH PULMONARY MANIFESTATIONS (H): ICD-10-CM

## 2020-12-02 DIAGNOSIS — E84.9 DIABETES MELLITUS DUE TO CYSTIC FIBROSIS (H): ICD-10-CM

## 2020-12-02 DIAGNOSIS — E08.9 DIABETES MELLITUS DUE TO CYSTIC FIBROSIS (H): ICD-10-CM

## 2020-12-02 DIAGNOSIS — E84.0 CYSTIC FIBROSIS WITH PULMONARY EXACERBATION (H): ICD-10-CM

## 2020-12-02 RX ORDER — AZITHROMYCIN 250 MG/1
500 TABLET, FILM COATED ORAL
Qty: 150 TABLET | Refills: 1 | Status: SHIPPED | OUTPATIENT
Start: 2020-12-02 | End: 2020-12-02

## 2020-12-02 RX ORDER — AZITHROMYCIN 250 MG/1
500 TABLET, FILM COATED ORAL
Qty: 150 TABLET | Refills: 1 | Status: SHIPPED | OUTPATIENT
Start: 2020-12-02 | End: 2021-02-05

## 2020-12-09 DIAGNOSIS — E84.9 CYSTIC FIBROSIS (H): ICD-10-CM

## 2020-12-13 NOTE — PROGRESS NOTES
Dilan Nassar is a 35 year old male who is being evaluated via a billable video visit.      1. CF lung disease: no pulmonary complaints, no cough or congestion. Does notice if he doesn't get in two vests/day. No PFTs, but his PFTs in September were at his best. Previous cultures + for MRSA, Klebsiella, Steno, Ps A and A. Fumigatus. No evidence of exacerbation at this time.  - Continue nebulizers, inhaler and vest therapy  - On chronic azithromycin   - Continue george podhaler month on/off  - Okay to try and discontinue Flovent and monitor for symptoms     2. Hemoptysis: no recent issues.   - Continue vitamin K once/week, okay to discontinue and monitor for symptoms      3. 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 US 5/17 demonstrating coarse echotexture of the liver with hypertrophy of the caudate lobe and left lobe concerning for possible cirrhosis, no lesions, patent vasculature. LFTs 6/18/20 WNL.   - Continue Ursodiol  - Hepatic panel this week      4. Exocrine pancreatic insufficiency: denies symptoms of malabsorption, notes his prior cramping has resolved. Weight is up to 125 lbs with just shorts and a T shirt.   - Continue pancreatic enzymes and vitamin supplementation      5. CFRD: recent AIC of 5.7 on 9/17/20. BS have been okay,  in the am, highest after breakfast, around 120-180 about 2 hours after breakfast. After lunch and dinner, his BS are lower, in the 120-140s. Overdue for Endocrine visit, tried to get on a video visit with him today and it didn't work.   - Continue Basaglar 8 U  - Reschedule with Dr. Miranda and routine diabetic eye exam      6. CF related sinus disease: no current issues.   - Continue Flonase      7. CFTR: modulation: patient started on Trikafta last January and is doing well.   - Due for labs now and then in June  - Continue Trikafta.      RTC: in 3 months with routine spirometry  Annual studies due: September 2021  Vaccinations: received influenza  "vaccine      Jyothi Warren PA-C  Pulmonary, Allergy, Critical Care and Sleep Medicine    Interval history: patient is doing well, has been stable pretty much all year. Notes his weight is up, about 125 lbs. Minimal cough, no congestion. Vesting BID. No GI complaints, prior issues with cramping have resolved. No bloating or gas. Having 2 stools/day.     EYES: Eyes grossly normal to inspection.  No discharge or erythema, or obvious scleral/conjunctival abnormalities.  RESP: No audible wheeze, cough, or visible cyanosis.  No visible retractions or increased work of breathing.    SKIN: Visible skin clear. No significant rash, abnormal pigmentation or lesions.  NEURO: Cranial nerves grossly intact.  Mentation and speech appropriate for age.  PSYCH: Mentation appears normal, affect normal/bright, judgement and insight intact, normal speech and appearance well-groomed.    The patient has been notified of following:     \"This video visit will be conducted via a call between you and your physician/provider. We have found that certain health care needs can be provided without the need for an in-person physical exam.  This service lets us provide the care you need with a video conversation.  If a prescription is necessary we can send it directly to your pharmacy.  If lab work is needed we can place an order for that and you can then stop by our lab to have the test done at a later time.    Video visits are billed at different rates depending on your insurance coverage.  Please reach out to your insurance provider with any questions.    If during the course of the call the physician/provider feels a video visit is not appropriate, you will not be charged for this service.\"    Patient has given verbal consent for Video visit? Yes  How would you like to obtain your AVS? [employer]  If you are dropped from the video visit, the video invite should be resent to: Other e-mail: [employer]  Will anyone else be joining your video visit? " No        Video-Visit Details    Type of service:  Video Visit    Video Start Time: 12:53  Video End Time: 1:17 PM    Originating Location (pt. Location): Home    Distant Location (provider location):  [facility]ENCE [facility]     Platform used for Video Visit: Muzui

## 2020-12-15 ENCOUNTER — VIRTUAL VISIT (OUTPATIENT)
Dept: PULMONOLOGY | Facility: CLINIC | Age: 35
End: 2020-12-15
Attending: PHYSICIAN ASSISTANT
Payer: COMMERCIAL

## 2020-12-15 DIAGNOSIS — E84.0 CYSTIC FIBROSIS WITH PULMONARY MANIFESTATIONS (H): Primary | ICD-10-CM

## 2020-12-15 PROCEDURE — 99214 OFFICE O/P EST MOD 30 MIN: CPT | Mod: 95 | Performed by: PHYSICIAN ASSISTANT

## 2020-12-15 NOTE — LETTER
12/15/2020         RE: Dilan Nassar  [address] [place] [id number]        Dear Colleague,    Thank you for referring your patient, Dilan Nassar, to the [facility] FOR LUNG SCIENCE AND [facility] CLINIC [place]. Please see a copy of my visit note below.    Dilan Nassar is a 35 year old male who is being evaluated via a billable video visit.      1. CF lung disease: no pulmonary complaints, no cough or congestion. Does notice if he doesn't get in two vests/day. No PFTs, but his PFTs in September were at his best. Previous cultures + for MRSA, Klebsiella, Steno, Ps A and A. Fumigatus. No evidence of exacerbation at this time.  - Continue nebulizers, inhaler and vest therapy  - On chronic azithromycin   - Continue george podhaler month on/off  - Okay to try and discontinue Flovent and monitor for symptoms     2. Hemoptysis: no recent issues.   - Continue vitamin K once/week, okay to discontinue and monitor for symptoms      3. 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 US 5/17 demonstrating coarse echotexture of the liver with hypertrophy of the caudate lobe and left lobe concerning for possible cirrhosis, no lesions, patent vasculature. LFTs 6/18/20 WNL.   - Continue Ursodiol  - Hepatic panel this week      4. Exocrine pancreatic insufficiency: denies symptoms of malabsorption, notes his prior cramping has resolved. Weight is up to 125 lbs with just shorts and a T shirt.   - Continue pancreatic enzymes and vitamin supplementation      5. CFRD: recent AIC of 5.7 on 9/17/20. BS have been okay,  in the am, highest after breakfast, around 120-180 about 2 hours after breakfast. After lunch and dinner, his BS are lower, in the 120-140s. Overdue for Endocrine visit, tried to get on a video visit with him today and it didn't work.   - Continue Basaglar 8 U  - Reschedule with Dr. Miranda and routine diabetic eye exam      6. CF related sinus disease: no current issues.  "  - Continue Flonase      7. CFTR: modulation: patient started on Trikafta last January and is doing well.   - Due for labs now and then in June  - Continue Trikafta.      RTC: in 3 months with routine spirometry  Annual studies due: September 2021  Vaccinations: received influenza vaccine      Jyothi Warren PA-C  Pulmonary, Allergy, Critical Care and Sleep Medicine    Interval history: patient is doing well, has been stable pretty much all year. Notes his weight is up, about 125 lbs. Minimal cough, no congestion. Vesting BID. No GI complaints, prior issues with cramping have resolved. No bloating or gas. Having 2 stools/day.     EYES: Eyes grossly normal to inspection.  No discharge or erythema, or obvious scleral/conjunctival abnormalities.  RESP: No audible wheeze, cough, or visible cyanosis.  No visible retractions or increased work of breathing.    SKIN: Visible skin clear. No significant rash, abnormal pigmentation or lesions.  NEURO: Cranial nerves grossly intact.  Mentation and speech appropriate for age.  PSYCH: Mentation appears normal, affect normal/bright, judgement and insight intact, normal speech and appearance well-groomed.    The patient has been notified of following:     \"This video visit will be conducted via a call between you and your physician/provider. We have found that certain health care needs can be provided without the need for an in-person physical exam.  This service lets us provide the care you need with a video conversation.  If a prescription is necessary we can send it directly to your pharmacy.  If lab work is needed we can place an order for that and you can then stop by our lab to have the test done at a later time.    Video visits are billed at different rates depending on your insurance coverage.  Please reach out to your insurance provider with any questions.    If during the course of the call the physician/provider feels a video visit is not appropriate, you will not be " "charged for this service.\"    Patient has given verbal consent for Video visit? Yes  How would you like to obtain your AVS? MyChart  If you are dropped from the video visit, the video invite should be resent to: Other e-mail: [employer]  Will anyone else be joining your video visit? No        Video-Visit Details    Type of service:  Video Visit    Video Start Time: 12:53  Video End Time: 1:17 PM    Originating Location (pt. Location): [place]    Distant Location (provider location):  [facility] FOR LUNG SCIENCE [facility]     Platform used for Video Visit: Elisha[employer]          Again, thank you for allowing me to participate in the care of your patient.        Sincerely,        Jyothi Warren PA-C    "

## 2020-12-15 NOTE — PATIENT INSTRUCTIONS
Cystic Fibrosis Self-Care Plan       Patient: Dilan Nassar   MRN: [id number]   Clinic Date: December 15, 2020     RECOMMENDATIONS:  1. Continue nebulizers and vest therapy.   2. Referral to Dr. Mukherjee, Urology.  3. Okay to stop Flovent and/or vitamin K and monitor for symptoms.   4. Reschedule your visit with Dr. Miranda and call Lisset SÁNCHEZ for lancets.     Annual Studies:   IGG   Date Value Ref Range Status   09/17/2020 1,153 610 - 1,616 mg/dL Final     No results found for: INS  There are no preventive care reminders to display for this patient.    Pulmonary Function Tests  FEV1: amount of air you can blow out in 1 second  FVC: total amount of air you can take in and blow out    Your Goals:         PFT Latest Ref Rng & Units 9/17/2020   FVC L 4.68   FEV1 L 2.84   FVC% % 108   FEV1% % 79          Airway Clearance: The Most Important Way to Keep Your Lungs Healthy  Vest Settings:    Hill-Rom Frequencies: 8, 9, 10 Pressure 10 Then, Frequencies 18, 19, 20 Pressure 6      [employer]pir[employer]: Quick Start with Pressure of     Do each frequency for 5 minutes; Deflate vest after each frequency & cough 3 times before beginning the next setting.    Vest and Neb Therapy should be done 2 times/day.    Good Nutrition Can Improve Lung Function and Overall Health     Take ALL of your vitamins with food     Take 1/2 of your enzymes before EVERY meal/snack and the other 1/2 mid-meal/snack    Wt Readings from Last 3 Encounters:   09/17/20 55.9 kg (123 lb 3.8 oz)   12/19/19 53.6 kg (118 lb 2.7 oz)   09/09/19 54 kg (119 lb)       There is no height or weight on file to calculate BMI.         National CF Foundation Recommendations for BMI in CF Adults: Women: at least 22 Men: at least 23        Controlling Blood Sugars Helps Prevent Lung Infections & Improves Nutrition  Test blood sugar:     In the morning before eating (goal is )     2 hours after a meal (goal is less than 150)     When pre-meal glucose is greater than 150 add  correction     At bedtime (if less than 100 eat a snack with 15 grams of carbohydrates  Last A1C Results:   Hemoglobin A1C   Date Value Ref Range Status   09/17/2020 5.7 (H) 0 - 5.6 % Final     Comment:     Normal <5.7% Prediabetes 5.7-6.4%  Diabetes 6.5% or higher - adopted from ADA   consensus guidelines.           If diabetic, measure A1C every 6 months. Goal is under 7%.    Staying Healthy    Research: If you are interested in learning about research opportunities or have questions, please contact Sonali Altamirano at [phone] or [email or web address].       Foundation: Compass is a personalized resource service to help you with the insurance, financial, legal and other issues you are facing.  It's free, confidential and available to anyone with CF.  Ask your  for more information or contact Compass directly at [facility] ([phone]) or compass@cff.org, or learn more at cff.org/compass.       CF Nurse Line: Alla Pimentel: [phone]   Audra Smart, RT: [phone]     Daniela Whitt and Tamika Perez , Dieticians: [phone]     Lisset Root, Diabetes Nurse: [phone]    Kathie Truong: [phone] or Kailey Mixon at [phone], Social Workers   [email or web address]

## 2020-12-16 DIAGNOSIS — E84.0 CYSTIC FIBROSIS WITH PULMONARY MANIFESTATIONS (H): ICD-10-CM

## 2020-12-17 NOTE — TELEPHONE ENCOUNTER
MEDICAL RECORDS REQUEST   [facility]  Urology Clinic  [address] [place] [address]  PHONE: [phone]  Fax: [phone]        FUTURE VISIT INFORMATION                                                   Dilan Nassar, : 1985 scheduled for future visit at [facility]    APPOINTMENT INFORMATION:    Date: 2021 8AM    Provider:  Danny Mukherjee MD    Reason for Visit/Diagnosis: Infertility     REFERRAL INFORMATION:    Referring provider:  N/A    Specialty: N/A    Referring providers clinic:      Clinic contact number:  N/A    RECORDS REQUESTED FOR VISIT                                                     NOTES  STATUS/DETAILS   OFFICE NOTE from referring provider  yes   OFFICE NOTE from other specialist  no   DISCHARGE SUMMARY from hospital  no   DISCHARGE REPORT from the ER  no   OPERATIVE REPORT  no   MEDICATION LIST  no   INFERTILITY     SEMEN ANALYSIS (LAST 2)  in process     PRE-VISIT CHECKLIST      Record collection complete Yes   Appointment appropriately scheduled           (right time/right provider) Yes   MyChart activation Yes   Questionnaire complete If no, please explain: In process      Completed by: Nakia Melchor

## 2020-12-18 LAB
ALBUMIN SERPL-MCNC: 4.4 G/DL
ALP SERPL-CCNC: 124 U/L
ALT SERPL-CCNC: 34 U/L
AST SERPL-CCNC: 30 U/L
BILIRUB SERPL-MCNC: 1.2 MG/DL
BILIRUBIN DIRECT: 0.6 MG/DL
CK TOTAL: 129 U/L
PROT SERPL-MCNC: 7.1 G/DL

## 2020-12-21 ENCOUNTER — CLINICAL UPDATE (OUTPATIENT)
Dept: PHARMACY | Facility: CLINIC | Age: 35
End: 2020-12-21
Payer: COMMERCIAL

## 2020-12-21 DIAGNOSIS — E84.0 CYSTIC FIBROSIS WITH PULMONARY MANIFESTATIONS (H): Primary | ICD-10-CM

## 2020-12-21 PROCEDURE — 99207 PR NO CHARGE LOS: CPT | Performed by: PHARMACIST

## 2020-12-21 NOTE — PROGRESS NOTES
Clinical Update:                                                    At the request of Dr. Nieto, a chart review was conducted for Dilan Nassar.    Reason for Chart Review: Lab review/Trikafta update    Discussion: Reviewed labs from 12/17/20 - all results are within normal limits    Plan:  1. Continue Trikafta  2. Repeat hepatic panel and CK in 6 months, since Guillermo has been on Trikafta for one year    Gwen Murray PharmD  CF Medication Therapy Management Pharmacist  [facility]  [phone]

## 2021-01-07 DIAGNOSIS — E84.9 CYSTIC FIBROSIS (H): ICD-10-CM

## 2021-01-07 DIAGNOSIS — K86.81 EXOCRINE PANCREATIC INSUFFICIENCY: ICD-10-CM

## 2021-01-07 RX ORDER — PEDIATRIC MULTIVIT 61/D3/VIT K 1500-800
2 CAPSULE ORAL DAILY
Qty: 60 CAPSULE | Refills: 11 | Status: SHIPPED | OUTPATIENT
Start: 2021-01-07 | End: 2022-01-21

## 2021-01-18 NOTE — PROGRESS NOTES
CF  Cherlele Muhammad, [facility]    Outcome for 01/18/21 3:15 PM :Glucose data attachment located on [employer] message     Dilan is a 35 year old who is being evaluated via a billable video visit.      How would you like to obtain your AVS? [employer]  If the video visit is dropped, the invitation should be resent by: Send to e-mail at: [email or web address]  Will anyone else be joining your video visit? No        CF Endocrinology Return Consultation:  Diabetes  :   Patient: Dilan Nassar MRN# [id number]   YOB: 1985 Age: 35 year old   Date of Visit: 01/19/2021  Dear Dr. Atilio Nieto:    I had the pleasure of seeing your patient, Dilan Nassar in the CF Endocrinology Clinic, [facility], for a return consultation .           Problem list:   MRSA  Cystic fibrosis  Diabetes mellitus  Exocrine pancreatic insufficiency  Vitamin D deficiency  Gout  Intestinal malabsorption         HPI:   Dilan is a 35 year old male with Cystic Fibrosis Related Diabetes Mellitus (CFRD).    I have reviewed the available past laboratory evaluations, imaging studies, and medical records available to me at this visit.   History was obtained from the patient and the medical record.  I have reviewed the notes of the pulmonary care team entered into the medical record since I last saw the patient.    Overall feels well.    Started Trikafta about a year ago  Stable appetite  Weight has been up ~7 lb  Doesn't check post-prandials too often    Glucose readings sent via [employer] copied below    01/12  right away in morning 100  after breakfast (cereal, and p-nut butter toast) 193  after lunch 128     01/13  right away in morning 94  after breakfast (eggs, pate, p-nut butter toast, orange juice, and banana) 120  after lunch 177     01/14  right away in morning 107  after breakfast (cereal, and p-nut butter toast) 145  after lunch 168     01/15  right away in morning 106   after breakfast (cereal, and p-nut butter toast)  143     12/10  right away in morning 87  after breakfast (cereal, p-nut butter toast, and banana) 189  after lunch 126     12/11  right away in morning 100  after breakfast (eggs, pate, p-nut butter toast, orange juice, and banana) 110  after lunch 102     12/12  right away in morning 77  after breakfast (eggs, pate, p-nut butter toast, orange juice, and banana) 105     12/14  right away in morning 103  after breakfast (cereal, p-nut butter toast, and banana) 166     09/14  after breakfast (cereal, p-nut butter toast, and banana) 126     09/15  right away in AM 88  after breakfast (cereal, p-nut butter toast, and banana) 135     09/16  right away in morning 88  after lunch (turkey and cheddar sandwich and potato chips) 97     09/17  right away in morning 93  after breakfast (eggs, pate, p-nut butter toast, orange juice, and banana) 126      A1c:  Last A1c was 5.7%    Current insulin regimen:   Basaglar 8 unit(s) daily 10 AM          Past Medical History:   Cystic fibrosis          Past Surgical History:   Appendectomy  Sinus surgery            Social History:   Unmarried  Non smoker  Former smokeless tobacco user           Family History:   Mother: liver disease  Paternal grandfather: prostate cancer  Maternal aunt: diabetes mellitus  Maternal uncle: diabetes mellitus  Maternal grandmother: diabetes mellitus  Paternal grandmother: coronary artery disease         Allergies:     Allergies   Allergen Reactions     Mold      Molds & Smuts Itching          Medications:     Current Outpatient Medications:      albuterol (PROAIR HFA/PROVENTIL HFA/VENTOLIN HFA) 108 (90 Base) MCG/ACT Inhaler, Inhale 2 puffs into the lungs 2 times daily Following your vancomycin nebulizer treatment., Disp: 1 Inhaler, Rfl: 3     albuterol (PROVENTIL) (2.5 MG/3ML) 0.083% neb solution, Take 1 vial (2.5 mg) by nebulization 2 times daily, Disp: 60 vial, Rfl: 11     allopurinol (ZYLOPRIM) 300 MG tablet, TAKE ONE TABLET (300MG) BY MOUTH DAILY,  "Disp: 90 tablet, Rfl: 3     amylase-lipase-protease (CREON) 76427 units CPEP, TAKE 5-6 CAPSULES (60,000-72,000) BY MOUTH THREE TIMES A DAY WITH MEALS, Disp: 600 each, Rfl: 3     azithromycin (ZITHROMAX) 250 MG tablet, Take 2 tablets (500 mg) by mouth Every Mon, Wed, Fri Morning, Disp: 150 tablet, Rfl: 1     blood glucose (NO BRAND SPECIFIED) test strip, Use to test blood sugar 4 times daily or as directed., Disp: 125 each, Rfl: 11     blood glucose monitoring (FREESTYLE) lancets, Use to test blood sugar 4 times daily or as directed., Disp: 2 Box, Rfl: 12     blood glucose monitoring (NO BRAND SPECIFIED) meter device kit, Use to test blood sugar 4 times daily or as directed., Disp: 1 kit, Rfl: 0     dornase alpha (PULMOZYME) 1 MG/ML neb solution, INHALE CONTENTS OF ONE VIAL (2.5MG) VIA NEBULIZER TWICE DAILY. KEEP REFRIGERATED UNTIL USE., Disp: 150 mL, Rfl: 11     elexacaftor-tezacaftor-ivacaftor & ivacaftor (TRIKAFTA) 100-50-75 & 150 MG tablet pack, Take 2 orange tablets in the morning and 1 light blue tablet in the evening. Swallow whole with fat-containing food., Disp: 84 tablet, Rfl: 5     fluticasone (FLONASE) 50 MCG/ACT nasal spray, SPRAY 2 SPRAYS INTO BOTH NOSTRILS DAILY, Disp: 48 g, Rfl: 3     fluticasone (FLOVENT HFA) 110 MCG/ACT inhaler, INHALE 1 PUFF INTO THE LUNGS TWO TIMES A DAY, Disp: 1 Inhaler, Rfl: 3     insulin glargine (BASAGLAR KWIKPEN) 100 UNIT/ML pen, Inject 8 Units Subcutaneous daily, Disp: 15 mL, Rfl: 11     insulin pen needle (BD YON U/F) 32G X 4 MM miscellaneous, Use 3 pen needles daily or as directed. Please schedule a follow-up appointment with Dr Miranda at [phone], Disp: 100 each, Rfl: 2     mvw complete formulation (SOFTGELS ) capsule, Take 2 capsules by mouth daily, Disp: 60 capsule, Rfl: 11     phytonadione (MEPHYTON) 5 MG tablet, TAKE ONE TABLET (5MG) BY MOUTH ONCE A WEEK, Disp: 16 tablet, Rfl: 3     Syringe/Needle, Disp, 18G X 1\" 6 ML MISC, 1 each 2 times daily, Disp: 60 " each, Rfl: 5     tobramycin (MARLEN PODHALER) 28 MG in caps, Inhale 4 capsules (112 mg) into the lungs 2 times daily 1 month on, 1 month off, Disp: 224 capsule, Rfl: 5     ursodiol (ACTIGALL) 300 MG capsule, TAKE 1 CAPSULE (300MG ) BY MOUTH TWO TIMES A DAY, Disp: 180 capsule, Rfl: 3           Review of Systems:     Comprehensive ROS negative other than the symptoms noted above in the HPI.          Physical Exam:   There were no vitals taken for this visit.  Height: Data Unavailable, Facility age limit for growth percentiles is 20 years.  Weight: 0 lbs 0 oz, Facility age limit for growth percentiles is 20 years.  BMI: There is no height or weight on file to calculate BMI., No height and weight on file for this encounter.      CONSTITUTIONAL:   Awake, alert, and in no apparent distress.nder  NEUROLOGIC:No focal deficits noted.   PSYCHIATRIC: Cooperative, no agitation.        Laboratory results:     TSH   Date Value Ref Range Status   09/17/2020 1.96 0.40 - 4.00 mU/L Final     Testosterone Total   Date Value Ref Range Status   09/17/2020 416 240 - 950 ng/dL Final     Comment:     This test was developed and its performance characteristics determined by the   [facility].   It has not been cleared or approved by the FDA. The laboratory is regulated   under CLIA as qualified to perform high-complexity testing. This test is used   for clinical purposes. It should not be regarded as investigational or for   research.       Cholesterol   Date Value Ref Range Status   09/17/2020 134 <200 mg/dL Final     Albumin Urine mg/L   Date Value Ref Range Status   09/17/2020 12 mg/L Final     Triglycerides   Date Value Ref Range Status   09/17/2020 91 <150 mg/dL Final     HDL Cholesterol   Date Value Ref Range Status   09/17/2020 83 >39 mg/dL Final     LDL Cholesterol Calculated   Date Value Ref Range Status   09/17/2020 33 <100 mg/dL Final     Comment:     Desirable:        <100 mg/dl     Non HDL Cholesterol   Date Value Ref Range Status   09/17/2020 51 <130 mg/dL Final     Lab Results   Component Value Date    A1C 5.8 09/26/2018    A1C 6.1 09/15/2017    A1C 6.7 12/20/2016    A1C 7.5 10/20/2016    A1C 6.8 09/30/2016       CF  Diabetes Health Maintenance    Date of Diabetes Diagnosis: ~ 2012     Special Notes (if any):      Date Last Eye Exam:      Date Last Dental Appointment:     Dates of Episodes Severe* Hypoglycemia (month/year, cumulative, ongoing, assess each visit):    *Severe=patient unconscious, seizure, unable to help self    Last 25-Vitamin D (every year): 38      Last DXA, lowest Z-score (every 2 years): Most negative and valid T score of -0.9    Last Testosterone:   Testosterone Total   Date Value Ref Range Status   09/17/2020 416 240 - 950 ng/dL Final     Comment:     This test was developed and its performance characteristics determined by the   [facility].   It has not been cleared or approved by the FDA. The laboratory is regulated   under CLIA as qualified to perform high-complexity testing. This test is used   for clinical purposes. It should not be regarded as investigational or for   research.              Assessment and Plan:   Dilan is a 35 year old male with cystic fibrosis related diabetes mellitus.     CFRD: Overall good control, continue current dose of Basaglar.  He was counseled to contact clinic if he experiences persistent hyperglycemia or recurrent hypoglycemia.    Schedule eye exam  Return to clinic in 1 year or sooner if needed    Note: Chart documentation done in part with Dragon Voice Recognition software. Although reviewed after completion, some word and grammatical errors may remain.  Please consider this when interpreting information in this chart    Video-Visit Details    Type of service:  Video Visit    Total video visit time 15 minutes    Originating Location (pt. Location):  Home    Distant Location (provider location):   [facility]     Platform used for Video Visit: Mamta Miranda MD    Note: Chart documentation done in part with Dragon Voice Recognition software. Although reviewed after completion, some word and grammatical errors may remain.  Please consider this when interpreting information in this chart    Time: I spent 20 minutes preparing to see patient (including chart review and preparation), obtaining and or reviewing additional medical history, performing an evaluation, documenting clinical information in the electronic health record, independently interpreting results, communicating results to the patient, and/or coordinating care.

## 2021-01-18 NOTE — PATIENT INSTRUCTIONS
We appreciate your assistance in coordinating your healthcare.     Please upload your insulin pump, blood sugar meter and/or continuous glucose monitor at home 1-2 days before your next diabetes-related appointment.   This will allow your provider to review your  data before your scheduled virtual visit.    To ask a question to your Endocrine care team, please send them a [employer] message, or reach them by phone at [phone]     To expedite your medication refill(s), please contact your pharmacy and have them   fax a refill request to: [phone].  *Please allow 3 business days for routine medication refills.  *Please allow 5 business days for controlled substance medication refills.    For after-hours urgent Endocrine issues, that do not require 301, please dial [phone], and ask to speak with the Endocrinologist On-Call

## 2021-01-19 ENCOUNTER — DOCUMENTATION ONLY (OUTPATIENT)
Dept: CARE COORDINATION | Facility: CLINIC | Age: 36
End: 2021-01-19

## 2021-01-19 ENCOUNTER — VIRTUAL VISIT (OUTPATIENT)
Dept: ENDOCRINOLOGY | Facility: CLINIC | Age: 36
End: 2021-01-19
Attending: INTERNAL MEDICINE
Payer: COMMERCIAL

## 2021-01-19 DIAGNOSIS — E84.9 DIABETES MELLITUS DUE TO CYSTIC FIBROSIS (H): ICD-10-CM

## 2021-01-19 DIAGNOSIS — E08.9 DIABETES MELLITUS DUE TO CYSTIC FIBROSIS (H): ICD-10-CM

## 2021-01-19 PROCEDURE — 99213 OFFICE O/P EST LOW 20 MIN: CPT | Mod: 95 | Performed by: INTERNAL MEDICINE

## 2021-01-19 RX ORDER — INSULIN GLARGINE 100 [IU]/ML
8 INJECTION, SOLUTION SUBCUTANEOUS DAILY
Qty: 15 ML | Refills: 11 | Status: SHIPPED | OUTPATIENT
Start: 2021-01-19 | End: 2021-03-05

## 2021-01-19 NOTE — LETTER
1/19/2021       RE: Dilan Nassar  [address] [place] [id number]     Dear Colleague,    Thank you for referring your patient, Dilan Nassar, to the [facility] [place] at [facility]. Please see a copy of my visit note below.    CF  Cherelle Muhammad [facility]    Outcome for 01/18/21 3:15 PM :Glucose data attachment located on VitaPortal message     Dilan is a 35 year old who is being evaluated via a billable video visit.      How would you like to obtain your AVS? [email or web address]  If the video visit is dropped, the invitation should be resent by: Send to e-mail at: [email or web address]  Will anyone else be joining your video visit? No        CF Endocrinology Return Consultation:  Diabetes  :   Patient: Dilan Nassar MRN# [id number]   YOB: 1985 Age: 35 year old   Date of Visit: 01/19/2021  Dear Dr. Atilio Nieto:    I had the pleasure of seeing your patient, Dilan Nassar in the [facility] Endocrinology Clinic, [facility], for a return consultation .           Problem list:   MRSA  Cystic fibrosis  Diabetes mellitus  Exocrine pancreatic insufficiency  Vitamin D deficiency  Gout  Intestinal malabsorption         HPI:   Dilan is a 35 year old male with Cystic Fibrosis Related Diabetes Mellitus (CFRD).    I have reviewed the available past laboratory evaluations, imaging studies, and medical records available to me at this visit.   History was obtained from the patient and the medical record.  I have reviewed the notes of the pulmonary care team entered into the medical record since I last saw the patient.    Overall feels well.    Started Trikafta about a year ago  Stable appetite  Weight has been up ~7 lb  Doesn't check post-prandials too often    Glucose readings sent via [employer] copied below    01/12  right away in morning 100  after breakfast (cereal, and p-nut butter toast) 193  after lunch 128     01/13  right away in morning  94  after breakfast (eggs, pate, p-nut butter toast, orange juice, and banana) 120  after lunch 177     01/14  right away in morning 107  after breakfast (cereal, and p-nut butter toast) 145  after lunch 168     01/15  right away in morning 106   after breakfast (cereal, and p-nut butter toast) 143     12/10  right away in morning 87  after breakfast (cereal, p-nut butter toast, and banana) 189  after lunch 126     12/11  right away in morning 100  after breakfast (eggs, pate, p-nut butter toast, orange juice, and banana) 110  after lunch 102     12/12  right away in morning 77  after breakfast (eggs, pate, p-nut butter toast, orange juice, and banana) 105     12/14  right away in morning 103  after breakfast (cereal, p-nut butter toast, and banana) 166     09/14  after breakfast (cereal, p-nut butter toast, and banana) 126     09/15  right away in AM 88  after breakfast (cereal, p-nut butter toast, and banana) 135     09/16  right away in morning 88  after lunch (turkey and cheddar sandwich and potato chips) 97     09/17  right away in morning 93  after breakfast (eggs, pate, p-nut butter toast, orange juice, and banana) 126      A1c:  Last A1c was 5.7%    Current insulin regimen:   Basaglar 8 unit(s) daily 10 AM          Past Medical History:   Cystic fibrosis          Past Surgical History:   Appendectomy  Sinus surgery            Social History:   Unmarried  Non smoker  Former smokeless tobacco user           Family History:   Mother: liver disease  Paternal grandfather: prostate cancer  Maternal aunt: diabetes mellitus  Maternal uncle: diabetes mellitus  Maternal grandmother: diabetes mellitus  Paternal grandmother: coronary artery disease         Allergies:     Allergies   Allergen Reactions     Mold      Molds & Smuts Itching          Medications:     Current Outpatient Medications:      albuterol (PROAIR HFA/PROVENTIL HFA/VENTOLIN HFA) 108 (90 Base) MCG/ACT Inhaler, Inhale 2 puffs into the lungs 2 times  daily Following your vancomycin nebulizer treatment., Disp: 1 Inhaler, Rfl: 3     albuterol (PROVENTIL) (2.5 MG/3ML) 0.083% neb solution, Take 1 vial (2.5 mg) by nebulization 2 times daily, Disp: 60 vial, Rfl: 11     allopurinol (ZYLOPRIM) 300 MG tablet, TAKE ONE TABLET (300MG) BY MOUTH DAILY, Disp: 90 tablet, Rfl: 3     amylase-lipase-protease (CREON) 16078 units CPEP, TAKE 5-6 CAPSULES (60,000-72,000) BY MOUTH THREE TIMES A DAY WITH MEALS, Disp: 600 each, Rfl: 3     azithromycin (ZITHROMAX) 250 MG tablet, Take 2 tablets (500 mg) by mouth Every Mon, Wed, Fri Morning, Disp: 150 tablet, Rfl: 1     blood glucose (NO BRAND SPECIFIED) test strip, Use to test blood sugar 4 times daily or as directed., Disp: 125 each, Rfl: 11     blood glucose monitoring (FREESTYLE) lancets, Use to test blood sugar 4 times daily or as directed., Disp: 2 Box, Rfl: 12     blood glucose monitoring (NO BRAND SPECIFIED) meter device kit, Use to test blood sugar 4 times daily or as directed., Disp: 1 kit, Rfl: 0     dornase alpha (PULMOZYME) 1 MG/ML neb solution, INHALE CONTENTS OF ONE VIAL (2.5MG) VIA NEBULIZER TWICE DAILY. KEEP REFRIGERATED UNTIL USE., Disp: 150 mL, Rfl: 11     elexacaftor-tezacaftor-ivacaftor & ivacaftor (TRIKAFTA) 100-50-75 & 150 MG tablet pack, Take 2 orange tablets in the morning and 1 light blue tablet in the evening. Swallow whole with fat-containing food., Disp: 84 tablet, Rfl: 5     fluticasone (FLONASE) 50 MCG/ACT nasal spray, SPRAY 2 SPRAYS INTO BOTH NOSTRILS DAILY, Disp: 48 g, Rfl: 3     fluticasone (FLOVENT HFA) 110 MCG/ACT inhaler, INHALE 1 PUFF INTO THE LUNGS TWO TIMES A DAY, Disp: 1 Inhaler, Rfl: 3     insulin glargine (BASAGLAR KWIKPEN) 100 UNIT/ML pen, Inject 8 Units Subcutaneous daily, Disp: 15 mL, Rfl: 11     insulin pen needle (BD YON U/F) 32G X 4 MM miscellaneous, Use 3 pen needles daily or as directed. Please schedule a follow-up appointment with Dr Miranda at [phone], Disp: 100 each, Rfl: 2     mvw  "complete formulation (SOFTGELS ) capsule, Take 2 capsules by mouth daily, Disp: 60 capsule, Rfl: 11     phytonadione (MEPHYTON) 5 MG tablet, TAKE ONE TABLET (5MG) BY MOUTH ONCE A WEEK, Disp: 16 tablet, Rfl: 3     Syringe/Needle, Disp, 18G X 1\" 6 ML MISC, 1 each 2 times daily, Disp: 60 each, Rfl: 5     tobramycin (MARLEN PODHALER) 28 MG in caps, Inhale 4 capsules (112 mg) into the lungs 2 times daily 1 month on, 1 month off, Disp: 224 capsule, Rfl: 5     ursodiol (ACTIGALL) 300 MG capsule, TAKE 1 CAPSULE (300MG ) BY MOUTH TWO TIMES A DAY, Disp: 180 capsule, Rfl: 3           Review of Systems:     Comprehensive ROS negative other than the symptoms noted above in the HPI.          Physical Exam:   There were no vitals taken for this visit.  Height: Data Unavailable, Facility age limit for growth percentiles is 20 years.  Weight: 0 lbs 0 oz, Facility age limit for growth percentiles is 20 years.  BMI: There is no height or weight on file to calculate BMI., No height and weight on file for this encounter.      CONSTITUTIONAL:   Awake, alert, and in no apparent distress.nder  NEUROLOGIC:No focal deficits noted.   PSYCHIATRIC: Cooperative, no agitation.        Laboratory results:     TSH   Date Value Ref Range Status   09/17/2020 1.96 0.40 - 4.00 mU/L Final     Testosterone Total   Date Value Ref Range Status   09/17/2020 416 240 - 950 ng/dL Final     Comment:     This test was developed and its performance characteristics determined by the   [facility].   It has not been cleared or approved by the FDA. The laboratory is regulated   under CLIA as qualified to perform high-complexity testing. This test is used   for clinical purposes. It should not be regarded as investigational or for   research.       Cholesterol   Date Value Ref Range Status   09/17/2020 134 <200 mg/dL Final     Albumin Urine mg/L   Date Value Ref Range Status   09/17/2020 12 mg/L " Final     Triglycerides   Date Value Ref Range Status   09/17/2020 91 <150 mg/dL Final     HDL Cholesterol   Date Value Ref Range Status   09/17/2020 83 >39 mg/dL Final     LDL Cholesterol Calculated   Date Value Ref Range Status   09/17/2020 33 <100 mg/dL Final     Comment:     Desirable:       <100 mg/dl     Non HDL Cholesterol   Date Value Ref Range Status   09/17/2020 51 <130 mg/dL Final     Lab Results   Component Value Date    A1C 5.8 09/26/2018    A1C 6.1 09/15/2017    A1C 6.7 12/20/2016    A1C 7.5 10/20/2016    A1C 6.8 09/30/2016       CF  Diabetes Health Maintenance    Date of Diabetes Diagnosis: ~ 2012     Special Notes (if any):      Date Last Eye Exam:      Date Last Dental Appointment:     Dates of Episodes Severe* Hypoglycemia (month/year, cumulative, ongoing, assess each visit):    *Severe=patient unconscious, seizure, unable to help self    Last 25-Vitamin D (every year): 38      Last DXA, lowest Z-score (every 2 years): Most negative and valid T score of -0.9    Last Testosterone:   Testosterone Total   Date Value Ref Range Status   09/17/2020 416 240 - 950 ng/dL Final     Comment:     This test was developed and its performance characteristics determined by the   [facility].   It has not been cleared or approved by the FDA. The laboratory is regulated   under CLIA as qualified to perform high-complexity testing. This test is used   for clinical purposes. It should not be regarded as investigational or for   research.              Assessment and Plan:   Dilan is a 35 year old male with cystic fibrosis related diabetes mellitus.     CFRD: Overall good control, continue current dose of Basaglar.  He was counseled to contact clinic if he experiences persistent hyperglycemia or recurrent hypoglycemia.    Schedule eye exam  Return to clinic in 1 year or sooner if needed    Note: Chart documentation done in part with Dragon Voice  Recognition software. Although reviewed after completion, some word and grammatical errors may remain.  Please consider this when interpreting information in this chart    Video-Visit Details    Type of service:  Video Visit    Total video visit time 15 minutes    Originating Location (pt. Location): Home    Distant Location (provider location):  [facility] ENDOCRINOLOGY     Platform used for Video Visit: [facility]    Edison Miranda MD    Note: Chart documentation done in part with Dragon Voice Recognition software. Although reviewed after completion, some word and grammatical errors may remain.  Please consider this when interpreting information in this chart    Time: I spent 20 minutes preparing to see patient (including chart review and preparation), obtaining and or reviewing additional medical history, performing an evaluation, documenting clinical information in the electronic health record, independently interpreting results, communicating results to the patient, and/or coordinating care.              Again, thank you for allowing me to participate in the care of your patient.      Sincerely,    Edison Miranda MD

## 2021-01-19 NOTE — LETTER
1/19/2021      RE: Dilan Nassar  [address] [place] [id number]       CF  Cherelle Muhammad CMA    Outcome for 01/18/21 3:15 PM :Glucose data attachment located on [employer] message     Dilan is a 35 year old who is being evaluated via a billable video visit.      How would you like to obtain your AVS? [email or web address]  If the video visit is dropped, the invitation should be resent by: Send to e-mail at: [email or web address]  Will anyone else be joining your video visit? No        CF Endocrinology Return Consultation:  Diabetes  :   Patient: Dilan Nassar MRN# [id number]   YOB: 1985 Age: 35 year old   Date of Visit: 01/19/2021  Dear Dr. Atilio Nieto:    I had the pleasure of seeing your patient, Dilan Nassar in the CF Endocrinology Clinic, [facility], for a return consultation .           Problem list:   MRSA  Cystic fibrosis  Diabetes mellitus  Exocrine pancreatic insufficiency  Vitamin D deficiency  Gout  Intestinal malabsorption         HPI:   Dilan is a 35 year old male with Cystic Fibrosis Related Diabetes Mellitus (CFRD).    I have reviewed the available past laboratory evaluations, imaging studies, and medical records available to me at this visit.   History was obtained from the patient and the medical record.  I have reviewed the notes of the pulmonary care team entered into the medical record since I last saw the patient.    Overall feels well.    Started Trikafta about a year ago  Stable appetite  Weight has been up ~7 lb  Doesn't check post-prandials too often    Glucose readings sent via [employer] copied below    01/12  right away in morning 100  after breakfast (cereal, and p-nut butter toast) 193  after lunch 128     01/13  right away in morning 94  after breakfast (eggs, pate, p-nut butter toast, orange juice, and banana) 120  after lunch 177     01/14  right away in morning 107  after breakfast (cereal, and p-nut butter toast) 145  after lunch 168     01/15  right  away in morning 106   after breakfast (cereal, and p-nut butter toast) 143     12/10  right away in morning 87  after breakfast (cereal, p-nut butter toast, and banana) 189  after lunch 126     12/11  right away in morning 100  after breakfast (eggs, pate, p-nut butter toast, orange juice, and banana) 110  after lunch 102     12/12  right away in morning 77  after breakfast (eggs, pate, p-nut butter toast, orange juice, and banana) 105     12/14  right away in morning 103  after breakfast (cereal, p-nut butter toast, and banana) 166     09/14  after breakfast (cereal, p-nut butter toast, and banana) 126     09/15  right away in AM 88  after breakfast (cereal, p-nut butter toast, and banana) 135     09/16  right away in morning 88  after lunch (turkey and cheddar sandwich and potato chips) 97     09/17  right away in morning 93  after breakfast (eggs, pate, p-nut butter toast, orange juice, and banana) 126      A1c:  Last A1c was 5.7%    Current insulin regimen:   Basaglar 8 unit(s) daily 10 AM          Past Medical History:   Cystic fibrosis          Past Surgical History:   Appendectomy  Sinus surgery            Social History:   Unmarried  Non smoker  Former smokeless tobacco user           Family History:   Mother: liver disease  Paternal grandfather: prostate cancer  Maternal aunt: diabetes mellitus  Maternal uncle: diabetes mellitus  Maternal grandmother: diabetes mellitus  Paternal grandmother: coronary artery disease         Allergies:     Allergies   Allergen Reactions     Mold      Molds & Smuts Itching          Medications:     Current Outpatient Medications:      albuterol (PROAIR HFA/PROVENTIL HFA/VENTOLIN HFA) 108 (90 Base) MCG/ACT Inhaler, Inhale 2 puffs into the lungs 2 times daily Following your vancomycin nebulizer treatment., Disp: 1 Inhaler, Rfl: 3     albuterol (PROVENTIL) (2.5 MG/3ML) 0.083% neb solution, Take 1 vial (2.5 mg) by nebulization 2 times daily, Disp: 60 vial, Rfl: 11      allopurinol (ZYLOPRIM) 300 MG tablet, TAKE ONE TABLET (300MG) BY MOUTH DAILY, Disp: 90 tablet, Rfl: 3     amylase-lipase-protease (CREON) 99190 units CPEP, TAKE 5-6 CAPSULES (60,000-72,000) BY MOUTH THREE TIMES A DAY WITH MEALS, Disp: 600 each, Rfl: 3     azithromycin (ZITHROMAX) 250 MG tablet, Take 2 tablets (500 mg) by mouth Every Mon, Wed, Fri Morning, Disp: 150 tablet, Rfl: 1     blood glucose (NO BRAND SPECIFIED) test strip, Use to test blood sugar 4 times daily or as directed., Disp: 125 each, Rfl: 11     blood glucose monitoring (FREESTYLE) lancets, Use to test blood sugar 4 times daily or as directed., Disp: 2 Box, Rfl: 12     blood glucose monitoring (NO BRAND SPECIFIED) meter device kit, Use to test blood sugar 4 times daily or as directed., Disp: 1 kit, Rfl: 0     dornase alpha (PULMOZYME) 1 MG/ML neb solution, INHALE CONTENTS OF ONE VIAL (2.5MG) VIA NEBULIZER TWICE DAILY. KEEP REFRIGERATED UNTIL USE., Disp: 150 mL, Rfl: 11     elexacaftor-tezacaftor-ivacaftor & ivacaftor (TRIKAFTA) 100-50-75 & 150 MG tablet pack, Take 2 orange tablets in the morning and 1 light blue tablet in the evening. Swallow whole with fat-containing food., Disp: 84 tablet, Rfl: 5     fluticasone (FLONASE) 50 MCG/ACT nasal spray, SPRAY 2 SPRAYS INTO BOTH NOSTRILS DAILY, Disp: 48 g, Rfl: 3     fluticasone (FLOVENT HFA) 110 MCG/ACT inhaler, INHALE 1 PUFF INTO THE LUNGS TWO TIMES A DAY, Disp: 1 Inhaler, Rfl: 3     insulin glargine (BASAGLAR KWIKPEN) 100 UNIT/ML pen, Inject 8 Units Subcutaneous daily, Disp: 15 mL, Rfl: 11     insulin pen needle (BD YON U/F) 32G X 4 MM miscellaneous, Use 3 pen needles daily or as directed. Please schedule a follow-up appointment with Dr Miranda at [phone], Disp: 100 each, Rfl: 2     mvw complete formulation (SOFTGELS ) capsule, Take 2 capsules by mouth daily, Disp: 60 capsule, Rfl: 11     phytonadione (MEPHYTON) 5 MG tablet, TAKE ONE TABLET (5MG) BY MOUTH ONCE A WEEK, Disp: 16 tablet, Rfl: 3     " Syringe/Needle, Disp, 18G X 1\" 6 ML MISC, 1 each 2 times daily, Disp: 60 each, Rfl: 5     tobramycin (MARLEN PODHALER) 28 MG in caps, Inhale 4 capsules (112 mg) into the lungs 2 times daily 1 month on, 1 month off, Disp: 224 capsule, Rfl: 5     ursodiol (ACTIGALL) 300 MG capsule, TAKE 1 CAPSULE (300MG ) BY MOUTH TWO TIMES A DAY, Disp: 180 capsule, Rfl: 3           Review of Systems:     Comprehensive ROS negative other than the symptoms noted above in the HPI.          Physical Exam:   There were no vitals taken for this visit.  Height: Data Unavailable, Facility age limit for growth percentiles is 20 years.  Weight: 0 lbs 0 oz, Facility age limit for growth percentiles is 20 years.  BMI: There is no height or weight on file to calculate BMI., No height and weight on file for this encounter.      CONSTITUTIONAL:   Awake, alert, and in no apparent distress.nder  NEUROLOGIC:No focal deficits noted.   PSYCHIATRIC: Cooperative, no agitation.        Laboratory results:     TSH   Date Value Ref Range Status   09/17/2020 1.96 0.40 - 4.00 mU/L Final     Testosterone Total   Date Value Ref Range Status   09/17/2020 416 240 - 950 ng/dL Final     Comment:     This test was developed and its performance characteristics determined by the   [facility].   It has not been cleared or approved by the FDA. The laboratory is regulated   under CLIA as qualified to perform high-complexity testing. This test is used   for clinical purposes. It should not be regarded as investigational or for   research.       Cholesterol   Date Value Ref Range Status   09/17/2020 134 <200 mg/dL Final     Albumin Urine mg/L   Date Value Ref Range Status   09/17/2020 12 mg/L Final     Triglycerides   Date Value Ref Range Status   09/17/2020 91 <150 mg/dL Final     HDL Cholesterol   Date Value Ref Range Status   09/17/2020 83 >39 mg/dL Final     LDL Cholesterol Calculated   Date Value Ref " Range Status   09/17/2020 33 <100 mg/dL Final     Comment:     Desirable:       <100 mg/dl     Non HDL Cholesterol   Date Value Ref Range Status   09/17/2020 51 <130 mg/dL Final     Lab Results   Component Value Date    A1C 5.8 09/26/2018    A1C 6.1 09/15/2017    A1C 6.7 12/20/2016    A1C 7.5 10/20/2016    A1C 6.8 09/30/2016       CF  Diabetes Health Maintenance    Date of Diabetes Diagnosis: ~ 2012     Special Notes (if any):      Date Last Eye Exam:      Date Last Dental Appointment:     Dates of Episodes Severe* Hypoglycemia (month/year, cumulative, ongoing, assess each visit):    *Severe=patient unconscious, seizure, unable to help self    Last 25-Vitamin D (every year): 38      Last DXA, lowest Z-score (every 2 years): Most negative and valid T score of -0.9    Last Testosterone:   Testosterone Total   Date Value Ref Range Status   09/17/2020 416 240 - 950 ng/dL Final     Comment:     This test was developed and its performance characteristics determined by the   [facility].   It has not been cleared or approved by the FDA. The laboratory is regulated   under CLIA as qualified to perform high-complexity testing. This test is used   for clinical purposes. It should not be regarded as investigational or for   research.              Assessment and Plan:   Dilan is a 35 year old male with cystic fibrosis related diabetes mellitus.     CFRD: Overall good control, continue current dose of Basaglar.  He was counseled to contact clinic if he experiences persistent hyperglycemia or recurrent hypoglycemia.    Schedule eye exam  Return to clinic in 1 year or sooner if needed    Note: Chart documentation done in part with Dragon Voice Recognition software. Although reviewed after completion, some word and grammatical errors may remain.  Please consider this when interpreting information in this chart    Video-Visit Details    Type of service:  Video  Visit    Total video visit time 15 minutes    Originating Location (pt. Location): Home    Distant Location (provider location):   [employer] [facility] ENDOCRINOLOGY     Platform used for Video Visit: [facility]    Edison Miranda MD    Note: Chart documentation done in part with Dragon Voice Recognition software. Although reviewed after completion, some word and grammatical errors may remain.  Please consider this when interpreting information in this chart    Time: I spent 20 minutes preparing to see patient (including chart review and preparation), obtaining and or reviewing additional medical history, performing an evaluation, documenting clinical information in the electronic health record, independently interpreting results, communicating results to the patient, and/or coordinating care.      Edison Miranda MD

## 2021-02-02 DIAGNOSIS — E08.9 DIABETES MELLITUS DUE TO CYSTIC FIBROSIS (H): Primary | ICD-10-CM

## 2021-02-02 DIAGNOSIS — E84.9 DIABETES MELLITUS DUE TO CYSTIC FIBROSIS (H): Primary | ICD-10-CM

## 2021-02-04 ENCOUNTER — TELEPHONE (OUTPATIENT)
Dept: PULMONOLOGY | Facility: CLINIC | Age: 36
End: 2021-02-04

## 2021-02-04 NOTE — TELEPHONE ENCOUNTER
PA Initiation    Medication: Trikafta  Insurance Company: [employer] [place] - Phone [phone] Fax [phone]  Pharmacy Filling the Rx: [facility] - [place], [place] - [facility] [address]  Filling Pharmacy Phone: [phone]  Filling Pharmacy Fax: [phone]  Start Date: 2/4/2021

## 2021-02-05 ENCOUNTER — TELEPHONE (OUTPATIENT)
Dept: PULMONOLOGY | Facility: CLINIC | Age: 36
End: 2021-02-05

## 2021-02-05 ENCOUNTER — PRE VISIT (OUTPATIENT)
Dept: UROLOGY | Facility: CLINIC | Age: 36
End: 2021-02-05

## 2021-02-05 DIAGNOSIS — E84.0 CYSTIC FIBROSIS OF THE LUNG (H): ICD-10-CM

## 2021-02-05 DIAGNOSIS — K86.81 EXOCRINE PANCREATIC INSUFFICIENCY: ICD-10-CM

## 2021-02-05 DIAGNOSIS — E84.9 CF (CYSTIC FIBROSIS) (H): ICD-10-CM

## 2021-02-05 DIAGNOSIS — A49.02 MRSA INFECTION: ICD-10-CM

## 2021-02-05 DIAGNOSIS — E84.0 CYSTIC FIBROSIS WITH PULMONARY EXACERBATION (H): ICD-10-CM

## 2021-02-05 DIAGNOSIS — E84.0 CYSTIC FIBROSIS WITH PULMONARY MANIFESTATIONS (H): ICD-10-CM

## 2021-02-05 DIAGNOSIS — E84.9 CYSTIC FIBROSIS (H): ICD-10-CM

## 2021-02-05 DIAGNOSIS — E08.9 DIABETES MELLITUS DUE TO CYSTIC FIBROSIS (H): ICD-10-CM

## 2021-02-05 DIAGNOSIS — M10.9 GOUT, UNSPECIFIED CAUSE, UNSPECIFIED CHRONICITY, UNSPECIFIED SITE: ICD-10-CM

## 2021-02-05 DIAGNOSIS — E84.9 DIABETES MELLITUS DUE TO CYSTIC FIBROSIS (H): ICD-10-CM

## 2021-02-05 RX ORDER — ALLOPURINOL 300 MG/1
TABLET ORAL
Qty: 90 TABLET | Refills: 3 | Status: SHIPPED | OUTPATIENT
Start: 2021-02-05 | End: 2022-02-10

## 2021-02-05 RX ORDER — TOBRAMYCIN 28 MG/1
4 CAPSULE ORAL; RESPIRATORY (INHALATION) 2 TIMES DAILY
Qty: 224 CAPSULE | Refills: 5 | Status: SHIPPED | OUTPATIENT
Start: 2021-02-05 | End: 2022-04-12

## 2021-02-05 RX ORDER — ALBUTEROL SULFATE 0.83 MG/ML
2.5 SOLUTION RESPIRATORY (INHALATION)
Qty: 180 ML | Refills: 3 | Status: SHIPPED | OUTPATIENT
Start: 2021-02-05 | End: 2022-01-04

## 2021-02-05 RX ORDER — URSODIOL 300 MG/1
CAPSULE ORAL
Qty: 180 CAPSULE | Refills: 3 | Status: SHIPPED | OUTPATIENT
Start: 2021-02-05 | End: 2021-07-07

## 2021-02-05 RX ORDER — ALBUTEROL SULFATE 90 UG/1
2 AEROSOL, METERED RESPIRATORY (INHALATION) 2 TIMES DAILY
Qty: 1 INHALER | Refills: 3 | Status: SHIPPED | OUTPATIENT
Start: 2021-02-05 | End: 2021-03-09

## 2021-02-05 RX ORDER — FLUTICASONE PROPIONATE 50 MCG
SPRAY, SUSPENSION (ML) NASAL
Qty: 48 G | Refills: 3 | Status: SHIPPED | OUTPATIENT
Start: 2021-02-05 | End: 2022-02-10

## 2021-02-05 RX ORDER — AZITHROMYCIN 250 MG/1
500 TABLET, FILM COATED ORAL
Qty: 150 TABLET | Refills: 1 | Status: SHIPPED | OUTPATIENT
Start: 2021-02-05 | End: 2022-02-24

## 2021-02-05 NOTE — TELEPHONE ENCOUNTER
Reason for Visit: Consult to discuss fertility    Orders/Procedures/Records:  in process    Contact Patient: n/a    Rooming Requirements: normal      Johanna Figueroa LPN  02/05/21  8:13 AM

## 2021-02-05 NOTE — ORAL ONC MGMT
PA Initiation    Medication: MARLEN PODHALER PENDING  Insurance Company: [employer] [place] - Phone [phone] Fax [phone]  Pharmacy Filling the Rx: [facility] - [place], [place] - [address]  Filling Pharmacy Phone:    Filling Pharmacy Fa

## 2021-02-08 DIAGNOSIS — E84.9 CF (CYSTIC FIBROSIS) (H): ICD-10-CM

## 2021-02-08 DIAGNOSIS — E84.9 DIABETES MELLITUS DUE TO CYSTIC FIBROSIS (H): ICD-10-CM

## 2021-02-08 DIAGNOSIS — K86.81 EXOCRINE PANCREATIC INSUFFICIENCY: ICD-10-CM

## 2021-02-08 DIAGNOSIS — E84.9 CYSTIC FIBROSIS (H): ICD-10-CM

## 2021-02-08 DIAGNOSIS — E84.0 CYSTIC FIBROSIS WITH PULMONARY MANIFESTATIONS (H): ICD-10-CM

## 2021-02-08 DIAGNOSIS — A49.02 MRSA INFECTION: ICD-10-CM

## 2021-02-08 DIAGNOSIS — E08.9 DIABETES MELLITUS DUE TO CYSTIC FIBROSIS (H): ICD-10-CM

## 2021-02-08 RX ORDER — DEXAMETHASONE 4 MG/1
TABLET ORAL
Qty: 1 G | Refills: 3 | Status: SHIPPED | OUTPATIENT
Start: 2021-02-08 | End: 2021-09-14

## 2021-02-10 ENCOUNTER — MYC MEDICAL ADVICE (OUTPATIENT)
Dept: ENDOCRINOLOGY | Facility: CLINIC | Age: 36
End: 2021-02-10

## 2021-02-10 DIAGNOSIS — E08.9 DIABETES MELLITUS RELATED TO CF (CYSTIC FIBROSIS) (H): ICD-10-CM

## 2021-02-10 DIAGNOSIS — E84.8 DIABETES MELLITUS RELATED TO CF (CYSTIC FIBROSIS) (H): ICD-10-CM

## 2021-02-10 RX ORDER — LANCETS 28 GAUGE
EACH MISCELLANEOUS
Qty: 200 EACH | Refills: 11 | Status: SHIPPED | OUTPATIENT
Start: 2021-02-10

## 2021-02-10 NOTE — TELEPHONE ENCOUNTER
Centralized Medication Refill Team note:  Last visit date Dr Miranda  1/19/2021  [facility]    Lancets refilled per request    blood sugar testing lancets sent to the [facility] on [facility] in [place], [place] ([address] [place] [id number]).

## 2021-02-10 NOTE — TELEPHONE ENCOUNTER
Prior Authorization Approval    Authorization Effective Date:    Authorization Expiration Date:    Medication: Trikafta  Approved Dose/Quantity: 84  Reference #: [id number]   Insurance Company: [employer] [place] - Phone [phone] Fax [phone]  Expected CoPay:       CoPay Card Available:      Foundation Assistance Needed:    Which Pharmacy is filling the prescription (Not needed for infusion/clinic administered): [facility] - [place], [place] - [address]  Pharmacy Notified: Yes  Patient Notified: Yes

## 2021-02-10 NOTE — TELEPHONE ENCOUNTER
PRIOR AUTHORIZATION DENIED    Medication: MARLEN PODHALER DENIED- WILL RENEW PAP    Denial Date:      Denial Rational:     Appeal Information:     Will renew patient in mylan PAP.

## 2021-02-18 ENCOUNTER — PRE VISIT (OUTPATIENT)
Dept: UROLOGY | Facility: CLINIC | Age: 36
End: 2021-02-18

## 2021-02-18 ENCOUNTER — VIRTUAL VISIT (OUTPATIENT)
Dept: UROLOGY | Facility: CLINIC | Age: 36
End: 2021-02-18
Payer: COMMERCIAL

## 2021-02-18 DIAGNOSIS — Z31.41 FERTILITY TESTING: Primary | ICD-10-CM

## 2021-02-18 DIAGNOSIS — E84.0 CYSTIC FIBROSIS WITH PULMONARY MANIFESTATIONS (H): ICD-10-CM

## 2021-02-18 PROCEDURE — 99203 OFFICE O/P NEW LOW 30 MIN: CPT | Mod: 95 | Performed by: UROLOGY

## 2021-02-18 NOTE — PROGRESS NOTES
Dilan is a 35 year old who is being evaluated via a billable video visit.      Video Visit Technology for this patient: [employer] Video Visit- Patient was left in waiting room      How would you like to obtain your AVS? MyChart  If the video visit is dropped, the invitation should be resent by: Send to e-mail at: [email or web address]  Will anyone else be joining your video visit? No      Video Start Time: 8:04 AM  Video-Visit Details    Type of service:  Video Visit    Video End Time:0815    Originating Location (pt. Location): Home    Distant Location (provider location):  [facility] UROLOGY CLINIC [place]     Platform used for Video Visit: [employer]    HPI:  Dilan Nassar is a 35 year old male being seen for infertility advice as it relates to cystic fibrosis.  Duration of problem: he was born with CF.  He is followed for manifestation of CF at [facility].    His wife is on birth control and would like to discontinue it.   He has never had fertility testing but if he's sterile she'd like to DC birth control.      Reviewed previous note from Dr. Jyothi Warren 12/15/20    Exam:  Exam  General- Alert, oriented, nad.  Pleasant and conversant.  Eyes- anicteric, EOMI.  Resps- normal, non-labored.  No cough  Abdomen-  nondistended.   exam- deferred.   Neurological - no tremors  Skin - no discoloration/ lesions noted  Psychiatric - no anxiety, alert & oriented.       The rest of a comprehensive physical examination is deferred due to PHE (public health emergency) video visit restrictions.      Review of Imaging:  The following imaging exams were independently viewed and interpreted by me and discussed with patient:  N/A     Review of Labs:  The following labs were reviewed by me and discussed with the patient:  Testosterone was normal 416 ng/dl 9/17/20    Assessment & Plan     Cystic fibrosis with pulmonary manifestations (H)    - Semen Analysis - Azoospermia (EMILEE); Future    Fertility testing      SA requested and  I'll contact him with results.  He can do this in [place] or at [facility] [phone]. 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      Danny Mukherjee MD  [facility] UROLOGY CLINIC [place]      ==========================    Additional Billing and Coding Information:    17 minutes spent on the date of the encounter doing chart review, history and exam, documentation and further activities as noted above    ==========================

## 2021-02-18 NOTE — PATIENT INSTRUCTIONS
Please schedule lab appointment with [facility] for . Dr. Mukherjee will be contacting you with the results once their available. Please let our clinic know if you would like to have this completed closer to home. We will fax our orders to [place].     It was a pleasure meeting with you today.  Thank you for allowing me and my team the privilege of caring for you today.  YOU are the reason we are here, and I truly hope we provided you with the excellent service you deserve.  Please let us know if there is anything else we can do for you so that we can be sure you are leaving completely satisfied with your care experience.

## 2021-02-18 NOTE — LETTER
2/18/2021       RE: Dilan Nassar  [address] [place] [id number]     Dear Colleague,    Thank you for referring your patient, Dilan Nassar, to the [facility] UROLOGY CLINIC [place] at [facility]. Please see a copy of my visit note below.    Dilan is a 35 year old who is being evaluated via a billable video visit.      Video Visit Technology for this patient: [employer] Video Visit- Patient was left in waiting room      How would you like to obtain your AVS? MyChart  If the video visit is dropped, the invitation should be resent by: Send to e-mail at: [email or web address]  Will anyone else be joining your video visit? No      Video Start Time: 8:04 AM  Video-Visit Details    Type of service:  Video Visit    Video End Time:0815    Originating Location (pt. Location): Home    Distant Location (provider location):  [facility] UROLOGY [facility]     Platform used for Video Visit: [employer]    HPI:  Dilan Nassar is a 35 year old male being seen for infertility advice as it relates to cystic fibrosis.  Duration of problem: he was born with CF.  He is followed for manifestation of CF at [facility].    His wife is on birth control and would like to discontinue it.   He has never had fertility testing but if he's sterile she'd like to DC birth control.      Reviewed previous note from Dr. Jyothi Warren 12/15/20    Exam:  Exam  General- Alert, oriented, nad.  Pleasant and conversant.  Eyes- anicteric, EOMI.  Resps- normal, non-labored.  No cough  Abdomen-  nondistended.   exam- deferred.   Neurological - no tremors  Skin - no discoloration/ lesions noted  Psychiatric - no anxiety, alert & oriented.       The rest of a comprehensive physical examination is deferred due to PHE (public health emergency) video visit restrictions.      Review of Imaging:  The following imaging exams were independently viewed and interpreted by me and discussed with  patient:  N/A     Review of Labs:  The following labs were reviewed by me and discussed with the patient:  Testosterone was normal 416 ng/dl 9/17/20    Assessment & Plan     Cystic fibrosis with pulmonary manifestations (H)    - Semen Analysis - Azoospermia (EMILEE); Future    Fertility testing      SA requested and I'll contact him with results.  He can do this in [place] or at [facility] [phone]. 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      Danny Mukherjee MD  [facility] UROLOGY CLINIC [place]      ==========================    Additional Billing and Coding Information:    17 minutes spent on the date of the encounter doing chart review, history and exam, documentation and further activities as noted above

## 2021-02-19 ENCOUNTER — TELEPHONE (OUTPATIENT)
Dept: UROLOGY | Facility: CLINIC | Age: 36
End: 2021-02-19

## 2021-03-03 ENCOUNTER — TELEPHONE (OUTPATIENT)
Dept: PULMONOLOGY | Facility: CLINIC | Age: 36
End: 2021-03-03

## 2021-03-03 NOTE — TELEPHONE ENCOUNTER
Prior Authorization Approval    Authorization Effective Date: 1/1/2021  Authorization Expiration Date: 9/1/2021  Medication: Trikafta  Approved Dose/Quantity: 84  Reference #: [id number]   Insurance Company: [employer] - Phone [phone] Fax [phone]  Expected CoPay:       CoPay Card Available:      Foundation Assistance Needed:    Which Pharmacy is filling the prescription (Not needed for infusion/clinic administered): [facility] - [place], [place] - [address]  Pharmacy Notified: Yes  Patient Notified: Yes

## 2021-03-04 ENCOUNTER — TELEPHONE (OUTPATIENT)
Dept: ENDOCRINOLOGY | Facility: CLINIC | Age: 36
End: 2021-03-04

## 2021-03-04 NOTE — TELEPHONE ENCOUNTER
Central Prior Authorization Team   Phone: [phone]      PA Initiation    Medication: Basaglar-PA initiated  Insurance Company: [place] Medicaid ([facility]) - Phone [phone] Fax [phone]  Pharmacy Filling the Rx: [address]  Filling Pharmacy Phone: [phone]  Filling Pharmacy Fax:    Start Date: 3/4/2021

## 2021-03-04 NOTE — TELEPHONE ENCOUNTER
Prior Authorization Retail Medication Request-Rekeyed from [facility] [place] Medical Advice encounter. New insurance card in chart shows he has [facility] PMAP.       Medication/Dose: Basaglar   ICD code (if different than what is on RX):E89.0,E08.9  Rationale:Patient needs to maintain diabetes      Insurance Name:[facility]  Insurance ID:[id number]        Pharmacy Information (if different than what is on RX)  Name:    Phone:

## 2021-03-05 DIAGNOSIS — E08.9 DIABETES MELLITUS RELATED TO CF (CYSTIC FIBROSIS) (H): Primary | ICD-10-CM

## 2021-03-05 DIAGNOSIS — E84.8 DIABETES MELLITUS RELATED TO CF (CYSTIC FIBROSIS) (H): Primary | ICD-10-CM

## 2021-03-05 NOTE — TELEPHONE ENCOUNTER
Prior Authorization Not Needed per Insurance-This insurance card was copied into chart on 3/3, but it termininated on 2/28/21. I am unable to locate new insurance. I left a voicemail for him to call me with that information.     Medication: Basaglar-PA Not Needed  Insurance Company: [place] Medicaid ([facility]) - Phone [phone] Fax [phone]  Expected CoPay:      Pharmacy Filling the Rx: [facility] - [place], [place] - [address] [place] [address]  Pharmacy Notified: No  Patient Notified: Yes

## 2021-03-05 NOTE — TELEPHONE ENCOUNTER
Basaglar denied. Appeal or alternative order requested.   Danica Mcgee RN on 3/5/2021 at 2:08 PM

## 2021-03-05 NOTE — TELEPHONE ENCOUNTER
PRIOR AUTHORIZATION DENIED-after receiving the letter stating patient's insurance terminated on 2/28, I got a denial for this medication.    Medication: Basaglar-PA Denied    Denial Date: 3/5/2021    Denial Rational:           Appeal Information:

## 2021-03-09 ENCOUNTER — VIRTUAL VISIT (OUTPATIENT)
Dept: PULMONOLOGY | Facility: CLINIC | Age: 36
End: 2021-03-09
Attending: PHYSICIAN ASSISTANT
Payer: COMMERCIAL

## 2021-03-09 DIAGNOSIS — E84.0 CYSTIC FIBROSIS WITH PULMONARY MANIFESTATIONS (H): Primary | ICD-10-CM

## 2021-03-09 PROCEDURE — 99214 OFFICE O/P EST MOD 30 MIN: CPT | Mod: 95 | Performed by: PHYSICIAN ASSISTANT

## 2021-03-09 NOTE — PATIENT INSTRUCTIONS
Cystic Fibrosis Self-Care Plan       Patient: Dilan Nassar   MRN: [id number]   Clinic Date: March 9, 2021     RECOMMENDATIONS:  1. Continue nebulizers and vest therapy.   2. Take levaquin or ciprofloxacin 2 hours before or 2 hours after your multivitamin, magnesium and iron supplementation. Hold azithromycin while on this antibiotic.    Annual Studies:   IGG   Date Value Ref Range Status   09/17/2020 1,153 610 - 1,616 mg/dL Final     No results found for: INS  There are no preventive care reminders to display for this patient.    Pulmonary Function Tests  FEV1: amount of air you can blow out in 1 second  FVC: total amount of air you can take in and blow out    Your Goals:         PFT Latest Ref Rng & Units 9/17/2020   FVC L 4.68   FEV1 L 2.84   FVC% % 108   FEV1% % 79          Airway Clearance: The Most Important Way to Keep Your Lungs Healthy  Vest Settings:    Hill-Rom Frequencies: 8, 9, 10 Pressure 10 Then, Frequencies 18, 19, 20 Pressure 6      RespirTech: Quick Start with Pressure of     Do each frequency for 5 minutes; Deflate vest after each frequency & cough 3 times before beginning the next setting.    Vest and Neb Therapy should be done 2 times/day.    Good Nutrition Can Improve Lung Function and Overall Health     Take ALL of your vitamins with food     Take 1/2 of your enzymes before EVERY meal/snack and the other 1/2 mid-meal/snack    Wt Readings from Last 3 Encounters:   09/17/20 55.9 kg (123 lb 3.8 oz)   12/19/19 53.6 kg (118 lb 2.7 oz)   09/09/19 54 kg (119 lb)       There is no height or weight on file to calculate BMI.         National CF Foundation Recommendations for BMI in CF Adults: Women: at least 22 Men: at least 23        Controlling Blood Sugars Helps Prevent Lung Infections & Improves Nutrition  Test blood sugar:     In the morning before eating (goal is )     2 hours after a meal (goal is less than 150)     When pre-meal glucose is greater than 150 add correction     At  bedtime (if less than 100 eat a snack with 15 grams of carbohydrates  Last A1C Results:   Hemoglobin A1C   Date Value Ref Range Status   09/17/2020 5.7 (H) 0 - 5.6 % Final     Comment:     Normal <5.7% Prediabetes 5.7-6.4%  Diabetes 6.5% or higher - adopted from ADA   consensus guidelines.           If diabetic, measure A1C every 6 months. Goal is under 7%.    Staying Healthy    Research: If you are interested in learning about research opportunities or have questions, please contact Sonali Altamirano at [phone] or [email or web address].       Foundation: Compass is a personalized resource service to help you with the insurance, financial, legal and other issues you are facing.  It's free, confidential and available to anyone with CF.  Ask your  for more information or contact Compass directly at 024-[facility] ([phone]) or compass@cff.org, or learn more at cff.org/compass.       CF Nurse Line: Alla Pimentel: [phone]   Audra Smart, RT: [phone]     Daniela Whitt and Tamika Perez , Dieticians: [phone]     Lisset Root, Diabetes Nurse: [phone]    Kathie Truong: [phone] or Kailey Mixon at [phone], Social Workers   [email or web address]

## 2021-03-09 NOTE — LETTER
3/9/2021         RE: Dilan Nassar  [address] [place] [id number]        Dear Colleague,    Thank you for referring your patient, Dilan Nassar, to the [facility] FOR LUNG SCIENCE AND [facility]. Please see a copy of my visit note below.    Dilan is a 35 year old who is being evaluated via a billable video visit.       1. CF lung disease: no pulmonary complaints, no cough or congestion. Home spirometry on 3/4 was consistent with PFTs 9/17/20. Previous cultures + for MRSA, Klebsiella, Steno, Ps A and A. Fumigatus. No evidence of exacerbation at this time.  - Continue nebulizers, Flovent and vest therapy  - On chronic azithromycin   - Continue george podhaler month on/off     2. Hemoptysis: no recent issues. Stopped vitamin K.       3. CF related liver disease: US 5/17 demonstrating coarse echotexture of the liver with hypertrophy of the caudate lobe and left lobe concerning for possible cirrhosis, no lesions, patent vasculature. LFTs 12/17/20 WNL.   - Continue Ursodiol      4. Exocrine pancreatic insufficiency: denies symptoms of malabsorption. No weight today.  - Continue pancreatic enzymes and vitamin supplementation      5. CFRD: recent AIC of 5.7 on 9/17/20. Following with Endocrine. Overdue for eye exam.   - Continue Basaglar 8 U      6. CF related sinus disease: no current issues.   - Continue Flonase      7. CFTR: modulation: patient started on Trikafta last January and is doing well. Labs 12/17/20 WNL.   - Due for labs in June  - Continue Trikafta      RTC: in 3 months with routine spirometry  Annual studies due: September 2021  Vaccinations: received influenza vaccine; discussed covid vaccine today      Jyothi Warren PA-C  Pulmonary, Allergy, Critical Care and Sleep Medicine    Interval history: doing well, feeling pretty good. Decided not to run for reelection last year, has been looking for work (non profit, advocacy, fundraising etc), doing some stock trading. Has  been taking the dog for walks and the park. Minimal cough, no tightness or shortness of breath. Vesting BID. No new GI complaints.     GENERAL: Healthy, alert and no distress  EYES: Eyes grossly normal to inspection.  No discharge or erythema, or obvious scleral/conjunctival abnormalities.  RESP: No audible wheeze, cough, or visible cyanosis.  No visible retractions or increased work of breathing.    SKIN: Visible skin clear. No significant rash, abnormal pigmentation or lesions.  NEURO: Cranial nerves grossly intact.  Mentation and speech appropriate for age.  PSYCH: Mentation appears normal, affect normal/bright, judgement and insight intact, normal speech and appearance well-groomed.    How would you like to obtain your AVS? [employer]  If the video visit is dropped, the invitation should be resent by: Other e-mail: [email or web address]  Will anyone else be joining your video visit? No     Video Start Time: 11:26    Video-Visit Details    Type of service:  Video Visit    Video End Time: 11:45    Originating Location (pt. Location): Home    Distant Location (provider location):  [facility] FOR LUNG SCIENCE AND [facility]     Platform used for Video Visit: [employer]      Again, thank you for allowing me to participate in the care of your patient.      Sincerely,      Jyothi Warren PA-C

## 2021-03-12 ENCOUNTER — TELEPHONE (OUTPATIENT)
Dept: CARE COORDINATION | Facility: CLINIC | Age: 36
End: 2021-03-12

## 2021-03-12 ENCOUNTER — ALLIED HEALTH/NURSE VISIT (OUTPATIENT)
Dept: CARE COORDINATION | Facility: CLINIC | Age: 36
End: 2021-03-12
Payer: COMMERCIAL

## 2021-03-12 DIAGNOSIS — Z13.9 RISK AND FUNCTIONAL ASSESSMENT: Primary | ICD-10-CM

## 2021-03-24 ASSESSMENT — PATIENT HEALTH QUESTIONNAIRE - PHQ9
SUM OF ALL RESPONSES TO PHQ QUESTIONS 1-9: 0
5. POOR APPETITE OR OVEREATING: NOT AT ALL

## 2021-03-24 ASSESSMENT — ANXIETY QUESTIONNAIRES
6. BECOMING EASILY ANNOYED OR IRRITABLE: NOT AT ALL
2. NOT BEING ABLE TO STOP OR CONTROL WORRYING: NOT AT ALL
3. WORRYING TOO MUCH ABOUT DIFFERENT THINGS: NOT AT ALL
GAD7 TOTAL SCORE: 0
7. FEELING AFRAID AS IF SOMETHING AWFUL MIGHT HAPPEN: NOT AT ALL
1. FEELING NERVOUS, ANXIOUS, OR ON EDGE: NOT AT ALL
5. BEING SO RESTLESS THAT IT IS HARD TO SIT STILL: NOT AT ALL

## 2021-03-24 NOTE — PROGRESS NOTES
"Adult Cystic Fibrosis Program  Annual Psychosocial Assessment- Phone call    Presenting Information:  ALEXANDRIA is a 35-year-old man with cystic fibrosis, presenting in CF clinic for a regular follow up with CF provider, Jyothi Navarro. Spoke with Guillermo over the phone for annual psychosocial assessment.     Living situation:  Guillermo lives in [place], [place] with his girlfriend. He purchased a home in November 2015. There is 1 dog in the home. He denies any concern about his current living situation.      Family Constellation:  Guillermo was raised by his biological parents who are still  and live in [place], [place]. He has 1 sibling(s): an older sister who lives in [place], [place]. He reports a good relationship with all of his family members and states they are doing well. His sister does not have CF.     Social Support:  Guillermo reports \" good\" social support. He has been in a relationship with his girlfriend for several years. He gets along well with family members and draws additional support from friends. He does not have any connections in the CF Community.     Adjustment to Illness:  Guillermo was diagnosed with CF in infancy.  He was \"pretty stable\" as a kid with some medical complications. He was hospitalized 3x: 2x for sinoscopies and 1x to have his appendix removed. He notes that in adulthood he was hospitalized in 2013 for a pulmonary exacerbation.     Guillermo describes his current health status as \"good\". He started trikafta last January and notes he has had an improvement in his lung function but has not noticed many other changes. Clinically, he has significant lung disease, pancreatic insufficiency, and CFRD. He typically does 2 vest treatments per day. He goes for walks daily but does not engage in any other form of exercise.     He reports that he is open about his CF with friends and family, but not with coworkers and acquaintances. He also notes he was pretty private about CF even as a kid.     Health Care " "Directive:  Guillermo has previously received Health Care Directive education. Guillermo is comfortable with his default decision makers (his parents) at this time. He is aware that if he would like his girlfriend to be involved he would need to fill out a directive.      Education:  Guillermo completed a Bachelors Degree in Political Science at [employer]. He has considered going back to school for accounting but put this on hold due to the pandemic. He may reconsider in the future so SW discussed availability of scholarships.     Employment:  Guillermo is currently unemployed and looking for policy or non-profit work. 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 He is surprised at how long it has taken to find work.    Guillermo's girlfriend works full time as well for the [employer] office in [place], [place].     Finances:  Guillermo receives income from stock trading and is able to meet daily living expense and can use his savings if needed. He also has a pension with the state from his previous job. He denied any related concerns today.      Insurance:  Guillermo is insured through [employer] [place]. He reports no issues with his plan and minimal health care related costs. He plans to get on on employer group health coverage again once he finds work as he knows he will likely be over income for [place]. He denied other insurance related concerns today.      Mental Health/Coping:  Guillermo denies any current or past symptoms indicative of mood, anxiety, eating, learning or other mental health disorder. He describes his mood recently as \"stable\".     UBALDO-7 score: 0, indicating an absence of anxiety symptoms.  PHQ-9 score: 0, indicating an absence of depression symptoms.     He does not see a therapist and does not take any MH medication. He notes looking for a job " has been a bit stressful for him but otherwise he has manageable stress levels. To cope with stress he will process with his girlfriend or his parents.      He identifies spirituality as important in his life, but he does not identify as a Baptist person.     Chemical Health:  Guillermo denies tobacco use, regular exposure to second hand smoke or illicit drug use. He drinks alcohol, 1/week consuming 2-3 drinks. He denies any current/past psychosocial impairment caused by alcohol use.  He is aware of a family history of alcohol abuse, as his uncle began AA when Guillermo was 17 years old.     Leisure Activities/Interests:   Guillermo enjoys going for walks, stock trading, listening to and producing music, and spending time with family and friends.     Intervention:  -Psychosocial Assessment  -Health Care Directive education  -Supportive counseling  -Insurance/financial counseling    Assessment:  Guillermo presented with a normal affect and appeared to be open in his responses. He left his job at the [employer] and has been struggling to find formal work since that time. he seems to be coping well with this stress and remains financially stable overall. No mental health concerns. He notes an improvement in his health with trikafta.     Guillermo seems to be psychosocially stable overall, with access to relevant resources and supports.  No concerns expressed/noted.    Plan:  Re-consult for any psychosocial needs that may arise.  Complete psychosocial assessment annually.  Continue to follow for regular clinic consult.    ROMA Lakhani, [facility]  Adult Cystic Fibrosis   Ph: [phone], Pager: [phone]

## 2021-03-25 ASSESSMENT — ANXIETY QUESTIONNAIRES: GAD7 TOTAL SCORE: 0

## 2021-04-24 ENCOUNTER — HEALTH MAINTENANCE LETTER (OUTPATIENT)
Age: 36
End: 2021-04-24

## 2021-05-13 ENCOUNTER — TELEPHONE (OUTPATIENT)
Dept: PULMONOLOGY | Facility: CLINIC | Age: 36
End: 2021-05-13

## 2021-05-13 NOTE — TELEPHONE ENCOUNTER
Received communication from endo clinic coordinator to assist pt in scheduling virtual visit with Dr. Miranda, CF/Endo doctor. Spoke with pt and able to arrange this. Details confirmed with pt.

## 2021-06-03 DIAGNOSIS — E84.9 CYSTIC FIBROSIS (H): ICD-10-CM

## 2021-06-03 RX ORDER — ELEXACAFTOR, TEZACAFTOR, AND IVACAFTOR 100-50-75
KIT ORAL
Qty: 84 EACH | Refills: 5 | Status: SHIPPED | OUTPATIENT
Start: 2021-06-03 | End: 2021-11-19

## 2021-06-04 NOTE — PATIENT INSTRUCTIONS
We appreciate your assistance in coordinating your healthcare.     Please upload your insulin pump, blood sugar meter and/or continuous glucose monitor at home 1-2 days before your next diabetes-related appointment.   This will allow your provider to review your  data before your scheduled virtual visit.    To ask a question to your Endocrine care team, please send them a [employer] message, or reach them by phone at [phone]     To expedite your medication refill(s), please contact your pharmacy and have them   fax a refill request to: [phone].  *Please allow 3 business days for routine medication refills.  *Please allow 5 business days for controlled substance medication refills.    For after-hours urgent Endocrine issues, that do not require 001, please dial [phone], and ask to speak with the Endocrinologist On-Call

## 2021-06-04 NOTE — PROGRESS NOTES
Outcome for 06/04/21 2:50 PM :Left Voicemail for patient to call back  Outcome for 06/07/21 1:30 PM :Glucose data sent in Aerospike Message    Dilan Nassar  is being evaluated via a billable video visit.        How would you like to obtain your AVS? "[employer]  For the video visit, send the invitation by: log onto [employer]  10 minutes before appointment   Will anyone else be joining your video visit? No  Will you be in the [facility] at time of visit? Yes         CF  Cherelle Muhammad [facility]    CF Endocrinology Return Consultation:  Diabetes  :   Patient: Dilan Nassar MRN# [id number]   YOB: 1985 Age: 35 year old   Date of Visit: 06/08/2021  Dear Dr. Atilio Nieto:    I had the pleasure of seeing your patient, Dilan Nassar in the CF Endocrinology Clinic, [facility], for a return consultation .           Problem list:   MRSA  Cystic fibrosis  Diabetes mellitus  Exocrine pancreatic insufficiency  Vitamin D deficiency  Gout  Intestinal malabsorption         HPI:   Dilan is a 35 year old male with Cystic Fibrosis Related Diabetes Mellitus (CFRD).    I have reviewed the available past laboratory evaluations, imaging studies, and medical records available to me at this visit.   History was obtained from the patient and the medical record.  I have reviewed the notes of the pulmonary care team entered into the medical record since I last saw the patient.    Overall feels well, denies specific complaints or concerns.  Started Trikafta about two years ago  Stable appetite, no diarrhea, no episodes of hypoglycemia. Weight has been stable, after a gain of ~7 lbs noted at last visit. Doesn't check post-prandials very often -- usually checks a couple days before doctors appointments.  Last A1C was approximately a year ago at 5.7%.    Glucose readings sent via "[employer] copied below    06/03/2021  right away in morning                          98  after breakfast                                      157            Cheerios, peanut butter toast, milk  after lunch                                  154            2 hot dogs, macaroni and cheese, Brisk tea  after diner                                  208            chicken stir gomes w/ habanero salsa, potato chips, Brisk tea     06/04/2021  right away in morning                          104  after breakfast                                     118            Cheerios, peanut butter toast, banana, milk  after lunch                                  152            corn dog, yogurt  after dinner                                179  salmon w/ citrus sauce, corn bread, sweet potato w/marshmallow     06/05/2021  right away in morning                          92  after breakfast                                     89            2 eggs w/ cheese, 2 slices of pate, peanut butter toast, banana, orange juice  after lunch                                  92            turkey and cheese sandwich w/ mayen, potato chips     06/06/2021  right away in morning                          91  after dinner                                254            chicken fajitas, corn, rice     06/07/2021  right away in morning                          93  after dinner                                122            bratwurst, baked beans, potato      A1c:  Last A1c in September of 2020 was 5.7%, down from 6.1% in September of 2019    Current insulin regimen:   Basaglar 8 unit(s) daily 10 AM          Past Medical History:   Cystic fibrosis          Past Surgical History:   Appendectomy  Sinus surgery            Social History:   Unmarried  Non smoker  Former smokeless tobacco user           Family History:   Mother: liver disease  Paternal grandfather: prostate cancer  Maternal aunt: diabetes mellitus  Maternal uncle: diabetes mellitus  Maternal grandmother: diabetes mellitus  Paternal grandmother: coronary artery disease         Allergies:     Allergies   Allergen Reactions     Mold      Molds & Smuts  Itching          Medications:     Current Outpatient Medications:      albuterol (PROVENTIL) (2.5 MG/3ML) 0.083% neb solution, Take 1 vial (2.5 mg) by nebulization 2 times daily, Disp: 180 mL, Rfl: 3     allopurinol (ZYLOPRIM) 300 MG tablet, TAKE ONE TABLET (300MG) BY MOUTH DAILY, Disp: 90 tablet, Rfl: 3     amylase-lipase-protease (CREON) 80397-30309-93400 units CPEP, TAKE 5-6 CAPSULES (60,000-72,000) BY MOUTH THREE TIMES A DAY WITH MEALS, Disp: 600 capsule, Rfl: 3     azithromycin (ZITHROMAX) 250 MG tablet, Take 2 tablets (500 mg) by mouth Every Mon, Wed, Fri Morning, Disp: 150 tablet, Rfl: 1     blood glucose (NO BRAND SPECIFIED) test strip, Use to test blood sugar 4 times daily or as directed., Disp: 125 each, Rfl: 11     blood glucose monitoring (FREESTYLE) lancets, Use to test blood sugar 4 times daily or as directed., Disp: 200 each, Rfl: 11     blood glucose monitoring (NO BRAND SPECIFIED) meter device kit, Use to test blood sugar 4 times daily or as directed., Disp: 1 kit, Rfl: 0     dornase alpha (PULMOZYME) 1 MG/ML neb solution, INHALE CONTENTS OF ONE VIAL (2.5MG) VIA NEBULIZER TWICE DAILY. KEEP REFRIGERATED UNTIL USE., Disp: 150 mL, Rfl: 11     fluticasone (FLONASE) 50 MCG/ACT nasal spray, SPRAY 2 SPRAYS INTO BOTH NOSTRILS DAILY, Disp: 48 g, Rfl: 3     fluticasone (FLOVENT HFA) 110 MCG/ACT inhaler, INHALE 1 PUFF INTO THE LUNGS TWO TIMES A DAY, Disp: 1 g, Rfl: 3     insulin glargine (LANTUS SOLOSTAR) 100 UNIT/ML pen, Inject 8 Units Subcutaneous daily, Disp: 15 mL, Rfl: 3     insulin pen needle (32G X 4 MM) 32G X 4 MM miscellaneous, Use 3 pen needles daily or as directed., Disp: 100 each, Rfl: 11     insulin pen needle (BD YON U/F) 32G X 4 MM miscellaneous, Use 3 pen needles daily or as directed. Please schedule a follow-up appointment with Dr Miranda at [phone], Disp: 100 each, Rfl: 11     mvw complete formulation (SOFTGELS ) capsule, Take 2 capsules by mouth daily, Disp: 60 capsule, Rfl: 11      "Syringe/Needle, Disp, 18G X 1\" 6 ML MISC, 1 each 2 times daily, Disp: 60 each, Rfl: 5     tobramycin (MARLEN PODHALER) 28 MG in caps, Inhale 4 capsules (112 mg) into the lungs 2 times daily 1 month on, 1 month off, Disp: 224 capsule, Rfl: 5     TRIKAFTA 100-50-75 & 150 MG tablet pack, TAKE TWO ORANGE TABLETS BY MOUTH IN THE MORNING AND ONE BLUE TABLET IN THE EVENING AS DIRECTED ON PACKAGE.  TAKE WITH FAT CONTAINING FOOD., Disp: 84 each, Rfl: 5     ursodiol (ACTIGALL) 300 MG capsule, TAKE 1 CAPSULE (300MG ) BY MOUTH TWO TIMES A DAY, Disp: 180 capsule, Rfl: 3           Review of Systems:     Comprehensive ROS negative other than the symptoms noted above in the HPI.          Physical Exam:   There were no vitals taken for this visit.  Height: Data Unavailable, Facility age limit for growth percentiles is 20 years.  Weight: 0 lbs 0 oz, Facility age limit for growth percentiles is 20 years.  BMI: There is no height or weight on file to calculate BMI., No height and weight on file for this encounter.      CONSTITUTIONAL:   Awake, alert, and in no apparent distress.  NEUROLOGIC:No focal deficits noted, asks insightful questions.    PSYCHIATRIC: Cooperative, no agitation, appropriate affect.        Laboratory results:     TSH   Date Value Ref Range Status   09/17/2020 1.96 0.40 - 4.00 mU/L Final     Testosterone Total   Date Value Ref Range Status   09/17/2020 416 240 - 950 ng/dL Final     Comment:     This test was developed and its performance characteristics determined by the   [facility].   It has not been cleared or approved by the FDA. The laboratory is regulated   under CLIA as qualified to perform high-complexity testing. This test is used   for clinical purposes. It should not be regarded as investigational or for   research.       Cholesterol   Date Value Ref Range Status   09/17/2020 134 <200 mg/dL Final     Albumin Urine mg/L   Date Value Ref Range " Status   09/17/2020 12 mg/L Final     Triglycerides   Date Value Ref Range Status   09/17/2020 91 <150 mg/dL Final     HDL Cholesterol   Date Value Ref Range Status   09/17/2020 83 >39 mg/dL Final     LDL Cholesterol Calculated   Date Value Ref Range Status   09/17/2020 33 <100 mg/dL Final     Comment:     Desirable:       <100 mg/dl     Non HDL Cholesterol   Date Value Ref Range Status   09/17/2020 51 <130 mg/dL Final     Lab Results   Component Value Date    A1C 5.8 09/26/2018    A1C 6.1 09/15/2017    A1C 6.7 12/20/2016    A1C 7.5 10/20/2016    A1C 6.8 09/30/2016       CF  Diabetes Health Maintenance    Date of Diabetes Diagnosis: ~ 2012     Special Notes (if any):      Date Last Eye Exam: Endorses that he was in for an eye exam in late April / Early May of this year, at at outside clinic (no records available). Patient states he was told they have no concerns, denies retinopathy.       Date Last Dental Appointment: Next dental visit tomorrow (6/9/21), sees them two times a year.    Dates of Episodes Severe* Hypoglycemia (month/year, cumulative, ongoing, assess each visit):    *Severe=patient unconscious, seizure, unable to help self    Last 25-Vitamin D (every year): 38 (9/17/2020)      Last DXA, lowest Z-score (every 2 years): Most negative and valid T score of -0.9    Last Testosterone:   Testosterone Total   Date Value Ref Range Status   09/17/2020 416 240 - 950 ng/dL Final     Comment:     This test was developed and its performance characteristics determined by the   [facility].   It has not been cleared or approved by the FDA. The laboratory is regulated   under CLIA as qualified to perform high-complexity testing. This test is used   for clinical purposes. It should not be regarded as investigational or for   research.              Assessment and Plan:   Dilan is a 35 year old male with cystic fibrosis related diabetes mellitus.      CFRD: Overall good control -- continue current dose of Basaglar. However does have some  elevated BGs >200 after large meals, but data are very limited -- patient only checks occasionally.  We discussed the need to increase frequency of BG monitoring, especially after large meals, for consideration of short-acting / meal time insulin (would start with dinner -- biggest meal).  Offered continuous glucose sensor vs. increased finger-stick frequency.  He is considering a sensor, but will increase frequency of checks in the meantime.    -Return to clinic in 6 months or sooner if needed / post-prandial BGs frquently above >180   -Counseled to contact clinic if he experiences persistent hyperglycemia or recurrent hypoglycemia.        The patient personally interviewed via telephone by me, Guillermo Rios MS4, prior to our video call with Dr. Miranda.  Patient was discussed with my supervising physician, who has independently confirmed my findings during our shared video call (in which I acted as a scribe), and he agrees with the assessment and plan as reflected in their final signed note.     I have seen the patient during this video visit, reviewed and edited the note, and agree with the plan of care.  JOSE Headley    Video-Visit Details    Type of service:  Video Visit    Total video visit time: 16 mins    Originating Location (pt. Location): Home    Distant Location (provider location):  [facility] ENDOCRINOLOGY     Platform used for Video Visit: [facility]    Edison Miranda MD      Time: I spent 30 minutes on the date of the encounter preparing to see patient (including chart review and preparation), obtaining and or reviewing additional medical history, performing an evaluation, documenting clinical information in the electronic health record, independently interpreting results, communicating results to the patient, and/or coordinating care.

## 2021-06-08 ENCOUNTER — VIRTUAL VISIT (OUTPATIENT)
Dept: ENDOCRINOLOGY | Facility: CLINIC | Age: 36
End: 2021-06-08
Attending: INTERNAL MEDICINE
Payer: COMMERCIAL

## 2021-06-08 DIAGNOSIS — E84.8 DIABETES MELLITUS RELATED TO CYSTIC FIBROSIS (H): Primary | ICD-10-CM

## 2021-06-08 DIAGNOSIS — E08.9 DIABETES MELLITUS RELATED TO CYSTIC FIBROSIS (H): Primary | ICD-10-CM

## 2021-06-08 PROCEDURE — 99214 OFFICE O/P EST MOD 30 MIN: CPT | Mod: 95 | Performed by: INTERNAL MEDICINE

## 2021-06-08 NOTE — LETTER
6/8/2021       RE: Dilan Nassar  [address] [place] [id number]     Dear Colleague,    Thank you for referring your patient, Dilan Nassar, to the [facility]. Please see a copy of my visit note below.      CF Endocrinology Return Consultation:  Diabetes  :   Patient: Dilan Nassar MRN# [id number]   YOB: 1985 Age: 35 year old   Date of Visit: 06/08/2021  Dear Dr. Atilio Nieto:    I had the pleasure of seeing your patient, Dilan Nassar in the CF Endocrinology Clinic, [facility], for a return consultation .           Problem list:   MRSA  Cystic fibrosis  Diabetes mellitus  Exocrine pancreatic insufficiency  Vitamin D deficiency  Gout  Intestinal malabsorption         HPI:   Dilan is a 35 year old male with Cystic Fibrosis Related Diabetes Mellitus (CFRD).    I have reviewed the available past laboratory evaluations, imaging studies, and medical records available to me at this visit.   History was obtained from the patient and the medical record.  I have reviewed the notes of the pulmonary care team entered into the medical record since I last saw the patient.    Overall feels well, denies specific complaints or concerns.  Started Trikafta about two years ago  Stable appetite, no diarrhea, no episodes of hypoglycemia. Weight has been stable, after a gain of ~7 lbs noted at last visit. Doesn't check post-prandials very often -- usually checks a couple days before doctors appointments.  Last A1C was approximately a year ago at 5.7%.    Glucose readings sent via [employer] copied below    06/03/2021  right away in morning                          98  after breakfast                                     157            Cheerios, peanut butter toast, milk  after lunch                                  154            2 hot dogs, macaroni and cheese, Brisk tea  after diner                                   208            chicken stir gomes w/ habanero salsa, potato chips, Brisk tea     06/04/2021  right away in morning                          104  after breakfast                                     118            Cheerios, peanut butter toast, banana, milk  after lunch                                  152            corn dog, yogurt  after dinner                                179  salmon w/ citrus sauce, corn bread, sweet potato w/marshmallow     06/05/2021  right away in morning                          92  after breakfast                                     89            2 eggs w/ cheese, 2 slices of pate, peanut butter toast, banana, orange juice  after lunch                                  92            turkey and cheese sandwich w/ mayen, potato chips     06/06/2021  right away in morning                          91  after dinner                                254            chicken fajitas, corn, rice     06/07/2021  right away in morning                          93  after dinner                                122            bratwurst, baked beans, potato      A1c:  Last A1c in September of 2020 was 5.7%, down from 6.1% in September of 2019    Current insulin regimen:   Basaglar 8 unit(s) daily 10 AM          Past Medical History:   Cystic fibrosis          Past Surgical History:   Appendectomy  Sinus surgery            Social History:   Unmarried  Non smoker  Former smokeless tobacco user           Family History:   Mother: liver disease  Paternal grandfather: prostate cancer  Maternal aunt: diabetes mellitus  Maternal uncle: diabetes mellitus  Maternal grandmother: diabetes mellitus  Paternal grandmother: coronary artery disease         Allergies:     Allergies   Allergen Reactions     Mold      Molds & Smuts Itching          Medications:     Current Outpatient Medications:      albuterol (PROVENTIL) (2.5 MG/3ML) 0.083% neb solution, Take 1 vial (2.5 mg) by nebulization 2 times daily, Disp: 180  "mL, Rfl: 3     allopurinol (ZYLOPRIM) 300 MG tablet, TAKE ONE TABLET (300MG) BY MOUTH DAILY, Disp: 90 tablet, Rfl: 3     amylase-lipase-protease (CREON) 20080-31576-86055 units CPEP, TAKE 5-6 CAPSULES (60,000-72,000) BY MOUTH THREE TIMES A DAY WITH MEALS, Disp: 600 capsule, Rfl: 3     azithromycin (ZITHROMAX) 250 MG tablet, Take 2 tablets (500 mg) by mouth Every Mon, Wed, Fri Morning, Disp: 150 tablet, Rfl: 1     blood glucose (NO BRAND SPECIFIED) test strip, Use to test blood sugar 4 times daily or as directed., Disp: 125 each, Rfl: 11     blood glucose monitoring (FREESTYLE) lancets, Use to test blood sugar 4 times daily or as directed., Disp: 200 each, Rfl: 11     blood glucose monitoring (NO BRAND SPECIFIED) meter device kit, Use to test blood sugar 4 times daily or as directed., Disp: 1 kit, Rfl: 0     dornase alpha (PULMOZYME) 1 MG/ML neb solution, INHALE CONTENTS OF ONE VIAL (2.5MG) VIA NEBULIZER TWICE DAILY. KEEP REFRIGERATED UNTIL USE., Disp: 150 mL, Rfl: 11     fluticasone (FLONASE) 50 MCG/ACT nasal spray, SPRAY 2 SPRAYS INTO BOTH NOSTRILS DAILY, Disp: 48 g, Rfl: 3     fluticasone (FLOVENT HFA) 110 MCG/ACT inhaler, INHALE 1 PUFF INTO THE LUNGS TWO TIMES A DAY, Disp: 1 g, Rfl: 3     insulin glargine (LANTUS SOLOSTAR) 100 UNIT/ML pen, Inject 8 Units Subcutaneous daily, Disp: 15 mL, Rfl: 3     insulin pen needle (32G X 4 MM) 32G X 4 MM miscellaneous, Use 3 pen needles daily or as directed., Disp: 100 each, Rfl: 11     insulin pen needle (BD YON U/F) 32G X 4 MM miscellaneous, Use 3 pen needles daily or as directed. Please schedule a follow-up appointment with Dr Miranda at [phone], Disp: 100 each, Rfl: 11     mvw complete formulation (SOFTGELS ) capsule, Take 2 capsules by mouth daily, Disp: 60 capsule, Rfl: 11     Syringe/Needle, Disp, 18G X 1\" 6 ML MISC, 1 each 2 times daily, Disp: 60 each, Rfl: 5     tobramycin (MARLEN PODHALER) 28 MG in caps, Inhale 4 capsules (112 mg) into the lungs 2 times daily " 1 month on, 1 month off, Disp: 224 capsule, Rfl: 5     TRIKAFTA 100-50-75 & 150 MG tablet pack, TAKE TWO ORANGE TABLETS BY MOUTH IN THE MORNING AND ONE BLUE TABLET IN THE EVENING AS DIRECTED ON PACKAGE.  TAKE WITH FAT CONTAINING FOOD., Disp: 84 each, Rfl: 5     ursodiol (ACTIGALL) 300 MG capsule, TAKE 1 CAPSULE (300MG ) BY MOUTH TWO TIMES A DAY, Disp: 180 capsule, Rfl: 3           Review of Systems:     Comprehensive ROS negative other than the symptoms noted above in the HPI.          Physical Exam:   There were no vitals taken for this visit.  Height: Data Unavailable, Facility age limit for growth percentiles is 20 years.  Weight: 0 lbs 0 oz, Facility age limit for growth percentiles is 20 years.  BMI: There is no height or weight on file to calculate BMI., No height and weight on file for this encounter.      CONSTITUTIONAL:   Awake, alert, and in no apparent distress.  NEUROLOGIC:No focal deficits noted, asks insightful questions.    PSYCHIATRIC: Cooperative, no agitation, appropriate affect.        Laboratory results:     TSH   Date Value Ref Range Status   09/17/2020 1.96 0.40 - 4.00 mU/L Final     Testosterone Total   Date Value Ref Range Status   09/17/2020 416 240 - 950 ng/dL Final     Comment:     This test was developed and its performance characteristics determined by the   [facility].   It has not been cleared or approved by the FDA. The laboratory is regulated   under CLIA as qualified to perform high-complexity testing. This test is used   for clinical purposes. It should not be regarded as investigational or for   research.       Cholesterol   Date Value Ref Range Status   09/17/2020 134 <200 mg/dL Final     Albumin Urine mg/L   Date Value Ref Range Status   09/17/2020 12 mg/L Final     Triglycerides   Date Value Ref Range Status   09/17/2020 91 <150 mg/dL Final     HDL Cholesterol   Date Value Ref Range Status   09/17/2020 83 >39  mg/dL Final     LDL Cholesterol Calculated   Date Value Ref Range Status   09/17/2020 33 <100 mg/dL Final     Comment:     Desirable:       <100 mg/dl     Non HDL Cholesterol   Date Value Ref Range Status   09/17/2020 51 <130 mg/dL Final     Lab Results   Component Value Date    A1C 5.8 09/26/2018    A1C 6.1 09/15/2017    A1C 6.7 12/20/2016    A1C 7.5 10/20/2016    A1C 6.8 09/30/2016       CF  Diabetes Health Maintenance    Date of Diabetes Diagnosis: ~ 2012     Special Notes (if any):      Date Last Eye Exam: Endorses that he was in for an eye exam in late April / Early May of this year, at at outside clinic (no records available). Patient states he was told they have no concerns, denies retinopathy.       Date Last Dental Appointment: Next dental visit tomorrow (6/9/21), sees them two times a year.    Dates of Episodes Severe* Hypoglycemia (month/year, cumulative, ongoing, assess each visit):    *Severe=patient unconscious, seizure, unable to help self    Last 25-Vitamin D (every year): 38 (9/17/2020)      Last DXA, lowest Z-score (every 2 years): Most negative and valid T score of -0.9    Last Testosterone:   Testosterone Total   Date Value Ref Range Status   09/17/2020 416 240 - 950 ng/dL Final     Comment:     This test was developed and its performance characteristics determined by the   [facility].   It has not been cleared or approved by the FDA. The laboratory is regulated   under CLIA as qualified to perform high-complexity testing. This test is used   for clinical purposes. It should not be regarded as investigational or for   research.              Assessment and Plan:   Dilan is a 35 year old male with cystic fibrosis related diabetes mellitus.     CFRD: Overall good control -- continue current dose of Basaglar. However does have some  elevated BGs >200 after large meals, but data are very limited -- patient only checks occasionally.   We discussed the need to increase frequency of BG monitoring, especially after large meals, for consideration of short-acting / meal time insulin (would start with dinner -- biggest meal).  Offered continuous glucose sensor vs. increased finger-stick frequency.  He is considering a sensor, but will increase frequency of checks in the meantime.    -Return to clinic in 6 months or sooner if needed / post-prandial BGs frquently above >180   -Counseled to contact clinic if he experiences persistent hyperglycemia or recurrent hypoglycemia.        The patient personally interviewed via telephone by me, Guillermo Rios MS4, prior to our video call with Dr. Miranda.  Patient was discussed with my supervising physician, who has independently confirmed my findings during our shared video call (in which I acted as a scribe), and he agrees with the assessment and plan as reflected in their final signed note.     I have seen the patient during this video visit, reviewed and edited the note, and agree with the plan of care.  JOSE Headley    Video-Visit Details    Type of service:  Video Visit    Total video visit time: 16 mins    Originating Location (pt. Location): Home    Distant Location (provider location):   [facility] ENDOCRINOLOGY     Platform used for Video Visit: [employer]K    Edison Miranda MD      Time: I spent 30 minutes on the date of the encounter preparing to see patient (including chart review and preparation), obtaining and or reviewing additional medical history, performing an evaluation, documenting clinical information in the electronic health record, independently interpreting results, communicating results to the patient, and/or coordinating care.    Again, thank you for allowing me to participate in the care of your patient.      Sincerely,    Edison Miranda MD

## 2021-06-13 NOTE — PROGRESS NOTES
[facility]  Kyra 15, 2021         Assessment and Plan:   Dilan is a 35 year old male with h/o cystic fibrosis who is seen today for routine follow up.      1. CF lung disease: no pulmonary complaints, no cough or congestion. Minimally active, but does get outside and walk the dog. Sating 97% on room air; vesting BID. Previous cultures + for MRSA, Klebsiella, Steno, Ps A and A. Fumigatus. No evidence of exacerbation at this time.  - Continue nebulizers and vest therapy; okay to trial off Flovent  - On chronic azithromycin   - Continue george podhaler month on/off    2. CF related liver disease: US 5/17 demonstrating coarse echotexture of the liver with hypertrophy of the caudate lobe and left lobe concerning for possible cirrhosis, no lesions, patent vasculature. LFTs today WNL.   - Continue Ursodiol      3. Exocrine pancreatic insufficiency: denies symptoms of malabsorption. Weight has improved, BMI < CFF goal.   - Continue pancreatic enzymes and vitamin supplementation      4. CFRD: recent AIC of 5.7 on 9/17/20. Saw Dr. Miranda last week and he notes that his dinner sugars are high, watching it for now. Up to date on eye exam.  - Continue Basaglar 8 U      5. CF related sinus disease: no current issues.   - Continue Flonase      6. CFTR: modulation: patient started on Trikafta and is doing well. Hepatic panel and CK today WNL.   - Continue Trikafta    7. Elevated BP: in clinic today, increased to 143/85.  - Monitor      RTC: in 3 months with routine spirometry  Annual studies due: September 2021 (ordered for next visit)  Vaccinations: received the covid vaccine      Jyothi Warren PA-C  Pulmonary, Allergy, Critical Care and Sleep Medicine         Interval History:     Getting out and walking the dog. Overall feeling well, no cough or congestion. Vesting BID. No GI complaints.          Review of Systems:   Please see HPI. Otherwise, complete 10 point ROS  negative.           Past Medical and Surgical History:     Past Medical History:   Diagnosis Date     Cystic fibrosis with pulmonary manifestations (H)     /[id number]     Past Surgical History:   Procedure Laterality Date     APPENDECTOMY OPEN  1999    Appendicitis      SINUS SURGERY  1990's    h/o many sinus infections           Family History:     Family History   Problem Relation Age of Onset     Diabetes Mother         Unknown type     Prostate Cancer Paternal Grandfather      LUNG DISEASE Other         Negative     Diabetes Maternal Aunt         unknown type     Diabetes Maternal Uncle         unknown type     Diabetes Maternal Grandmother         unknown type     Coronary Artery Disease Paternal Grandmother             Social History:     Social History     Socioeconomic History     Marital status: Single     Spouse name: Not on file     Number of children: Not on file     Years of education: Not on file     Highest education level: Not on file   Occupational History     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     Employer: [employer]     Occupation: Financial Counselor   Social Needs     Financial resource strain: Not on file     Food insecurity     Worry: Not on file     Inability: Not on file     Transportation needs     Medical: Not on file     Non-medical: Not on file   Tobacco Use     Smoking status: Never Smoker     Smokeless tobacco: Former User   Substance and Sexual Activity     Alcohol use: Yes     Alcohol/week: 10.0 - 14.0 standard drinks     Types: 10 - 14 Standard drinks or equivalent per week     Comment: 2 drinks per night 5X/wk     Drug use: No     Sexual activity: Yes     Partners: Female     Birth control/protection: Pull-out method, Condom   Lifestyle     Physical activity     Days per week: Not on file     Minutes per session: Not on file     Stress: Not on file   Relationships     Social connections     Talks on phone: Not on file     Gets together: Not on file     Attends Tenriism  "service: Not on file     Active member of club or organization: Not on file     Attends meetings of clubs or organizations: Not on file     Relationship status: Not on file     Intimate partner violence     Fear of current or ex partner: Not on file     Emotionally abused: Not on file     Physically abused: Not on file     Forced sexual activity: Not on file   Other Topics Concern     Parent/sibling w/ CABG, MI or angioplasty before 65F 55M? Yes   Social History Narrative    Owns a home in [place], [place] with his girlfriend of ~7 years. 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            Medications:     Current Outpatient Medications   Medication     albuterol (PROVENTIL) (2.5 MG/3ML) 0.083% neb solution     allopurinol (ZYLOPRIM) 300 MG tablet     amylase-lipase-protease (CREON) 10383-49743-84758 units CPEP     azithromycin (ZITHROMAX) 250 MG tablet     blood glucose (NO BRAND SPECIFIED) test strip     blood glucose monitoring (FREESTYLE) lancets     blood glucose monitoring (NO BRAND SPECIFIED) meter device kit     dornase alpha (PULMOZYME) 1 MG/ML neb solution     fluticasone (FLONASE) 50 MCG/ACT nasal spray     fluticasone (FLOVENT HFA) 110 MCG/ACT inhaler     insulin glargine (LANTUS SOLOSTAR) 100 UNIT/ML pen     insulin pen needle (32G X 4 MM) 32G X 4 MM miscellaneous     insulin pen needle (BD YON U/F) 32G X 4 MM miscellaneous     mvw complete formulation (SOFTGELS ) capsule     Syringe/Needle, Disp, 18G X 1\" 6 ML MISC     tobramycin (MARLEN PODHALER) 28 MG in caps     TRIKAFTA 100-50-75 & 150 MG tablet pack     ursodiol (ACTIGALL) 300 MG capsule     No current facility-administered medications for this visit.             Physical Exam:   BP (!) 143/85   Pulse 71   Ht 1.63 m (5' 4.17\")   Wt 55.6 kg (122 lb 9.2 oz)   SpO2 97%   BMI 20.93 kg/m      GENERAL: alert, NAD  HEENT: NCAT, EOMI, anicteric sclera, no nasal edema or erythema; canals and TMs clear; no oral mucosal edema or " erythema  Neck: no cervical or supraclavicular adenopathy  Respiratory: good air flow, mainly clear  CV: RRR, S1S2, no murmurs noted  Abdomen: normoactive BS, soft and non tender   Lymph: no edema, + digital clubbing  Neuro: AAO X 3, CN 2-12 grossly intact  Psychiatric: normal affect, good eye contact  Skin: no rash, jaundice or lesions on limited exam         Data:   All laboratory and imaging data reviewed.      Cystic Fibrosis Culture  Specimen Description   Date Value Ref Range Status   09/17/2020 Sputum  Final   12/19/2019 Sputum  Final   09/09/2019 Sputum  Final    Culture Micro   Date Value Ref Range Status   09/17/2020 Heavy growth  Normal mami    Final   09/17/2020 (A)  Final    Moderate growth  Pseudomonas aeruginosa, mucoid strain     09/17/2020 Light growth  Pseudomonas aeruginosa   (A)  Final        PFT interpretation:  Maneuver: valid and meets ATS guidelines  Mild obstruction with decreased FEV1/FVC, but normal FEV1 and FVC  Compared to prior: FEV1 of 2.98 is 140 ml above prior

## 2021-06-15 ENCOUNTER — OFFICE VISIT (OUTPATIENT)
Dept: PULMONOLOGY | Facility: CLINIC | Age: 36
End: 2021-06-15
Attending: PHYSICIAN ASSISTANT
Payer: COMMERCIAL

## 2021-06-15 VITALS
DIASTOLIC BLOOD PRESSURE: 85 MMHG | WEIGHT: 122.58 LBS | HEART RATE: 71 BPM | SYSTOLIC BLOOD PRESSURE: 143 MMHG | HEIGHT: 64 IN | OXYGEN SATURATION: 97 % | BODY MASS INDEX: 20.93 KG/M2

## 2021-06-15 DIAGNOSIS — K86.81 EXOCRINE PANCREATIC INSUFFICIENCY: Chronic | ICD-10-CM

## 2021-06-15 DIAGNOSIS — E08.9 DIABETES MELLITUS DUE TO CYSTIC FIBROSIS (H): ICD-10-CM

## 2021-06-15 DIAGNOSIS — E84.0 CYSTIC FIBROSIS WITH PULMONARY MANIFESTATIONS (H): Primary | ICD-10-CM

## 2021-06-15 DIAGNOSIS — E84.0 CYSTIC FIBROSIS WITH PULMONARY MANIFESTATIONS (H): ICD-10-CM

## 2021-06-15 DIAGNOSIS — E84.9 DIABETES MELLITUS DUE TO CYSTIC FIBROSIS (H): ICD-10-CM

## 2021-06-15 LAB
ALBUMIN SERPL-MCNC: 4 G/DL (ref 3.4–5)
ALP SERPL-CCNC: 120 U/L (ref 40–150)
ALT SERPL W P-5'-P-CCNC: 40 U/L (ref 0–70)
AST SERPL W P-5'-P-CCNC: 20 U/L (ref 0–45)
BILIRUB DIRECT SERPL-MCNC: 0.3 MG/DL (ref 0–0.2)
BILIRUB SERPL-MCNC: 1 MG/DL (ref 0.2–1.3)
CK SERPL-CCNC: 182 U/L (ref 30–300)
EXPTIME-PRE: 13.88 SEC
FEF2575-%PRED-PRE: 37 %
FEF2575-PRE: 1.39 L/SEC
FEF2575-PRED: 3.67 L/SEC
FEFMAX-%PRED-PRE: 92 %
FEFMAX-PRE: 8.26 L/SEC
FEFMAX-PRED: 8.96 L/SEC
FEV1-%PRED-PRE: 83 %
FEV1-PRE: 2.98 L
FEV1FEV6-PRE: 66 %
FEV1FEV6-PRED: 82 %
FEV1FVC-PRE: 61 %
FEV1FVC-PRED: 83 %
FIFMAX-PRE: 8.2 L/SEC
FVC-%PRED-PRE: 112 %
FVC-PRE: 4.86 L
FVC-PRED: 4.31 L
PROT SERPL-MCNC: 7.3 G/DL (ref 6.8–8.8)

## 2021-06-15 PROCEDURE — 82550 ASSAY OF CK (CPK): CPT | Performed by: PATHOLOGY

## 2021-06-15 PROCEDURE — 87186 SC STD MICRODIL/AGAR DIL: CPT | Performed by: PHYSICIAN ASSISTANT

## 2021-06-15 PROCEDURE — 99214 OFFICE O/P EST MOD 30 MIN: CPT | Mod: 25 | Performed by: PHYSICIAN ASSISTANT

## 2021-06-15 PROCEDURE — 97803 MED NUTRITION INDIV SUBSEQ: CPT | Performed by: DIETITIAN, REGISTERED

## 2021-06-15 PROCEDURE — 94375 RESPIRATORY FLOW VOLUME LOOP: CPT | Performed by: PHYSICIAN ASSISTANT

## 2021-06-15 PROCEDURE — G0463 HOSPITAL OUTPT CLINIC VISIT: HCPCS | Mod: 25

## 2021-06-15 PROCEDURE — 36415 COLL VENOUS BLD VENIPUNCTURE: CPT | Performed by: PATHOLOGY

## 2021-06-15 PROCEDURE — 80076 HEPATIC FUNCTION PANEL: CPT | Performed by: PATHOLOGY

## 2021-06-15 PROCEDURE — 87077 CULTURE AEROBIC IDENTIFY: CPT | Performed by: PHYSICIAN ASSISTANT

## 2021-06-15 PROCEDURE — 87070 CULTURE OTHR SPECIMN AEROBIC: CPT | Performed by: PHYSICIAN ASSISTANT

## 2021-06-15 ASSESSMENT — MIFFLIN-ST. JEOR: SCORE: 1394.75

## 2021-06-15 NOTE — LETTER
6/15/2021     RE: Dilan Nassar  [address] [place] [id number]    Dear Colleague,    Thank you for referring your patient, Dilan Nassar, to the [facility] [place]. Please see a copy of my visit note below.    [facility]  Kyra 15, 2021         Assessment and Plan:   Dilan is a 35 year old male with h/o cystic fibrosis who is seen today for routine follow up.      1. CF lung disease: no pulmonary complaints, no cough or congestion. Minimally active, but does get outside and walk the dog. Sating 97% on room air; vesting BID. Previous cultures + for MRSA, Klebsiella, Steno, Ps A and A. Fumigatus. No evidence of exacerbation at this time.  - Continue nebulizers and vest therapy; okay to trial off Flovent  - On chronic azithromycin   - Continue george podhaler month on/off    2. CF related liver disease: US 5/17 demonstrating coarse echotexture of the liver with hypertrophy of the caudate lobe and left lobe concerning for possible cirrhosis, no lesions, patent vasculature. LFTs today WNL.   - Continue Ursodiol      3. Exocrine pancreatic insufficiency: denies symptoms of malabsorption. Weight has improved, BMI < CFF goal.   - Continue pancreatic enzymes and vitamin supplementation      4. CFRD: recent AIC of 5.7 on 9/17/20. Saw Dr. Miranda last week and he notes that his dinner sugars are high, watching it for now. Up to date on eye exam.  - Continue Basaglar 8 U      5. CF related sinus disease: no current issues.   - Continue Flonase      6. CFTR: modulation: patient started on Trikafta and is doing well. Hepatic panel and CK today WNL.   - Continue Trikafta    7. Elevated BP: in clinic today, increased to 143/85.  - Monitor      RTC: in 3 months with routine spirometry  Annual studies due: September 2021 (ordered for next visit)  Vaccinations: received the covid vaccine      Jyothi Warren  PATRICIO  Pulmonary, Allergy, Critical Care and Sleep Medicine         Interval History:     Getting out and walking the dog. Overall feeling well, no cough or congestion. Vesting BID. No GI complaints.          Review of Systems:   Please see HPI. Otherwise, complete 10 point ROS negative.           Past Medical and Surgical History:     Past Medical History:   Diagnosis Date     Cystic fibrosis with pulmonary manifestations (H)     /[id number]     Past Surgical History:   Procedure Laterality Date     APPENDECTOMY OPEN  1999    Appendicitis      SINUS SURGERY  1990's    h/o many sinus infections           Family History:     Family History   Problem Relation Age of Onset     Diabetes Mother         Unknown type     Prostate Cancer Paternal Grandfather      LUNG DISEASE Other         Negative     Diabetes Maternal Aunt         unknown type     Diabetes Maternal Uncle         unknown type     Diabetes Maternal Grandmother         unknown type     Coronary Artery Disease Paternal Grandmother             Social History:     Social History     Socioeconomic History     Marital status: Single     Spouse name: Not on file     Number of children: Not on file     Years of education: Not on file     Highest education level: Not on file   Occupational History     Occupation: State Representative     Employer: [employer]     Occupation: Financial Counselor   Social Needs     Financial resource strain: Not on file     Food insecurity     Worry: Not on file     Inability: Not on file     Transportation needs     Medical: Not on file     Non-medical: Not on file   Tobacco Use     Smoking status: Never Smoker     Smokeless tobacco: Former User   Substance and Sexual Activity     Alcohol use: Yes     Alcohol/week: 10.0 - 14.0 standard drinks     Types: 10 - 14 Standard drinks or equivalent per week     Comment: 2 drinks per night 5X/wk     Drug use: No     Sexual activity: Yes     Partners: Female     Birth  "control/protection: Pull-out method, Condom   Lifestyle     Physical activity     Days per week: Not on file     Minutes per session: Not on file     Stress: Not on file   Relationships     Social connections     Talks on phone: Not on file     Gets together: Not on file     Attends Voodoo service: Not on file     Active member of club or organization: Not on file     Attends meetings of clubs or organizations: Not on file     Relationship status: Not on file     Intimate partner violence     Fear of current or ex partner: Not on file     Emotionally abused: Not on file     Physically abused: Not on file     Forced sexual activity: Not on file   Other Topics Concern     Parent/sibling w/ CABG, MI or angioplasty before 65F 55M? Yes   Social History Narrative    Owns a home in [place], [place] with his girlfriend of ~7 years. 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            Medications:     Current Outpatient Medications   Medication     albuterol (PROVENTIL) (2.5 MG/3ML) 0.083% neb solution     allopurinol (ZYLOPRIM) 300 MG tablet     amylase-lipase-protease (CREON) 10485-88662-94393 units CPEP     azithromycin (ZITHROMAX) 250 MG tablet     blood glucose (NO BRAND SPECIFIED) test strip     blood glucose monitoring (FREESTYLE) lancets     blood glucose monitoring (NO BRAND SPECIFIED) meter device kit     dornase alpha (PULMOZYME) 1 MG/ML neb solution     fluticasone (FLONASE) 50 MCG/ACT nasal spray     fluticasone (FLOVENT HFA) 110 MCG/ACT inhaler     insulin glargine (LANTUS SOLOSTAR) 100 UNIT/ML pen     insulin pen needle (32G X 4 MM) 32G X 4 MM miscellaneous     insulin pen needle (BD YON U/F) 32G X 4 MM miscellaneous     mvw complete formulation (SOFTGELS ) capsule     Syringe/Needle, Disp, 18G X 1\" 6 ML MISC     tobramycin (MARLEN PODHALER) 28 MG in caps     TRIKAFTA 100-50-75 & 150 MG tablet pack     ursodiol (ACTIGALL) 300 MG capsule     No current facility-administered medications for this " "visit.             Physical Exam:   BP (!) 143/85   Pulse 71   Ht 1.63 m (5' 4.17\")   Wt 55.6 kg (122 lb 9.2 oz)   SpO2 97%   BMI 20.93 kg/m      GENERAL: alert, NAD  HEENT: NCAT, EOMI, anicteric sclera, no nasal edema or erythema; canals and TMs clear; no oral mucosal edema or erythema  Neck: no cervical or supraclavicular adenopathy  Respiratory: good air flow, mainly clear  CV: RRR, S1S2, no murmurs noted  Abdomen: normoactive BS, soft and non tender   Lymph: no edema, + digital clubbing  Neuro: AAO X 3, CN 2-12 grossly intact  Psychiatric: normal affect, good eye contact  Skin: no rash, jaundice or lesions on limited exam         Data:   All laboratory and imaging data reviewed.      Cystic Fibrosis Culture  Specimen Description   Date Value Ref Range Status   09/17/2020 Sputum  Final   12/19/2019 Sputum  Final   09/09/2019 Sputum  Final    Culture Micro   Date Value Ref Range Status   09/17/2020 Heavy growth  Normal mami    Final   09/17/2020 (A)  Final    Moderate growth  Pseudomonas aeruginosa, mucoid strain     09/17/2020 Light growth  Pseudomonas aeruginosa   (A)  Final        PFT interpretation:  Maneuver: valid and meets ATS guidelines  Mild obstruction with decreased FEV1/FVC, but normal FEV1 and FVC  Compared to prior: FEV1 of 2.98 is 140 ml above prior            Respiratory Therapist Note:         Vest                Brand: Hill-Rom - traditional Hill Rom: Frequencies 8, 9, 10 at pressure 10 then frequencies 18, 19, 20 at pressure 6. and Hill-Rom - [place] Frequencies: 13-15, intensity 8-10                Cough Pause: Cough Pause; Yes                Vest Garment Size: Adult Small                Last Fitting Date: Due as needed                Frequency of therapy: 14 times per week                Concerns: None         Exercise (purposeful and aerobic for >20 minutes each session): Yes - amount : Walks dog daily                Does this qualify as additional airway clearance: No     "     Alternative Airway Clearance:          Nebulized Medications                Bronchodilators: Albuterol                Mucolytic: Pulmozyme                Antibiotics:                 Additional Inhaled Medications:                 Spacer Use:          Review Cleaning: Yes. Soap and boil.         Education and Transition Information                Correct order of inhaled medications: Yes                Mechanism of Action of inhaled medications: Yes                Frequency of inhaled medications: Yes                Dosage of inhaled medications: Yes                Other:          Home Care:                Nebulizer Cups (Brand/Type): [place]                Nebulizer Compressor                            Year Purchased: works                            [facility], Phone: [phone], Fax: [phone]                Nebulizer Supply Company:                            [facility], Phone: [phone], Fax: [phone]         Oxygen:N/A         Pulmonary Rehab                Site:                 Date Completed:          Plan of Care and Goals for next visit: Keep up the great work with your airway clearance. Contact me for any of your airway clearance needs!    CF Annual Nutrition Assessment     Reason for Assessment  Assessed during CF clinic visit with Jyothi Warren r/t increased nutrition risk with diagnosis of CF per protocol.   Patient accompanied by girlfriend, Belem, at visit.      Anthropometric Assessment  Height: 163 cm  Weight: 55.6 kg (122 lbs)  Ideal body weight based on BMI 22 for females and 23 for males per CF Foundation recs: 60.8 kg (134 lbs)   BMI: 20.93 kg/m      Wt Readings from Last 5 Encounters:   06/15/21 55.6 kg (122 lb 9.2 oz)   09/17/20 55.9 kg (123 lb 3.8 oz)   12/19/19 53.6 kg (118 lb 2.7 oz)   09/09/19 54 kg (119 lb)   06/05/19 54.5 kg (120 lb 2.4 oz)     Comments: Overall weight relatively stable with slight gains since initiation of Trikafta.     Pancreatic  Enzymes  Brand: Creon 12,000  Dosin with meals, 3 with snacks = 1070 units lipase/kg/meal  Estimated Daily Intake: 15 caps = 3210 units lipase/kg/day     Signs of Malabsorption: No denies greasy/oily stools - has had some stomach cramping in the morning before taking AM Trikafta dose. Has become less often, maybe only a few times per month now. Has tried increasing fat with Trikafta dose however is not taking enzymes with snacks.   Enzyme Program: Not eligible.      Nutrition-Related Lab & Vitamin/Mineral Supplements  Annual studies completed 2020  Vitamin A- 0.55 wnl  Vitamin D- 38 wnl  Vitamin E- 9.2 wnl  Iron- 71 wnl  Lipid Panel- wnl     DEXA - , lowest z-score -0.9     Current Vitamin/Mineral Supplements: MVW Complete  - 2 caps per day from [facility]     Diet History and Assessment  Diet Preferences/Allergies/Intolerances: Regular  Intake Recall/Comments: Denied recent changes in appetite/PO intake. Typically eats TID meals and Zone perfect bar snack in afternoon. Also has a high fat HS snack with Trikafta, usually bag of chips or cheese stick. Girlfriend, Belem, does majority of the cooking/grocery shopping - usually prepares protein/starch/vegetable for dinner.      Calcium: ~3 servings/day with milk and cheese  Salt: not addressed  Hydration: not addressed  Supplements: Yes - Zone perfect bar as snack     CF Related Diabetes Evaluation  Hgb A1C: 5.7% on 20  Insulin: yes - basaglar 8 units  Recently seen by Dr. Miranda 2021. Occasionally BG >200 with large meals in the evening. Will continue to monitor.      NUTRITION DIAGNOSIS  Impaired nutrient utilization related to CF pancreatic insufficiency as evidenced by requires pancreatic enzyme replacement therapy, vitamin/mineral supplementation, and higher kcal/protein diet in order to maintain ideal body weight and nutrition status.       INTERVENTIONS/RECOMMENDATIONS   1) Oral Intake/Weight:   BEE: ~1400  Calorie Goals- 5580-6862 kcals/day  (175-200% BEE) +500 kcals/day for weight gain  Protein Goals-  grams/day (1.5-2 g/kg)    Reviewed recent nutrition history and weight trends. Encouraged physical activity along with increased calorie/protein intake in order to support lean body mass gains. Discussed benefits of physical activity for bone health as well given previous losses noted on DEXA scan (due 2021).     2) GI/Enzymes:  Noted ongoing stomach cramping with Trikafta, although with improved frequency. Encouraged pt to initiate 2-3 caps Creon with high fat snacks with Trikafta.     3) Vitamin/Mineral Supplementation:  Reviewed results of most recent annual study labs. Nutrition-related levels all WNL. Plan to continue with current supplementation.     GOALS:  1) Weight maintenance, ideally with gains towards BMI >23 kg/m2    2) Take enzymes with all snacks     FOLLOW-UP/MONITORING:  Visit patient within 12 months for annual nutrition reassessment.     Time spent in face-to-face interaction: 15 minutes     Tamika Perez RD, LD  Cystic Fibrosis/Lung Transplant Dietitian  Pager [phone]

## 2021-06-15 NOTE — NURSING NOTE
Chief Complaint   Patient presents with     Cystic Fibrosis     FOLLOW UP     Vitals were taken and medications were reconciled.     CHRIS Crocker

## 2021-06-16 NOTE — PROGRESS NOTES
CF Annual Nutrition Assessment     Reason for Assessment  Assessed during CF clinic visit with Jyothi Warren r/t increased nutrition risk with diagnosis of CF per protocol.   Patient accompanied by girlfriend, Be[place], at visit.      Anthropometric Assessment  Height: 163 cm  Weight: 55.6 kg (122 lbs)  Ideal body weight based on BMI 22 for females and 23 for males per CF Foundation recs: 60.8 kg (134 lbs)   BMI: 20.93 kg/m      Wt Readings from Last 5 Encounters:   06/15/21 55.6 kg (122 lb 9.2 oz)   20 55.9 kg (123 lb 3.8 oz)   19 53.6 kg (118 lb 2.7 oz)   19 54 kg (119 lb)   19 54.5 kg (120 lb 2.4 oz)     Comments: Overall weight relatively stable with slight gains since initiation of Trikafta.     Pancreatic Enzymes  Brand: Creon 12,000  Dosin with meals, 3 with snacks = 1070 units lipase/kg/meal  Estimated Daily Intake: 15 caps = 3210 units lipase/kg/day     Signs of Malabsorption: No denies greasy/oily stools - has had some stomach cramping in the morning before taking AM Trikafta dose. Has become less often, maybe only a few times per month now. Has tried increasing fat with Trikafta dose however is not taking enzymes with snacks.   Enzyme Program: Not eligible.      Nutrition-Related Lab & Vitamin/Mineral Supplements  Annual studies completed 2020  Vitamin A- 0.55 wnl  Vitamin D- 38 wnl  Vitamin E- 9.2 wnl  Iron- 71 wnl  Lipid Panel- wnl     DEXA - 2019, lowest z-score -0.9     Current Vitamin/Mineral Supplements: MVW Complete  - 2 caps per day from [facility]     Diet History and Assessment  Diet Preferences/Allergies/Intolerances: Regular  Intake Recall/Comments: Denied recent changes in appetite/PO intake. Typically eats TID meals and Zone perfect bar snack in afternoon. Also has a high fat HS snack with Trikafta, usually bag of chips or cheese stick. Girlfriend, Belem, does majority of the cooking/grocery shopping - usually prepares protein/starch/vegetable for dinner.       Calcium: ~3 servings/day with milk and cheese  Salt: not addressed  Hydration: not addressed  Supplements: Yes - Zone perfect bar as snack     CF Related Diabetes Evaluation  Hgb A1C: 5.7% on 9/17/20  Insulin: yes - basaglar 8 units  Recently seen by Dr. Miranda June 2021. Occasionally BG >200 with large meals in the evening. Will continue to monitor.      NUTRITION DIAGNOSIS  Impaired nutrient utilization related to CF pancreatic insufficiency as evidenced by requires pancreatic enzyme replacement therapy, vitamin/mineral supplementation, and higher kcal/protein diet in order to maintain ideal body weight and nutrition status.       INTERVENTIONS/RECOMMENDATIONS   1) Oral Intake/Weight:   BEE: ~1400  Calorie Goals- 4266-3283 kcals/day (175-200% BEE) +500 kcals/day for weight gain  Protein Goals-  grams/day (1.5-2 g/kg)    Reviewed recent nutrition history and weight trends. Encouraged physical activity along with increased calorie/protein intake in order to support lean body mass gains. Discussed benefits of physical activity for bone health as well given previous losses noted on DEXA scan (due 2021).     2) GI/Enzymes:  Noted ongoing stomach cramping with Trikafta, although with improved frequency. Encouraged pt to initiate 2-3 caps Creon with high fat snacks with Trikafta.     3) Vitamin/Mineral Supplementation:  Reviewed results of most recent annual study labs. Nutrition-related levels all WNL. Plan to continue with current supplementation.     GOALS:  1) Weight maintenance, ideally with gains towards BMI >23 kg/m2    2) Take enzymes with all snacks     FOLLOW-UP/MONITORING:  Visit patient within 12 months for annual nutrition reassessment.     Time spent in face-to-face interaction: 15 minutes     Tamika Perez RD, LD  Cystic Fibrosis/Lung Transplant Dietitian  Pager [phone]

## 2021-06-16 NOTE — PROGRESS NOTES
Respiratory Therapist Note:         Vest                Brand: Hill-Rom - traditional Hill Rom: Frequencies 8, 9, 10 at pressure 10 then frequencies 18, 19, 20 at pressure 6. and Hill-Rom - [place] Frequencies: 13-15, intensity 8-10                Cough Pause: Cough Pause; Yes                Vest Garment Size: Adult Small                Last Fitting Date: Due as needed                Frequency of therapy: 14 times per week                Concerns: None         Exercise (purposeful and aerobic for >20 minutes each session): Yes - amount : Walks dog daily                Does this qualify as additional airway clearance: No         Alternative Airway Clearance:          Nebulized Medications                Bronchodilators: Albuterol                Mucolytic: Pulmozyme                Antibiotics:                 Additional Inhaled Medications:                 Spacer Use:          Review Cleaning: Yes. Soap and boil.         Education and Transition Information                Correct order of inhaled medications: Yes                Mechanism of Action of inhaled medications: Yes                Frequency of inhaled medications: Yes                Dosage of inhaled medications: Yes                Other:          Home Care:                Nebulizer Cups (Brand/Type): Ruby                Nebulizer Compressor                            Year Purchased: works                            [facility], Phone: [phone], Fax: [phone]                Nebulizer Supply Company:                            [facility], Phone: [phone], Fax: [phone]         Oxygen:N/A         Pulmonary Rehab                Site:                 Date Completed:          Plan of Care and Goals for next visit: Keep up the great work with your airway clearance. Contact me for any of your airway clearance needs!

## 2021-06-20 LAB
BACTERIA SPEC CULT: ABNORMAL
SPECIMEN SOURCE: ABNORMAL

## 2021-07-07 DIAGNOSIS — E84.9 CF (CYSTIC FIBROSIS) (H): ICD-10-CM

## 2021-07-07 DIAGNOSIS — E84.9 DIABETES MELLITUS DUE TO CYSTIC FIBROSIS (H): ICD-10-CM

## 2021-07-07 DIAGNOSIS — E84.0 CYSTIC FIBROSIS WITH PULMONARY MANIFESTATIONS (H): ICD-10-CM

## 2021-07-07 DIAGNOSIS — K86.81 EXOCRINE PANCREATIC INSUFFICIENCY: ICD-10-CM

## 2021-07-07 DIAGNOSIS — E08.9 DIABETES MELLITUS DUE TO CYSTIC FIBROSIS (H): ICD-10-CM

## 2021-07-07 DIAGNOSIS — E84.9 CYSTIC FIBROSIS (H): ICD-10-CM

## 2021-07-07 DIAGNOSIS — A49.02 MRSA INFECTION: ICD-10-CM

## 2021-07-07 RX ORDER — URSODIOL 300 MG/1
CAPSULE ORAL
Qty: 180 CAPSULE | Refills: 3 | Status: SHIPPED | OUTPATIENT
Start: 2021-07-07 | End: 2022-07-14

## 2021-09-08 NOTE — PROGRESS NOTES
[facility]  September 14, 2021         Assessment and Plan:   Dilan is a 36 year old male with h/o cystic fibrosis who is seen today for routine follow up.      1. CF lung disease: no new pulmonary complaints, minimal cough, no congestion or dyspnea. Gets out and walks the dog and has started doing some weights. Sating 98% on room air; vesting BID. PFTs suprisingly, are down 12% today despite no symptoms. In review of chart, only medication change was stopping Flovent a few months ago. Previous cultures + for MRSA, Klebsiella, Steno, Ps A and A. Fumigatus. Discussed with patient and will resume Flovent and start george podhaler; Guillermo will send in home spirometry in 3 weeks and see us back in clinic in 6 weeks.  - Resume Flovent  - Continue nebulizers and vest therapy, 2-3 times/day  - On chronic azithromycin   - Continue george podhaler month on/off, resume now    2. Elevated Cr with albuminuria  HTN: on labs from today. Unclear etiology other than BPs have been elevated during the last few clinic visits.   - Start lisinopril 5 mg daily; get a BP cuff and check BPs at home  - Recheck labs at next visit    3. CF related liver disease: US 5/17 demonstrating coarse echotexture of the liver with hypertrophy of the caudate lobe and left lobe concerning for possible cirrhosis, no lesions, patent vasculature. LFTs today WNL.   - Continue Ursodiol      4. Exocrine pancreatic insufficiency: denies symptoms of malabsorption.   - Continue pancreatic enzymes and vitamin supplementation      5. CFRD: recent AIC of 6.0 today. Started lisinopril per above for renal protection. Following with Dr. Miranda.  - Continue Basaglar 8 U      6. CF related sinus disease: no current issues.   - Continue Flonase      7. CFTR: modulation: patient started on Trikafta and is doing well. Labs today WNL.   - Continue Trikafta    8. HCM: reviewed labs today including normal CBC, CMP (other  than above), cholesterol, iron, TSH. CXR and multiple other labs pending for today.   - CXR today  - Repeat labs and DEXA at next visit    RTC: in 3 weeks with home spirometry and in 6 weeks with clinic (labs and DEXA)  Annual studies due: September 2022  Vaccinations: received the covid vaccine; TDAP today; flu shot in October      Jyothi Warren PA-C  Pulmonary, Allergy, Critical Care and Sleep Medicine         Interval History:     Still doing some stock trading, biking and walking the dog 20-30 minutes, doing some weight as well. Feeling well, minimal cough, mainly dry. No change in GI complaints, no change in stools. Vesting BID. Denies feeling sick.          Review of Systems:   Please see HPI. Otherwise, complete 10 point ROS negative.           Past Medical and Surgical History:     Past Medical History:   Diagnosis Date     Cystic fibrosis with pulmonary manifestations (H)     /[id number]     Past Surgical History:   Procedure Laterality Date     APPENDECTOMY OPEN  1999    Appendicitis      SINUS SURGERY  1990's    h/o many sinus infections           Family History:     Family History   Problem Relation Age of Onset     Diabetes Mother         Unknown type     Prostate Cancer Paternal Grandfather      LUNG DISEASE Other         Negative     Diabetes Maternal Aunt         unknown type     Diabetes Maternal Uncle         unknown type     Diabetes Maternal Grandmother         unknown type     Coronary Artery Disease Paternal Grandmother             Social History:     Social History     Socioeconomic History     Marital status: Single     Spouse name: Not on file     Number of children: Not on file     Years of education: Not on file     Highest education level: Not on file   Occupational History     Occupation: State Representative     Employer: [employer]     Occupation: Financial Counselor   Tobacco Use     Smoking status: Never Smoker     Smokeless tobacco: Former User   Substance and Sexual  Activity     Alcohol use: Yes     Alcohol/week: 10.0 - 14.0 standard drinks     Types: 10 - 14 Standard drinks or equivalent per week     Comment: 2 drinks per night 5X/wk     Drug use: No     Sexual activity: Yes     Partners: Female     Birth control/protection: Pull-out method, Condom   Other Topics Concern     Parent/sibling w/ CABG, MI or angioplasty before 65F 55M? Yes   Social History Narrative    Owns a home in [place], [place] with his girlfriend of ~7 years. 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     Social Determinants of Health     Financial Resource Strain:      Difficulty of Paying Living Expenses:    Food Insecurity:      Worried About Running Out of Food in the Last Year:      Ran Out of Food in the Last Year:    Transportation Needs:      Lack of Transportation (Medical):      Lack of Transportation (Non-Medical):    Physical Activity:      Days of Exercise per Week:      Minutes of Exercise per Session:    Stress:      Feeling of Stress :    Social Connections:      Frequency of Communication with Friends and Family:      Frequency of Social Gatherings with Friends and Family:      Attends Bahai Services:      Active Member of Clubs or Organizations:      Attends Club or Organization Meetings:      Marital Status:    Intimate Partner Violence:      Fear of Current or Ex-Partner:      Emotionally Abused:      Physically Abused:      Sexually Abused:             Medications:     Current Outpatient Medications   Medication     albuterol (PROVENTIL) (2.5 MG/3ML) 0.083% neb solution     allopurinol (ZYLOPRIM) 300 MG tablet     amylase-lipase-protease (CREON) 11122-96118-22542 units CPEP     azithromycin (ZITHROMAX) 250 MG tablet     blood glucose (NO BRAND SPECIFIED) test strip     blood glucose monitoring (FREESTYLE) lancets     blood glucose monitoring (NO BRAND SPECIFIED) meter device kit     dornase alpha (PULMOZYME) 1 MG/ML neb solution     fluticasone (FLONASE) 50 MCG/ACT nasal spray      fluticasone (FLOVENT HFA) 110 MCG/ACT inhaler     insulin glargine (LANTUS SOLOSTAR) 100 UNIT/ML pen     insulin pen needle (32G X 4 MM) 32G X 4 MM miscellaneous     mvw complete formulation (SOFTGELS ) capsule     tobramycin (MARLEN PODHALER) 28 MG in caps     TRIKAFTA 100-50-75 & 150 MG tablet pack     ursodiol (ACTIGALL) 300 MG capsule     Current Facility-Administered Medications   Medication     Tdap (tetanus-diptheria-acell pertussis) (BOOSTRIX) injection 0.5 mL            Physical Exam:   BP (!) 148/93   Pulse 65   SpO2 98%     GENERAL: alert, NAD  HEENT: NCAT, EOMI, anicteric sclera, no nasal edema or erythema; canals and TMs clear; no oral mucosal edema or erythema  Neck: no cervical or supraclavicular adenopathy  Respiratory: good air flow, mainly clear  CV: RRR, S1S2, no murmurs noted  Abdomen: normoactive BS, soft and non tender   Lymph: no edema, + digital clubbing  Neuro: AAO X 3, CN 2-12 grossly intact  Psychiatric: normal affect, good eye contact  Skin: no rash, jaundice or lesions on limited exam         Data:   All laboratory and imaging data reviewed.      Cystic Fibrosis Culture  Specimen Description   Date Value Ref Range Status   06/15/2021 Sputum  Final   09/17/2020 Sputum  Final   12/19/2019 Sputum  Final    Culture Micro   Date Value Ref Range Status   06/15/2021 Heavy growth  Normal mami    Final   06/15/2021 (A)  Final    Light growth  Pseudomonas aeruginosa, mucoid strain     06/15/2021 Light growth  Pseudomonas aeruginosa   (A)  Final   06/15/2021 Light growth  Strain 2  Pseudomonas aeruginosa   (A)  Final        PFT interpretation:  Maneuver: valid and meets ATS guidelines  Mild obstruction with decreased FEV1/FVC and FVC  Compared to prior: FEV1 of 2.54 is 440 ml below prior

## 2021-09-14 ENCOUNTER — OFFICE VISIT (OUTPATIENT)
Dept: PULMONOLOGY | Facility: CLINIC | Age: 36
End: 2021-09-14
Attending: PHYSICIAN ASSISTANT
Payer: COMMERCIAL

## 2021-09-14 ENCOUNTER — ANCILLARY PROCEDURE (OUTPATIENT)
Dept: GENERAL RADIOLOGY | Facility: CLINIC | Age: 36
End: 2021-09-14
Attending: PHYSICIAN ASSISTANT
Payer: COMMERCIAL

## 2021-09-14 ENCOUNTER — LAB (OUTPATIENT)
Dept: LAB | Facility: CLINIC | Age: 36
End: 2021-09-14
Payer: COMMERCIAL

## 2021-09-14 ENCOUNTER — OFFICE VISIT (OUTPATIENT)
Dept: PHARMACY | Facility: CLINIC | Age: 36
End: 2021-09-14
Payer: COMMERCIAL

## 2021-09-14 VITALS — DIASTOLIC BLOOD PRESSURE: 93 MMHG | HEART RATE: 65 BPM | SYSTOLIC BLOOD PRESSURE: 148 MMHG | OXYGEN SATURATION: 98 %

## 2021-09-14 DIAGNOSIS — E84.0 CYSTIC FIBROSIS WITH PULMONARY EXACERBATION (H): Primary | ICD-10-CM

## 2021-09-14 DIAGNOSIS — E84.0 CYSTIC FIBROSIS WITH PULMONARY MANIFESTATIONS (H): ICD-10-CM

## 2021-09-14 DIAGNOSIS — E84.9 CF (CYSTIC FIBROSIS) (H): ICD-10-CM

## 2021-09-14 DIAGNOSIS — K86.81 EXOCRINE PANCREATIC INSUFFICIENCY: ICD-10-CM

## 2021-09-14 DIAGNOSIS — E84.9 CYSTIC FIBROSIS (H): ICD-10-CM

## 2021-09-14 DIAGNOSIS — E84.8 DIABETES MELLITUS RELATED TO CYSTIC FIBROSIS (H): ICD-10-CM

## 2021-09-14 DIAGNOSIS — M10.9 GOUT, UNSPECIFIED CAUSE, UNSPECIFIED CHRONICITY, UNSPECIFIED SITE: ICD-10-CM

## 2021-09-14 DIAGNOSIS — E08.9 DIABETES MELLITUS DUE TO CYSTIC FIBROSIS (H): ICD-10-CM

## 2021-09-14 DIAGNOSIS — A49.02 MRSA INFECTION: ICD-10-CM

## 2021-09-14 DIAGNOSIS — I10 BENIGN ESSENTIAL HYPERTENSION: ICD-10-CM

## 2021-09-14 DIAGNOSIS — E84.9 DIABETES MELLITUS DUE TO CYSTIC FIBROSIS (H): ICD-10-CM

## 2021-09-14 DIAGNOSIS — Z71.85 VACCINE COUNSELING: ICD-10-CM

## 2021-09-14 DIAGNOSIS — E84.9 CF (CYSTIC FIBROSIS) (H): Primary | ICD-10-CM

## 2021-09-14 DIAGNOSIS — E08.9 DIABETES MELLITUS RELATED TO CYSTIC FIBROSIS (H): ICD-10-CM

## 2021-09-14 DIAGNOSIS — E84.0 CYSTIC FIBROSIS WITH PULMONARY MANIFESTATIONS (H): Primary | ICD-10-CM

## 2021-09-14 LAB
ALBUMIN SERPL-MCNC: 4.2 G/DL (ref 3.4–5)
ALBUMIN UR-MCNC: NEGATIVE MG/DL
ALP SERPL-CCNC: 130 U/L (ref 40–150)
ALT SERPL W P-5'-P-CCNC: 43 U/L (ref 0–70)
ANION GAP SERPL CALCULATED.3IONS-SCNC: 7 MMOL/L (ref 3–14)
APPEARANCE UR: CLEAR
AST SERPL W P-5'-P-CCNC: 25 U/L (ref 0–45)
BASOPHILS # BLD AUTO: 0.1 10E3/UL (ref 0–0.2)
BASOPHILS NFR BLD AUTO: 1 %
BILIRUB UR QL STRIP: NEGATIVE
BUN SERPL-MCNC: 22 MG/DL (ref 7–30)
CALCIUM SERPL-MCNC: 9.8 MG/DL (ref 8.5–10.1)
CHLORIDE BLD-SCNC: 109 MMOL/L (ref 94–109)
CHOLEST SERPL-MCNC: 142 MG/DL
CK SERPL-CCNC: 214 U/L (ref 30–300)
CO2 SERPL-SCNC: 24 MMOL/L (ref 20–32)
COLOR UR AUTO: YELLOW
CREAT SERPL-MCNC: 1.05 MG/DL (ref 0.66–1.25)
CREAT UR-MCNC: 109 MG/DL
DEPRECATED CALCIDIOL+CALCIFEROL SERPL-MC: 37 UG/L (ref 20–75)
EOSINOPHIL # BLD AUTO: 0.3 10E3/UL (ref 0–0.7)
EOSINOPHIL NFR BLD AUTO: 3 %
ERYTHROCYTE [DISTWIDTH] IN BLOOD BY AUTOMATED COUNT: 11.9 % (ref 10–15)
ERYTHROCYTE [SEDIMENTATION RATE] IN BLOOD BY WESTERGREN METHOD: 4 MM/HR (ref 0–15)
EXPTIME-PRE: 10.62 SEC
FASTING STATUS PATIENT QL REPORTED: NORMAL
FEF2575-%PRED-PRE: 30 %
FEF2575-PRE: 1.09 L/SEC
FEF2575-PRED: 3.63 L/SEC
FEFMAX-%PRED-PRE: 92 %
FEFMAX-PRE: 8.31 L/SEC
FEFMAX-PRED: 8.95 L/SEC
FEV1-%PRED-PRE: 71 %
FEV1-PRE: 2.54 L
FEV1FEV6-PRE: 64 %
FEV1FEV6-PRED: 82 %
FEV1FVC-PRE: 59 %
FEV1FVC-PRED: 82 %
FIFMAX-PRE: 7.71 L/SEC
FVC-%PRED-PRE: 99 %
FVC-PRE: 4.29 L
FVC-PRED: 4.29 L
GFR SERPL CREATININE-BSD FRML MDRD: 90 ML/MIN/1.73M2
GGT SERPL-CCNC: 37 U/L (ref 0–75)
GLUCOSE BLD-MCNC: 142 MG/DL (ref 70–99)
GLUCOSE UR STRIP-MCNC: NEGATIVE MG/DL
HBA1C MFR BLD: 6 % (ref 0–5.6)
HCT VFR BLD AUTO: 44.6 % (ref 40–53)
HDLC SERPL-MCNC: 89 MG/DL
HGB BLD-MCNC: 16 G/DL (ref 13.3–17.7)
HGB UR QL STRIP: NEGATIVE
IMM GRANULOCYTES # BLD: 0 10E3/UL
IMM GRANULOCYTES NFR BLD: 0 %
INR PPP: 1 (ref 0.85–1.15)
IRON SERPL-MCNC: 76 UG/DL (ref 35–180)
KETONES UR STRIP-MCNC: NEGATIVE MG/DL
LDLC SERPL CALC-MCNC: 35 MG/DL
LEUKOCYTE ESTERASE UR QL STRIP: NEGATIVE
LYMPHOCYTES # BLD AUTO: 1.8 10E3/UL (ref 0.8–5.3)
LYMPHOCYTES NFR BLD AUTO: 19 %
MAGNESIUM SERPL-MCNC: 2.2 MG/DL (ref 1.6–2.3)
MCH RBC QN AUTO: 32.9 PG (ref 26.5–33)
MCHC RBC AUTO-ENTMCNC: 35.9 G/DL (ref 31.5–36.5)
MCV RBC AUTO: 92 FL (ref 78–100)
MICROALBUMIN UR-MCNC: 38 MG/L
MICROALBUMIN/CREAT UR: 34.86 MG/G CR (ref 0–17)
MONOCYTES # BLD AUTO: 0.6 10E3/UL (ref 0–1.3)
MONOCYTES NFR BLD AUTO: 6 %
NEUTROPHILS # BLD AUTO: 6.7 10E3/UL (ref 1.6–8.3)
NEUTROPHILS NFR BLD AUTO: 71 %
NITRATE UR QL: NEGATIVE
NONHDLC SERPL-MCNC: 53 MG/DL
NRBC # BLD AUTO: 0 10E3/UL
NRBC BLD AUTO-RTO: 0 /100
PH UR STRIP: 6 [PH] (ref 5–7)
PHOSPHATE SERPL-MCNC: 3 MG/DL (ref 2.5–4.5)
PLATELET # BLD AUTO: 161 10E3/UL (ref 150–450)
POTASSIUM BLD-SCNC: 4.2 MMOL/L (ref 3.4–5.3)
PROT SERPL-MCNC: 7.8 G/DL (ref 6.8–8.8)
RBC # BLD AUTO: 4.86 10E6/UL (ref 4.4–5.9)
RBC URINE: 1 /HPF
SODIUM SERPL-SCNC: 140 MMOL/L (ref 133–144)
SP GR UR STRIP: 1.02 (ref 1–1.03)
SQUAMOUS EPITHELIAL: <1 /HPF
TRIGL SERPL-MCNC: 90 MG/DL
TSH SERPL DL<=0.005 MIU/L-ACNC: 2.37 MU/L (ref 0.4–4)
UROBILINOGEN UR STRIP-MCNC: NORMAL MG/DL
WBC # BLD AUTO: 9.4 10E3/UL (ref 4–11)
WBC URINE: 2 /HPF

## 2021-09-14 PROCEDURE — 84403 ASSAY OF TOTAL TESTOSTERONE: CPT | Performed by: PHYSICIAN ASSISTANT

## 2021-09-14 PROCEDURE — 84450 TRANSFERASE (AST) (SGOT): CPT | Performed by: PATHOLOGY

## 2021-09-14 PROCEDURE — 90471 IMMUNIZATION ADMIN: CPT | Performed by: PHYSICIAN ASSISTANT

## 2021-09-14 PROCEDURE — 80061 LIPID PANEL: CPT | Performed by: PATHOLOGY

## 2021-09-14 PROCEDURE — 71046 X-RAY EXAM CHEST 2 VIEWS: CPT | Mod: GC | Performed by: RADIOLOGY

## 2021-09-14 PROCEDURE — 84446 ASSAY OF VITAMIN E: CPT | Mod: 90 | Performed by: PATHOLOGY

## 2021-09-14 PROCEDURE — 99607 MTMS BY PHARM ADDL 15 MIN: CPT | Performed by: PHARMACIST

## 2021-09-14 PROCEDURE — 84460 ALANINE AMINO (ALT) (SGPT): CPT | Performed by: PATHOLOGY

## 2021-09-14 PROCEDURE — 82306 VITAMIN D 25 HYDROXY: CPT | Performed by: PHYSICIAN ASSISTANT

## 2021-09-14 PROCEDURE — 83540 ASSAY OF IRON: CPT | Performed by: PATHOLOGY

## 2021-09-14 PROCEDURE — 82785 ASSAY OF IGE: CPT | Performed by: PHYSICIAN ASSISTANT

## 2021-09-14 PROCEDURE — 82550 ASSAY OF CK (CPK): CPT | Performed by: PATHOLOGY

## 2021-09-14 PROCEDURE — 85610 PROTHROMBIN TIME: CPT | Performed by: PATHOLOGY

## 2021-09-14 PROCEDURE — 84443 ASSAY THYROID STIM HORMONE: CPT | Performed by: PATHOLOGY

## 2021-09-14 PROCEDURE — 87186 SC STD MICRODIL/AGAR DIL: CPT | Performed by: PHYSICIAN ASSISTANT

## 2021-09-14 PROCEDURE — 94375 RESPIRATORY FLOW VOLUME LOOP: CPT | Performed by: PHYSICIAN ASSISTANT

## 2021-09-14 PROCEDURE — 83036 HEMOGLOBIN GLYCOSYLATED A1C: CPT | Performed by: PATHOLOGY

## 2021-09-14 PROCEDURE — 85652 RBC SED RATE AUTOMATED: CPT | Performed by: PATHOLOGY

## 2021-09-14 PROCEDURE — 36415 COLL VENOUS BLD VENIPUNCTURE: CPT | Performed by: PATHOLOGY

## 2021-09-14 PROCEDURE — 85025 COMPLETE CBC W/AUTO DIFF WBC: CPT | Performed by: PATHOLOGY

## 2021-09-14 PROCEDURE — 81001 URINALYSIS AUTO W/SCOPE: CPT | Performed by: PATHOLOGY

## 2021-09-14 PROCEDURE — 83735 ASSAY OF MAGNESIUM: CPT | Performed by: PATHOLOGY

## 2021-09-14 PROCEDURE — 80069 RENAL FUNCTION PANEL: CPT | Performed by: PATHOLOGY

## 2021-09-14 PROCEDURE — 250N000011 HC RX IP 250 OP 636: Performed by: PHYSICIAN ASSISTANT

## 2021-09-14 PROCEDURE — 82784 ASSAY IGA/IGD/IGG/IGM EACH: CPT | Performed by: PHYSICIAN ASSISTANT

## 2021-09-14 PROCEDURE — 84155 ASSAY OF PROTEIN SERUM: CPT | Performed by: PATHOLOGY

## 2021-09-14 PROCEDURE — 99214 OFFICE O/P EST MOD 30 MIN: CPT | Mod: 25 | Performed by: PHYSICIAN ASSISTANT

## 2021-09-14 PROCEDURE — G0463 HOSPITAL OUTPT CLINIC VISIT: HCPCS | Mod: 25

## 2021-09-14 PROCEDURE — 90715 TDAP VACCINE 7 YRS/> IM: CPT | Performed by: PHYSICIAN ASSISTANT

## 2021-09-14 PROCEDURE — 82043 UR ALBUMIN QUANTITATIVE: CPT | Performed by: PATHOLOGY

## 2021-09-14 PROCEDURE — 84590 ASSAY OF VITAMIN A: CPT | Mod: 90 | Performed by: PATHOLOGY

## 2021-09-14 PROCEDURE — 84075 ASSAY ALKALINE PHOSPHATASE: CPT | Performed by: PATHOLOGY

## 2021-09-14 PROCEDURE — 82977 ASSAY OF GGT: CPT | Performed by: PHYSICIAN ASSISTANT

## 2021-09-14 PROCEDURE — 99605 MTMS BY PHARM NP 15 MIN: CPT | Performed by: PHARMACIST

## 2021-09-14 RX ORDER — DEXAMETHASONE 4 MG/1
TABLET ORAL
Qty: 1 G | Refills: 3 | Status: SHIPPED | OUTPATIENT
Start: 2021-09-14 | End: 2022-02-10

## 2021-09-14 RX ORDER — AMLODIPINE BESYLATE 2.5 MG/1
2.5 TABLET ORAL AT BEDTIME
Qty: 30 TABLET | Refills: 11 | Status: SHIPPED | OUTPATIENT
Start: 2021-09-14 | End: 2021-09-14

## 2021-09-14 RX ADMIN — TETANUS TOXOID, REDUCED DIPHTHERIA TOXOID AND ACELLULAR PERTUSSIS VACCINE, ADSORBED 0.5 ML: 5; 2.5; 8; 8; 2.5 SUSPENSION INTRAMUSCULAR at 15:25

## 2021-09-14 NOTE — LETTER
9/14/2021         RE: Dilan Nassar  [address] [place] [id number]        Dear Colleague,    Thank you for referring your patient, Dilan Nassar, to the [facility] [place]. Please see a copy of my visit note below.    [facility]  September 14, 2021         Assessment and Plan:   Dilan is a 36 year old male with h/o cystic fibrosis who is seen today for routine follow up.      1. CF lung disease: no new pulmonary complaints, minimal cough, no congestion or dyspnea. Gets out and walks the dog and has started doing some weights. Sating 98% on room air; vesting BID. PFTs suprisingly, are down 12% today despite no symptoms. In review of chart, only medication change was stopping Flovent a few months ago. Previous cultures + for MRSA, Klebsiella, Steno, Ps A and A. Fumigatus. Discussed with patient and will resume Flovent and start george podhaler; Guillermo will send in home spirometry in 3 weeks and see us back in clinic in 6 weeks.  - Resume Flovent  - Continue nebulizers and vest therapy, 2-3 times/day  - On chronic azithromycin   - Continue george podhaler month on/off, resume now    2. Elevated Cr with albuminuria  HTN: on labs from today. Unclear etiology other than BPs have been elevated during the last few clinic visits.   - Start lisinopril 5 mg daily; get a BP cuff and check BPs at home  - Recheck labs at next visit    3. CF related liver disease: US 5/17 demonstrating coarse echotexture of the liver with hypertrophy of the caudate lobe and left lobe concerning for possible cirrhosis, no lesions, patent vasculature. LFTs today WNL.   - Continue Ursodiol      4. Exocrine pancreatic insufficiency: denies symptoms of malabsorption.   - Continue pancreatic enzymes and vitamin supplementation      5. CFRD: recent AIC of 6.0 today. Started lisinopril per above for renal protection. Following with   Ruben.  - Continue Basaglar 8 U      6. CF related sinus disease: no current issues.   - Continue Flonase      7. CFTR: modulation: patient started on Trikafta and is doing well. Labs today WNL.   - Continue Trikafta    8. HCM: reviewed labs today including normal CBC, CMP (other than above), cholesterol, iron, TSH. CXR and multiple other labs pending for today.   - CXR today  - Repeat labs and DEXA at next visit    RTC: in 3 weeks with home spirometry and in 6 weeks with clinic (labs and DEXA)  Annual studies due: September 2022  Vaccinations: received the covid vaccine; TDAP today; flu shot in October      Jyothi Warren PA-C  Pulmonary, Allergy, Critical Care and Sleep Medicine         Interval History:     Still doing some stock trading, biking and walking the dog 20-30 minutes, doing some weight as well. Feeling well, minimal cough, mainly dry. No change in GI complaints, no change in stools. Vesting BID. Denies feeling sick.          Review of Systems:   Please see HPI. Otherwise, complete 10 point ROS negative.           Past Medical and Surgical History:     Past Medical History:   Diagnosis Date     Cystic fibrosis with pulmonary manifestations (H)     /[id number]     Past Surgical History:   Procedure Laterality Date     APPENDECTOMY OPEN  1999    Appendicitis      SINUS SURGERY  1990's    h/o many sinus infections           Family History:     Family History   Problem Relation Age of Onset     Diabetes Mother         Unknown type     Prostate Cancer Paternal Grandfather      LUNG DISEASE Other         Negative     Diabetes Maternal Aunt         unknown type     Diabetes Maternal Uncle         unknown type     Diabetes Maternal Grandmother         unknown type     Coronary Artery Disease Paternal Grandmother             Social History:     Social History     Socioeconomic History     Marital status: Single     Spouse name: Not on file     Number of children: Not on file     Years of education: Not on  file     Highest education level: Not on file   Occupational History     Occupation: State Representative     Employer: [employer]     Occupation: Financial Counselor   Tobacco Use     Smoking status: Never Smoker     Smokeless tobacco: Former User   Substance and Sexual Activity     Alcohol use: Yes     Alcohol/week: 10.0 - 14.0 standard drinks     Types: 10 - 14 Standard drinks or equivalent per week     Comment: 2 drinks per night 5X/wk     Drug use: No     Sexual activity: Yes     Partners: Female     Birth control/protection: Pull-out method, Condom   Other Topics Concern     Parent/sibling w/ CABG, MI or angioplasty before 65F 55M? Yes   Social History Narrative    Owns a home in [place], [place] with his girlfriend of ~7 years. 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     Social Determinants of Health     Financial Resource Strain:      Difficulty of Paying Living Expenses:    Food Insecurity:      Worried About Running Out of Food in the Last Year:      Ran Out of Food in the Last Year:    Transportation Needs:      Lack of Transportation (Medical):      Lack of Transportation (Non-Medical):    Physical Activity:      Days of Exercise per Week:      Minutes of Exercise per Session:    Stress:      Feeling of Stress :    Social Connections:      Frequency of Communication with Friends and Family:      Frequency of Social Gatherings with Friends and Family:      Attends Mosque Services:      Active Member of Clubs or Organizations:      Attends Club or Organization Meetings:      Marital Status:    Intimate Partner Violence:      Fear of Current or Ex-Partner:      Emotionally Abused:      Physically Abused:      Sexually Abused:             Medications:     Current Outpatient Medications   Medication     albuterol (PROVENTIL) (2.5 MG/3ML) 0.083% neb solution     allopurinol (ZYLOPRIM) 300 MG tablet     amylase-lipase-protease (CREON) 59814-75767-93333 units CPEP     azithromycin (ZITHROMAX)  250 MG tablet     blood glucose (NO BRAND SPECIFIED) test strip     blood glucose monitoring (FREESTYLE) lancets     blood glucose monitoring (NO BRAND SPECIFIED) meter device kit     dornase alpha (PULMOZYME) 1 MG/ML neb solution     fluticasone (FLONASE) 50 MCG/ACT nasal spray     fluticasone (FLOVENT HFA) 110 MCG/ACT inhaler     insulin glargine (LANTUS SOLOSTAR) 100 UNIT/ML pen     insulin pen needle (32G X 4 MM) 32G X 4 MM miscellaneous     mvw complete formulation (SOFTGELS ) capsule     tobramycin (MARLEN PODHALER) 28 MG in caps     TRIKAFTA 100-50-75 & 150 MG tablet pack     ursodiol (ACTIGALL) 300 MG capsule     Current Facility-Administered Medications   Medication     Tdap (tetanus-diptheria-acell pertussis) (BOOSTRIX) injection 0.5 mL            Physical Exam:   BP (!) 148/93   Pulse 65   SpO2 98%     GENERAL: alert, NAD  HEENT: NCAT, EOMI, anicteric sclera, no nasal edema or erythema; canals and TMs clear; no oral mucosal edema or erythema  Neck: no cervical or supraclavicular adenopathy  Respiratory: good air flow, mainly clear  CV: RRR, S1S2, no murmurs noted  Abdomen: normoactive BS, soft and non tender   Lymph: no edema, + digital clubbing  Neuro: AAO X 3, CN 2-12 grossly intact  Psychiatric: normal affect, good eye contact  Skin: no rash, jaundice or lesions on limited exam         Data:   All laboratory and imaging data reviewed.      Cystic Fibrosis Culture  Specimen Description   Date Value Ref Range Status   06/15/2021 Sputum  Final   09/17/2020 Sputum  Final   12/19/2019 Sputum  Final    Culture Micro   Date Value Ref Range Status   06/15/2021 Heavy growth  Normal mami    Final   06/15/2021 (A)  Final    Light growth  Pseudomonas aeruginosa, mucoid strain     06/15/2021 Light growth  Pseudomonas aeruginosa   (A)  Final   06/15/2021 Light growth  Strain 2  Pseudomonas aeruginosa   (A)  Final        PFT interpretation:  Maneuver: valid and meets ATS guidelines  Mild obstruction with  decreased FEV1/FVC and FVC  Compared to prior: FEV1 of 2.54 is 440 ml below prior            Again, thank you for allowing me to participate in the care of your patient.        Sincerely,        Jyothi Warren PA-C

## 2021-09-14 NOTE — PROGRESS NOTES
Medication Therapy Management (MTM) Encounter    ASSESSMENT:                            Medication Adherence/Access: No issues identified    CF: Trikafta labs drawn today with annuals were at goal, missing bilirubin and CK. Recommend drawing with next labs.    Pancreatic Insufficiency/Nutrition: BMI is below goal of 23 kg/m2 per CFF guidelines, however weight today was not available. Annual labs pending today, dietitirubia Lundberg to follow up. Patient would benefit from no changes at this time    Hypertension: Patient is not meeting blood pressure goal of < 130/80mmHg. Given Guillermo is also experiencing an elevation in urine albumin >30 mg/g would recommend lisinopril 5mg daily today. Provided education today, and encouraged monitoring for low blood pressure and dry cough. Follow up in 4 weeks to reassess blood pressure.    CFRD: A1c is at goal of <7%. Pt would benefit from continued follow up with Dr. Miranda.    Gout: Patient is not at goal of uric acid <6 mg/dl, however has not had recent level and no recent gout flares. Given team has been prescribing would recommend repeat uric acid level with next labs.    Vaccines: Due for Tdap and influenza vaccines today per ACIP guidelines. Tdap available and given in clinic. Influenza     PLAN:                            1. Continue Trikafta, recheck labs including bilirubin and CK in 1 year    2. Dietitirubia Lundberg to follow up when annual labs result    3. Start lisinopril 5mg daily. Monitor for any new dry cough. Follow up in 4 weeks to recheck blood pressure in clinic.    4. Continue follow up with Dr. Miranda for endocrinology    5. Consider uric acid level with next annuals    6. Tdap today, recommend flu shot at earliest convenience    Follow-up: Return in about 1 year (around 9/14/2022) for annual medication review.      SUBJECTIVE/OBJECTIVE:                          Dilan Nassar is a 36 year old male coming in for a follow-up visit. He was referred to me from Jyothi Warren,  PATRICIO. Today's visit is a co-visit with Jyothi Warren and team. Today's visit is a follow-up MTM visit from 1/3/19.     Reason for visit: Annual Medication Review    Allergies/ADRs: Reviewed in Epic  Tobacco: No tobacco use  Alcohol: rarely  PMH: Reviewed in Epic  CF Genotype: [id number]    Medication Adherence  Medication adherence flowsheet 9/14/2021   Patient medication administration: Responsible for own medications   Patient estimated adherence level: 89-76%   Pharmacist assessment of adherence: Good   Patient reported doses missed per week: 2-4   Pharmacy MPR: Not available   Facilitators to medication adherence  Schedule/routine   Patient reported barriers to medication adherence  No issues identified        Medication Access  Medication access flowsheet 9/14/2021   Number of pharmacies used: 2   Pharmacy: [facility]   Enrolled in [facility] pharmacy? Yes   Programs or coupons used: -   Patient reported barriers to accessing medications: No issues reported by patient   Medication access interventions: No issues identified    Guillermo uses [facility] for the majority of his medications, and [facility] for Trikafta, Pulmozyme and vitamins.    CF  Inhaled medications   Bronchodilator: albuterol nebs twice daily   Mucolytic: Pulmozyme nebs twice daily  Antibiotic: Jamele Podhaler twice daily (cycling; off)  Other: Flonase 2 sprays each nostril daily, and Flovent HFA  110mcg not using (written 1 puff twice daily)  Oral medications   Azithromycin: 250mg Monday, Wednesday, and Friday  CFTR modulator: Trikafta   Other: ursodiol 300mg twice daily for CF liver disease  Pulmonary symptoms are stable  PFTs are decreased  Current FEV1: 71%  Cultures (last growth): sputum cultures grow Pseudomonas (6/15/21)  Current exacerbation: no  Lab Results   Component Value Date    ALT 43 09/14/2021    AST 25 09/14/2021    BILITOTAL 1.0 06/15/2021    DBIL 0.3 (H) 06/15/2021       Pancreatic  "Insufficiency/Nutrition: Pancreatic enzyme replacement with Creon 97531 units.  Patient is taking 5-6 capsules with meals. Patient is not experiencing sign/symptoms of malabsorption.  Acid Reducer: none  Bowel Regimen: none  Weight and BMI are previously stable  Vitamins include: MVW  two caps daily        Hypertension: Not currently on any medications.  Patient does not self-monitor blood pressure.     BP Readings from Last 3 Encounters:   09/14/21 (!) 148/93   06/15/21 (!) 143/85   09/17/20 133/87     Lab Results   Component Value Date    UMALCR 34.86 (H) 09/14/2021       CFRD: Taking 8 units every monring of Basaglar, but has not been checking his blood sugars.   Patient is not experiencing hypoglycemia  Recent symptoms of high blood sugar? none     Lab Results   Component Value Date    A1C 6.0 09/14/2021    A1C 5.7 09/17/2020    A1C 6.1 09/09/2019    A1C 5.8 09/26/2018    A1C 6.1 09/15/2017    A1C 6.7 12/20/2016   Guillermo follows with Dr. Miranda from endocrinology, last visit was 6/8/21.      Gout: On allopurinol 300 mg daily.  Has not had any flares in the last couple years.        PFTs:      Weight:      Today's Vitals:   BP Readings from Last 1 Encounters:   09/14/21 (!) 148/93     Pulse Readings from Last 1 Encounters:   09/14/21 65     Wt Readings from Last 1 Encounters:   06/15/21 122 lb 9.2 oz (55.6 kg)     Ht Readings from Last 1 Encounters:   06/15/21 5' 4.17\" (1.63 m)     Estimated body mass index is 20.93 kg/m  as calculated from the following:    Height as of 6/15/21: 5' 4.17\" (1.63 m).    Weight as of 6/15/21: 122 lb 9.2 oz (55.6 kg).    Temp Readings from Last 1 Encounters:   09/17/20 97.6  F (36.4  C) (Oral)       ----------------      I spent 23 minutes with this patient today. I offer these suggestions for consideration by Jyothi Warren during covisit today.     The patient was given a summary of these recommendations. See Provider note/AVS from today.     Belem Valle, PharmD  Cystic Fibrosis " MTM Pharmacist  [facility]  Voicemail: [phone]           Medication Therapy Recommendations  Benign essential hypertension    Current Medication: lisinopril (ZESTRIL) 5 MG tablet   Rationale: Untreated condition - Needs additional medication therapy - Indication   Recommendation: Start Medication   Status: Accepted per Provider         Cystic fibrosis with pulmonary manifestations (H)    Current Medication: TRIKAFTA 100-50-75 & 150 MG tablet pack   Rationale: Medication requires monitoring - Needs additional monitoring - Safety   Recommendation: Order Lab   Status: Accepted per Provider         Vaccine counseling    Rationale: Preventive therapy - Needs additional medication therapy - Indication   Recommendation: Start Medication - Adacel 5-2-15.5 LF-MCG/0.5 Susp   Status: Patient Agreed - Adherence/Education

## 2021-09-14 NOTE — PATIENT INSTRUCTIONS
See provider AVS for a summary of recommendations from today's visit.    Belem Valle, PharmD  Cystic Fibrosis MTM Pharmacist  [facility]  Voicemail: [phone]

## 2021-09-14 NOTE — PATIENT INSTRUCTIONS
Cystic Fibrosis Self-Care Plan       Patient: Dilan Nassar   MRN: [id number]   Clinic Date: September 14, 2021     RECOMMENDATIONS:  1. Continue nebulizers and vest therapy. Continue 2-3 times/day, continue excising.  2. Resume Flovent inhaler and start month of george podhaler.   3. Start Lisinopril 5mg at daily. Get a blood pressure cuff and check BPs twice/day.   4. Labs and bone density at next follow up.   5. Send us home spirometry in 3 weeks.     Annual Studies:   IGG   Date Value Ref Range Status   09/17/2020 1,153 610 - 1,616 mg/dL Final     No results found for: INS  There are no preventive care reminders to display for this patient.    Pulmonary Function Tests  FEV1: amount of air you can blow out in 1 second  FVC: total amount of air you can take in and blow out    Your Goals:         PFT Latest Ref Rng & Units 9/14/2021   FVC L 4.29   FEV1 L 2.54   FVC% % 99   FEV1% % 71          Airway Clearance: The Most Important Way to Keep Your Lungs Healthy  Vest Settings:    Hill-Rom Frequencies: 8, 9, 10 Pressure 10 Then, Frequencies 18, 19, 20 Pressure 6      RespirTech: Quick Start with Pressure of     Do each frequency for 5 minutes; Deflate vest after each frequency & cough 3 times before beginning the next setting.    Vest and Neb Therapy should be done 2 times/day.    Good Nutrition Can Improve Lung Function and Overall Health     Take ALL of your vitamins with food     Take 1/2 of your enzymes before EVERY meal/snack and the other 1/2 mid-meal/snack    Wt Readings from Last 3 Encounters:   06/15/21 55.6 kg (122 lb 9.2 oz)   09/17/20 55.9 kg (123 lb 3.8 oz)   12/19/19 53.6 kg (118 lb 2.7 oz)       There is no height or weight on file to calculate BMI.         National CF Foundation Recommendations for BMI in CF Adults: Women: at least 22 Men: at least 23        Controlling Blood Sugars Helps Prevent Lung Infections & Improves Nutrition  Test blood sugar:     In the morning before eating (goal is  )     2 hours after a meal (goal is less than 150)     When pre-meal glucose is greater than 150 add correction     At bedtime (if less than 100 eat a snack with 15 grams of carbohydrates  Last A1C Results:   Hemoglobin A1C   Date Value Ref Range Status   09/14/2021 6.0 (H) 0.0 - 5.6 % Final     Comment:     Normal <5.7%   Prediabetes 5.7-6.4%    Diabetes 6.5% or higher     Note: Adopted from ADA consensus guidelines.   09/17/2020 5.7 (H) 0 - 5.6 % Final     Comment:     Normal <5.7% Prediabetes 5.7-6.4%  Diabetes 6.5% or higher - adopted from ADA   consensus guidelines.           If diabetic, measure A1C every 6 months. Goal is under 7%.    Staying Healthy    Research: If you are interested in learning about research opportunities or have questions, please contact Sonali Altamirano at [phone] or [email or web address].       Foundation: Compass is a personalized resource service to help you with the insurance, financial, legal and other issues you are facing.  It's free, confidential and available to anyone with CF.  Ask your  for more information or contact Compass directly at [facility] ([phone]) or compass@cff.org, or learn more at cff.org/compass.       CF Nurse Line: Alla Pimentel: [phone]   Audra Smart, RT: [phone]     Daniela Whitt and Tamika Perez , Dieticians: [phone]     Lisset Root, Diabetes Nurse: [phone]    Kathie Truong: [phone] or Kailey Mixon at [phone], Social Workers   [email or web address]

## 2021-09-15 LAB
IGE SERPL-ACNC: <2 KU/L (ref 0–114)
IGG SERPL-MCNC: 990 MG/DL (ref 610–1616)

## 2021-09-16 LAB — TESTOST SERPL-MCNC: 515 NG/DL (ref 240–950)

## 2021-09-16 RX ORDER — LISINOPRIL 5 MG/1
5 TABLET ORAL DAILY
Qty: 90 TABLET | Refills: 3 | Status: SHIPPED | OUTPATIENT
Start: 2021-09-16 | End: 2022-10-25

## 2021-09-17 LAB
A-TOCOPHEROL VIT E SERPL-MCNC: 11.8 MG/L
ANNOTATION COMMENT IMP: NORMAL
BETA+GAMMA TOCOPHEROL SERPL-MCNC: 0.3 MG/L
RETINYL PALMITATE SERPL-MCNC: 0.02 MG/L
VIT A SERPL-MCNC: 0.71 MG/L

## 2021-09-20 LAB
BACTERIA SPT CULT: ABNORMAL

## 2021-09-21 ENCOUNTER — VIRTUAL VISIT (OUTPATIENT)
Dept: PHARMACY | Facility: CLINIC | Age: 36
End: 2021-09-21
Payer: COMMERCIAL

## 2021-09-21 DIAGNOSIS — E84.0 CYSTIC FIBROSIS WITH PULMONARY EXACERBATION (H): Primary | ICD-10-CM

## 2021-09-21 PROCEDURE — 99207 PR NO CHARGE LOS: CPT | Performed by: PHARMACIST

## 2021-09-21 NOTE — PROGRESS NOTES
Clinical Pharmacy Consult:                                                    Dilan Nsasar is a 36 year old male called for a clinical pharmacist consult.  He was referred to me from Jyothi Warren PA-C.     Reason for Consult: Medication Question    Discussion: Guillermo was started on lisinopril 5mg daily at his last clinic visit for elevated blood pressure and elevated protein in urine.    After picking up the medication from the pharmacy, he had some concerns from reading the literature provided with the medication. Concerns are outlined below:    1. Cough: Previously discussed cough that is a rare side effect caused by a build up of bradykinin. Educated Guillermo that this cough is more like a tickle in the throat, not something that is harmful to the lungs or would affect PFTs. If this side effect occurred we would start the lisinopril, and the bradykinin would decrease to previous level.    2. Blood sugars: Guillermo expressed concern about destabilizing blood sugars. Educated Guillermo that lisinopril is used frequently in patients with diabetes, and risk is for low blood sugar. Given low dose do not anticipate high risk of side effect, but could monitor at home.    3. Home blood pressure: Guillermo states that at home his blood pressures have been lower, the highest was 130s/80s mmHg. Educated Guillermo that given his elevated blood pressures in clinic and history of diabetes would recommend a blood pressure goal of <130/80 mmHg. Home blood pressure cuff has not been validated, but he could bring this to his next appointment for this to help rule out white coat hypertension. Given low dose do not anticipate high risk for hypotension.    4. Risk to Kidneys: Guillermo expressed concern that literature stated lisinopril could harm kidneys. Provided education that based on his current kidney function and protein in urine lisinopril is likely to be renally protective which is why this blood pressure medication was recommended over other options.    Guillermo  states he appreciates this information and agrees to start lisinopril 5mg daily.    Plan:  1. Start lisinopril 5mg daily as prescribed  2. Reach out with any additional questions or concerns      Belem Valle, PharmD  Cystic Fibrosis MTM Pharmacist  [facility]  Voicemail: [phone]

## 2021-10-03 ENCOUNTER — HEALTH MAINTENANCE LETTER (OUTPATIENT)
Age: 36
End: 2021-10-03

## 2021-10-06 ENCOUNTER — EXTERNAL ORDER RESULTS (OUTPATIENT)
Dept: PULMONOLOGY | Facility: CLINIC | Age: 36
End: 2021-10-06

## 2021-10-06 LAB
FEV-1: 2.76
FEV1-%PRED-PRE (EXTERNAL): 79
FEV1/FVC: 0.61
FVC-%PRED-PRE (EXTERNAL): 106
FVC: 4.5

## 2021-10-19 ASSESSMENT — ANXIETY QUESTIONNAIRES
2. NOT BEING ABLE TO STOP OR CONTROL WORRYING: NOT AT ALL
5. BEING SO RESTLESS THAT IT IS HARD TO SIT STILL: NOT AT ALL
GAD7 TOTAL SCORE: 0
8. IF YOU CHECKED OFF ANY PROBLEMS, HOW DIFFICULT HAVE THESE MADE IT FOR YOU TO DO YOUR WORK, TAKE CARE OF THINGS AT HOME, OR GET ALONG WITH OTHER PEOPLE?: NOT DIFFICULT AT ALL
GAD7 TOTAL SCORE: 0
GAD7 TOTAL SCORE: 0
6. BECOMING EASILY ANNOYED OR IRRITABLE: NOT AT ALL
1. FEELING NERVOUS, ANXIOUS, OR ON EDGE: NOT AT ALL
4. TROUBLE RELAXING: NOT AT ALL
7. FEELING AFRAID AS IF SOMETHING AWFUL MIGHT HAPPEN: NOT AT ALL
3. WORRYING TOO MUCH ABOUT DIFFERENT THINGS: NOT AT ALL
7. FEELING AFRAID AS IF SOMETHING AWFUL MIGHT HAPPEN: NOT AT ALL

## 2021-10-20 ENCOUNTER — OFFICE VISIT (OUTPATIENT)
Dept: FAMILY MEDICINE | Facility: CLINIC | Age: 36
End: 2021-10-20
Payer: COMMERCIAL

## 2021-10-20 VITALS
TEMPERATURE: 98.5 F | RESPIRATION RATE: 16 BRPM | DIASTOLIC BLOOD PRESSURE: 88 MMHG | HEIGHT: 64 IN | OXYGEN SATURATION: 97 % | BODY MASS INDEX: 20.83 KG/M2 | WEIGHT: 122 LBS | SYSTOLIC BLOOD PRESSURE: 135 MMHG | HEART RATE: 84 BPM

## 2021-10-20 DIAGNOSIS — D23.9 DERMATOFIBROMA: Primary | ICD-10-CM

## 2021-10-20 DIAGNOSIS — L98.9 BENIGN SKIN LESION: ICD-10-CM

## 2021-10-20 PROCEDURE — 99212 OFFICE O/P EST SF 10 MIN: CPT | Performed by: NURSE PRACTITIONER

## 2021-10-20 ASSESSMENT — MIFFLIN-ST. JEOR: SCORE: 1387.09

## 2021-10-20 ASSESSMENT — PAIN SCALES - GENERAL: PAINLEVEL: NO PAIN (0)

## 2021-10-20 ASSESSMENT — ANXIETY QUESTIONNAIRES: GAD7 TOTAL SCORE: 0

## 2021-10-20 NOTE — PATIENT INSTRUCTIONS
Patient Education     Checking for Skin Cancer  You can find cancer early by checking your skin each month. There are 3 kinds of skin cancer. They are melanoma, basal cell carcinoma, and squamous cell carcinoma. Doing monthly skin checks is the best way to find new marks or skin changes. Follow the instructions below for checking your skin.  The ABCDEs of checking moles for melanoma  Check your moles or growths for signs of melanoma using ABCDE:    Asymmetry: the sides of the mole or growth don t match    Border: the edges are ragged, notched, or blurred    Color: the color within the mole or growth varies    Diameter: the mole or growth is larger than 6 mm (size of a pencil eraser)    Evolving: the size, shape, or color of the mole or growth is changing    Checking for other types of skin cancer  Basal cell carcinoma or squamous cell carcinoma have symptoms such as:    A spot or mole that looks different from all other marks on your skin    Changes in how an area feels, such as itching, tenderness, or pain    Changes in the skin's surface, such as oozing, bleeding, or scaliness    A sore that does not heal    New swelling or redness beyond the border of a mole  Who s at risk?  Anyone can get skin cancer. But you are at greater risk if you have:    Fair skin, light-colored hair, or light-colored eyes    Many moles or abnormal moles on your skin    A history of sunburns from sunlight or tanning beds    A family history of skin cancer    A history of exposure to radiation or chemicals    A weakened immune system  If you have had skin cancer in the past, you are at risk for recurring skin cancer.  How to check your skin  Do your monthly skin checkups in front of a full-length mirror. Check all parts of your body, including your:    Head (ears, face, neck, and scalp)    Torso (front, back, and sides)    Arms (tops, undersides, upper, and lower armpits)    Hands (palms, backs, and fingers, including under the  nails)    Buttocks and genitals    Legs (front, back, and sides)    Feet (tops, soles, toes, including under the nails, and between toes)  If you have a lot of moles, take digital photos of them each month. Make sure to take photos both up close and from a distance. These can help you see if any moles change over time.  Most skin changes are not cancer. But if you see any changes in your skin, call your doctor right away. Only he or she can diagnose a problem. If you have skin cancer, seeing your doctor can be the first step toward getting the treatment that could save your life.  [employer] last reviewed this educational content on 4/1/2019 2000-2021 [employer]. All rights reserved. This information is not intended as a substitute for professional medical care. Always follow your healthcare professional's instructions.     Please call if you notice any changes listed above or would like a referral to dermatology to discuss further.     WASHINGTON Foy CNP

## 2021-10-20 NOTE — NURSING NOTE
Chief Complaint   Patient presents with     Derm Problem     Patient comes in to discuss skin kevin.         Josse Ospina MA on 10/20/2021 at 1:28 PM

## 2021-10-20 NOTE — PROGRESS NOTES
Dilan Nassar is a 36 year old male who presents today for skin lesions/changing lesions.      Right leg growth   O:6 months   L:right leg   D:constant   C:pigment change and size change growing larger   A:none   R:none   T:none       Right chest   O:3 weeks   L:right weeks    D:constant   C:thought it was a pimple, has not gone away.   A:none   R:none   T:none      Review Of Systems  Skin: as above  Eyes: negative  Ears/Nose/Throat: negative  Cardiovascular: negative  Gastrointestinal: negative  Hematologic/Lymphatic/Immunologic: negative  Endocrine: negative    Past Medical History:   Diagnosis Date     Cystic fibrosis with pulmonary manifestations (H)     /[id number]     Past Surgical History:   Procedure Laterality Date     APPENDECTOMY OPEN  1999    Appendicitis      SINUS SURGERY  1990's    h/o many sinus infections     Social History     Socioeconomic History     Marital status: Single     Spouse name: Not on file     Number of children: Not on file     Years of education: Not on file     Highest education level: Not on file   Occupational History     Occupation: State Representative     Employer: [employer]     Occupation: Financial Counselor   Tobacco Use     Smoking status: Never Smoker     Smokeless tobacco: Former User   Substance and Sexual Activity     Alcohol use: Yes     Alcohol/week: 10.0 - 14.0 standard drinks     Types: 10 - 14 Standard drinks or equivalent per week     Comment: 2 drinks per night 5X/wk     Drug use: No     Sexual activity: Yes     Partners: Female     Birth control/protection: Pull-out method, Condom   Other Topics Concern     Parent/sibling w/ CABG, MI or angioplasty before 65F 55M? Yes   Social History Narrative    Owns a home in [place], [place] with his girlfriend of ~7 years. 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     Social Determinants of Health     Financial Resource Strain:      Difficulty of Paying Living Expenses:    Food Insecurity:      Worried  "About Running Out of Food in the Last Year:      Ran Out of Food in the Last Year:    Transportation Needs:      Lack of Transportation (Medical):      Lack of Transportation (Non-Medical):    Physical Activity:      Days of Exercise per Week:      Minutes of Exercise per Session:    Stress:      Feeling of Stress :    Social Connections:      Frequency of Communication with Friends and Family:      Frequency of Social Gatherings with Friends and Family:      Attends Zoroastrian Services:      Active Member of Clubs or Organizations:      Attends Club or Organization Meetings:      Marital Status:    Intimate Partner Violence:      Fear of Current or Ex-Partner:      Emotionally Abused:      Physically Abused:      Sexually Abused:      Family History   Problem Relation Age of Onset     Diabetes Mother         Unknown type     Prostate Cancer Paternal Grandfather      LUNG DISEASE Other         Negative     Diabetes Maternal Aunt         unknown type     Diabetes Maternal Uncle         unknown type     Diabetes Maternal Grandmother         unknown type     Coronary Artery Disease Paternal Grandmother        /88 (BP Location: Right arm, Patient Position: Sitting, Cuff Size: Adult Regular)   Pulse 84   Temp 98.5  F (36.9  C) (Oral)   Resp 16   Ht 1.63 m (5' 4.17\")   Wt 55.3 kg (122 lb)   SpO2 97%   BMI 20.83 kg/m      Exam:  Constitutional: healthy, alert and no distress  Head: Normocephalic. No masses, lesions, tenderness or abnormalities  Musculoskeletal: extremities normal- no gross deformities noted, gait normal and normal muscle tone  Skin: the rigt upper post calf shows a 4 mm slightly hyperpigmented papule, non tender. The right upper medial breast shows a 3 mm pink papule, non umbilicated. Non tender.   Neurologic: Gait normal. Reflexes normal and symmetric. Sensation grossly WNL.  Psychiatric: mentation appears normal and affect normal/bright  Hematologic/Lymphatic/Immunologic: Normal cervical " lymph nodes    Assessment/Plan:  (D23.9) Dermatofibroma  (primary encounter diagnosis)  Comment: right upper post calf   Plan: Discussed benign lesion and that these are typically caused by bug bites and other minor trauma on the lower extremities. dicussed changes to look for and if the lesion hurts, bleeds, itches or peels to come in sooner. Also discussed referral to dermatology to have biopsy/removal with punch excision and send to pathology for specific diagnosing. Discussed the only was to know for certain is to have biopsy sent- patient verbalized understanding.     (L98.9) Benign skin lesion  Comment: right upper medial breast   Plan: Discussed likely benign lesion- dicussed changes to look for and if the lesion hurts, bleeds, itches or peels to come in sooner. Also discussed referral to dermatology to have biopsy/removal with punch vs shave excision and send to pathology for specific diagnosing. Discussed the only was to know for certain is to have biopsy sent- patient verbalized understanding.       Pat Vasquez, APRN CNP

## 2021-11-19 DIAGNOSIS — E84.9 CYSTIC FIBROSIS (H): ICD-10-CM

## 2021-11-19 RX ORDER — ELEXACAFTOR, TEZACAFTOR, AND IVACAFTOR 100-50-75
KIT ORAL
Qty: 84 TABLET | Refills: 5 | Status: SHIPPED | OUTPATIENT
Start: 2021-11-19 | End: 2022-05-11

## 2021-12-14 ENCOUNTER — EXTERNAL ORDER RESULTS (OUTPATIENT)
Dept: PULMONOLOGY | Facility: CLINIC | Age: 36
End: 2021-12-14

## 2021-12-14 LAB
FEV-1: 2.78
FEV1-%PRED-PRE (EXTERNAL): 77
FEV1/FVC: 0.63
FVC-%PRED-PRE (EXTERNAL): 108
FVC: 4.61

## 2022-01-04 DIAGNOSIS — E84.9 CYSTIC FIBROSIS (H): ICD-10-CM

## 2022-01-04 DIAGNOSIS — E08.9 DIABETES MELLITUS DUE TO CYSTIC FIBROSIS (H): ICD-10-CM

## 2022-01-04 DIAGNOSIS — E84.9 DIABETES MELLITUS DUE TO CYSTIC FIBROSIS (H): ICD-10-CM

## 2022-01-04 DIAGNOSIS — E84.0 CYSTIC FIBROSIS WITH PULMONARY MANIFESTATIONS (H): ICD-10-CM

## 2022-01-04 DIAGNOSIS — A49.02 MRSA INFECTION: ICD-10-CM

## 2022-01-04 DIAGNOSIS — K86.81 EXOCRINE PANCREATIC INSUFFICIENCY: ICD-10-CM

## 2022-01-04 DIAGNOSIS — E84.9 CF (CYSTIC FIBROSIS) (H): ICD-10-CM

## 2022-01-04 RX ORDER — ALBUTEROL SULFATE 0.83 MG/ML
2.5 SOLUTION RESPIRATORY (INHALATION)
Qty: 180 ML | Refills: 3 | Status: SHIPPED | OUTPATIENT
Start: 2022-01-04 | End: 2022-02-10

## 2022-01-09 NOTE — PROGRESS NOTES
[facility]  January 11, 2022         Assessment and Plan:   Dilan is a 36 year old male with h/o cystic fibrosis who is seen today for routine follow up.      1. CF lung disease: no new pulmonary complaints, has been using the elliptical and trying to do some weights at home. Sating 98% on room air; vesting BID. PFTs has improved 6% since September, remain below his yearly best in June. Previous cultures + for MRSA, Klebsiella, Steno, Ps A and A. Fumigatus. No evidence of exacerbation at thist camelia.   - Continue nebulizers and vest therapy, 2 times/day  - On chronic azithromycin   - Continue george podhaler month on/off  - Continue to exercise 30 minutes at least 5 days per week    2. Elevated Cr with albuminuria  HTN: BP elevated today at 147/92. Unclear etiology other than BPs have been elevated during the last few clinic visits.   - Check BPs twice/day for a week  - Repeat UA today  - Continue lisinopril 5 mg daily for now    3. CF related liver disease: US 5/17 demonstrating coarse echotexture of the liver with hypertrophy of the caudate lobe and left lobe concerning for possible cirrhosis, no lesions, patent vasculature. LFTs today WNL.   - Continue Ursodiol      4. Exocrine pancreatic insufficiency: denies symptoms of malabsorption.   - Continue pancreatic enzymes and vitamin supplementation      5. CFRD: last AIC of 6.0 on 9/14/21. Started lisinopril per above for renal protection. BS are in the 90s-100s in the am, after meals can be in the 180s.  Following with Dr. Miranda.  - Continue Basaglar 8 U  - Schedule f/u with Endocrine      6. CF related sinus disease: no current issues.   - Continue Flonase      7. CFTR: modulation: patient started on Trikafta and is doing well. Labs today in September WNL.  - Continue Trikafta    RTC: in 3 months  Annual studies due: September 2022 (DEXA with next visit)  Vaccinations: received the covid vaccine and  booster; UTD      Jyothi Warren PA-C  Pulmonary, Allergy, Critical Care and Sleep Medicine         Interval History:     Has an elliptical and has been using it for the last 2-3 months, had been doing some weights and a curl bar. No fever or chills, no change in cough, congestion or shortness of breath. Vesting BID. No bloating or gas, no stool changes.          Review of Systems:   Please see HPI. Otherwise, complete 10 point ROS negative.           Past Medical and Surgical History:     Past Medical History:   Diagnosis Date     Cystic fibrosis with pulmonary manifestations (H)     /[id number]     Past Surgical History:   Procedure Laterality Date     APPENDECTOMY OPEN  1999    Appendicitis      SINUS SURGERY  1990's    h/o many sinus infections           Family History:     Family History   Problem Relation Age of Onset     Diabetes Mother         Unknown type     Prostate Cancer Paternal Grandfather      LUNG DISEASE Other         Negative     Diabetes Maternal Aunt         unknown type     Diabetes Maternal Uncle         unknown type     Diabetes Maternal Grandmother         unknown type     Coronary Artery Disease Paternal Grandmother             Social History:     Social History     Socioeconomic History     Marital status: Single     Spouse name: Not on file     Number of children: Not on file     Years of education: Not on file     Highest education level: Not on file   Occupational History     Occupation: State Representative     Employer: [employer]     Occupation: Financial Counselor   Tobacco Use     Smoking status: Never Smoker     Smokeless tobacco: Former User   Substance and Sexual Activity     Alcohol use: Yes     Alcohol/week: 10.0 - 14.0 standard drinks     Types: 10 - 14 Standard drinks or equivalent per week     Comment: 2 drinks per night 5X/wk     Drug use: No     Sexual activity: Yes     Partners: Female     Birth control/protection: Pull-out method, Condom   Other Topics Concern      Parent/sibling w/ CABG, MI or angioplasty before 65F 55M? Yes   Social History Narrative    Owns a home in [place], [place] with his girlfriend of ~7 years. 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     Social Determinants of Health     Financial Resource Strain: Not on file   Food Insecurity: Not on file   Transportation Needs: Not on file   Physical Activity: Not on file   Stress: Not on file   Social Connections: Not on file   Intimate Partner Violence: Not on file   Housing Stability: Not on file            Medications:     Current Outpatient Medications   Medication     albuterol (PROVENTIL) (2.5 MG/3ML) 0.083% neb solution     allopurinol (ZYLOPRIM) 300 MG tablet     amylase-lipase-protease (CREON) 63037-79580-15039 units CPEP     azithromycin (ZITHROMAX) 250 MG tablet     blood glucose (NO BRAND SPECIFIED) test strip     blood glucose monitoring (FREESTYLE) lancets     blood glucose monitoring (NO BRAND SPECIFIED) meter device kit     dornase alpha (PULMOZYME) 1 MG/ML neb solution     fluticasone (FLONASE) 50 MCG/ACT nasal spray     fluticasone (FLOVENT HFA) 110 MCG/ACT inhaler     insulin glargine (LANTUS SOLOSTAR) 100 UNIT/ML pen     insulin pen needle (32G X 4 MM) 32G X 4 MM miscellaneous     lisinopril (ZESTRIL) 5 MG tablet     mvw complete formulation (SOFTGELS ) capsule     tobramycin (MARLEN PODHALER) 28 MG in caps     TRIKAFTA 100-50-75 & 150 MG tablet pack     ursodiol (ACTIGALL) 300 MG capsule     No current facility-administered medications for this visit.            Physical Exam:   BP (!) 147/92   Pulse 72   SpO2 98%     GENERAL: alert, NAD  HEENT: NCAT, EOMI, anicteric sclera, no nasal edema or erythema; canals and TMs clear; no oral mucosal edema or erythema  Neck: no cervical or supraclavicular adenopathy  Respiratory: good air flow, mainly clear  CV: RRR, S1S2, no murmurs noted  Abdomen: normoactive BS, soft and non tender   Lymph: no edema, + digital clubbing  Neuro: AAO X 3,  CN 2-12 grossly intact  Psychiatric: normal affect, good eye contact  Skin: no rash, jaundice or lesions on limited exam         Data:   All laboratory and imaging data reviewed.      Cystic Fibrosis Culture  Specimen Description   Date Value Ref Range Status   06/15/2021 Sputum  Final   09/17/2020 Sputum  Final   12/19/2019 Sputum  Final    Culture Micro   Date Value Ref Range Status   06/15/2021 Heavy growth  Normal mami    Final   06/15/2021 (A)  Final    Light growth  Pseudomonas aeruginosa, mucoid strain     06/15/2021 Light growth  Pseudomonas aeruginosa   (A)  Final   06/15/2021 Light growth  Strain 2  Pseudomonas aeruginosa   (A)  Final        PFT interpretation:  Maneuver: valid and meets ATS guidelines  Mild obstruction with decreased FEV1/FVC and FEV1  Compared to prior: FEV1 of 2.72 is 180 ml above prior

## 2022-01-11 ENCOUNTER — OFFICE VISIT (OUTPATIENT)
Dept: PULMONOLOGY | Facility: CLINIC | Age: 37
End: 2022-01-11
Attending: INTERNAL MEDICINE
Payer: COMMERCIAL

## 2022-01-11 VITALS — DIASTOLIC BLOOD PRESSURE: 92 MMHG | OXYGEN SATURATION: 98 % | SYSTOLIC BLOOD PRESSURE: 147 MMHG | HEART RATE: 72 BPM

## 2022-01-11 DIAGNOSIS — E84.9 DIABETES MELLITUS DUE TO CYSTIC FIBROSIS (H): Primary | ICD-10-CM

## 2022-01-11 DIAGNOSIS — E84.0 CYSTIC FIBROSIS WITH PULMONARY MANIFESTATIONS (H): ICD-10-CM

## 2022-01-11 DIAGNOSIS — E84.9 CYSTIC FIBROSIS (H): Primary | ICD-10-CM

## 2022-01-11 DIAGNOSIS — E08.9 DIABETES MELLITUS DUE TO CYSTIC FIBROSIS (H): Primary | ICD-10-CM

## 2022-01-11 LAB
CREAT UR-MCNC: 133 MG/DL
EXPTIME-PRE: 14.03 SEC
FEF2575-%PRED-PRE: 31 %
FEF2575-PRE: 1.15 L/SEC
FEF2575-PRED: 3.63 L/SEC
FEFMAX-%PRED-PRE: 93 %
FEFMAX-PRE: 8.34 L/SEC
FEFMAX-PRED: 8.95 L/SEC
FEV1-%PRED-PRE: 77 %
FEV1-PRE: 2.72 L
FEV1FEV6-PRE: 64 %
FEV1FEV6-PRED: 82 %
FEV1FVC-PRE: 58 %
FEV1FVC-PRED: 82 %
FIFMAX-PRE: 7.95 L/SEC
FVC-%PRED-PRE: 108 %
FVC-PRE: 4.68 L
FVC-PRED: 4.29 L
MICROALBUMIN UR-MCNC: 35 MG/L
MICROALBUMIN/CREAT UR: 26.32 MG/G CR (ref 0–17)

## 2022-01-11 PROCEDURE — G0463 HOSPITAL OUTPT CLINIC VISIT: HCPCS | Mod: 25

## 2022-01-11 PROCEDURE — 87077 CULTURE AEROBIC IDENTIFY: CPT | Performed by: PHYSICIAN ASSISTANT

## 2022-01-11 PROCEDURE — 82043 UR ALBUMIN QUANTITATIVE: CPT | Performed by: PHYSICIAN ASSISTANT

## 2022-01-11 PROCEDURE — 99214 OFFICE O/P EST MOD 30 MIN: CPT | Mod: 25 | Performed by: PHYSICIAN ASSISTANT

## 2022-01-11 PROCEDURE — 94375 RESPIRATORY FLOW VOLUME LOOP: CPT | Performed by: PHYSICIAN ASSISTANT

## 2022-01-11 NOTE — PATIENT INSTRUCTIONS
Cystic Fibrosis Self-Care Plan       Patient: Dilan Nassar   MRN: [id number]   Clinic Date: January 11, 2022     RECOMMENDATIONS:  1. Continue nebulizers and vest therapy.   2. Continue exercising and doing weights.  3. Check you blood pressure twice/day for a week and call us (morning and bedtime).  4.     Annual Studies:   IGG   Date Value Ref Range Status   09/17/2020 1,153 610 - 1,616 mg/dL Final     Immunoglobulin G   Date Value Ref Range Status   09/14/2021 990 610-1,616 mg/dL Final     No results found for: INS  There are no preventive care reminders to display for this patient.    Pulmonary Function Tests  FEV1: amount of air you can blow out in 1 second  FVC: total amount of air you can take in and blow out    Your Goals:         PFT Latest Ref Rng & Units 1/11/2022   FVC L 4.68   FEV1 L 2.72   FVC% % 108   FEV1% % 77          Airway Clearance: The Most Important Way to Keep Your Lungs Healthy  Vest Settings:    Hill-Rom Frequencies: 8, 9, 10 Pressure 10 Then, Frequencies 18, 19, 20 Pressure 6      RespirTech: Quick Start with Pressure of     Do each frequency for 5 minutes; Deflate vest after each frequency & cough 3 times before beginning the next setting.    Vest and Neb Therapy should be done 2 times/day.    Good Nutrition Can Improve Lung Function and Overall Health     Take ALL of your vitamins with food     Take 1/2 of your enzymes before EVERY meal/snack and the other 1/2 mid-meal/snack    Wt Readings from Last 3 Encounters:   10/20/21 55.3 kg (122 lb)   06/15/21 55.6 kg (122 lb 9.2 oz)   09/17/20 55.9 kg (123 lb 3.8 oz)       There is no height or weight on file to calculate BMI.         National CF Foundation Recommendations for BMI in CF Adults: Women: at least 22 Men: at least 23        Controlling Blood Sugars Helps Prevent Lung Infections & Improves Nutrition  Test blood sugar:     In the morning before eating (goal is )     2 hours after a meal (goal is less than 150)     When  pre-meal glucose is greater than 150 add correction     At bedtime (if less than 100 eat a snack with 15 grams of carbohydrates  Last A1C Results:   Hemoglobin A1C POCT   Date Value Ref Range Status   09/17/2020 5.7 (H) 0 - 5.6 % Final     Comment:     Normal <5.7% Prediabetes 5.7-6.4%  Diabetes 6.5% or higher - adopted from ADA   consensus guidelines.       Hemoglobin A1C   Date Value Ref Range Status   09/14/2021 6.0 (H) 0.0 - 5.6 % Final     Comment:     Normal <5.7%   Prediabetes 5.7-6.4%    Diabetes 6.5% or higher     Note: Adopted from ADA consensus guidelines.         If diabetic, measure A1C every 6 months. Goal is under 7%.    Staying Healthy    Research: If you are interested in learning about research opportunities or have questions, please contact Sonali Altamirano at [phone] or [email or web address].       Foundation: Compass is a personalized resource service to help you with the insurance, financial, legal and other issues you are facing.  It's free, confidential and available to anyone with CF.  Ask your  for more information or contact Compass directly at 547-[facility] ([phone]) or compass@cff.org, or learn more at cff.org/compass.       CF Nurse Line: Alla Pimentel: [phone]   Audra Smart, RT: [phone]     Daniela Whitt and Tamika Perez , Dieticians: [phone]     Lisset Root, Diabetes Nurse: [phone]    Kathie Truong: [phone] or Kailey Mixon at [phone], Social Workers   [email or web address]

## 2022-01-11 NOTE — LETTER
1/11/2022    RE: Dilan Nassar  [address] [place] [id number]    Dear Colleague,    Thank you for referring your patient, Dilan Nassar, to the [facility] [place]. Please see a copy of my visit note below.    [facility]  January 11, 2022         Assessment and Plan:   Dilan is a 36 year old male with h/o cystic fibrosis who is seen today for routine follow up.      1. CF lung disease: no new pulmonary complaints, has been using the elliptical and trying to do some weights at home. Sating 98% on room air; vesting BID. PFTs has improved 6% since September, remain below his yearly best in June. Previous cultures + for MRSA, Klebsiella, Steno, Ps A and A. Fumigatus. No evidence of exacerbation at thist camelia.   - Continue nebulizers and vest therapy, 2 times/day  - On chronic azithromycin   - Continue george podhaler month on/off  - Continue to exercise 30 minutes at least 5 days per week    2. Elevated Cr with albuminuria  HTN: BP elevated today at 147/92. Unclear etiology other than BPs have been elevated during the last few clinic visits.   - Check BPs twice/day for a week  - Repeat UA today  - Continue lisinopril 5 mg daily for now    3. CF related liver disease: US 5/17 demonstrating coarse echotexture of the liver with hypertrophy of the caudate lobe and left lobe concerning for possible cirrhosis, no lesions, patent vasculature. LFTs today WNL.   - Continue Ursodiol      4. Exocrine pancreatic insufficiency: denies symptoms of malabsorption.   - Continue pancreatic enzymes and vitamin supplementation      5. CFRD: last AIC of 6.0 on 9/14/21. Started lisinopril per above for renal protection. BS are in the 90s-100s in the am, after meals can be in the 180s.  Following with Dr. Miranda.  - Continue Basaglar 8 U  - Schedule f/u with Endocrine      6. CF related sinus disease: no current  issues.   - Continue Flonase      7. CFTR: modulation: patient started on Trikafta and is doing well. Labs today in September WNL.  - Continue Trikafta    RTC: in 3 months  Annual studies due: September 2022 (DEXA with next visit)  Vaccinations: received the covid vaccine and booster; UTDELILAH Warren PA-C  Pulmonary, Allergy, Critical Care and Sleep Medicine         Interval History:     Has an elliptical and has been using it for the last 2-3 months, had been doing some weights and a curl bar. No fever or chills, no change in cough, congestion or shortness of breath. Vesting BID. No bloating or gas, no stool changes.          Review of Systems:   Please see HPI. Otherwise, complete 10 point ROS negative.           Past Medical and Surgical History:     Past Medical History:   Diagnosis Date     Cystic fibrosis with pulmonary manifestations (H)     /[id number]     Past Surgical History:   Procedure Laterality Date     APPENDECTOMY OPEN  1999    Appendicitis      SINUS SURGERY  1990's    h/o many sinus infections           Family History:     Family History   Problem Relation Age of Onset     Diabetes Mother         Unknown type     Prostate Cancer Paternal Grandfather      LUNG DISEASE Other         Negative     Diabetes Maternal Aunt         unknown type     Diabetes Maternal Uncle         unknown type     Diabetes Maternal Grandmother         unknown type     Coronary Artery Disease Paternal Grandmother             Social History:     Social History     Socioeconomic History     Marital status: Single     Spouse name: Not on file     Number of children: Not on file     Years of education: Not on file     Highest education level: Not on file   Occupational History     Occupation: State Representative     Employer: [employer]     Occupation: Financial Counselor   Tobacco Use     Smoking status: Never Smoker     Smokeless tobacco: Former User   Substance and Sexual Activity     Alcohol use: Yes      Alcohol/week: 10.0 - 14.0 standard drinks     Types: 10 - 14 Standard drinks or equivalent per week     Comment: 2 drinks per night 5X/wk     Drug use: No     Sexual activity: Yes     Partners: Female     Birth control/protection: Pull-out method, Condom   Other Topics Concern     Parent/sibling w/ CABG, MI or angioplasty before 65F 55M? Yes   Social History Narrative    Owns a home in [place], [place] with his girlfriend of ~7 years. 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     Social Determinants of Health     Financial Resource Strain: Not on file   Food Insecurity: Not on file   Transportation Needs: Not on file   Physical Activity: Not on file   Stress: Not on file   Social Connections: Not on file   Intimate Partner Violence: Not on file   Housing Stability: Not on file            Medications:     Current Outpatient Medications   Medication     albuterol (PROVENTIL) (2.5 MG/3ML) 0.083% neb solution     allopurinol (ZYLOPRIM) 300 MG tablet     amylase-lipase-protease (CREON) 10158-18342-02650 units CPEP     azithromycin (ZITHROMAX) 250 MG tablet     blood glucose (NO BRAND SPECIFIED) test strip     blood glucose monitoring (FREESTYLE) lancets     blood glucose monitoring (NO BRAND SPECIFIED) meter device kit     dornase alpha (PULMOZYME) 1 MG/ML neb solution     fluticasone (FLONASE) 50 MCG/ACT nasal spray     fluticasone (FLOVENT HFA) 110 MCG/ACT inhaler     insulin glargine (LANTUS SOLOSTAR) 100 UNIT/ML pen     insulin pen needle (32G X 4 MM) 32G X 4 MM miscellaneous     lisinopril (ZESTRIL) 5 MG tablet     mvw complete formulation (SOFTGELS ) capsule     tobramycin (MARLEN PODHALER) 28 MG in caps     TRIKAFTA 100-50-75 & 150 MG tablet pack     ursodiol (ACTIGALL) 300 MG capsule     No current facility-administered medications for this visit.            Physical Exam:   BP (!) 147/92   Pulse 72   SpO2 98%     GENERAL: alert, NAD  HEENT: NCAT, EOMI, anicteric sclera, no nasal edema or erythema;  canals and TMs clear; no oral mucosal edema or erythema  Neck: no cervical or supraclavicular adenopathy  Respiratory: good air flow, mainly clear  CV: RRR, S1S2, no murmurs noted  Abdomen: normoactive BS, soft and non tender   Lymph: no edema, + digital clubbing  Neuro: AAO X 3, CN 2-12 grossly intact  Psychiatric: normal affect, good eye contact  Skin: no rash, jaundice or lesions on limited exam         Data:   All laboratory and imaging data reviewed.      Cystic Fibrosis Culture  Specimen Description   Date Value Ref Range Status   06/15/2021 Sputum  Final   09/17/2020 Sputum  Final   12/19/2019 Sputum  Final    Culture Micro   Date Value Ref Range Status   06/15/2021 Heavy growth  Normal mami    Final   06/15/2021 (A)  Final    Light growth  Pseudomonas aeruginosa, mucoid strain     06/15/2021 Light growth  Pseudomonas aeruginosa   (A)  Final   06/15/2021 Light growth  Strain 2  Pseudomonas aeruginosa   (A)  Final        PFT interpretation:  Maneuver: valid and meets ATS guidelines  Mild obstruction with decreased FEV1/FVC and FEV1  Compared to prior: FEV1 of 2.72 is 180 ml above prior    Again, thank you for allowing me to participate in the care of your patient.      Sincerely,    Jyothi Warren PA-C

## 2022-01-11 NOTE — ADDENDUM NOTE
Addended by: IRVIN LIN on: 1/11/2022 03:47 PM     Modules accepted: Orders     Pt will transfer sit<->stand with RW with min assist x 1 in 1 week

## 2022-01-13 DIAGNOSIS — R80.9 PROTEINURIA: ICD-10-CM

## 2022-01-13 DIAGNOSIS — E84.9 CYSTIC FIBROSIS (H): Primary | ICD-10-CM

## 2022-01-16 LAB
BACTERIA SPT CULT: ABNORMAL

## 2022-01-21 DIAGNOSIS — K86.81 EXOCRINE PANCREATIC INSUFFICIENCY: ICD-10-CM

## 2022-01-21 DIAGNOSIS — E84.9 CYSTIC FIBROSIS (H): ICD-10-CM

## 2022-01-21 RX ORDER — PEDIATRIC MULTIVIT 61/D3/VIT K 1500-800
2 CAPSULE ORAL DAILY
Qty: 60 CAPSULE | Refills: 11 | Status: SHIPPED | OUTPATIENT
Start: 2022-01-21 | End: 2023-02-22

## 2022-01-27 ENCOUNTER — MYC MEDICAL ADVICE (OUTPATIENT)
Dept: ENDOCRINOLOGY | Facility: CLINIC | Age: 37
End: 2022-01-27
Payer: COMMERCIAL

## 2022-01-27 DIAGNOSIS — E08.9 DIABETES MELLITUS DUE TO CYSTIC FIBROSIS (H): ICD-10-CM

## 2022-01-27 DIAGNOSIS — E84.9 DIABETES MELLITUS DUE TO CYSTIC FIBROSIS (H): ICD-10-CM

## 2022-01-27 NOTE — TELEPHONE ENCOUNTER
LVD: 6/8/2021  [facility]    blood glucose test strips sent to the [facility] in [place], [place].  This is a different pharmacy than what I've used in the past.  The new pharmacy contact information is:     [facility]  [address]  [facility] [address]  phone [phone]  fax [phone]

## 2022-02-10 DIAGNOSIS — E08.9 DIABETES MELLITUS DUE TO CYSTIC FIBROSIS (H): ICD-10-CM

## 2022-02-10 DIAGNOSIS — E84.9 DIABETES MELLITUS DUE TO CYSTIC FIBROSIS (H): ICD-10-CM

## 2022-02-10 DIAGNOSIS — E84.0 CYSTIC FIBROSIS WITH PULMONARY MANIFESTATIONS (H): ICD-10-CM

## 2022-02-10 DIAGNOSIS — E84.9 CF (CYSTIC FIBROSIS) (H): ICD-10-CM

## 2022-02-10 DIAGNOSIS — A49.02 MRSA INFECTION: ICD-10-CM

## 2022-02-10 DIAGNOSIS — E84.9 CYSTIC FIBROSIS (H): ICD-10-CM

## 2022-02-10 DIAGNOSIS — K86.81 EXOCRINE PANCREATIC INSUFFICIENCY: ICD-10-CM

## 2022-02-10 RX ORDER — FLUTICASONE PROPIONATE 50 MCG
SPRAY, SUSPENSION (ML) NASAL
Qty: 48 G | Refills: 3 | Status: SHIPPED | OUTPATIENT
Start: 2022-02-10 | End: 2022-03-22

## 2022-02-10 RX ORDER — ALLOPURINOL 300 MG/1
TABLET ORAL
Qty: 90 TABLET | Refills: 3 | Status: SHIPPED | OUTPATIENT
Start: 2022-02-10 | End: 2023-02-14

## 2022-02-10 RX ORDER — ALBUTEROL SULFATE 0.83 MG/ML
2.5 SOLUTION RESPIRATORY (INHALATION)
Qty: 180 ML | Refills: 3 | Status: SHIPPED | OUTPATIENT
Start: 2022-02-10 | End: 2022-06-29

## 2022-02-10 RX ORDER — DEXAMETHASONE 4 MG/1
TABLET ORAL
Qty: 1 G | Refills: 3 | Status: SHIPPED | OUTPATIENT
Start: 2022-02-10 | End: 2022-04-07

## 2022-02-21 DIAGNOSIS — E84.0 CYSTIC FIBROSIS WITH PULMONARY MANIFESTATIONS (H): ICD-10-CM

## 2022-02-22 ENCOUNTER — TELEPHONE (OUTPATIENT)
Dept: ADMINISTRATIVE | Facility: CLINIC | Age: 37
End: 2022-02-22
Payer: COMMERCIAL

## 2022-02-24 DIAGNOSIS — E84.9 DIABETES MELLITUS DUE TO CYSTIC FIBROSIS (H): ICD-10-CM

## 2022-02-24 DIAGNOSIS — E08.9 DIABETES MELLITUS DUE TO CYSTIC FIBROSIS (H): ICD-10-CM

## 2022-02-24 DIAGNOSIS — E84.0 CYSTIC FIBROSIS OF THE LUNG (H): ICD-10-CM

## 2022-02-24 DIAGNOSIS — M10.9 GOUT, UNSPECIFIED CAUSE, UNSPECIFIED CHRONICITY, UNSPECIFIED SITE: ICD-10-CM

## 2022-02-24 DIAGNOSIS — E84.0 CYSTIC FIBROSIS WITH PULMONARY EXACERBATION (H): ICD-10-CM

## 2022-02-24 DIAGNOSIS — E84.0 CYSTIC FIBROSIS WITH PULMONARY MANIFESTATIONS (H): ICD-10-CM

## 2022-02-24 RX ORDER — AZITHROMYCIN 250 MG/1
500 TABLET, FILM COATED ORAL
Qty: 150 TABLET | Refills: 1 | Status: SHIPPED | OUTPATIENT
Start: 2022-02-25 | End: 2023-03-07

## 2022-02-28 NOTE — PROGRESS NOTES
Cystic Fibrosis Clinical Follow Up Assessment  Assessment Link -Cystic Fibrosis Clinical Follow Up Assessment  2022/02/28 4:43:42 [facility], by Carrie Leopold      Activity date 2022/02/22      Providers    JYOTHI GALVAN      Medications    TRIKAFTA      Encounter Notes  ALEXANDRIA CALZADA is a 36-year-old male being followed by the clinical pharmacist for medication monitoring of Cystic Fibrosis. This patient's current med list, allergies, and vaccination status have been reviewed and updated as appropriate.        Question   Answer     What are the patient's goals for this therapy?      Did you identify any patient barriers to access and successful treatment?   No barriers to access identified     Is it appropriate to collect a PDC at this time? [QA Metric] (An MPR or PDC would not be appropriate for cycled medications or if the patient is on therapy   Yes     Document the date of the last refill of PDC   2022/01/28     Document PDC   0.99     Has the patient missed doses inappropriately?   No     Please select CURRENT side effect(s) for monitoring:   None           Identified concerns      Summary notes  spoke to pt for CF assmt CF: Lungs feel good. no need for antibiotics over the past several months. c/o kidney and insulin issues over the past 6-12 months. has appts with nephrology and his diabetes docs next week to f/u on high kidney proteins, BP and insulin concerns. he wonders if he'll need to hold Trikafta to see if medication is responsible for these concerns. Says he started lisinopril, and has been checking his BP at home, seem to be low consistently, but then has raised numbers when in clinic. He has plans to see Jyothi in April, and will be discussing these issues then as well. No concerns for me today. Added lisinopril to DUR list. No other new meds or allergies. Trikafta: See notes above about possible concerns r/t effects on kidneys and pancreatic functioning. Confirmed standard BID dosing. PDC 0.99. Confirmed  BID Pulmozyme dosing as well. Did not need a fill with this month's order. No other questions today. RN f/u after April CF clinic appt, and pt was invited to reach out anytime.

## 2022-03-08 ENCOUNTER — VIRTUAL VISIT (OUTPATIENT)
Dept: NEPHROLOGY | Facility: CLINIC | Age: 37
End: 2022-03-08
Attending: PHYSICIAN ASSISTANT
Payer: COMMERCIAL

## 2022-03-08 ENCOUNTER — TELEPHONE (OUTPATIENT)
Dept: ENDOCRINOLOGY | Facility: CLINIC | Age: 37
End: 2022-03-08

## 2022-03-08 ENCOUNTER — VIRTUAL VISIT (OUTPATIENT)
Dept: ENDOCRINOLOGY | Facility: CLINIC | Age: 37
End: 2022-03-08
Attending: INTERNAL MEDICINE
Payer: COMMERCIAL

## 2022-03-08 DIAGNOSIS — E84.9 DIABETES MELLITUS DUE TO CYSTIC FIBROSIS (H): Primary | ICD-10-CM

## 2022-03-08 DIAGNOSIS — E84.9 CYSTIC FIBROSIS (H): ICD-10-CM

## 2022-03-08 DIAGNOSIS — R80.9 MICROALBUMINURIA: ICD-10-CM

## 2022-03-08 DIAGNOSIS — E08.9 DIABETES MELLITUS DUE TO CYSTIC FIBROSIS (H): Primary | ICD-10-CM

## 2022-03-08 DIAGNOSIS — I10 BENIGN ESSENTIAL HYPERTENSION: ICD-10-CM

## 2022-03-08 PROCEDURE — 99203 OFFICE O/P NEW LOW 30 MIN: CPT | Mod: 95 | Performed by: INTERNAL MEDICINE

## 2022-03-08 PROCEDURE — 99214 OFFICE O/P EST MOD 30 MIN: CPT | Mod: 95 | Performed by: INTERNAL MEDICINE

## 2022-03-08 NOTE — PROGRESS NOTES
Guillermo is a 36 year old who is being evaluated via a billable video visit.      How would you like to obtain your AVS? MyChart  If the video visit is dropped, the invitation should be resent by: Send to e-mail at: [email or web address]  Will anyone else be joining your video visit? No

## 2022-03-08 NOTE — LETTER
3/8/2022     RE: Dilan Nassar  [address] [place] [id number]     Dear Colleague,    Thank you for referring your patient, Dilan Nassar, to the [facility]. Please see a copy of my visit note below.    Outcome for 03/02/22 12:47 PM: [facility] message sent   Shelli Saunders MA          Dilan Nassar  is being evaluated via a billable video visit.      How would you like to obtain your AVS? [employer]  For the video visit, send the invitation by: Send to e-mail at: [email or web address]  Will anyone else be joining your video visit? No      Outcome for 03/07/22 8:50 AM: Glucose Readings sent via [facility] Steffi Harding   Virtual Visit Facilitator    Outcome for 06/04/21 2:50 PM :Left Voicemail for patient to call back  Outcome for 06/07/21 1:30 PM :Glucose data sent in [facility] Message      CF Endocrinology Return Consultation:  Diabetes  :   Patient: Dilan Nassar MRN# [id number]   YOB: 1985 Age: 36 year old   Date of Visit: 03/08/2022  Dear Dr. Atilio Nieto:    I had the pleasure of seeing your patient, Dilan Nassar in the [facility], [facility], for a return consultation .           Problem list:   MRSA  Cystic fibrosis  Diabetes mellitus  Exocrine pancreatic insufficiency  Vitamin D deficiency  Gout  Intestinal malabsorption         HPI:   Dilan is a 36 year old male with Cystic Fibrosis Related Diabetes Mellitus (CFRD).    I have reviewed the available past laboratory evaluations, imaging studies, and medical records available to me at this visit.   History was obtained from the patient and the medical record.  I have reviewed the notes of the pulmonary care team entered into the medical record since I last saw the patient.    Overall feels well, denies specific complaints or concerns.      Glucose readings sent via [employer] copied below-   Reading are checked fasting  and 2 hours pot meal.     02/15/2022  morning 109  breakfast 206 (Cheerios, whole milk, peanut butter toast [wheat], banana)  lunch 103 (roast beef, [place] sprouts, Vito 57 sauce, peach yogurt, mattie orange)  dinner 221 (3 tacos, corn, rice, corn chips, queso sauce)     02/16/2022  morning 107  breakfast 176 (Cheerios, whole milk, peanut butter toast [wheat], banana)  lunch 172 (turkey sandwich [mayen, mustard, spinach, cheese, white roll], potato chips, pickle, cherry yogurt)  dinner 121 (chicken wings, pepperoni pizza, apple)     02/17/2022  morning 107  breakfast 151 (Cheerios, whole milk, peanut butter toast [wheat], mattie orange)  lunch 162 (cheese and pepperoni pizza, blueberry yogurt)  dinner 269 (Pancheros chicken burrito [rice, peppers, onions, sour cream, cheese, salsa], corn chips, queso sauce)     02/21/2022  morning 91  breakfast 118 (two eggs, cheese, hot sauce, turkey pate, peanut butter toast [wheat], whole milk)  lunch 201 (turkey sandwich [mayen, mustard, spinach, cheese, white roll], potato chips, pickle)       *took nap before reading  dinner 231 (chicken spaghetti [cream sauce], zucchini, salad [spinach, lettuce, carrots, brocolli, celery, Arabic dressing],                          garlic toast [wheat])     02/22/2022  morning 103  breakfast 139 (two eggs, cheese, hot sauce, turkey pate, peanut butter toast [wheat], orange juice)  lunch 95 (turkey chili [beans, onions, peppers, crackers, greek yogurt], mattie orange, strawberry yogurt)  dinner 179 (white fish sandwich [cheese, tartar sauce, hot sauce, white roll], carrots, baked beans, cookie, milk,                         peanut butter [for fat with [employer]])       *cheese crackers before dinner     02/24/2022  morning 89  breakfast 223 (Cheerios, whole milk, peanut butter toast [wheat], banana)  lunch 159 (turkey sandwich [mayen, mustard, spinach, cheese, white roll], potato chips, pickle)  dinner 186 (chicken spaghetti [cream  sauce], garlic toast [wheat])     03/01/2022  morning 113  breakfast 198 (Cheerios, whole milk, peanut butter toast [wheat])  lunch 129 (turkey sandwich [mayen, mustard, spinach, cheese, white roll], potato chips, pickle, blueberry yogurt)  dinner 180 (turkey meat loaf, ketchup, baked potato with turkey chili [beans, onions, peppers, greek yogurt], broccoli)     03/02/2022  morning 104  breakfast 240 (Cheerios, whole milk, peanut butter toast [wheat])  lunch 181 (chicken corn dog, oven fries, ketchup, apple)  dinner 186 (pizza [pepperoni, sausage, mushroom, black olive], salad [spinach, lettuce, carrots, brocolli, celery,                          B[place]ian dressing, ranch dressing]     03/03/2022  morning 113  breakfast 160 (Cheerios, whole milk, peanut butter toast [wheat])  lunch 107  (pizza [pepperoni, sausage, mushroom, black olive], cherry yogurt)  dinner 186 (Everardo Andreas spicy dakotah'Andreas sandwich, medium fries, ketchup, 4 oz Sprite)      Hemoglobin A1C POCT   Date Value Ref Range Status   09/17/2020 5.7 (H) 0 - 5.6 % Final     Comment:     Normal <5.7% Prediabetes 5.7-6.4%  Diabetes 6.5% or higher - adopted from ADA   consensus guidelines.       Hemoglobin A1C   Date Value Ref Range Status   09/14/2021 6.0 (H) 0.0 - 5.6 % Final     Comment:     Normal <5.7%   Prediabetes 5.7-6.4%    Diabetes 6.5% or higher     Note: Adopted from ADA consensus guidelines.     Current insulin regimen:   Basaglar 8 unit(s) daily 10 AM          Past Medical History:     Active Ambulatory Problems     Diagnosis Date Noted     Cystic fibrosis with pulmonary manifestations      Exocrine pancreatic insufficiency 02/13/2015     Vitamin D deficiency 02/13/2015     Gout 02/13/2015     Allergic rhinitis 02/13/2015     Intestinal malabsorption 02/13/2015     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) 10/16/2015     Diabetes mellitus related to cystic fibrosis (H) 10/16/2015     Cystic fibrosis with pulmonary exacerbation (H) 10/23/2015      Methicillin resistant Staphylococcus aureus infection 08/16/2016     Resolved Ambulatory Problems     Diagnosis Date Noted     Type 1 diabetes mellitus (H) 02/13/2015     No Additional Past Medical History          Past Surgical History:   Appendectomy  Sinus surgery            Social History:   Unmarried  Non smoker  Former smokeless tobacco user           Family History:   Mother: liver disease  Paternal grandfather: prostate cancer  Maternal aunt: diabetes mellitus  Maternal uncle: diabetes mellitus  Maternal grandmother: diabetes mellitus  Paternal grandmother: coronary artery disease         Allergies:     Allergies   Allergen Reactions     Molds & Smuts Itching          Medications:     Current Outpatient Medications:      albuterol (PROVENTIL) (2.5 MG/3ML) 0.083% neb solution, Take 1 vial (2.5 mg) by nebulization 2 times daily, Disp: 180 mL, Rfl: 3     allopurinol (ZYLOPRIM) 300 MG tablet, TAKE ONE TABLET (300MG) BY MOUTH DAILY, Disp: 90 tablet, Rfl: 3     amylase-lipase-protease (CREON) 35324-32105-33887 units CPEP, TAKE 5-6 CAPSULES (60,000-72,000) BY MOUTH THREE TIMES A DAY WITH MEALS, Disp: 600 capsule, Rfl: 3     azithromycin (ZITHROMAX) 250 MG tablet, Take 2 tablets (500 mg) by mouth Every Mon, Wed, Fri Morning, Disp: 150 tablet, Rfl: 1     blood glucose (NO BRAND SPECIFIED) test strip, Use to test blood sugar 4 times daily or as directed., Disp: 125 strip, Rfl: 11     blood glucose monitoring (FREESTYLE) lancets, Use to test blood sugar 4 times daily or as directed., Disp: 200 each, Rfl: 11     blood glucose monitoring (NO BRAND SPECIFIED) meter device kit, Use to test blood sugar 4 times daily or as directed., Disp: 1 kit, Rfl: 0     dornase alpha (PULMOZYME) 2.5 MG/2.5ML neb solution, INHALE CONTENTS OF ONE VIAL (2.5MG) VIA NEBULIZER TWICE DAILY. KEEP REFRIGERATED UNTIL USE., Disp: 150 mL, Rfl: 11     fluticasone (FLONASE) 50 MCG/ACT nasal spray, SPRAY 2 SPRAYS INTO BOTH NOSTRILS DAILY, Disp: 48 g,  Rfl: 3     fluticasone (FLOVENT HFA) 110 MCG/ACT inhaler, INHALE 1 PUFF INTO THE LUNGS TWO TIMES A DAY, Disp: 1 g, Rfl: 3     insulin glargine (LANTUS SOLOSTAR) 100 UNIT/ML pen, Inject 8 Units Subcutaneous daily, Disp: 15 mL, Rfl: 3     insulin pen needle (32G X 4 MM) 32G X 4 MM miscellaneous, Use 3 pen needles daily or as directed., Disp: 100 each, Rfl: 11     lisinopril (ZESTRIL) 5 MG tablet, Take 1 tablet (5 mg) by mouth daily, Disp: 90 tablet, Rfl: 3     mvw complete formulation (SOFTGELS ) capsule, Take 2 capsules by mouth daily, Disp: 60 capsule, Rfl: 11     tobramycin (MARLEN PODHALER) 28 MG in caps, Inhale 4 capsules (112 mg) into the lungs 2 times daily 1 month on, 1 month off, Disp: 224 capsule, Rfl: 5     TRIKAFTA 100-50-75 & 150 MG tablet pack, TAKE TWO ORANGE TABLETS BY MOUTH IN THE MORNING AND ONE BLUE TABLET IN THE EVENING AS DIRECTED ON PACKAGE.  TAKE WITH FAT CONTAINING FOOD., Disp: 84 tablet, Rfl: 5     ursodiol (ACTIGALL) 300 MG capsule, TAKE 1 CAPSULE (300MG ) BY MOUTH TWO TIMES A DAY, Disp: 180 capsule, Rfl: 3           Review of Systems:     Comprehensive ROS negative other than the symptoms noted above in the HPI.          Physical Exam:   There were no vitals taken for this visit.  Height: Data Unavailable, Facility age limit for growth percentiles is 20 years.  Weight: 0 lbs 0 oz, Facility age limit for growth percentiles is 20 years.  BMI: There is no height or weight on file to calculate BMI., No height and weight on file for this encounter.      GENERAL: Healthy, alert and no distress  EYES: Eyes grossly normal to inspection.  No discharge or erythema, or obvious scleral/conjunctival abnormalities.  RESP: No audible wheeze, cough, or visible cyanosis.  No visible retractions or increased work of breathing.    NEURO:  Mentation and speech appropriate for age.  PSYCH: affect normal/bright, judgement and insight intact, normal speech and appearance well-groomed.        Laboratory results:      TSH   Date Value Ref Range Status   09/14/2021 2.37 0.40 - 4.00 mU/L Final   09/17/2020 1.96 0.40 - 4.00 mU/L Final     Testosterone Total   Date Value Ref Range Status   09/14/2021 515 240 - 950 ng/dL Final     Comment:       MALE:  0 to 5 months:  ng/dL  6 months to 9 years: <2-20 ng/dL  10 to 11 years: <2-130 ng/dL  12 to 13 years: <2-800 ng/dL  14 years: <2-1200 ng/dL  15 to 16 years: 100-1200 ng/dL  17 to 18 years: 300-1200 ng/dL  19 years and older: 240-950 ng/dL    FEMALE:  0 to 5 months: 20-80 ng/dL  6 months to 9 years: <2-20 ng/dL  10 to 11 years: <2-44 ng/dL  12 to 16 years: <2-75 ng/dL  17 to 18 years: 20-75 ng/dL  19 years and older: 8-60 ng/dL    Values by Paul Stage:  Stage I (prepubertal)  Male: <2 to 20 ng/dL  Female: <2 to 20 ng/dL    Stage II  Male: 8 to 66 ng/dL  Female: <2 to 47 ng/dL    Stage III  Male: 26 to 800 ng/dL  Female: 17 to 75 ng/dL    Stage IV  Male: 85 to 1200 ng/dL  Female: 20 to 75 ng/dL    Stage V (young adult)  Male: 300 to 950 ng/dL  Female: 12 to 60 ng/dL    Puberty onset (transition from Paul Stage I to Paul Stage II) occurs for boys at a median age of 11.5 years and for girls at median age of 10.5 years. There is evidence that it may occur up to 1 year earlier in obese girls and in  girls. For boys there is no definite proven relationship between puberty onset and body weight or ethnic origin. Progression through Paul stages is variable. Paul Stage V (young adult) should be reached by age 18.       09/17/2020 416 240 - 950 ng/dL Final     Comment:     This test was developed and its performance characteristics determined by the   [facility].   It has not been cleared or approved by the FDA. The laboratory is regulated   under CLIA as qualified to perform high-complexity testing. This test is used   for clinical purposes. It should not be regarded as investigational or for    research.       Cholesterol   Date Value Ref Range Status   09/14/2021 142 <200 mg/dL Final   09/17/2020 134 <200 mg/dL Final     Albumin Urine mg/L   Date Value Ref Range Status   01/11/2022 35 mg/L Final   09/17/2020 12 mg/L Final     Triglycerides   Date Value Ref Range Status   09/14/2021 90 <150 mg/dL Final   09/17/2020 91 <150 mg/dL Final     HDL Cholesterol   Date Value Ref Range Status   09/17/2020 83 >39 mg/dL Final     Direct Measure HDL   Date Value Ref Range Status   09/14/2021 89 >=40 mg/dL Final     LDL Cholesterol Calculated   Date Value Ref Range Status   09/14/2021 35 <=100 mg/dL Final   09/17/2020 33 <100 mg/dL Final     Comment:     Desirable:       <100 mg/dl     Non HDL Cholesterol   Date Value Ref Range Status   09/14/2021 53 <130 mg/dL Final   09/17/2020 51 <130 mg/dL Final         CF  Diabetes Health Maintenance    Date of Diabetes Diagnosis: ~ 2012     Special Notes (if any):      Date Last Eye Exam:   2021     Date Last Dental Appointment:     Dates of Episodes Severe* Hypoglycemia (month/year, cumulative, ongoing, assess each visit):    *Severe=patient unconscious, seizure, unable to help self    Last 25-Vitamin D (every year): 37      Last DXA, lowest Z-score (every 2 years):  -0.9    Last Testosterone:   Testosterone Total   Date Value Ref Range Status   09/14/2021 515 240 - 950 ng/dL Final     Comment:       MALE:  0 to 5 months:  ng/dL  6 months to 9 years: <2-20 ng/dL  10 to 11 years: <2-130 ng/dL  12 to 13 years: <2-800 ng/dL  14 years: <2-1200 ng/dL  15 to 16 years: 100-1200 ng/dL  17 to 18 years: 300-1200 ng/dL  19 years and older: 240-950 ng/dL    FEMALE:  0 to 5 months: 20-80 ng/dL  6 months to 9 years: <2-20 ng/dL  10 to 11 years: <2-44 ng/dL  12 to 16 years: <2-75 ng/dL  17 to 18 years: 20-75 ng/dL  19 years and older: 8-60 ng/dL    Values by Paul Stage:  Stage I (prepubertal)  Male: <2 to 20 ng/dL  Female: <2 to 20 ng/dL    Stage II  Male: 8 to 66 ng/dL  Female: <2  to 47 ng/dL    Stage III  Male: 26 to 800 ng/dL  Female: 17 to 75 ng/dL    Stage IV  Male: 85 to 1200 ng/dL  Female: 20 to 75 ng/dL    Stage V (young adult)  Male: 300 to 950 ng/dL  Female: 12 to 60 ng/dL    Puberty onset (transition from Paul Stage I to Paul Stage II) occurs for boys at a median age of 11.5 years and for girls at median age of 10.5 years. There is evidence that it may occur up to 1 year earlier in obese girls and in  girls. For boys there is no definite proven relationship between puberty onset and body weight or ethnic origin. Progression through Paul stages is variable. Paul Stage V (young adult) should be reached by age 18.       09/17/2020 416 240 - 950 ng/dL Final     Comment:     This test was developed and its performance characteristics determined by the   [facility].   It has not been cleared or approved by the FDA. The laboratory is regulated   under CLIA as qualified to perform high-complexity testing. This test is used   for clinical purposes. It should not be regarded as investigational or for   research.            Assessment and Plan:   Dilan is a 36 year old male with cystic fibrosis related diabetes    CFRD: Overall good control.  He has occasional postprandial highs at 2 hours after meals.    Discussed use of continuous glucose monitor to get more detailed glucose profile.  Could consider adding premeal insulin with the largest meal of the day if he has persistent hyperglycemia after meals.  Patient will get additional glucose readings during the day including at 3 hours, if 2-hour reading post meal is high.  Increased dose of Basaglar to 10 units daily.  Positive microalbuminuria, started on lisinopril  Hypertension: Following with nephrology    Return to clinic in 6 months or sooner if needed / post-prandial BGs frquently above >180     JOSE Headley    Video-Visit Details  Type of  service:  Video Visit -due to voice quality issues, visit was completed over telephone  Video visit start time 10:12 AM, visit end time 10:37 AM  Originating Location (pt. Location): Home  Distant Location (provider location):  [facility] ENDOCRINOLOGY   Platform used for Video Visit: Mamta Miranda MD      Time: I spent 30 minutes on the date of the encounter preparing to see patient (including chart review and preparation), obtaining and or reviewing additional medical history, performing an evaluation, documenting clinical information in the electronic health record, independently interpreting results, communicating results to the patient, and/or coordinating care.

## 2022-03-08 NOTE — TELEPHONE ENCOUNTER
Attempted to reach patient to schedule follow up in the Endocrinology Clinic. No answer, LM on VM to call office back.    Schedule with Edison Miranda MD .  Steffi Harding   Virtual Visit Facilitator

## 2022-03-08 NOTE — PROGRESS NOTES
03/08/22    I was asked to see this patient by Jyothi Warren PA-C for evaluation of albuminuria.     CC: albuminuria    HPI: Dilan Nassar is a 36 year old male who presents for evaluation of albuminuria. Mr. Nassar's hx is significant for cystic fibrosis, diabetes. He was dx with DM ~ 2013. He has had sinus surgery, appendectomy but otherwise no other medical issues. Noted to have microalbuminuria in Sept without hematuria at that time. Ultrasound of the kidneys in 2017 showed normal appearing kidneys. Just in the past years he was dx with hypertension - tried 3 different BP cuffs at home - doesn't get high readings. Today was 93/68 but otherwise 122/68, 120/63. Also in the 110s at times. Today was an outlier - no lightheadedness. He questions whether trifakta could be contributing to hypertension and also changes with his blood sugar levels - he plans to discuss with his endocrinologist today.     - History of Hematuria: no  - Swelling: no  - Hx of UTIs: no  - Hx of stones: no  - Family hx of kidney disease: no  - NSAID use: not regularly.     albuterol (PROVENTIL) (2.5 MG/3ML) 0.083% neb solution, Take 1 vial (2.5 mg) by nebulization 2 times daily  allopurinol (ZYLOPRIM) 300 MG tablet, TAKE ONE TABLET (300MG) BY MOUTH DAILY  amylase-lipase-protease (CREON) 58760-256[address]-63493 units CPEP, TAKE 5-6 CAPSULES (60,000-72,000) BY MOUTH THREE TIMES A DAY WITH MEALS  azithromycin (ZITHROMAX) 250 MG tablet, Take 2 tablets (500 mg) by mouth Every Mon, Wed, Fri Morning  blood glucose (NO BRAND SPECIFIED) test strip, Use to test blood sugar 4 times daily or as directed.  blood glucose monitoring (FREESTYLE) lancets, Use to test blood sugar 4 times daily or as directed.  blood glucose monitoring (NO BRAND SPECIFIED) meter device kit, Use to test blood sugar 4 times daily or as directed.  dornase alpha (PULMOZYME) 2.5 MG/2.5ML neb solution, INHALE CONTENTS OF ONE VIAL (2.5MG) VIA NEBULIZER TWICE DAILY. KEEP REFRIGERATED UNTIL  USE.  fluticasone (FLONASE) 50 MCG/ACT nasal spray, SPRAY 2 SPRAYS INTO BOTH NOSTRILS DAILY  fluticasone (FLOVENT HFA) 110 MCG/ACT inhaler, INHALE 1 PUFF INTO THE LUNGS TWO TIMES A DAY  insulin glargine (LANTUS SOLOSTAR) 100 UNIT/ML pen, Inject 8 Units Subcutaneous daily  insulin pen needle (32G X 4 MM) 32G X 4 MM miscellaneous, Use 3 pen needles daily or as directed.  lisinopril (ZESTRIL) 5 MG tablet, Take 1 tablet (5 mg) by mouth daily  mvw complete formulation (SOFTGELS ) capsule, Take 2 capsules by mouth daily  tobramycin (MARLEN PODHALER) 28 MG in caps, Inhale 4 capsules (112 mg) into the lungs 2 times daily 1 month on, 1 month off  TRIKAFTA 100-50-75 & 150 MG tablet pack, TAKE TWO ORANGE TABLETS BY MOUTH IN THE MORNING AND ONE BLUE TABLET IN THE EVENING AS DIRECTED ON PACKAGE.  TAKE WITH FAT CONTAINING FOOD.  ursodiol (ACTIGALL) 300 MG capsule, TAKE 1 CAPSULE (300MG ) BY MOUTH TWO TIMES A DAY    No current facility-administered medications on file prior to visit.      Exam:  There were no vitals taken for this visit.  GENERAL APPEARANCE: alert and no distress    Results:    No visits with results within 1 Day(s) from this visit.   Latest known visit with results is:   Office Visit on 01/11/2022   Component Date Value Ref Range Status     Culture 01/11/2022 2+ Normal mami   Final     Culture 01/11/2022 1+ Pseudomonas aeruginosa (A)  Final     Culture 01/11/2022 1+ Pseudomonas aeruginosa (A)  Final     Culture 01/11/2022 1+ Pseudomonas aeruginosa (A)  Final     Creatinine Urine mg/dL 01/11/2022 133  mg/dL Final     Albumin Urine mg/L 01/11/2022 35  mg/L Final     Albumin Urine mg/g Cr 01/11/2022 26.32 (A) 0.00 - 17.00 mg/g Cr Final      Lab results were reviewed and interpreted.       Assessment/Plan:   Microalbuminuria: creatinine is well preserved but with microalbuminuria. He has diabetes and may have early signs of diabetic nephropathy with his UMACR of 34 mg/g in Sept. Normal is considered less than 30  mg/g and after starting lisinopril I am pleased to see that he reached this goal. He had ultrasound previously that showed normal kidneys. Encouraged good diabetes control, blood pressure control of 110-130, ongoing lisinopril use, avoidance of NSAIDs to optimize kidney health going forward. If blood pressure drops less than 110 consistently, would have low threshold to lower the lisinopril to 2.5 mg daily to avoid hypotension (less than 110 may affect renal perfusion).     Hypertension: new dx for him - goal blood pressure is 110-130/<80 given his diabetes hx.    DM: well controlled with his last A1C at 6% in Sept.       Patient Instructions   Goal blood pressure is 110-130/<80. Please update if outside this range.     Follow-up in nephrology clinic as needed or please feel free to mychart with questions.        32 minutes spent on the date of the encounter doing chart review, review of test results, interpretation of tests, patient visit and documentation   821-842 AM video visit via [employer]  Gege Guillory,

## 2022-03-08 NOTE — PATIENT INSTRUCTIONS
Goal blood pressure is 110-130/<80. Please update if outside this range.     Follow-up in nephrology clinic as needed or please feel free to mychart with questions.

## 2022-03-09 DIAGNOSIS — E84.9 CF (CYSTIC FIBROSIS) (H): ICD-10-CM

## 2022-03-09 DIAGNOSIS — E08.9 DIABETES MELLITUS DUE TO CYSTIC FIBROSIS (H): ICD-10-CM

## 2022-03-09 DIAGNOSIS — E84.0 CYSTIC FIBROSIS WITH PULMONARY MANIFESTATIONS (H): ICD-10-CM

## 2022-03-09 DIAGNOSIS — A49.02 MRSA INFECTION: ICD-10-CM

## 2022-03-09 DIAGNOSIS — E84.9 DIABETES MELLITUS DUE TO CYSTIC FIBROSIS (H): ICD-10-CM

## 2022-03-09 DIAGNOSIS — E84.9 CYSTIC FIBROSIS (H): ICD-10-CM

## 2022-03-09 DIAGNOSIS — K86.81 EXOCRINE PANCREATIC INSUFFICIENCY: ICD-10-CM

## 2022-03-22 DIAGNOSIS — A49.02 MRSA INFECTION: ICD-10-CM

## 2022-03-22 DIAGNOSIS — K86.81 EXOCRINE PANCREATIC INSUFFICIENCY: ICD-10-CM

## 2022-03-22 DIAGNOSIS — E84.9 DIABETES MELLITUS DUE TO CYSTIC FIBROSIS (H): ICD-10-CM

## 2022-03-22 DIAGNOSIS — E84.0 CYSTIC FIBROSIS WITH PULMONARY MANIFESTATIONS (H): ICD-10-CM

## 2022-03-22 DIAGNOSIS — E84.9 CYSTIC FIBROSIS (H): ICD-10-CM

## 2022-03-22 DIAGNOSIS — E84.9 CF (CYSTIC FIBROSIS) (H): ICD-10-CM

## 2022-03-22 DIAGNOSIS — E08.9 DIABETES MELLITUS RELATED TO CF (CYSTIC FIBROSIS) (H): ICD-10-CM

## 2022-03-22 DIAGNOSIS — E84.8 DIABETES MELLITUS RELATED TO CF (CYSTIC FIBROSIS) (H): ICD-10-CM

## 2022-03-22 DIAGNOSIS — E08.9 DIABETES MELLITUS DUE TO CYSTIC FIBROSIS (H): ICD-10-CM

## 2022-03-22 RX ORDER — FLUTICASONE PROPIONATE 50 MCG
SPRAY, SUSPENSION (ML) NASAL
Qty: 48 G | Refills: 3 | Status: SHIPPED | OUTPATIENT
Start: 2022-03-22 | End: 2023-05-03

## 2022-03-22 RX ORDER — INSULIN GLARGINE 100 [IU]/ML
8 INJECTION, SOLUTION SUBCUTANEOUS DAILY
Qty: 15 ML | Refills: 3 | Status: SHIPPED | OUTPATIENT
Start: 2022-03-22 | End: 2022-04-19

## 2022-04-06 ENCOUNTER — TELEPHONE (OUTPATIENT)
Dept: PULMONOLOGY | Facility: CLINIC | Age: 37
End: 2022-04-06
Payer: COMMERCIAL

## 2022-04-06 NOTE — TELEPHONE ENCOUNTER
Pt sent a MyC message regarding being unable to get Jameel podhaler from [facility]- he was advised to contact his doctors office. I called them, and they provided me with a little bit more information, they said they no longer service Jameel podhaler patient assistance program, this is now [facility] pharmacy. They provided me with the phone # to [employer] Pt Assistance Program to see if pt is enrolled in this program for . They last shipped Jameel to the patient on 2021.     Called [phone] ([employer]) to check if pt was reenrolled for  and they told me the patients application was grandfathered over but  on 3/4/2022. She is faxing me enrollment forms for us to fill out and send back in along with a prescription for Jameel Podhalers. She also advised we'll have to sign a shipping attestation form. She said once they receive all of this completed information, it will take 72 hours to make a decision.     Sent MyC message to patient.

## 2022-04-06 NOTE — TELEPHONE ENCOUNTER
PA Initiation    Medication: Jameel podhaler- PA initiated, pt needs free drug renewal  Insurance Company: [employer] [place] - Phone [phone] Fax [phone]  Pharmacy Filling the Rx: [facility] - [place], [place] - [address]  Filling Pharmacy Phone:    Filling Pharmacy Fax:    Start Date: 4/6/2022    Submitting PA as well to see if pt would be able to get through his insurance instead, the free drug program will want to make sure we first try to obtain through pts insurance. I will also fill out [employer] free drug sadaf and email to patient and provider for signature, will also need standalone prescription printed off and signed to send along with [employer] free drug sadaf.

## 2022-04-07 DIAGNOSIS — E08.9 DIABETES MELLITUS DUE TO CYSTIC FIBROSIS (H): ICD-10-CM

## 2022-04-07 DIAGNOSIS — E84.9 DIABETES MELLITUS DUE TO CYSTIC FIBROSIS (H): ICD-10-CM

## 2022-04-07 DIAGNOSIS — E84.9 CYSTIC FIBROSIS (H): ICD-10-CM

## 2022-04-07 DIAGNOSIS — A49.02 MRSA INFECTION: ICD-10-CM

## 2022-04-07 DIAGNOSIS — K86.81 EXOCRINE PANCREATIC INSUFFICIENCY: ICD-10-CM

## 2022-04-07 DIAGNOSIS — E84.0 CYSTIC FIBROSIS WITH PULMONARY MANIFESTATIONS (H): ICD-10-CM

## 2022-04-07 DIAGNOSIS — E84.9 CF (CYSTIC FIBROSIS) (H): ICD-10-CM

## 2022-04-07 RX ORDER — DEXAMETHASONE 4 MG/1
TABLET ORAL
Qty: 1 G | Refills: 11 | Status: SHIPPED | OUTPATIENT
Start: 2022-04-07 | End: 2022-12-19

## 2022-04-07 NOTE — TELEPHONE ENCOUNTER
PRIOR AUTHORIZATION DENIED    Medication: George podhaler- PA denied, free drug in process    Denial Date: 4/6/2022    Denial Rational: Pt has not tried the preferred products: Bethkis, Kitabis brandan        Appeal Information:      Pt has been on george podhaler caps for over a year now, gets them through the free drug program. We are working on renewal application for this-- but if we want to change him to an alternative, that's another option as well -- please let us know if you wish to continue for pt to get george podhaler through free drug program (currently waiting for Jyothi Warren signature on application) or if you think switching to an alternative would be a feasible option- and we may have to do one level of appeal in order for free drug to provide pt with med.

## 2022-04-12 DIAGNOSIS — E84.0 CYSTIC FIBROSIS WITH PULMONARY MANIFESTATIONS (H): ICD-10-CM

## 2022-04-12 RX ORDER — TOBRAMYCIN 28 MG/1
4 CAPSULE ORAL; RESPIRATORY (INHALATION) 2 TIMES DAILY
Qty: 224 CAPSULE | Refills: 5 | Status: SHIPPED | OUTPATIENT
Start: 2022-04-12 | End: 2022-09-20

## 2022-04-12 NOTE — TELEPHONE ENCOUNTER
Called [employer] Patient Assistance ([phone]) and spoke to rep Lott [facility] [place]. Form below is not sufficient. Once prescription sent call a  [employer] Patient Assistance to verify prescription was received. Msg sent to care team.

## 2022-04-12 NOTE — TELEPHONE ENCOUNTER
Called [employer] Patient [employer] ([phone]) and spoke to rep Thorne, prescription was received and under review

## 2022-04-13 NOTE — TELEPHONE ENCOUNTER
Free Drug Application Approved - [employer] Patient Assistance Program   Effective Dates: 4/12/2022  Patient notified: 12/31/2022

## 2022-04-18 NOTE — PROGRESS NOTES
[facility]  April 19, 2022         Assessment and Plan:   Guillermo Nassar is a 36 year old male with h/o cystic fibrosis and pancreatic insufficiency who is seen today for routine follow up.      1. 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 Has not been doing his george podhaler since December and has not noticed any new symptoms. Sating 97% on room air; vesting BID. PFTs at his baseline, slightly below his yearly best. Previous cultures + for MRSA, Klebsiella, Steno, Ps A and A. Fumigatus. No evidence of exacerbation at thist camelia.   - Continue nebulizers and vest therapy  - On chronic azithromycin   - Okay to hold off on resuming the podhaler  - Continue to exercise 30 minutes at least 5 days per week    2. Elevated Cr with albuminuria  HTN: BP controlled today at 121/78. Repeat urine albumin improved from prior, still elevated at 26.32.   - Continue lisinopril 5 mg daily    3. CF related liver disease: US 5/17 demonstrating coarse echotexture of the liver with hypertrophy of the caudate lobe and left lobe concerning for possible cirrhosis, no lesions, patent vasculature.   - Continue Ursodiol      4. Exocrine pancreatic insufficiency: denies symptoms of malabsorption.   - Continue enzymes and vitamin supplementation      5. CFRD: last AIC of 6.0 on 9/14/21. Started lisinopril per above for renal protection.   Following with Dr. Miranda.  - Continue Lantus 10 U      6. CF related sinus disease: no current issues with allergies.   - Continue Flonase      7. CFTR: modulation: on Trikafta and is doing well. Labs today in September WNL.  - Continue Trikafta    RTC: in 3 months  Annual studies due: September 2022 (DEXA pending after his visit)  Vaccinations: received the covid vaccine and booster      Jyothi Warren PA-C  Pulmonary, Allergy, Critical Care and Sleep  Medicine         Interval History:     Thinking about looking for a part time job, consistent income. Is also looking an online accounting program. Still using the elliptical, also working on getting his house ready to sell. No recent sickness, no fever or chills, very minimal cough. Vesting BID.   No bloating or gas, no stool changes.          Review of Systems:   Please see HPI. Otherwise, complete 10 point ROS negative.           Past Medical and Surgical History:     Past Medical History:   Diagnosis Date     Cystic fibrosis with pulmonary manifestations (H)     /[id number]     Past Surgical History:   Procedure Laterality Date     APPENDECTOMY OPEN  1999    Appendicitis      SINUS SURGERY  1990's    h/o many sinus infections           Family History:     Family History   Problem Relation Age of Onset     Diabetes Mother         Unknown type     Prostate Cancer Paternal Grandfather      LUNG DISEASE Other         Negative     Diabetes Maternal Aunt         unknown type     Diabetes Maternal Uncle         unknown type     Diabetes Maternal Grandmother         unknown type     Coronary Artery Disease Paternal Grandmother             Social History:     Social History     Socioeconomic History     Marital status: Single     Spouse name: Not on file     Number of children: Not on file     Years of education: Not on file     Highest education level: Not on file   Occupational History     Occupation: State Representative     Employer: [employer]     Occupation: Financial Counselor   Tobacco Use     Smoking status: Never Smoker     Smokeless tobacco: Former User   Substance and Sexual Activity     Alcohol use: Yes     Alcohol/week: 10.0 - 14.0 standard drinks     Types: 10 - 14 Standard drinks or equivalent per week     Comment: 2 drinks per night 5X/wk     Drug use: No     Sexual activity: Yes     Partners: Female     Birth control/protection: Pull-out method, Condom   Other Topics Concern      Parent/sibling w/ CABG, MI or angioplasty before 65F 55M? Yes   Social History Narrative    Owns a home in [place], [place] with his girlfriend of ~7 years. 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     Social Determinants of Health     Financial Resource Strain: Not on file   Food Insecurity: Not on file   Transportation Needs: Not on file   Physical Activity: Not on file   Stress: Not on file   Social Connections: Not on file   Intimate Partner Violence: Not on file   Housing Stability: Not on file            Medications:     Current Outpatient Medications   Medication     albuterol (PROVENTIL) (2.5 MG/3ML) 0.083% neb solution     allopurinol (ZYLOPRIM) 300 MG tablet     amylase-lipase-protease (CREON) 40257-35665-07966 units CPEP     azithromycin (ZITHROMAX) 250 MG tablet     blood glucose (NO BRAND SPECIFIED) test strip     blood glucose monitoring (FREESTYLE) lancets     blood glucose monitoring (NO BRAND SPECIFIED) meter device kit     dornase alpha (PULMOZYME) 2.5 MG/2.5ML neb solution     fluticasone (FLONASE) 50 MCG/ACT nasal spray     fluticasone (FLOVENT HFA) 110 MCG/ACT inhaler     insulin glargine (LANTUS SOLOSTAR) 100 UNIT/ML pen     insulin pen needle (32G X 4 MM) 32G X 4 MM miscellaneous     lisinopril (ZESTRIL) 5 MG tablet     mvw complete formulation (SOFTGELS ) capsule     tobramycin (MARLEN PODHALER) 28 MG in caps     TRIKAFTA 100-50-75 & 150 MG tablet pack     ursodiol (ACTIGALL) 300 MG capsule     No current facility-administered medications for this visit.            Physical Exam:   /78   Pulse 78   SpO2 97%     GENERAL: alert, NAD  HEENT: NCAT, EOMI, anicteric sclera, no oral mucosal edema or erythema  Neck: no cervical or supraclavicular adenopathy  Respiratory: good air flow, few scattered coarse BS, but mainly clear  CV: RRR, S1S2, no murmurs noted  Abdomen: normoactive BS, soft and non tender   Lymph: no edema, + digital clubbing  Neuro: AAO X 3, CN 2-12 grossly  intact  Psychiatric: normal affect, good eye contact  Skin: no rash, jaundice or lesions on limited exam         Data:   All laboratory and imaging data reviewed.      Cystic Fibrosis Culture  Specimen Description   Date Value Ref Range Status   06/15/2021 Sputum  Final   09/17/2020 Sputum  Final   12/19/2019 Sputum  Final    Culture Micro   Date Value Ref Range Status   06/15/2021 Heavy growth  Normal mami    Final   06/15/2021 (A)  Final    Light growth  Pseudomonas aeruginosa, mucoid strain     06/15/2021 Light growth  Pseudomonas aeruginosa   (A)  Final   06/15/2021 Light growth  Strain 2  Pseudomonas aeruginosa   (A)  Final        PFT interpretation:  Maneuver: valid and meets ATS guidelines  Mild obstruction with decreased FEV1/FVC and FEV1  Compared to prior: FEV1 of 2.79 is 70 ml above prior

## 2022-04-19 ENCOUNTER — OFFICE VISIT (OUTPATIENT)
Dept: PULMONOLOGY | Facility: CLINIC | Age: 37
End: 2022-04-19
Attending: PHYSICIAN ASSISTANT
Payer: COMMERCIAL

## 2022-04-19 ENCOUNTER — ANCILLARY PROCEDURE (OUTPATIENT)
Dept: BONE DENSITY | Facility: CLINIC | Age: 37
End: 2022-04-19
Attending: PHYSICIAN ASSISTANT
Payer: COMMERCIAL

## 2022-04-19 DIAGNOSIS — E84.8 DIABETES MELLITUS RELATED TO CF (CYSTIC FIBROSIS) (H): ICD-10-CM

## 2022-04-19 DIAGNOSIS — E08.9 DIABETES MELLITUS DUE TO CYSTIC FIBROSIS (H): ICD-10-CM

## 2022-04-19 DIAGNOSIS — E84.9 DIABETES MELLITUS DUE TO CYSTIC FIBROSIS (H): ICD-10-CM

## 2022-04-19 DIAGNOSIS — E84.0 CYSTIC FIBROSIS WITH PULMONARY MANIFESTATIONS (H): ICD-10-CM

## 2022-04-19 DIAGNOSIS — E84.0 CYSTIC FIBROSIS WITH PULMONARY MANIFESTATIONS (H): Primary | ICD-10-CM

## 2022-04-19 DIAGNOSIS — K86.81 EXOCRINE PANCREATIC INSUFFICIENCY: Chronic | ICD-10-CM

## 2022-04-19 DIAGNOSIS — E08.9 DIABETES MELLITUS RELATED TO CF (CYSTIC FIBROSIS) (H): ICD-10-CM

## 2022-04-19 LAB
EXPTIME-PRE: 14.48 SEC
FEF2575-%PRED-PRE: 32 %
FEF2575-PRE: 1.19 L/SEC
FEF2575-PRED: 3.63 L/SEC
FEFMAX-%PRED-PRE: 88 %
FEFMAX-PRE: 7.96 L/SEC
FEFMAX-PRED: 8.95 L/SEC
FEV1-%PRED-PRE: 79 %
FEV1-PRE: 2.79 L
FEV1FEV6-PRE: 63 %
FEV1FEV6-PRED: 82 %
FEV1FVC-PRE: 58 %
FEV1FVC-PRED: 82 %
FIFMAX-PRE: 8.14 L/SEC
FVC-%PRED-PRE: 111 %
FVC-PRE: 4.79 L
FVC-PRED: 4.29 L

## 2022-04-19 PROCEDURE — 99214 OFFICE O/P EST MOD 30 MIN: CPT | Mod: 25 | Performed by: PHYSICIAN ASSISTANT

## 2022-04-19 PROCEDURE — 87206 SMEAR FLUORESCENT/ACID STAI: CPT | Performed by: PHYSICIAN ASSISTANT

## 2022-04-19 PROCEDURE — 87077 CULTURE AEROBIC IDENTIFY: CPT | Mod: 59 | Performed by: PHYSICIAN ASSISTANT

## 2022-04-19 PROCEDURE — 97803 MED NUTRITION INDIV SUBSEQ: CPT | Performed by: DIETITIAN, REGISTERED

## 2022-04-19 PROCEDURE — 77080 DXA BONE DENSITY AXIAL: CPT | Performed by: INTERNAL MEDICINE

## 2022-04-19 PROCEDURE — 94375 RESPIRATORY FLOW VOLUME LOOP: CPT | Performed by: PHYSICIAN ASSISTANT

## 2022-04-19 PROCEDURE — G0463 HOSPITAL OUTPT CLINIC VISIT: HCPCS | Mod: 25

## 2022-04-19 RX ORDER — INSULIN GLARGINE 100 [IU]/ML
10 INJECTION, SOLUTION SUBCUTANEOUS DAILY
Qty: 15 ML | Refills: 3 | COMMUNITY
Start: 2022-04-19 | End: 2023-04-06

## 2022-04-19 NOTE — NURSING NOTE
Dilan Nassar is a 36 year old year old who is being seen for Follow Up (3mo f/u)      Medications reviewed and Vital signs taken.    Specimen Collection Type: Sputum    Order(s) placed: AFB (Acid Fast Bacilli) and CF Aerobic Bacterial        No results found for: ACIDFAST      Lab Results   Component Value Date    AFBSMS Negative for acid fast bacteria 09/26/2018    AFBSMS  09/26/2018     Less than 5ml of specimen received.  A minimum of 5 mL of sputum or fluid is recommended for recovery of acid fast bacilli   (AFB).  Volumes less than 5 mL are suboptimal and may compromise recovery of AFB from   culture.      AFBSMS  09/26/2018     Assayed at [facility] [address] [place] [id number] [phone]             Vitals were taken and medications were reconciled.     CHRIS Crocker

## 2022-04-19 NOTE — LETTER
4/19/2022     RE: Dilan Nassar  [address] [place] [id number]    Dear Colleague,    Thank you for referring your patient, Dilan Nassar, to the [facility] [place]. Please see a copy of my visit note below.    [facility]  April 19, 2022         Assessment and Plan:   Guillermo Nassar is a 36 year old male with h/o cystic fibrosis and pancreatic insufficiency who is seen today for routine follow up.      1. 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 Has not been doing his george podhaler since December and has not noticed any new symptoms. Sating 97% on room air; vesting BID. PFTs at his baseline, slightly below his yearly best. Previous cultures + for MRSA, Klebsiella, Steno, Ps A and A. Fumigatus. No evidence of exacerbation at thist camelia.   - Continue nebulizers and vest therapy  - On chronic azithromycin   - Okay to hold off on resuming the podhaler  - Continue to exercise 30 minutes at least 5 days per week    2. Elevated Cr with albuminuria  HTN: BP controlled today at 121/78. Repeat urine albumin improved from prior, still elevated at 26.32.   - Continue lisinopril 5 mg daily    3. CF related liver disease: US 5/17 demonstrating coarse echotexture of the liver with hypertrophy of the caudate lobe and left lobe concerning for possible cirrhosis, no lesions, patent vasculature.   - Continue Ursodiol      4. Exocrine pancreatic insufficiency: denies symptoms of malabsorption.   - Continue enzymes and vitamin supplementation      5. CFRD: last AIC of 6.0 on 9/14/21. Started lisinopril per above for renal protection.   Following with Dr. Miranda.  - Continue Lantus 10 U      6. CF related sinus disease: no current issues with allergies.   - Continue Flonase      7. CFTR: modulation: on Trikafta and is  doing well. Labs today in September WNL.  - Continue Trikafta    RTC: in 3 months  Annual studies due: September 2022 (DEXA pending after his visit)  Vaccinations: received the covid vaccine and booster      Jyothi Warren PA-C  Pulmonary, Allergy, Critical Care and Sleep Medicine         Interval History:     Thinking about looking for a part time job, consistent income. Is also looking an online accounting program. Still using the Fluency, also working on getting his house ready to sell. No recent sickness, no fever or chills, very minimal cough. Vesting BID.   No bloating or gas, no stool changes.          Review of Systems:   Please see HPI. Otherwise, complete 10 point ROS negative.           Past Medical and Surgical History:     Past Medical History:   Diagnosis Date     Cystic fibrosis with pulmonary manifestations (H)     /[id number]     Past Surgical History:   Procedure Laterality Date     APPENDECTOMY OPEN  1999    Appendicitis      SINUS SURGERY  1990's    h/o many sinus infections           Family History:     Family History   Problem Relation Age of Onset     Diabetes Mother         Unknown type     Prostate Cancer Paternal Grandfather      LUNG DISEASE Other         Negative     Diabetes Maternal Aunt         unknown type     Diabetes Maternal Uncle         unknown type     Diabetes Maternal Grandmother         unknown type     Coronary Artery Disease Paternal Grandmother             Social History:     Social History     Socioeconomic History     Marital status: Single     Spouse name: Not on file     Number of children: Not on file     Years of education: Not on file     Highest education level: Not on file   Occupational History     Occupation: State Representative     Employer: [employer]     Occupation: Financial Counselor   Tobacco Use     Smoking status: Never Smoker     Smokeless tobacco: Former User   Substance and Sexual Activity     Alcohol use: Yes     Alcohol/week: 10.0 -  14.0 standard drinks     Types: 10 - 14 Standard drinks or equivalent per week     Comment: 2 drinks per night 5X/wk     Drug use: No     Sexual activity: Yes     Partners: Female     Birth control/protection: Pull-out method, Condom   Other Topics Concern     Parent/sibling w/ CABG, MI or angioplasty before 65F 55M? Yes   Social History Narrative    Owns a home in [place], [place] with his girlfriend of ~7 years. 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     Social Determinants of Health     Financial Resource Strain: Not on file   Food Insecurity: Not on file   Transportation Needs: Not on file   Physical Activity: Not on file   Stress: Not on file   Social Connections: Not on file   Intimate Partner Violence: Not on file   Housing Stability: Not on file            Medications:     Current Outpatient Medications   Medication     albuterol (PROVENTIL) (2.5 MG/3ML) 0.083% neb solution     allopurinol (ZYLOPRIM) 300 MG tablet     amylase-lipase-protease (CREON) 76136-97155-31631 units CPEP     azithromycin (ZITHROMAX) 250 MG tablet     blood glucose (NO BRAND SPECIFIED) test strip     blood glucose monitoring (FREESTYLE) lancets     blood glucose monitoring (NO BRAND SPECIFIED) meter device kit     dornase alpha (PULMOZYME) 2.5 MG/2.5ML neb solution     fluticasone (FLONASE) 50 MCG/ACT nasal spray     fluticasone (FLOVENT HFA) 110 MCG/ACT inhaler     insulin glargine (LANTUS SOLOSTAR) 100 UNIT/ML pen     insulin pen needle (32G X 4 MM) 32G X 4 MM miscellaneous     lisinopril (ZESTRIL) 5 MG tablet     mvw complete formulation (SOFTGELS ) capsule     tobramycin (MARLEN PODHALER) 28 MG in caps     TRIKAFTA 100-50-75 & 150 MG tablet pack     ursodiol (ACTIGALL) 300 MG capsule     No current facility-administered medications for this visit.            Physical Exam:   /78   Pulse 78   SpO2 97%     GENERAL: alert, NAD  HEENT: NCAT, EOMI, anicteric sclera, no oral mucosal edema or erythema  Neck: no  cervical or supraclavicular adenopathy  Respiratory: good air flow, few scattered coarse BS, but mainly clear  CV: RRR, S1S2, no murmurs noted  Abdomen: normoactive BS, soft and non tender   Lymph: no edema, + digital clubbing  Neuro: AAO X 3, CN 2-12 grossly intact  Psychiatric: normal affect, good eye contact  Skin: no rash, jaundice or lesions on limited exam         Data:   All laboratory and imaging data reviewed.      Cystic Fibrosis Culture  Specimen Description   Date Value Ref Range Status   06/15/2021 Sputum  Final   2020 Sputum  Final   2019 Sputum  Final    Culture Micro   Date Value Ref Range Status   06/15/2021 Heavy growth  Normal mami    Final   06/15/2021 (A)  Final    Light growth  Pseudomonas aeruginosa, mucoid strain     06/15/2021 Light growth  Pseudomonas aeruginosa   (A)  Final   06/15/2021 Light growth  Strain 2  Pseudomonas aeruginosa   (A)  Final        PFT interpretation:  Maneuver: valid and meets ATS guidelines  Mild obstruction with decreased FEV1/FVC and FEV1  Compared to prior: FEV1 of 2.79 is 70 ml above prior          CF Annual Nutrition Assessment     Reason for Assessment  Assessed during CF clinic visit with Jyothi Warren r/t increased nutrition risk with diagnosis of CF per protocol.   Patient accompanied by girlfriend, Belem, at visit.      Anthropometric Assessment  Height: 163 cm  Weight: 56.2 kg (123 lbs)  Ideal body weight based on BMI 22 for females and 23 for males per CF Foundation recs: 60.8 kg (134 lbs)   BMI: 21.1 kg/m      Wt Readings from Last 5 Encounters:   22 56.2 kg (123 lb 14.4 oz)   10/20/21 55.3 kg (122 lb)   06/15/21 55.6 kg (122 lb 9.2 oz)   20 55.9 kg (123 lb 3.8 oz)   19 53.6 kg (118 lb 2.7 oz)     Comments: Overall weight relatively stable with slight gains since initiation of Trikafta. Also physical activity with free weights per pt.      Pancreatic Enzymes  Brand: Creon 12,000  Dosin with meals, 3 with snacks = 1070  units lipase/kg/meal  Estimated Daily Intake: 15 caps = 3210 units lipase/kg/day     Signs of Malabsorption: No, denies greasy/oily stools. Continues with intermittent stomach cramping since Trikafta however frequency has improved.   Enzyme Program: Not eligible.      Nutrition-Related Lab & Vitamin/Mineral Supplements  Annual studies completed Sept 2021  Vitamin A- 0.71 wnl  Vitamin D- 37 wnl  Vitamin E- 11.8 wnl  Iron- 76 wnl  Lipid Panel- wnl     DEXA - 2022, results pending      Current Vitamin/Mineral Supplements: MVW Complete  - 2 caps per day from [facility].      Diet History and Assessment  Diet Preferences/Allergies/Intolerances: Regular  Intake Recall/Comments: Denied recent changes in appetite/PO intake. Typically eats TID meals and protein bar snack in afternoon. Also has a high fat HS snack with Trikafta, usually bag of chips or cheese stick. Girlfriend, Belem, does majority of the cooking/grocery shopping - usually prepares protein/starch/vegetable for dinner. Overall consumes good variety at meals/balanced.      Calcium: ~3 servings/day with milk and cheese  Salt: no concerns  Hydration: trying to drink 8 cups water/day, also drinks milk in AM, OJ, some soda  Supplements: Yes- protein bar     CF Related Diabetes Evaluation  Hgb A1C: 6% on 9/14/21  Insulin: yes - basaglar 10 units  Recently seen by Dr. Miranda March 2022. Will continue to discuss CGM.      NUTRITION DIAGNOSIS  Impaired nutrient utilization related to CF pancreatic insufficiency as evidenced by requires pancreatic enzyme replacement therapy, vitamin/mineral supplementation, and higher kcal/protein diet in order to maintain ideal body weight and nutrition status.       INTERVENTIONS/RECOMMENDATIONS   1) Oral Intake/Weight:   BEE: ~1400  Calorie Goals- 6907-1758 kcals/day (175-200% BEE) +500 kcals/day for weight gain  Protein Goals-  grams/day (1.5-2 g/kg)    Reviewed recent nutrition history and weight trends. Encouraged ongoing good  PO, goal for ongoing slight gains. No nutrition-related concerns noted today.     2) GI/Enzymes:  Continue with current enzyme dose.      3) Vitamin/Mineral Supplementation:  Vitamin levels and iron WNL. Plan to continue current vitamin supplementation at this time.     GOALS:  1) Weight maintenance, ideally with gains towards BMI >23 kg/m2    2) Vitamin levels wnl     FOLLOW-UP/MONITORING:  Visit patient within 12 months for annual nutrition reassessment.     Time spent in face-to-face interaction: 15 minutes     Tamika Perez RD, LD  Cystic Fibrosis/Lung Transplant Dietitian  Pager [phone]    Again, thank you for allowing me to participate in the care of your patient.      Sincerely,    Jyothi Warren PA-C

## 2022-04-19 NOTE — PATIENT INSTRUCTIONS
Cystic Fibrosis Self-Care Plan       Patient: Dilan Nassar   MRN: [id number]   Clinic Date: April 19, 2022     RECOMMENDATIONS:  1. Continue nebulizers and vest therapy.   2. Okay to stop george podhaler and monitor for symptoms.     Annual Studies:   IGG   Date Value Ref Range Status   09/17/2020 1,153 610 - 1,616 mg/dL Final     Immunoglobulin G   Date Value Ref Range Status   09/14/2021 990 610-1,616 mg/dL Final     No results found for: INS  There are no preventive care reminders to display for this patient.    Pulmonary Function Tests  FEV1: amount of air you can blow out in 1 second  FVC: total amount of air you can take in and blow out    Your Goals:         PFT Latest Ref Rng & Units 4/19/2022   FVC L 4.79   FEV1 L 2.79   FVC% % 111   FEV1% % 79          Airway Clearance: The Most Important Way to Keep Your Lungs Healthy  Vest Settings:   Hill-Rom Frequencies: 8, 9, 10 Pressure 10 Then, Frequencies 18, 19, 20 Pressure 6     RespirTech: Quick Start with Pressure of     Do each frequency for 5 minutes; Deflate vest after each frequency & cough 3 times before beginning the next setting.    Vest and Neb Therapy should be done 2 times/day.    Good Nutrition Can Improve Lung Function and Overall Health    Take ALL of your vitamins with food    Take 1/2 of your enzymes before EVERY meal/snack and the other 1/2 mid-meal/snack    Wt Readings from Last 3 Encounters:   10/20/21 55.3 kg (122 lb)   06/15/21 55.6 kg (122 lb 9.2 oz)   09/17/20 55.9 kg (123 lb 3.8 oz)       There is no height or weight on file to calculate BMI.         National CF Foundation Recommendations for BMI in CF Adults: Women: at least 22 Men: at least 23        Controlling Blood Sugars Helps Prevent Lung Infections & Improves Nutrition  Test blood sugar:    In the morning before eating (goal is )    2 hours after a meal (goal is less than 150)    When pre-meal glucose is greater than 150 add correction    At bedtime (if less than 100  eat a snack with 15 grams of carbohydrates  Last A1C Results:   Hemoglobin A1C POCT   Date Value Ref Range Status   09/17/2020 5.7 (H) 0 - 5.6 % Final     Comment:     Normal <5.7% Prediabetes 5.7-6.4%  Diabetes 6.5% or higher - adopted from ADA   consensus guidelines.       Hemoglobin A1C   Date Value Ref Range Status   09/14/2021 6.0 (H) 0.0 - 5.6 % Final     Comment:     Normal <5.7%   Prediabetes 5.7-6.4%    Diabetes 6.5% or higher     Note: Adopted from ADA consensus guidelines.         If diabetic, measure A1C every 6 months. Goal is under 7%.    Staying Healthy  Research: If you are interested in learning about research opportunities or have questions, please contact Sonali Altamirano at [phone] or [email or web address].     Foundation: Compass is a personalized resource service to help you with the insurance, financial, legal and other issues you are facing.  It's free, confidential and available to anyone with CF.  Ask your  for more information or contact Compass directly at 346-[facility] ([phone]) or compass@cff.org, or learn more at cff.org/compass.       CF Nurse Line: Alla Pimentel: [phone]  Audra Smart, RT: [phone]    Daniela Whitt and Tamika Perez , Dieticians: [phone]    Lisset Root, Diabetes Nurse: [phone]   Kathie Truong: [phone] or Kailey Mixon at [phone], Social Workers  [email or web address]

## 2022-04-21 VITALS
WEIGHT: 123.9 LBS | DIASTOLIC BLOOD PRESSURE: 78 MMHG | OXYGEN SATURATION: 97 % | HEIGHT: 64 IN | HEART RATE: 78 BPM | SYSTOLIC BLOOD PRESSURE: 121 MMHG | BODY MASS INDEX: 21.15 KG/M2

## 2022-04-21 NOTE — PROGRESS NOTES
CF Annual Nutrition Assessment     Reason for Assessment  Assessed during CF clinic visit with Jyothi Warren r/t increased nutrition risk with diagnosis of CF per protocol.   Patient accompanied by girlfriend, Belem, at visit.      Anthropometric Assessment  Height: 163 cm  Weight: 56.2 kg (123 lbs)  Ideal body weight based on BMI 22 for females and 23 for males per CF Foundation recs: 60.8 kg (134 lbs)   BMI: 21.1 kg/m      Wt Readings from Last 5 Encounters:   22 56.2 kg (123 lb 14.4 oz)   10/20/21 55.3 kg (122 lb)   06/15/21 55.6 kg (122 lb 9.2 oz)   20 55.9 kg (123 lb 3.8 oz)   19 53.6 kg (118 lb 2.7 oz)     Comments: Overall weight relatively stable with slight gains since initiation of Trikafta. Also physical activity with free weights per pt.      Pancreatic Enzymes  Brand: Creon 12,000  Dosin with meals, 3 with snacks = 1070 units lipase/kg/meal  Estimated Daily Intake: 15 caps = 3210 units lipase/kg/day     Signs of Malabsorption: No, denies greasy/oily stools. Continues with intermittent stomach cramping since Trikafta however frequency has improved.   Enzyme Program: Not eligible.      Nutrition-Related Lab & Vitamin/Mineral Supplements  Annual studies completed 2021  Vitamin A- 0.71 wnl  Vitamin D- 37 wnl  Vitamin E- 11.8 wnl  Iron- 76 wnl  Lipid Panel- wnl     DEXA - , results pending      Current Vitamin/Mineral Supplements: MVW Complete  - 2 caps per day from [facility].      Diet History and Assessment  Diet Preferences/Allergies/Intolerances: Regular  Intake Recall/Comments: Denied recent changes in appetite/PO intake. Typically eats TID meals and protein bar snack in afternoon. Also has a high fat HS snack with Trikafta, usually bag of chips or cheese stick. Girlfriend, Belem, does majority of the cooking/grocery shopping - usually prepares protein/starch/vegetable for dinner. Overall consumes good variety at meals/balanced.      Calcium: ~3 servings/day with milk and  cheese  Salt: no concerns  Hydration: trying to drink 8 cups water/day, also drinks milk in AM, OJ, some soda  Supplements: Yes- protein bar     CF Related Diabetes Evaluation  Hgb A1C: 6% on 9/14/21  Insulin: yes - basaglar 10 units  Recently seen by Dr. Miranda March 2022. Will continue to discuss CGM.      NUTRITION DIAGNOSIS  Impaired nutrient utilization related to CF pancreatic insufficiency as evidenced by requires pancreatic enzyme replacement therapy, vitamin/mineral supplementation, and higher kcal/protein diet in order to maintain ideal body weight and nutrition status.       INTERVENTIONS/RECOMMENDATIONS   1) Oral Intake/Weight:   BEE: ~1400  Calorie Goals- 7959-6731 kcals/day (175-200% BEE) +500 kcals/day for weight gain  Protein Goals-  grams/day (1.5-2 g/kg)    Reviewed recent nutrition history and weight trends. Encouraged ongoing good PO, goal for ongoing slight gains. No nutrition-related concerns noted today.     2) GI/Enzymes:  Continue with current enzyme dose.      3) Vitamin/Mineral Supplementation:  Vitamin levels and iron WNL. Plan to continue current vitamin supplementation at this time.     GOALS:  1) Weight maintenance, ideally with gains towards BMI >23 kg/m2    2) Vitamin levels wnl     FOLLOW-UP/MONITORING:  Visit patient within 12 months for annual nutrition reassessment.     Time spent in face-to-face interaction: 15 minutes     Tamika Perez RD, LD  Cystic Fibrosis/Lung Transplant Dietitian  Pager [phone]

## 2022-04-24 LAB
BACTERIA SPT CULT: ABNORMAL

## 2022-04-29 NOTE — PROGRESS NOTES
Respiratory Therapist Note:         Vest                Brand: Hill-Rom - traditional Hill Rom: Frequencies 8, 9, 10 at pressure 10 then frequencies 18, 19, 20 at pressure 6. and Hill-Rom - Huron Frequencies: 13-15, intensity 8-10                Cough Pause: Cough Pause; Yes                Vest Garment Size: Adult Small                Last Fitting Date: 2022                Frequency of therapy: 14 times per week                Concerns: none         Exercise (purposeful and aerobic for >20 minutes each session): Yes - amount : 2-3/week 20 minutes on the elliptical                Does this qualify as additional airway clearance: Yes         Alternative Airway Clearance:               Nebulized Medications                Bronchodilators: Albuterol                Mucolytic: Pulmozyme                Antibiotics:                 Additional Inhaled Medications: ICS                Spacer Use: yes         Review Cleaning: Yes. Soap and boil.         Education and Transition Information                Correct order of inhaled medications: Yes                Mechanism of Action of inhaled medications: Yes                Frequency of inhaled medications: Yes                Dosage of inhaled medications: Yes                Other:          Home Care:                Nebulizer Cups (Brand/Type): Ruby                Nebulizer Compressor                            Year Purchased: 2018                            [facility], Phone: [phone], Fax: [phone]                Nebulizer Supply Company:                            [facility], Phone: [phone], Fax: [phone]         Oxygen:                                     [facility]                Site:                 Date Completed:          Plan of Care and Goals for next visit: Keep up the good work

## 2022-05-11 DIAGNOSIS — E84.9 CYSTIC FIBROSIS (H): ICD-10-CM

## 2022-05-11 RX ORDER — ELEXACAFTOR, TEZACAFTOR, AND IVACAFTOR 100-50-75
KIT ORAL
Qty: 84 TABLET | Refills: 5 | Status: SHIPPED | OUTPATIENT
Start: 2022-05-11 | End: 2022-11-16

## 2022-05-12 ENCOUNTER — TELEPHONE (OUTPATIENT)
Dept: CARE COORDINATION | Facility: CLINIC | Age: 37
End: 2022-05-12
Payer: COMMERCIAL

## 2022-05-12 NOTE — TELEPHONE ENCOUNTER
Adult Cystic Fibrosis Program  E-mail Communication    Guillermo,     Of course- always happy to help!    One thing that was recently brought to my attention that I wanted to make sure I shared with you is that [employer], who provides the co-pay cards for Trikafta, recently changed their policy and now have a cap to how much they will assist with each month which is: $8950 per fill up to 17 fills per year.  So for many that have a flat co-pay rate this will be fine but we have found that for those who have percentage co-pay it has made their co-pay very high. I don t think that any of the plans you sent had a percentage medication co-pay option but just wanted you to be aware for the future.    Thank you and let me know if you have any questions!    Kathie Bustamante, [facility]  Adult Cystic Fibrosis   [facility]  Care Management   [address] [place] [address]  [email or web address]   Office: [phone]  Pager: [phone]  Gender pronouns: she/her  Employed by [employer]        From: Dilan Nassar <[email or web address]>   Sent: Thursday, May 5, 2022 8:48 AM  To: Kathie Bustamante <[email or web address]>  Subject: Re: Health insurance questions         Kathie,    Thank you for all this information.  It is very helpful.  I appreciate it, and will be in touch if/when I need some assistace with these programs.    Thanks again,  Guillermo Nassar    On Tuesday, May 3, 2022, 11:54:38 AM CDT, Kathie Bustamante <[email or web address]> wrote:       Guillermo,     Ok thank you for that updated info!  You can use co-pay cards for your specialty medications regardless of income.     I talked with our pharmacy technician and he did verify that if you have a private health plan (not Medicare or Medicaid) you can use a co-pay card that will greatly reduce your co-pay to about $15 per month on average. He said that most specialty  medications for offer a co-pay card and most of our patients on Trikafta use a co-pay card. We have found in recent years, that although the insurance allows the use of co-pay card this may not count toward your medical deductible/out of pocket so it will be important that you are choosing a plan that is affordable for you in regards to the deductible and out of pocket- although sometimes people do find that it does go toward that and we always hope for that. These are called  accumulator plans  and I attached a PowerPoint that our former pharmacy tech put together to help explain as it is confusing!     As far as co-pay cards for non-specialty medications, some do and some don t. That is where [employer] could be very useful- I ve explained this below.     Jose Burrows is our pharmacy tech liaison (he is covering for Tl Zurita who is on medical leave) and he said that you may also call him directly to discuss, he did review the insurance plan options that you sent me and suggested the use of co-pay card and there are other resources such as grants if you qualify financially. His phone number is: [phone]     I m not sure if you are familiar with lynda.com or if you have ever used this but this is also an option for co-pay assistance (in addition to the co-pay card) if you qualify financially which may be appropriate for you as it is 500% of poverty guidelines (generous) and especially if you are working part-time. Optimal Blue helps with certain CF medication co-pays- up to $15,000 per year. To see if you qualify, you can view the income guidelines on their website FPL_Guidelines_for_2022_EXTERNAL-2.pdf (Fromlabfoundation.org)  Here is more information about the program: Cystic Fibrosis Treatments - lynda.com where it listed the CF meds/treatments that it helps cover and you can apply for this aureliano online. Let me know if you want to apply and I can explain this program a bit  further.     If you sign up for a private health plan and once this plan is active, this is when we can work with our pharmacy team to make sure that they are applying co-pay cards, grants such as WiseStamp, etc to your medications and work with your pharmacies to make sure they are billing/running these first to reduce the cost of your medications.     Let me know what questions you have!     Kathie Bustamante, [facility]  Adult Cystic Fibrosis   [facility]  Care [address]  [place] [address]  [email or web address]   Office: [phone]  Pager: [phone]  Gender pronouns: she/her  Employed by [employer]          From: Dilan Nassar <[email or web address]>   Sent: Tuesday, May 3, 2022 9:37 AM  To: Kathie Bustamante <[email or web address]  Subject: Re: Health insurance questions          Kathie,     I appreciate you looking into these questions for me.  I'm thinking about returning to school part-time and finding a job part-time.  I that case, I'd likely make too much to qualify for MA (I could possibly still be on [facility]) and I assume I wouldn't be able to secure health benefits through a part-time job.     Do you know if those co-pay cards are available for most/all expensive specialty drugs?  Are they available for most/all prescriptions (not just specialty drugs)?  Also, if I do go back to school and find part-time work, are you or someone from the CF clinic able to help me out with all the paperwork I'd need to get enrolled into these programs?     Thanks again,  Guillermo Nassar         On Thursday, April 28, 2022, 09:25:54 AM CDT, Kathie Bustamante <[email or web address]> wrote:         This one is a little better, much lower deductible. But the meds are obviously still very high.  Jose Burrows, our pharmacy liaison is looking these over.     He did say that you should be  "able to use co-pay cards to reduce the cost to $15 per month, but sometimes the insurance company does not count this toward your deductible or out of pocket so you may still be responsible for that full amount. We also have the [facility] that you could apply for- depending on whether you qualify based on your income.     Just for my own knowledge- will your income be changing where you will no longer be eligible for MA?  If you are returning to work, do you anticipate being self-employed where you will not have access to employer based health insurance?     Kathie Bustamante, [facility]  Adult Cystic Fibrosis   [facility]  Care Management   [address]  [place] [address]  [email or web address]  [email or web address]   Office: [phone]  Pager: [phone]  Gender pronouns: she/her  Employed by [employer]          From: Dilan Nassar <[email or web address]>   Sent: Thursday, April 28, 2022 9:08 AM  To: Kathie Bustamante <[email or web address]>  Subject: Re: Health insurance questions          Kathie,     I did not find any platinum plans available for me in [facility].  Here is a summary of benefits for a Blue Cross gold plan.  Pages 3 and 4 show the drug coverage with \"$650/prescription\" listed for \"Specialty drugs\".     Thank you,  Guillermo Nassar     On Thursday, April 28, 2022, 08:57:50 AM CDT, Kathie Bustamante <[email or web address]> wrote:         James Li,     Thank you for sending this- I briefly looked them over but am going to look at them a bit more closely and also forward to our pharmacy technician. In my brief overview I totally agree with what you are saying- these would be very expensive and difficult for most to afford! In addition to the medication coverage (which is not good), the out of pocket expenses were very high.      Are these the only options or was there a  platinum  plan?      I will let you " "know once I hear back from our pharmacy tech as well.     Kathie Bustamante, [facility]  Adult Cystic Fibrosis   [facility]  Care Management   [address] [place] [address]  [place] [address]  [email or web address]   Office: [phone]  Pager: [phone]  Gender pronouns: she/her  Employed by [employer]          From: Dilan Nassar <[email or web address]>   Sent: Wednesday, April 27, 2022 7:23 PM  To: Kathie Bustamante <[email or web address]>  Subject: Re: Health insurance questions       Hi Kathie,     I pulled these pdfs off the [facility] website.  They are summary of benefits documents for three health plans offered to me in [facility].     1) Pages 2 and 3 of the Blue Cross summary show the different levels of coverage for prescription drugs.  I see \"Tier 4 specialty\" listed with \"$700 copay/prescription.\"     2) Page 3 of the [employer] summary shows the different levels of coverage for prescription drugs.  I see \"Specialty drugs\" listed with \"50% coinsurance.\"  I don't know the allowed amounts for my prescriptions, but I know the medications are very expensive.       3) Page 3 of the [employer] summary shows the different levels of coverage for prescription drugs.  I see \"Specialty drugs\" listed with \"$750 copay/prescription\" and \"deductible does not apply\".     I wanted to reach out and ask what people who may be on plans through [facility] do about some of their most expensive drugs.  I'm guessing I'm on 3-4 specialty drugs and I can't imagine how anyone could afford $2,000 - $3,000 per month for some of their prescriptions.  This is why I assumed people use drug  patient assistance programs.  Please provide any insight or information you may have about being able to afford these prescriptions.  As I said, I'm enrolled through MA right now so it's not an immediate concern, but I'd like to know for " near future reference.     Thank you,  Guillermo Kohlidorene     On Wednesday, April 27, 2022, 12:57:33 PM CDT, Dilan Nassar <[email or web address]> wrote:         Kathie,     Let me see if I can find some details about the benefits I can send to you in the next day or so.  I remeber seeing a $700 flat rate listed for specialty drugs.  I thought I called [facility] too to be sure this information was correct, and I thought the . told me it was correct.  I will get back to you.     Thank you,  Guillermo Kohlidorene       On Wednesday, April 27, 2022, 09:32:24 AM CDT, Kathie Bustamante <[email or web address]> wrote:         James Li!     We do have quite a few of our patients on a private insurance plan that was purchased through the [place] [employer] marketplace/website. $700 per month for specialty medications sounds very high to me. Do you recall whether the drug plan coverage was a percentage of the cost of the medication or if it were a flat rate?  I would have to look at it a bit closer to better understand. There are co-pay cards available through the drug company to help reduce the cost of co-pays for specialty medications, but many insurance plans are no longer allowing the co-pay card payment portion to go toward the deductible/out of pocket maximum like they had been allowing in the past so even though your out of pocket expense would be lower when you fill meds, the cost that the co-pay card covered would not be deducted from you deductible or out of pocket max. In general, the higher the monthly premium, the less your deductible/co-pays and out of pocket expenses will be.      As far as [facility] being in-network, you would have to look at the specific policy and how broad/narrow the network is to know that we were in network but I believe that most are. [facility] does change which private pay insurance policy options are offered each year and contract with certain insurance companies and yes, it may vary which plans are  offered in your area of the state. Because you live in [facility] [place] [facility] [place], it would be important to make sure that [facility] is in network since you live so close to [facility] but I am confident that you would still be able to select an insurance policy that is in network with [facility].      I can definitely give you a call as well, is there a specific time that would work well for you today?     Thanks,      Kathie Bustamante, [facility]  Adult Cystic Fibrosis   [facility] [place]  Care Management   [address]  [place] [address]  [email or web address]  [email or web address]   Office: [phone]  Pager: [phone]  Gender pronouns: she/her  Employed by [employer]          From: Dilan Nassar <[email or web address]>   Sent: Wednesday, April 27, 2022 9:16 AM  To: Kathie Bustamante <[email or web address]>  Subject: Re: Health insurance questions          Thank you for reaching out.  I appreciate it.  I do have some questions about health insurance, specifically MNSure and prescription drug coverage.  I'm currently on MA, and have researched MNSure in the past.  What I found was that co-pays for specialty prescriptions are listed as $700 per month.  Do you know of any CF families or individuals who are on MNSure?  Is that correct that people have to pay $700 per month for their specialty prescriptions?  Or are there some drug  patient assistance programs available that make these costs affordable and more manageable?  I'm also wondering if you know if the [facility] is considered an in-network provider through [facility] regardless of where people live in [place]?  I live in [place], over [place] miles away, and I think the [place] would still be in-network for me, but I'm just curious if you have any insight on that.  Please let me know what you can.  I can also be reached on my cell at [phone] if  that would be easier to talk about some of this.     Thanks again,  Guillermo Nassar     On Tuesday, April 26, 2022, 11:46:08 AM CDT, Kathie Bustamante <[email or web address]> wrote:         James Li!     I am covering Kailey Bryant while she is on maternity leave (she comes back around June 1st) and I d be happy to try to answer questions you have related to health insurance. Feel free to e-mail me your questions or give me a call. My direct phone number is: [phone].       Thank you for reaching out!     Hope to talk with you soon,     Kathie Bustamante, [facility]  Adult Cystic Fibrosis   [facility]  [facility]  Care Management

## 2022-05-15 ENCOUNTER — HEALTH MAINTENANCE LETTER (OUTPATIENT)
Age: 37
End: 2022-05-15

## 2022-06-15 LAB
ACID FAST STAIN (ARUP): NORMAL

## 2022-06-29 DIAGNOSIS — E08.9 DIABETES MELLITUS DUE TO CYSTIC FIBROSIS (H): ICD-10-CM

## 2022-06-29 DIAGNOSIS — E84.0 CYSTIC FIBROSIS WITH PULMONARY MANIFESTATIONS (H): ICD-10-CM

## 2022-06-29 DIAGNOSIS — K86.81 EXOCRINE PANCREATIC INSUFFICIENCY: ICD-10-CM

## 2022-06-29 DIAGNOSIS — A49.02 MRSA INFECTION: ICD-10-CM

## 2022-06-29 DIAGNOSIS — E84.9 CYSTIC FIBROSIS (H): ICD-10-CM

## 2022-06-29 DIAGNOSIS — E84.9 DIABETES MELLITUS DUE TO CYSTIC FIBROSIS (H): ICD-10-CM

## 2022-06-29 DIAGNOSIS — E84.9 CF (CYSTIC FIBROSIS) (H): ICD-10-CM

## 2022-06-29 RX ORDER — ALBUTEROL SULFATE 0.83 MG/ML
2.5 SOLUTION RESPIRATORY (INHALATION)
Qty: 180 ML | Refills: 3 | Status: SHIPPED | OUTPATIENT
Start: 2022-06-29 | End: 2022-11-29

## 2022-08-17 ENCOUNTER — TELEPHONE (OUTPATIENT)
Dept: PULMONOLOGY | Facility: CLINIC | Age: 37
End: 2022-08-17

## 2022-08-17 DIAGNOSIS — E84.9 CYSTIC FIBROSIS (H): ICD-10-CM

## 2022-08-17 DIAGNOSIS — A49.02 MRSA INFECTION: ICD-10-CM

## 2022-08-17 DIAGNOSIS — E08.9 DIABETES MELLITUS DUE TO CYSTIC FIBROSIS (H): ICD-10-CM

## 2022-08-17 DIAGNOSIS — K86.81 EXOCRINE PANCREATIC INSUFFICIENCY: ICD-10-CM

## 2022-08-17 DIAGNOSIS — E84.0 CYSTIC FIBROSIS WITH PULMONARY MANIFESTATIONS (H): ICD-10-CM

## 2022-08-17 DIAGNOSIS — E84.9 CF (CYSTIC FIBROSIS) (H): ICD-10-CM

## 2022-08-17 DIAGNOSIS — E84.9 DIABETES MELLITUS DUE TO CYSTIC FIBROSIS (H): ICD-10-CM

## 2022-08-17 NOTE — TELEPHONE ENCOUNTER
[facility] Prior Authorization Team   Phone: [phone]  Fax: [phone]    PA Initiation    Medication: Trikafta (PA Initiated)  Insurance Company: [employer] - Phone [phone] Fax [phone]  Pharmacy Filling the Rx: [facility] - [place], [place] - [address]  Filling Pharmacy Phone: [phone]  Filling Pharmacy Fax: [phone]  Start Date: 8/17/2022

## 2022-08-18 NOTE — TELEPHONE ENCOUNTER
Prior Authorization Approval    Authorization Effective Date: 9/3/2022  Authorization Expiration Date: 9/3/2023  Medication: Trikafta (PA Approved)  Approved Dose/Quantity: 84 for 28ds  Reference #: [id number]   Insurance Company: [employer] - Phone [phone] Fax [phone]  Expected CoPay: $3     CoPay Card Available: No    Foundation Assistance Needed:    Which Pharmacy is filling the prescription (Not needed for infusion/clinic administered): [facility] - [place], [place] - [address]  Pharmacy Notified: Yes  Patient Notified: Yes

## 2022-08-24 DIAGNOSIS — E84.9 CF (CYSTIC FIBROSIS) (H): Primary | ICD-10-CM

## 2022-09-11 ENCOUNTER — HEALTH MAINTENANCE LETTER (OUTPATIENT)
Age: 37
End: 2022-09-11

## 2022-09-15 NOTE — PROGRESS NOTES
[facility]  September 20, 2022         Assessment and Plan:   Guillermo Nassar is a 37 year old male with h/o cystic fibrosis and pancreatic insufficiency who is seen today for routine follow up.      1. CF lung disease: no new pulmonary complaints, hasn't been doing any cardio, occasionally does some weight and push ups. No recent illnesses or change in cough. Sating 99% on room air; vesting BID. PFTs improved to her recent best. Previous cultures + for MRSA, Klebsiella, Steno, Ps A and A. Fumigatus. No evidence of exacerbation at thist camelia.   - Continue nebulizers and vest therapy  - On chronic azithromycin; okay to stop Flovent and see how you feel    2. Elevated Cr with albuminuria  HTN: /79 today. Urine albumin pending.   - Continue lisinopril 5 mg daily    3. CF related liver disease: US 5/17 demonstrating coarse echotexture of the liver with hypertrophy of the caudate lobe and left lobe concerning for possible cirrhosis, no lesions, patent vasculature. Normal LFTs.  - Continue Ursodiol      4. Exocrine pancreatic insufficiency: denies symptoms of malabsorption.   - Continue enzymes and vitamin supplementation      5. CFRD: last AIC of 6.0 on 9/14/21, pending for today. Started lisinopril per above for renal protection.  Following with Dr. Miranda, due for follow up.   - Continue Lantus 10 U      6. CF related sinus disease: notes some allergy type symptoms starting up. Feels like it might be time to start loratidine  - Continue Flonase      7. CFTR: modulation: on Trikafta and is doing well. Labs today WNL.   - Continue Trikafta    8. HCM: reviewed labs with the patient today including normal CBC, BMP, hepatic panel, CK, iron. CXR reviewed today and demonstrates improvement in bilateral apical bronchiectasis. Multiple labs pending.     RTC: in 3 months virtual or in person  Annual studies due: September 2023 (DEXA > April 2024)  Vaccinations: received  omicron booster; please get flu shot in October      Jyothi Warren PA-C  Pulmonary, Allergy, Critical Care and Sleep Medicine         Interval History:     Just bought a new home and that is going well. 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 Feeling well, no formal exercise other than some weights and push ups. No sickness, no, change in cough or shortness of breath. Just got his Omicron booster. Vesting BID. No new GI symptoms, stools are stable, no grease or fat.          Review of Systems:   Please see HPI. Otherwise, complete 10 point ROS negative.           Past Medical and Surgical History:     Past Medical History:   Diagnosis Date     Cystic fibrosis with pulmonary manifestations (H)     /[id number]     Past Surgical History:   Procedure Laterality Date     APPENDECTOMY OPEN  1999    Appendicitis      SINUS SURGERY  1990's    h/o many sinus infections           Family History:     Family History   Problem Relation Age of Onset     Diabetes Mother         Unknown type     Prostate Cancer Paternal Grandfather      LUNG DISEASE Other         Negative     Diabetes Maternal Aunt         unknown type     Diabetes Maternal Uncle         unknown type     Diabetes Maternal Grandmother         unknown type     Coronary Artery Disease Paternal Grandmother             Social History:     Social History     Socioeconomic History     Marital status: Single     Spouse name: Not on file     Number of children: Not on file     Years of education: Not on file     Highest education level: Not on file   Occupational History     Occupation: State Representative     Employer: [employer]     Occupation: Financial Counselor   Tobacco Use     Smoking status: Never Smoker     Smokeless tobacco: Former User   Substance and Sexual Activity     Alcohol use: Yes     Alcohol/week: 10.0 - 14.0 standard drinks     Types: 10 - 14 Standard drinks or equivalent per week     Comment: 2 drinks per night  5X/wk     Drug use: No     Sexual activity: Yes     Partners: Female     Birth control/protection: Pull-out method, Condom   Other Topics Concern     Parent/sibling w/ CABG, MI or angioplasty before 65F 55M? Yes   Social History Narrative    Owns a home in [place], [place] with his girlfriend of ~7 years. 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     Social Determinants of Health     Financial Resource Strain: Not on file   Food Insecurity: Not on file   Transportation Needs: Not on file   Physical Activity: Not on file   Stress: Not on file   Social Connections: Not on file   Intimate Partner Violence: Not on file   Housing Stability: Not on file            Medications:     Current Outpatient Medications   Medication     albuterol (PROVENTIL) (2.5 MG/3ML) 0.083% neb solution     allopurinol (ZYLOPRIM) 300 MG tablet     amylase-lipase-protease (CREON) 44417-58253-18662 units CPEP     azithromycin (ZITHROMAX) 250 MG tablet     blood glucose (NO BRAND SPECIFIED) test strip     blood glucose monitoring (FREESTYLE) lancets     blood glucose monitoring (NO BRAND SPECIFIED) meter device kit     dornase alpha (PULMOZYME) 2.5 MG/2.5ML neb solution     elexacaftor-tezacaftor-ivacaftor & ivacaftor (TRIKAFTA) 100-50-75 & 150 MG tablet pack     fluticasone (FLONASE) 50 MCG/ACT nasal spray     fluticasone (FLOVENT HFA) 110 MCG/ACT inhaler     insulin glargine (LANTUS SOLOSTAR) 100 UNIT/ML pen     insulin pen needle (32G X 4 MM) 32G X 4 MM miscellaneous     lisinopril (ZESTRIL) 5 MG tablet     mvw complete formulation (SOFTGELS ) capsule     ursodiol (ACTIGALL) 300 MG capsule     No current facility-administered medications for this visit.            Physical Exam:   /79   Pulse 57   SpO2 99%     GENERAL: alert, NAD  HEENT: NCAT, EOMI, anicteric sclera, no oral mucosal edema or erythema  Neck: no cervical or supraclavicular adenopathy  Respiratory: good air flow, mainly clear  CV: RRR, S1S2, no murmurs  noted  Abdomen: normoactive BS, soft and non tender   Lymph: no edema, + digital clubbing  Neuro: AAO X 3, CN 2-12 grossly intact  Psychiatric: normal affect, good eye contact  Skin: no rash, jaundice or lesions on limited exam         Data:   All laboratory and imaging data reviewed.      Cystic Fibrosis Culture  Specimen Description   Date Value Ref Range Status   06/15/2021 Sputum  Final   09/17/2020 Sputum  Final   12/19/2019 Sputum  Final    Culture Micro   Date Value Ref Range Status   06/15/2021 Heavy growth  Normal mami    Final   06/15/2021 (A)  Final    Light growth  Pseudomonas aeruginosa, mucoid strain     06/15/2021 Light growth  Pseudomonas aeruginosa   (A)  Final   06/15/2021 Light growth  Strain 2  Pseudomonas aeruginosa   (A)  Final        PFT interpretation:  Maneuver: valid and meets ATS guidelines  Mild obstruction with decreased FEV1/FVC, but normal FEV1  Compared to prior: FEV1 of 2.85 is 60 ml above prior

## 2022-09-20 ENCOUNTER — OFFICE VISIT (OUTPATIENT)
Dept: PULMONOLOGY | Facility: CLINIC | Age: 37
End: 2022-09-20
Attending: PHYSICIAN ASSISTANT
Payer: COMMERCIAL

## 2022-09-20 ENCOUNTER — ANCILLARY PROCEDURE (OUTPATIENT)
Dept: GENERAL RADIOLOGY | Facility: CLINIC | Age: 37
End: 2022-09-20
Attending: PHYSICIAN ASSISTANT
Payer: COMMERCIAL

## 2022-09-20 ENCOUNTER — OFFICE VISIT (OUTPATIENT)
Dept: PHARMACY | Facility: CLINIC | Age: 37
End: 2022-09-20
Payer: COMMERCIAL

## 2022-09-20 ENCOUNTER — LAB (OUTPATIENT)
Dept: LAB | Facility: CLINIC | Age: 37
End: 2022-09-20
Payer: COMMERCIAL

## 2022-09-20 VITALS — HEART RATE: 57 BPM | SYSTOLIC BLOOD PRESSURE: 131 MMHG | DIASTOLIC BLOOD PRESSURE: 79 MMHG | OXYGEN SATURATION: 99 %

## 2022-09-20 DIAGNOSIS — E84.0 CYSTIC FIBROSIS WITH PULMONARY MANIFESTATIONS (H): ICD-10-CM

## 2022-09-20 DIAGNOSIS — E84.9 CF (CYSTIC FIBROSIS) (H): ICD-10-CM

## 2022-09-20 DIAGNOSIS — K86.81 EXOCRINE PANCREATIC INSUFFICIENCY: ICD-10-CM

## 2022-09-20 DIAGNOSIS — E84.0 CYSTIC FIBROSIS WITH PULMONARY EXACERBATION (H): Primary | ICD-10-CM

## 2022-09-20 DIAGNOSIS — E84.8 DIABETES MELLITUS RELATED TO CYSTIC FIBROSIS (H): ICD-10-CM

## 2022-09-20 DIAGNOSIS — E08.9 DIABETES MELLITUS RELATED TO CYSTIC FIBROSIS (H): ICD-10-CM

## 2022-09-20 DIAGNOSIS — M10.9 GOUT, UNSPECIFIED CAUSE, UNSPECIFIED CHRONICITY, UNSPECIFIED SITE: ICD-10-CM

## 2022-09-20 DIAGNOSIS — E84.9 CF (CYSTIC FIBROSIS) (H): Primary | ICD-10-CM

## 2022-09-20 DIAGNOSIS — Z71.85 VACCINE COUNSELING: ICD-10-CM

## 2022-09-20 DIAGNOSIS — I10 BENIGN ESSENTIAL HYPERTENSION: ICD-10-CM

## 2022-09-20 LAB
ALBUMIN SERPL BCG-MCNC: 4.4 G/DL (ref 3.5–5.2)
ALBUMIN UR-MCNC: NEGATIVE MG/DL
ALP SERPL-CCNC: 118 U/L (ref 40–129)
ALT SERPL W P-5'-P-CCNC: 30 U/L (ref 10–50)
ANION GAP SERPL CALCULATED.3IONS-SCNC: 10 MMOL/L (ref 7–15)
APPEARANCE UR: CLEAR
AST SERPL W P-5'-P-CCNC: 28 U/L (ref 10–50)
BASOPHILS # BLD AUTO: 0.1 10E3/UL (ref 0–0.2)
BASOPHILS NFR BLD AUTO: 1 %
BILIRUB DIRECT SERPL-MCNC: <0.2 MG/DL (ref 0–0.3)
BILIRUB SERPL-MCNC: 0.6 MG/DL
BILIRUB UR QL STRIP: NEGATIVE
BUN SERPL-MCNC: 22.3 MG/DL (ref 6–20)
CALCIUM SERPL-MCNC: 9.7 MG/DL (ref 8.6–10)
CHLORIDE SERPL-SCNC: 102 MMOL/L (ref 98–107)
CHOLEST SERPL-MCNC: 120 MG/DL
CK SERPL-CCNC: 218 U/L (ref 39–308)
COLOR UR AUTO: YELLOW
CREAT SERPL-MCNC: 0.88 MG/DL (ref 0.67–1.17)
CREAT UR-MCNC: 79.5 MG/DL
DEPRECATED HCO3 PLAS-SCNC: 26 MMOL/L (ref 22–29)
EOSINOPHIL # BLD AUTO: 0.3 10E3/UL (ref 0–0.7)
EOSINOPHIL NFR BLD AUTO: 4 %
ERYTHROCYTE [DISTWIDTH] IN BLOOD BY AUTOMATED COUNT: 11.9 % (ref 10–15)
EXPTIME-PRE: 14.84 SEC
FEF2575-%PRED-PRE: 32 %
FEF2575-PRE: 1.16 L/SEC
FEF2575-PRED: 3.59 L/SEC
FEFMAX-%PRED-PRE: 90 %
FEFMAX-PRE: 8.13 L/SEC
FEFMAX-PRED: 8.93 L/SEC
FEV1-%PRED-PRE: 81 %
FEV1-PRE: 2.85 L
FEV1FEV6-PRE: 63 %
FEV1FEV6-PRED: 82 %
FEV1FVC-PRE: 58 %
FEV1FVC-PRED: 82 %
FIFMAX-PRE: 7.86 L/SEC
FVC-%PRED-PRE: 114 %
FVC-PRE: 4.89 L
FVC-PRED: 4.28 L
GFR SERPL CREATININE-BSD FRML MDRD: >90 ML/MIN/1.73M2
GLUCOSE SERPL-MCNC: 199 MG/DL (ref 70–99)
GLUCOSE UR STRIP-MCNC: NEGATIVE MG/DL
HBA1C MFR BLD: 6.3 %
HCT VFR BLD AUTO: 40.5 % (ref 40–53)
HDLC SERPL-MCNC: 72 MG/DL
HGB BLD-MCNC: 14.4 G/DL (ref 13.3–17.7)
HGB UR QL STRIP: NEGATIVE
IMM GRANULOCYTES # BLD: 0 10E3/UL
IMM GRANULOCYTES NFR BLD: 0 %
INR PPP: 1.03 (ref 0.85–1.15)
IRON SERPL-MCNC: 70 UG/DL (ref 61–157)
KETONES UR STRIP-MCNC: NEGATIVE MG/DL
LDLC SERPL CALC-MCNC: 35 MG/DL
LEUKOCYTE ESTERASE UR QL STRIP: NEGATIVE
LYMPHOCYTES # BLD AUTO: 1.8 10E3/UL (ref 0.8–5.3)
LYMPHOCYTES NFR BLD AUTO: 26 %
MAGNESIUM SERPL-MCNC: 1.7 MG/DL (ref 1.7–2.3)
MCH RBC QN AUTO: 32.5 PG (ref 26.5–33)
MCHC RBC AUTO-ENTMCNC: 35.6 G/DL (ref 31.5–36.5)
MCV RBC AUTO: 91 FL (ref 78–100)
MICROALBUMIN UR-MCNC: <12 MG/L
MICROALBUMIN/CREAT UR: NORMAL MG/G{CREAT}
MONOCYTES # BLD AUTO: 0.4 10E3/UL (ref 0–1.3)
MONOCYTES NFR BLD AUTO: 6 %
MUCOUS THREADS #/AREA URNS LPF: PRESENT /LPF
NEUTROPHILS # BLD AUTO: 4.4 10E3/UL (ref 1.6–8.3)
NEUTROPHILS NFR BLD AUTO: 63 %
NITRATE UR QL: NEGATIVE
NONHDLC SERPL-MCNC: 48 MG/DL
NRBC # BLD AUTO: 0 10E3/UL
NRBC BLD AUTO-RTO: 0 /100
PH UR STRIP: 6 [PH] (ref 5–7)
PHOSPHATE SERPL-MCNC: 3.1 MG/DL (ref 2.5–4.5)
PLATELET # BLD AUTO: 164 10E3/UL (ref 150–450)
POTASSIUM SERPL-SCNC: 4.2 MMOL/L (ref 3.4–5.3)
PROT SERPL-MCNC: 6.7 G/DL (ref 6.4–8.3)
RBC # BLD AUTO: 4.43 10E6/UL (ref 4.4–5.9)
RBC URINE: 0 /HPF
SODIUM SERPL-SCNC: 138 MMOL/L (ref 136–145)
SP GR UR STRIP: 1.02 (ref 1–1.03)
TRIGL SERPL-MCNC: 64 MG/DL
TSH SERPL DL<=0.005 MIU/L-ACNC: 1.35 UIU/ML (ref 0.3–4.2)
UROBILINOGEN UR STRIP-MCNC: NORMAL MG/DL
WBC # BLD AUTO: 7 10E3/UL (ref 4–11)
WBC URINE: 0 /HPF

## 2022-09-20 PROCEDURE — 87077 CULTURE AEROBIC IDENTIFY: CPT | Performed by: PHYSICIAN ASSISTANT

## 2022-09-20 PROCEDURE — 84590 ASSAY OF VITAMIN A: CPT | Mod: 90 | Performed by: PATHOLOGY

## 2022-09-20 PROCEDURE — 99214 OFFICE O/P EST MOD 30 MIN: CPT | Mod: 25 | Performed by: PHYSICIAN ASSISTANT

## 2022-09-20 PROCEDURE — 85610 PROTHROMBIN TIME: CPT | Performed by: PATHOLOGY

## 2022-09-20 PROCEDURE — 36415 COLL VENOUS BLD VENIPUNCTURE: CPT | Performed by: PATHOLOGY

## 2022-09-20 PROCEDURE — 84443 ASSAY THYROID STIM HORMONE: CPT | Performed by: PHYSICIAN ASSISTANT

## 2022-09-20 PROCEDURE — 71046 X-RAY EXAM CHEST 2 VIEWS: CPT | Mod: GC | Performed by: RADIOLOGY

## 2022-09-20 PROCEDURE — 80061 LIPID PANEL: CPT | Performed by: PHYSICIAN ASSISTANT

## 2022-09-20 PROCEDURE — 82306 VITAMIN D 25 HYDROXY: CPT | Performed by: PHYSICIAN ASSISTANT

## 2022-09-20 PROCEDURE — 99000 SPECIMEN HANDLING OFFICE-LAB: CPT | Performed by: PATHOLOGY

## 2022-09-20 PROCEDURE — 82785 ASSAY OF IGE: CPT | Performed by: PHYSICIAN ASSISTANT

## 2022-09-20 PROCEDURE — 84100 ASSAY OF PHOSPHORUS: CPT | Performed by: PATHOLOGY

## 2022-09-20 PROCEDURE — 83735 ASSAY OF MAGNESIUM: CPT | Performed by: PATHOLOGY

## 2022-09-20 PROCEDURE — 82248 BILIRUBIN DIRECT: CPT | Performed by: PATHOLOGY

## 2022-09-20 PROCEDURE — 84403 ASSAY OF TOTAL TESTOSTERONE: CPT | Performed by: PHYSICIAN ASSISTANT

## 2022-09-20 PROCEDURE — 83540 ASSAY OF IRON: CPT | Performed by: PATHOLOGY

## 2022-09-20 PROCEDURE — G0463 HOSPITAL OUTPT CLINIC VISIT: HCPCS | Mod: 25

## 2022-09-20 PROCEDURE — 99605 MTMS BY PHARM NP 15 MIN: CPT | Performed by: PHARMACIST

## 2022-09-20 PROCEDURE — 82784 ASSAY IGA/IGD/IGG/IGM EACH: CPT | Performed by: PHYSICIAN ASSISTANT

## 2022-09-20 PROCEDURE — 84446 ASSAY OF VITAMIN E: CPT | Mod: 90 | Performed by: PATHOLOGY

## 2022-09-20 PROCEDURE — 83036 HEMOGLOBIN GLYCOSYLATED A1C: CPT | Performed by: PHYSICIAN ASSISTANT

## 2022-09-20 PROCEDURE — 82043 UR ALBUMIN QUANTITATIVE: CPT | Performed by: PHYSICIAN ASSISTANT

## 2022-09-20 PROCEDURE — 94375 RESPIRATORY FLOW VOLUME LOOP: CPT | Performed by: PHYSICIAN ASSISTANT

## 2022-09-20 PROCEDURE — 82550 ASSAY OF CK (CPK): CPT | Performed by: PATHOLOGY

## 2022-09-20 PROCEDURE — 80050 GENERAL HEALTH PANEL: CPT | Performed by: PATHOLOGY

## 2022-09-20 PROCEDURE — 99607 MTMS BY PHARM ADDL 15 MIN: CPT | Performed by: PHARMACIST

## 2022-09-20 PROCEDURE — 81001 URINALYSIS AUTO W/SCOPE: CPT | Performed by: PATHOLOGY

## 2022-09-20 NOTE — NURSING NOTE
"Dilan Nassar is a 37 year old year old who is being seen for Follow Up (annual)      Medications reviewed and Vital signs taken.    Specimen Collection Type: Sputum    Order(s) placed: CF Aerobic Bacterial    *IF AFB order placed - please enter \"PRIORITIZE AFB\" to order comments.       Lab Results   Component Value Date    ACIDFAST No acid fast bacilli seen 04/19/2022    ACIDFAST No acid fast bacilli seen 04/19/2022    ACIDFAST No acid fast bacilli seen 04/19/2022         Lab Results   Component Value Date    AFBSMS Negative for acid fast bacteria 09/26/2018    AFBSMS  09/26/2018     Less than 5ml of specimen received.  A minimum of 5 mL of sputum or fluid is recommended for recovery of acid fast bacilli   (AFB).  Volumes less than 5 mL are suboptimal and may compromise recovery of AFB from   culture.      AFBSMS  09/26/2018     Assayed at [facility] [address] [place] [id number] [phone]         Vitals were taken and medications were reconciled.     CHRIS Crocker      "

## 2022-09-20 NOTE — PATIENT INSTRUCTIONS
Cystic Fibrosis Self-Care Plan       Patient: Dilan Nassar   MRN: [id number]   Clinic Date: September 20, 2022     RECOMMENDATIONS:  1. Continue nebulizers and vest therapy.   2. Okay to stop Flovent and see how you feel.  3. Please do home spirometry today or tomorrow and then again, prior to your next visit.  4. Get a flu shot in mid October.       Annual Studies:   IGG   Date Value Ref Range Status   09/17/2020 1,153 610 - 1,616 mg/dL Final     Immunoglobulin G   Date Value Ref Range Status   09/14/2021 990 610-1,616 mg/dL Final     No results found for: INS  There are no preventive care reminders to display for this patient.    Pulmonary Function Tests  FEV1: amount of air you can blow out in 1 second  FVC: total amount of air you can take in and blow out    Your Goals:         PFT Latest Ref Rng & Units 4/19/2022   FVC L 4.79   FEV1 L 2.79   FVC% % 111   FEV1% % 79          Airway Clearance: The Most Important Way to Keep Your Lungs Healthy  Vest Settings:   Hill-Rom Frequencies: 8, 9, 10 Pressure 10 Then, Frequencies 18, 19, 20 Pressure 6     RespirTech: Quick Start with Pressure of     Do each frequency for 5 minutes; Deflate vest after each frequency & cough 3 times before beginning the next setting.    Vest and Neb Therapy should be done 2 times/day.    Good Nutrition Can Improve Lung Function and Overall Health    Take ALL of your vitamins with food    Take 1/2 of your enzymes before EVERY meal/snack and the other 1/2 mid-meal/snack    Wt Readings from Last 3 Encounters:   04/19/22 56.2 kg (123 lb 14.4 oz)   10/20/21 55.3 kg (122 lb)   06/15/21 55.6 kg (122 lb 9.2 oz)       There is no height or weight on file to calculate BMI.         National CF Foundation Recommendations for BMI in CF Adults: Women: at least 22 Men: at least 23        Controlling Blood Sugars Helps Prevent Lung Infections & Improves Nutrition  Test blood sugar:    In the morning before eating (goal is )    2 hours after a  meal (goal is less than 150)    When pre-meal glucose is greater than 150 add correction    At bedtime (if less than 100 eat a snack with 15 grams of carbohydrates  Last A1C Results:   Hemoglobin A1C   Date Value Ref Range Status   09/14/2021 6.0 (H) 0.0 - 5.6 % Final     Comment:     Normal <5.7%   Prediabetes 5.7-6.4%    Diabetes 6.5% or higher     Note: Adopted from ADA consensus guidelines.   09/17/2020 5.7 (H) 0 - 5.6 % Final     Comment:     Normal <5.7% Prediabetes 5.7-6.4%  Diabetes 6.5% or higher - adopted from ADA   consensus guidelines.           If diabetic, measure A1C every 6 months. Goal is under 7%.    Staying Healthy  Research: If you are interested in learning about research opportunities or have questions, please contact Sonali Altamirano at [phone] or [email or web address].     Foundation: [employer] is a personalized resource service to help you with the insurance, financial, legal and other issues you are facing.  It's free, confidential and available to anyone with CF.  Ask your  for more information or contact [employer] directly at [phone] ([phone]) or compass@cff.org, or learn more at cff.org/compass.       CF Nurse Line: Alla Pimentel: [phone]  Audra Smart, RT: [phone]    Daniela Whitt and Tamika Perez , Dieticians: [phone]    Lisset Root, Diabetes Nurse: [phone]   Kathie Truong: [phone] or Kailey Mixon at [phone], Social Workers  [email or web address]

## 2022-09-20 NOTE — LETTER
9/20/2022         RE: Dilan Nassar  [address]  [facility] [address]        Dear Colleague,    Thank you for referring your patient, Dilan Nassar, to the [facility] [place]. Please see a copy of my visit note below.    [facility]  September 20, 2022         Assessment and Plan:   Guillermo Nassar is a 37 year old male with h/o cystic fibrosis and pancreatic insufficiency who is seen today for routine follow up.      1. CF lung disease: no new pulmonary complaints, hasn't been doing any cardio, occasionally does some weight and push ups. No recent illnesses or change in cough. Sating 99% on room air; vesting BID. PFTs improved to her recent best. Previous cultures + for MRSA, Klebsiella, Steno, Ps A and A. Fumigatus. No evidence of exacerbation at thist [place]elia.   - Continue nebulizers and vest therapy  - On chronic azithromycin; okay to stop Flovent and see how you feel    2. Elevated Cr with albuminuria  HTN: /79 today. Urine albumin pending.   - Continue lisinopril 5 mg daily    3. CF related liver disease: US 5/17 demonstrating coarse echotexture of the liver with hypertrophy of the caudate lobe and left lobe concerning for possible cirrhosis, no lesions, patent vasculature. Normal LFTs.  - Continue Ursodiol      4. Exocrine pancreatic insufficiency: denies symptoms of malabsorption.   - Continue enzymes and vitamin supplementation      5. CFRD: last AIC of 6.0 on 9/14/21, pending for today. Started lisinopril per above for renal protection.  Following with Dr. Miranda, due for follow up.   - Continue Lantus 10 U      6. CF related sinus disease: notes some allergy type symptoms starting up. Feels like it might be time to start loratidine  - Continue Flonase      7. CFTR: modulation: on Trikafta and is doing well. Labs today WNL.   - Continue Trikafta    8. HCM: reviewed labs with the patient  today including normal CBC, BMP, hepatic panel, CK, iron. CXR reviewed today and demonstrates improvement in bilateral apical bronchiectasis. Multiple labs pending.     RTC: in 3 months virtual or in person  Annual studies due: September 2023 (DEXA > April 2024)  Vaccinations: received omicron booster; please get flu shot in October      Jyothi Warren PA-C  Pulmonary, Allergy, Critical Care and Sleep Medicine         Interval History:     Just bought a new home and that is going well. 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 Feeling well, no formal exercise other than some weights and push ups. No sickness, no, change in cough or shortness of breath. Just got his Omicron booster. Vesting BID. No new GI symptoms, stools are stable, no grease or fat.          Review of Systems:   Please see HPI. Otherwise, complete 10 point ROS negative.           Past Medical and Surgical History:     Past Medical History:   Diagnosis Date     Cystic fibrosis with pulmonary manifestations (H)     /3659delc     Past Surgical History:   Procedure Laterality Date     APPENDECTOMY OPEN  1999    Appendicitis      SINUS SURGERY  1990's    h/o many sinus infections           Family History:     Family History   Problem Relation Age of Onset     Diabetes Mother         Unknown type     Prostate Cancer Paternal Grandfather      LUNG DISEASE Other         Negative     Diabetes Maternal Aunt         unknown type     Diabetes Maternal Uncle         unknown type     Diabetes Maternal Grandmother         unknown type     Coronary Artery Disease Paternal Grandmother             Social History:     Social History     Socioeconomic History     Marital status: Single     Spouse name: Not on file     Number of children: Not on file     Years of education: Not on file     Highest education level: Not on file   Occupational History     Occupation: State Representative     Employer: [employer]     Occupation:  Financial Counselor   Tobacco Use     Smoking status: Never Smoker     Smokeless tobacco: Former User   Substance and Sexual Activity     Alcohol use: Yes     Alcohol/week: 10.0 - 14.0 standard drinks     Types: 10 - 14 Standard drinks or equivalent per week     Comment: 2 drinks per night 5X/wk     Drug use: No     Sexual activity: Yes     Partners: Female     Birth control/protection: Pull-out method, Condom   Other Topics Concern     Parent/sibling w/ CABG, MI or angioplasty before 65F 55M? Yes   Social History Narrative    Owns a home in [place], [place] with his girlfriend of ~7 years. Continues to work full time as a [employer].     Social Determinants of Health     Financial Resource Strain: Not on file   Food Insecurity: Not on file   Transportation Needs: Not on file   Physical Activity: Not on file   Stress: Not on file   Social Connections: Not on file   Intimate Partner Violence: Not on file   Housing Stability: Not on file            Medications:     Current Outpatient Medications   Medication     albuterol (PROVENTIL) (2.5 MG/3ML) 0.083% neb solution     allopurinol (ZYLOPRIM) 300 MG tablet     amylase-lipase-protease (CREON) 74188-74799-66400 units CPEP     azithromycin (ZITHROMAX) 250 MG tablet     blood glucose (NO BRAND SPECIFIED) test strip     blood glucose monitoring (FREESTYLE) lancets     blood glucose monitoring (NO BRAND SPECIFIED) meter device kit     dornase alpha (PULMOZYME) 2.5 MG/2.5ML neb solution     elexacaftor-tezacaftor-ivacaftor & ivacaftor (TRIKAFTA) 100-50-75 & 150 MG tablet pack     fluticasone (FLONASE) 50 MCG/ACT nasal spray     fluticasone (FLOVENT HFA) 110 MCG/ACT inhaler     insulin glargine (LANTUS SOLOSTAR) 100 UNIT/ML pen     insulin pen needle (32G X 4 MM) 32G X 4 MM miscellaneous     lisinopril (ZESTRIL) 5 MG tablet     mvw complete formulation (SOFTGELS ) capsule     ursodiol (ACTIGALL) 300 MG capsule     No current facility-administered medications for this  visit.            Physical Exam:   /79   Pulse 57   SpO2 99%     GENERAL: alert, NAD  HEENT: NCAT, EOMI, anicteric sclera, no oral mucosal edema or erythema  Neck: no cervical or supraclavicular adenopathy  Respiratory: good air flow, mainly clear  CV: RRR, S1S2, no murmurs noted  Abdomen: normoactive BS, soft and non tender   Lymph: no edema, + digital clubbing  Neuro: AAO X 3, CN 2-12 grossly intact  Psychiatric: normal affect, good eye contact  Skin: no rash, jaundice or lesions on limited exam         Data:   All laboratory and imaging data reviewed.      Cystic Fibrosis Culture  Specimen Description   Date Value Ref Range Status   06/15/2021 Sputum  Final   09/17/2020 Sputum  Final   12/19/2019 Sputum  Final    Culture Micro   Date Value Ref Range Status   06/15/2021 Heavy growth  Normal mami    Final   06/15/2021 (A)  Final    Light growth  Pseudomonas aeruginosa, mucoid strain     06/15/2021 Light growth  Pseudomonas aeruginosa   (A)  Final   06/15/2021 Light growth  Strain 2  Pseudomonas aeruginosa   (A)  Final        PFT interpretation:  Maneuver: valid and meets ATS guidelines  Mild obstruction with decreased FEV1/FVC, but normal FEV1  Compared to prior: FEV1 of 2.85 is 60 ml above prior            Again, thank you for allowing me to participate in the care of your patient.        Sincerely,        Jyothi Warren PA-C

## 2022-09-20 NOTE — PROGRESS NOTES
Medication Therapy Management (MTM) Encounter    ASSESSMENT:                            Medication Adherence/Access: No issues identified    CF: PFTs are stable and patient may benefit from discontinuing Flovent, per Jyothi Warren PA-C. Trikafta labs are within normal limits. Recommend recheck labs in 1 year. Patient may need to send home PFTs via [employer] to [facility].     Pancreatic Insufficiency/Nutrition: annual vitamin labs are due today. Dietitian to follow up with results. BMI is at goal of 22 mg/k2 per CFF guidelines.    Hypertension: Stable. Patient is meeting blood pressure goal of < 140/90mmHg.    CFRD: Patient is meeting A1c goal of <7% per CFF guidelines. Patient would benefit from continued follow-up with endocrine.    Gout: Stable. Patient is due for uric acid level.    Immunizations: Patient may benefit from receiving the influenza vaccination.    PLAN:                            1. Great work managing your medications, keep it up!     2. Hold Flovent and check PFTs at home. Jocelin will have the RTs reach out to you to get your home PFT readings.    3. Schedule a follow-up appointment with Dr. Miranda.     4. Plan to get your influenza vaccine at the end of October.     Follow-up: 1 year  Future considerations: uric acid level    SUBJECTIVE/OBJECTIVE:                          Dilan Nassar is a 37 year old male coming in for a follow-up visit. Today's visit is a co-visit with Jyothi Warren. Today's visit is a follow-up MTM visit from 9/21/21     Reason for visit: annual comprehensive medication review.    Allergies/ADRs: Reviewed in chart  Past Medical History: Reviewed in chart  Tobacco: He reports that he has never smoked. He has quit using smokeless tobacco.  Alcohol: rarely    Medication Adherence/Access:   Pharmacies: Patient uses [facility] in [place] and  [facility]. Patient denies any issues with medication access or cost.  Adherence: Patient reports good adherence to  "medications and rarely misses doses.    CF: Patient is taking the following medications:  Inhaled medications              Bronchodilator: albuterol nebs 0.083% twice daily              Mucolytic: Pulmozyme nebs 2.5 mg twice daily  Antibiotic: not indicated  Other:  Flonase 2 sprays each nostril daily and Flovent  mcg - not using (written 1 puff twice daily)  Oral medications              Azithromycin: 250 mg Monday, Wednesday, and Friday. No issues today.  CFTR modulator: Trikafta (full dose) with high fat meals. No reported SE.               Other: ursodiol 300 mg twice daily for CF liver disease. No reported SE.  Pulmonary symptoms are stable  PFTs are increased  Cultures (last growth): sputum cultures grow Pseudomonas (9/20/22)  Current exacerbation: no  CF Genotype: [id number]  Jyothi Warren PA-C notes that she would like Guillermo to send home PFTs to [facility].     Lab Results   Component Value Date    ALT 30 09/20/2022    AST 28 09/20/2022    BILITOTAL 0.6 09/20/2022    DBIL <0.20 09/20/2022     09/20/2022     PFTs:       Pancreatic Insufficiency/Nutrition: Pancreatic enzyme replacement with Creon 12,000-38,000-60,000 units.  Patient is taking 5-6 capsules with meals. Patient is not experiencing sign/symptoms of malabsorption. No reported SE.  Acid Reducer: none  Bowel Regimen: none  Vitamins include: MVW  two caps daily    Estimated body mass index is 21.15 kg/m  as calculated from the following:    Height as of 4/19/22: 5' 4.17\" (1.63 m).    Weight as of 4/19/22: 123 lb 14.4 oz (56.2 kg).    Lab Results   Component Value Date    MOUNA 0.58 09/20/2022    BETTY 10.3 09/20/2022    VITDT 42 09/20/2022     Hypertension: Current medications include lisinopril 5 mg daily.  Patient does not self-monitor blood pressure.  Patient reports no current medication side effects, including dry cough, dizziness, or headache.  BP Readings from Last 3 Encounters:   09/20/22 131/79   04/19/22 121/78   01/11/22 (!) " 147/92     CFRD: He is taking Lantus 10 units every morning. No reported SE.  Patient has glucose testing supplies at home. Testing up to 4 times daily (patient reported): Fastin - 105 mg/dL PP: 170 - 200 mg/dL.   Patient is not experiencing hypoglycemia  Recent symptoms of high blood sugar? none  Guillermo follows with Dr. Miranda from endocrinology, last visit was 3/8/22.  Lab Results   Component Value Date    A1C 6.3 2022    A1C 6.0 2021    A1C 5.7 2020    A1C 6.1 2019    A1C 5.8 2018    A1C 6.1 09/15/2017    A1C 6.7 2016     Gout: On allopurinol 300 mg daily. No SE and has not had any flares in the last couple years.   No results found for: URIC    Immunizations: Patient has not yet had influenza vaccination.  Most Recent Immunizations   Administered Date(s) Administered     COVID-19,PF,Moderna 2021     Flu, Unspecified 10/01/2010     M6s4-20 Novel Flu 2009     HepB, Unspecified 1997     Historical DTP/aP 1988     Influenza (IIV3) PF 2013     Influenza Vaccine IM > 6 months Valent IIV4 (Alfuria,Fluzone) 2020     Influenza Vaccine, 6+MO IM (QUADRIVALENT W/PRESERVATIVES) 10/01/2014     MMR 1996     OPV, trivalent, live 1988     Pneumo Conj 13-V (2010&after) 2015     Pneumococcal 23 valent 10/31/2012     TD (ADULT, 7+) 2009     Td (Adult), Adsorbed 1996     Tdap (Adacel,Boostrix) 2021     Today's Vitals: There were no vitals taken for this visit.  ----------------  I spent 20 minutes with this patient today. All changes were made via collaborative practice agreement with Jyothi Warren PA-C. A copy of the visit note was provided to the patient's provider(s).    The patient declined a summary of these recommendations. See Provider note/AVS from today.     Jocelin Cuba, PharmD   Medication Therapy Management   Cystic Fibrosis Pharmacist     Medication Therapy Recommendations  No medication therapy  recommendations to display

## 2022-09-21 ENCOUNTER — EXTERNAL ORDER RESULTS (OUTPATIENT)
Dept: PULMONOLOGY | Facility: CLINIC | Age: 37
End: 2022-09-21

## 2022-09-21 LAB
DEPRECATED CALCIDIOL+CALCIFEROL SERPL-MC: 42 UG/L (ref 20–75)
FEV-1: 2.76
FEV1-%PRED-PRE (EXTERNAL): 79
FEV1/FVC: 0.59
FVC-%PRED-PRE (EXTERNAL): 109
FVC: 4.64
IGG SERPL-MCNC: 830 MG/DL (ref 610–1616)

## 2022-09-22 LAB
IGE SERPL-ACNC: <2 KU/L (ref 0–114)
TESTOST SERPL-MCNC: 373 NG/DL (ref 240–950)

## 2022-09-23 LAB
A-TOCOPHEROL VIT E SERPL-MCNC: 10.3 MG/L
ANNOTATION COMMENT IMP: NORMAL
BETA+GAMMA TOCOPHEROL SERPL-MCNC: 0.2 MG/L
RETINYL PALMITATE SERPL-MCNC: 0.03 MG/L
VIT A SERPL-MCNC: 0.58 MG/L

## 2022-09-25 LAB
BACTERIA SPT CULT: ABNORMAL

## 2022-09-29 ENCOUNTER — TELEPHONE (OUTPATIENT)
Dept: PULMONOLOGY | Facility: CLINIC | Age: 37
End: 2022-09-29

## 2022-09-29 NOTE — TELEPHONE ENCOUNTER
called to schedule video visit no answer lvm to contact Alejandra or call center for scheduling sent my

## 2022-10-05 ENCOUNTER — TELEPHONE (OUTPATIENT)
Dept: PULMONOLOGY | Facility: CLINIC | Age: 37
End: 2022-10-05

## 2022-10-10 NOTE — PROGRESS NOTES
Dilan Nassar  is being evaluated via a billable video visit.      How would you like to obtain your AVS? [employer]  For the video visit, send the invitation by: Send to e-mail at: [email or web address]  Will anyone else be joining your video visit? No    Outcome for 10/10/22 10:49 AM: [employer] message sent  KAYLIN Hall  Outcome for 10/10/22 10:49 AM: Left Voicemail   KAYLIN Hall  Outcome for 10/10/22 12:46 PM: Glucose Readings sent via [employer]  KAYLIN Hall      CF Endocrinology Return Consultation:  Diabetes  :   Patient: Dilan Nassar MRN# [id number]   YOB: 1985 Age: 37 year old   Date of Visit: 10/11/2022  Dear Dr. Atilio Nieto:    I had the pleasure of seeing your patient, Dilan Nassar in the [facility], [facility], for a return consultation .           Problem list:   MRSA  Cystic fibrosis  Diabetes mellitus  Exocrine pancreatic insufficiency  Vitamin D deficiency  Gout  Intestinal malabsorption         HPI:   Dilan is a 37 year old male with Cystic Fibrosis Related Diabetes Mellitus (CFRD).    I have reviewed the available past laboratory evaluations, imaging studies, and medical records available to me at this visit.   History was obtained from the patient and the medical record.  I have reviewed the notes of the pulmonary care team entered into the medical record since I last saw the patient.    Overall feels well, denies specific complaints or concerns.      Glucose readings sent via [employer] copied below-   Reading are checked fasting and 2 hours pot meal.   Occasionally experiences symptoms of low blood sugar couple of hours after breakfast    10/03  AM 99  breakfast 191 (Cheerios, whole milk, whole wheat toast w/ peanut butter)  lunch 72 (2hrs 30 mins) (corn dog, frozen french fries, ketchup, fruit yogurt)  dinner 237 (bratwurst, white bun, potatoes, squash, coleslaw, ketchup, mustard)     10/04  breakfast 179 (2 hrs 20 mins)  (Cheerios, whole milk, whole wheat toast w/ peanut butter)  lunch 82 (2 hrs 30 mins) (ham sandwich and potato chips)     10/05  AM 75  breakfast 154 (Cheerios, whole milk, whole wheat toast w/ peanut butter)  lunch 75 (bbq chicken, coleslaw, fruit yogurt)  dinner 122 (chicken, green beans, macaroni and cheese)     10/06    dinner 73 (salmon, rice, green beans)    Hemoglobin A1C   Date Value Ref Range Status   09/20/2022 6.3 (H) <5.7 % Final     Comment:     Normal <5.7%   Prediabetes 5.7-6.4%    Diabetes 6.5% or higher     Note: Adopted from ADA consensus guidelines.   09/17/2020 5.7 (H) 0 - 5.6 % Final     Comment:     Normal <5.7% Prediabetes 5.7-6.4%  Diabetes 6.5% or higher - adopted from ADA   consensus guidelines.       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) 10/16/2015     Diabetes mellitus related to cystic fibrosis (H) 10/16/2015     Cystic fibrosis with pulmonary exacerbation (H) 10/23/2015     Methicillin resistant Staphylococcus aureus infection 08/16/2016     Resolved Ambulatory Problems     Diagnosis Date Noted     Type 1 diabetes mellitus (H) 02/13/2015     No Additional Past Medical History          Past Surgical History:   Appendectomy  Sinus surgery            Social History:   Unmarried  Non smoker  Former smokeless tobacco user           Family History:   Mother: liver disease  Paternal grandfather: prostate cancer  Maternal aunt: diabetes mellitus  Maternal uncle: diabetes mellitus  Maternal grandmother: diabetes mellitus  Paternal grandmother: coronary artery disease         Allergies:     Allergies   Allergen  Reactions     Molds & Smuts Itching          Medications:     Current Outpatient Medications:      albuterol (PROVENTIL) (2.5 MG/3ML) 0.083% neb solution, Take 1 vial (2.5 mg) by nebulization 2 times daily, Disp: 180 mL, Rfl: 3     allopurinol (ZYLOPRIM) 300 MG tablet, TAKE ONE TABLET (300MG) BY MOUTH DAILY, Disp: 90 tablet, Rfl: 3     amylase-lipase-protease (CREON) 76584-88491-60535 units CPEP, TAKE 5-6 CAPSULES (60,000-72,000) BY MOUTH THREE TIMES A DAY WITH MEALS, Disp: 600 capsule, Rfl: 4     azithromycin (ZITHROMAX) 250 MG tablet, Take 2 tablets (500 mg) by mouth Every Mon, Wed, Fri Morning, Disp: 150 tablet, Rfl: 1     blood glucose (NO BRAND SPECIFIED) test strip, Use to test blood sugar 4 times daily or as directed., Disp: 125 strip, Rfl: 11     blood glucose monitoring (FREESTYLE) lancets, Use to test blood sugar 4 times daily or as directed., Disp: 200 each, Rfl: 11     blood glucose monitoring (NO BRAND SPECIFIED) meter device kit, Use to test blood sugar 4 times daily or as directed., Disp: 1 kit, Rfl: 0     dornase alpha (PULMOZYME) 2.5 MG/2.5ML neb solution, INHALE CONTENTS OF ONE VIAL (2.5MG) VIA NEBULIZER TWICE DAILY. KEEP REFRIGERATED UNTIL USE., Disp: 150 mL, Rfl: 11     elexacaftor-tezacaftor-ivacaftor & ivacaftor (TRIKAFTA) 100-50-75 & 150 MG tablet pack, TAKE TWO ORANGE TABLETS BY MOUTH IN THE MORNING AND ONE BLUE TABLET IN THE EVENING AS DIRECTED ON PACKAGE.  TAKE WITH FAT CONTAINING FOOD., Disp: 84 tablet, Rfl: 5     fluticasone (FLONASE) 50 MCG/ACT nasal spray, SPRAY 2 SPRAYS INTO BOTH NOSTRILS DAILY, Disp: 48 g, Rfl: 3     fluticasone (FLOVENT HFA) 110 MCG/ACT inhaler, INHALE 1 PUFF INTO THE LUNGS TWO TIMES A DAY, Disp: 1 g, Rfl: 11     insulin glargine (LANTUS SOLOSTAR) 100 UNIT/ML pen, Inject 10 Units Subcutaneous daily, Disp: 15 mL, Rfl: 3     insulin pen needle (32G X 4 MM) 32G X 4 MM miscellaneous, Use 3 pen needles daily or as directed., Disp: 100 each, Rfl: 11     lisinopril  (ZESTRIL) 5 MG tablet, Take 1 tablet (5 mg) by mouth daily, Disp: 90 tablet, Rfl: 3     mvw complete formulation (SOFTGELS ) capsule, Take 2 capsules by mouth daily, Disp: 60 capsule, Rfl: 11     ursodiol (ACTIGALL) 300 MG capsule, TAKE 1 CAPSULE (300MG ) BY MOUTH TWO TIMES A DAY, Disp: 180 capsule, Rfl: 3           Review of Systems:        Answers for HPI/ROS submitted by the patient on 10/11/2022  General Symptoms: No  Skin Symptoms: No  HENT Symptoms: No  EYE SYMPTOMS: No  HEART SYMPTOMS: No  LUNG SYMPTOMS: No  INTESTINAL SYMPTOMS: No  URINARY SYMPTOMS: No  REPRODUCTIVE SYMPTOMS: No  SKELETAL SYMPTOMS: No  BLOOD SYMPTOMS: No  NERVOUS SYSTEM SYMPTOMS: No  MENTAL HEALTH SYMPTOMS: No           Physical Exam:   There were no vitals taken for this visit.  Height: Data Unavailable, Facility age limit for growth percentiles is 20 years.  Weight: 0 lbs 0 oz, Facility age limit for growth percentiles is 20 years.  BMI: There is no height or weight on file to calculate BMI., No height and weight on file for this encounter.      GENERAL: Healthy, alert and no distress  EYES: Eyes grossly normal to inspection.  No discharge or erythema, or obvious scleral/conjunctival abnormalities.  RESP: No audible wheeze, cough, or visible cyanosis.  No visible retractions or increased work of breathing.    NEURO:  Mentation and speech appropriate for age.  PSYCH: affect normal/bright, judgement and insight intact, normal speech and appearance well-groomed.        Laboratory results:     TSH   Date Value Ref Range Status   09/20/2022 1.35 0.30 - 4.20 uIU/mL Final   09/14/2021 2.37 0.40 - 4.00 mU/L Final   09/17/2020 1.96 0.40 - 4.00 mU/L Final     Testosterone Total   Date Value Ref Range Status   09/20/2022 373 240 - 950 ng/dL Final   09/17/2020 416 240 - 950 ng/dL Final     Comment:     This test was developed and its performance characteristics determined by the   [facility], Special Chemistry  Laboratory.   It has not been cleared or approved by the FDA. The laboratory is regulated   under CLIA as qualified to perform high-complexity testing. This test is used   for clinical purposes. It should not be regarded as investigational or for   research.       Cholesterol   Date Value Ref Range Status   09/20/2022 120 <200 mg/dL Final   09/17/2020 134 <200 mg/dL Final     Albumin Urine mg/L   Date Value Ref Range Status   09/20/2022 <12.0 mg/L Final     Comment:     The reference ranges have not been established in urine albumin. The results should be integrated into the clinical context for interpretation.   01/11/2022 35 mg/L Final   09/17/2020 12 mg/L Final     Triglycerides   Date Value Ref Range Status   09/20/2022 64 <150 mg/dL Final   09/17/2020 91 <150 mg/dL Final     HDL Cholesterol   Date Value Ref Range Status   09/17/2020 83 >39 mg/dL Final     Direct Measure HDL   Date Value Ref Range Status   09/20/2022 72 >=40 mg/dL Final     LDL Cholesterol Calculated   Date Value Ref Range Status   09/20/2022 35 <=100 mg/dL Final   09/17/2020 33 <100 mg/dL Final     Comment:     Desirable:       <100 mg/dl     Non HDL Cholesterol   Date Value Ref Range Status   09/20/2022 48 <130 mg/dL Final   09/17/2020 51 <130 mg/dL Final           Diabetes Health Maintenance    Date of Diabetes Diagnosis: ~ 2012     Special Notes (if any):      Date Last Eye Exam:   2022     Date Last Dental Appointment:     Dates of Episodes Severe* Hypoglycemia (month/year, cumulative, ongoing, assess each visit):    *Severe=patient unconscious, seizure, unable to help self    Last 25-Vitamin D (every year): 42      Last DXA, lowest Z-score (every 2 years):  -1.1    Last Testosterone:   Testosterone Total   Date Value Ref Range Status   09/20/2022 373 240 - 950 ng/dL Final   09/17/2020 416 240 - 950 ng/dL Final     Comment:     This test was developed and its performance characteristics determined by the   [facility]  [facility], Special Chemistry Laboratory.   It has not been cleared or approved by the FDA. The laboratory is regulated   under CLIA as qualified to perform high-complexity testing. This test is used   for clinical purposes. It should not be regarded as investigational or for   research.              Assessment and Plan:   Dilan is a 37 year old male with cystic fibrosis related diabetes    CFRD: Overall good control.  He has occasional postprandial highs at 2 hours after meals.    Discussed use of continuous glucose monitor to get more detailed glucose profile.    Could consider adding premeal insulin with the largest meal of the day if he has persistent hyperglycemia after meals.  Patient at this time declined the use of personal or diagnostic CGM.  Overall he feels satisfied with his current diabetes management plan.  He was counseled about hypoglycemia particularly fasting hypoglycemia on Basaglar  Also counseled about reactive hypoglycemia  Positive microalbuminuria, on lisinopril.  Microalbumin was normal on most recent annual labs    Return to clinic in 6 months or sooner if needed     JOSE Headley    Video-Visit Details    Type of service:  Video Visit -due to voice quality issues, visit was completed over telephone    Video visit start time 9:31 AM, video visit end time 9:45 AM    Originating Location (pt. Location): Home    Distant Location (provider location):   [facility]     Platform used for Video Visit: [facility]    Edison Miranda MD

## 2022-10-11 ENCOUNTER — VIRTUAL VISIT (OUTPATIENT)
Dept: ENDOCRINOLOGY | Facility: CLINIC | Age: 37
End: 2022-10-11
Attending: INTERNAL MEDICINE
Payer: COMMERCIAL

## 2022-10-11 DIAGNOSIS — E84.9 DIABETES MELLITUS DUE TO CYSTIC FIBROSIS (H): Primary | ICD-10-CM

## 2022-10-11 DIAGNOSIS — E08.9 DIABETES MELLITUS DUE TO CYSTIC FIBROSIS (H): Primary | ICD-10-CM

## 2022-10-11 PROCEDURE — 99213 OFFICE O/P EST LOW 20 MIN: CPT | Mod: 95 | Performed by: INTERNAL MEDICINE

## 2022-10-11 PROCEDURE — G0463 HOSPITAL OUTPT CLINIC VISIT: HCPCS | Mod: PN,RTG | Performed by: INTERNAL MEDICINE

## 2022-10-11 NOTE — LETTER
10/11/2022       RE: Dilan Nassar  [address] [facility] [address]     Dear Colleague,    Thank you for referring your patient, Dilan Nassar, to the [facility]ET[facility] [place] at [facility]. Please see a copy of my visit note below.    Dilan Nassar  is being evaluated via a billable video visit.      How would you like to obtain your AVS? SomaLogic  For the video visit, send the invitation by: Send to e-mail at: [email or web address]  Will anyone else be joining your video visit? No    Outcome for 10/10/22 10:49 AM: [employer] message sent  KAYLIN aHll  Outcome for 10/10/22 10:49 AM: Left Voicemail   KAYLIN Hall  Outcome for 10/10/22 12:46 PM: Glucose Readings sent via [employer]  KAYLIN Hall      CF Endocrinology Return Consultation:  Diabetes  :   Patient: Dilan Nassar MRN# [id number]   YOB: 1985 Age: 37 year old   Date of Visit: 10/11/2022  Dear Dr. Atilio Nieto:    I had the pleasure of seeing your patient, Dilan Nassar in the CF Endocrinology Clinic, [facility], for a return consultation .           Problem list:   MRSA  Cystic fibrosis  Diabetes mellitus  Exocrine pancreatic insufficiency  Vitamin D deficiency  Gout  Intestinal malabsorption         HPI:   Dilan is a 37 year old male with Cystic Fibrosis Related Diabetes Mellitus (CFRD).    I have reviewed the available past laboratory evaluations, imaging studies, and medical records available to me at this visit.   History was obtained from the patient and the medical record.  I have reviewed the notes of the pulmonary care team entered into the medical record since I last saw the patient.    Overall feels well, denies specific complaints or concerns.      Glucose readings sent via [employer]oma[employer] copied below-   Reading are checked fasting and 2 hours pot meal.   Occasionally experiences symptoms of low blood sugar couple of hours  after breakfast    10/03  AM 99  breakfast 191 (Cheerios, whole milk, whole wheat toast w/ peanut butter)  lunch 72 (2hrs 30 mins) (corn dog, frozen french fries, ketchup, fruit yogurt)  dinner 237 (bratwurst, white bun, potatoes, squash, coleslaw, ketchup, mustard)     10/04  breakfast 179 (2 hrs 20 mins) (Cheerios, whole milk, whole wheat toast w/ peanut butter)  lunch 82 (2 hrs 30 mins) (ham sandwich and potato chips)     10/05  AM 75  breakfast 154 (Cheerios, whole milk, whole wheat toast w/ peanut butter)  lunch 75 (bbq chicken, coleslaw, fruit yogurt)  dinner 122 (chicken, green beans, macaroni and cheese)     10/06    dinner 73 (salmon, rice, green beans)    Hemoglobin A1C   Date Value Ref Range Status   09/20/2022 6.3 (H) <5.7 % Final     Comment:     Normal <5.7%   Prediabetes 5.7-6.4%    Diabetes 6.5% or higher     Note: Adopted from ADA consensus guidelines.   09/17/2020 5.7 (H) 0 - 5.6 % Final     Comment:     Normal <5.7% Prediabetes 5.7-6.4%  Diabetes 6.5% or higher - adopted from ADA   consensus guidelines.       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) 10/16/2015     Diabetes mellitus related to cystic fibrosis (H) 10/16/2015     Cystic fibrosis with pulmonary exacerbation (H) 10/23/2015     Methicillin resistant Staphylococcus aureus infection 08/16/2016     Resolved Ambulatory Problems     Diagnosis Date Noted     Type 1 diabetes mellitus (H) 02/13/2015     No Additional Past Medical History          Past Surgical History:   Appendectomy  Sinus surgery            Social History:   Unmarried  Non smoker  Former  smokeless tobacco user           Family History:   Mother: liver disease  Paternal grandfather: prostate cancer  Maternal aunt: diabetes mellitus  Maternal uncle: diabetes mellitus  Maternal grandmother: diabetes mellitus  Paternal grandmother: coronary artery disease         Allergies:     Allergies   Allergen Reactions     Molds & Smuts Itching          Medications:     Current Outpatient Medications:      albuterol (PROVENTIL) (2.5 MG/3ML) 0.083% neb solution, Take 1 vial (2.5 mg) by nebulization 2 times daily, Disp: 180 mL, Rfl: 3     allopurinol (ZYLOPRIM) 300 MG tablet, TAKE ONE TABLET (300MG) BY MOUTH DAILY, Disp: 90 tablet, Rfl: 3     amylase-lipase-protease (CREON) 01973-04727-81446 units CPEP, TAKE 5-6 CAPSULES (60,000-72,000) BY MOUTH THREE TIMES A DAY WITH MEALS, Disp: 600 capsule, Rfl: 4     azithromycin (ZITHROMAX) 250 MG tablet, Take 2 tablets (500 mg) by mouth Every Mon, Wed, Fri Morning, Disp: 150 tablet, Rfl: 1     blood glucose (NO BRAND SPECIFIED) test strip, Use to test blood sugar 4 times daily or as directed., Disp: 125 strip, Rfl: 11     blood glucose monitoring (FREESTYLE) lancets, Use to test blood sugar 4 times daily or as directed., Disp: 200 each, Rfl: 11     blood glucose monitoring (NO BRAND SPECIFIED) meter device kit, Use to test blood sugar 4 times daily or as directed., Disp: 1 kit, Rfl: 0     dornase alpha (PULMOZYME) 2.5 MG/2.5ML neb solution, INHALE CONTENTS OF ONE VIAL (2.5MG) VIA NEBULIZER TWICE DAILY. KEEP REFRIGERATED UNTIL USE., Disp: 150 mL, Rfl: 11     elexacaftor-tezacaftor-ivacaftor & ivacaftor (TRIKAFTA) 100-50-75 & 150 MG tablet pack, TAKE TWO ORANGE TABLETS BY MOUTH IN THE MORNING AND ONE BLUE TABLET IN THE EVENING AS DIRECTED ON PACKAGE.  TAKE WITH FAT CONTAINING FOOD., Disp: 84 tablet, Rfl: 5     fluticasone (FLONASE) 50 MCG/ACT nasal spray, SPRAY 2 SPRAYS INTO BOTH NOSTRILS DAILY, Disp: 48 g, Rfl: 3     fluticasone (FLOVENT HFA) 110 MCG/ACT inhaler, INHALE 1 PUFF  INTO THE LUNGS TWO TIMES A DAY, Disp: 1 g, Rfl: 11     insulin glargine (LANTUS SOLOSTAR) 100 UNIT/ML pen, Inject 10 Units Subcutaneous daily, Disp: 15 mL, Rfl: 3     insulin pen needle (32G X 4 MM) 32G X 4 MM miscellaneous, Use 3 pen needles daily or as directed., Disp: 100 each, Rfl: 11     lisinopril (ZESTRIL) 5 MG tablet, Take 1 tablet (5 mg) by mouth daily, Disp: 90 tablet, Rfl: 3     mvw complete formulation (SOFTGELS ) capsule, Take 2 capsules by mouth daily, Disp: 60 capsule, Rfl: 11     ursodiol (ACTIGALL) 300 MG capsule, TAKE 1 CAPSULE (300MG ) BY MOUTH TWO TIMES A DAY, Disp: 180 capsule, Rfl: 3           Review of Systems:        Answers for HPI/ROS submitted by the patient on 10/11/2022  General Symptoms: No  Skin Symptoms: No  HENT Symptoms: No  EYE SYMPTOMS: No  HEART SYMPTOMS: No  LUNG SYMPTOMS: No  INTESTINAL SYMPTOMS: No  URINARY SYMPTOMS: No  REPRODUCTIVE SYMPTOMS: No  SKELETAL SYMPTOMS: No  BLOOD SYMPTOMS: No  NERVOUS SYSTEM SYMPTOMS: No  MENTAL HEALTH SYMPTOMS: No           Physical Exam:   There were no vitals taken for this visit.  Height: Data Unavailable, Facility age limit for growth percentiles is 20 years.  Weight: 0 lbs 0 oz, Facility age limit for growth percentiles is 20 years.  BMI: There is no height or weight on file to calculate BMI., No height and weight on file for this encounter.      GENERAL: Healthy, alert and no distress  EYES: Eyes grossly normal to inspection.  No discharge or erythema, or obvious scleral/conjunctival abnormalities.  RESP: No audible wheeze, cough, or visible cyanosis.  No visible retractions or increased work of breathing.    NEURO:  Mentation and speech appropriate for age.  PSYCH: affect normal/bright, judgement and insight intact, normal speech and appearance well-groomed.        Laboratory results:     TSH   Date Value Ref Range Status   09/20/2022 1.35 0.30 - 4.20 uIU/mL Final   09/14/2021 2.37 0.40 - 4.00 mU/L Final   09/17/2020 1.96 0.40 - 4.00  mU/L Final     Testosterone Total   Date Value Ref Range Status   09/20/2022 373 240 - 950 ng/dL Final   09/17/2020 416 240 - 950 ng/dL Final     Comment:     This test was developed and its performance characteristics determined by the   [facility].   It has not been cleared or approved by the FDA. The laboratory is regulated   under CLIA as qualified to perform high-complexity testing. This test is used   for clinical purposes. It should not be regarded as investigational or for   research.       Cholesterol   Date Value Ref Range Status   09/20/2022 120 <200 mg/dL Final   09/17/2020 134 <200 mg/dL Final     Albumin Urine mg/L   Date Value Ref Range Status   09/20/2022 <12.0 mg/L Final     Comment:     The reference ranges have not been established in urine albumin. The results should be integrated into the clinical context for interpretation.   01/11/2022 35 mg/L Final   09/17/2020 12 mg/L Final     Triglycerides   Date Value Ref Range Status   09/20/2022 64 <150 mg/dL Final   09/17/2020 91 <150 mg/dL Final     HDL Cholesterol   Date Value Ref Range Status   09/17/2020 83 >39 mg/dL Final     Direct Measure HDL   Date Value Ref Range Status   09/20/2022 72 >=40 mg/dL Final     LDL Cholesterol Calculated   Date Value Ref Range Status   09/20/2022 35 <=100 mg/dL Final   09/17/2020 33 <100 mg/dL Final     Comment:     Desirable:       <100 mg/dl     Non HDL Cholesterol   Date Value Ref Range Status   09/20/2022 48 <130 mg/dL Final   09/17/2020 51 <130 mg/dL Final           Diabetes Health Maintenance    Date of Diabetes Diagnosis: ~ 2012     Special Notes (if any):      Date Last Eye Exam:   2022     Date Last Dental Appointment:     Dates of Episodes Severe* Hypoglycemia (month/year, cumulative, ongoing, assess each visit):    *Severe=patient unconscious, seizure, unable to help self    Last 25-Vitamin D (every year): 42      Last DXA, lowest  Z-score (every 2 years):  -1.1    Last Testosterone:   Testosterone Total   Date Value Ref Range Status   09/20/2022 373 240 - 950 ng/dL Final   09/17/2020 416 240 - 950 ng/dL Final     Comment:     This test was developed and its performance characteristics determined by the   [facility].   It has not been cleared or approved by the FDA. The laboratory is regulated   under CLIA as qualified to perform high-complexity testing. This test is used   for clinical purposes. It should not be regarded as investigational or for   research.              Assessment and Plan:   Dilan is a 37 year old male with cystic fibrosis related diabetes    CFRD: Overall good control.  He has occasional postprandial highs at 2 hours after meals.    Discussed use of continuous glucose monitor to get more detailed glucose profile.    Could consider adding premeal insulin with the largest meal of the day if he has persistent hyperglycemia after meals.  Patient at this time declined the use of personal or diagnostic CGM.  Overall he feels satisfied with his current diabetes management plan.  He was counseled about hypoglycemia particularly fasting hypoglycemia on Basaglar  Also counseled about reactive hypoglycemia  Positive microalbuminuria, on lisinopril.  Microalbumin was normal on most recent annual labs    Return to clinic in 6 months or sooner if needed     JOSE Headley

## 2022-10-12 ENCOUNTER — TELEPHONE (OUTPATIENT)
Dept: ENDOCRINOLOGY | Facility: CLINIC | Age: 37
End: 2022-10-12

## 2022-10-12 NOTE — TELEPHONE ENCOUNTER
Attempted to reach patient to schedule follow up in the Endocrinology Clinic. No answer, LM on VM to call office back.    Schedule with Edison Miranda MD.     Steffi Harding, VF

## 2022-10-25 DIAGNOSIS — I10 BENIGN ESSENTIAL HYPERTENSION: ICD-10-CM

## 2022-10-25 RX ORDER — LISINOPRIL 5 MG/1
5 TABLET ORAL DAILY
Qty: 90 TABLET | Refills: 3 | Status: SHIPPED | OUTPATIENT
Start: 2022-10-25 | End: 2023-11-07

## 2022-11-16 DIAGNOSIS — E84.9 CYSTIC FIBROSIS (H): ICD-10-CM

## 2022-11-16 RX ORDER — ELEXACAFTOR, TEZACAFTOR, AND IVACAFTOR 100-50-75
KIT ORAL
Qty: 84 TABLET | Refills: 5 | Status: SHIPPED | OUTPATIENT
Start: 2022-11-16 | End: 2023-04-18

## 2022-11-17 ENCOUNTER — PHARMACY VISIT (OUTPATIENT)
Dept: ADMINISTRATIVE | Facility: CLINIC | Age: 37
End: 2022-11-17

## 2022-11-17 NOTE — PROGRESS NOTES
Cystic Fibrosis Clinical Follow Up Assessment   Assessment Link -Cystic Fibrosis Clinical Follow Up Assessment      17:35:58 [facility], by Letha Jones        Patient  Patient Name: ALEXANDRIA CALZADA DOB:   EHR ID: [id number]       Activity Date      Care Details    What are the patient's goals for this therapy?   ? n/a      Did you identify any patient barriers to access and successful treatment?   ? No barriers to access identified      Is it appropriate to collect a PDC at this time? [QA Metric] (An MPR or PDC would not be appropriate for cycled medications or if the patient is on therapy   ? Yes      Document the date of the last refill of PDC   ? 2022-10-24      Document PDC   ? 0.99      Has the patient missed doses inappropriately?   ? No      Please select CURRENT side effect(s) for monitoring:   ? None          Summary Notes   Spoke to pt for CF assmt and set up order for Trikafta to del 22. Pulmozyme was set up to del 22 as we were waiting for refill auth for Trikafta. Pt did not need a refill on MVW softgels today. CF: Pt is doing well. Denies any changes in health, meds or allergies. Trikafta: No issues with SE or missed doses. PDC 0.99. Denies questions or concerns today. TM will f/u with pt in 6 months. Pt was invited to call anytime for concerns.    Letha Jones, Pharm.D.[place] [facility] Pharmacist  [address] [place] [address]  [email or web address]  Office: [phone]

## 2022-11-29 DIAGNOSIS — E84.9 CF (CYSTIC FIBROSIS) (H): ICD-10-CM

## 2022-11-29 DIAGNOSIS — E08.9 DIABETES MELLITUS DUE TO CYSTIC FIBROSIS (H): ICD-10-CM

## 2022-11-29 DIAGNOSIS — K86.81 EXOCRINE PANCREATIC INSUFFICIENCY: ICD-10-CM

## 2022-11-29 DIAGNOSIS — E84.9 CYSTIC FIBROSIS (H): ICD-10-CM

## 2022-11-29 DIAGNOSIS — E84.0 CYSTIC FIBROSIS WITH PULMONARY MANIFESTATIONS (H): ICD-10-CM

## 2022-11-29 DIAGNOSIS — E84.9 DIABETES MELLITUS DUE TO CYSTIC FIBROSIS (H): ICD-10-CM

## 2022-11-29 DIAGNOSIS — A49.02 MRSA INFECTION: ICD-10-CM

## 2022-11-29 RX ORDER — ALBUTEROL SULFATE 0.83 MG/ML
2.5 SOLUTION RESPIRATORY (INHALATION)
Qty: 180 ML | Refills: 3 | Status: SHIPPED | OUTPATIENT
Start: 2022-11-29 | End: 2023-04-18

## 2022-12-19 ENCOUNTER — EXTERNAL ORDER RESULTS (OUTPATIENT)
Dept: PULMONOLOGY | Facility: CLINIC | Age: 37
End: 2022-12-19

## 2022-12-19 ENCOUNTER — VIRTUAL VISIT (OUTPATIENT)
Dept: PULMONOLOGY | Facility: CLINIC | Age: 37
End: 2022-12-19
Attending: PHYSICIAN ASSISTANT
Payer: COMMERCIAL

## 2022-12-19 DIAGNOSIS — E84.0 CYSTIC FIBROSIS WITH PULMONARY MANIFESTATIONS (H): ICD-10-CM

## 2022-12-19 DIAGNOSIS — K86.81 EXOCRINE PANCREATIC INSUFFICIENCY: Primary | ICD-10-CM

## 2022-12-19 LAB
FEV-1: 2.87
FEV1-%PRED-PRE (EXTERNAL): 83
FEV1/FVC: 0.61
FVC-%PRED-PRE (EXTERNAL): 112
FVC: 4.73

## 2022-12-19 PROCEDURE — 99214 OFFICE O/P EST MOD 30 MIN: CPT | Mod: 95 | Performed by: PHYSICIAN ASSISTANT

## 2022-12-19 RX ORDER — PEDIATRIC MULTIVIT 61/D3/VIT K 1500-800
1 CAPSULE ORAL DAILY
Qty: 30 CAPSULE | Refills: 11 | Status: SHIPPED | OUTPATIENT
Start: 2022-12-19 | End: 2024-01-16

## 2022-12-19 NOTE — LETTER
12/19/2022         RE: Dilan Nassar  [address]  [facility] [address]        Dear Colleague,    Thank you for referring your patient, Dilan Nassar, to the [facility] FOR LUNG SCIENCE AND [facility] CLINIC [place]. Please see a copy of my visit note below.    Guillermo Nassar is a 37 year old male with cystic fibrosis who is being evaluated via a billable telephone visit.      1. CF lung disease: no new pulmonary complaints or recent illnesses, notes he is getting a lot more physical activity with his job as an [employer] . Vesting BID. Home PFTs today improved from his recent baseline/best in clinic. Previous cultures + for MRSA, Klebsiella, Steno, Ps A and A. Fumigatus. No evidence of exacerbation at thist camelia.   - Continue nebulizers and vest therapy  - On chronic azithromycin     2. Elevated Cr with albuminuria  HTN: no BP data today. Repeat Cr improved on annuals.   - Continue lisinopril 5 mg daily     3. CF related liver disease: US 5/17 demonstrating coarse echotexture of the liver with hypertrophy of the caudate lobe and left lobe concerning for possible cirrhosis, no lesions, patent vasculature.  - Continue Ursodiol      4. Exocrine pancreatic insufficiency: notes some loose and frequent stools, improved with increase in Creon to 6. Discussed with GAURAV Suarez, who will see patient at his next visit.   - Start probiotic once/day for a month and monitor for improvement  - Continue enzymes and vitamin supplementation      5. CFRD: last AIC of 6.3 on 9/20. Started lisinopril per above for renal protection. Notes his BS was 99 this am. Interested in a trial of a Dexcom. Following with Dr. Miranda.   - Continue Lantus 10 U      6. CF related sinus disease: no complaints today.   - Continue Flonase      7. CFTR: modulation: on Trikafta and is doing well. Labs in September WNL.   - Continue Trikafta     RTC: in 3 months in person  Annual studies due: September 2023 (DEXA > April  2024)  Vaccinations: received bivalent booster; got flu shot      Jyothi Warren PA-C  Pulmonary, Allergy, Critical Care and Sleep Medicine    Interval history: patient is unable to log to video. 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 Has also been interviewing for a position with [employer]'s office in [place], would be the only person in the office, eyes/ears for [place] [address]. Physically feeling well; stills tries to limit his contact, wearing a mask. No new cough or congestion, has been feeling pretty good, notes the [employer] job is quite active. No new shortness of breath, vesting BID. Has tried taking 6 Creon with meals, helps some, stools are loose, going less frequently. Trying to eat less carbs, especially with breakfast.     Phone start time: 9:22 am  Phone end time: 9:44 am  Phone call duration: 22 minutes        Again, thank you for allowing me to participate in the care of your patient.        Sincerely,        Jyothi Warren PA-C

## 2022-12-19 NOTE — PATIENT INSTRUCTIONS
Cystic Fibrosis Self-Care Plan       Patient: Dilan Nassar   MRN: [id number]   Clinic Date: December 19, 2022     RECOMMENDATIONS:  1. Continue nebulizers and vest therapy.   2. Start a probiotic daily; dietician will follow up at the next visit.     Annual Studies:   IGG   Date Value Ref Range Status   09/17/2020 1,153 610 - 1,616 mg/dL Final     Immunoglobulin G   Date Value Ref Range Status   09/20/2022 830 610 - 1,616 mg/dL Final     No results found for: INS  There are no preventive care reminders to display for this patient.    Pulmonary Function Tests  FEV1: amount of air you can blow out in 1 second  FVC: total amount of air you can take in and blow out    Your Goals:         PFT Latest Ref Rng & Units 9/20/2022   FVC L 4.89   FEV1 L 2.85   FVC% % 114   FEV1% % 81          Airway Clearance: The Most Important Way to Keep Your Lungs Healthy  Vest Settings:   Hill-Rom Frequencies: 8, 9, 10 Pressure 10 Then, Frequencies 18, 19, 20 Pressure 6     RespirTech: Quick Start with Pressure of     Do each frequency for 5 minutes; Deflate vest after each frequency & cough 3 times before beginning the next setting.    Vest and Neb Therapy should be done 2 times/day.    Good Nutrition Can Improve Lung Function and Overall Health    Take ALL of your vitamins with food    Take 1/2 of your enzymes before EVERY meal/snack and the other 1/2 mid-meal/snack    Wt Readings from Last 3 Encounters:   04/19/22 56.2 kg (123 lb 14.4 oz)   10/20/21 55.3 kg (122 lb)   06/15/21 55.6 kg (122 lb 9.2 oz)       There is no height or weight on file to calculate BMI.         National CF Foundation Recommendations for BMI in CF Adults: Women: at least 22 Men: at least 23        Controlling Blood Sugars Helps Prevent Lung Infections & Improves Nutrition  Test blood sugar:    In the morning before eating (goal is )    2 hours after a meal (goal is less than 150)    When pre-meal glucose is greater than 150 add correction    At  bedtime (if less than 100 eat a snack with 15 grams of carbohydrates  Last A1C Results:   Hemoglobin A1C   Date Value Ref Range Status   09/20/2022 6.3 (H) <5.7 % Final     Comment:     Normal <5.7%   Prediabetes 5.7-6.4%    Diabetes 6.5% or higher     Note: Adopted from ADA consensus guidelines.   09/17/2020 5.7 (H) 0 - 5.6 % Final     Comment:     Normal <5.7% Prediabetes 5.7-6.4%  Diabetes 6.5% or higher - adopted from ADA   consensus guidelines.           If diabetic, measure A1C every 6 months. Goal is under 7%.    Staying Healthy  Research: If you are interested in learning about research opportunities or have questions, please contact Sonali Altamirano at [phone] or [email or web address].     Foundation: Compass is a personalized resource service to help you with the insurance, financial, legal and other issues you are facing.  It's free, confidential and available to anyone with CF.  Ask your  for more information or contact Compass directly at 7[facility] ([phone]) or compass@cff.org, or learn more at cff.org/compass.       CF Nurse Line: Alla Pimentel: [phone]  Audra Smart, RT: [phone]    Daniela Whitt and Tamika Perez , Dieticians: [phone]    iLsset Root, Diabetes Nurse: [phone]   Kathie Truong: [phone] or Kailey Mixon at [phone], Social Workers  [email or web address]

## 2022-12-19 NOTE — PROGRESS NOTES
Guillermo Nassar is a 37 year old male with cystic fibrosis who is being evaluated via a billable telephone visit.      1. CF lung disease: no new pulmonary complaints or recent illnesses, notes he is getting a lot more physical activity with his job as an [employer] . Vesting BID. Home PFTs today improved from his recent baseline/best in clinic. Previous cultures + for MRSA, Klebsiella, Steno, Ps A and A. Fumigatus. No evidence of exacerbation at thist camelia.   - Continue nebulizers and vest therapy  - On chronic azithromycin     2. Elevated Cr with albuminuria  HTN: no BP data today. Repeat Cr improved on annuals.   - Continue lisinopril 5 mg daily     3. CF related liver disease: US 5/17 demonstrating coarse echotexture of the liver with hypertrophy of the caudate lobe and left lobe concerning for possible cirrhosis, no lesions, patent vasculature.  - Continue Ursodiol      4. Exocrine pancreatic insufficiency: notes some loose and frequent stools, improved with increase in Creon to 6. Discussed with GAURAV Suarez, who will see patient at his next visit.   - Start probiotic once/day for a month and monitor for improvement  - Continue enzymes and vitamin supplementation      5. CFRD: last AIC of 6.3 on 9/20. Started lisinopril per above for renal protection. Notes his BS was 99 this am. Interested in a trial of a Dexcom. Following with Dr. Miranda.   - Continue Lantus 10 U      6. CF related sinus disease: no complaints today.   - Continue Flonase      7. CFTR: modulation: on Trikafta and is doing well. Labs in September WNL.   - Continue Trikafta     RTC: in 3 months in person  Annual studies due: September 2023 (DEXA > April 2024)  Vaccinations: received bivalent booster; got flu shot      Jyothi Warren PA-C  Pulmonary, Allergy, Critical Care and Sleep Medicine    Interval history: patient is unable to log to video. 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 Has also been interviewing for a position with [employer]'s office in [place], would be the only person in the office, eyes/ears for [place] [address]. Physically feeling well; stills tries to limit his contact, wearing a mask. No new cough or congestion, has been feeling pretty good, notes the [employer] job is quite active. No new shortness of breath, vesting BID. Has tried taking 6 Creon with meals, helps some, stools are loose, going less frequently. Trying to eat less carbs, especially with breakfast.     Phone start time: 9:22 am  Phone end time: 9:44 am  Phone call duration: 22 minutes

## 2023-02-14 DIAGNOSIS — E84.9 CF (CYSTIC FIBROSIS) (H): ICD-10-CM

## 2023-02-14 RX ORDER — ALLOPURINOL 300 MG/1
TABLET ORAL
Qty: 90 TABLET | Refills: 3 | Status: SHIPPED | OUTPATIENT
Start: 2023-02-14 | End: 2024-02-22

## 2023-02-22 DIAGNOSIS — K86.81 EXOCRINE PANCREATIC INSUFFICIENCY: ICD-10-CM

## 2023-02-22 DIAGNOSIS — E84.0 CYSTIC FIBROSIS WITH PULMONARY MANIFESTATIONS (H): ICD-10-CM

## 2023-02-22 DIAGNOSIS — E84.9 CYSTIC FIBROSIS (H): ICD-10-CM

## 2023-02-22 RX ORDER — PEDIATRIC MULTIVIT 61/D3/VIT K 1500-800
2 CAPSULE ORAL DAILY
Qty: 60 CAPSULE | Refills: 11 | Status: SHIPPED | OUTPATIENT
Start: 2023-02-22 | End: 2024-01-18

## 2023-02-23 DIAGNOSIS — E84.0 CYSTIC FIBROSIS WITH PULMONARY MANIFESTATIONS (H): ICD-10-CM

## 2023-02-23 DIAGNOSIS — K86.81 EXOCRINE PANCREATIC INSUFFICIENCY: ICD-10-CM

## 2023-02-23 DIAGNOSIS — E84.9 DIABETES MELLITUS DUE TO CYSTIC FIBROSIS (H): ICD-10-CM

## 2023-02-23 DIAGNOSIS — E08.9 DIABETES MELLITUS DUE TO CYSTIC FIBROSIS (H): ICD-10-CM

## 2023-02-23 DIAGNOSIS — A49.02 MRSA INFECTION: ICD-10-CM

## 2023-02-23 DIAGNOSIS — E84.9 CYSTIC FIBROSIS (H): ICD-10-CM

## 2023-02-23 DIAGNOSIS — E84.9 CF (CYSTIC FIBROSIS) (H): ICD-10-CM

## 2023-03-07 DIAGNOSIS — E84.9 DIABETES MELLITUS DUE TO CYSTIC FIBROSIS (H): ICD-10-CM

## 2023-03-07 DIAGNOSIS — M10.9 GOUT, UNSPECIFIED CAUSE, UNSPECIFIED CHRONICITY, UNSPECIFIED SITE: ICD-10-CM

## 2023-03-07 DIAGNOSIS — E84.0 CYSTIC FIBROSIS WITH PULMONARY EXACERBATION (H): ICD-10-CM

## 2023-03-07 DIAGNOSIS — E84.0 CYSTIC FIBROSIS OF THE LUNG (H): ICD-10-CM

## 2023-03-07 DIAGNOSIS — E84.0 CYSTIC FIBROSIS WITH PULMONARY MANIFESTATIONS (H): ICD-10-CM

## 2023-03-07 DIAGNOSIS — E08.9 DIABETES MELLITUS DUE TO CYSTIC FIBROSIS (H): ICD-10-CM

## 2023-03-07 RX ORDER — AZITHROMYCIN 250 MG/1
500 TABLET, FILM COATED ORAL
Qty: 72 TABLET | Refills: 3 | Status: SHIPPED | OUTPATIENT
Start: 2023-03-08 | End: 2024-03-07

## 2023-03-09 ENCOUNTER — VIRTUAL VISIT (OUTPATIENT)
Dept: PHARMACY | Facility: CLINIC | Age: 38
End: 2023-03-09
Payer: COMMERCIAL

## 2023-03-09 ENCOUNTER — VIRTUAL VISIT (OUTPATIENT)
Dept: INTERNAL MEDICINE | Facility: CLINIC | Age: 38
End: 2023-03-09
Payer: COMMERCIAL

## 2023-03-09 DIAGNOSIS — Z20.822 SUSPECTED 2019 NOVEL CORONAVIRUS INFECTION: Primary | ICD-10-CM

## 2023-03-09 DIAGNOSIS — E84.0 CYSTIC FIBROSIS WITH PULMONARY EXACERBATION (H): Primary | ICD-10-CM

## 2023-03-09 DIAGNOSIS — U07.1 INFECTION DUE TO 2019 NOVEL CORONAVIRUS: ICD-10-CM

## 2023-03-09 PROCEDURE — 99207 PR NO CHARGE LOS: CPT | Performed by: PHARMACIST

## 2023-03-09 PROCEDURE — 99213 OFFICE O/P EST LOW 20 MIN: CPT | Mod: CS | Performed by: INTERNAL MEDICINE

## 2023-03-09 ASSESSMENT — ENCOUNTER SYMPTOMS
GASTROINTESTINAL NEGATIVE: 1
CARDIOVASCULAR NEGATIVE: 1
ARTHRALGIAS: 0
NEUROLOGICAL NEGATIVE: 1
RHINORRHEA: 1
HEADACHES: 0
SORE THROAT: 1
FEVER: 0
COUGH: 1

## 2023-03-09 NOTE — PROGRESS NOTES
Guillermo is a 37 year old who is being evaluated via a billable video visit.      How would you like to obtain your AVS? MyChart  If the video visit is dropped, the invitation should be resent by: Send to e-mail at: [email or web address]  Will anyone else be joining your video visit? No          Assessment & Plan     2019 novel coronavirus infection  At this time, patient did test positive for COVID on the day prior to presentation.  Given his comorbidities, he would be considered to be high risk for a applications related to the COVID virus infection.  After much discussion, we did elect to initiate therapy with Paxlovid.  Patient does take Trikafta as part of the management plan for his cystic fibrosis.  I did recommend that he discuss with his pulmonologist as to whether or not he should hold the Trikafta while on Paxlovid.      Prescription drug management  20 minutes spent on the date of the encounter doing chart review, history and exam, documentation and further activities per the note       See Patient Instructions    Return in about 2 weeks (around 3/23/2023), or if symptoms worsen or fail to improve.    Leon Magana MD  [facility]   Guillermo is a 37 year old, presenting for the following health issues:  Covid Concern      Patient is a 37-year-old male who presents in a virtual visit to discuss treatment for COVID.  Patient reports that he began to have symptoms approximately 3 days prior to presentation.  He did develop a sore throat and subsequent cough.  He has also had some nasal congestion and rhinorrhea.  Patient denies any issues with fever, headaches, chills, or myalgias.  He does have a history of cystic fibrosis, and his cystic fibrosis clinic did recommend that he reach out to his primary care provider about the possibility of receiving antiviral treatment.  Patient has not been taking medications for management of his infection since onset of his symptoms.   He has received 4 doses of the COVID vaccination series.  He did have a positive COVID test on the evening prior to presentation.    History of Present Illness       Reason for visit:  Corona virus positive test and paxlovid  Symptom onset:  3-7 days ago  Symptoms include:  Cough  Symptom intensity:  Moderate  Symptom progression:  Staying the same  Had these symptoms before:  No    He eats 2-3 servings of fruits and vegetables daily.He consumes 1 sweetened beverage(s) daily. He exercises with enough effort to increase his heart rate 3 or less days per week.   He is taking medications regularly.         COVID-19 Symptom Review  How many days ago did these symptoms start? About 5 days    Are any of the following symptoms significant for you?    New or worsening difficulty breathing? No    Worsening cough? Yes, I am coughing up mucus.    Fever or chills? No    Headache: YES    Sore throat: YES    Chest pain: No    Diarrhea: No    Body aches? No    What treatments has patient tried? none   Does patient live in a nursing home, group home, or shelter? No  Does patient have a way to get food/medications during quarantined? Yes, I have a friend or family member who can help me.              Review of Systems   Constitutional: Negative for fever.   HENT: Positive for congestion, rhinorrhea and sore throat.    Respiratory: Positive for cough.    Cardiovascular: Negative.    Gastrointestinal: Negative.    Musculoskeletal: Negative for arthralgias.   Neurological: Negative.  Negative for headaches.            Objective           Vitals:  No vitals were obtained today due to virtual visit.    Physical Exam   GENERAL: Healthy, alert and no distress  EYES: Eyes grossly normal to inspection.  No discharge or erythema, or obvious scleral/conjunctival abnormalities.  RESP: No audible wheeze, cough, or visible cyanosis.  No visible retractions or increased work of breathing.    SKIN: Visible skin clear. No significant rash, abnormal  pigmentation or lesions.  NEURO: Cranial nerves grossly intact.  Mentation and speech appropriate for age.  PSYCH: Mentation appears normal, affect normal/bright, judgement and insight intact, normal speech and appearance well-groomed.              Video-Visit Details    Type of service:  Video Visit   Video Start Time: 1:00 PM  Video End Time:1:13 PM    Originating Location (pt. Location): Home  Distant Location (provider location):  On-site  Platform used for Video Visit: Mamta

## 2023-03-13 NOTE — PATIENT INSTRUCTIONS
I'm sorry to hear you have COVID! Here is the dose reduction for your Trikafta while you're on Paxlovid (adjusting for 1 dose of paxlovid today):     Day 1 (first day of [place]) - 2 orange tablets in morning only  Days 2 - 5 - No Trikafta  Day 6 (last day of [place]) - 2 orange tablets in morning only  Days 7 - 8 - No Trikafta  Day 9 - resume normal Trikafta dosing     Then we will recheck your liver labs 1 week after finishing Paxlovid. Orders will be sent to your local [facility].     Let me know if you have questions!     Belem Valle, PharmD  Cystic Fibrosis MTM Pharmacist  [facility]  Voicemail: [phone]

## 2023-03-13 NOTE — PROGRESS NOTES
Disease State Management Encounter:                          Dilan Nassar is a 37 year old male called for a follow-up visit.  Today's visit is a follow-up visit from 22     Reason for Chart Review: COVID Treatment Drug Interaction Check     Discussion: Message received from nurse triage that patient tested positive for COVID. Virtual appointment scheduled today and prescribed Paxlovid. Paxlovid is known to have many drug interactions which require review.     1. Trikafta: Treatment with Paxlovid may increase levels of Trikafta due to CY irreversible inhibition, requires dose reduction of Trikafta. Recommend hepatic panel 1 week after completing Paxlovid. Guillermo is planning to take 1 dose of Paxlovid today.    Trikafta dose reduction:  Day 1 - 2 orange tablets in morning only  Days 2 - 5 - No Trikafta  Day 6 (last day of PAXLOVID) - 2 orange tablets in morning only  Days 7 - 8 - No Trikafta  Day 9 -resume normal Trikafta dosing    Lab Results   Component Value Date    ALT 30 2022    AST 28 2022    BILITOTAL 0.6 2022    DBIL <0.20 2022          Medstory COVID Treatment Drug Interaction Reference: https://Videodeclasse.com.Biotie Therapies/dotNet/documents/?mcpyi=52127&anonymous=true  [place] COVID Drug Interaction   https://www.jzpvc26-abeglwwwkghhdeda.org/         Plan:  1. If prescribing Paxlovid, consider Trikafta dose reduction noted above and hepatic panel 1 week after completing Paxlovid       I spent 10 minutes with this patient today. I offer these suggestions for consideration by Jyothi Warren PA-C. A copy of the visit note was provided to the patient's provider(s).    A summary of these recommendations was sent via OmPrompt.    Belem Valle, PharmD  Cystic Fibrosis MTM Pharmacist  [facility]  Voicemail: [phone]           Medication Therapy Recommendations  Cystic fibrosis with pulmonary manifestations (H)    Current Medication:  elexacaftor-tezacaftor-ivacaftor & ivacaftor (TRIKAFTA) 100-50-75 & 150 MG tablet pack   Rationale: Dose too high - Dosage too high - Safety   Recommendation: Decrease Dose   Status: Patient Agreed - Adherence/Education

## 2023-03-27 ENCOUNTER — CLINICAL UPDATE (OUTPATIENT)
Dept: PHARMACY | Facility: CLINIC | Age: 38
End: 2023-03-27
Payer: COMMERCIAL

## 2023-03-27 DIAGNOSIS — U07.1 INFECTION DUE TO 2019 NOVEL CORONAVIRUS: ICD-10-CM

## 2023-03-27 DIAGNOSIS — E84.0 CYSTIC FIBROSIS WITH PULMONARY EXACERBATION (H): Primary | ICD-10-CM

## 2023-03-27 PROCEDURE — 99207 PR NO CHARGE LOS: CPT | Performed by: PHARMACIST

## 2023-03-27 NOTE — PROGRESS NOTES
Clinical Update:                                                    A chart review was conducted for Dilan Nassar.    Reason for Chart Review: Trikafta Lab Monitoring Post-Paxlovid    Discussion: Dilan has been on Trikafta since 12/17/2020.  Per chart review, patient continues full dose Trikafta . He recently completed course of Paxlovid which requires additional monitoring due to drug-drug interaction with Trikafta.    Labs were reviewed from 3/21/23 at [facility]. All labs are within normal limits.        Plan:  1. Continue Trikafta   2. Recheck hepatic panel and CK in 1 year or with next annual labs        Jocelin Cuba, PharmD   Medication Therapy Management   Cystic Fibrosis Pharmacist

## 2023-03-29 ENCOUNTER — TELEPHONE (OUTPATIENT)
Dept: PULMONOLOGY | Facility: CLINIC | Age: 38
End: 2023-03-29

## 2023-03-29 ENCOUNTER — TELEPHONE (OUTPATIENT)
Dept: PULMONOLOGY | Facility: CLINIC | Age: 38
End: 2023-03-29
Payer: COMMERCIAL

## 2023-03-29 NOTE — TELEPHONE ENCOUNTER
[facility] Prior Authorization Team   Phone: [phone]  Fax: [phone]    PA Initiation    Medication: Trikafta (PA Initiated)  Insurance Company: [employer] ([facility]) - Phone [phone] Fax [phone]  Pharmacy Filling the Rx: [facility] - [place], [place] - [facility] [address]  Filling Pharmacy Phone: [phone]  Filling Pharmacy Fax: [phone]  Start Date: 3/29/2023

## 2023-03-29 NOTE — TELEPHONE ENCOUNTER
[facility] Prior Authorization Team   Phone: [phone]  Fax: [phone]    PA Initiation    Medication: Pulmozyme (PA Initiated)  Insurance Company: [employer] ([facility]) - Phone [phone] Fax [phone]  Pharmacy Filling the Rx: [facility] - [place], [place] - [facility] [address]  Filling Pharmacy Phone: [phone]  Filling Pharmacy Fax: [phone]  Start Date: 3/29/2023

## 2023-03-31 NOTE — TELEPHONE ENCOUNTER
PRIOR AUTHORIZATION DENIED    Medication: Trikafta (denied)    Denial Date: 3/31/2023    Denial Rational:    Appeal Information:

## 2023-03-31 NOTE — TELEPHONE ENCOUNTER
Prior Authorization Approval    Authorization Effective Date: 3/29/2023  Authorization Expiration Date: 3/29/2024  Medication: Pulmozyme (approved)  Approved Dose/Quantity: 84  Reference #: [id number]   Insurance Company: [employer] ([facility]) - Phone [phone] Fax [phone]  Expected CoPay:       CoPay Card Available:      Foundation Assistance Needed:    Which Pharmacy is filling the prescription (Not needed for infusion/clinic administered): [facility] - [place], [place] - [address]  Pharmacy Notified: Yes  Patient Notified: Yes

## 2023-04-03 NOTE — TELEPHONE ENCOUNTER
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     Callback number: [phone]. Ref [id number].    Routing to the party that is handle the request.

## 2023-04-03 NOTE — TELEPHONE ENCOUNTER
Medication Appeal Initiation    We have initiated an appeal for the requested medication:  Medication: Trikafta PA denied, appeal pending  Appeal Start Date:  4/3/2023  Insurance Company: [employer] ([facility]) - Phone [phone] Fax [phone]  Comments:  Faxed denial and genotype to [phone]

## 2023-04-04 NOTE — PROGRESS NOTES
Outcome for 04/04/23 9:02 AM: Mychart message sent  Maria Antonia Buitrago LPN   Outcome for 04/07/23 11:28 AM: Left Voicemail   Yuliya Ferrara MA  Outcome for 04/10/23 7:31 AM: Replied to Mychart message  Maria Antonia Buitrago LPN   Outcome for 04/10/23 9:46 AM: Glucose readings sent via Everybody[employer]- Spoke to patient and he advises he does not have any other data for provider to review.   Yuliya Ferrara MA          Dilan Nassar  is being evaluated via a billable video visit.      CF Endocrinology Return Consultation:  Diabetes  :   Patient: Dilan Nassar MRN# [id number]   YOB: 1985 Age: 37 year old   Date of Visit: 04/11/2023  Dear Dr. Atilio Nieto:    I had the pleasure of seeing your patient, Dilan Nassar in the [facility], [facility], for a return consultation .           Problem list:   MRSA  Cystic fibrosis  Diabetes mellitus  Exocrine pancreatic insufficiency  Vitamin D deficiency  Gout  Intestinal malabsorption         HPI:   Dilan is a 37 year old male with Cystic Fibrosis Related Diabetes Mellitus (CFRD).    I have reviewed the available past laboratory evaluations, imaging studies, and medical records available to me at this visit.   History was obtained from the patient and the medical record.  I have reviewed the notes of the pulmonary care team entered into the medical record since I last saw the patient.    Overall feels well, denies specific complaints or concerns.    Glucose readings sent via [email or web address] copied above  Reading are checked fasting and 2 hours pot meal.   Post dinner readings are occasionally high    He wants to discuss use of continuous glucose monitor  Overall doing well  Denies hypoglycemia    Hemoglobin A1C   Date Value Ref Range Status   09/20/2022 6.3 (H) <5.7 % Final     Comment:     Normal <5.7%   Prediabetes 5.7-6.4%    Diabetes 6.5% or higher     Note: Adopted from ADA consensus guidelines.   09/17/2020 5.7 (H) 0 - 5.6 % Final     Comment:      Normal <5.7% Prediabetes 5.7-6.4%  Diabetes 6.5% or higher - adopted from ADA   consensus guidelines.       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) 10/16/2015     Diabetes mellitus related to cystic fibrosis (H) 10/16/2015     Cystic fibrosis with pulmonary exacerbation (H) 10/23/2015     Methicillin resistant Staphylococcus aureus infection 08/16/2016     Resolved Ambulatory Problems     Diagnosis Date Noted     Type 1 diabetes mellitus (H) 02/13/2015     No Additional Past Medical History          Past Surgical History:   Appendectomy  Sinus surgery            Social History:   Unmarried  Non smoker  Former smokeless tobacco user           Family History:   Mother: liver disease  Paternal grandfather: prostate cancer  Maternal aunt: diabetes mellitus  Maternal uncle: diabetes mellitus  Maternal grandmother: diabetes mellitus  Paternal grandmother: coronary artery disease         Allergies:     Allergies   Allergen Reactions     Molds & Smuts Itching          Medications:     Current Outpatient Medications:      albuterol (PROVENTIL) (2.5 MG/3ML) 0.083% neb solution, Take 1 vial (2.5 mg) by nebulization 2 times daily, Disp: 180 mL, Rfl: 3     allopurinol (ZYLOPRIM) 300 MG tablet, TAKE ONE TABLET (300MG) BY MOUTH DAILY, Disp: 90 tablet, Rfl: 3     azithromycin (ZITHROMAX) 250 MG tablet, Take 2 tablets (500 mg) by mouth Every Mon, Wed, Fri Morning, Disp: 72 tablet, Rfl: 3     blood glucose (NO BRAND SPECIFIED) test strip, Use to test blood sugar 4 times daily or as directed., Disp: 125 strip, Rfl: 11     blood glucose monitoring  (FREESTYLE) lancets, Use to test blood sugar 4 times daily or as directed., Disp: 200 each, Rfl: 11     blood glucose monitoring (NO BRAND SPECIFIED) meter device kit, Use to test blood sugar 4 times daily or as directed., Disp: 1 kit, Rfl: 0     dornase maryse (PULMOZYME) 2.5 MG/2.5ML neb solution, INHALE CONTENTS OF ONE VIAL (2.5MG) VIA NEBULIZER TWICE DAILY. KEEP REFRIGERATED UNTIL USE., Disp: 150 mL, Rfl: 11     elexacaftor-tezacaftor-ivacaftor & ivacaftor (TRIKAFTA) 100-50-75 & 150 MG tablet pack, TAKE TWO ORANGE TABLETS BY MOUTH IN THE MORNING AND ONE BLUE TABLET IN THE EVENING AS DIRECTED ON PACKAGE.  TAKE WITH FAT CONTAINING FOOD., Disp: 84 tablet, Rfl: 5     fluticasone (FLONASE) 50 MCG/ACT nasal spray, SPRAY 2 SPRAYS INTO BOTH NOSTRILS DAILY, Disp: 48 g, Rfl: 3     insulin glargine (LANTUS SOLOSTAR) 100 UNIT/ML pen, Inject 10 Units Subcutaneous daily, Disp: 15 mL, Rfl: 3     insulin pen needle (32G X 4 MM) 32G X 4 MM miscellaneous, Use 3 pen needles daily or as directed., Disp: 100 each, Rfl: 11     lipase-protease-amylase (CREON) 87744-50976-03748 units CPEP, TAKE 5-6 CAPSULES (60,000-72,000) BY MOUTH THREE TIMES A DAY WITH MEALS, Disp: 600 capsule, Rfl: 4     lisinopril (ZESTRIL) 5 MG tablet, Take 1 tablet (5 mg) by mouth daily, Disp: 90 tablet, Rfl: 3     mvw complete (PROBIOTIC FORMULATION) capsule, Take 1 capsule by mouth daily, Disp: 30 capsule, Rfl: 11     mvw complete formulation (SOFTGELS ) capsule, Take 2 capsules by mouth daily, Disp: 60 capsule, Rfl: 11     ursodiol (ACTIGALL) 300 MG capsule, TAKE 1 CAPSULE (300MG ) BY MOUTH TWO TIMES A DAY, Disp: 180 capsule, Rfl: 3           Review of Systems:        Answers for HPI/ROS submitted by the patient on 4/11/2023  General Symptoms: No  Skin Symptoms: No  HENT Symptoms: No  EYE SYMPTOMS: No  HEART SYMPTOMS: No  LUNG SYMPTOMS: No  INTESTINAL SYMPTOMS: No  URINARY SYMPTOMS: No  REPRODUCTIVE SYMPTOMS: No  SKELETAL SYMPTOMS: No  BLOOD SYMPTOMS:  No  NERVOUS SYSTEM SYMPTOMS: No  MENTAL HEALTH SYMPTOMS: No           Physical Exam:   There were no vitals taken for this visit.  Height: Data Unavailable, Facility age limit for growth %deana is 20 years.  Weight: 0 lbs 0 oz, Facility age limit for growth %deana is 20 years.  BMI: There is no height or weight on file to calculate BMI., No height and weight on file for this encounter.      GENERAL: Healthy, alert and no distress  EYES: Eyes grossly normal to inspection.  No discharge or erythema, or obvious scleral/conjunctival abnormalities.  RESP: No audible wheeze, cough, or visible cyanosis.  No visible retractions or increased work of breathing.    NEURO:  Mentation and speech appropriate for age.  PSYCH: affect normal/bright, judgement and insight intact, normal speech and appearance well-groomed.        Laboratory results:     TSH   Date Value Ref Range Status   09/20/2022 1.35 0.30 - 4.20 uIU/mL Final   09/14/2021 2.37 0.40 - 4.00 mU/L Final   09/17/2020 1.96 0.40 - 4.00 mU/L Final     Testosterone Total   Date Value Ref Range Status   09/20/2022 373 240 - 950 ng/dL Final   09/17/2020 416 240 - 950 ng/dL Final     Comment:     This test was developed and its performance characteristics determined by the   [facility].   It has not been cleared or approved by the FDA. The laboratory is regulated   under CLIA as qualified to perform high-complexity testing. This test is used   for clinical purposes. It should not be regarded as investigational or for   research.       Cholesterol   Date Value Ref Range Status   09/20/2022 120 <200 mg/dL Final   09/17/2020 134 <200 mg/dL Final     Albumin Urine mg/L   Date Value Ref Range Status   09/20/2022 <12.0 mg/L Final     Comment:     The reference ranges have not been established in urine albumin. The results should be integrated into the clinical context for interpretation.   01/11/2022 35 mg/L Final    09/17/2020 12 mg/L Final     Triglycerides   Date Value Ref Range Status   09/20/2022 64 <150 mg/dL Final   09/17/2020 91 <150 mg/dL Final     HDL Cholesterol   Date Value Ref Range Status   09/17/2020 83 >39 mg/dL Final     Direct Measure HDL   Date Value Ref Range Status   09/20/2022 72 >=40 mg/dL Final     LDL Cholesterol Calculated   Date Value Ref Range Status   09/20/2022 35 <=100 mg/dL Final   09/17/2020 33 <100 mg/dL Final     Comment:     Desirable:       <100 mg/dl     Non HDL Cholesterol   Date Value Ref Range Status   09/20/2022 48 <130 mg/dL Final   09/17/2020 51 <130 mg/dL Final         CF  Diabetes Health Maintenance    Date of Diabetes Diagnosis: ~ 2012     Special Notes (if any):      Date Last Eye Exam:   ~ Aug 2022     Date Last Dental Appointment:     Dates of Episodes Severe* Hypoglycemia (month/year, cumulative, ongoing, assess each visit):    *Severe=patient unconscious, seizure, unable to help self    Last 25-Vitamin D (every year): 42      Last DXA, lowest Z-score (every 2 years):  -1.1    Last Testosterone:   Testosterone Total   Date Value Ref Range Status   09/20/2022 373 240 - 950 ng/dL Final   09/17/2020 416 240 - 950 ng/dL Final     Comment:     This test was developed and its performance characteristics determined by the   [facility].   It has not been cleared or approved by the FDA. The laboratory is regulated   under CLIA as qualified to perform high-complexity testing. This test is used   for clinical purposes. It should not be regarded as investigational or for   research.              Assessment and Plan:   Dilan is a 37 year old male with cystic fibrosis related diabetes    CFRD: Overall good control.  He has occasional postprandial highs at 2 hours after meals.    Discussed use of continuous glucose monitor to get more detailed glucose profile.    Could consider adding premeal insulin with the largest meal  of the day if he has persistent hyperglycemia after meals.  Patient to meet with diabetes educator to learn more about CGM and to look at options.  Patient will decide about her CGM use after meeting with diabetes education  Previously positive microalbuminuria, on lisinopril.  Microalbumin was normal on most recent annual labs    Return to clinic in 6 months or sooner if needed     JOSE Headley    Video-Visit Details    Type of service:  Video Visit done using [employer]    Video visit start time 8:15 AM, video visit end time 9:10 AM    Originating Location (pt. Location): Home    Distant Location (provider location):  [facility] ENDOCRINOLOGY     Platform used for Video Visit: Mamta Miranda MD

## 2023-04-05 ENCOUNTER — MYC MEDICAL ADVICE (OUTPATIENT)
Dept: ENDOCRINOLOGY | Facility: CLINIC | Age: 38
End: 2023-04-05
Payer: COMMERCIAL

## 2023-04-05 DIAGNOSIS — E08.9 DIABETES MELLITUS RELATED TO CF (CYSTIC FIBROSIS) (H): ICD-10-CM

## 2023-04-05 DIAGNOSIS — E84.8 DIABETES MELLITUS RELATED TO CF (CYSTIC FIBROSIS) (H): ICD-10-CM

## 2023-04-05 NOTE — TELEPHONE ENCOUNTER
Called [employer] and spoke with Pool. They have not received our request for appeal. Re-faxed to [phone] which is the PA dept, they should be able to overturn.

## 2023-04-06 RX ORDER — INSULIN GLARGINE 100 [IU]/ML
10 INJECTION, SOLUTION SUBCUTANEOUS DAILY
Qty: 15 ML | Refills: 3 | Status: SHIPPED | OUTPATIENT
Start: 2023-04-06 | End: 2024-04-09

## 2023-04-06 NOTE — TELEPHONE ENCOUNTER
insulin glargine (LANTUS SOLOSTAR) 100 UNIT/ML pen      Last Written Prescription Date:  Historical    Last Office Visit : 10-11-22  Future Office visit:  4-11-23    Routing refill request to provider for review/approval because:  Medication is reported/historical  Insulin - refilled per clinic

## 2023-04-06 NOTE — TELEPHONE ENCOUNTER
Prior Authorization Approval    Authorization Effective Date: 4/5/2023  Authorization Expiration Date: 10/5/2023  Medication: Trikafta PA denied, appeal Approved  Approved Dose/Quantity: 84 tabs per 28 days  Reference #: [id number]   Insurance Company: [employer] ([facility]) - Phone [phone] Fax [phone]  Expected CoPay:       CoPay Card Available:      Foundation Assistance Needed:    Which Pharmacy is filling the prescription (Not needed for infusion/clinic administered): [facility] - [place], [place] - [address]  Pharmacy Notified: Yes  Patient Notified: Yes

## 2023-04-07 ENCOUNTER — TELEPHONE (OUTPATIENT)
Dept: ENDOCRINOLOGY | Facility: CLINIC | Age: 38
End: 2023-04-07
Payer: COMMERCIAL

## 2023-04-07 NOTE — TELEPHONE ENCOUNTER
Called patient and left voicemail. Patient has an appointment on 4/11/23. Need to collect the last 14 days worth of blood sugar readings to prepare for patient's visit.   Yuliya Ferrara MA

## 2023-04-10 NOTE — TELEPHONE ENCOUNTER
Spoke with patient. Pt advises he doesn't have any other data for provider to review besides the data sent via [employer]. Yuliya Ferrara CMA on 4/10/2023 at 9:49 AM

## 2023-04-11 ENCOUNTER — TELEPHONE (OUTPATIENT)
Dept: ENDOCRINOLOGY | Facility: CLINIC | Age: 38
End: 2023-04-11

## 2023-04-11 ENCOUNTER — VIRTUAL VISIT (OUTPATIENT)
Dept: ENDOCRINOLOGY | Facility: CLINIC | Age: 38
End: 2023-04-11
Attending: INTERNAL MEDICINE
Payer: COMMERCIAL

## 2023-04-11 DIAGNOSIS — E08.9 DIABETES MELLITUS RELATED TO CF (CYSTIC FIBROSIS) (H): Primary | ICD-10-CM

## 2023-04-11 DIAGNOSIS — E84.8 DIABETES MELLITUS RELATED TO CF (CYSTIC FIBROSIS) (H): Primary | ICD-10-CM

## 2023-04-11 PROCEDURE — 99214 OFFICE O/P EST MOD 30 MIN: CPT | Mod: VID | Performed by: INTERNAL MEDICINE

## 2023-04-11 NOTE — NURSING NOTE
Is the patient currently in the state of [place]? YES    Visit mode:VIDEO    If the visit is dropped, the patient can be reconnected by: TELEPHONE VISIT: Phone number: [phone]    Will anyone else be joining the visit? NO    How would you like to obtain your AVS? MyChart    Are changes needed to the allergy or medication list? NO    Reason for visit:   Chief Complaint   Patient presents with     Video Visit     Diabetes Mellitus related to Cystic Fibrosis     Shelli Saunders MA

## 2023-04-11 NOTE — LETTER
4/11/2023       RE: Dilan Nassar  [address] [facility] [address]     Dear Colleague,    Thank you for referring your patient, Dilan Nassar, to the [facility] [place] [facility]. Please see a copy of my visit note below.    Outcome for 04/04/23 9:02 AM: [employer] message sent  Maria Antonia Buitrago LPN   Outcome for 04/07/23 11:28 AM: Left Voicemail   Yuliya Ferrara MA  Outcome for 04/10/23 7:31 AM: Replied to [employer] message  Maria Antonia Buitrago LPN   Outcome for 04/10/23 9:46 AM: Glucose readings sent via [employer]- Spoke to patient and he advises he does not have any other data for provider to review.   Yuliya Ferrara MA          Dilan Nassar  is being evaluated via a billable video visit.      CF Endocrinology Return Consultation:  Diabetes  :   Patient: Dilan Nassar MRN# [id number]   YOB: 1985 Age: 37 year old   Date of Visit: 04/11/2023  Dear Dr. Atilio Nieto:    I had the pleasure of seeing your patient, Dilan Nassar in the CF Endocrinology Clinic, [facility], for a return consultation .           Problem list:   MRSA  Cystic fibrosis  Diabetes mellitus  Exocrine pancreatic insufficiency  Vitamin D deficiency  Gout  Intestinal malabsorption         HPI:   Dilan is a 37 year old male with Cystic Fibrosis Related Diabetes Mellitus (CFRD).    I have reviewed the available past laboratory evaluations, imaging studies, and medical records available to me at this visit.   History was obtained from the patient and the medical record.  I have reviewed the notes of the pulmonary care team entered into the medical record since I last saw the patient.    Overall feels well, denies specific complaints or concerns.    Glucose readings sent via [employer] copied above  Reading are checked fasting and 2 hours pot meal.   Post dinner readings are occasionally high    He wants to discuss use of continuous glucose  monitor  Overall doing well  Denies hypoglycemia    Hemoglobin A1C   Date Value Ref Range Status   09/20/2022 6.3 (H) <5.7 % Final     Comment:     Normal <5.7%   Prediabetes 5.7-6.4%    Diabetes 6.5% or higher     Note: Adopted from ADA consensus guidelines.   09/17/2020 5.7 (H) 0 - 5.6 % Final     Comment:     Normal <5.7% Prediabetes 5.7-6.4%  Diabetes 6.5% or higher - adopted from ADA   consensus guidelines.       Current insulin regimen:   Basaglar 10 unit(s) daily 10 AM          Past Medical History:     Active Ambulatory Problems     Diagnosis Date Noted    Cystic fibrosis with pulmonary manifestations     Exocrine pancreatic insufficiency 02/13/2015    Vitamin D deficiency 02/13/2015    Gout 02/13/2015    Allergic rhinitis 02/13/2015    Intestinal malabsorption 02/13/2015    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) 10/16/2015    Diabetes mellitus related to cystic fibrosis (H) 10/16/2015    Cystic fibrosis with pulmonary exacerbation (H) 10/23/2015    Methicillin resistant Staphylococcus aureus infection 08/16/2016     Resolved Ambulatory Problems     Diagnosis Date Noted    Type 1 diabetes mellitus (H) 02/13/2015     No Additional Past Medical History          Past Surgical History:   Appendectomy  Sinus surgery            Social History:   Unmarried  Non smoker  Former smokeless tobacco user           Family History:   Mother: liver disease  Paternal grandfather: prostate cancer  Maternal aunt: diabetes mellitus  Maternal uncle: diabetes mellitus  Maternal grandmother: diabetes mellitus  Paternal grandmother: coronary artery disease         Allergies:     Allergies   Allergen Reactions    Molds & Smuts Itching          Medications:     Current Outpatient Medications:     albuterol (PROVENTIL) (2.5 MG/3ML) 0.083% neb solution, Take 1 vial (2.5 mg) by nebulization 2 times daily, Disp: 180 mL, Rfl: 3    allopurinol (ZYLOPRIM) 300 MG tablet, TAKE ONE TABLET (300MG) BY MOUTH DAILY, Disp: 90 tablet, Rfl: 3     azithromycin (ZITHROMAX) 250 MG tablet, Take 2 tablets (500 mg) by mouth Every Mon, Wed, Fri Morning, Disp: 72 tablet, Rfl: 3    blood glucose (NO BRAND SPECIFIED) test strip, Use to test blood sugar 4 times daily or as directed., Disp: 125 strip, Rfl: 11    blood glucose monitoring (FREESTYLE) lancets, Use to test blood sugar 4 times daily or as directed., Disp: 200 each, Rfl: 11    blood glucose monitoring (NO BRAND SPECIFIED) meter device kit, Use to test blood sugar 4 times daily or as directed., Disp: 1 kit, Rfl: 0    dornase maryse (PULMOZYME) 2.5 MG/2.5ML neb solution, INHALE CONTENTS OF ONE VIAL (2.5MG) VIA NEBULIZER TWICE DAILY. KEEP REFRIGERATED UNTIL USE., Disp: 150 mL, Rfl: 11    elexacaftor-tezacaftor-ivacaftor & ivacaftor (TRIKAFTA) 100-50-75 & 150 MG tablet pack, TAKE TWO ORANGE TABLETS BY MOUTH IN THE MORNING AND ONE BLUE TABLET IN THE EVENING AS DIRECTED ON PACKAGE.  TAKE WITH FAT CONTAINING FOOD., Disp: 84 tablet, Rfl: 5    fluticasone (FLONASE) 50 MCG/ACT nasal spray, SPRAY 2 SPRAYS INTO BOTH NOSTRILS DAILY, Disp: 48 g, Rfl: 3    insulin glargine (LANTUS SOLOSTAR) 100 UNIT/ML pen, Inject 10 Units Subcutaneous daily, Disp: 15 mL, Rfl: 3    insulin pen needle (32G X 4 MM) 32G X 4 MM miscellaneous, Use 3 pen needles daily or as directed., Disp: 100 each, Rfl: 11    lipase-protease-amylase (CREON) 82619-24068-79109 units CPEP, TAKE 5-6 CAPSULES (60,000-72,000) BY MOUTH THREE TIMES A DAY WITH MEALS, Disp: 600 capsule, Rfl: 4    lisinopril (ZESTRIL) 5 MG tablet, Take 1 tablet (5 mg) by mouth daily, Disp: 90 tablet, Rfl: 3    mvw complete (PROBIOTIC FORMULATION) capsule, Take 1 capsule by mouth daily, Disp: 30 capsule, Rfl: 11    mvw complete formulation (SOFTGELS ) capsule, Take 2 capsules by mouth daily, Disp: 60 capsule, Rfl: 11    ursodiol (ACTIGALL) 300 MG capsule, TAKE 1 CAPSULE (300MG ) BY MOUTH TWO TIMES A DAY, Disp: 180 capsule, Rfl: 3           Review of Systems:        Answers for HPI/ROS  submitted by the patient on 4/11/2023  General Symptoms: No  Skin Symptoms: No  HENT Symptoms: No  EYE SYMPTOMS: No  HEART SYMPTOMS: No  LUNG SYMPTOMS: No  INTESTINAL SYMPTOMS: No  URINARY SYMPTOMS: No  REPRODUCTIVE SYMPTOMS: No  SKELETAL SYMPTOMS: No  BLOOD SYMPTOMS: No  NERVOUS SYSTEM SYMPTOMS: No  MENTAL HEALTH SYMPTOMS: No           Physical Exam:   There were no vitals taken for this visit.  Height: Data Unavailable, Facility age limit for growth %deana is 20 years.  Weight: 0 lbs 0 oz, Facility age limit for growth %deana is 20 years.  BMI: There is no height or weight on file to calculate BMI., No height and weight on file for this encounter.      GENERAL: Healthy, alert and no distress  EYES: Eyes grossly normal to inspection.  No discharge or erythema, or obvious scleral/conjunctival abnormalities.  RESP: No audible wheeze, cough, or visible cyanosis.  No visible retractions or increased work of breathing.    NEURO:  Mentation and speech appropriate for age.  PSYCH: affect normal/bright, judgement and insight intact, normal speech and appearance well-groomed.        Laboratory results:     TSH   Date Value Ref Range Status   09/20/2022 1.35 0.30 - 4.20 uIU/mL Final   09/14/2021 2.37 0.40 - 4.00 mU/L Final   09/17/2020 1.96 0.40 - 4.00 mU/L Final     Testosterone Total   Date Value Ref Range Status   09/20/2022 373 240 - 950 ng/dL Final   09/17/2020 416 240 - 950 ng/dL Final     Comment:     This test was developed and its performance characteristics determined by the   [facility].   It has not been cleared or approved by the FDA. The laboratory is regulated   under CLIA as qualified to perform high-complexity testing. This test is used   for clinical purposes. It should not be regarded as investigational or for   research.       Cholesterol   Date Value Ref Range Status   09/20/2022 120 <200 mg/dL Final   09/17/2020 134 <200 mg/dL Final      Albumin Urine mg/L   Date Value Ref Range Status   09/20/2022 <12.0 mg/L Final     Comment:     The reference ranges have not been established in urine albumin. The results should be integrated into the clinical context for interpretation.   01/11/2022 35 mg/L Final   09/17/2020 12 mg/L Final     Triglycerides   Date Value Ref Range Status   09/20/2022 64 <150 mg/dL Final   09/17/2020 91 <150 mg/dL Final     HDL Cholesterol   Date Value Ref Range Status   09/17/2020 83 >39 mg/dL Final     Direct Measure HDL   Date Value Ref Range Status   09/20/2022 72 >=40 mg/dL Final     LDL Cholesterol Calculated   Date Value Ref Range Status   09/20/2022 35 <=100 mg/dL Final   09/17/2020 33 <100 mg/dL Final     Comment:     Desirable:       <100 mg/dl     Non HDL Cholesterol   Date Value Ref Range Status   09/20/2022 48 <130 mg/dL Final   09/17/2020 51 <130 mg/dL Final         CF  Diabetes Health Maintenance    Date of Diabetes Diagnosis: ~ 2012     Special Notes (if any):      Date Last Eye Exam:   ~ Aug 2022     Date Last Dental Appointment:     Dates of Episodes Severe* Hypoglycemia (month/year, cumulative, ongoing, assess each visit):    *Severe=patient unconscious, seizure, unable to help self    Last 25-Vitamin D (every year): 42      Last DXA, lowest Z-score (every 2 years):  -1.1    Last Testosterone:   Testosterone Total   Date Value Ref Range Status   09/20/2022 373 240 - 950 ng/dL Final   09/17/2020 416 240 - 950 ng/dL Final     Comment:     This test was developed and its performance characteristics determined by the   [facility].   It has not been cleared or approved by the FDA. The laboratory is regulated   under CLIA as qualified to perform high-complexity testing. This test is used   for clinical purposes. It should not be regarded as investigational or for   research.              Assessment and Plan:   Dilan is a 37 year old male with  cystic fibrosis related diabetes    CFRD: Overall good control.  He has occasional postprandial highs at 2 hours after meals.    Discussed use of continuous glucose monitor to get more detailed glucose profile.    Could consider adding premeal insulin with the largest meal of the day if he has persistent hyperglycemia after meals.  Patient to meet with diabetes educator to learn more about CGM and to look at options.  Patient will decide about her CGM use after meeting with diabetes education  Previously positive microalbuminuria, on lisinopril.  Microalbumin was normal on most recent annual labs    Return to clinic in 6 months or sooner if needed     JOSE Headley

## 2023-04-13 NOTE — PROGRESS NOTES
[facility]  [facility]  April 18, 2023         Assessment and Plan:   Guillermo Nassar is a 37 year old male with cystic fibrosis who is seen today for routine follow up.     1. CF lung disease: had covid in March s/p paxlovid and feels he has recovered back to baseline. Sating 97% on room air; nebs and vesting BID. PFTs at his baseline today, down slightly from his best. Previous cultures + for MRSA, Klebsiella, Steno, Ps A and A. Fumigatus. No evidence of exacerbation at thist camelia.   - Continue nebulizers and vest therapy  - On chronic azithromycin     2. Elevated Cr with albuminuria  HTN: BP of 128/86 today.. Repeat Cr improved on annuals.   - Continue lisinopril 5 mg daily     3. CF related liver disease: US 5/17 demonstrating coarse echotexture of the liver with hypertrophy of the caudate lobe and left lobe concerning for possible cirrhosis, no lesions, patent vasculature.  - Continue Ursodiol      4. Exocrine pancreatic insufficiency: no new GI complaints today, notes he still has some stools are float, didn't get the probiotic we had talked about at his last visit.   - Start probiotic once/day for a month and monitor for improvement  - Continue enzymes and vitamin supplementation      5. CFRD: last AIC of 6.3 on 9/20. Started lisinopril per above for renal protection. Following with Dr. Miranda.   - Continue Lantus 10 U      6. CF related sinus disease: no complaints today.   - Continue Flonase      7. CFTR: modulation: on Trikafta and is doing well. Labs in September WNL.   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  Vesting BID and staying on his insulin and Trikafa to stay well. Did have covid in March and is s/p Paxlovid. Had some chest congestion, but feels back to baseline now. No fever or chills, cough is minimal, no chest congestion or tightness, no shortness of breath. No fatigue. No chest pain or palpitations. No nausea or vomiting, stools are floating, didn't try a probiotic.          Review of Systems:   Please see HPI. Otherwise, complete 10 point ROS negative.           Past Medical and Surgical History:     Past Medical History:   Diagnosis Date     Cystic fibrosis with pulmonary manifestations (H)     /[id number]     Past Surgical History:   Procedure Laterality Date     APPENDECTOMY OPEN  1999    Appendicitis      SINUS SURGERY  1990's    h/o many sinus infections           Family History:     Family History   Problem Relation Age of Onset     Diabetes Mother         Unknown type     Prostate Cancer Paternal Grandfather      LUNG DISEASE Other         Negative     Diabetes Maternal Aunt         unknown type     Diabetes Maternal Uncle         unknown type     Diabetes Maternal Grandmother         unknown type     Coronary Artery Disease Paternal Grandmother             Social History:     Social History     Socioeconomic History     Marital status: Single     Spouse name: Not on file     Number of children: Not on file     Years of education: Not on file     Highest education level: Not on file   Occupational History     Occupation: State Representative     Employer: [employer]     Occupation: Financial Counselor   Tobacco Use     Smoking status: Never     Smokeless tobacco: Former   Vaping Use     Vaping status: Not on file   Substance and Sexual Activity     Alcohol use: Yes     Alcohol/week: 10.0 - 14.0 standard drinks of alcohol     Types: 10 - 14 Standard drinks or equivalent per week     Comment: 2 drinks per night 5X/wk     Drug use: No     Sexual activity: Yes     Partners: Female     Birth  "control/protection: Pull-out method, Condom   Other Topics Concern     Parent/sibling w/ CABG, MI or angioplasty before 65F 55M? Yes   Social History Narrative    Owns a home in [place], [place] with his girlfriend of ~7 years. 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     Social Determinants of Health     Financial Resource Strain: Not on file   Food Insecurity: Not on file   Transportation Needs: Not on file   Physical Activity: Not on file   Stress: Not on file   Social Connections: Not on file   Intimate Partner Violence: Not on file   Housing Stability: Not on file            Medications:     Current Outpatient Medications   Medication     albuterol (PROVENTIL) (2.5 MG/3ML) 0.083% neb solution     elexacaftor-tezacaftor-ivacaftor & ivacaftor (TRIKAFTA) 100-50-75 & 150 MG tablet pack     allopurinol (ZYLOPRIM) 300 MG tablet     azithromycin (ZITHROMAX) 250 MG tablet     blood glucose (NO BRAND SPECIFIED) test strip     blood glucose monitoring (FREESTYLE) lancets     blood glucose monitoring (NO BRAND SPECIFIED) meter device kit     dornase maryse (PULMOZYME) 2.5 MG/2.5ML neb solution     fluticasone (FLONASE) 50 MCG/ACT nasal spray     insulin glargine (LANTUS SOLOSTAR) 100 UNIT/ML pen     insulin pen needle (32G X 4 MM) 32G X 4 MM miscellaneous     lipase-protease-amylase (CREON) 69848-22488-65299 units CPEP     lisinopril (ZESTRIL) 5 MG tablet     mvw complete (PROBIOTIC FORMULATION) capsule     mvw complete formulation (SOFTGELS ) capsule     ursodiol (ACTIGALL) 300 MG capsule     No current facility-administered medications for this visit.            Physical Exam:   /86 (BP Location: Right arm, Patient Position: Sitting, Cuff Size: Adult Regular)   Pulse 79   Ht 1.63 m (5' 4.17\")   Wt 57 kg (125 lb 10.6 oz)   SpO2 97%   BMI 21.45 kg/m      GENERAL: alert, NAD  HEENT: NCAT, EOMI, anicteric sclera, no nasal edema or erythema; canals and TMs clear; no oral mucosal edema or erythema  Neck: " no cervical or supraclavicular adenopathy  Respiratory: good air flow, no crackles, rhonchi or wheezing  CV: RRR, S1S2, no murmurs noted  Abdomen: normoactive BS, soft and non tender without organomegaly  Lymph: no edema, + digital clubbing  Neuro: AAO X 3, CN 2-12 grossly intact, 5/5 bilateral  strength and dorsiflexion  Psychiatric: normal affect, good eye contact  Skin: no rash, jaundice or lesions on limited exam         Data:   All laboratory and imaging data reviewed.      Cystic Fibrosis Culture  Specimen Description   Date Value Ref Range Status   06/15/2021 Sputum  Final   09/17/2020 Sputum  Final   12/19/2019 Sputum  Final    Culture Micro   Date Value Ref Range Status   06/15/2021 Heavy growth  Normal mami    Final   06/15/2021 (A)  Final    Light growth  Pseudomonas aeruginosa, mucoid strain     06/15/2021 Light growth  Pseudomonas aeruginosa   (A)  Final   06/15/2021 Light growth  Strain 2  Pseudomonas aeruginosa   (A)  Final        PFT interpretation:  Maneuver: valid and meets ATS guidelines  Normal spirometry  Compared to prior: FEV1 of 2.74 is 110 ml below his prior value  The decrease in FVC may represent air trapping v. restrictive physiology.  Lung volumes would be necessary to determine.

## 2023-04-15 ENCOUNTER — TELEPHONE (OUTPATIENT)
Dept: ENDOCRINOLOGY | Facility: CLINIC | Age: 38
End: 2023-04-15
Payer: COMMERCIAL

## 2023-04-18 ENCOUNTER — OFFICE VISIT (OUTPATIENT)
Dept: PULMONOLOGY | Facility: CLINIC | Age: 38
End: 2023-04-18
Attending: PHYSICIAN ASSISTANT
Payer: COMMERCIAL

## 2023-04-18 ENCOUNTER — OFFICE VISIT (OUTPATIENT)
Dept: PHARMACY | Facility: CLINIC | Age: 38
End: 2023-04-18
Payer: COMMERCIAL

## 2023-04-18 ENCOUNTER — TELEPHONE (OUTPATIENT)
Dept: PULMONOLOGY | Facility: CLINIC | Age: 38
End: 2023-04-18

## 2023-04-18 ENCOUNTER — ALLIED HEALTH/NURSE VISIT (OUTPATIENT)
Dept: EDUCATION SERVICES | Facility: CLINIC | Age: 38
End: 2023-04-18
Payer: COMMERCIAL

## 2023-04-18 VITALS
DIASTOLIC BLOOD PRESSURE: 86 MMHG | HEART RATE: 79 BPM | WEIGHT: 125.66 LBS | OXYGEN SATURATION: 97 % | BODY MASS INDEX: 21.45 KG/M2 | SYSTOLIC BLOOD PRESSURE: 128 MMHG | HEIGHT: 64 IN

## 2023-04-18 DIAGNOSIS — E84.9 CYSTIC FIBROSIS (H): ICD-10-CM

## 2023-04-18 DIAGNOSIS — E84.9 DIABETES MELLITUS DUE TO CYSTIC FIBROSIS (H): ICD-10-CM

## 2023-04-18 DIAGNOSIS — K86.81 EXOCRINE PANCREATIC INSUFFICIENCY: ICD-10-CM

## 2023-04-18 DIAGNOSIS — E84.0 CYSTIC FIBROSIS WITH PULMONARY MANIFESTATIONS (H): Primary | ICD-10-CM

## 2023-04-18 DIAGNOSIS — E08.9 DIABETES MELLITUS RELATED TO CYSTIC FIBROSIS (H): ICD-10-CM

## 2023-04-18 DIAGNOSIS — E08.9 DIABETES MELLITUS DUE TO CYSTIC FIBROSIS (H): Primary | ICD-10-CM

## 2023-04-18 DIAGNOSIS — E84.9 CF (CYSTIC FIBROSIS) (H): ICD-10-CM

## 2023-04-18 DIAGNOSIS — E08.9 DIABETES MELLITUS DUE TO CYSTIC FIBROSIS (H): ICD-10-CM

## 2023-04-18 DIAGNOSIS — E84.8 DIABETES MELLITUS RELATED TO CYSTIC FIBROSIS (H): ICD-10-CM

## 2023-04-18 DIAGNOSIS — E84.0 CYSTIC FIBROSIS WITH PULMONARY EXACERBATION (H): Primary | ICD-10-CM

## 2023-04-18 DIAGNOSIS — A49.02 MRSA INFECTION: ICD-10-CM

## 2023-04-18 DIAGNOSIS — E84.9 DIABETES MELLITUS DUE TO CYSTIC FIBROSIS (H): Primary | ICD-10-CM

## 2023-04-18 DIAGNOSIS — E84.0 CYSTIC FIBROSIS WITH PULMONARY MANIFESTATIONS (H): ICD-10-CM

## 2023-04-18 LAB
EXPTIME-PRE: 17.31 SEC
FEF2575-%PRED-PRE: 32 %
FEF2575-PRE: 1.09 L/SEC
FEF2575-PRED: 3.4 L/SEC
FEFMAX-%PRED-PRE: 79 %
FEFMAX-PRE: 7.1 L/SEC
FEFMAX-PRED: 8.93 L/SEC
FEV1-%PRED-PRE: 83 %
FEV1-PRE: 2.74 L
FEV1FEV6-PRE: 62 %
FEV1FEV6-PRED: 82 %
FEV1FVC-PRE: 56 %
FEV1FVC-PRED: 83 %
FIFMAX-PRE: 7.53 L/SEC
FVC-%PRED-PRE: 123 %
FVC-PRE: 4.86 L
FVC-PRED: 3.94 L

## 2023-04-18 PROCEDURE — G0463 HOSPITAL OUTPT CLINIC VISIT: HCPCS | Performed by: PHYSICIAN ASSISTANT

## 2023-04-18 PROCEDURE — 99207 PR NO CHARGE LOS: CPT | Performed by: PHARMACIST

## 2023-04-18 PROCEDURE — 87070 CULTURE OTHR SPECIMN AEROBIC: CPT | Performed by: PHYSICIAN ASSISTANT

## 2023-04-18 PROCEDURE — 94375 RESPIRATORY FLOW VOLUME LOOP: CPT | Performed by: PHYSICIAN ASSISTANT

## 2023-04-18 PROCEDURE — 99207 PR NO BILLABLE SERVICE THIS VISIT: CPT | Performed by: NUTRITIONIST

## 2023-04-18 PROCEDURE — 99214 OFFICE O/P EST MOD 30 MIN: CPT | Mod: 25 | Performed by: PHYSICIAN ASSISTANT

## 2023-04-18 PROCEDURE — 87206 SMEAR FLUORESCENT/ACID STAI: CPT | Performed by: PHYSICIAN ASSISTANT

## 2023-04-18 RX ORDER — ELEXACAFTOR, TEZACAFTOR, AND IVACAFTOR 100-50-75
KIT ORAL
Qty: 84 TABLET | Refills: 5 | Status: SHIPPED | OUTPATIENT
Start: 2023-04-18 | End: 2023-12-12

## 2023-04-18 RX ORDER — ALBUTEROL SULFATE 0.83 MG/ML
2.5 SOLUTION RESPIRATORY (INHALATION) 3 TIMES DAILY PRN
Qty: 180 ML | Refills: 3 | Status: SHIPPED | OUTPATIENT
Start: 2023-04-18 | End: 2023-09-19

## 2023-04-18 ASSESSMENT — PAIN SCALES - GENERAL: PAINLEVEL: NO PAIN (0)

## 2023-04-18 NOTE — PATIENT INSTRUCTIONS
Cystic Fibrosis Self-Care Plan       Patient: Dilan Nassar   MRN: [id number]   Clinic Date: April 18, 2023     RECOMMENDATIONS:  1. Continue nebulizers and vest therapy.   2. Enjoy the summer and your new job!  3. Call and get your probiotic.     Annual Studies:   IGG   Date Value Ref Range Status   09/17/2020 1,153 610 - 1,616 mg/dL Final     Immunoglobulin G   Date Value Ref Range Status   09/20/2022 830 610 - 1,616 mg/dL Final     No results found for: INS  There are no preventive care reminders to display for this patient.    Pulmonary Function Tests  FEV1: amount of air you can blow out in 1 second  FVC: total amount of air you can take in and blow out    Your Goals:             Latest Ref Rng & Units 4/18/2023     1:21 PM   PFT   FVC L 4.86  P   FEV1 L 2.74  P   FVC% % 123  P   FEV1% % 83  P      P Preliminary result          Airway Clearance: The Most Important Way to Keep Your Lungs Healthy  Vest Settings:   Hill-Rom Frequencies: 8, 9, 10 Pressure 10 Then, Frequencies 18, 19, 20 Pressure 6     [employer]pirTech: Quick Start with Pressure of     Do each frequency for 5 minutes; Deflate vest after each frequency & cough 3 times before beginning the next setting.    Vest and Neb Therapy should be done 2 times/day.    Good Nutrition Can Improve Lung Function and Overall Health    Take ALL of your vitamins with food    Take 1/2 of your enzymes before EVERY meal/snack and the other 1/2 mid-meal/snack    Wt Readings from Last 3 Encounters:   04/18/23 57 kg (125 lb 10.6 oz)   04/19/22 56.2 kg (123 lb 14.4 oz)   10/20/21 55.3 kg (122 lb)       Body mass index is 21.45 kg/m .         National CF Foundation Recommendations for BMI in CF Adults: Women: at least 22 Men: at least 23        Controlling Blood Sugars Helps Prevent Lung Infections & Improves Nutrition  Test blood sugar:    In the morning before eating (goal is )    2 hours after a meal (goal is less than 150)    When pre-meal glucose is greater than 150  add correction    At bedtime (if less than 100 eat a snack with 15 grams of carbohydrates  Last A1C Results:   Hemoglobin A1C   Date Value Ref Range Status   09/20/2022 6.3 (H) <5.7 % Final     Comment:     Normal <5.7%   Prediabetes 5.7-6.4%    Diabetes 6.5% or higher     Note: Adopted from ADA consensus guidelines.   09/17/2020 5.7 (H) 0 - 5.6 % Final     Comment:     Normal <5.7% Prediabetes 5.7-6.4%  Diabetes 6.5% or higher - adopted from ADA   consensus guidelines.           If diabetic, measure A1C every 6 months. Goal is under 7%.    Staying Healthy  Research: If you are interested in learning about research opportunities or have questions, please contact Sonali Altamirano at [phone] or [email or web address].     Foundation: [employer] is a personalized resource service to help you with the insurance, financial, legal and other issues you are facing.  It's free, confidential and available to anyone with CF.  Ask your  for more information or contact [employer]ass directly at [address] ([phone]) or compass@cff.org, or learn more at cff.org/compass.       CF Nurse Line: Alla Pimentel: [phone]  Audra Smart, RT: [phone]    Daniela Whitt and Tamika Perez , Dieticians: [phone]    Lisset Root, Diabetes Nurse: [phone]   Kathie Truong: [phone] or Kailey Mixon at [phone], Social Workers  [email or web address]

## 2023-04-18 NOTE — PATIENT INSTRUCTIONS
Parts of your Braden Sensor                                           Cell phone jeyson and sensor.    The sensor is a white disc that you typically wear on the back of your upper arm.  An jeyson for your cell phone       1. Plan to wear your Braden sensor for 14-days.  The reader/jeyson will tell you when your sensor session is ending.  A one month supply is two sensors.    2. There is a one-hour warm up period each time you insert a new sensor.  You will not get any glucose data during this time.    3. Please scan your sensor at least every 8 hours.  Before breakfast, lunch, dinner and at bedtime.  The sensor updates every minute and can only hold 8 hours of blood glucose data.  If you go longer than 8 hours without scanning we will lose data.  When you scan it, you are emptying it out to make room for more data.      4. When your glucose result is on the screen of the reader you will see a pencil in the upper right corner of the screen.  If you press the pencil it will take you to a screen where you can check boxes for insulin and or food.  This will allow your healthcare team to see when the food and/or insulin go in.     5. Jeyson users can hook to our clinic portal using the code: [id number].  You cannot hook to our portal if you use a reader but you can upload your reader to [employer].    6. If you have any issues with keeping the Braden sensor on, please let us know.  There are several products that can be used to help it stay in place. This tends to be more of an issue during the summer months.     7.  If you have a skin reaction related to the Braden tape, let us know.  There are some products that can be used under that can reduce the reaction.    8.  Important:  The Braden sensor is reading the glucose in the interstitial fluid.  Your blood glucose meter is reading the glucose in the bloodstream.  These two values often mirror each other.  However, after you eat glucose goes to your bloodstream first and then takes an  "additional 20-30 minutes to get out to the interstitial fluid where the sensor can read it.  This means after treating a low (with food) it will take the sensor longer to recognize that you have recovered.  Do not eat until the sensor value comes back up, this will cause you to rebound high.  Do a fingerstick 15 minutes after treating a low to check recovery.    9. Compression lows: If you lay on the sensor you may prevent the interstitial fluid from getting to the sensor.  The sensor may read this as a low blood sugar.  The glucose will typically come right back up if you reposition yourself.  If you are unsure if you are low, do a fingerstick.    10. You will get some notice when your sensor session is ending.  There will be messages that give you a countdown until your sensor ends.  When you see the \"change sensor now\" message remove the old sensor.  It peels off like a Band-Aid and insert a new sensor.      11. Braden sensors are typically covered by your pharmacy benefit.  There are often coupons available on-line for people who do not have coverage.  Medicare covers them as durable medical equipment and they must be ordered through a company who can bill that way.  [facility] or [employer] or [employer] are examples of companies who can bill this way.     Remember the sensor is measuring the glucose in the interstitial fluid.  Your meter is measuring the fluid in your bloodstream.  Most of the time these values will mirror each other but after eating glucose gets to your blood stream first and then goes out to your interstitial fluid.  There is about a 20-30 minute \"lag time\" between the two.  If you have eaten in the last 2 hours then you may find that your meter is reading higher than your sensor and that is expected.  Hydration status can come into play with sensor accuracy.  The sensor is reading glucose in the interstitial fluid, if you are dehydrated there is less fluid to read and this can " "impact accuracy.  You don't need to push fluids when using a sensor but you do want to make sure you are drinking throughout the day.  Interfering Factors:  Vitamin C breaks down into a molecule the same size as glucose.  If you take more than 500 mg per day of Vitamin C you could be falsely high on the sensor.  Compression Lows are something that happens if you are laying on the arm with the sensor in it.  Essentially you cut off the interstitial fluid that can get to the sensor with your body weight.  The sensor will then read this as low.  If you reposition the sensor will often quickly adjust and come back up.  The first day of a new sensor is the least accurate.  For some reason all sensors get more accurate as they adjust to the \"environment\" of your body.  Some people double check the first day by doing a fingerstick to confirm readings.  When doing a fingerstick to compare to the sensor make sure that you are washing your hands well and dry them thouroughly.  We want to make sure that your fingerstick result is an accurate as possible.             "

## 2023-04-18 NOTE — LETTER
4/18/2023         RE: Dilan Nassar  [address]  [place] [address]        Dear Colleague,    Thank you for referring your patient, Dilan Nassar, to the [facility] [place]. Please see a copy of my visit note below.    [facility]  April 18, 2023         Assessment and Plan:   Guillermo Nassar is a 37 year old male with cystic fibrosis who is seen today for routine follow up.     1. CF lung disease: had covid in March s/p paxlovid and feels he has recovered back to baseline. Sating 97% on room air; nebs and vesting BID. PFTs at his baseline today, down slightly from his best. Previous cultures + for MRSA, Klebsiella, Steno, Ps A and A. Fumigatus. No evidence of exacerbation at thist camelia.   - Continue nebulizers and vest therapy  - On chronic azithromycin     2. Elevated Cr with albuminuria  HTN: BP of 128/86 today.. Repeat Cr improved on annuals.   - Continue lisinopril 5 mg daily     3. CF related liver disease: US 5/17 demonstrating coarse echotexture of the liver with hypertrophy of the caudate lobe and left lobe concerning for possible cirrhosis, no lesions, patent vasculature.  - Continue Ursodiol      4. Exocrine pancreatic insufficiency: no new GI complaints today, notes he still has some stools are float, didn't get the probiotic we had talked about at his last visit.   - Start probiotic once/day for a month and monitor for improvement  - Continue enzymes and vitamin supplementation      5. CFRD: last AIC of 6.3 on 9/20. Started lisinopril per above for renal protection. Following with Dr. Miranda.   - Continue Lantus 10 U      6. CF related sinus disease: no complaints today.   - Continue Flonase      7. CFTR: modulation: on Trikafta and is doing well. Labs in September WNL.   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 Vesting BID and staying on his insulin and Trikafa to stay well. Did have covid in March and is s/p Paxlovid. Had some chest congestion, but feels back to baseline now. No fever or chills, cough is minimal, no chest congestion or tightness, no shortness of breath. No fatigue. No chest pain or palpitations. No nausea or vomiting, stools are floating, didn't try a probiotic.          Review of Systems:   Please see HPI. Otherwise, complete 10 point ROS negative.           Past Medical and Surgical History:     Past Medical History:   Diagnosis Date    Cystic fibrosis with pulmonary manifestations (H)     /[id number]     Past Surgical History:   Procedure Laterality Date    APPENDECTOMY OPEN  1999    Appendicitis     SINUS SURGERY  1990's    h/o many sinus infections           Family History:     Family History   Problem Relation Age of Onset    Diabetes Mother         Unknown type    Prostate Cancer Paternal Grandfather     LUNG DISEASE Other         Negative    Diabetes Maternal Aunt         unknown type    Diabetes Maternal Uncle         unknown type    Diabetes Maternal Grandmother         unknown type    Coronary Artery Disease Paternal Grandmother             Social History:     Social History     Socioeconomic History    Marital status: Single     Spouse name: Not on file    Number of children: Not on file    Years of education: Not on file    Highest education level: Not on file   Occupational History    Occupation: State Representative     Employer: [employer]    Occupation: Financial Counselor   Tobacco Use    Smoking status: Never    Smokeless tobacco: Former   Vaping Use    Vaping status: Not on file    Substance and Sexual Activity    Alcohol use: Yes     Alcohol/week: 10.0 - 14.0 standard drinks of alcohol     Types: 10 - 14 Standard drinks or equivalent per week     Comment: 2 drinks per night 5X/wk    Drug use: No    Sexual activity: Yes     Partners: Female     Birth control/protection: Pull-out method, Condom   Other Topics Concern    Parent/sibling w/ CABG, MI or angioplasty before 65F 55M? Yes   Social History Narrative    Owns a home in [place], [place] with his girlfriend of ~7 years. 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     Social Determinants of Health     Financial Resource Strain: Not on file   Food Insecurity: Not on file   Transportation Needs: Not on file   Physical Activity: Not on file   Stress: Not on file   Social Connections: Not on file   Intimate Partner Violence: Not on file   Housing Stability: Not on file            Medications:     Current Outpatient Medications   Medication    albuterol (PROVENTIL) (2.5 MG/3ML) 0.083% neb solution    elexacaftor-tezacaftor-ivacaftor & ivacaftor (TRIKAFTA) 100-50-75 & 150 MG tablet pack    allopurinol (ZYLOPRIM) 300 MG tablet    azithromycin (ZITHROMAX) 250 MG tablet    blood glucose (NO BRAND SPECIFIED) test strip    blood glucose monitoring (FREESTYLE) lancets    blood glucose monitoring (NO BRAND SPECIFIED) meter device kit    dornase maryse (PULMOZYME) 2.5 MG/2.5ML neb solution    fluticasone (FLONASE) 50 MCG/ACT nasal spray    insulin glargine (LANTUS SOLOSTAR) 100 UNIT/ML pen    insulin pen needle (32G X 4 MM) 32G X 4 MM miscellaneous    lipase-protease-amylase (CREON) 03202-40533-96333 units CPEP    lisinopril (ZESTRIL) 5 MG tablet    mvw complete (PROBIOTIC FORMULATION) capsule    mvw complete formulation (SOFTGELS ) capsule    ursodiol (ACTIGALL) 300 MG capsule     No current facility-administered medications for this visit.            Physical Exam:   /86 (BP Location: Right arm, Patient Position: Sitting, Cuff Size: Adult  "Regular)   Pulse 79   Ht 1.63 m (5' 4.17\")   Wt 57 kg (125 lb 10.6 oz)   SpO2 97%   BMI 21.45 kg/m      GENERAL: alert, NAD  HEENT: NCAT, EOMI, anicteric sclera, no nasal edema or erythema; canals and TMs clear; no oral mucosal edema or erythema  Neck: no cervical or supraclavicular adenopathy  Respiratory: good air flow, no crackles, rhonchi or wheezing  CV: RRR, S1S2, no murmurs noted  Abdomen: normoactive BS, soft and non tender without organomegaly  Lymph: no edema, + digital clubbing  Neuro: AAO X 3, CN 2-12 grossly intact, 5/5 bilateral  strength and dorsiflexion  Psychiatric: normal affect, good eye contact  Skin: no rash, jaundice or lesions on limited exam         Data:   All laboratory and imaging data reviewed.      Cystic Fibrosis Culture  Specimen Description   Date Value Ref Range Status   06/15/2021 Sputum  Final   09/17/2020 Sputum  Final   12/19/2019 Sputum  Final    Culture Micro   Date Value Ref Range Status   06/15/2021 Heavy growth  Normal mami    Final   06/15/2021 (A)  Final    Light growth  Pseudomonas aeruginosa, mucoid strain     06/15/2021 Light growth  Pseudomonas aeruginosa   (A)  Final   06/15/2021 Light growth  Strain 2  Pseudomonas aeruginosa   (A)  Final        PFT interpretation:  Maneuver: valid and meets ATS guidelines  Normal spirometry  Compared to prior: FEV1 of 2.74 is 110 ml below his prior value  The decrease in FVC may represent air trapping v. restrictive physiology.  Lung volumes would be necessary to determine.                Again, thank you for allowing me to participate in the care of your patient.        Sincerely,        Jyothi Warren PA-C    "

## 2023-04-18 NOTE — TELEPHONE ENCOUNTER
Guillermo messaged [facility] asking for assistance with copays for Trikafta, Pulmozyme, Creon, Ursodiol, Allopurinol. If he enrolls in any of the following please let [facility] know the information to bill, except [facility] they will send the information directly to the pharmacy. If he has any questions after visit with clinic [facility] please reach out or give him my contact information     [facility] CF treatment aureliano [email or web address]  Treatments covered include Trikafta, Creon, Pulmozyme and Ursodiol    Pulmozyme copay assistance   Patients have to call [phone] or enroll online at [email or web address]     Creon copay assistance   [email or web address]    Trikafta - he is already enrolled in [employer], will message [facility] contact to send copay information to [facility]

## 2023-04-18 NOTE — PROGRESS NOTES
Visit with Guillermo today to discuss CGM.    We discussed Braden 3 sensor and Dexcom G7 sensor.  Patient took a braden 3 sample home. He prefers to put it on at home on his own rather than in clinic today.  See wrap up section for topics discussed.     Scarlett Noel, GAURAV, LD, CDE  4/18/2023   3:50 PM  Time spent with patient 20 minutes.

## 2023-04-18 NOTE — NURSING NOTE
"Dilan Nassar is a 37 year old year old who is being seen for Cystic Fibrosis (CF Follow up)      Medications reviewed and Vital signs taken.    Specimen Collection Type: Sputum    Order(s) placed: AFB (Acid Fast Bacilli) and CF Aerobic Bacterial    *IF AFB order placed - please enter \"PRIORITIZE AFB\" to order comments.       Lab Results   Component Value Date    ACIDFAST No acid fast bacilli seen 04/19/2022    ACIDFAST No acid fast bacilli seen 04/19/2022    ACIDFAST No acid fast bacilli seen 04/19/2022         Lab Results   Component Value Date    AFBSMS Negative for acid fast bacteria 09/26/2018    AFBSMS  09/26/2018     Less than 5ml of specimen received.  A minimum of 5 mL of sputum or fluid is recommended for recovery of acid fast bacilli   (AFB).  Volumes less than 5 mL are suboptimal and may compromise recovery of AFB from   culture.      AFBSMS  09/26/2018     Assayed at [facility] [address] [place] [id number] [phone]           Vitals were taken and medications were reconciled.    Shayy Silverman RMA  2:07 PM    "

## 2023-04-18 NOTE — PROGRESS NOTES
"Disease State Management Encounter:                          Dilan Nassar is a 37 year old male coming in for a follow-up visit. Today's visit is a co-visit with Jyothi Warren PA-C. Today's visit is a follow-up visit from 3/9/23     Reason for visit: Annual CF Medication Review.    Medication Access: Patient fills medication at [facility] and [facility]. Patient expresses concern regarding cost of medications due to new insurance. He reports, \"I don't think my new insurance covers allopurinol or ursodiol.  I think allopurinol is relatively inexpensive out of pocket, but I think ursodiol is quite expensive.  I'm also wondering if there's any sort of co-pay or cost-sharing assistance for Creon, Pulmozyme, or Trikafta.  My new insurance covers these drugs; however, my responsibility is about $100 each after I hit my prescription deductible (my pre-deductible for Creon was $280 - I don't know about it for Pulmozyme or Trikafta).\" He is not currently enrolled in any grants.    CF: Patient is currently taking the following medications:  Bronchodilator: albuterol nebs 0.083% daily as needed   Mucolytic: Pulmozyme 2.5 mg once daily as needed  Antibiotic: Not indicated  Azithromycin: 250 mg daily  CFTR modulator: Trikafta (full dose).   Hepatic: ursodiol 300 mg twice daily  Other: fluticasone (Flonase) 50 mcg/act 2 sprays once daily  Pulmonary symptoms are stable  FEV1-%Pred-Pre (4/18/23): 83%  Cultures (last growth): sputum cultures grow PSA (9/20/22)  Genotype: [id number]    Lab Results   Component Value Date    ALT 30 09/20/2022    AST 28 09/20/2022    BILITOTAL 0.6 09/20/2022    DBIL <0.20 09/20/2022     09/20/2022        Pancreatic Insufficiency/Nutrition: Pancreatic enzyme replacement with Creon [phone] units.  Patient is taking 5-6 capsules with meals. Patient is not experiencing sign/symptoms of malabsorption.  Acid reducer: not indicated  Bowel regimen: MVW " probiotic daily  Vitamins include: MVW complete  2 capsules daily    Lab Results   Component Value Date    MOUNA 0.58 09/20/2022    BETTY 10.3 09/20/2022    VITDT 42 09/20/2022     CFRD:  Patient is currently using Lantus 10 units daily.   SMBG: Accu-Chek meter and supplies.   See recent endo note for blood glucose readings  Patient is not experiencing hypoglycemia  Recent symptoms of high blood sugar? none  Patient follows with Dr. Miranda for endocrinology. Last visit 4/11/23  Most recent HgA1C:   Lab Results   Component Value Date    A1C 6.3 09/20/2022    A1C 6.0 09/14/2021    A1C 5.7 09/17/2020    A1C 6.1 09/09/2019    A1C 5.8 09/26/2018    A1C 6.1 09/15/2017    A1C 6.7 12/20/2016     Assessment/Plan:    1. Sent links to Guillermo for copay assistance programs and [employer] to help with CF medication copays:  [employer]: [email or web address]/[email or web address]  PulmoTro[employer]: [email or web address]/  Creon: [email or web address]  2. Discussed with Guillermo that it may be easiest to fill his CF medications at [facility] to apply the Assistera. He is considering transferring all medications to [facility] for convenience and will reach out if he needs assistance with this.    Follow-up: Guillermo to reach out after signing up for copay assistance programs/Assistera    I spent 21 minutes with this patient today. All changes were made via verbal approval with Jyothi Warren PA-C . A copy of the visit note was provided to the patient's provider(s).    A summary of these recommendations was given to the patient (see AVS from today's appointment with Joythi Warren PA-C).    Jocelin Cuba, PharmD   Medication Therapy Management   Cystic Fibrosis Pharmacist       Medication Therapy Recommendations  Cystic fibrosis with pulmonary exacerbation (H)    Current Medication: dornase maryse (PULMOZYME) 2.5 MG/2.5ML neb solution   Rationale: Cannot afford medication product - Cost -  Adherence   Recommendation: Referral to Service    Status: Resolved Med Access Issue          Current Medication: elexacaftor-tezacaftor-ivacaftor & ivacaftor (TRIKAFTA) 100-50-75 & 150 MG tablet pack   Rationale: Cannot afford medication product - Cost - Adherence   Recommendation: Referral to Service    Status: Resolved Med Access Issue          Current Medication: ursodiol (ACTIGALL) 300 MG capsule   Rationale: Cannot afford medication product - Cost - Adherence   Recommendation: Referral to Service    Status: Resolved Med Access Issue         Exocrine pancreatic insufficiency    Current Medication: lipase-protease-amylase (CREON) 85232-09933-21063 units CPEP   Rationale: Cannot afford medication product - Cost - Adherence   Recommendation: Referral to Service    Status: Resolved Med Access Issue

## 2023-04-21 NOTE — PATIENT INSTRUCTIONS
See provider AVS for a summary of recommendations from today's visit.  Jocelin Cuba, PharmD  Cystic Fibrosis MTM Pharmacist  [facility]

## 2023-04-24 LAB
BACTERIA SPT CULT: ABNORMAL

## 2023-04-28 ENCOUNTER — TELEPHONE (OUTPATIENT)
Dept: PULMONOLOGY | Facility: CLINIC | Age: 38
End: 2023-04-28

## 2023-04-28 NOTE — TELEPHONE ENCOUNTER
Date: 4/28    Phone Number: [phone]    Comments: LVM for pt to follow up about the [employer] program and if patient was able to deactivate his Blue Plus insurance

## 2023-05-01 NOTE — TELEPHONE ENCOUNTER
Spoke to Pt.  Let patient know that the system still shows his Blue Cross Blue Shield was still active.  He said he will call them to deactivate it them let us know when its done

## 2023-05-03 DIAGNOSIS — E08.9 DIABETES MELLITUS DUE TO CYSTIC FIBROSIS (H): ICD-10-CM

## 2023-05-03 DIAGNOSIS — K86.81 EXOCRINE PANCREATIC INSUFFICIENCY: ICD-10-CM

## 2023-05-03 DIAGNOSIS — E84.9 CYSTIC FIBROSIS (H): ICD-10-CM

## 2023-05-03 DIAGNOSIS — E84.9 CF (CYSTIC FIBROSIS) (H): ICD-10-CM

## 2023-05-03 DIAGNOSIS — E84.9 DIABETES MELLITUS DUE TO CYSTIC FIBROSIS (H): ICD-10-CM

## 2023-05-03 DIAGNOSIS — E84.0 CYSTIC FIBROSIS WITH PULMONARY MANIFESTATIONS (H): ICD-10-CM

## 2023-05-03 DIAGNOSIS — A49.02 MRSA INFECTION: ICD-10-CM

## 2023-05-03 RX ORDER — FLUTICASONE PROPIONATE 50 MCG
SPRAY, SUSPENSION (ML) NASAL
Qty: 48 G | Refills: 3 | Status: SHIPPED | OUTPATIENT
Start: 2023-05-03 | End: 2024-05-27

## 2023-05-05 NOTE — TELEPHONE ENCOUNTER
Per message from [facility] stating patient has Blue Plus PMAP     Aleksander, pharmacy liaison spoke to Guillermo who stated Blue Plus PMAP should not be active    Message to [facility] with update regarding insurance

## 2023-05-11 NOTE — TELEPHONE ENCOUNTER
Per [employer] portal patient elig will end on 5/31/2023, message sent to [facility] with update

## 2023-05-15 ENCOUNTER — TELEPHONE (OUTPATIENT)
Dept: PULMONOLOGY | Facility: CLINIC | Age: 38
End: 2023-05-15
Payer: COMMERCIAL

## 2023-05-15 NOTE — TELEPHONE ENCOUNTER
Left Voicemail (1st Attempt) for the patient to call back and schedule the following:    Appointment type: RTN  Provider: Briana  Return date: 07/2023  Specialty phone number: [phone]  Additional appointment(s) needed: PFT, Labs  Additonal Notes: LVM on 05/15/2023

## 2023-06-03 ENCOUNTER — HEALTH MAINTENANCE LETTER (OUTPATIENT)
Age: 38
End: 2023-06-03

## 2023-06-14 LAB
ACID FAST STAIN (ARUP): NORMAL

## 2023-08-02 DIAGNOSIS — E84.0 CYSTIC FIBROSIS WITH PULMONARY MANIFESTATIONS (H): ICD-10-CM

## 2023-08-02 DIAGNOSIS — E08.9 DIABETES MELLITUS DUE TO CYSTIC FIBROSIS (H): ICD-10-CM

## 2023-08-02 DIAGNOSIS — E84.9 CYSTIC FIBROSIS (H): ICD-10-CM

## 2023-08-02 DIAGNOSIS — E84.9 DIABETES MELLITUS DUE TO CYSTIC FIBROSIS (H): ICD-10-CM

## 2023-08-02 DIAGNOSIS — K86.81 EXOCRINE PANCREATIC INSUFFICIENCY: ICD-10-CM

## 2023-08-02 DIAGNOSIS — E84.9 CF (CYSTIC FIBROSIS) (H): ICD-10-CM

## 2023-08-02 DIAGNOSIS — A49.02 MRSA INFECTION: ICD-10-CM

## 2023-08-07 DIAGNOSIS — E08.9 DIABETES MELLITUS DUE TO CYSTIC FIBROSIS (H): ICD-10-CM

## 2023-08-07 DIAGNOSIS — E84.9 DIABETES MELLITUS DUE TO CYSTIC FIBROSIS (H): ICD-10-CM

## 2023-08-15 DIAGNOSIS — K86.81 EXOCRINE PANCREATIC INSUFFICIENCY: ICD-10-CM

## 2023-08-15 DIAGNOSIS — E08.9 DIABETES MELLITUS DUE TO CYSTIC FIBROSIS (H): ICD-10-CM

## 2023-08-15 DIAGNOSIS — E84.9 CF (CYSTIC FIBROSIS) (H): Primary | ICD-10-CM

## 2023-08-15 DIAGNOSIS — E55.9 VITAMIN D DEFICIENCY: ICD-10-CM

## 2023-08-15 DIAGNOSIS — E84.9 DIABETES MELLITUS DUE TO CYSTIC FIBROSIS (H): ICD-10-CM

## 2023-09-19 DIAGNOSIS — E84.9 DIABETES MELLITUS DUE TO CYSTIC FIBROSIS (H): ICD-10-CM

## 2023-09-19 DIAGNOSIS — E84.9 CYSTIC FIBROSIS (H): ICD-10-CM

## 2023-09-19 DIAGNOSIS — K86.81 EXOCRINE PANCREATIC INSUFFICIENCY: ICD-10-CM

## 2023-09-19 DIAGNOSIS — E08.9 DIABETES MELLITUS DUE TO CYSTIC FIBROSIS (H): ICD-10-CM

## 2023-09-19 DIAGNOSIS — E84.9 CF (CYSTIC FIBROSIS) (H): ICD-10-CM

## 2023-09-19 DIAGNOSIS — E84.0 CYSTIC FIBROSIS WITH PULMONARY MANIFESTATIONS (H): ICD-10-CM

## 2023-09-19 DIAGNOSIS — A49.02 MRSA INFECTION: ICD-10-CM

## 2023-09-19 RX ORDER — ALBUTEROL SULFATE 0.83 MG/ML
2.5 SOLUTION RESPIRATORY (INHALATION) 3 TIMES DAILY PRN
Qty: 180 ML | Refills: 3 | Status: SHIPPED | OUTPATIENT
Start: 2023-09-19 | End: 2024-02-22

## 2023-09-21 ENCOUNTER — TELEPHONE (OUTPATIENT)
Dept: PULMONOLOGY | Facility: CLINIC | Age: 38
End: 2023-09-21

## 2023-09-21 NOTE — TELEPHONE ENCOUNTER
PA Initiation    Medication: TRIKAFTA 100-50-75 & 150 MG PO TBPK  Insurance Company: [employer] ([facility]) - Phone [phone] Fax [phone]  Pharmacy Filling the Rx:    Filling Pharmacy Phone:    Filling Pharmacy Fax:    Start Date: 9/21/2023    Key: [id number]

## 2023-09-26 ENCOUNTER — CLINICAL UPDATE (OUTPATIENT)
Dept: PHARMACY | Facility: CLINIC | Age: 38
End: 2023-09-26
Payer: COMMERCIAL

## 2023-09-26 ENCOUNTER — LAB (OUTPATIENT)
Dept: LAB | Facility: CLINIC | Age: 38
End: 2023-09-26
Attending: INTERNAL MEDICINE
Payer: COMMERCIAL

## 2023-09-26 ENCOUNTER — OFFICE VISIT (OUTPATIENT)
Dept: PULMONOLOGY | Facility: CLINIC | Age: 38
End: 2023-09-26
Attending: INTERNAL MEDICINE
Payer: COMMERCIAL

## 2023-09-26 ENCOUNTER — ANCILLARY PROCEDURE (OUTPATIENT)
Dept: GENERAL RADIOLOGY | Facility: CLINIC | Age: 38
End: 2023-09-26
Attending: INTERNAL MEDICINE
Payer: COMMERCIAL

## 2023-09-26 VITALS
WEIGHT: 131.61 LBS | SYSTOLIC BLOOD PRESSURE: 131 MMHG | HEART RATE: 78 BPM | DIASTOLIC BLOOD PRESSURE: 89 MMHG | BODY MASS INDEX: 22.47 KG/M2 | HEIGHT: 64 IN | OXYGEN SATURATION: 95 %

## 2023-09-26 DIAGNOSIS — K86.81 EXOCRINE PANCREATIC INSUFFICIENCY: ICD-10-CM

## 2023-09-26 DIAGNOSIS — E08.9 DIABETES MELLITUS DUE TO CYSTIC FIBROSIS (H): ICD-10-CM

## 2023-09-26 DIAGNOSIS — E84.0 CYSTIC FIBROSIS WITH PULMONARY MANIFESTATIONS (H): ICD-10-CM

## 2023-09-26 DIAGNOSIS — E84.9 CF (CYSTIC FIBROSIS) (H): ICD-10-CM

## 2023-09-26 DIAGNOSIS — K86.81 EXOCRINE PANCREATIC INSUFFICIENCY: Chronic | ICD-10-CM

## 2023-09-26 DIAGNOSIS — E84.9 DIABETES MELLITUS DUE TO CYSTIC FIBROSIS (H): ICD-10-CM

## 2023-09-26 DIAGNOSIS — E55.9 VITAMIN D DEFICIENCY: ICD-10-CM

## 2023-09-26 DIAGNOSIS — E84.9 DIABETES MELLITUS DUE TO CYSTIC FIBROSIS (H): Primary | ICD-10-CM

## 2023-09-26 DIAGNOSIS — E08.9 DIABETES MELLITUS DUE TO CYSTIC FIBROSIS (H): Primary | ICD-10-CM

## 2023-09-26 DIAGNOSIS — E84.0 CYSTIC FIBROSIS WITH PULMONARY EXACERBATION (H): Primary | ICD-10-CM

## 2023-09-26 DIAGNOSIS — E84.0 CYSTIC FIBROSIS WITH PULMONARY MANIFESTATIONS (H): Primary | ICD-10-CM

## 2023-09-26 LAB
ALBUMIN SERPL BCG-MCNC: 4.4 G/DL (ref 3.5–5.2)
ALBUMIN UR-MCNC: NEGATIVE MG/DL
ALP SERPL-CCNC: 115 U/L (ref 40–129)
ALT SERPL W P-5'-P-CCNC: 24 U/L (ref 0–70)
ANION GAP SERPL CALCULATED.3IONS-SCNC: 11 MMOL/L (ref 7–15)
APPEARANCE UR: CLEAR
AST SERPL W P-5'-P-CCNC: 23 U/L (ref 0–45)
BASOPHILS # BLD AUTO: 0 10E3/UL (ref 0–0.2)
BASOPHILS NFR BLD AUTO: 1 %
BILIRUB DIRECT SERPL-MCNC: 0.22 MG/DL (ref 0–0.3)
BILIRUB SERPL-MCNC: 0.7 MG/DL
BILIRUB UR QL STRIP: NEGATIVE
BUN SERPL-MCNC: 19.2 MG/DL (ref 6–20)
CALCIUM SERPL-MCNC: 9.5 MG/DL (ref 8.6–10)
CHLORIDE SERPL-SCNC: 103 MMOL/L (ref 98–107)
CHOLEST SERPL-MCNC: 133 MG/DL
CK SERPL-CCNC: 157 U/L (ref 39–308)
COLOR UR AUTO: ABNORMAL
CREAT SERPL-MCNC: 0.98 MG/DL (ref 0.67–1.17)
CREAT UR-MCNC: 129 MG/DL
DEPRECATED HCO3 PLAS-SCNC: 24 MMOL/L (ref 22–29)
EGFRCR SERPLBLD CKD-EPI 2021: >90 ML/MIN/1.73M2
EOSINOPHIL # BLD AUTO: 0.1 10E3/UL (ref 0–0.7)
EOSINOPHIL NFR BLD AUTO: 2 %
ERYTHROCYTE [DISTWIDTH] IN BLOOD BY AUTOMATED COUNT: 11.9 % (ref 10–15)
EXPTIME-PRE: 11.3 SEC
FEF2575-%PRED-PRE: 35 %
FEF2575-PRE: 1.21 L/SEC
FEF2575-PRED: 3.37 L/SEC
FEFMAX-%PRED-PRE: 82 %
FEFMAX-PRE: 7.37 L/SEC
FEFMAX-PRED: 8.95 L/SEC
FEV1-%PRED-PRE: 84 %
FEV1-PRE: 2.76 L
FEV1FEV6-PRE: 63 %
FEV1FEV6-PRED: 82 %
FEV1FVC-PRE: 58 %
FEV1FVC-PRED: 83 %
FIFMAX-PRE: 6.75 L/SEC
FVC-%PRED-PRE: 120 %
FVC-PRE: 4.75 L
FVC-PRED: 3.95 L
GLUCOSE SERPL-MCNC: 229 MG/DL (ref 70–99)
GLUCOSE UR STRIP-MCNC: 50 MG/DL
HBA1C MFR BLD: 6.4 %
HCT VFR BLD AUTO: 39.3 % (ref 40–53)
HDLC SERPL-MCNC: 81 MG/DL
HGB BLD-MCNC: 14.4 G/DL (ref 13.3–17.7)
HGB UR QL STRIP: NEGATIVE
HOLD SPECIMEN: NORMAL
HOLD SPECIMEN: NORMAL
IMM GRANULOCYTES # BLD: 0 10E3/UL
IMM GRANULOCYTES NFR BLD: 0 %
INR PPP: 1.14 (ref 0.85–1.15)
IRON SERPL-MCNC: 81 UG/DL (ref 61–157)
KETONES UR STRIP-MCNC: NEGATIVE MG/DL
LDLC SERPL CALC-MCNC: 40 MG/DL
LEUKOCYTE ESTERASE UR QL STRIP: NEGATIVE
LYMPHOCYTES # BLD AUTO: 1.4 10E3/UL (ref 0.8–5.3)
LYMPHOCYTES NFR BLD AUTO: 19 %
MAGNESIUM SERPL-MCNC: 1.9 MG/DL (ref 1.7–2.3)
MCH RBC QN AUTO: 33.3 PG (ref 26.5–33)
MCHC RBC AUTO-ENTMCNC: 36.6 G/DL (ref 31.5–36.5)
MCV RBC AUTO: 91 FL (ref 78–100)
MICROALBUMIN UR-MCNC: <12 MG/L
MICROALBUMIN/CREAT UR: NORMAL MG/G{CREAT}
MONOCYTES # BLD AUTO: 0.4 10E3/UL (ref 0–1.3)
MONOCYTES NFR BLD AUTO: 6 %
MUCOUS THREADS #/AREA URNS LPF: PRESENT /LPF
NEUTROPHILS # BLD AUTO: 5.2 10E3/UL (ref 1.6–8.3)
NEUTROPHILS NFR BLD AUTO: 72 %
NITRATE UR QL: NEGATIVE
NONHDLC SERPL-MCNC: 52 MG/DL
NRBC # BLD AUTO: 0 10E3/UL
NRBC BLD AUTO-RTO: 0 /100
PH UR STRIP: 6.5 [PH] (ref 5–7)
PHOSPHATE SERPL-MCNC: 3 MG/DL (ref 2.5–4.5)
PLATELET # BLD AUTO: 151 10E3/UL (ref 150–450)
POTASSIUM SERPL-SCNC: 4.2 MMOL/L (ref 3.4–5.3)
PROT SERPL-MCNC: 6.6 G/DL (ref 6.4–8.3)
RBC # BLD AUTO: 4.33 10E6/UL (ref 4.4–5.9)
RBC URINE: 0 /HPF
SODIUM SERPL-SCNC: 138 MMOL/L (ref 135–145)
SP GR UR STRIP: 1.02 (ref 1–1.03)
TRIGL SERPL-MCNC: 61 MG/DL
TSH SERPL DL<=0.005 MIU/L-ACNC: 0.99 UIU/ML (ref 0.3–4.2)
UROBILINOGEN UR STRIP-MCNC: NORMAL MG/DL
VIT D+METAB SERPL-MCNC: 37 NG/ML (ref 20–50)
WBC # BLD AUTO: 7.2 10E3/UL (ref 4–11)
WBC URINE: 0 /HPF

## 2023-09-26 PROCEDURE — 97803 MED NUTRITION INDIV SUBSEQ: CPT | Performed by: DIETITIAN, REGISTERED

## 2023-09-26 PROCEDURE — 82784 ASSAY IGA/IGD/IGG/IGM EACH: CPT | Performed by: INTERNAL MEDICINE

## 2023-09-26 PROCEDURE — 84446 ASSAY OF VITAMIN E: CPT | Mod: 90 | Performed by: PATHOLOGY

## 2023-09-26 PROCEDURE — 85610 PROTHROMBIN TIME: CPT | Performed by: PATHOLOGY

## 2023-09-26 PROCEDURE — 82550 ASSAY OF CK (CPK): CPT | Performed by: PATHOLOGY

## 2023-09-26 PROCEDURE — 85025 COMPLETE CBC W/AUTO DIFF WBC: CPT | Performed by: PATHOLOGY

## 2023-09-26 PROCEDURE — 87116 MYCOBACTERIA CULTURE: CPT | Mod: 90 | Performed by: PATHOLOGY

## 2023-09-26 PROCEDURE — 84590 ASSAY OF VITAMIN A: CPT | Mod: 90 | Performed by: PATHOLOGY

## 2023-09-26 PROCEDURE — 84443 ASSAY THYROID STIM HORMONE: CPT | Performed by: PATHOLOGY

## 2023-09-26 PROCEDURE — 87206 SMEAR FLUORESCENT/ACID STAI: CPT | Mod: 90 | Performed by: PATHOLOGY

## 2023-09-26 PROCEDURE — 99000 SPECIMEN HANDLING OFFICE-LAB: CPT | Performed by: PATHOLOGY

## 2023-09-26 PROCEDURE — 36415 COLL VENOUS BLD VENIPUNCTURE: CPT | Performed by: PATHOLOGY

## 2023-09-26 PROCEDURE — 83735 ASSAY OF MAGNESIUM: CPT | Performed by: PATHOLOGY

## 2023-09-26 PROCEDURE — 80061 LIPID PANEL: CPT | Performed by: PATHOLOGY

## 2023-09-26 PROCEDURE — 87106 FUNGI IDENTIFICATION YEAST: CPT | Performed by: INTERNAL MEDICINE

## 2023-09-26 PROCEDURE — 71046 X-RAY EXAM CHEST 2 VIEWS: CPT | Mod: GC | Performed by: RADIOLOGY

## 2023-09-26 PROCEDURE — 99215 OFFICE O/P EST HI 40 MIN: CPT | Mod: 25 | Performed by: INTERNAL MEDICINE

## 2023-09-26 PROCEDURE — 82785 ASSAY OF IGE: CPT | Performed by: INTERNAL MEDICINE

## 2023-09-26 PROCEDURE — 83540 ASSAY OF IRON: CPT | Performed by: PATHOLOGY

## 2023-09-26 PROCEDURE — 82248 BILIRUBIN DIRECT: CPT | Performed by: PATHOLOGY

## 2023-09-26 PROCEDURE — 82306 VITAMIN D 25 HYDROXY: CPT | Performed by: INTERNAL MEDICINE

## 2023-09-26 PROCEDURE — 99207 PR NO CHARGE LOS: CPT | Performed by: PHARMACIST

## 2023-09-26 PROCEDURE — 84100 ASSAY OF PHOSPHORUS: CPT | Performed by: PATHOLOGY

## 2023-09-26 PROCEDURE — 81001 URINALYSIS AUTO W/SCOPE: CPT | Performed by: PATHOLOGY

## 2023-09-26 PROCEDURE — 84403 ASSAY OF TOTAL TESTOSTERONE: CPT | Performed by: INTERNAL MEDICINE

## 2023-09-26 PROCEDURE — 80053 COMPREHEN METABOLIC PANEL: CPT | Performed by: PATHOLOGY

## 2023-09-26 PROCEDURE — 82570 ASSAY OF URINE CREATININE: CPT | Performed by: INTERNAL MEDICINE

## 2023-09-26 PROCEDURE — G0463 HOSPITAL OUTPT CLINIC VISIT: HCPCS | Performed by: INTERNAL MEDICINE

## 2023-09-26 PROCEDURE — 94375 RESPIRATORY FLOW VOLUME LOOP: CPT | Performed by: INTERNAL MEDICINE

## 2023-09-26 PROCEDURE — 83036 HEMOGLOBIN GLYCOSYLATED A1C: CPT | Performed by: INTERNAL MEDICINE

## 2023-09-26 PROCEDURE — 87102 FUNGUS ISOLATION CULTURE: CPT | Performed by: INTERNAL MEDICINE

## 2023-09-26 PROCEDURE — 87186 SC STD MICRODIL/AGAR DIL: CPT | Performed by: INTERNAL MEDICINE

## 2023-09-26 NOTE — PROGRESS NOTES
Pulmonary Staff Progress Note    CC: Patient is here for follow up of cystic fibrosis    Interval History: Patient last seen in April at which time he was fully recovered from having Covid in March. Today, patient reports doing well since his last visit. He has not had any sustained period of increased respiratory symptoms with no need for additional therapies or antibiotics.  He has baseline small amount of clear to pale-yellow sputum in the AM. Completing 10 or more vest therapies per week and rarely misses a Trikafta dose.  No hemoptysis. No regular aerobic exercise but no increased dyspnea with ADLs.       Current Outpatient Medications   Medication    albuterol (PROVENTIL) (2.5 MG/3ML) 0.083% neb solution    allopurinol (ZYLOPRIM) 300 MG tablet    azithromycin (ZITHROMAX) 250 MG tablet    blood glucose (NO BRAND SPECIFIED) test strip    blood glucose monitoring (FREESTYLE) lancets    blood glucose monitoring (NO BRAND SPECIFIED) meter device kit    dornase maryse (PULMOZYME) 2.5 MG/2.5ML neb solution    elexacaftor-tezacaftor-ivacaftor & ivacaftor (TRIKAFTA) 100-50-75 & 150 MG tablet pack    fluticasone (FLONASE) 50 MCG/ACT nasal spray    insulin glargine (LANTUS SOLOSTAR) 100 UNIT/ML pen    insulin pen needle (32G X 4 MM) 32G X 4 MM miscellaneous    lipase-protease-amylase (CREON) 80340-79[address]-23610 units CPEP    lisinopril (ZESTRIL) 5 MG tablet    mvw complete (PROBIOTIC FORMULATION) capsule    mvw complete formulation (SOFTGELS ) capsule    ursodiol (ACTIGALL) 300 MG capsule     No current facility-administered medications for this visit.      Social History: Working in [employer]. Upcoming trip to [place] this fall.     Review of Systems: Const: no fever, chills.  ENT: no sinus pain or drainage. GI: stools still loose but without pain or constipation. Frequency of stools gradually trending up in past year. No grease. Endo: reports good control but typically does not consistently monitor  "except in window prior to follow up visits. Remainder of 10-point ROS negative unless noted otherwise in HPI.    Physical Examination  /89   Pulse 78   Ht 1.635 m (5' 4.37\")   Wt 59.7 kg (131 lb 9.8 oz)   SpO2 95%   BMI 22.33 kg/m     Gen; alert, appropriate, NAD  HEENT: anicteric. Moist mucus mb without exudate.  Lymph: no cervical or supraclavicular LAD   Chest: Clear bilaterally with good air movement and normal chest wall excursion.  CV: S1S2 with RRR. No murmur or gallop. No LE edema  Abd: soft, NT, ND, + BS    Studies  PFTs from today personally reviewed. Valid maneuver. FEV1 2.76 L (84% pred), FVC 4.75 L (120% pred). Mild obstruction. No significant interval change. He is within his baseline.    Annual studies from today reviewed with patient  - CXR from today per my review with bronchiectatic changes in upper lobe predominant distribution without significant mucus plugging and no interval change from 1 year ago.  - Labs with normal LFT, Cr, lytes, cholesterol panel, INR  - HgA1C 6.4%  - Vitamin D 37  - Normal CBC and diff aside from slightly low RBC parameters    3. Last sputum April, 2023 with PA x 3      Impression and Plan  CF lung disease: Doing well with excellent symptom control and stability in PFTs.  Will plan on continued vest bid with nebulized bronchodilator plus pulmozyme. He is on chronic azithromycin.    2. CFTR modulation therapy: Excellent response to trikafta. LFTs today normal. Due for repeat testing in March, 2024.    3. CF diabetes: A1C suggests good control at 6.4%. Reviewed that he is due for follow up with Dr. Miranda as well as eye exam. Anticipate closer monitoring of sugars leading up to follow up with Dr. Miranda.    4. CF sinus disease: quiescent on flonase. Consider trial of dose reduction.    5. Pancreatic exocrine insufficiency: Seems less likely loose stools are related to inadequate enzyme replacement. Weight just shy of target wth BMI 22.3.  Daniela, nutrition, " consulted. Will review options for trial of probiotics.    6. Microalbuminuria: tolerating low-dose lisinopril. Today's value pending.     7. CF-related liver disease: last ultrasound in 2017. LFTs normal today. On ursodiol.     8. Healthcare maintenance: Patient plans on getting new covid vaccine this Friday and decided to hold off on flu vaccine until next week. Next due for annual studies in September, 2024.     Follow up in 3 months.    Total visit time today 40 minutes exclusive of time interpreting PFTs.    Abraham Hines MD

## 2023-09-26 NOTE — LETTER
9/26/2023         RE: Dilan Nassar  [address]  [place] [address]        Dear Colleague,    Thank you for referring your patient, Dilan Nassar, to the [facility] FOR LUNG SCIENCE AND [facility] [place]. Please see a copy of my visit note below.    Pulmonary Staff Progress Note    CC: Patient is here for follow up of cystic fibrosis    Interval History: Patient last seen in April at which time he was fully recovered from having Covid in March. Today, patient reports doing well since his last visit. He has not had any sustained period of increased respiratory symptoms with no need for additional therapies or antibiotics.  He has baseline small amount of clear to pale-yellow sputum in the AM. Completing 10 or more vest therapies per week and rarely misses a Trikafta dose.  No hemoptysis. No regular aerobic exercise but no increased dyspnea with ADLs.       Current Outpatient Medications   Medication    albuterol (PROVENTIL) (2.5 MG/3ML) 0.083% neb solution    allopurinol (ZYLOPRIM) 300 MG tablet    azithromycin (ZITHROMAX) 250 MG tablet    blood glucose (NO BRAND SPECIFIED) test strip    blood glucose monitoring (FREESTYLE) lancets    blood glucose monitoring (NO BRAND SPECIFIED) meter device kit    dornase maryse (PULMOZYME) 2.5 MG/2.5ML neb solution    elexacaftor-tezacaftor-ivacaftor & ivacaftor (TRIKAFTA) 100-50-75 & 150 MG tablet pack    fluticasone (FLONASE) 50 MCG/ACT nasal spray    insulin glargine (LANTUS SOLOSTAR) 100 UNIT/ML pen    insulin pen needle (32G X 4 MM) 32G X 4 MM miscellaneous    lipase-protease-amylase (CREON) 66541-80456-20993 units CPEP    lisinopril (ZESTRIL) 5 MG tablet    mvw complete (PROBIOTIC FORMULATION) capsule    mvw complete formulation (SOFTGELS ) capsule    ursodiol (ACTIGALL) 300 MG capsule     No current facility-administered medications for this visit.      Social History: Working in [employer]. Upcoming trip to [place] this  "fall.     Review of Systems: Const: no fever, chills.  ENT: no sinus pain or drainage. GI: stools still loose but without pain or constipation. Frequency of stools gradually trending up in past year. No grease. Endo: reports good control but typically does not consistently monitor except in window prior to follow up visits. Remainder of 10-point ROS negative unless noted otherwise in HPI.    Physical Examination  /89   Pulse 78   Ht 1.635 m (5' 4.37\")   Wt 59.7 kg (131 lb 9.8 oz)   SpO2 95%   BMI 22.33 kg/m     Gen; alert, appropriate, NAD  HEENT: anicteric. Moist mucus mb without exudate.  Lymph: no cervical or supraclavicular LAD   Chest: Clear bilaterally with good air movement and normal chest wall excursion.  CV: S1S2 with RRR. No murmur or gallop. No LE edema  Abd: soft, NT, ND, + BS    Studies  PFTs from today personally reviewed. Valid maneuver. FEV1 2.76 L (84% pred), FVC 4.75 L (120% pred). Mild obstruction. No significant interval change. He is within his baseline.    Annual studies from today reviewed with patient  - CXR from today per my review with bronchiectatic changes in upper lobe predominant distribution without significant mucus plugging and no interval change from 1 year ago.  - Labs with normal LFT, Cr, lytes, cholesterol panel, INR  - HgA1C 6.4%  - Vitamin D 37  - Normal CBC and diff aside from slightly low RBC parameters    3. Last sputum April, 2023 with PA x 3      Impression and Plan  CF lung disease: Doing well with excellent symptom control and stability in PFTs.  Will plan on continued vest bid with nebulized bronchodilator plus pulmozyme. He is on chronic azithromycin.    2. CFTR modulation therapy: Excellent response to trikafta. LFTs today normal. Due for repeat testing in March, 2024.    3. CF diabetes: A1C suggests good control at 6.4%. Reviewed that he is due for follow up with Dr. Miranda as well as eye exam. Anticipate closer monitoring of sugars leading up to follow " up with Dr. Miranda.    4. CF sinus disease: quiescent on flonase. Consider trial of dose reduction.    5. Pancreatic exocrine insufficiency: Seems less likely loose stools are related to inadequate enzyme replacement. Weight just shy of target wth BMI 22.3.  Daniela, nutrition, consulted. Will review options for trial of probiotics.    6. Microalbuminuria: tolerating low-dose lisinopril. Today's value pending.     7. CF-related liver disease: last ultrasound in 2017. LFTs normal today. On ursodiol.     8. Healthcare maintenance: Patient plans on getting new covid vaccine this Friday and decided to hold off on flu vaccine until next week. Next due for annual studies in September, 2024.     Follow up in 3 months.    Total visit time today 40 minutes exclusive of time interpreting PFTs.    Abraham Hines MD

## 2023-09-26 NOTE — NURSING NOTE
"Dilan Nassar is a 38 year old year old who is being seen for Cystic Fibrosis (F/u)      Medications reviewed and Vital signs taken.    Specimen Collection Type: Sputum    Order(s) placed: CF Aerobic Bacterial    *IF AFB order placed - please enter \"PRIORITIZE AFB\" to order comments.       Lab Results   Component Value Date    ACIDFAST No acid fast bacilli seen 04/18/2023    ACIDFAST No acid fast bacilli seen 04/18/2023    ACIDFAST No acid fast bacilli seen 04/18/2023         Lab Results   Component Value Date    AFBSMS Negative for acid fast bacteria 09/26/2018    AFBSMS  09/26/2018     Less than 5ml of specimen received.  A minimum of 5 mL of sputum or fluid is recommended for recovery of acid fast bacilli   (AFB).  Volumes less than 5 mL are suboptimal and may compromise recovery of AFB from   culture.      AFBSMS  09/26/2018     Assayed at [facility] [address] [place] [id number] [phone]           "

## 2023-09-27 DIAGNOSIS — E84.9 CYSTIC FIBROSIS (H): Primary | ICD-10-CM

## 2023-09-27 LAB
IGE SERPL-ACNC: 2 KU/L (ref 0–114)
IGG SERPL-MCNC: 847 MG/DL (ref 610–1616)

## 2023-09-27 NOTE — PROGRESS NOTES
CF Annual Nutrition Assessment     Reason for Assessment  Assessed during CF clinic visit with Dr. Abraham Hines r/t increased nutrition risk with diagnosis of CF per protocol.      Anthropometric Assessment  Height: 163 cm  Weight: 59.7 kg (actual weight)/131 lbs 9.83 oz  Ideal body weight based on BMI 22 for females and 23 for males per [facility] Foundation recs: 60.8 kg (134 lbs)   Body mass index is 22.33 kg/m .    Wt Readings from Last 5 Encounters:   23 59.7 kg (131 lb 9.8 oz)   23 57 kg (125 lb 10.6 oz)   22 56.2 kg (123 lb 14.4 oz)   10/20/21 55.3 kg (122 lb)   06/15/21 55.6 kg (122 lb 9.2 oz)      Pancreatic Enzymes  Brand: Creon 12,000  Dosin-6 with meals, 3 with snacks = 1206 units lipase/kg/meal  Estimated Daily Intake: 18 caps = 3618 units lipase/kg/day     Signs of Malabsorption: No new sx consistent with EPI.  Has some gas and cramping at times, this is chronic and thought to be r/t Trikafta. Has not started probiotic.   Enzyme Program: None     Nutrition-Related Lab & Vitamin/Mineral Supplements  Annual studies completed today, 2023  Vitamin A- pending  Vitamin D- 37 wnl  Vitamin E- pending  Iron- 81 wnl  Lipid Panel- wnl     DEXA - , wnl     Current Vitamin/Mineral Supplements: MVW Complete  - 2 caps per day from [facility]     Diet History and Assessment  Diet Preferences/Allergies/Intolerances: Regular    Intake Recall/Comments: Denied recent changes in appetite/PO intake. Typically eats TID meals and protein bar snack in afternoon. Also has a high fat HS snack with Trikafta, usually bag of chips or cheese stick. Overall consumes good variety at meals/balanced, no food insecurity concerns.      Calcium: ~3 servings/day with milk and cheese  Salt: no concerns  Hydration: not discussed this visit  Supplements: Yes- protein bars     CF Related Diabetes Evaluation  Hgb A1C: 6.4% on 23  Insulin: yes - basaglar 10 units  Continues following with Dr. Miranda. Continuing to  discuss CGM, he is considering doing one for a short course possibly.  He does report occasional symptoms of hypoglycemia, usually mid-to-late morning when they happen.  These are easily corrected with oral intake of carbs.      NUTRITION DIAGNOSIS  Impaired nutrient utilization related to CF pancreatic insufficiency as evidenced by requires pancreatic enzyme replacement therapy, vitamin/mineral supplementation, and higher kcal/protein diet in order to maintain ideal body weight and nutrition status.       INTERVENTIONS/RECOMMENDATIONS   1) Oral Intake/Weight:   BEE: ~1400  Calorie Goals- 1197-7642 kcals/day (175-200% BEE) +500 kcals/day for weight gain  Protein Goals-  grams/day (1.5-2 g/kg)     Reviewed recent nutrition history and weight trends. Encouraged ongoing good PO, goal for ongoing slight gains vs maintenance.       2) GI/Enzymes:  Continue with current enzyme dose. Will start probiotic therapy - MVW probiotic 1 capsule daily, prescribed to [facility]. Educated pt on cost and how to obtain.      3) Vitamin/Mineral Supplementation:  Vitamin levels pending at the time of this visit.  If results warrant a change in his regimen we agreed to communicate via [facility] [employer].  Otherwise, continue as prescribed.     4) Diabetes:  Discussed the benefits of CGM and monitoring responses to carb intake, meal/snack pattern, and hypoglycemic episodes.  Pt will continue to work with his endocrine team on this.  Encouraged maintenance of current plan of care.      GOALS:  1) Weight maintenance, ideally with gains towards BMI >23 kg/m2    2) Vitamin levels wnl     FOLLOW-UP/MONITORING:  Visit patient within 12 months for annual nutrition reassessment.      Time spent in face-to-face interaction: 15 minutes (MNT time with diabetes)    Daniela Whitt MS, RD, LD (pager [phone])  Cystic Fibrosis/Lung Transplant Dietitian      -Available Mon-Thurs 8 AM-4:30 PM. On Fridays please contact pager [phone] (Tamika Perez RD) and on  weekends/holidays contact coverage dietitian at pager [phone] (inpatient use only).

## 2023-09-27 NOTE — PROGRESS NOTES
Clinical Update:                                                    A chart review was conducted for Dilan Nassar.    Reason for Chart Review: Trikafta Lab Monitoring     Discussion: Dilan has been on Trikafta since 12/17/2020.  Per chart review, patient continues full dose Trikafta .     Labs were reviewed from 9/26/2023 at [facility] . All labs are within normal limits.    Lab Results   Component Value Date    ALT 24 09/26/2023    AST 23 09/26/2023    BILITOTAL 0.7 09/26/2023    DBIL 0.22 09/26/2023     09/26/2023       Plan:  1. Continue Trikafta   2. Recheck hepatic panel and CK in 6 months  (labs ordered)      Belem Valle PharmD  Cystic Fibrosis MTM Pharmacist  [facility]  Voicemail: [phone]

## 2023-09-28 LAB — TESTOST SERPL-MCNC: 398 NG/DL (ref 240–950)

## 2023-09-29 LAB
A-TOCOPHEROL VIT E SERPL-MCNC: 10.2 MG/L
ANNOTATION COMMENT IMP: NORMAL
BETA+GAMMA TOCOPHEROL SERPL-MCNC: 0.2 MG/L
RETINYL PALMITATE SERPL-MCNC: 0.03 MG/L
VIT A SERPL-MCNC: 0.64 MG/L

## 2023-10-01 LAB
BACTERIA SPT CULT: ABNORMAL

## 2023-10-03 ENCOUNTER — TELEPHONE (OUTPATIENT)
Dept: ENDOCRINOLOGY | Facility: CLINIC | Age: 38
End: 2023-10-03
Payer: COMMERCIAL

## 2023-10-03 NOTE — TELEPHONE ENCOUNTER
Prior Authorization Approval    Medication: TRIKAFTA 100-50-75 & 150 MG PO TBPK  Authorization Effective Date: 9/21/2023  Authorization Expiration Date: 9/21/2024  Approved Dose/Quantity: 84 for 28 ds   Reference #:     Insurance Company: [employer] ([facility]) - Phone [phone] Fax [phone]  Expected CoPay: $    CoPay Card Available:      Financial Assistance Needed:   Which Pharmacy is filling the prescription: [facility] - [place], [place] - [address]  Pharmacy Notified:   Patient Notified:

## 2023-10-04 ENCOUNTER — TELEPHONE (OUTPATIENT)
Dept: PULMONOLOGY | Facility: CLINIC | Age: 38
End: 2023-10-04
Payer: COMMERCIAL

## 2023-10-04 NOTE — TELEPHONE ENCOUNTER
Left Voicemail (1st Attempt) for the patient to call back and schedule the following:    Appointment type: F  Provider: ANCA  Return date: 12/26/23  Specialty phone number: [phone]  Additional appointment(s) needed: LABS  Additonal Notes: N/A

## 2023-10-06 ENCOUNTER — TELEPHONE (OUTPATIENT)
Dept: PULMONOLOGY | Facility: CLINIC | Age: 38
End: 2023-10-06
Payer: COMMERCIAL

## 2023-10-06 NOTE — TELEPHONE ENCOUNTER
Left Voicemail (2nd Attempt) for the patient to call back and schedule the following:    Appointment type: F  Provider: MANDY  Return date: 12/26/2023  Specialty phone number: [phone]  Additional appointment(s) needed: CF LOOP  Additonal Notes: N/A

## 2023-10-16 ENCOUNTER — CARE COORDINATION (OUTPATIENT)
Dept: EDUCATION SERVICES | Facility: CLINIC | Age: 38
End: 2023-10-16
Payer: COMMERCIAL

## 2023-10-16 ENCOUNTER — TELEPHONE (OUTPATIENT)
Dept: ENDOCRINOLOGY | Facility: CLINIC | Age: 38
End: 2023-10-16
Payer: COMMERCIAL

## 2023-10-16 DIAGNOSIS — E84.8 DIABETES MELLITUS RELATED TO CYSTIC FIBROSIS (H): Primary | ICD-10-CM

## 2023-10-16 DIAGNOSIS — E08.9 DIABETES MELLITUS RELATED TO CYSTIC FIBROSIS (H): Primary | ICD-10-CM

## 2023-10-16 RX ORDER — BLOOD-GLUCOSE SENSOR
1 EACH MISCELLANEOUS
Qty: 6 EACH | Refills: 3 | Status: SHIPPED | OUTPATIENT
Start: 2023-10-16 | End: 2024-03-14

## 2023-10-16 NOTE — PROGRESS NOTES
Per patient request, order for Braden 3 sensors sent to his local pharmacy along with instructions for linking with the clinic.      SARAH MeadN, RN, [facility]  Certified Diabetes Care and   [facility] Endocrinology and Diabetes  [facility]  [facility]inic [id number]  Phone [phone]

## 2023-10-16 NOTE — TELEPHONE ENCOUNTER
PA Initiation    Medication: FREESTYLE ESTEFANY 3 SENSOR [facility]  Insurance Company: [employer] ([facility]) - Phone [phone] Fax [phone]  Pharmacy Filling the Rx: [facility] - [place] - [address] [place] [address]  Filling Pharmacy Phone: [phone]  Filling Pharmacy Fax: [phone]  Start Date: 10/16/2023

## 2023-10-16 NOTE — PROGRESS NOTES
Outcome for 10/16/23 1:07 PM: [email or web address] message sent  Shelli Saunders MA  Outcome for 10/20/23 2:43 PM: Per patient, will send BG readings via [employer]  Yuliya Ferrara MA  Outcome for 10/23/23 7:40 AM: Replied to [employer] message  Yuliya Ferrara MA  Outcome for 10/23/23 8:03 AM: Glucose readings sent via [employer]  Yuliya Ferrara MA        Dilan Nassar  is being evaluated via a billable video visit.      CF Endocrinology Return Consultation:  Diabetes  :   Patient: Dilan Nassar MRN# [id number]   YOB: 1985 Age: 38 year old   Date of Visit: 10/24/2023  Dear Dr. Atilio Nieto:    I had the pleasure of seeing your patient, Dilan Nassar in the CF Endocrinology Clinic, [facility], for a return consultation .           Problem list:   MRSA  Cystic fibrosis  Diabetes mellitus  Exocrine pancreatic insufficiency  Vitamin D deficiency  Gout  Intestinal malabsorption         HPI:     Dilan is a 38 year old male with Cystic Fibrosis Related Diabetes Mellitus (CFRD).    I have reviewed the available past laboratory evaluations, imaging studies, and medical records available to me at this visit.   History was obtained from the patient and the medical record.  I have reviewed the notes of the pulmonary care team entered into the medical record since I last saw the patient.    Overall feels well, denies specific complaints or concerns.    Glucose readings sent via [employer] copied above  Reading are checked fasting and 2 hours pot meal.  Fasting readings are in range, postprandial readings are occasionally above 200  Personal CGM was not covered by insurance.    Overall doing well   symptoms of hypoglycemia rarely occur couple of hours after breakfast   BMI has improved vitamins does not range    Hemoglobin A1C   Date Value Ref Range Status   09/26/2023 6.4 (H) <5.7 % Final     Comment:     Normal <5.7%   Prediabetes 5.7-6.4%    Diabetes 6.5% or higher     Note: Adopted from ADA consensus  guidelines.   09/17/2020 5.7 (H) 0 - 5.6 % Final     Comment:     Normal <5.7% Prediabetes 5.7-6.4%  Diabetes 6.5% or higher - adopted from ADA   consensus guidelines.       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) 10/16/2015     Diabetes mellitus related to cystic fibrosis (H) 10/16/2015     Cystic fibrosis with pulmonary exacerbation (H) 10/23/2015     Methicillin resistant Staphylococcus aureus infection 08/16/2016     Resolved Ambulatory Problems     Diagnosis Date Noted     Type 1 diabetes mellitus (H) 02/13/2015     No Additional Past Medical History          Past Surgical History:   Appendectomy  Sinus surgery            Social History:   Unmarried  Non smoker  Former smokeless tobacco user           Family History:   Mother: liver disease  Paternal grandfather: prostate cancer  Maternal aunt: diabetes mellitus  Maternal uncle: diabetes mellitus  Maternal grandmother: diabetes mellitus  Paternal grandmother: coronary artery disease         Allergies:     Allergies   Allergen Reactions     Molds & Smuts Itching          Medications:     Current Outpatient Medications:      albuterol (PROVENTIL) (2.5 MG/3ML) 0.083% neb solution, Take 1 vial (2.5 mg) by nebulization 3 times daily as needed for shortness of breath, wheezing or cough, Disp: 180 mL, Rfl: 3     allopurinol (ZYLOPRIM) 300 MG tablet, TAKE ONE TABLET (300MG) BY MOUTH DAILY, Disp: 90 tablet, Rfl: 3     azithromycin (ZITHROMAX) 250 MG tablet, Take 2 tablets (500 mg) by mouth Every Mon, Wed, Fri Morning, Disp: 72 tablet, Rfl: 3     blood glucose (NO BRAND SPECIFIED) test strip,  Use to test blood sugar 4 times daily or as directed., Disp: 125 strip, Rfl: 11     blood glucose monitoring (FREESTYLE) lancets, Use to test blood sugar 4 times daily or as directed., Disp: 200 each, Rfl: 11     blood glucose monitoring (NO BRAND SPECIFIED) meter device kit, Use to test blood sugar 4 times daily or as directed., Disp: 1 kit, Rfl: 0     dornase maryse (PULMOZYME) 2.5 MG/2.5ML neb solution, INHALE CONTENTS OF ONE VIAL (2.5MG) VIA NEBULIZER TWICE DAILY. KEEP REFRIGERATED UNTIL USE., Disp: 150 mL, Rfl: 11     elexacaftor-tezacaftor-ivacaftor & ivacaftor (TRIKAFTA) 100-50-75 & 150 MG tablet pack, TAKE TWO ORANGE TABLETS BY MOUTH IN THE MORNING AND ONE BLUE TABLET IN THE EVENING AS DIRECTED ON PACKAGE.  TAKE WITH FAT CONTAINING FOOD., Disp: 84 tablet, Rfl: 5     fluticasone (FLONASE) 50 MCG/ACT nasal spray, SPRAY 2 SPRAYS INTO BOTH NOSTRILS DAILY, Disp: 48 g, Rfl: 3     insulin glargine (LANTUS SOLOSTAR) 100 UNIT/ML pen, Inject 10 Units Subcutaneous daily, Disp: 15 mL, Rfl: 3     insulin pen needle (32G X 4 MM) 32G X 4 MM miscellaneous, Use 3 pen needles daily or as directed., Disp: 300 each, Rfl: 3     lipase-protease-amylase (CREON) 22448-29303-05125 units CPEP, TAKE 5-6 CAPSULES (60,000-72,000) BY MOUTH THREE TIMES A DAY WITH MEALS, Disp: 600 capsule, Rfl: 4     lisinopril (ZESTRIL) 5 MG tablet, Take 1 tablet (5 mg) by mouth daily, Disp: 90 tablet, Rfl: 3     mvw complete (PROBIOTIC FORMULATION) capsule, Take 1 capsule by mouth daily, Disp: 30 capsule, Rfl: 11     mvw complete formulation (SOFTGELS ) capsule, Take 2 capsules by mouth daily, Disp: 60 capsule, Rfl: 11     ursodiol (ACTIGALL) 300 MG capsule, TAKE 1 CAPSULE (300MG ) BY MOUTH TWO TIMES A DAY, Disp: 180 capsule, Rfl: 3     Continuous Blood Gluc Sensor (FREESTYLE ESTEFANY 3 SENSOR) MISC, 1 each every 14 days (Patient not taking: Reported on 10/24/2023), Disp: 6 each, Rfl: 3           Review of Systems:              Physical Exam:   There  were no vitals taken for this visit.  Height: Data Unavailable, Facility age limit for growth %deana is 20 years.  Weight: 0 lbs 0 oz, Facility age limit for growth %deana is 20 years.  BMI: There is no height or weight on file to calculate BMI., No height and weight on file for this encounter.      GENERAL: Healthy, alert and no distress  EYES: Eyes grossly normal to inspection.  No discharge or erythema, or obvious scleral/conjunctival abnormalities.  RESP: No audible wheeze, cough, or visible cyanosis.  No visible retractions or increased work of breathing.    NEURO:  Mentation and speech appropriate for age.  PSYCH: affect normal/bright, judgement and insight intact, normal speech and appearance well-groomed.        Laboratory results:     TSH   Date Value Ref Range Status   09/26/2023 0.99 0.30 - 4.20 uIU/mL Final   09/14/2021 2.37 0.40 - 4.00 mU/L Final   09/17/2020 1.96 0.40 - 4.00 mU/L Final     Testosterone Total   Date Value Ref Range Status   09/26/2023 398 240 - 950 ng/dL Final   09/17/2020 416 240 - 950 ng/dL Final     Comment:     This test was developed and its performance characteristics determined by the   [facility].   It has not been cleared or approved by the FDA. The laboratory is regulated   under CLIA as qualified to perform high-complexity testing. This test is used   for clinical purposes. It should not be regarded as investigational or for   research.       Cholesterol   Date Value Ref Range Status   09/26/2023 133 <200 mg/dL Final   09/17/2020 134 <200 mg/dL Final     Albumin Urine mg/L   Date Value Ref Range Status   09/26/2023 <12.0 mg/L Final     Comment:     The reference ranges have not been established in urine albumin. The results should be integrated into the clinical context for interpretation.   01/11/2022 35 mg/L Final   09/17/2020 12 mg/L Final     Triglycerides   Date Value Ref Range Status   09/26/2023 61 <150  mg/dL Final   09/17/2020 91 <150 mg/dL Final     HDL Cholesterol   Date Value Ref Range Status   09/17/2020 83 >39 mg/dL Final     Direct Measure HDL   Date Value Ref Range Status   09/26/2023 81 >=40 mg/dL Final     LDL Cholesterol Calculated   Date Value Ref Range Status   09/26/2023 40 <=100 mg/dL Final   09/17/2020 33 <100 mg/dL Final     Comment:     Desirable:       <100 mg/dl     Non HDL Cholesterol   Date Value Ref Range Status   09/26/2023 52 <130 mg/dL Final   09/17/2020 51 <130 mg/dL Final         CF  Diabetes Health Maintenance    Date of Diabetes Diagnosis: ~ 2012     Special Notes (if any):      Date Last Eye Exam:   ~ Aug 2022     Date Last Dental Appointment:     Dates of Episodes Severe* Hypoglycemia (month/year, cumulative, ongoing, assess each visit):    *Severe=patient unconscious, seizure, unable to help self    Last 25-Vitamin D (every year): 42      Last DXA, lowest Z-score (every 2 years):  -1.1    Last Testosterone:   Testosterone Total   Date Value Ref Range Status   09/26/2023 398 240 - 950 ng/dL Final   09/17/2020 416 240 - 950 ng/dL Final     Comment:     This test was developed and its performance characteristics determined by the   [facility].   It has not been cleared or approved by the FDA. The laboratory is regulated   under CLIA as qualified to perform high-complexity testing. This test is used   for clinical purposes. It should not be regarded as investigational or for   research.              Assessment and Plan:   Dilan is a 38 year old male with cystic fibrosis related diabetes    CFRD: Overall good control.  Continue current dose of Basaglar insulin  Patient is interested in using CGM.  He will reevaluate insurance coverage for personal CGM next year or have a diagnostic CGM placed prior to next visit    Previously positive microalbuminuria, on lisinopril.  Microalbumin was normal on most recent annual  labs    Return to clinic in 6 months     JOSE Headley    Video-Visit Details    Type of service:  Video Visit done using [employer]    Video visit start time 10:35 AM, video visit end time 10:48 AM    Originating Location (pt. Location): Home    Distant Location (provider location): Off-site    Platform used for Video Visit: Mamta Miranda MD

## 2023-10-18 NOTE — TELEPHONE ENCOUNTER
PRIOR AUTHORIZATION DENIED    Medication: [place]ST[place] 3 [place] [facility] [place] [employer] ([employer]) - Phone [phone] Fax [phone]  Denial Date: 10/16/2023  Denial Rational:   Appeal Information:   Patient Notified: clinic to discuss with pt what they would like to do

## 2023-10-18 NOTE — TELEPHONE ENCOUNTER
PA Denied for Freestyle Braden. Pt must be using insulin 3x daily. Would you like order to be sent to pharmacy to pay cash

## 2023-10-20 ENCOUNTER — TELEPHONE (OUTPATIENT)
Dept: ENDOCRINOLOGY | Facility: CLINIC | Age: 38
End: 2023-10-20
Payer: COMMERCIAL

## 2023-10-20 NOTE — TELEPHONE ENCOUNTER
Spoke with patient. Pt states he has not started judy sensor yet. Pt state he has been checking for the last few days manually with meter. [facility]. Yuliya Ferrara CMA on 10/20/2023 at 2:46 PM

## 2023-10-20 NOTE — TELEPHONE ENCOUNTER
PA Denied for Freestyle Braden. Pt must be using insulin 3x daily. Order sent to pharmacy for pt to have option to pay cash. Closing encounter

## 2023-10-24 ENCOUNTER — VIRTUAL VISIT (OUTPATIENT)
Dept: ENDOCRINOLOGY | Facility: CLINIC | Age: 38
End: 2023-10-24
Attending: INTERNAL MEDICINE
Payer: COMMERCIAL

## 2023-10-24 DIAGNOSIS — E84.9 DIABETES MELLITUS DUE TO CYSTIC FIBROSIS (H): Primary | ICD-10-CM

## 2023-10-24 DIAGNOSIS — E08.9 DIABETES MELLITUS DUE TO CYSTIC FIBROSIS (H): Primary | ICD-10-CM

## 2023-10-24 LAB
BACTERIA SPT CULT: ABNORMAL
BACTERIA SPT CULT: NO GROWTH

## 2023-10-24 PROCEDURE — 99213 OFFICE O/P EST LOW 20 MIN: CPT | Mod: VID | Performed by: INTERNAL MEDICINE

## 2023-10-24 NOTE — NURSING NOTE
Is the patient currently in the state of [place]? YES    Visit mode:VIDEO    If the visit is dropped, the patient can be reconnected by: VIDEO VISIT: Send to e-mail at: [email or web address]    Will anyone else be joining the visit? NO  (If patient encounters technical issues they should call [phone]    How would you like to obtain your AVS? MyChart    Are changes needed to the allergy or medication list? No    Reason for visit: RECHECK    Alayna PHILIP

## 2023-10-24 NOTE — LETTER
10/24/2023       RE: Dilan Nassar  [address] [facility] [id number]     Dear Colleague,    Thank you for referring your patient, Dilan Nassar, to the [facility] [place] at [facility]. Please see a copy of my visit note below.    Outcome for 10/16/23 1:07 PM: [employer] message sent  Shelli Saunders MA  Outcome for 10/20/23 2:43 PM: Per patient, will send BG readings via [employer]  Yuliya Ferrara MA  Outcome for 10/23/23 7:40 AM: Replied to [employer] message  Yuliya Ferrara MA  Outcome for 10/23/23 8:03 AM: Glucose readings sent via [employer]  Yuliya Ferrara MA        Dilan Nassar  is being evaluated via a billable video visit.      CF Endocrinology Return Consultation:  Diabetes  :   Patient: Dilan Nassar MRN# [id number]   YOB: 1985 Age: 38 year old   Date of Visit: 10/24/2023  Dear Dr. Atilio Nieto:    I had the pleasure of seeing your patient, Dilan Nassar in the CF Endocrinology Clinic, [facility], for a return consultation .           Problem list:   MRSA  Cystic fibrosis  Diabetes mellitus  Exocrine pancreatic insufficiency  Vitamin D deficiency  Gout  Intestinal malabsorption         HPI:     Dilan is a 38 year old male with Cystic Fibrosis Related Diabetes Mellitus (CFRD).    I have reviewed the available past laboratory evaluations, imaging studies, and medical records available to me at this visit.   History was obtained from the patient and the medical record.  I have reviewed the notes of the pulmonary care team entered into the medical record since I last saw the patient.    Overall feels well, denies specific complaints or concerns.    Glucose readings sent via [employer] copied above  Reading are checked fasting and 2 hours pot meal.  Fasting readings are in range, postprandial readings are occasionally above 200  Personal CGM was not covered by insurance.    Overall doing well    symptoms of hypoglycemia rarely occur couple of hours after breakfast   BMI has improved vitamins does not range    Hemoglobin A1C   Date Value Ref Range Status   09/26/2023 6.4 (H) <5.7 % Final     Comment:     Normal <5.7%   Prediabetes 5.7-6.4%    Diabetes 6.5% or higher     Note: Adopted from ADA consensus guidelines.   09/17/2020 5.7 (H) 0 - 5.6 % Final     Comment:     Normal <5.7% Prediabetes 5.7-6.4%  Diabetes 6.5% or higher - adopted from ADA   consensus guidelines.       Current insulin regimen:   Basaglar 10 unit(s) daily 10 AM          Past Medical History:     Active Ambulatory Problems     Diagnosis Date Noted    Cystic fibrosis with pulmonary manifestations     Exocrine pancreatic insufficiency 02/13/2015    Vitamin D deficiency 02/13/2015    Gout 02/13/2015    Allergic rhinitis 02/13/2015    Intestinal malabsorption 02/13/2015    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) 10/16/2015    Diabetes mellitus related to cystic fibrosis (H) 10/16/2015    Cystic fibrosis with pulmonary exacerbation (H) 10/23/2015    Methicillin resistant Staphylococcus aureus infection 08/16/2016     Resolved Ambulatory Problems     Diagnosis Date Noted    Type 1 diabetes mellitus (H) 02/13/2015     No Additional Past Medical History          Past Surgical History:   Appendectomy  Sinus surgery            Social History:   Unmarried  Non smoker  Former smokeless tobacco user           Family History:   Mother: liver disease  Paternal grandfather: prostate cancer  Maternal aunt: diabetes mellitus  Maternal uncle: diabetes mellitus  Maternal grandmother: diabetes mellitus  Paternal grandmother: coronary artery disease         Allergies:     Allergies   Allergen Reactions    Molds & Smuts Itching          Medications:     Current Outpatient Medications:     albuterol (PROVENTIL) (2.5 MG/3ML) 0.083% neb solution, Take 1 vial (2.5 mg) by nebulization 3 times daily as needed for shortness of breath, wheezing or cough, Disp: 180  mL, Rfl: 3    allopurinol (ZYLOPRIM) 300 MG tablet, TAKE ONE TABLET (300MG) BY MOUTH DAILY, Disp: 90 tablet, Rfl: 3    azithromycin (ZITHROMAX) 250 MG tablet, Take 2 tablets (500 mg) by mouth Every Mon, Wed, Fri Morning, Disp: 72 tablet, Rfl: 3    blood glucose (NO BRAND SPECIFIED) test strip, Use to test blood sugar 4 times daily or as directed., Disp: 125 strip, Rfl: 11    blood glucose monitoring (FREESTYLE) lancets, Use to test blood sugar 4 times daily or as directed., Disp: 200 each, Rfl: 11    blood glucose monitoring (NO BRAND SPECIFIED) meter device kit, Use to test blood sugar 4 times daily or as directed., Disp: 1 kit, Rfl: 0    dornase maryse (PULMOZYME) 2.5 MG/2.5ML neb solution, INHALE CONTENTS OF ONE VIAL (2.5MG) VIA NEBULIZER TWICE DAILY. KEEP REFRIGERATED UNTIL USE., Disp: 150 mL, Rfl: 11    elexacaftor-tezacaftor-ivacaftor & ivacaftor (TRIKAFTA) 100-50-75 & 150 MG tablet pack, TAKE TWO ORANGE TABLETS BY MOUTH IN THE MORNING AND ONE BLUE TABLET IN THE EVENING AS DIRECTED ON PACKAGE.  TAKE WITH FAT CONTAINING FOOD., Disp: 84 tablet, Rfl: 5    fluticasone (FLONASE) 50 MCG/ACT nasal spray, SPRAY 2 SPRAYS INTO BOTH NOSTRILS DAILY, Disp: 48 g, Rfl: 3    insulin glargine (LANTUS SOLOSTAR) 100 UNIT/ML pen, Inject 10 Units Subcutaneous daily, Disp: 15 mL, Rfl: 3    insulin pen needle (32G X 4 MM) 32G X 4 MM miscellaneous, Use 3 pen needles daily or as directed., Disp: 300 each, Rfl: 3    lipase-protease-amylase (CREON) [id number] units CPEP, TAKE 5-6 CAPSULES (60,000-72,000) BY MOUTH THREE TIMES A DAY WITH MEALS, Disp: 600 capsule, Rfl: 4    lisinopril (ZESTRIL) 5 MG tablet, Take 1 tablet (5 mg) by mouth daily, Disp: 90 tablet, Rfl: 3    mvw complete (PROBIOTIC FORMULATION) capsule, Take 1 capsule by mouth daily, Disp: 30 capsule, Rfl: 11    mvw complete formulation (SOFTGELS ) capsule, Take 2 capsules by mouth daily, Disp: 60 capsule, Rfl: 11    ursodiol (ACTIGALL) 300 MG capsule, TAKE 1 CAPSULE  (300MG ) BY MOUTH TWO TIMES A DAY, Disp: 180 capsule, Rfl: 3    Continuous Blood Gluc Sensor (FREESTYLE ESTEFANY 3 SENSOR) MISC, 1 each every 14 days (Patient not taking: Reported on 10/24/2023), Disp: 6 each, Rfl: 3           Review of Systems:              Physical Exam:   There were no vitals taken for this visit.  Height: Data Unavailable, Facility age limit for growth %deana is 20 years.  Weight: 0 lbs 0 oz, Facility age limit for growth %deana is 20 years.  BMI: There is no height or weight on file to calculate BMI., No height and weight on file for this encounter.      GENERAL: Healthy, alert and no distress  EYES: Eyes grossly normal to inspection.  No discharge or erythema, or obvious scleral/conjunctival abnormalities.  RESP: No audible wheeze, cough, or visible cyanosis.  No visible retractions or increased work of breathing.    NEURO:  Mentation and speech appropriate for age.  PSYCH: affect normal/bright, judgement and insight intact, normal speech and appearance well-groomed.        Laboratory results:     TSH   Date Value Ref Range Status   09/26/2023 0.99 0.30 - 4.20 uIU/mL Final   09/14/2021 2.37 0.40 - 4.00 mU/L Final   09/17/2020 1.96 0.40 - 4.00 mU/L Final     Testosterone Total   Date Value Ref Range Status   09/26/2023 398 240 - 950 ng/dL Final   09/17/2020 416 240 - 950 ng/dL Final     Comment:     This test was developed and its performance characteristics determined by the   [facility].   It has not been cleared or approved by the FDA. The laboratory is regulated   under CLIA as qualified to perform high-complexity testing. This test is used   for clinical purposes. It should not be regarded as investigational or for   research.       Cholesterol   Date Value Ref Range Status   09/26/2023 133 <200 mg/dL Final   09/17/2020 134 <200 mg/dL Final     Albumin Urine mg/L   Date Value Ref Range Status   09/26/2023 <12.0 mg/L Final      Comment:     The reference ranges have not been established in urine albumin. The results should be integrated into the clinical context for interpretation.   01/11/2022 35 mg/L Final   09/17/2020 12 mg/L Final     Triglycerides   Date Value Ref Range Status   09/26/2023 61 <150 mg/dL Final   09/17/2020 91 <150 mg/dL Final     HDL Cholesterol   Date Value Ref Range Status   09/17/2020 83 >39 mg/dL Final     Direct Measure HDL   Date Value Ref Range Status   09/26/2023 81 >=40 mg/dL Final     LDL Cholesterol Calculated   Date Value Ref Range Status   09/26/2023 40 <=100 mg/dL Final   09/17/2020 33 <100 mg/dL Final     Comment:     Desirable:       <100 mg/dl     Non HDL Cholesterol   Date Value Ref Range Status   09/26/2023 52 <130 mg/dL Final   09/17/2020 51 <130 mg/dL Final         CF  Diabetes Health Maintenance    Date of Diabetes Diagnosis: ~ 2012     Special Notes (if any):      Date Last Eye Exam:   ~ Aug 2022     Date Last Dental Appointment:     Dates of Episodes Severe* Hypoglycemia (month/year, cumulative, ongoing, assess each visit):    *Severe=patient unconscious, seizure, unable to help self    Last 25-Vitamin D (every year): 42      Last DXA, lowest Z-score (every 2 years):  -1.1    Last Testosterone:   Testosterone Total   Date Value Ref Range Status   09/26/2023 398 240 - 950 ng/dL Final   09/17/2020 416 240 - 950 ng/dL Final     Comment:     This test was developed and its performance characteristics determined by the   [facility].   It has not been cleared or approved by the FDA. The laboratory is regulated   under CLIA as qualified to perform high-complexity testing. This test is used   for clinical purposes. It should not be regarded as investigational or for   research.              Assessment and Plan:   Dilan is a 38 year old male with cystic fibrosis related diabetes    CFRD: Overall good control.  Continue current dose of  Basaglar insulin  Patient is interested in using CGM.  He will reevaluate insurance coverage for personal CGM next year or have a diagnostic CGM placed prior to next visit    Previously positive microalbuminuria, on lisinopril.  Microalbumin was normal on most recent annual labs    Return to clinic in 6 months     JOSE Headley

## 2023-11-07 ENCOUNTER — MYC REFILL (OUTPATIENT)
Dept: PULMONOLOGY | Facility: CLINIC | Age: 38
End: 2023-11-07
Payer: COMMERCIAL

## 2023-11-07 DIAGNOSIS — I10 BENIGN ESSENTIAL HYPERTENSION: ICD-10-CM

## 2023-11-08 RX ORDER — LISINOPRIL 5 MG/1
5 TABLET ORAL DAILY
Qty: 90 TABLET | Refills: 3 | Status: SHIPPED | OUTPATIENT
Start: 2023-11-08

## 2023-11-10 ENCOUNTER — TELEPHONE (OUTPATIENT)
Dept: ENDOCRINOLOGY | Facility: CLINIC | Age: 38
End: 2023-11-10
Payer: COMMERCIAL

## 2023-11-10 NOTE — TELEPHONE ENCOUNTER
1st attempt- LVM and sent mychart    Schedule 10/24 checkout order:  6 mo follow-up appointment with Dr. Miranda in CF clinic (around 4/24/24).

## 2023-11-22 LAB
ACID FAST STAIN (ARUP): NORMAL

## 2023-12-11 NOTE — TELEPHONE ENCOUNTER
2nd attempt- LVM and sent mychart     Schedule 10/24 checkout order:  6 mo follow-up appointment with Dr. Miranda in CF clinic (around 4/24/24).

## 2023-12-12 DIAGNOSIS — E84.9 CYSTIC FIBROSIS (H): ICD-10-CM

## 2023-12-12 RX ORDER — ELEXACAFTOR, TEZACAFTOR, AND IVACAFTOR 100-50-75
KIT ORAL
Qty: 84 TABLET | Refills: 4 | Status: SHIPPED | OUTPATIENT
Start: 2023-12-12 | End: 2024-04-16

## 2024-01-15 ENCOUNTER — EXTERNAL ORDER RESULTS (OUTPATIENT)
Dept: PULMONOLOGY | Facility: CLINIC | Age: 39
End: 2024-01-15
Payer: COMMERCIAL

## 2024-01-15 LAB
FEV-1: NORMAL
FVC: NORMAL

## 2024-01-16 ENCOUNTER — VIRTUAL VISIT (OUTPATIENT)
Dept: PULMONOLOGY | Facility: CLINIC | Age: 39
End: 2024-01-16
Attending: PHYSICIAN ASSISTANT
Payer: COMMERCIAL

## 2024-01-16 DIAGNOSIS — E84.9 CYSTIC FIBROSIS (H): Primary | ICD-10-CM

## 2024-01-16 PROCEDURE — 99213 OFFICE O/P EST LOW 20 MIN: CPT | Mod: 95 | Performed by: PHYSICIAN ASSISTANT

## 2024-01-16 NOTE — PROGRESS NOTES
Guillermo Nassar is a 38 year old male with cystic fibrosis who is seen today for a routine vide visit.      1. CF lung disease: no recent illnesses or symptoms. Has not been active other than for his job, no formal exercise. Continues to do nebs and vesting, about 10-12 per week. PFTsfrom home spirometry at his baseline. Previous cultures + for MRSA, Klebsiella, Steno, Ps A and A. Fumigatus. No evidence of exacerbation at thist camelia.   - Continue nebulizers and vest therapy BID  - On chronic azithromycin     2. HTN: no BP data today, has been stable.    - Continue lisinopril 5 mg daily     3. CF related liver disease: prior US demonstrating coarse echotexture of the liver with hypertrophy of the caudate lobe and left lobe concerning for possible cirrhosis, no lesions, patent vasculature. Liver panel in September WNL.  - Continue Ursodiol      4. Exocrine pancreatic insufficiency: no new GI complaints today, continues with occasional loose or floating stools, is consistent with enzyme use. Trialed a probiotic that actually made stools worse. States his weight is stable.   - Continue enzymes and vitamin supplementation      5. CFRD: last AIC of 6.4 on 9/26. Thinking about a CGM, didn't make any canges in Oct when he saw Endocrine.   - Continue Lantus 10 U  - Will follow up with Endocrine in April 6. CF related sinus disease: no complaints today.   - Continue Flonase      7. CFTR: modulation: on Trikafta and is doing well. Labs in September WNL.   - Labs with his next visit  - Continue Trikafta     RTC: 3 months in person with labs  Annual studies due: September 2024 (DEXA > April 2024)  Vaccinations: got his flu shot and covid booster      Jyothi Warren PA-C  Pulmonary, Allergy, Critical Care and Sleep Medicine    Interval history: doing well. Very busy with his job, took a trip to [facility] [place] over July, saw his sister and her family over the holidays. Has been healthy so far, not exercising, but is staying on top of his  airway clearance. Trying to get in 10-12 nebs/vesting per week. No change in cough or shortness of breath, no sinus or nasal issues. No chest pain or palpitations. Tried a probiotic, because stools were loose and floating, but that didn't really help and made stools worse. Tries to take his enzymes consistently. Weight is stable.     GENERAL: Healthy, alert and no distress  EYES: Eyes grossly normal to inspection.  No discharge or erythema, or obvious scleral/conjunctival abnormalities.  RESP: No audible wheeze, cough, or visible cyanosis.  No visible retractions or increased work of breathing.    SKIN: Visible skin clear. No significant rash, abnormal pigmentation or lesions.  NEURO: Cranial nerves grossly intact.  Mentation and speech appropriate for age.  PSYCH: Mentation appears normal, affect normal/bright, judgement and insight intact, normal speech and appearance well-groomed.     Video-Visit Details    Type of service:  Video Visit     Originating Location (pt. Location): Home    Distant Location (provider location):  On-site  Platform used for Video Visit: Mamta

## 2024-01-16 NOTE — NURSING NOTE
Is the patient currently in the state of [place]? YES    Visit mode:VIDEO    If the visit is dropped, the patient can be reconnected by: VIDEO VISIT: Text to cell phone:   Telephone Information:   Mobile [phone]       Will anyone else be joining the visit? NO  (If patient encounters technical issues they should call [phone]    How would you like to obtain your AVS? MyChart    Are changes needed to the allergy or medication list? No    Reason for visit: RECHECK  VIBHA PHILIP

## 2024-01-16 NOTE — LETTER
1/16/2024         RE: Dilan Nassar  [address]  [facility] [address]        Dear Colleague,    Thank you for referring your patient, Dilan Nassar, to the [facility] FOR LUNG SCIENCE AND [facility] [place]. Please see a copy of my visit note below.    Guillermo Nassar is a 38 year old male with cystic fibrosis who is seen today for a routine vide visit.      1. CF lung disease: no recent illnesses or symptoms. Has not been active other than for his job, no formal exercise. Continues to do nebs and vesting, about 10-12 per week. PFTsfrom home spirometry at his baseline. Previous cultures + for MRSA, Klebsiella, Steno, Ps A and A. Fumigatus. No evidence of exacerbation at thist camelia.   - Continue nebulizers and vest therapy BID  - On chronic azithromycin     2. HTN: no BP data today, has been stable.    - Continue lisinopril 5 mg daily     3. CF related liver disease: prior US demonstrating coarse echotexture of the liver with hypertrophy of the caudate lobe and left lobe concerning for possible cirrhosis, no lesions, patent vasculature. Liver panel in September WNL.  - Continue Ursodiol      4. Exocrine pancreatic insufficiency: no new GI complaints today, continues with occasional loose or floating stools, is consistent with enzyme use. Trialed a probiotic that actually made stools worse. States his weight is stable.   - Continue enzymes and vitamin supplementation      5. CFRD: last AIC of 6.4 on 9/26. Thinking about a CGM, didn't make any canges in Oct when he saw Endocrine.   - Continue Lantus 10 U  - Will follow up with Endocrine in April 6. CF related sinus disease: no complaints today.   - Continue Flonase      7. CFTR: modulation: on Trikafta and is doing well. Labs in September WNL.   - Labs with his next visit  - Continue Trikafta     RTC: 3 months in person with labs  Annual studies due: September 2024 (DEXA > April 2024)  Vaccinations: got his flu shot and covid booster       Jyothi Warren PA-C  Pulmonary, Allergy, Critical Care and Sleep Medicine    Interval history: doing well. Very busy with his job, took a trip to [facility] over July, saw his sister and her family over the holidays. Has been healthy so far, not exercising, but is staying on top of his airway clearance. Trying to get in 10-12 nebs/vesting per week. No change in cough or shortness of breath, no sinus or nasal issues. No chest pain or palpitations. Tried a probiotic, because stools were loose and floating, but that didn't really help and made stools worse. Tries to take his enzymes consistently. Weight is stable.     GENERAL: Healthy, alert and no distress  EYES: Eyes grossly normal to inspection.  No discharge or erythema, or obvious scleral/conjunctival abnormalities.  RESP: No audible wheeze, cough, or visible cyanosis.  No visible retractions or increased work of breathing.    SKIN: Visible skin clear. No significant rash, abnormal pigmentation or lesions.  NEURO: Cranial nerves grossly intact.  Mentation and speech appropriate for age.  PSYCH: Mentation appears normal, affect normal/bright, judgement and insight intact, normal speech and appearance well-groomed.     Video-Visit Details    Type of service:  Video Visit     Originating Location (pt. Location): Home    Distant Location (provider location):  On-site  Platform used for Video Visit: AmWell         Again, thank you for allowing me to participate in the care of your patient.        Sincerely,        Jyothi Warren PA-C   Megan Gould PA-C

## 2024-01-16 NOTE — PATIENT INSTRUCTIONS
"Cystic Fibrosis Self-Care Plan       Patient: Dilan Nassar   MRN: [id number]   Clinic Date: January 16, 2024     RECOMMENDATIONS:  1. Continue nebulizers and vest therapy.   2. Try and get in exercise, even 30 minutes 3-5 days per week.  3. Labs prior to next visit.     Annual Studies:   IGG   Date Value Ref Range Status   09/17/2020 1,153 610 - 1,616 mg/dL Final     Immunoglobulin G   Date Value Ref Range Status   09/26/2023 847 610 - 1,616 mg/dL Final     No results found for: \"INS\"  There are no preventive care reminders to display for this patient.    Pulmonary Function Tests  FEV1: amount of air you can blow out in 1 second  FVC: total amount of air you can take in and blow out    Your Goals:             Latest Ref Rng & Units 9/26/2023     2:01 PM   PFT   FVC L 4.75  P   FEV1 L 2.76  P   FVC% % 120  P   FEV1% % 84  P      P Preliminary result          Airway Clearance: The Most Important Way to Keep Your Lungs Healthy  Vest Settings:   Hill-Rom Frequencies: 8, 9, 10 Pressure 10 Then, Frequencies 18, 19, 20 Pressure 6     RespirTech: Quick Start with Pressure of     Do each frequency for 5 minutes; Deflate vest after each frequency & cough 3 times before beginning the next setting.    Vest and Neb Therapy should be done 2 times/day.    Good Nutrition Can Improve Lung Function and Overall Health    Take ALL of your vitamins with food    Take 1/2 of your enzymes before EVERY meal/snack and the other 1/2 mid-meal/snack    Wt Readings from Last 3 Encounters:   09/26/23 59.7 kg (131 lb 9.8 oz)   04/18/23 57 kg (125 lb 10.6 oz)   04/19/22 56.2 kg (123 lb 14.4 oz)       There is no height or weight on file to calculate BMI.         National CF Foundation Recommendations for BMI in CF Adults: Women: at least 22 Men: at least 23        Controlling Blood Sugars Helps Prevent Lung Infections & Improves Nutrition  Test blood sugar:    In the morning before eating (goal is )    2 hours after a meal (goal is " less than 150)    When pre-meal glucose is greater than 150 add correction    At bedtime (if less than 100 eat a snack with 15 grams of carbohydrates  Last A1C Results:   Hemoglobin A1C   Date Value Ref Range Status   09/26/2023 6.4 (H) <5.7 % Final     Comment:     Normal <5.7%   Prediabetes 5.7-6.4%    Diabetes 6.5% or higher     Note: Adopted from ADA consensus guidelines.   09/17/2020 5.7 (H) 0 - 5.6 % Final     Comment:     Normal <5.7% Prediabetes 5.7-6.4%  Diabetes 6.5% or higher - adopted from ADA   consensus guidelines.           If diabetic, measure A1C every 6 months. Goal is under 7%.    Staying Healthy  Research: If you are interested in learning about research opportunities or have questions, please contact Sonali Altamirano at [phone] or [email or web address].     Foundation: Compass is a personalized resource service to help you with the insurance, financial, legal and other issues you are facing.  It's free, confidential and available to anyone with CF.  Ask your  for more information or contact Compass directly at 042-COMPASS ([phone]) or compass@cff.org, or learn more at cff.org/compass.       CF Nurse Line: Savannah Kang and KJ: [phone]  Steffi Awad or Viviana Harden RT: [phone]    Daniela Perez , Dieticians: [phone]    Lisset Root, Diabetes Nurse: [phone]   Yuridia Ortega: [phone] or Kailey Byrant at [phone], Social Workers  [email or web address]

## 2024-01-18 DIAGNOSIS — K86.81 EXOCRINE PANCREATIC INSUFFICIENCY: ICD-10-CM

## 2024-01-18 DIAGNOSIS — E84.9 CYSTIC FIBROSIS (H): ICD-10-CM

## 2024-01-18 RX ORDER — PEDIATRIC MULTIVIT 61/D3/VIT K 1500-800
2 CAPSULE ORAL DAILY
Qty: 60 CAPSULE | Refills: 11 | Status: SHIPPED | OUTPATIENT
Start: 2024-01-18

## 2024-01-23 DIAGNOSIS — E84.0 CYSTIC FIBROSIS WITH PULMONARY MANIFESTATIONS (H): ICD-10-CM

## 2024-01-23 DIAGNOSIS — E84.9 DIABETES MELLITUS DUE TO CYSTIC FIBROSIS (H): ICD-10-CM

## 2024-01-23 DIAGNOSIS — E08.9 DIABETES MELLITUS DUE TO CYSTIC FIBROSIS (H): ICD-10-CM

## 2024-01-23 DIAGNOSIS — K86.81 EXOCRINE PANCREATIC INSUFFICIENCY: ICD-10-CM

## 2024-01-23 DIAGNOSIS — A49.02 MRSA INFECTION: ICD-10-CM

## 2024-01-23 DIAGNOSIS — E84.9 CF (CYSTIC FIBROSIS) (H): ICD-10-CM

## 2024-01-23 DIAGNOSIS — E84.9 CYSTIC FIBROSIS (H): ICD-10-CM

## 2024-02-12 ENCOUNTER — TRANSFERRED RECORDS (OUTPATIENT)
Dept: HEALTH INFORMATION MANAGEMENT | Facility: CLINIC | Age: 39
End: 2024-02-12
Payer: COMMERCIAL

## 2024-02-21 ENCOUNTER — MYC MEDICAL ADVICE (OUTPATIENT)
Dept: PULMONOLOGY | Facility: CLINIC | Age: 39
End: 2024-02-21
Payer: COMMERCIAL

## 2024-02-21 DIAGNOSIS — E08.9 DIABETES MELLITUS DUE TO CYSTIC FIBROSIS (H): ICD-10-CM

## 2024-02-21 DIAGNOSIS — K86.81 EXOCRINE PANCREATIC INSUFFICIENCY: ICD-10-CM

## 2024-02-21 DIAGNOSIS — A49.02 MRSA INFECTION: ICD-10-CM

## 2024-02-21 DIAGNOSIS — E84.9 CF (CYSTIC FIBROSIS) (H): ICD-10-CM

## 2024-02-21 DIAGNOSIS — E84.9 DIABETES MELLITUS DUE TO CYSTIC FIBROSIS (H): ICD-10-CM

## 2024-02-21 DIAGNOSIS — E84.9 CYSTIC FIBROSIS (H): ICD-10-CM

## 2024-02-21 DIAGNOSIS — E84.0 CYSTIC FIBROSIS WITH PULMONARY MANIFESTATIONS (H): ICD-10-CM

## 2024-02-22 RX ORDER — ALLOPURINOL 300 MG/1
TABLET ORAL
Qty: 90 TABLET | Refills: 3 | Status: SHIPPED | OUTPATIENT
Start: 2024-02-22

## 2024-02-22 RX ORDER — ALBUTEROL SULFATE 0.83 MG/ML
2.5 SOLUTION RESPIRATORY (INHALATION) 3 TIMES DAILY PRN
Qty: 180 ML | Refills: 3 | Status: SHIPPED | OUTPATIENT
Start: 2024-02-22 | End: 2024-07-31

## 2024-03-07 ENCOUNTER — MYC REFILL (OUTPATIENT)
Dept: PULMONOLOGY | Facility: CLINIC | Age: 39
End: 2024-03-07
Payer: COMMERCIAL

## 2024-03-07 DIAGNOSIS — E84.0 CYSTIC FIBROSIS WITH PULMONARY MANIFESTATIONS (H): ICD-10-CM

## 2024-03-07 RX ORDER — AZITHROMYCIN 250 MG/1
500 TABLET, FILM COATED ORAL
Qty: 72 TABLET | Refills: 3 | Status: SHIPPED | OUTPATIENT
Start: 2024-03-08

## 2024-03-14 DIAGNOSIS — E84.8 DIABETES MELLITUS RELATED TO CYSTIC FIBROSIS (H): ICD-10-CM

## 2024-03-14 DIAGNOSIS — E08.9 DIABETES MELLITUS RELATED TO CYSTIC FIBROSIS (H): ICD-10-CM

## 2024-03-14 RX ORDER — BLOOD-GLUCOSE SENSOR
1 EACH MISCELLANEOUS
Qty: 6 EACH | Refills: 3 | Status: SHIPPED | OUTPATIENT
Start: 2024-03-14

## 2024-03-15 ENCOUNTER — TELEPHONE (OUTPATIENT)
Dept: ENDOCRINOLOGY | Facility: CLINIC | Age: 39
End: 2024-03-15
Payer: COMMERCIAL

## 2024-03-15 NOTE — TELEPHONE ENCOUNTER
Patient Contacted for the patient to call back and schedule the following:    Appointment type: Return Cystic fibrosis   Provider: Ruben  Return date: 4/30   Specialty phone number: [phone]  Additional appointment(s) needed: NA   Additonal Notes: Spoke to pt and scheduled per his request. Pt also wanted to start on a continuous glucose monitor prior to the visit with Ruben to go over results for at least 2 weeks. Pt also wants education on the monitors as he is unsure which one is a good fit and how to use. Sent message to nursing staff.     Yesi Carranza on 3/15/2024 at 1:13 PM

## 2024-03-20 ENCOUNTER — TELEPHONE (OUTPATIENT)
Dept: EDUCATION SERVICES | Facility: CLINIC | Age: 39
End: 2024-03-20
Payer: COMMERCIAL

## 2024-03-20 NOTE — TELEPHONE ENCOUNTER
Patient Contacted for the patient to call back and schedule the following:    Appointment type: Diabetes ed   Provider: Nasrin Schaffer   Return date: 4/9   Specialty phone number: [phone]  Additional appointment(s) needed: NA   Additonal Notes: Spoke to pt and scheduled next avail w CDE per message below  Can you help Guillermo schedule with a diabetes educator, anyone, for a 60 min diabetes education appointment to review glucose sensors.      Thank you,   Sophia Mtz RDN, LD, [facility]   Dietitian and Diabetes  - Endocrinology   [facility]     Yesi Carranza on 3/20/2024 at 2:27 PM\

## 2024-03-28 DIAGNOSIS — M10.9 GOUT, UNSPECIFIED CAUSE, UNSPECIFIED CHRONICITY, UNSPECIFIED SITE: Primary | ICD-10-CM

## 2024-04-09 ENCOUNTER — VIRTUAL VISIT (OUTPATIENT)
Dept: EDUCATION SERVICES | Facility: CLINIC | Age: 39
End: 2024-04-09
Payer: COMMERCIAL

## 2024-04-09 ENCOUNTER — TELEPHONE (OUTPATIENT)
Dept: ENDOCRINOLOGY | Facility: CLINIC | Age: 39
End: 2024-04-09

## 2024-04-09 DIAGNOSIS — E08.9 DIABETES MELLITUS RELATED TO CF (CYSTIC FIBROSIS) (H): ICD-10-CM

## 2024-04-09 DIAGNOSIS — E84.8 DIABETES MELLITUS RELATED TO CF (CYSTIC FIBROSIS) (H): ICD-10-CM

## 2024-04-09 PROCEDURE — 99207 PR NO BILLABLE SERVICE THIS VISIT: CPT | Mod: 95 | Performed by: DIETITIAN, REGISTERED

## 2024-04-09 RX ORDER — ACYCLOVIR 400 MG/1
TABLET ORAL
Qty: 9 EACH | Refills: 4 | OUTPATIENT
Start: 2024-04-09 | End: 2024-05-17

## 2024-04-09 RX ORDER — INSULIN GLARGINE 100 [IU]/ML
10 INJECTION, SOLUTION SUBCUTANEOUS DAILY
Qty: 15 ML | Refills: 3 | Status: SHIPPED | OUTPATIENT
Start: 2024-04-09

## 2024-04-09 ASSESSMENT — PAIN SCALES - GENERAL: PAINLEVEL: NO PAIN (0)

## 2024-04-09 NOTE — TELEPHONE ENCOUNTER
PRIOR AUTHORIZATION DENIED    Medication: DEXCOM G7 SENSOR MISC  Insurance Company: [employer] ([facility]) - Phone [phone] Fax [phone]  Denial Date: 4/9/2024  Denial Reason(s):   Appeal Information:   Patient Notified: clinic to discuss with pt what they would like to do

## 2024-04-09 NOTE — TELEPHONE ENCOUNTER
PA Initiation    Medication: DEXCOM G7 SENSOR MISC  Insurance Company: [employer] ([facility]) - Phone [phone] Fax [phone]  Pharmacy Filling the Rx: [facility] - FELICITY KEATING - [address] [place] [address]  Filling Pharmacy Phone: [phone]  Filling Pharmacy Fax: [phone]  Start Date: 4/9/2024

## 2024-04-09 NOTE — PROGRESS NOTES
Virtual Visit Details    Type of service:  Video Visit   Joined the call at 4/9/2024, 8:03:50 am.  Left the call at 4/9/2024, 8:18:47 am.  You were on the call for 14 minutes 56 seconds .  Originating Location (pt. Location): Home    Distant Location (provider location):  On-site  Platform used for Video Visit: ElishaWell

## 2024-04-09 NOTE — LETTER
4/10/2024    INSURER: Payor: [employer] / Plan: [employer] / Product Type: HMO /   ATTN: Appeals Department  Re: Prior Authorization Request  Patient: Dilan Nassar  Policy ID#:  [id number]  : 1985      To Whom it May Concern:    I am writing to formally request a prior authorization of coverage for my patient, Dilan Nassar, for treatment using Dexcom G7 sensors. I am requesting authorization for applicable provider professional and facility services associated with this therapy.    I have treated Dilan since 2017 and I have determined that it is medically appropriate for this patient to utilize the Dexcom G7 for the reason(s) stated below:    The rationale for denial of this coverage was because patient is not on an intensive insulin regimen (three or more insulin injections per day or use a continuous subcutaneous insulin infusion pump). Please review the below with extensive rationale for why the safest option for this patient is to be monitored with the Dexcom G7 sensor.    American Diabetes Association (ADA) 2022 guidelines state continuous glucose monitors (CGM) are useful tools to lower or maintain A1C levels and/or reduce hypoglycemia in adults and youth with diabetes (7.11; 7.12).  As of 202, this recommendation is NOT only for patients on multiple daily injection--it has been expanded to patients on basal insulin alone. Furthermore, the AACE/ACE 2020 type 2 diabetes treatment algorithm explicitly states that CGM is preferred over blood glucose monitoring (BGM) and is highly recommended to assist patients with diabetes in reaching goals safely. Additionally, the AACE/ diabetes technology guideline recommends CGM for people with diabetes treated with less intensive insulin therapy (less than 3 injections per day). The patient is at increased risk of hypoglycemia (and subsequently falls) given insulin therapy.This patient has a diagnosis of Diabetes related to  "Cystic Fibrosis and is on 1 injection per day of Lantus (10 units) and is experiencing episodes of hypoglycemia after breakfast despite frequent adjustments due to labile blood sugars. It is difficult to obtain a complete picture of this patient's blood glycemic variability and current trends with fingerstick readings alone. This device would be extremely useful to help monitor the patient throughout the day not only so we can make clinical decisions on their diabetes regimen and avoid a hospital admission--but also so the patient can make lifestyle modifications as well.     I urge you to consider the recent evidence and clinical practice guidelines and follow the current \"relaxed guidelines\" that Medicare Part B is following -- which temporarily allow access to these devices on patients on one or more insulin injections per day.     Please keep the best interest of Dilan in mind by allowing him access to this evidence-based, cost-effective, and useful device. Denial of coverage would go against clinical practice guidelines and would exhibit poor patient care--which would be subsequently reported to the [facility] Board of Medicine and Department of [place].    I have included medical records pertaining to the patient s medical history, current condition and treatment plan.  In addition, the following billing codes will be used for therapy and follow-up: E84.8.    I would request this submission be evaluated on an individual basis by an endocrinologist or weight  who is current in the practice and standards of care. I firmly believe that this therapy is clinically appropriate and that he would benefit from improved clinical outcomes and quality of life if allowed the opportunity to receive this treatment.      Please contact my office at [phone] if you require additional information to ensure the prompt approval for coverage.    Please send your written decision to me at this address:  M " [facility] [address]  [address] [facility] [id number] [phone]        Sincerely,      MD Umm Nazarioosures

## 2024-04-09 NOTE — PATIENT INSTRUCTIONS
James Li,    It was nice meeting with you today. Here is a summary of our visit and plan:    I sent staff message to pharmacy liaison team to check cost and coverage for CGMs - both Dexcom G7 and FreeStyle Braden 3 require prior authorization. PA for FreeStyle Braden 3 was denied in 2023, we'll try to order the Dexcom G7.     If PA is denied, I'll leave a sample sensor for you to  on  when you are at the [address] for other appointments. You can wear the sensor and review data with Dr. Miranda during your appointment.        Please call or MyChart if you have any questions.      Nasrin LEGGETT  Diabetes Educator  [facility] [address] [address]  Phone: [phone]  Email: [email or web address]  [phone]

## 2024-04-09 NOTE — PROGRESS NOTES
"Diabetes Self-Management Education & Support    Dilan Nassar is a 38 year old who presents today for education related to Cystic Fibrosis Related Diabetes (CFRD)  Patient is being treated with:  diet, exercise, and insulin injections  He is accompanied by self    Year of diagnosis: ~2012  Referring provider:  Edison Miranda  Living Situation: did not ask  Employment: did not ask    PATIENT CONCERNS RELATED TO DIABETES SELF MANAGEMENT: no overwhelming concerns, would like to get a personal CGM to wear prior to appointment with Dr. Miranda later this month      SUBJECTIVE / OBJECTIVE:  Diabetes type: Other (see Comments)  Please elaborate:: Cystic Fibrosis related Diabetes  Disease course: Stable      Monitoring:  Times checking blood sugar at home (number): Never  BG results: not available 2 hours after a meal, only tests before meeting with Dr. Miranda  CGM: none    Labs:  Lab Results   Component Value Date    A1C 6.4 09/26/2023    A1C 5.7 09/17/2020     Lab Results   Component Value Date     09/26/2023     09/14/2021     09/17/2020     Lab Results   Component Value Date    LDL 40 09/26/2023    LDL 33 09/17/2020     HDL Cholesterol   Date Value Ref Range Status   09/17/2020 83 >39 mg/dL Final     Direct Measure HDL   Date Value Ref Range Status   09/26/2023 81 >=40 mg/dL Final     GFR Estimate   Date Value Ref Range Status   09/26/2023 >90 >60 mL/min/1.73m2 Final   09/17/2020 >90 >60 mL/min/[1.73_m2] Final     Comment:     Non  GFR Calc  Starting 12/18/2018, serum creatinine based estimated GFR (eGFR) will be   calculated using the Chronic Kidney Disease Epidemiology Collaboration   (CKD-EPI) equation.       Lab Results   Component Value Date    CR 0.98 09/26/2023    CR 0.95 09/17/2020     No results found for: \"MICROALBUMIN\"  No results found for: \"GADAB\"  No results found for: \"CPEPT\"      Diabetes Symptoms & Complications:  Diabetes Related Symptoms: None  Weight trend: " Stable  Symptom course: Stable  Disease course: Stable         Taking Medications:  Diabetes Medication(s)       Insulin       insulin glargine (LANTUS SOLOSTAR) 100 UNIT/ML pen Inject 10 Units Subcutaneous daily          Current Treatments: Insulin Injections      Problem Solving:  Patient carries a carbohydrate source: No  Hypoglycemia Symptoms  Hypoglycemia: Hunger  Hypoglycemia Complications  Hypoglycemia Complications: None    Low BG in the morning, gets hungry, doesn't get hungry dizziness  Patient is at risk of hypoglycemia?: YES  Hypoglycemia symptoms: shaky, dizzy, weak  Hospitalizations for hyper or hypoglycemia: No    Reducing Risks:       Patient's most recent   Lab Results   Component Value Date    A1C 6.4 09/26/2023    A1C 6.3 09/20/2022    A1C 5.7 09/17/2020    A1C 6.1 09/09/2019      Patient's A1C goal: <7.0    Being Active:   Walking the dog,   20 minutes - half hour      Nutrition:     Meal planning/habits: None  Beverages: Water, Milk, Alcohol    3 meals a day, morning noon and night,   Maybe popcorn at night  Generally water    Healthy Coping:      EDUCATION and INSTRUCTION PROVIDED AT THIS VISIT:    CFRD patient seen for Comprehensive Knowledge Assessment and Instruction at the request of Dr. Miranda. Guillermo is interested in getting a personal CGM to better manage his blood glucoses. He would like to wear one prior to his appointment with Dr. Miranda at the end of the month. Currently managing his diabetes with diet, exercise, and Lantus 10 units daily. When he checks his blood glucose, it's usually once or twice a day, fasting or 2 hours post-meal.     I sent staff message to pharmacy liaison team to check cost and coverage for CGMs - both Dexcom G7 and FreeStyle Braden 3 require prior authorization. PA for FreeStyle Braden 3 was denied in October 2023, can try for Dexcom G7.     Will reach out to patient if PA for Dexcom is denied and give him a sample personal CGM to wear prior to appointment with  Dr. Miranda.    Patient-stated goal written and given to Dilan Nassar.  Verbalized and demonstrated understanding of instructions.     PLAN:  Will order personal CGM to patient's pharmacy, if it is not covered by insurance we will give Guillermo a sample sensor on 4/16 when he comes to the [facility] for other appointments      FOLLOW-UP:    4/30 with Dr. Miranda        See patient instructions.  AVS provided to patient.    Time spent with patient at today's visit was 15 minutes.      Any diabetes medication initiation or dose changes were [facility] the [facility] per the patient's referring provider. A copy of this encounter was shared with the provider.

## 2024-04-09 NOTE — NURSING NOTE
Is the patient currently in the state of [place]? YES    Visit mode:VIDEO    If the visit is dropped, the patient can be reconnected by: VIDEO VISIT: Text to cell phone:   Telephone Information:   Mobile [phone]       Will anyone else be joining the visit? NO  (If patient encounters technical issues they should call [phone]    How would you like to obtain your AVS? MyChart    Are changes needed to the allergy or medication list? No    Are refills needed on medications prescribed by this physician?     Reason for visit: Diabetes Education    Shelby Kocher VVF

## 2024-04-11 NOTE — TELEPHONE ENCOUNTER
Medication Appeal Initiation    Medication: DEXCOM G7 SENSOR MISC  Appeal Start Date:  4/11/2024  Insurance Company: [employer] Phone: [phone]  Insurance Fax: [phone]  Comments:

## 2024-04-15 NOTE — PROGRESS NOTES
[facility]  April 16, 2024         Assessment and Plan:        Guillermo Nassar is a 38 year old male with cystic fibrosis who is seen today for a routine visit.      1. CF lung disease: no recent illnesses or symptoms. Does not exercise, gets out and walks the dog. Continues to do nebs and vesting, about 12 times/week. PFTs today are stable at his baseline. Previous cultures + for MRSA, Klebsiella, Steno, Ps A and A. Fumigatus. No evidence of exacerbation at [facility].   - Continue nebulizers and vest therapy   - On chronic azithromycin     2. HTN: BP is well controlled.   - Continue lisinopril 5 mg daily     3. CF related liver disease: prior US demonstrating coarse echotexture of the liver with hypertrophy of the caudate lobe and left lobe concerning for possible cirrhosis, no lesions, patent vasculature. Liver panel in September WNL.  - Continue Ursodiol      4. Exocrine pancreatic insufficiency: no new GI complaints today, no s/s of malabsorption.   - Continue enzymes and vitamin supplementation      5. CFRD: last AIC of 6.4 on 9/26. Planning to do a CGM for a few weeks with Endocrine.   - Continue Lantus 10 U      6. CF related sinus disease: no complaints today.   - Continue Flonase      7. CFTR: modulation: on Trikafta and is doing well. Labs in September WNL.   - Labs pending for today  - Continue Trikafta     RTC: 3 months with video visit  Annual studies due: September 2024 (DEXA > April 2024); colonoscopy due (plans to get at [place])  Vaccinations: got his flu shot and covid booster        Jyothi Warren PA-C  Pulmonary, Allergy, Critical Care and Sleep Medicine        Interval History:     Accepted into a grad program in accounting through [employer]. Getting  on May 12, small wedding. No recent illnesses, no exercising, walking the dog for exercise. Having some ear itching from allergies, no cough or shortness of breath. Doing nebs and vesting 12  times/week. No change in GI symptoms, has appointment with Dr. Miranda in a few weeks.          Review of Systems:   Please see HPI. Otherwise, complete 10 point ROS negative.           Past Medical and Surgical History:     Past Medical History:   Diagnosis Date    Cystic fibrosis with pulmonary manifestations (H)     /[id number]     Past Surgical History:   Procedure Laterality Date    APPENDECTOMY OPEN  1999    Appendicitis     SINUS SURGERY  1990's    h/o many sinus infections           Family History:     Family History   Problem Relation Age of Onset    Diabetes Mother         Unknown type    Prostate Cancer Paternal Grandfather     LUNG DISEASE Other         Negative    Diabetes Maternal Aunt         unknown type    Diabetes Maternal Uncle         unknown type    Diabetes Maternal Grandmother         unknown type    Coronary Artery Disease Paternal Grandmother             Social History:     Social History     Socioeconomic History    Marital status: Single     Spouse name: Not on file    Number of children: Not on file    Years of education: Not on file    Highest education level: Not on file   Occupational History    Occupation: State Representative     Employer: [employer]    Occupation: Financial Counselor   Tobacco Use    Smoking status: Never    Smokeless tobacco: Former   Substance and Sexual Activity    Alcohol use: Yes     Alcohol/week: 10.0 - 14.0 standard drinks of alcohol     Types: 10 - 14 Standard drinks or equivalent per week     Comment: 2 drinks per night 5X/wk    Drug use: No    Sexual activity: Yes     Partners: Female     Birth control/protection: Pull-out method, Condom   Other Topics Concern    Parent/sibling w/ CABG, MI or angioplasty before 65F 55M? Yes   Social History Narrative    Owns a home in [place], [place] with his girlfriend of ~7 years. 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     Social Determinants of Health     Financial Resource Strain: Not on file   Food  Insecurity: Not on file   Transportation Needs: Not on file   Physical Activity: Not on file   Stress: Not on file   Social Connections: Not on file   Interpersonal Safety: Not on file   Housing Stability: Not on file            Medications:     Current Outpatient Medications   Medication Sig Dispense Refill    elexacaftor-tezacaftor-ivacaftor & ivacaftor (TRIKAFTA) 100-50-75 & 150 MG tablet pack TAKE TWO ORANGE TABLETS BY MOUTH IN THE MORNING AND ONE BLUE TABLET IN THE EVENING AS DIRECTED ON PACKAGE.  TAKE WITH FAT CONTAINING FOOD. 84 tablet 5    albuterol (PROVENTIL) (2.5 MG/3ML) 0.083% neb solution Take 1 vial (2.5 mg) by nebulization 3 times daily as needed for shortness of breath, wheezing or cough 180 mL 3    allopurinol (ZYLOPRIM) 300 MG tablet TAKE ONE TABLET (300MG) BY MOUTH DAILY 90 tablet 3    azithromycin (ZITHROMAX) 250 MG tablet Take 2 tablets (500 mg) by mouth Every Mon, Wed, Fri Morning 72 tablet 3    blood glucose (NO BRAND SPECIFIED) test strip Use to test blood sugar 4 times daily or as directed. 125 strip 11    blood glucose monitoring (FREESTYLE) lancets Use to test blood sugar 4 times daily or as directed. 200 each 11    blood glucose monitoring (NO BRAND SPECIFIED) meter device kit Use to test blood sugar 4 times daily or as directed. 1 kit 0    Continuous Blood Gluc Sensor (DEXCOM G7 SENSOR) MISC Change every 10 days. 9 each 4    Continuous Blood Gluc Sensor (FREESTYLE ESTEFANY 3 SENSOR) MISC 1 each every 14 days 6 each 3    dornase maryse (PULMOZYME) 2.5 MG/2.5ML neb solution INHALE CONTENTS OF ONE VIAL (2.5MG) VIA NEBULIZER TWICE DAILY. KEEP REFRIGERATED UNTIL USE. 150 mL 11    fluticasone (FLONASE) 50 MCG/ACT nasal spray SPRAY 2 SPRAYS INTO BOTH NOSTRILS DAILY 48 g 3    insulin glargine (LANTUS SOLOSTAR) 100 UNIT/ML pen Inject 10 Units Subcutaneous daily 15 mL 3    insulin pen needle (32G X 4 MM) 32G X 4 MM miscellaneous Use 3 pen needles daily or as directed. 300 each 3     lipase-protease-amylase (CREON) 53639-11598-42135 units CPEP TAKE 5-6 CAPSULES (60,000-72,000) BY MOUTH THREE TIMES A DAY WITH MEALS 600 capsule 4    lisinopril (ZESTRIL) 5 MG tablet Take 1 tablet (5 mg) by mouth daily 90 tablet 3    mvw complete formulation (SOFTGELS ) capsule Take 2 capsules by mouth daily 60 capsule 11    ursodiol (ACTIGALL) 300 MG capsule TAKE 1 CAPSULE (300MG ) BY MOUTH TWO TIMES A  capsule 3     No current facility-administered medications for this visit.            Physical Exam:   /72 (BP Location: Right arm, Patient Position: Sitting, Cuff Size: Adult Regular)   Pulse 74   SpO2 97%     GENERAL: alert, NAD  HEENT: NCAT, EOMI, anicteric sclera, no nasal edema or erythema; canals and TMs clear; no oral mucosal edema or erythema  Neck: no cervical or supraclavicular adenopathy  Respiratory: good air flow, no crackles, rhonchi or wheezing  CV: RRR, S1S2, no murmurs noted  Abdomen: normoactive BS, soft   Lymph: no edema, + digital clubbing  Neuro: AAO X 3, CN 2-12 grossly intact  Psychiatric: normal affect, good eye contact  Skin: no rash, jaundice or lesions on limited exam         Data:   All laboratory and imaging data reviewed.      Cystic Fibrosis Culture  Specimen Description   Date Value Ref Range Status   06/15/2021 Sputum  Final   09/17/2020 Sputum  Final   12/19/2019 Sputum  Final    Culture Micro   Date Value Ref Range Status   06/15/2021 Heavy growth  Normal mami    Final   06/15/2021 (A)  Final    Light growth  Pseudomonas aeruginosa, mucoid strain     06/15/2021 Light growth  Pseudomonas aeruginosa   (A)  Final   06/15/2021 Light growth  Strain 2  Pseudomonas aeruginosa   (A)  Final        PFT interpretation:  Maneuver: valid and meets ATS guidelines  Normal spirometry  Compared to prior: FEV1 of 2.70 is 60 ml below prior

## 2024-04-16 ENCOUNTER — ALLIED HEALTH/NURSE VISIT (OUTPATIENT)
Dept: CARE COORDINATION | Facility: CLINIC | Age: 39
End: 2024-04-16

## 2024-04-16 ENCOUNTER — LAB (OUTPATIENT)
Dept: LAB | Facility: CLINIC | Age: 39
End: 2024-04-16
Payer: COMMERCIAL

## 2024-04-16 ENCOUNTER — OFFICE VISIT (OUTPATIENT)
Dept: PULMONOLOGY | Facility: CLINIC | Age: 39
End: 2024-04-16
Attending: PHYSICIAN ASSISTANT
Payer: COMMERCIAL

## 2024-04-16 ENCOUNTER — OFFICE VISIT (OUTPATIENT)
Dept: PHARMACY | Facility: CLINIC | Age: 39
End: 2024-04-16
Payer: COMMERCIAL

## 2024-04-16 ENCOUNTER — OFFICE VISIT (OUTPATIENT)
Dept: FAMILY MEDICINE | Facility: CLINIC | Age: 39
End: 2024-04-16
Payer: COMMERCIAL

## 2024-04-16 VITALS
OXYGEN SATURATION: 100 % | TEMPERATURE: 98 F | HEIGHT: 63 IN | HEART RATE: 68 BPM | BODY MASS INDEX: 23.2 KG/M2 | SYSTOLIC BLOOD PRESSURE: 125 MMHG | RESPIRATION RATE: 16 BRPM | WEIGHT: 130.9 LBS | DIASTOLIC BLOOD PRESSURE: 73 MMHG

## 2024-04-16 VITALS — HEART RATE: 74 BPM | DIASTOLIC BLOOD PRESSURE: 72 MMHG | SYSTOLIC BLOOD PRESSURE: 116 MMHG | OXYGEN SATURATION: 97 %

## 2024-04-16 DIAGNOSIS — K86.81 EXOCRINE PANCREATIC INSUFFICIENCY: ICD-10-CM

## 2024-04-16 DIAGNOSIS — L98.9 SKIN LESION: Primary | ICD-10-CM

## 2024-04-16 DIAGNOSIS — E84.9 CYSTIC FIBROSIS (H): ICD-10-CM

## 2024-04-16 DIAGNOSIS — Z13.9 RISK AND FUNCTIONAL ASSESSMENT: Primary | ICD-10-CM

## 2024-04-16 DIAGNOSIS — E84.0 CYSTIC FIBROSIS WITH PULMONARY EXACERBATION (H): Primary | ICD-10-CM

## 2024-04-16 DIAGNOSIS — M10.9 GOUT, UNSPECIFIED CAUSE, UNSPECIFIED CHRONICITY, UNSPECIFIED SITE: ICD-10-CM

## 2024-04-16 DIAGNOSIS — E84.8 DIABETES MELLITUS RELATED TO CYSTIC FIBROSIS (H): ICD-10-CM

## 2024-04-16 DIAGNOSIS — E08.9 DIABETES MELLITUS RELATED TO CYSTIC FIBROSIS (H): ICD-10-CM

## 2024-04-16 LAB
ALBUMIN SERPL BCG-MCNC: 4.2 G/DL (ref 3.5–5.2)
ALP SERPL-CCNC: 99 U/L (ref 40–150)
ALT SERPL W P-5'-P-CCNC: 29 U/L (ref 0–70)
AST SERPL W P-5'-P-CCNC: 30 U/L (ref 0–45)
BILIRUB DIRECT SERPL-MCNC: 0.25 MG/DL (ref 0–0.3)
BILIRUB SERPL-MCNC: 0.8 MG/DL
CK SERPL-CCNC: 210 U/L (ref 39–308)
EXPTIME-PRE: 14.32 SEC
FEF2575-%PRED-PRE: 34 %
FEF2575-PRE: 1.16 L/SEC
FEF2575-PRED: 3.35 L/SEC
FEFMAX-%PRED-PRE: 80 %
FEFMAX-PRE: 7.21 L/SEC
FEFMAX-PRED: 8.94 L/SEC
FEV1-%PRED-PRE: 83 %
FEV1-PRE: 2.7 L
FEV1FEV6-PRE: 62 %
FEV1FEV6-PRED: 82 %
FEV1FVC-PRE: 57 %
FEV1FVC-PRED: 83 %
FIFMAX-PRE: 7.96 L/SEC
FVC-%PRED-PRE: 119 %
FVC-PRE: 4.71 L
FVC-PRED: 3.94 L
PROT SERPL-MCNC: 6.6 G/DL (ref 6.4–8.3)
URATE SERPL-MCNC: 4.8 MG/DL (ref 3.4–7)

## 2024-04-16 PROCEDURE — 82550 ASSAY OF CK (CPK): CPT | Performed by: PATHOLOGY

## 2024-04-16 PROCEDURE — 99213 OFFICE O/P EST LOW 20 MIN: CPT | Performed by: PHYSICIAN ASSISTANT

## 2024-04-16 PROCEDURE — 99207 PR NO CHARGE LOS: CPT | Performed by: PHARMACIST

## 2024-04-16 PROCEDURE — 99214 OFFICE O/P EST MOD 30 MIN: CPT | Mod: 25 | Performed by: PHYSICIAN ASSISTANT

## 2024-04-16 PROCEDURE — 36415 COLL VENOUS BLD VENIPUNCTURE: CPT | Performed by: PATHOLOGY

## 2024-04-16 PROCEDURE — 94375 RESPIRATORY FLOW VOLUME LOOP: CPT | Performed by: PHYSICIAN ASSISTANT

## 2024-04-16 PROCEDURE — 87186 SC STD MICRODIL/AGAR DIL: CPT | Performed by: PHYSICIAN ASSISTANT

## 2024-04-16 PROCEDURE — 80076 HEPATIC FUNCTION PANEL: CPT | Performed by: PATHOLOGY

## 2024-04-16 PROCEDURE — 84550 ASSAY OF BLOOD/URIC ACID: CPT | Performed by: PATHOLOGY

## 2024-04-16 RX ORDER — ELEXACAFTOR, TEZACAFTOR, AND IVACAFTOR 100-50-75
KIT ORAL
Qty: 84 TABLET | Refills: 5 | Status: SHIPPED | OUTPATIENT
Start: 2024-04-16

## 2024-04-16 ASSESSMENT — PAIN SCALES - GENERAL
PAINLEVEL: NO PAIN (0)
PAINLEVEL: NO PAIN (0)

## 2024-04-16 NOTE — PATIENT INSTRUCTIONS
"Cystic Fibrosis Self-Care Plan       Patient: Dilan Nassar   MRN: [id number]   Clinic Date: April 16, 2024     RECOMMENDATIONS:  1. Continue nebulizers and vest therapy.   2. Congrats on your upcoming wedding!  3. Colonoscopy at [place].     Annual Studies:   IGG   Date Value Ref Range Status   09/17/2020 1,153 610 - 1,616 mg/dL Final     Immunoglobulin G   Date Value Ref Range Status   09/26/2023 847 610 - 1,616 mg/dL Final     No results found for: \"INS\"  There are no preventive care reminders to display for this patient.    Pulmonary Function Tests  FEV1: amount of air you can blow out in 1 second  FVC: total amount of air you can take in and blow out    Your Goals:             Latest Ref Rng & Units 4/16/2024     9:55 AM   PFT   FVC L 4.71  P   FEV1 L 2.70  P   FVC% % 119  P   FEV1% % 83  P      P Preliminary result          Airway Clearance: The Most Important Way to Keep Your Lungs Healthy  Vest Settings:   Hill-Rom Frequencies: 8, 9, 10 Pressure 10 Then, Frequencies 18, 19, 20 Pressure 6     RespirTech: Quick Start with Pressure of     Do each frequency for 5 minutes; Deflate vest after each frequency & cough 3 times before beginning the next setting.    Vest and Neb Therapy should be done 1 times/day.    Good Nutrition Can Improve Lung Function and Overall Health    Take ALL of your vitamins with food    Take 1/2 of your enzymes before EVERY meal/snack and the other 1/2 mid-meal/snack    Wt Readings from Last 3 Encounters:   09/26/23 59.7 kg (131 lb 9.8 oz)   04/18/23 57 kg (125 lb 10.6 oz)   04/19/22 56.2 kg (123 lb 14.4 oz)       There is no height or weight on file to calculate BMI.         National CF Foundation Recommendations for BMI in CF Adults: Women: at least 22 Men: at least 23        Controlling Blood Sugars Helps Prevent Lung Infections & Improves Nutrition  Test blood sugar:    In the morning before eating (goal is )    2 hours after a meal (goal is less than 150)    When pre-meal " glucose is greater than 150 add correction    At bedtime (if less than 100 eat a snack with 15 grams of carbohydrates  Last A1C Results:   Hemoglobin A1C   Date Value Ref Range Status   09/26/2023 6.4 (H) <5.7 % Final     Comment:     Normal <5.7%   Prediabetes 5.7-6.4%    Diabetes 6.5% or higher     Note: Adopted from ADA consensus guidelines.   09/17/2020 5.7 (H) 0 - 5.6 % Final     Comment:     Normal <5.7% Prediabetes 5.7-6.4%  Diabetes 6.5% or higher - adopted from ADA   consensus guidelines.           If diabetic, measure A1C every 6 months. Goal is under 7%.    Staying Healthy  Research: If you are interested in learning about research opportunities or have questions, please contact Sonali Altamirano at [phone] or [email or web address].     Foundation: Compass is a personalized resource service to help you with the insurance, financial, legal and other issues you are facing.  It's free, confidential and available to anyone with CF.  Ask your  for more information or contact [employer]ass directly at 974-COMPASS ([phone]) or compass@cff.org, or learn more at cff.org/compass.       CF Nurse Line: Savannah Kang and KJ: [phone]  Steffi Awad or Viviana Harden RT: [phone]    Daniela Perez , Dieticians: [phone]    Lisset Root, Diabetes Nurse: [phone]   Yuridia Ortega: [phone] or Kailey Bryant at [phone], Social Workers  [email or web address]

## 2024-04-16 NOTE — TELEPHONE ENCOUNTER
Please fax this once approved to [phone] for [facility] pharmacy. [facility] - [place], [place] - [address] [place] [address]

## 2024-04-16 NOTE — LETTER
4/16/2024         RE: Dilan Nassar  [address]  [facility] [address]        Dear Colleague,    Thank you for referring your patient, Dilan Nassar, to the [facility] [place]. Please see a copy of my visit note below.    [facility]  April 16, 2024         Assessment and Plan:        Guillermo Nassar is a 38 year old male with cystic fibrosis who is seen today for a routine visit.      1. CF lung disease: no recent illnesses or symptoms. Does not exercise, gets out and walks the dog. Continues to do nebs and vesting, about 12 times/week. PFTs today are stable at his baseline. Previous cultures + for MRSA, Klebsiella, Steno, Ps A and A. Fumigatus. No evidence of exacerbation at [facility] [place].   - Continue nebulizers and vest therapy   - On chronic azithromycin     2. HTN: BP is well controlled.   - Continue lisinopril 5 mg daily     3. CF related liver disease: prior US demonstrating coarse echotexture of the liver with hypertrophy of the caudate lobe and left lobe concerning for possible cirrhosis, no lesions, patent vasculature. Liver panel in September WNL.  - Continue Ursodiol      4. Exocrine pancreatic insufficiency: no new GI complaints today, no s/s of malabsorption.   - Continue enzymes and vitamin supplementation      5. CFRD: last AIC of 6.4 on 9/26. Planning to do a CGM for a few weeks with Endocrine.   - Continue Lantus 10 U      6. CF related sinus disease: no complaints today.   - Continue Flonase      7. CFTR: modulation: on Trikafta and is doing well. Labs in September WNL.   - Labs pending for today  - Continue Trikafta     RTC: 3 months with video visit  Annual studies due: September 2024 (DEXA > April 2024); colonoscopy due (plans to get at [place])  Vaccinations: got his flu shot and covid booster        Jyothi Warren PA-C  Pulmonary, Allergy, Critical Care and Sleep Medicine         Interval History:     Accepted into a grad program in accounting through [employer]. Getting  on May 12, small wedding. No recent illnesses, no exercising, walking the dog for exercise. Having some ear itching from allergies, no cough or shortness of breath. Doing nebs and vesting 12 times/week. No change in GI symptoms, has appointment with Dr. Miranda in a few weeks.          Review of Systems:   Please see HPI. Otherwise, complete 10 point ROS negative.           Past Medical and Surgical History:     Past Medical History:   Diagnosis Date     Cystic fibrosis with pulmonary manifestations (H)     /[id number]     Past Surgical History:   Procedure Laterality Date     APPENDECTOMY OPEN  1999    Appendicitis      SINUS SURGERY  1990's    h/o many sinus infections           Family History:     Family History   Problem Relation Age of Onset     Diabetes Mother         Unknown type     Prostate Cancer Paternal Grandfather      LUNG DISEASE Other         Negative     Diabetes Maternal Aunt         unknown type     Diabetes Maternal Uncle         unknown type     Diabetes Maternal Grandmother         unknown type     Coronary Artery Disease Paternal Grandmother             Social History:     Social History     Socioeconomic History     Marital status: Single     Spouse name: Not on file     Number of children: Not on file     Years of education: Not on file     Highest education level: Not on file   Occupational History     Occupation: State Representative     Employer: [employer]     Occupation: Financial Counselor   Tobacco Use     Smoking status: Never     Smokeless tobacco: Former   Substance and Sexual Activity     Alcohol use: Yes     Alcohol/week: 10.0 - 14.0 standard drinks of alcohol     Types: 10 - 14 Standard drinks or equivalent per week     Comment: 2 drinks per night 5X/wk     Drug use: No     Sexual activity: Yes     Partners: Female     Birth control/protection: Pull-out method, Condom   Other  Topics Concern     Parent/sibling w/ CABG, MI or angioplasty before 65F 55M? Yes   Social History Narrative    Owns a home in [place], [place] with his girlfriend of ~7 years. 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     Social Determinants of Health     Financial Resource Strain: Not on file   Food Insecurity: Not on file   Transportation Needs: Not on file   Physical Activity: Not on file   Stress: Not on file   Social Connections: Not on file   Interpersonal Safety: Not on file   Housing Stability: Not on file            Medications:     Current Outpatient Medications   Medication Sig Dispense Refill     elexacaftor-tezacaftor-ivacaftor & ivacaftor (TRIKAFTA) 100-50-75 & 150 MG tablet pack TAKE TWO ORANGE TABLETS BY MOUTH IN THE MORNING AND ONE BLUE TABLET IN THE EVENING AS DIRECTED ON PACKAGE.  TAKE WITH FAT CONTAINING FOOD. 84 tablet 5     albuterol (PROVENTIL) (2.5 MG/3ML) 0.083% neb solution Take 1 vial (2.5 mg) by nebulization 3 times daily as needed for shortness of breath, wheezing or cough 180 mL 3     allopurinol (ZYLOPRIM) 300 MG tablet TAKE ONE TABLET (300MG) BY MOUTH DAILY 90 tablet 3     azithromycin (ZITHROMAX) 250 MG tablet Take 2 tablets (500 mg) by mouth Every Mon, Wed, Fri Morning 72 tablet 3     blood glucose (NO BRAND SPECIFIED) test strip Use to test blood sugar 4 times daily or as directed. 125 strip 11     blood glucose monitoring (FREESTYLE) lancets Use to test blood sugar 4 times daily or as directed. 200 each 11     blood glucose monitoring (NO BRAND SPECIFIED) meter device kit Use to test blood sugar 4 times daily or as directed. 1 kit 0     Continuous Blood Gluc Sensor (DEXCOM G7 SENSOR) MISC Change every 10 days. 9 each 4     Continuous Blood Gluc Sensor (FREESTYLE ESTEFANY 3 SENSOR) MISC 1 each every 14 days 6 each 3     dornase maryse (PULMOZYME) 2.5 MG/2.5ML neb solution INHALE CONTENTS OF ONE VIAL (2.5MG) VIA NEBULIZER TWICE DAILY. KEEP REFRIGERATED UNTIL USE. 150 mL 11      fluticasone (FLONASE) 50 MCG/ACT nasal spray SPRAY 2 SPRAYS INTO BOTH NOSTRILS DAILY 48 g 3     insulin glargine (LANTUS SOLOSTAR) 100 UNIT/ML pen Inject 10 Units Subcutaneous daily 15 mL 3     insulin pen needle (32G X 4 MM) 32G X 4 MM miscellaneous Use 3 pen needles daily or as directed. 300 each 3     lipase-protease-amylase (CREON) 28769-63841-17864 units CPEP TAKE 5-6 CAPSULES (60,000-72,000) BY MOUTH THREE TIMES A DAY WITH MEALS 600 capsule 4     lisinopril (ZESTRIL) 5 MG tablet Take 1 tablet (5 mg) by mouth daily 90 tablet 3     mvw complete formulation (SOFTGELS ) capsule Take 2 capsules by mouth daily 60 capsule 11     ursodiol (ACTIGALL) 300 MG capsule TAKE 1 CAPSULE (300MG ) BY MOUTH TWO TIMES A  capsule 3     No current facility-administered medications for this visit.            Physical Exam:   /72 (BP Location: Right arm, Patient Position: Sitting, Cuff Size: Adult Regular)   Pulse 74   SpO2 97%     GENERAL: alert, NAD  HEENT: NCAT, EOMI, anicteric sclera, no nasal edema or erythema; canals and TMs clear; no oral mucosal edema or erythema  Neck: no cervical or supraclavicular adenopathy  Respiratory: good air flow, no crackles, rhonchi or wheezing  CV: RRR, S1S2, no murmurs noted  Abdomen: normoactive BS, soft   Lymph: no edema, + digital clubbing  Neuro: AAO X 3, CN 2-12 grossly intact  Psychiatric: normal affect, good eye contact  Skin: no rash, jaundice or lesions on limited exam         Data:   All laboratory and imaging data reviewed.      Cystic Fibrosis Culture  Specimen Description   Date Value Ref Range Status   06/15/2021 Sputum  Final   09/17/2020 Sputum  Final   12/19/2019 Sputum  Final    Culture Micro   Date Value Ref Range Status   06/15/2021 Heavy growth  Normal mami    Final   06/15/2021 (A)  Final    Light growth  Pseudomonas aeruginosa, mucoid strain     06/15/2021 Light growth  Pseudomonas aeruginosa   (A)  Final   06/15/2021 Light growth  Strain 2  Pseudomonas  aeruginosa   (A)  Final        PFT interpretation:  Maneuver: valid and meets ATS guidelines  Normal spirometry  Compared to prior: FEV1 of 2.70 is 60 ml below prior          Again, thank you for allowing me to participate in the care of your patient.        Sincerely,        Jyothi Warren PA-C

## 2024-04-16 NOTE — PROGRESS NOTES
HPI       Chief Complaint   Patient presents with    Lesion     Patient reports a bump on the back of their right leg, behind the knee. Patient says the bump is painless. Patient also reports that it's changing colors and it's growing.      Dilan Nassar is a 38 year old male with a history significant for CF, CFRD, and intestinal malabsorption who presents to the clinic for evaluation of an evolving skin lesion on the back of his right knee. Lesion was first noticed about 6 years ago. The lesion was evaluated in 2021 and the patient was instructed to monitor it. Since that time, the lesion has grown in diameter, has become more raised, and more recently has changed in color from flesh-colored to pinkish-red. Patient has no other lesions on his body that look similar to this one. Patient has not tried any treatments. Patient denies any injury to the area, pain, redness around the lesion, warmth, itching, peeling or sloughing of skin, discharge, bleeding, or any other associated symptoms. Patient denies personal and family history of skin cancers.    Patient has no other acute concerns at this time.    Problem, Medication and Allergy Lists were reviewed and updated if needed..    Patient is a new patient to this clinic    PAST MEDICAL HISTORY:   Past Medical History:   Diagnosis Date    Cystic fibrosis with pulmonary manifestations (H)     /[id number]       PAST SURGICAL HISTORY:   Past Surgical History:   Procedure Laterality Date    APPENDECTOMY OPEN  1999    Appendicitis     SINUS SURGERY  1990's    h/o many sinus infections       FAMILY HISTORY:   Family History   Problem Relation Age of Onset    Diabetes Mother         Unknown type    Prostate Cancer Paternal Grandfather     LUNG DISEASE Other         Negative    Diabetes Maternal Aunt         unknown type    Diabetes Maternal Uncle         unknown type    Diabetes Maternal Grandmother         unknown type    Coronary Artery Disease Paternal  Grandmother        SOCIAL HISTORY:   Social History     Tobacco Use    Smoking status: Never    Smokeless tobacco: Former   Substance Use Topics    Alcohol use: Yes     Alcohol/week: 10.0 - 14.0 standard drinks of alcohol     Types: 10 - 14 Standard drinks or equivalent per week     Comment: 2 drinks per night 5X/wk     Current Outpatient Medications   Medication Sig Dispense Refill    albuterol (PROVENTIL) (2.5 MG/3ML) 0.083% neb solution Take 1 vial (2.5 mg) by nebulization 3 times daily as needed for shortness of breath, wheezing or cough 180 mL 3    allopurinol (ZYLOPRIM) 300 MG tablet TAKE ONE TABLET (300MG) BY MOUTH DAILY 90 tablet 3    azithromycin (ZITHROMAX) 250 MG tablet Take 2 tablets (500 mg) by mouth Every Mon, Wed, Fri Morning 72 tablet 3    blood glucose (NO BRAND SPECIFIED) test strip Use to test blood sugar 4 times daily or as directed. 125 strip 11    blood glucose monitoring (FREESTYLE) lancets Use to test blood sugar 4 times daily or as directed. 200 each 11    blood glucose monitoring (NO BRAND SPECIFIED) meter device kit Use to test blood sugar 4 times daily or as directed. 1 kit 0    Continuous Blood Gluc Sensor (DEXCOM G7 SENSOR) MISC Change every 10 days. 9 each 4    Continuous Blood Gluc Sensor (FREESTYLE ESTEFANY 3 SENSOR) MISC 1 each every 14 days 6 each 3    dornase maryse (PULMOZYME) 2.5 MG/2.5ML neb solution INHALE CONTENTS OF ONE VIAL (2.5MG) VIA NEBULIZER TWICE DAILY. KEEP REFRIGERATED UNTIL USE. 150 mL 11    elexacaftor-tezacaftor-ivacaftor & ivacaftor (TRIKAFTA) 100-50-75 & 150 MG tablet pack TAKE TWO ORANGE TABLETS BY MOUTH IN THE MORNING AND ONE BLUE TABLET IN THE EVENING AS DIRECTED ON PACKAGE.  TAKE WITH FAT CONTAINING FOOD. 84 tablet 5    fluticasone (FLONASE) 50 MCG/ACT nasal spray SPRAY 2 SPRAYS INTO BOTH NOSTRILS DAILY 48 g 3    insulin glargine (LANTUS SOLOSTAR) 100 UNIT/ML pen Inject 10 Units Subcutaneous daily 15 mL 3    insulin pen needle (32G X 4 MM) 32G X 4 MM  "miscellaneous Use 3 pen needles daily or as directed. 300 each 3    lipase-protease-amylase (CREON) 04027-39170-23726 units CPEP TAKE 5-6 CAPSULES (60,000-72,000) BY MOUTH THREE TIMES A DAY WITH MEALS 600 capsule 4    lisinopril (ZESTRIL) 5 MG tablet Take 1 tablet (5 mg) by mouth daily 90 tablet 3    mvw complete formulation (SOFTGELS ) capsule Take 2 capsules by mouth daily 60 capsule 11    ursodiol (ACTIGALL) 300 MG capsule TAKE 1 CAPSULE (300MG ) BY MOUTH TWO TIMES A  capsule 3     No current facility-administered medications for this visit.            Review of Systems:   Review of Systems  GEN: Denies fever, chills, or unexplained changes in weight.  SKIN: As per HPI.         Physical Exam:     Vitals:    04/16/24 1324   BP: 125/73   BP Location: Left arm   Patient Position: Sitting   Cuff Size: Adult Regular   Pulse: 68   Resp: 16   Temp: 98  F (36.7  C)   TempSrc: Oral   SpO2: 100%   Weight: 59.4 kg (130 lb 14.4 oz)   Height: 1.608 m (5' 3.3\")     Body mass index is 22.97 kg/m .  Vitals were reviewed and were normal     Physical Exam  GEN: Alert and oriented x 4, well-groomed and well-dressed, no acute distress  SKIN: Discrete, symmetrical, 8mm-diameter, non-tender, circular, smooth, mobile, solid (not fluid-filled), mobile, nodular, raised/domed, and non-pigmented, pink skin lesion located on popliteal space of R leg. Skin intact and negative for discharge, erythema, warmth, or scaling. Outer edge of lesion is flesh-colored gradually becoming more pink in the center of the lesion.      Results:   No testing ordered today    Assessment and Plan        1. Skin lesion  Per HPI. Evolving skin lesion on skin in R popliteal space. Referral to derm for further evaluation. Patient was told he should expect a call in in the next couple days. Was provided the number to schedule if he does not hear from them by the end of the week.  - Adult Dermatology  Referral     There are no discontinued " medications.    Options for treatment and follow-up care were reviewed with the patient. Dilan Nassar  engaged in the decision making process and verbalized understanding of the options discussed and agreed with the final plan.    I was present with the NPP student, Genia Mckeon RN, who participated in the service and in the documentation of the services provided. I have verified the history and personally performed the physical exam and medical decision making, as documented by the student and edited by me    WASHINGTON Nails CNP  04/16/24  2:51 PM    WASHINGTON Nails CNP

## 2024-04-16 NOTE — PROGRESS NOTES
Outcome for 04/16/24 5:13 PM: [employer] message sent  Yuliya Ferrara MA  Outcome for 04/26/24 8:21 AM: Data obtained via [employer] website  Yuliya Ferrara MA

## 2024-04-16 NOTE — NURSING NOTE
"Dilan Nassar is a 38 year old year old who is being seen for Cystic Fibrosis (CF Follow up )      Medications reviewed and Vital signs taken.    Specimen Collection Type: Sputum    Order(s) placed: CF Aerobic Bacterial      *IF AFB order placed - please enter \"PRIORITIZE AFB\" to order comments.       Lab Results   Component Value Date    ACIDFAST No acid fast bacilli seen 09/26/2023    ACIDFAST No acid fast bacilli seen 09/26/2023    ACIDFAST No acid fast bacilli seen 09/26/2023         Lab Results   Component Value Date    AFBSMS Negative for acid fast bacteria 09/26/2018    AFBSMS  09/26/2018     Less than 5ml of specimen received.  A minimum of 5 mL of sputum or fluid is recommended for recovery of acid fast bacilli   (AFB).  Volumes less than 5 mL are suboptimal and may compromise recovery of AFB from   culture.      AFBSMS  09/26/2018     Assayed at [facility] [address] [place] [id number] [phone]       Vitals were taken and medications were reconciled.     Shayy PEREZ  10:51 AM      "

## 2024-04-16 NOTE — PROGRESS NOTES
"Adult Cystic Fibrosis Program  Annual Psychosocial Assessment- Phone call    Presenting Information:  ALEXANDRIA is a 38-year-old man with cystic fibrosis, presenting in CF clinic for a regular follow up with CF provider, Jyothi Navarro. Met with Guillermo for annual psychosocial assessment. No one accompanied him to clinic today.      Living situation:  Guillermo lives in [place], [place] with his fiance. He purchased a home in November 2015. They have one dog (a lab mix). He denies any concern about his current living situation.      Family Constellation:  Guillermo was raised by his biological parents who are still  and live in [place], [place]. He has 1 sibling(s): an older sister who lives in [place], [place]. He reports his family members are all doing well. His sister does not have CF.    Guillermo and his fiance will be getting  in May. They are planning to have a small wedding with immediate family.      Social Support:  Guillermo reports \" good\" social support. He has been in a relationship with his fiance for several years and they will be getting  in May. He gets along well with family members and draws additional support from friends. He does not have any connections in the CF Community.     Adjustment to Illness:  Guillermo was diagnosed with CF in infancy.  He was \"pretty stable\" as a kid with some medical complications. He was hospitalized 3x: 2x for sinoscopies and 1x to have his appendix removed. He notes that in adulthood he was hospitalized in 2013 for a pulmonary exacerbation.     Guillermo describes his current health status as \"good\". Guillermo continues to take trikafta which is going well. Clinically, he has significant lung disease, pancreatic insufficiency, and CFRD. He typically does 2 vest treatments per day. He goes for walks daily but does not engage in any other form of exercise.     He reports that he is open about his CF with friends and family, but not with coworkers and acquaintances. He also notes he was pretty " "private about CF even as a kid.     Health Care Directive:  Guillermo has previously received Health Care Directive education. Guillermo is comfortable with his default decision makers (his parents) at this time. He is aware that his fiance will become his default decision maker when they get .     Education:  Guillermo completed a Bachelors Degree in Political Science at [employer]. 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 He was interested in scholarship resources so  will send him contact info for CF COMPASS.     Employment:  Guillermo is currently working for [employer]. He denies any current employment concerns. He is not sure how many hours he will continue to work once he starts his accounting program.    Guillermo's girlfriend works full time as well for the [employer] in [place], [place].     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 He also has a pension with the state from his previous job. He denied any related concerns today.      Insurance:  Guillermo is insured by [employer] through his employer. He denied insurance related concerns today.      Mental Health/Coping:  Guillermo denies any current or past symptoms indicative of mood, anxiety, eating, learning or other mental health disorder. He describes his mood recently as \"good\". Guillermo does not see a therapist or take medication for his mental health.     UBALDO-7 score: 0, indicating an absence of anxiety symptoms.   PHQ-9 score: 0, indicating an absence of depression symptoms.     Guillermo reports his stress levels are manageable. To cope with stress he will process with his girlfriend or his parents.      He identifies spirituality as important in his life, but he does not identify as a Anabaptist person.     Chemical Health:  Guillermo denies tobacco/vaping use, regular exposure to second hand smoke or illicit drug use. He drinks alcohol, " consuming a couple of drinks per weekend. He denies any current/past psychosocial impairment caused by alcohol use.       Leisure Activities/Interests:   Guillermo enjoys going for walks, stock trading, listening to and producing music, and spending time with family and friends. He is looking forward to his upcoming wedding and will be traveling to [place] for his honeymoon.    Intervention:  -Psychosocial Assessment  -Resource education/referral (CF COMPASS)  -Supportive counseling    Assessment:  Guillermo was friendly and receptive to SW visit. He appeared to be open in his responses. He will be getting  and starting an accounting program next month which he is looking forward to. His mental health and physical health are stable and he denies any concerns. No insurance/financial concerns. He appears to have adequate support from his fiance and family as well.     Guillermo seems to be psychosocially stable overall, with access to relevant resources and supports. No concerns expressed/noted.    Plan:  Re-consult for any psychosocial needs that may arise.    Complete psychosocial assessment annually.  Continue to follow for regular clinic consult.    Kailey Bryant, [facility]  Adult Cystic Fibrosis   Ph: [phone]

## 2024-04-16 NOTE — PROGRESS NOTES
Disease State Management Encounter:                          Guillermo Nassar is a 38 year old male coming in for a follow-up visit.      Reason for visit: Annual CF Medication Review.    Medication Access: Patient fills medication at [facility] and [facility]. Patient expresses no concern regarding cost of medications.     CF: Vesting 2x/daily  Patient is currently taking the following medications:  Bronchodilator: albuterol nebs 0.083% twice daily and as needed -does note he needs neb cups refilled, he thinks a request has been sent.  Mucolytic: Pulmozyme 2.5 mg once twice daily  Antibiotic: Not indicated  Azithromycin: 250 mg daily  CFTR modulator: Trikafta (full dose), taking with fat food.  Hepatic: ursodiol 300 mg twice daily  Other: fluticasone (Flonase) 50 mcg/act 2 sprays once daily  Pulmonary symptoms are stable  FEV1-%Pred-Pre (4/16/24): 83%  Cultures (last growth): sputum cultures grow PSA (9/26/23)  Genotype: [id number]    Lab Results   Component Value Date    ALT 29 04/16/2024    AST 30 04/16/2024    BILITOTAL 0.8 04/16/2024    DBIL 0.25 04/16/2024     04/16/2024     Pancreatic Insufficiency/Nutrition: Pancreatic enzyme replacement with Creon [id number] units.  Patient is taking 5-6 capsules with meals. Patient is not experiencing sign/symptoms of malabsorption. Notes has always had loose stools, and this has never changed, remains stable here.  Acid reducer: not indicated  Bowel regimen: none  Vitamins include: MVW complete  2 capsules daily            CFRD:  Patient is currently using Lantus 10 units daily.   SMBG: Accu-Chek meter and supplies.     He's been trying to get CGM for upcoming appt but prior authorization was denied - he reports Dr. Miranda set him up with a sample one that he plans to  and start before upcoming appointment. He doesn't anticipate needing a CGM long-term.  Patient is not experiencing hypoglycemia  Recent symptoms of  high blood sugar? none  Patient follows with Dr. Miranda for endocrinology. Upcoming visit 4/30/24.  Most recent HgA1C:   Lab Results   Component Value Date    A1C 6.4 09/26/2023    A1C 6.3 09/20/2022    A1C 6.0 09/14/2021    A1C 5.7 09/17/2020    A1C 6.1 09/09/2019    A1C 5.8 09/26/2018    A1C 6.1 09/15/2017    A1C 6.7 12/20/2016     Today's Vitals: There were no vitals taken for this visit.    Assessment/Plan:    All modulator labs are within normal limits. Continue Trikafta.  Recheck CK and hepatic panel in 6 months.  Spoke with RT Viviana about neb cup refill - she will follow-up on this.      Follow-up: 6 months Trikafta labs.    I spent 15 minutes with this patient today. All changes were made via verbal approval with Jyothi Warren PA-C. A copy of the visit note was provided to the patient's provider(s).    A summary of these recommendations was given to the patient (see AVS from today's appointment with Jyothi Warren PA-C).    Brittney Gomez, PharmD  Medication Therapy Management Pharmacist         Medication Therapy Recommendations  No medication therapy recommendations to display     Orkambi/Kalydeco/Symdeko/Trikafta Handout    Goals: Aim for 10-12 grams of fat with each dose of medication    Breakfast Ideas  2 large scrambled eggs = 10 grams  Bagel with 2 tablespoons cream cheese = 12 grams  1 slice toast or english muffin with 1.5 tablespoons peanut butter = 13 grams  1 cup whole milk cottage cheese = 10 grams    cup whole milk yogurt with   cup granola = 11 grams  1 pkt instant oatmeal with 1 oz nuts and berries = 10 grams  Avocado toast with cream cheese and/or salmon = >15 grams  Breakfast Freedom = >15 grams  Breakfast Burrito = >15 grams  Fruit smoothie = varies   *prepare with whole fat yogurt, coconut milk, peanut butter, shorty seeds, etc for extra fat    Dinner Ideas  Hot sandwiches - tuna melt, ham and cheese = >15 grams   Cold sandwiches - deli meat with cheese or avocado and mayonnaise,  egg/chicken/tuna salad, peanut butter and jelly = >15 grams  Green salad with 1-2 tablespoons full-fat dressing = 10 grams  Hamburger or turkey burger with cheese = >15 grams  Pasta with valentino sauce (1/3 cup sauce) = 10 grams  Pasta with pesto (1/4 cup) = 21 grams  Pasta with red meat sauce or meatballs = >15 grams  2 tacos made with ground beef and cheese = >18 grams  5 oz steak with 1 tsp butter = varies, 10-15+ grams   Chicken parmesan = >15 grams  Caesar salad with chicken or salmon = >15 grams    Snack Ideas  Apple slices with 1.5 tablespoons peanut butter = 12 grams  1 oz tortilla chips with 2 tbsp guacamole = 12 grams  1 oz dry nuts (ex: almonds, peanuts, cashews, etc) = varies, 12-14 grams  Fresh veggies with 1-2 tablespoons ranch dressing or other full-fat dip = 14 grams  1/3 cup trail mix (with nuts, dried fruit) = 13 grams    avocado sliced on toast or crackers = 8-12 grams  2 hard-boiled eggs = 10 grams  2 full-fat string cheese = 8 grams  1 string cheese + 5 crackers = 9 grams   Peanut butter crackers - 1 pkg = 9 grams  Granola bars = varies   Beef stick = 10 grams   2 pieces beef jerky = 10 grams  2-3 squares dark chocolate = 10-15 grams   Tortilla wrap with deli turkey and cheese = 15 grams     High Calorie Supplements  Ensure/Boost Plus = 10-15 grams   Zone Perfect Bar= 7-8 grams    Cooking Tips  Add high fat ingredients if meal/snack is otherwise low in fat  -Examples:  Butter, oil, cream cheese, sour cream, whipped cream, salad dressing, cheese, nuts, avocado

## 2024-04-16 NOTE — NURSING NOTE
"ROOM:1  NATI DISLA    Preferred Name: Guillermo     How did you hear about us?  Other - Referred by another clinic     Services Provided? No    38 year old  Chief Complaint   Patient presents with    Lesion     Patient reports a bump on the back of their right leg, behind the knee. Patient says the bump is painless. Patient also reports that it's changing colors and it's growing.        Blood pressure 125/73, pulse 68, temperature 98  F (36.7  C), temperature source Oral, resp. rate 16, height 1.608 m (5' 3.3\"), weight 59.4 kg (130 lb 14.4 oz), SpO2 100%. Body mass index is 22.97 kg/m .  BP completed using cuff size:        Patient Active Problem List   Diagnosis    Cystic fibrosis with pulmonary manifestations    Exocrine pancreatic insufficiency    Vitamin D deficiency    Gout    Allergic rhinitis    Intestinal malabsorption    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)    Diabetes mellitus related to cystic fibrosis (H)    Cystic fibrosis with pulmonary exacerbation (H)    Methicillin resistant Staphylococcus aureus infection       Wt Readings from Last 2 Encounters:   04/16/24 59.4 kg (130 lb 14.4 oz)   09/26/23 59.7 kg (131 lb 9.8 oz)     BP Readings from Last 3 Encounters:   04/16/24 125/73   04/16/24 116/72   09/26/23 131/89       Allergies   Allergen Reactions    Molds & Smuts Itching       Current Outpatient Medications   Medication Sig Dispense Refill    albuterol (PROVENTIL) (2.5 MG/3ML) 0.083% neb solution Take 1 vial (2.5 mg) by nebulization 3 times daily as needed for shortness of breath, wheezing or cough 180 mL 3    allopurinol (ZYLOPRIM) 300 MG tablet TAKE ONE TABLET (300MG) BY MOUTH DAILY 90 tablet 3    azithromycin (ZITHROMAX) 250 MG tablet Take 2 tablets (500 mg) by mouth Every Mon, Wed, Fri Morning 72 tablet 3    blood glucose (NO BRAND SPECIFIED) test strip Use to test blood sugar 4 times daily or as directed. 125 strip 11    blood glucose monitoring (FREESTYLE) lancets Use " to test blood sugar 4 times daily or as directed. 200 each 11    blood glucose monitoring (NO BRAND SPECIFIED) meter device kit Use to test blood sugar 4 times daily or as directed. 1 kit 0    Continuous Blood Gluc Sensor (DEXCOM G7 SENSOR) MISC Change every 10 days. 9 each 4    Continuous Blood Gluc Sensor (FREESTYLE ESTEFANY 3 SENSOR) MISC 1 each every 14 days 6 each 3    dornase maryse (PULMOZYME) 2.5 MG/2.5ML neb solution INHALE CONTENTS OF ONE VIAL (2.5MG) VIA NEBULIZER TWICE DAILY. KEEP REFRIGERATED UNTIL USE. 150 mL 11    elexacaftor-tezacaftor-ivacaftor & ivacaftor (TRIKAFTA) 100-50-75 & 150 MG tablet pack TAKE TWO ORANGE TABLETS BY MOUTH IN THE MORNING AND ONE BLUE TABLET IN THE EVENING AS DIRECTED ON PACKAGE.  TAKE WITH FAT CONTAINING FOOD. 84 tablet 5    fluticasone (FLONASE) 50 MCG/ACT nasal spray SPRAY 2 SPRAYS INTO BOTH NOSTRILS DAILY 48 g 3    insulin glargine (LANTUS SOLOSTAR) 100 UNIT/ML pen Inject 10 Units Subcutaneous daily 15 mL 3    insulin pen needle (32G X 4 MM) 32G X 4 MM miscellaneous Use 3 pen needles daily or as directed. 300 each 3    lipase-protease-amylase (CREON) 58459-47146-92140 units CPEP TAKE 5-6 CAPSULES (60,000-72,000) BY MOUTH THREE TIMES A DAY WITH MEALS 600 capsule 4    lisinopril (ZESTRIL) 5 MG tablet Take 1 tablet (5 mg) by mouth daily 90 tablet 3    mvw complete formulation (SOFTGELS ) capsule Take 2 capsules by mouth daily 60 capsule 11    ursodiol (ACTIGALL) 300 MG capsule TAKE 1 CAPSULE (300MG ) BY MOUTH TWO TIMES A  capsule 3     No current facility-administered medications for this visit.       Social History     Tobacco Use    Smoking status: Never    Smokeless tobacco: Former   Substance Use Topics    Alcohol use: Yes     Alcohol/week: 10.0 - 14.0 standard drinks of alcohol     Types: 10 - 14 Standard drinks or equivalent per week     Comment: 2 drinks per night 5X/wk    Drug use: No       Honoring Choices - Health Care Directive Guide offered to patient at  "time of visit.    Health Maintenance Due   Topic Date Due    YEARLY PREVENTIVE VISIT  Never done    ADVANCE CARE PLANNING  Never done    ENDOCRINE REFERRAL  Never done    DENTAL REFERRAL  Never done    CYSTIC FIBROSIS: COLONOSCOPY  Never done       Immunization History   Administered Date(s) Administered    COVID-19 12+ (2023-24) (MODERNA) 09/29/2023    COVID-19 Bivalent 18+ (Moderna) 09/14/2022    COVID-19 Monovalent 18+ (Moderna) 03/10/2021, 04/09/2021, 11/02/2021    Flu, Unspecified 10/22/2003, 11/20/2007, 10/27/2008, 09/30/2009, 10/01/2010    C8l8-30 Novel Flu 12/09/2009    HepB, Unspecified 12/31/1996, 02/20/1997, 08/22/1997    Historical DTP/aP 1983, 1983, 03/05/1984, 08/05/1985, 11/03/1988    Influenza (IIV3) PF 11/29/2006, 09/17/2013    Influenza Vaccine >6 months,quad, PF 10/08/2015, 10/20/2016, 10/05/2017, 09/13/2019, 09/17/2020    Influenza Vaccine, 6+MO IM (QUADRIVALENT W/PRESERVATIVES) 10/01/2014    MMR 10/17/1984, 03/26/1996    OPV, trivalent, live 1983, 1983, 03/05/1984, 08/05/1985, 11/03/1988    Pneumo Conj 13-V (2010&after) 02/13/2015    Pneumococcal 23 valent 10/22/2003, 10/31/2012    TD,PF 7+ (Tenivac) 02/20/2009    TDAP (Adacel,Boostrix) 02/20/2009, 09/14/2021    Td (Adult), Adsorbed 12/31/1996       No results found for: \"PAP\"    Recent Labs   Lab Test 04/16/24  1156 09/26/23  1327 09/20/22  1415 09/14/21  1325 09/14/21  1325 12/17/20  1022 09/17/20  1328 12/19/19  1107 09/09/19  0836   A1C  --  6.4* 6.3*  --  6.0*  --  5.7*  --  6.1*   LDL  --  40 35  --  35  --  33  --  31   HDL  --  81 72  --  89  --  83  --  65   TRIG  --  61 64  --  90  --  91  --  56   ALT 29 24 30  --  43   < > 48   < > 39   CR  --  0.98 0.88  --  1.05   < > 0.95  --  0.81   GFRESTIMATED  --  >90 >90  --  90   < > >90  --  >90   GFRESTBLACK  --   --   --   --   --   --  >90  --  >90   ALBUMIN 4.2 4.4 4.4   < > 4.2   < > 4.1   < > 3.5   POTASSIUM  --  4.2 4.2  --  4.2  --  3.7  --  4.0   TSH  --  " 0.99 1.35  --  2.37  --  1.96  --  3.24    < > = values in this interval not displayed.           4/9/2024     7:56 AM 1/16/2024    11:48 AM   PHQ-2 ( 1999 Pfizer)   Q1: Little interest or pleasure in doing things 0 0   Q2: Feeling down, depressed or hopeless 0 0   PHQ-2 Score 0 0   Q1: Little interest or pleasure in doing things  Not at all   Q2: Feeling down, depressed or hopeless  Not at all   PHQ-2 Score  0           9/22/2017     2:55 PM 10/3/2018    11:39 AM 9/9/2019    10:09 AM 3/24/2021    11:34 AM   PHQ-9 SCORE   PHQ-9 Total Score 0 0 0 0           9/9/2019    10:09 AM 3/24/2021    11:34 AM 10/19/2021     2:08 PM   UBALDO-7 SCORE   Total Score   0 (minimal anxiety)   Total Score 0 0 0            No data to display                Mare Mtz    April 16, 2024 1:28 PM

## 2024-04-16 NOTE — PATIENT INSTRUCTIONS
See provider AVS for a summary of recommendations from today's visit.  Brittney Gomez, PharmD  Cystic Fibrosis MTM Pharmacist  [facility]

## 2024-04-18 ENCOUNTER — TELEPHONE (OUTPATIENT)
Dept: DERMATOLOGY | Facility: CLINIC | Age: 39
End: 2024-04-18
Payer: COMMERCIAL

## 2024-04-18 NOTE — TELEPHONE ENCOUNTER
Called and spoke with pt to schedule a New Derm appt for   nodular lesion right posterior knee, mobile, deep flesh tone that has enlarged and changed in color since 2021.        Shalonda Ramos, Procedure  4/18/2024 9:45 AM

## 2024-04-19 DIAGNOSIS — E84.0 CYSTIC FIBROSIS WITH PULMONARY MANIFESTATIONS (H): Primary | ICD-10-CM

## 2024-04-19 NOTE — TELEPHONE ENCOUNTER
Contacted [facility] to check on status of appeal.  Was transferred 4 times and and on hold for 50 minutes.  Unable to check status.

## 2024-04-21 LAB
BACTERIA SPT CULT: ABNORMAL
BACTERIA SPT CULT: ABNORMAL

## 2024-04-23 NOTE — TELEPHONE ENCOUNTER
Tammy Alvarado from [facility] billing calling requesting an update on the prior authorization for patients Free Style Braden sensors, please fax over to [phone]. She would still like a call even if the determination has not been made. Please review and call back at [phone], and voicemail is confidential.

## 2024-04-25 NOTE — TELEPHONE ENCOUNTER
Called Appeals [phone] spoke to Galilea. She states this is still in review status and can take up to 30 business days. She confirmed that we have sent to the correct Appeal fax number. Will follow up after 30 days if no decision has been received

## 2024-04-30 ENCOUNTER — VIRTUAL VISIT (OUTPATIENT)
Dept: ENDOCRINOLOGY | Facility: CLINIC | Age: 39
End: 2024-04-30
Attending: INTERNAL MEDICINE
Payer: COMMERCIAL

## 2024-04-30 DIAGNOSIS — E08.9 DIABETES MELLITUS DUE TO CYSTIC FIBROSIS (H): Primary | ICD-10-CM

## 2024-04-30 DIAGNOSIS — E84.9 DIABETES MELLITUS DUE TO CYSTIC FIBROSIS (H): Primary | ICD-10-CM

## 2024-04-30 PROCEDURE — 99214 OFFICE O/P EST MOD 30 MIN: CPT | Mod: 95 | Performed by: INTERNAL MEDICINE

## 2024-04-30 NOTE — LETTER
4/30/2024       RE: Dilan Nassar  [address]  [facility] [id number]     Dear Colleague,    Thank you for referring your patient, Dilan Nassar, to the [facility] [place] [facility]. Please see a copy of my visit note below.    Outcome for 04/16/24 5:13 PM: [employer] message sent  Yuliya Ferrara MA  Outcome for 04/26/24 8:21 AM: Data obtained via Dexcom website  Yuliya Ferrara MA                        Dilan Nassar  is being evaluated via a billable video visit.      CF Endocrinology Return Consultation:  Diabetes  :   Patient: Dilan Nassar MRN# [id number]   YOB: 1985 Age: 38 year old   Date of Visit: 04/30/2024  Dear Dr. Atilio Nieto:    I had the pleasure of seeing your patient, Dilan Nassar in the [facility], [facility], for a return consultation .           Problem list:   MRSA  Cystic fibrosis  Diabetes mellitus  Exocrine pancreatic insufficiency  Vitamin D deficiency  Gout  Intestinal malabsorption         HPI:     Dilan is a 38 year old male with Cystic Fibrosis Related Diabetes Mellitus (CFRD).    I have reviewed the available past laboratory evaluations, imaging studies, and medical records available to me at this visit.   History was obtained from the patient and the medical record.  I have reviewed the notes of the pulmonary care team entered into the medical record since I last saw the patient.    Overall feels well, denies specific complaints or concerns.    Overall doing well  Dexcom CGM data was reviewed  Average sensor glucose 138, time in range 76%, 4% below GMI 6.6%  Pattern of postprandial highs.  Patient kept a food diary and noticed that glucose readings were particularly high after drinking regular soda  Overnight lows on 2 days.  Asymptomatic and did not confirm with fingerstick glucose  Denies symptomatic hypoglycemia  Personal CGM was not covered and  undergoing appeal process  Used sample CGM    Hemoglobin A1C   Date Value Ref Range Status   09/26/2023 6.4 (H) <5.7 % Final     Comment:     Normal <5.7%   Prediabetes 5.7-6.4%    Diabetes 6.5% or higher     Note: Adopted from ADA consensus guidelines.   09/17/2020 5.7 (H) 0 - 5.6 % Final     Comment:     Normal <5.7% Prediabetes 5.7-6.4%  Diabetes 6.5% or higher - adopted from ADA   consensus guidelines.       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) 10/16/2015     Diabetes mellitus related to cystic fibrosis (H) 10/16/2015     Cystic fibrosis with pulmonary exacerbation (H) 10/23/2015     Methicillin resistant Staphylococcus aureus infection 08/16/2016     Resolved Ambulatory Problems     Diagnosis Date Noted     Type 1 diabetes mellitus (H) 02/13/2015     Past Medical History:   Diagnosis Date     Diabetes mellitus related to CF (cystic fibrosis) (H)           Past Surgical History:   Appendectomy  Sinus surgery            Social History:   Unmarried  Non smoker  Former smokeless tobacco user           Family History:   Mother: liver disease  Paternal grandfather: prostate cancer  Maternal aunt: diabetes mellitus  Maternal uncle: diabetes mellitus  Maternal grandmother: diabetes mellitus  Paternal grandmother: coronary artery disease         Allergies:     Allergies   Allergen Reactions     Molds & Smuts Itching          Medications:     Current Outpatient Medications:      albuterol (PROVENTIL) (2.5 MG/3ML) 0.083% neb solution, Take 1 vial (2.5 mg) by nebulization 3 times daily as needed for shortness of breath, wheezing or cough,  Disp: 180 mL, Rfl: 3     allopurinol (ZYLOPRIM) 300 MG tablet, TAKE ONE TABLET (300MG) BY MOUTH DAILY, Disp: 90 tablet, Rfl: 3     azithromycin (ZITHROMAX) 250 MG tablet, Take 2 tablets (500 mg) by mouth Every Mon, Wed, Fri Morning, Disp: 72 tablet, Rfl: 3     blood glucose (NO BRAND SPECIFIED) test strip, Use to test blood sugar 4 times daily or as directed., Disp: 125 strip, Rfl: 11     blood glucose monitoring (FREESTYLE) lancets, Use to test blood sugar 4 times daily or as directed., Disp: 200 each, Rfl: 11     blood glucose monitoring (NO BRAND SPECIFIED) meter device kit, Use to test blood sugar 4 times daily or as directed., Disp: 1 kit, Rfl: 0     Continuous Blood Gluc Sensor (DEXCOM G7 SENSOR) MISC, Change every 10 days., Disp: 9 each, Rfl: 4     Continuous Blood Gluc Sensor (FREESTYLE ESTEFANY 3 SENSOR) MISC, 1 each every 14 days, Disp: 6 each, Rfl: 3     dornase maryse (PULMOZYME) 2.5 MG/2.5ML neb solution, INHALE CONTENTS OF ONE VIAL (2.5MG) VIA NEBULIZER TWICE DAILY. KEEP REFRIGERATED UNTIL USE., Disp: 150 mL, Rfl: 11     elexacaftor-tezacaftor-ivacaftor & ivacaftor (TRIKAFTA) 100-50-75 & 150 MG tablet pack, TAKE TWO ORANGE TABLETS BY MOUTH IN THE MORNING AND ONE BLUE TABLET IN THE EVENING AS DIRECTED ON PACKAGE.  TAKE WITH FAT CONTAINING FOOD., Disp: 84 tablet, Rfl: 5     fluticasone (FLONASE) 50 MCG/ACT nasal spray, SPRAY 2 SPRAYS INTO BOTH NOSTRILS DAILY, Disp: 48 g, Rfl: 3     insulin glargine (LANTUS SOLOSTAR) 100 UNIT/ML pen, Inject 10 Units Subcutaneous daily, Disp: 15 mL, Rfl: 3     insulin pen needle (32G X 4 MM) 32G X 4 MM miscellaneous, Use 3 pen needles daily or as directed., Disp: 300 each, Rfl: 3     lipase-protease-amylase (CREON) 45116-72374-73508 units CPEP, TAKE 5-6 CAPSULES (60,000-72,000) BY MOUTH THREE TIMES A DAY WITH MEALS, Disp: 600 capsule, Rfl: 4     lisinopril (ZESTRIL) 5 MG tablet, Take 1 tablet (5 mg) by mouth daily, Disp: 90 tablet, Rfl: 3     mvw complete formulation (SOFTGELS  ) capsule, Take 2 capsules by mouth daily, Disp: 60 capsule, Rfl: 11     ursodiol (ACTIGALL) 300 MG capsule, TAKE 1 CAPSULE (300MG ) BY MOUTH TWO TIMES A DAY, Disp: 180 capsule, Rfl: 3           Review of Systems:              Physical Exam:   There were no vitals taken for this visit.  Height: Data Unavailable, Facility age limit for growth %deana is 20 years.  Weight: 0 lbs 0 oz, Facility age limit for growth %deana is 20 years.  BMI: There is no height or weight on file to calculate BMI., No height and weight on file for this encounter.      GENERAL: Healthy, alert and no distress  EYES: Eyes grossly normal to inspection.  No discharge or erythema, or obvious scleral/conjunctival abnormalities.  RESP: No audible wheeze, cough, or visible cyanosis.  No visible retractions or increased work of breathing.    NEURO:  Mentation and speech appropriate for age.  PSYCH: affect normal/bright, judgement and insight intact, normal speech and appearance well-groomed.        Laboratory results:     TSH   Date Value Ref Range Status   09/26/2023 0.99 0.30 - 4.20 uIU/mL Final   09/14/2021 2.37 0.40 - 4.00 mU/L Final   09/17/2020 1.96 0.40 - 4.00 mU/L Final     Testosterone Total   Date Value Ref Range Status   09/26/2023 398 240 - 950 ng/dL Final   09/17/2020 416 240 - 950 ng/dL Final     Comment:     This test was developed and its performance characteristics determined by the   [facility].   It has not been cleared or approved by the FDA. The laboratory is regulated   under CLIA as qualified to perform high-complexity testing. This test is used   for clinical purposes. It should not be regarded as investigational or for   research.       Cholesterol   Date Value Ref Range Status   09/26/2023 133 <200 mg/dL Final   09/17/2020 134 <200 mg/dL Final     Albumin Urine mg/L   Date Value Ref Range Status   09/26/2023 <12.0 mg/L Final     Comment:     The reference  ranges have not been established in urine albumin. The results should be integrated into the clinical context for interpretation.   01/11/2022 35 mg/L Final   09/17/2020 12 mg/L Final     Triglycerides   Date Value Ref Range Status   09/26/2023 61 <150 mg/dL Final   09/17/2020 91 <150 mg/dL Final     HDL Cholesterol   Date Value Ref Range Status   09/17/2020 83 >39 mg/dL Final     Direct Measure HDL   Date Value Ref Range Status   09/26/2023 81 >=40 mg/dL Final     LDL Cholesterol Calculated   Date Value Ref Range Status   09/26/2023 40 <=100 mg/dL Final   09/17/2020 33 <100 mg/dL Final     Comment:     Desirable:       <100 mg/dl     Non HDL Cholesterol   Date Value Ref Range Status   09/26/2023 52 <130 mg/dL Final   09/17/2020 51 <130 mg/dL Final         CF  Diabetes Health Maintenance    Date of Diabetes Diagnosis: ~ 2012     Special Notes (if any):      Date Last Eye Exam:   ~ Aug 2022     Date Last Dental Appointment:     Dates of Episodes Severe* Hypoglycemia (month/year, cumulative, ongoing, assess each visit):    *Severe=patient unconscious, seizure, unable to help self    Last 25-Vitamin D (every year): 42      Last DXA, lowest Z-score (every 2 years):  -1.1    Last Testosterone:   Testosterone Total   Date Value Ref Range Status   09/26/2023 398 240 - 950 ng/dL Final   09/17/2020 416 240 - 950 ng/dL Final     Comment:     This test was developed and its performance characteristics determined by the   [facility].   It has not been cleared or approved by the FDA. The laboratory is regulated   under CLIA as qualified to perform high-complexity testing. This test is used   for clinical purposes. It should not be regarded as investigational or for   research.              Assessment and Plan:   Dilan is a 38 year old male with cystic fibrosis related diabetes    CFRD: Overall good control with time in range 76%  Pattern of postprandial  highs  Discussed option of adding Sitagliptin and reducing dose of Lantus  Also counseled about overnight lows  At this time patient would like to continue on the current dose/regimen of Lantus insulin only  Continue use of periodic CGM if covered by insurance  Alternatively he will use sample/diagnostic CGM every 6 months    Previously positive microalbuminuria, on lisinopril.  Microalbumin was normal on most recent annual labs  Blood pressure in target range at other recent clinic visits    Return to clinic in 6 months     JOSE Headley  Note: Chart documentation done in part with Dragon Voice Recognition software. Although reviewed after completion, some word and grammatical errors may remain.  Please consider this when interpreting information in this chart     Video-Visit Details    Type of service:  Video Visit done using [employer]    Video visit start time 10:33 AM, video visit end time 10:51 AM    Originating Location (pt. Location): Home    Distant Location (provider location): Off-site    Platform used for Video Visit: [employer]                    Again, thank you for allowing me to participate in the care of your patient.      Sincerely,    Edison Miranda MD

## 2024-04-30 NOTE — NURSING NOTE
Is the patient currently in the state of [place]? YES    Visit mode:VIDEO    If the visit is dropped, the patient can be reconnected by: TELEPHONE VISIT: Phone number: Reviewed by phone messages, see Encounter section    Will anyone else be joining the visit? NO  (If patient encounters technical issues they should call [phone]    How would you like to obtain your AVS? MyChart    Are changes needed to the allergy or medication list? No, Pt stated no changes to allergies, and Pt stated no med changes so VF did not review this information again with patient due to this.    Are refills needed on medications prescribed by this physician? NO    Reason for visit: RECHECK    Alayna PHILIP

## 2024-04-30 NOTE — PROGRESS NOTES
Dilan Nassar  is being evaluated via a billable video visit.      CF Endocrinology Return Consultation:  Diabetes  :   Patient: Dilan Nassar MRN# [id number]   YOB: 1985 Age: 38 year old   Date of Visit: 04/30/2024  Dear Dr. Atilio Nieto:    I had the pleasure of seeing your patient, Dilan Nassar in the [facility] Endocr[facility]ology Clinic, [facility], for a return consultation .           Problem list:   MRSA  Cystic fibrosis  Diabetes mellitus  Exocrine pancreatic insufficiency  Vitamin D deficiency  Gout  Intestinal malabsorption         HPI:     Dilan is a 38 year old male with Cystic Fibrosis Related Diabetes Mellitus (CFRD).    I have reviewed the available past laboratory evaluations, imaging studies, and medical records available to me at this visit.   History was obtained from the patient and the medical record.  I have reviewed the notes of the pulmonary care team entered into the medical record since I last saw the patient.    Overall feels well, denies specific complaints or concerns.    Overall doing well  Dexcom CGM data was reviewed  Average sensor glucose 138, time in range 76%, 4% below GMI 6.6%  Pattern of postprandial highs.  Patient kept a food diary and noticed that glucose readings were particularly high after drinking regular soda  Overnight lows on 2 days.  Asymptomatic and did not confirm with fingerstick glucose  Denies symptomatic hypoglycemia  Personal CGM was not covered and undergoing appeal process  Used sample CGM    Hemoglobin A1C   Date Value Ref Range Status   09/26/2023 6.4 (H) <5.7 % Final     Comment:     Normal <5.7%   Prediabetes 5.7-6.4%    Diabetes 6.5% or higher     Note: Adopted from ADA consensus guidelines.   09/17/2020 5.7 (H) 0 - 5.6 % Final     Comment:     Normal <5.7% Prediabetes 5.7-6.4%  Diabetes 6.5% or higher - adopted from ADA   consensus guidelines.       Current insulin regimen:   Basaglar 10 unit(s) daily 10 AM          Past  Medical History:     Active Ambulatory Problems     Diagnosis Date Noted    Cystic fibrosis with pulmonary manifestations     Exocrine pancreatic insufficiency 02/13/2015    Vitamin D deficiency 02/13/2015    Gout 02/13/2015    Allergic rhinitis 02/13/2015    Intestinal malabsorption 02/13/2015    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) 10/16/2015    Diabetes mellitus related to cystic fibrosis (H) 10/16/2015    Cystic fibrosis with pulmonary exacerbation (H) 10/23/2015    Methicillin resistant Staphylococcus aureus infection 08/16/2016     Resolved Ambulatory Problems     Diagnosis Date Noted    Type 1 diabetes mellitus (H) 02/13/2015     Past Medical History:   Diagnosis Date    Diabetes mellitus related to CF (cystic fibrosis) (H)           Past Surgical History:   Appendectomy  Sinus surgery            Social History:   Unmarried  Non smoker  Former smokeless tobacco user           Family History:   Mother: liver disease  Paternal grandfather: prostate cancer  Maternal aunt: diabetes mellitus  Maternal uncle: diabetes mellitus  Maternal grandmother: diabetes mellitus  Paternal grandmother: coronary artery disease         Allergies:     Allergies   Allergen Reactions    Molds & Smuts Itching          Medications:     Current Outpatient Medications:     albuterol (PROVENTIL) (2.5 MG/3ML) 0.083% neb solution, Take 1 vial (2.5 mg) by nebulization 3 times daily as needed for shortness of breath, wheezing or cough, Disp: 180 mL, Rfl: 3    allopurinol (ZYLOPRIM) 300 MG tablet, TAKE ONE TABLET (300MG) BY MOUTH DAILY, Disp: 90 tablet, Rfl: 3    azithromycin (ZITHROMAX) 250 MG tablet, Take 2 tablets (500 mg) by mouth Every Mon, Wed, Fri Morning, Disp: 72 tablet, Rfl: 3    blood glucose (NO BRAND SPECIFIED) test strip, Use to test blood sugar 4 times daily or as directed., Disp: 125 strip, Rfl: 11    blood glucose monitoring (FREESTYLE) lancets, Use to test blood sugar 4 times daily or as directed., Disp: 200 each,  Rfl: 11    blood glucose monitoring (NO BRAND SPECIFIED) meter device kit, Use to test blood sugar 4 times daily or as directed., Disp: 1 kit, Rfl: 0    Continuous Blood Gluc Sensor (DEXCOM G7 SENSOR) MISC, Change every 10 days., Disp: 9 each, Rfl: 4    Continuous Blood Gluc Sensor (FREESTYLE ESTEFANY 3 SENSOR) [facility], 1 each every 14 days, Disp: 6 each, Rfl: 3    dornase maryse (PULMOZYME) 2.5 MG/2.5ML neb solution, INHALE CONTENTS OF ONE VIAL (2.5MG) VIA NEBULIZER TWICE DAILY. KEEP REFRIGERATED UNTIL USE., Disp: 150 mL, Rfl: 11    elexacaftor-tezacaftor-ivacaftor & ivacaftor (TRIKAFTA) 100-50-75 & 150 MG tablet pack, TAKE TWO ORANGE TABLETS BY MOUTH IN THE MORNING AND ONE BLUE TABLET IN THE EVENING AS DIRECTED ON PACKAGE.  TAKE WITH FAT CONTAINING FOOD., Disp: 84 tablet, Rfl: 5    fluticasone (FLONASE) 50 MCG/ACT nasal spray, SPRAY 2 SPRAYS INTO BOTH NOSTRILS DAILY, Disp: 48 g, Rfl: 3    insulin glargine (LANTUS SOLOSTAR) 100 UNIT/ML pen, Inject 10 Units Subcutaneous daily, Disp: 15 mL, Rfl: 3    insulin pen needle (32G X 4 MM) 32G X 4 MM miscellaneous, Use 3 pen needles daily or as directed., Disp: 300 each, Rfl: 3    lipase-protease-amylase (CREON) 73128-37[address]-23754 units CPEP, TAKE 5-6 CAPSULES (60,000-72,000) BY MOUTH THREE TIMES A DAY WITH MEALS, Disp: 600 capsule, Rfl: 4    lisinopril (ZESTRIL) 5 MG tablet, Take 1 tablet (5 mg) by mouth daily, Disp: 90 tablet, Rfl: 3    mvw complete formulation (SOFTGELS ) capsule, Take 2 capsules by mouth daily, Disp: 60 capsule, Rfl: 11    ursodiol (ACTIGALL) 300 MG capsule, TAKE 1 CAPSULE (300MG ) BY MOUTH TWO TIMES A DAY, Disp: 180 capsule, Rfl: 3           Review of Systems:              Physical Exam:   There were no vitals taken for this visit.  Height: Data Unavailable, Facility age limit for growth %deana is 20 years.  Weight: 0 lbs 0 oz, Facility age limit for growth %deana is 20 years.  BMI: There is no height or weight on file to calculate BMI., No height and weight  on file for this encounter.      GENERAL: Healthy, alert and no distress  EYES: Eyes grossly normal to inspection.  No discharge or erythema, or obvious scleral/conjunctival abnormalities.  RESP: No audible wheeze, cough, or visible cyanosis.  No visible retractions or increased work of breathing.    NEURO:  Mentation and speech appropriate for age.  PSYCH: affect normal/bright, judgement and insight intact, normal speech and appearance well-groomed.        Laboratory results:     TSH   Date Value Ref Range Status   09/26/2023 0.99 0.30 - 4.20 uIU/mL Final   09/14/2021 2.37 0.40 - 4.00 mU/L Final   09/17/2020 1.96 0.40 - 4.00 mU/L Final     Testosterone Total   Date Value Ref Range Status   09/26/2023 398 240 - 950 ng/dL Final   09/17/2020 416 240 - 950 ng/dL Final     Comment:     This test was developed and its performance characteristics determined by the   [facility].   It has not been cleared or approved by the FDA. The laboratory is regulated   under CLIA as qualified to perform high-complexity testing. This test is used   for clinical purposes. It should not be regarded as investigational or for   research.       Cholesterol   Date Value Ref Range Status   09/26/2023 133 <200 mg/dL Final   09/17/2020 134 <200 mg/dL Final     Albumin Urine mg/L   Date Value Ref Range Status   09/26/2023 <12.0 mg/L Final     Comment:     The reference ranges have not been established in urine albumin. The results should be integrated into the clinical context for interpretation.   01/11/2022 35 mg/L Final   09/17/2020 12 mg/L Final     Triglycerides   Date Value Ref Range Status   09/26/2023 61 <150 mg/dL Final   09/17/2020 91 <150 mg/dL Final     HDL Cholesterol   Date Value Ref Range Status   09/17/2020 83 >39 mg/dL Final     Direct Measure HDL   Date Value Ref Range Status   09/26/2023 81 >=40 mg/dL Final     LDL Cholesterol Calculated   Date Value Ref Range  Status   09/26/2023 40 <=100 mg/dL Final   09/17/2020 33 <100 mg/dL Final     Comment:     Desirable:       <100 mg/dl     Non HDL Cholesterol   Date Value Ref Range Status   09/26/2023 52 <130 mg/dL Final   09/17/2020 51 <130 mg/dL Final         CF  Diabetes Health Maintenance    Date of Diabetes Diagnosis: ~ 2012     Special Notes (if any):      Date Last Eye Exam:   ~ Aug 2022     Date Last Dental Appointment:     Dates of Episodes Severe* Hypoglycemia (month/year, cumulative, ongoing, assess each visit):    *Severe=patient unconscious, seizure, unable to help self    Last 25-Vitamin D (every year): 42      Last DXA, lowest Z-score (every 2 years):  -1.1    Last Testosterone:   Testosterone Total   Date Value Ref Range Status   09/26/2023 398 240 - 950 ng/dL Final   09/17/2020 416 240 - 950 ng/dL Final     Comment:     This test was developed and its performance characteristics determined by the   [facility].   It has not been cleared or approved by the FDA. The laboratory is regulated   under CLIA as qualified to perform high-complexity testing. This test is used   for clinical purposes. It should not be regarded as investigational or for   research.              Assessment and Plan:   Dilan is a 38 year old male with cystic fibrosis related diabetes    CFRD: Overall good control with time in range 76%  Pattern of postprandial highs  Discussed option of adding Sitagliptin and reducing dose of Lantus  Also counseled about overnight lows  At this time patient would like to continue on the current dose/regimen of Lantus insulin only  Continue use of periodic CGM if covered by insurance  Alternatively he will use sample/diagnostic CGM every 6 months    Previously positive microalbuminuria, on lisinopril.  Microalbumin was normal on most recent annual labs  Blood pressure in target range at other recent clinic visits    Return to clinic in 6 months      JOSE Headley  Note: Chart documentation done in part with Dragon Voice Recognition software. Although reviewed after completion, some word and grammatical errors may remain.  Please consider this when interpreting information in this chart     Video-Visit Details    Type of service:  Video Visit done using Joyent    Video visit start time 10:33 AM, video visit end time 10:51 AM    Originating Location (pt. Location): Home    Distant Location (provider location): Off-site    Platform used for Video Visit: ReviewZAP

## 2024-05-01 ASSESSMENT — ANXIETY QUESTIONNAIRES
1. FEELING NERVOUS, ANXIOUS, OR ON EDGE: NOT AT ALL
5. BEING SO RESTLESS THAT IT IS HARD TO SIT STILL: NOT AT ALL
3. WORRYING TOO MUCH ABOUT DIFFERENT THINGS: NOT AT ALL
GAD7 TOTAL SCORE: 0
GAD7 TOTAL SCORE: 0
7. FEELING AFRAID AS IF SOMETHING AWFUL MIGHT HAPPEN: NOT AT ALL
2. NOT BEING ABLE TO STOP OR CONTROL WORRYING: NOT AT ALL
6. BECOMING EASILY ANNOYED OR IRRITABLE: NOT AT ALL

## 2024-05-01 ASSESSMENT — PATIENT HEALTH QUESTIONNAIRE - PHQ9
SUM OF ALL RESPONSES TO PHQ QUESTIONS 1-9: 0
5. POOR APPETITE OR OVEREATING: NOT AT ALL

## 2024-05-08 NOTE — TELEPHONE ENCOUNTER
Problem: Patient cannot afford medication     Medication: Jameel Podhaler     Pharmacy: Does not apply/not a pharmacy issue    Intervention: Enrolled patient in free drug program    Result: Issue resolved    Additional Information:    Free Drug through [employer] was approved from 05/09/2018 to 12/31/2018. The RX was sent to [facility] and they will give him a call within 72 hours. Left message for pt.     Thank you for choosing our [facility]. We are committed to your medical treatment and  care. If you need to reschedule or cancel your surgery or follow up  appointment, please call the surgery scheduler at [phone].    INSTRUCTIONS FOR SURGERY FESS,Septoplasty SMRITS    Nothing to eat or drink after midnight the night before surgery unless surgery is at [facility] or otherwise instructed by the hospital.    DO NOT TAKE ANY ASPIRIN PRODUCTS 7 days prior to surgery-unless required by your cardiologist or primary care physician. Tylenol only. No Advil, Motrin, Aleve, or Ibuprofen    Any illegal drugs in your system (including Marijuana even if legally prescribed) will result in your surgery being cancelled. Please be sure to check with our office or the hospital on time frame for the drugs to be out of your system.    Should your insurance change at any time you must contact our office. Failure to do so may result in your surgery being rescheduled.    If you need paperwork filled out for work, you must give the office 2 weeks to complete and submit the forms.      O [facility], [address] [place] [address] will call you a couple days prior to your surgery and give you further instructions, if any questions call them at [phone]

## 2024-05-15 NOTE — TELEPHONE ENCOUNTER
MEDICATION APPEAL DENIED    Medication: DEXCOM G7 SENSOR MISC  Insurance Company: [employer]  Denial Date:    Denial Reason(s): criteria not meet  Second Level Appeal Information:    Patient Notified: clinic to discuss with pt what they would like to do  Central Prior Authorization Team ONLY: Second level appeals will be managed by the clinic staff and provider. Please contact the [facility] Prior Authorization Team if additional information about the denial is needed.          Called Appeals ([phone]) spoke to Daniela she stated she could not help and transferred me to ([phone]) spoke to Xiomara and she said I was transferred incorrect and I had to be transferred back to [phone] spoke to Alayna and she said I was transferred incorrect and need to call [phone] . She transferred me (call reference # [id number]). Spoke to Yomaira she stated the Appeal was denied 4-. Pt did not meet criteria. She will issue a Denial letter and we should have within 3 days  Call reference # [id number]

## 2024-05-17 DIAGNOSIS — E84.8 DIABETES MELLITUS RELATED TO CF (CYSTIC FIBROSIS) (H): ICD-10-CM

## 2024-05-17 DIAGNOSIS — E08.9 DIABETES MELLITUS RELATED TO CF (CYSTIC FIBROSIS) (H): ICD-10-CM

## 2024-05-17 RX ORDER — ACYCLOVIR 400 MG/1
TABLET ORAL
Qty: 9 EACH | Refills: 4 | Status: SHIPPED | OUTPATIENT
Start: 2024-05-17

## 2024-05-17 NOTE — PROGRESS NOTES
[facility]  [facility]  June 5, 2019         Assessment and Plan:   Dilan Nassar is a 31 year old male with cystic fibrosis, pancreatic insufficiency and CFRD who is seen today in clinic for routine follow up.       1. CF lung disease: overall, feeling at baseline. Cough is productive in the am, no congestion or dyspnea. Sating 94% on room air; vesting BID. PFTs down slightly today, still within his baseline. Previous cultures + for MRSA, Klebsiella, Steno and Ps A. No evidence of exacerbation at this time. Discussed addition of [place] (not sure he wants another neb machine) or aztreonam; Guillermo will think about it.   - Continue nebulizers, inhaler and vest therapy  - On chronic azithromycin   - Continue george podhaler month on/off  - F/u on addition of aztreonam      2. Hemoptysis: no recent complaints.   - Continue vitamin K once/week      3. CF related liver disease: since 10/15 per our EPIC charting with US 5/17 demonstrating coarse echotexture of the liver with hypertrophy of the caudate lobe and left lobe concerning for possible cirrhosis, no lesions, patent vasculature. LFTs stable with elevated alk phos, but normal enzymes.  - Continue Ursodiol      4. Exocrine pancreatic insufficiency: denies symptoms of malabsorption. Weight is stable, but BMI at 20.62 is still below Foundation goal.   - Continue pancreatic enzymes and vitamin supplementation      5. CFRD: recent AIC of 6.0 on 6/4/19. Recently had BS that were quite low, 47 and 36 and asymptomatic. Saw Dr. Miranda yesterday and they discussed CGM, as she wasn't sure if patient's glucometer was working. We attempted to get CGM placed today without success. Did get Guillermo a new glucometer.   - Continue Basaglar, decreased to 8 U  - F/u with Dr. Miranda and routine diabetic eye exam as scheduled       6. CF related sinus disease: denies current symptoms.   - Continue Flonase and Claritin    7. HCM: reviewed DEXA today,  Z scores okay, no evidence of CF related low bone mass      RTC: in 3 months with routine spirometry  Annual studies due: September 2019 (ordered for next f/u)      Jyothi Warren PA-C  Pulmonary, Allergy, Critical Care and Sleep Medicine        Interval History:   Overall, feeling well. No recent illness, had a cold a few times, but resolve on it's own. Cough is baseline, productive in the am. Has recently started running for the last week, hoping to get out every other days. No congestion or tightness. Vesting BID. No nausea or bloating.          Review of Systems:   Please see HPI. Otherwise, complete 10 point ROS negative.           Past Medical and Surgical History:     Past Medical History:   Diagnosis Date     Cystic fibrosis with pulmonary manifestations (H)     /[id number]     Past Surgical History:   Procedure Laterality Date     APPENDECTOMY OPEN  1999    Appendicitis      SINUS SURGERY  1990's    h/o many sinus infections           Family History:     Family History   Problem Relation Age of Onset     Diabetes Mother         Unknown type     Prostate Cancer Paternal Grandfather      LUNG DISEASE Other         Negative     Diabetes Maternal Aunt         unknown type     Diabetes Maternal Uncle         unknown type     Diabetes Maternal Grandmother         unknown type     Coronary Artery Disease Paternal Grandmother             Social History:     Social History     Socioeconomic History     Marital status: Single     Spouse name: Not on file     Number of children: Not on file     Years of education: Not on file     Highest education level: Not on file   Occupational History     Occupation: State Representative     Employer: [employer]     Occupation: Financial Counselor   Social Needs     Financial resource strain: Not on file     Food insecurity:     Worry: Not on file     Inability: Not on file     Transportation needs:     Medical: Not on file     Non-medical: Not on file   Tobacco Use      Smoking status: Never Smoker     Smokeless tobacco: Former User   Substance and Sexual Activity     Alcohol use: Yes     Alcohol/week: 6.0 - 8.4 oz     Types: 10 - 14 Standard drinks or equivalent per week     Comment: 2 drinks per night 5X/wk     Drug use: No     Sexual activity: Yes     Partners: Female     Birth control/protection: Pull-out method, Condom   Lifestyle     Physical activity:     Days per week: Not on file     Minutes per session: Not on file     Stress: Not on file   Relationships     Social connections:     Talks on phone: Not on file     Gets together: Not on file     Attends Mormonism service: Not on file     Active member of club or organization: Not on file     Attends meetings of clubs or organizations: Not on file     Relationship status: Not on file     Intimate partner violence:     Fear of current or ex partner: Not on file     Emotionally abused: Not on file     Physically abused: Not on file     Forced sexual activity: Not on file   Other Topics Concern     Parent/sibling w/ CABG, MI or angioplasty before 65F 55M? Yes   Social History Narrative    Lives alone in apartment in [place], [place]. 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         Nov 2015.  His main political issue is higher education.  His girlfriend is looking for work as an .  He eats 3 square meals per day, most of which are cooked by him or his girlfriend.  Gets outside to walk or run 20 minutes about 5x per week.  Works fairly regular daytime hours, often from home.            Medications:     Current Outpatient Medications   Medication     insulin glargine (BASAGLAR KWIKPEN) 100 UNIT/ML pen     albuterol (2.5 MG/3ML) 0.083% neb solution     albuterol (PROAIR HFA/PROVENTIL HFA/VENTOLIN HFA) 108 (90 Base) MCG/ACT Inhaler     allopurinol (ZYLOPRIM) 300 MG tablet     amylase-lipase-protease (CREON) 30667 units CPEP     azithromycin (ZITHROMAX)  "250 MG tablet     BD YON U/F 32G X 4 MM insulin pen needle     blood glucose monitoring (FREESTYLE LITE) test strip     blood glucose monitoring (FREESTYLE) lancets     dornase alpha (PULMOZYME) 1 MG/ML neb solution     fluticasone (FLONASE) 50 MCG/ACT nasal spray     fluticasone (FLOVENT HFA) 110 MCG/ACT inhaler     insulin pen needle (BD YON U/F) 32G X 4 MM miscellaneous     loratadine (CLARITIN) 10 MG tablet     multivitamins CF formula  (MVW COMPLETE FORMULATION ) CAPS capsule     order for DME     phytonadione (MEPHYTON) 5 MG tablet     ranitidine (ZANTAC) 75 MG tablet     Syringe/Needle, Disp, 18G X 1\" 6 ML MISC     tobramycin (MARLEN PODHALER) 28 MG in caps     ursodiol (ACTIGALL) 300 MG capsule     No current facility-administered medications for this visit.             Physical Exam:   /79 (BP Location: Right arm, Patient Position: Chair, Cuff Size: Adult Regular)   Pulse 96   Resp 16   Ht 1.626 m (5' 4\")   Wt 54.5 kg (120 lb 2.4 oz)   SpO2 94%   BMI 20.62 kg/m      GENERAL: alert, NAD  HEENT: NCAT, EOMI, anicteric sclera, canals and TMs clear; no oral mucosal edema or erythema  Neck: no cervical or supraclavicular adenopathy  Respiratory: good air flow, few scattered crackles in bilateral bases  CV: RRR, S1S2, no murmurs noted  Abdomen: normoactive BS, soft and non tender  Lymph: no edema, + digital clubbing  Neuro: AAO X 3, CN 2-12 grossly intact, 5/5 bilateral  strength and dorsiflexion  Psychiatric: normal affect, good eye contact  Skin: no rash, jaundice or lesions on limited exam         Data:   All laboratory and imaging data reviewed.      Cystic Fibrosis Culture  Specimen Description   Date Value Ref Range Status   01/03/2019 Sputum  Final   09/26/2018 Sputum  Final   09/26/2018 Sputum  Final    Culture Micro   Date Value Ref Range Status   01/03/2019 Moderate growth  Normal mami    Final   01/03/2019 Moderate growth  Pseudomonas aeruginosa   (A)  Final   01/03/2019 Light " growth  Strain 2  Pseudomonas aeruginosa   (A)  Final   01/03/2019 Single colony  Aspergillus fumigatus   (A)  Final        PFT interpretation:  Maneuver: valid and meets ATS guidelines  Moderate severe obstruction with decreased FEV1 and FEV1/FVC  Compared to prior: FEV1 of 2.07 is 150 ml below prior, but still within his baseline           Home

## 2024-05-21 ENCOUNTER — OFFICE VISIT (OUTPATIENT)
Dept: DERMATOLOGY | Facility: CLINIC | Age: 39
End: 2024-05-21
Payer: COMMERCIAL

## 2024-05-21 DIAGNOSIS — R22.41 NODULE OF SKIN OF RIGHT LOWER EXTREMITY: Primary | ICD-10-CM

## 2024-05-21 PROCEDURE — 99213 OFFICE O/P EST LOW 20 MIN: CPT | Performed by: DERMATOLOGY

## 2024-05-21 ASSESSMENT — PAIN SCALES - GENERAL: PAINLEVEL: NO PAIN (0)

## 2024-05-21 NOTE — LETTER
5/21/2024       RE: Dilan Nassar  [address]  [place] [address]     Dear Colleague,    Thank you for referring your patient, Dilan Nassar, to the [facility] [place] at [facility]. Please see a copy of my visit note below.    [facility] Dermatology Note  Encounter Date: May 21, 2024  Office Visit     Dermatology Problem List:  1. Mobile nodule right posterior knee 8 mm    ____________________________________________    Assessment & Plan:    # Mobile nodule right posterior knee  - Management: Options were reviewed. The patient elects for excision due to clinical symptoms. Patient recommended scheduling in dermatology surgery.    - Referral to dermatology surgery placed, they will reach out to patient to schedule     Procedures Performed:   None      Follow-up: prn for new or changing lesions    Staff and Medical Student:     Kirk Elizabeth MS3  [facility]    I was present with the medical student who participated in the service and in the documentation of the note. I have verified the history and personally performed the physical exam and medical decision making. I agree with the assessment and plan of care as documented in the note.  Sudha Lyn MD   ____________________________________________    CC: Derm Problem (Nodule posterior R knee- has been there for years. Has changed color. )    HPI:  Mr. Dilan Nassar is a(n) 38 year old male with a history of cystic fibrosis, who presents today as a new patient for bump on the back of his knee. Patient states bump has been there for the past 5 years and has slowly gone from a smaller, lighter bump into a larger and darker colored bump. He has no complaints of it causing him any pain or other problems.     Patient is otherwise feeling well, without additional skin concerns.    Labs:  None reviewed.    Physical Exam:  Vitals: There were no vitals taken  for this visit.  SKIN: Focused examination of posterior right knee was performed.  - 8-mm mobile, pink solid nodule with small punctum off to the side  - No other lesions of concern on areas examined.     Medications:  Current Outpatient Medications   Medication Sig Dispense Refill    albuterol (PROVENTIL) (2.5 MG/3ML) 0.083% neb solution Take 1 vial (2.5 mg) by nebulization 3 times daily as needed for shortness of breath, wheezing or cough 180 mL 3    allopurinol (ZYLOPRIM) 300 MG tablet TAKE ONE TABLET (300MG) BY MOUTH DAILY 90 tablet 3    azithromycin (ZITHROMAX) 250 MG tablet Take 2 tablets (500 mg) by mouth Every Mon, Wed, Fri Morning 72 tablet 3    blood glucose (NO BRAND SPECIFIED) test strip Use to test blood sugar 4 times daily or as directed. 125 strip 11    blood glucose monitoring (FREESTYLE) lancets Use to test blood sugar 4 times daily or as directed. 200 each 11    blood glucose monitoring (NO BRAND SPECIFIED) meter device kit Use to test blood sugar 4 times daily or as directed. 1 kit 0    Continuous Blood Gluc Sensor (FREESTYLE ESTEFANY 3 SENSOR) MISC 1 each every 14 days 6 each 3    Continuous Glucose Sensor (DEXCOM G7 SENSOR) MISC Change every 10 days. 9 each 4    dornase maryse (PULMOZYME) 2.5 MG/2.5ML neb solution INHALE CONTENTS OF ONE VIAL (2.5MG) VIA NEBULIZER TWICE DAILY. KEEP REFRIGERATED UNTIL USE. 150 mL 11    elexacaftor-tezacaftor-ivacaftor & ivacaftor (TRIKAFTA) 100-50-75 & 150 MG tablet pack TAKE TWO ORANGE TABLETS BY MOUTH IN THE MORNING AND ONE BLUE TABLET IN THE EVENING AS DIRECTED ON PACKAGE.  TAKE WITH FAT CONTAINING FOOD. 84 tablet 5    fluticasone (FLONASE) 50 MCG/ACT nasal spray SPRAY 2 SPRAYS INTO BOTH NOSTRILS DAILY 48 g 3    insulin glargine (LANTUS SOLOSTAR) 100 UNIT/ML pen Inject 10 Units Subcutaneous daily 15 mL 3    insulin pen needle (32G X 4 MM) 32G X 4 MM miscellaneous Use 3 pen needles daily or as directed. 300 each 3    lipase-protease-amylase (CREON) 55320-868[address]-777[address]  units CPEP TAKE 5-6 CAPSULES (60,000-72,000) BY MOUTH THREE TIMES A DAY WITH MEALS 600 capsule 4    lisinopril (ZESTRIL) 5 MG tablet Take 1 tablet (5 mg) by mouth daily 90 tablet 3    mvw complete formulation (SOFTGELS ) capsule Take 2 capsules by mouth daily 60 capsule 11    ursodiol (ACTIGALL) 300 MG capsule TAKE 1 CAPSULE (300MG ) BY MOUTH TWO TIMES A  capsule 3     No current facility-administered medications for this visit.      Past Medical History:   Patient Active Problem List   Diagnosis    Cystic fibrosis with pulmonary manifestations    Exocrine pancreatic insufficiency    Vitamin D deficiency    Gout    Allergic rhinitis    Intestinal malabsorption    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)    Diabetes mellitus related to cystic fibrosis (H)    Cystic fibrosis with pulmonary exacerbation (H)    Methicillin resistant Staphylococcus aureus infection     Past Medical History:   Diagnosis Date    Cystic fibrosis with pulmonary manifestations (H)     /3659delc    Diabetes mellitus related to CF (cystic fibrosis) (H)         CC WASHINGTON Nails CNP  [address] [place] [address] on close of this encounter.

## 2024-05-21 NOTE — NURSING NOTE
Dermatology Rooming Note    Dilan Nassar's goals for this visit include:   Chief Complaint   Patient presents with    Derm Problem     Nodule posterior R knee- has been there for years. Has changed color.      SANTA Arriaza

## 2024-05-21 NOTE — PROGRESS NOTES
[facility] Dermatology Note  Encounter Date: May 21, 2024  Office Visit     Dermatology Problem List:  1. Mobile nodule right posterior knee 8 mm    ____________________________________________    Assessment & Plan:    # Mobile nodule right posterior knee  - Management: Options were reviewed. The patient elects for excision due to clinical symptoms. Patient recommended scheduling in dermatology surgery.    - Referral to dermatology surgery placed, they will reach out to patient to schedule     Procedures Performed:   None      Follow-up: prn for new or changing lesions    Staff and Medical Student:     Kirk Elizabeth MS3  [facility]    I was present with the medical student who participated in the service and in the documentation of the note. I have verified the history and personally performed the physical exam and medical decision making. I agree with the assessment and plan of care as documented in the note.  Sudha Lyn MD   ____________________________________________    CC: Derm Problem (Nodule posterior R knee- has been there for years. Has changed color. )    HPI:  Mr. Dilan Nassar is a(n) 38 year old male with a history of cystic fibrosis, who presents today as a new patient for bump on the back of his knee. Patient states bump has been there for the past 5 years and has slowly gone from a smaller, lighter bump into a larger and darker colored bump. He has no complaints of it causing him any pain or other problems.     Patient is otherwise feeling well, without additional skin concerns.    Labs:  None reviewed.    Physical Exam:  Vitals: There were no vitals taken for this visit.  SKIN: Focused examination of posterior right knee was performed.  - 8-mm mobile, pink solid nodule with small punctum off to the side  - No other lesions of concern on areas examined.     Medications:  Current Outpatient Medications   Medication Sig Dispense Refill    albuterol (PROVENTIL) (2.5  MG/3ML) 0.083% neb solution Take 1 vial (2.5 mg) by nebulization 3 times daily as needed for shortness of breath, wheezing or cough 180 mL 3    allopurinol (ZYLOPRIM) 300 MG tablet TAKE ONE TABLET (300MG) BY MOUTH DAILY 90 tablet 3    azithromycin (ZITHROMAX) 250 MG tablet Take 2 tablets (500 mg) by mouth Every Mon, Wed, Fri Morning 72 tablet 3    blood glucose (NO BRAND SPECIFIED) test strip Use to test blood sugar 4 times daily or as directed. 125 strip 11    blood glucose monitoring (FREESTYLE) lancets Use to test blood sugar 4 times daily or as directed. 200 each 11    blood glucose monitoring (NO BRAND SPECIFIED) meter device kit Use to test blood sugar 4 times daily or as directed. 1 kit 0    Continuous Blood Gluc Sensor (FREESTYLE ESTEFANY 3 SENSOR) MISC 1 each every 14 days 6 each 3    Continuous Glucose Sensor (DEXCOM G7 SENSOR) MISC Change every 10 days. 9 each 4    dornase maryse (PULMOZYME) 2.5 MG/2.5ML neb solution INHALE CONTENTS OF ONE VIAL (2.5MG) VIA NEBULIZER TWICE DAILY. KEEP REFRIGERATED UNTIL USE. 150 mL 11    elexacaftor-tezacaftor-ivacaftor & ivacaftor (TRIKAFTA) 100-50-75 & 150 MG tablet pack TAKE TWO ORANGE TABLETS BY MOUTH IN THE MORNING AND ONE BLUE TABLET IN THE EVENING AS DIRECTED ON PACKAGE.  TAKE WITH FAT CONTAINING FOOD. 84 tablet 5    fluticasone (FLONASE) 50 MCG/ACT nasal spray SPRAY 2 SPRAYS INTO BOTH NOSTRILS DAILY 48 g 3    insulin glargine (LANTUS SOLOSTAR) 100 UNIT/ML pen Inject 10 Units Subcutaneous daily 15 mL 3    insulin pen needle (32G X 4 MM) 32G X 4 MM miscellaneous Use 3 pen needles daily or as directed. 300 each 3    lipase-protease-amylase (CREON) 56165-18899-94377 units CPEP TAKE 5-6 CAPSULES (60,000-72,000) BY MOUTH THREE TIMES A DAY WITH MEALS 600 capsule 4    lisinopril (ZESTRIL) 5 MG tablet Take 1 tablet (5 mg) by mouth daily 90 tablet 3    mvw complete formulation (SOFTGELS ) capsule Take 2 capsules by mouth daily 60 capsule 11    ursodiol (ACTIGALL) 300 MG  capsule TAKE 1 CAPSULE (300MG ) BY MOUTH TWO TIMES A  capsule 3     No current facility-administered medications for this visit.      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

## 2024-05-23 ENCOUNTER — TELEPHONE (OUTPATIENT)
Dept: DERMATOLOGY | Facility: CLINIC | Age: 39
End: 2024-05-23
Payer: COMMERCIAL

## 2024-05-23 ENCOUNTER — MEDICAL CORRESPONDENCE (OUTPATIENT)
Dept: HEALTH INFORMATION MANAGEMENT | Facility: CLINIC | Age: 39
End: 2024-05-23

## 2024-05-23 NOTE — TELEPHONE ENCOUNTER
Called patient to schedule surgery with Dr. Bethea    Date of Surgery: 10/22    Surgery type: excision    Consult scheduled: Not Applicable    Has patient had mohs with us before? Not Applicable    Additional comments: pt requested a Tuesday in October.       Shalonda Ramos on 5/23/2024 at 9:11 AM     Yes

## 2024-05-27 ENCOUNTER — MYC REFILL (OUTPATIENT)
Dept: PULMONOLOGY | Facility: CLINIC | Age: 39
End: 2024-05-27
Payer: COMMERCIAL

## 2024-05-27 DIAGNOSIS — K86.81 EXOCRINE PANCREATIC INSUFFICIENCY: ICD-10-CM

## 2024-05-27 DIAGNOSIS — E84.0 CYSTIC FIBROSIS WITH PULMONARY MANIFESTATIONS (H): ICD-10-CM

## 2024-05-27 DIAGNOSIS — A49.02 MRSA INFECTION: ICD-10-CM

## 2024-05-28 RX ORDER — FLUTICASONE PROPIONATE 50 MCG
SPRAY, SUSPENSION (ML) NASAL
Qty: 48 G | Refills: 3 | Status: SHIPPED | OUTPATIENT
Start: 2024-05-28

## 2024-06-10 NOTE — TELEPHONE ENCOUNTER
Retail Pharmacy Prior Authorization Team   Phone: [phone]    Received phone call from Luly at [facility] - she states they have been waiting since 5/20 for an update. Requesting for the approval/denial paperwork to be faxed the billing dept with attn to Luly. Fax# [phone]

## 2024-07-07 ENCOUNTER — HEALTH MAINTENANCE LETTER (OUTPATIENT)
Age: 39
End: 2024-07-07

## 2024-07-19 ENCOUNTER — EXTERNAL ORDER RESULTS (OUTPATIENT)
Dept: PULMONOLOGY | Facility: CLINIC | Age: 39
End: 2024-07-19
Payer: COMMERCIAL

## 2024-07-19 LAB
FEV-1: 2.73
FEV1/FVC: 0.61
FVC: 4.5

## 2024-07-22 ENCOUNTER — VIRTUAL VISIT (OUTPATIENT)
Dept: PULMONOLOGY | Facility: CLINIC | Age: 39
End: 2024-07-22
Attending: PHYSICIAN ASSISTANT
Payer: COMMERCIAL

## 2024-07-22 VITALS — HEIGHT: 64 IN | BODY MASS INDEX: 21.34 KG/M2 | WEIGHT: 125 LBS

## 2024-07-22 DIAGNOSIS — E84.0 CYSTIC FIBROSIS WITH PULMONARY MANIFESTATIONS (H): ICD-10-CM

## 2024-07-22 DIAGNOSIS — A49.02 MRSA INFECTION: ICD-10-CM

## 2024-07-22 DIAGNOSIS — K86.81 EXOCRINE PANCREATIC INSUFFICIENCY: ICD-10-CM

## 2024-07-22 DIAGNOSIS — E84.9 CF (CYSTIC FIBROSIS) (H): ICD-10-CM

## 2024-07-22 DIAGNOSIS — E84.9 DIABETES MELLITUS DUE TO CYSTIC FIBROSIS (H): ICD-10-CM

## 2024-07-22 DIAGNOSIS — E84.9 CYSTIC FIBROSIS (H): ICD-10-CM

## 2024-07-22 DIAGNOSIS — E08.9 DIABETES MELLITUS DUE TO CYSTIC FIBROSIS (H): ICD-10-CM

## 2024-07-22 PROCEDURE — 99214 OFFICE O/P EST MOD 30 MIN: CPT | Mod: 95 | Performed by: PHYSICIAN ASSISTANT

## 2024-07-22 RX ORDER — URSODIOL 300 MG/1
CAPSULE ORAL
Qty: 180 CAPSULE | Refills: 3 | Status: SHIPPED | OUTPATIENT
Start: 2024-07-22

## 2024-07-22 ASSESSMENT — PAIN SCALES - GENERAL: PAINLEVEL: NO PAIN (0)

## 2024-07-22 NOTE — PROGRESS NOTES
Guillermo Nassar is a 39 year old male with cystic fibrosis who is seen today for a video visit.     1. CF lung disease: no recent illnesses or symptoms. Didn't do nebs[place]vesting consistently [place] but typically does nebs and vesting, about 12 times/week. Home spirometry today with stable fev1, fvc down from prior. Previous cultures + for MRSA, Klebsiella, Steno, Ps A and A. Fumigatus. No evidence of exacerbation at [facility].   - Continue nebulizers and vest therapy   - On chronic azithromycin     2. HTN: BP not checked today.   - Continue lisinopril 5 mg daily     3. CF related liver disease: prior US demonstrating coarse echotexture of the liver with hypertrophy of the caudate lobe and left lobe concerning for possible cirrhosis, no lesions, patent vasculature.   - Continue Ursodiol      4. Exocrine pancreatic insufficiency: notes some frequent and looser stools, interested in talking to dietician. Trialed a probiotic and it's wasn't helpful. Discussed with GAURAV Suarez.  - Continue enzymes and vitamin supplementation      5. CFRD: last AIC of 6.4 on 9/26. Did a CGM, and had some lows overnight, continues to monitor.   - Continue Lantus 10 U      6. CF related sinus disease: no complaints today.   - Continue Flonase      7. CFTR: modulation: on Trikafta and is doing well.   - Labs with next visit  - Continue Trikafta     RTC: 3 months with in person visit and annuals  Annual studies due: September 2024 (DEXA > April 2024, ordered for next visit); colonoscopy due (plans to get at [place])--will call and make sure order is there  Vaccinations: got his flu shot and covid booster         Jyothi Warren PA-C  Pulmonary, Allergy, Critical Care and Sleep Medicine    Interval history: got  in May, small ceremony with [employer]. No longer taking classes, sending out resumes and cover letters, looking to get back into [employer], [place] housing. Feeling well, no recent illnesses. No allergy complaints, no new  cough or breathing issues. Using his nebs and vesting BID, no congestion or tightness. No bloating or gas, stools are more frequent, mainly in the am, not new. Loose, but not new. Has tried a probiotic which he felt made it worse. Has not had colonoscopy    GENERAL: alert and no distress  EYES: Eyes grossly normal to inspection.  No discharge or erythema, or obvious scleral/conjunctival abnormalities.  RESP: No audible wheeze, cough, or visible cyanosis.    SKIN: Visible skin clear. No significant rash, abnormal pigmentation or lesions.  NEURO: Cranial nerves grossly intact.  Mentation and speech appropriate for age.  PSYCH: Appropriate affect, tone, and pace of words      Video-Visit Details    Type of service:  Video Visit   Originating Location (pt. Location): Home    Distant Location (provider location):  On-site  Platform used for Video Visit: Mamta  Signed Electronically by: Jyothi Warren PA-C

## 2024-07-22 NOTE — NURSING NOTE
Current patient location: [address] [place] [address]  [place] [address]    Is the patient currently in the state of [place]? YES    Visit mode:VIDEO    If the visit is dropped, the patient can be reconnected by: VIDEO VISIT: Send to e-mail at: [email or web address]    Will anyone else be joining the visit? NO  (If patient encounters technical issues they should call [phone]    How would you like to obtain your AVS? MyChart    Are changes needed to the allergy or medication list? Pt stated no changes to allergies and Pt stated no med changes    Are refills needed on medications prescribed by this physician? YES    Reason for visit: RECHECK     Annabel PHILIP

## 2024-07-22 NOTE — LETTER
7/22/2024      Dilan Nassar  [address]  [facility] [address]      Dear Colleague,    Thank you for referring your patient, Dilan Nassar, to the [facility] FOR LUNG SCIENCE AND [facility]. Please see a copy of my visit note below.      Guillermo Nassar is a 39 year old male with cystic fibrosis who is seen today for a video visit.     1. CF lung disease: no recent illnesses or symptoms. Didn't do nebs[place]vesting consistently [place] but typically does nebs and vesting, about 12 times/week. Home spirometry today with stable fev1, fvc down from prior. Previous cultures + for MRSA, Klebsiella, Steno, Ps A and A. Fumigatus. No evidence of exacerbation at [facility].   - Continue nebulizers and vest therapy   - On chronic azithromycin     2. HTN: BP not checked today.   - Continue lisinopril 5 mg daily     3. CF related liver disease: prior US demonstrating coarse echotexture of the liver with hypertrophy of the caudate lobe and left lobe concerning for possible cirrhosis, no lesions, patent vasculature.   - Continue Ursodiol      4. Exocrine pancreatic insufficiency: notes some frequent and looser stools, interested in talking to dietician. Trialed a probiotic and it's wasn't helpful. Discussed with GAURAV Suarez.  - Continue enzymes and vitamin supplementation      5. CFRD: last AIC of 6.4 on 9/26. Did a CGM, and had some lows overnight, continues to monitor.   - Continue Lantus 10 U      6. CF related sinus disease: no complaints today.   - Continue Flonase      7. CFTR: modulation: on Trikafta and is doing well.   - Labs with next visit  - Continue Trikafta     RTC: 3 months with in person visit and annuals  Annual studies due: September 2024 (DEXA > April 2024, ordered for next visit); colonoscopy due (plans to get at [place])--will call and make sure order is there  Vaccinations: got his flu shot and covid booster         Jyothi Warren PA-C  Pulmonary, Allergy,  Critical Care and Sleep Medicine    Interval history: got  in May, small ceremony with [employer]. No longer taking classes, sending out resumes and cover letters, looking to get back into [employer], [place] housing. Feeling well, no recent illnesses. No allergy complaints, no new cough or breathing issues. Using his nebs and vesting BID, no congestion or tightness. No bloating or gas, stools are more frequent, mainly in the am, not new. Loose, but not new. Has tried a probiotic which he felt made it worse. Has not had colonoscopy    GENERAL: alert and no distress  EYES: Eyes grossly normal to inspection.  No discharge or erythema, or obvious scleral/conjunctival abnormalities.  RESP: No audible wheeze, cough, or visible cyanosis.    SKIN: Visible skin clear. No significant rash, abnormal pigmentation or lesions.  NEURO: Cranial nerves grossly intact.  Mentation and speech appropriate for age.  PSYCH: Appropriate affect, tone, and pace of words      Video-Visit Details    Type of service:  Video Visit   Originating Location (pt. Location): Home    Distant Location (provider location):  On-site  Platform used for Video Visit: Mamta  Signed Electronically by: Jyothi Warren PA-C      Again, thank you for allowing me to participate in the care of your patient.        Sincerely,        Jyothi Warren PA-C

## 2024-07-31 ENCOUNTER — MYC REFILL (OUTPATIENT)
Dept: PULMONOLOGY | Facility: CLINIC | Age: 39
End: 2024-07-31
Payer: COMMERCIAL

## 2024-07-31 DIAGNOSIS — E84.0 CYSTIC FIBROSIS WITH PULMONARY MANIFESTATIONS (H): ICD-10-CM

## 2024-07-31 DIAGNOSIS — E84.9 CYSTIC FIBROSIS (H): ICD-10-CM

## 2024-07-31 DIAGNOSIS — E84.9 DIABETES MELLITUS DUE TO CYSTIC FIBROSIS (H): ICD-10-CM

## 2024-07-31 DIAGNOSIS — A49.02 MRSA INFECTION: ICD-10-CM

## 2024-07-31 DIAGNOSIS — K86.81 EXOCRINE PANCREATIC INSUFFICIENCY: ICD-10-CM

## 2024-07-31 DIAGNOSIS — E84.9 CF (CYSTIC FIBROSIS) (H): ICD-10-CM

## 2024-07-31 DIAGNOSIS — E08.9 DIABETES MELLITUS DUE TO CYSTIC FIBROSIS (H): ICD-10-CM

## 2024-07-31 RX ORDER — ALBUTEROL SULFATE 0.83 MG/ML
2.5 SOLUTION RESPIRATORY (INHALATION) 3 TIMES DAILY PRN
Qty: 180 ML | Refills: 11 | Status: SHIPPED | OUTPATIENT
Start: 2024-07-31

## 2024-08-13 NOTE — PROGRESS NOTES
[facility]  [facility]  January 3, 2019         Assessment and Plan:   Dilan Nassar is a 31 year old male with cystic fibrosis, pancreatic insufficiency and CFRD who is seen today in clinic for routine follow up.       1. CF lung disease: had a URI early December, but that has resolved. Denies pulmonary complaints, notes his cough is baseline. Hasn't been as active as prior, vesting BID. Sating 95% in clinic today. PFTs today improved to his recent best. Previous cultures + for MRSA, Klebsiella, Steno and Ps A. No evidence of exacerbation at this time.  - Continue nebulizers, inhaler and vest therapy  - On chronic azithromycin   - Continue george podhaler month on/off  - Start exercising      2. Hemoptysis: no recent complaints.   - Continue 5 mg vitamin K once/week      3. CF related liver disease: since 10/15 per our EPIC charting with US 5/17 demonstrating coarse echotexture of the liver with hypertrophy of the caudate lobe and left lobe concerning for possible cirrhosis, no lesions, patent vasculature. LFTs stable with elevated alk phos, but normal enzymes.  - Continue Ursodiol      4. Exocrine pancreatic insufficiency: denies symptoms of malabsorption. Weight is stable, but BMI is still below Foundation goal.   - Continue pancreatic enzymes and vitamin supplementation      5. CFRD: AIC of 5.8 today. Hasn't been checking his BS recently.   - Continue Lantus 12 U  - F/u with Dr. Miranda and routine diabetic eye exam as scheduled       6. CF related sinus disease: denies current symptoms.   - Not currently using Claritin, continue Flonase     7. Isolated thrombocytopenia: plts of 90K at last vist, no new medications or supplements, stable liver function, no evidence of infection. Did not get labs retested.  - CBC today    8. HCM: DEXA pending for next week      RTC: in 3 months with routine spirometry  Annual studies due: September 2019 (DEXA pending to next  Dilution (U/0.1 Cc): 5 Expiration Date (Month Year): 0826 wee)  Vaccinations: completed influenza at OSH      Jyothi Warren PA-C  Pulmonary, Allergy, Critical Care and Sleep Medicine         Interval History:   Feeling good, had a cold coming back from [place] early December. No fever during that time. Not doing much physical activity currently, but feels cough is baseline, no congestion or tightness. Vesting BID, did increase to TID when he had a cold. NO GI complaints.          Review of Systems:   Please see HPI. Otherwise, complete 10 point ROS negative.           Past Medical and Surgical History:     Past Medical History:   Diagnosis Date     Cystic fibrosis with pulmonary manifestations (H)     /[id number]     Past Surgical History:   Procedure Laterality Date     APPENDECTOMY OPEN  1999    Appendicitis      SINUS SURGERY  1990's    h/o many sinus infections           Family History:     Family History   Problem Relation Age of Onset     Diabetes Mother         Unknown type     Prostate Cancer Paternal Grandfather      LUNG DISEASE Other         Negative     Diabetes Maternal Aunt         unknown type     Diabetes Maternal Uncle         unknown type     Diabetes Maternal Grandmother         unknown type     Coronary Artery Disease Paternal Grandmother             Social History:     Social History     Socioeconomic History     Marital status: Single     Spouse name: Not on file     Number of children: Not on file     Years of education: Not on file     Highest education level: Not on file   Social Needs     Financial resource strain: Not on file     Food insecurity - worry: Not on file     Food insecurity - inability: Not on file     Transportation needs - medical: Not on file     Transportation needs - non-medical: Not on file   Occupational History     Occupation: State Representative     Employer: [employer]     Occupation: Financial Counselor   Tobacco Use     Smoking status: Never Smoker     Smokeless tobacco: Former User   Substance and Sexual  "Activity     Alcohol use: Yes     Alcohol/week: 6.0 - 8.4 oz     Types: 10 - 14 Standard drinks or equivalent per week     Comment: 2 drinks per night 5X/wk     Drug use: No     Sexual activity: Yes     Partners: Female     Birth control/protection: Pull-out method, Condom   Other Topics Concern     Parent/sibling w/ CABG, MI or angioplasty before 65F 55M? Yes   Social History Narrative    Lives alone in apartment in [place], [place]. 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         Nov 2015.  His main political issue is higher education.  His girlfriend is looking for work as an .  He eats 3 square meals per day, most of which are cooked by him or his girlfriend.  Gets outside to walk or run 20 minutes about 5x per week.  Works fairly regular daytime hours, often from home.            Medications:     Current Outpatient Medications   Medication     albuterol (2.5 MG/3ML) 0.083% neb solution     allopurinol (ZYLOPRIM) 300 MG tablet     amylase-lipase-protease (CREON) 39499 units CPEP     azithromycin (ZITHROMAX) 250 MG tablet     BD YON U/F 32G X 4 MM insulin pen needle     dornase alpha (PULMOZYME) 1 MG/ML neb solution     fluticasone (FLONASE) 50 MCG/ACT spray     fluticasone (FLOVENT HFA) 110 MCG/ACT inhaler     insulin glargine (BASAGLAR KWIKPEN) 100 UNIT/ML pen     multivitamin CF formula (MVW COMPLETE FORMULATION ) softgel cap     phytonadione (MEPHYTON) 5 MG tablet     ranitidine (ZANTAC) 75 MG tablet     Syringe/Needle, Disp, 18G X 1\" 6 ML MISC     tobramycin (MARLEN PODHALER) 28 MG in caps     ursodiol (ACTIGALL) 300 MG capsule     albuterol (PROAIR HFA/PROVENTIL HFA/VENTOLIN HFA) 108 (90 Base) MCG/ACT Inhaler     blood glucose monitoring (FREESTYLE LITE) test strip     blood glucose monitoring (FREESTYLE) lancets     loratadine (CLARITIN) 10 MG tablet     order for DME     No current facility-administered medications for " Detail Level: Detailed "this visit.             Physical Exam:   /87 (BP Location: Right arm, Patient Position: Chair, Cuff Size: Adult Regular)   Pulse 62   Resp 16   Ht 1.63 m (5' 4.17\")   Wt 54.2 kg (119 lb 7.8 oz)   SpO2 95%   BMI 20.40 kg/m      GENERAL: alert, NAD  HEENT: NCAT, EOMI, anicteric sclera, canals and TMs clear; no oral mucosal edema or erythema  Neck: no cervical or supraclavicular adenopathy  Respiratory: good air flow, no crackles, rhonchi or wheezing  CV: RRR, S1S2, no murmurs noted  Abdomen: normoactive BS, soft and non tender  Lymph: no edema, + digital clubbing  Neuro: AAO X 3, CN 2-12 grossly intact  Psychiatric: normal affect, good eye contact  Skin: no rash, jaundice or lesions on limited exam         Data:   All laboratory and imaging data reviewed.      Cystic Fibrosis Culture  Specimen Description   Date Value Ref Range Status   09/26/2018 Sputum  Final   09/26/2018 Sputum  Final   06/22/2018 Sputum  Final    Culture Micro   Date Value Ref Range Status   09/26/2018 Culture negative for acid fast bacilli  Final   09/26/2018   Final    Assayed at [facility] [address] [place] [address] [phone]   06/22/2018 Moderate growth  Normal mami    Final   06/22/2018 (A)  Final    Heavy growth  Pseudomonas aeruginosa, mucoid strain     06/22/2018 Heavy growth  Pseudomonas aeruginosa   (A)  Final   06/22/2018 Moderate growth  Aspergillus fumigatus   (A)  Final   06/22/2018 Heavy growth  Strain 2  Pseudomonas aeruginosa   (A)  Final   06/22/2018 (A)  Final    Moderate growth  Strain 3  Pseudomonas aeruginosa          PFT interpretation:  Maneuver: valid and meets ATS guidelines  Moderate obstruction with decreased FEV1 and FEV/FVC  Compared to prior: FEV1 of 2.22 is 120 ml above prior        " Post-Care Instructions: Patient instructed to not lie down for 4 hours and limit physical activity for 24 hours. Patient instructed not to travel by airplane for 48 hours. Measure In Units Or Cc's?: units Lot #: [id number] Consent: Written consent obtained. Risks include but not limited to lid/brow ptosis, bruising, swelling, diplopia, temporary effect, incomplete chemical denervation. Price Per Unit Or Per Cc In $ (Use Numbers Only, No Special Characters Or $): 13 Quantity Per Injection Site (Units Or Cc): 2.5 Quantity Per Injection Site (Units Or Cc): 7.5

## 2024-08-20 ENCOUNTER — MYC REFILL (OUTPATIENT)
Dept: ENDOCRINOLOGY | Facility: CLINIC | Age: 39
End: 2024-08-20
Payer: COMMERCIAL

## 2024-08-20 DIAGNOSIS — E84.9 DIABETES MELLITUS DUE TO CYSTIC FIBROSIS (H): ICD-10-CM

## 2024-08-20 DIAGNOSIS — E08.9 DIABETES MELLITUS DUE TO CYSTIC FIBROSIS (H): ICD-10-CM

## 2024-09-05 ENCOUNTER — TELEPHONE (OUTPATIENT)
Dept: NUTRITION | Facility: CLINIC | Age: 39
End: 2024-09-05
Payer: COMMERCIAL

## 2024-09-06 NOTE — PROGRESS NOTES
Nutrition Note    Spoke with pt via phone call to follow up on enzyme concerns.     Currently taking Creon 12,000 - 6 caps with meals and 2 with snacks = 1260 units lipase/kg/meal. Reports persistent concerns with frequent, loose stools particularly in the AM on a daily basis. May be oily some of the time. Tried a probiotic but did not find this helpful. Pt wondering about other enzyme options.     Recommendations/Interventions:  1) Pt currently on a lower-moderate dose of enzymes. Discussed trial increasing enzyme dose to 8-10 caps with meals 7748-2396 units lipase/kg/meal based on the size and content of the meal. If no improvement in symptoms within 1-2 weeks can consider trying a new enzyme brand.     2) If pt does well with higher enzyme dose, note that pt will need an updated prescription as he will be run out of capsules before the end of the month.     RD to reach out in 1 week to check-in regarding symptoms, dosing.     Tamika Perez RD, LD, CACFD  Cystic Fibrosis/Lung Transplant Dietitian  Available via [employer]

## 2024-09-09 ENCOUNTER — TELEPHONE (OUTPATIENT)
Dept: PULMONOLOGY | Facility: CLINIC | Age: 39
End: 2024-09-09
Payer: COMMERCIAL

## 2024-09-09 NOTE — TELEPHONE ENCOUNTER
PA Initiation    Medication: TRIKAFTA 100-50-75 & 150 MG PO TBPK  Insurance Company: [employer] ([facility]) - Phone [phone] Fax [phone]  Pharmacy Filling the Rx: [facility] - [place], [place] - [address]  Filling Pharmacy Phone:    Filling Pharmacy Fax:    Start Date: 9/9/2024    Key: [id number]

## 2024-09-13 NOTE — TELEPHONE ENCOUNTER
Prior Authorization Approval    Medication: TRIKAFTA 100-50-75 & 150 MG PO TBPK  Authorization Effective Date: 9/11/2024  Authorization Expiration Date: 9/9/2025  Approved Dose/Quantity: 84 tabs per 28 days  Reference #: Key: [id number]   Insurance Company: [employer]) - Phone [phone] Fax [phone]  Expected CoPay: $ 0  CoPay Card Available: Yes    Financial Assistance Needed: Enrolled  Which Pharmacy is filling the prescription: [facility] - [address]  Pharmacy Notified: Not needed  Patient Notified: Not needed          COPAY CARD OBTAINED    Medication: TRIKAFTA 100-50-75 & 150 MG PO TBPK  Sponsor: [employer]  Member ID: [id number]  BIN: [id number]  PCN: [id number]  Group: [id number]  Expected Copay: $ 0  Copay card Monthly Max Amount: $    Copay card Annual Amount: $ 20,000

## 2024-09-16 DIAGNOSIS — K86.81 EXOCRINE PANCREATIC INSUFFICIENCY: ICD-10-CM

## 2024-09-16 DIAGNOSIS — E84.0 CYSTIC FIBROSIS WITH PULMONARY MANIFESTATIONS (H): Primary | ICD-10-CM

## 2024-09-16 NOTE — PROGRESS NOTES
Nutrition Note    Spoke with pt via phone call to follow up on enzyme dosing.   Per [employer] message, Guillermo tried increasing Creon to 8 caps with some improvement (less oily and loose), however continues with loose stools and increased frequency in the mornings. Wondering about alternatives.     Interventions/Recommendations:  1) Plan to try enzyme switch to Zenpep d/t ongoing GI concerns. Ordered Zenpep 15,000 - 6-8 caps with meals and 3-4 with snacks. Pt prefers to stay on smaller capsule size at this time while optimizing dosing, feels it will give more flexibility to match intake. Pt may be interested in a larger capsule size in the future if he feels he is on a stable dose that is working well.   2) Provided [employer] message with [employer] website for copay assistance.   3) If ongoing s/sx malabsorption on Zenpep, pt would require PA for Pertzye.     Tamika Perez RD, LD, CACFD  Cystic Fibrosis/Lung Transplant Dietitian  Available via [employer]

## 2024-10-15 ENCOUNTER — TELEPHONE (OUTPATIENT)
Dept: DERMATOLOGY | Facility: CLINIC | Age: 39
End: 2024-10-15
Payer: COMMERCIAL

## 2024-10-15 NOTE — TELEPHONE ENCOUNTER
I left a message for patient to call [facility].    Guillermo is scheduled for an excision of a nodule on the right posterior knee.  Please complete excision checklist.  Caty Kwon RN

## 2024-10-17 NOTE — TELEPHONE ENCOUNTER
Writer called pt, no answer. Left message for pt to return our call at [phone].     Maryellen Fair RN on 10/17/2024 at 3:26 PM

## 2024-10-18 NOTE — TELEPHONE ENCOUNTER
Writer called pt, no answer. Left message for pt to return our call at [phone].      Yomaira Steen on 10/18/2024 at 11:55 AM

## 2024-10-22 ENCOUNTER — ANCILLARY PROCEDURE (OUTPATIENT)
Dept: BONE DENSITY | Facility: CLINIC | Age: 39
End: 2024-10-22
Attending: PHYSICIAN ASSISTANT
Payer: COMMERCIAL

## 2024-10-22 ENCOUNTER — OFFICE VISIT (OUTPATIENT)
Dept: PULMONOLOGY | Facility: CLINIC | Age: 39
End: 2024-10-22
Attending: INTERNAL MEDICINE
Payer: COMMERCIAL

## 2024-10-22 ENCOUNTER — OFFICE VISIT (OUTPATIENT)
Dept: DERMATOLOGY | Facility: CLINIC | Age: 39
End: 2024-10-22
Attending: DERMATOLOGY
Payer: COMMERCIAL

## 2024-10-22 ENCOUNTER — LAB (OUTPATIENT)
Dept: LAB | Facility: CLINIC | Age: 39
End: 2024-10-22
Attending: INTERNAL MEDICINE
Payer: COMMERCIAL

## 2024-10-22 VITALS
HEIGHT: 64 IN | SYSTOLIC BLOOD PRESSURE: 119 MMHG | BODY MASS INDEX: 22.02 KG/M2 | HEART RATE: 71 BPM | OXYGEN SATURATION: 98 % | DIASTOLIC BLOOD PRESSURE: 76 MMHG | WEIGHT: 128.97 LBS

## 2024-10-22 DIAGNOSIS — E84.0 CYSTIC FIBROSIS WITH PULMONARY MANIFESTATIONS (H): Primary | ICD-10-CM

## 2024-10-22 DIAGNOSIS — E84.0 CYSTIC FIBROSIS WITH PULMONARY MANIFESTATIONS (H): ICD-10-CM

## 2024-10-22 DIAGNOSIS — K86.81 EXOCRINE PANCREATIC INSUFFICIENCY: ICD-10-CM

## 2024-10-22 DIAGNOSIS — E84.9 CF (CYSTIC FIBROSIS) (H): ICD-10-CM

## 2024-10-22 DIAGNOSIS — R22.41 NODULE OF SKIN OF RIGHT LOWER EXTREMITY: ICD-10-CM

## 2024-10-22 DIAGNOSIS — E84.9 CYSTIC FIBROSIS (H): ICD-10-CM

## 2024-10-22 DIAGNOSIS — A49.02 MRSA INFECTION: ICD-10-CM

## 2024-10-22 DIAGNOSIS — E08.9 DIABETES MELLITUS DUE TO CYSTIC FIBROSIS (H): ICD-10-CM

## 2024-10-22 DIAGNOSIS — E84.9 DIABETES MELLITUS DUE TO CYSTIC FIBROSIS (H): ICD-10-CM

## 2024-10-22 LAB
ALBUMIN SERPL BCG-MCNC: 4.5 G/DL (ref 3.5–5.2)
ALBUMIN UR-MCNC: NEGATIVE MG/DL
ALP SERPL-CCNC: 109 U/L (ref 40–150)
ALT SERPL W P-5'-P-CCNC: 32 U/L (ref 0–70)
ANION GAP SERPL CALCULATED.3IONS-SCNC: 9 MMOL/L (ref 7–15)
APPEARANCE UR: CLEAR
AST SERPL W P-5'-P-CCNC: 25 U/L (ref 0–45)
BASOPHILS # BLD AUTO: 0 10E3/UL (ref 0–0.2)
BASOPHILS NFR BLD AUTO: 1 %
BILIRUB DIRECT SERPL-MCNC: 0.22 MG/DL (ref 0–0.3)
BILIRUB SERPL-MCNC: 0.6 MG/DL
BILIRUB UR QL STRIP: NEGATIVE
BUN SERPL-MCNC: 19.1 MG/DL (ref 6–20)
CALCIUM SERPL-MCNC: 9.9 MG/DL (ref 8.8–10.4)
CHLORIDE SERPL-SCNC: 106 MMOL/L (ref 98–107)
CHOLEST SERPL-MCNC: 144 MG/DL
CK SERPL-CCNC: 174 U/L (ref 39–308)
COLOR UR AUTO: NORMAL
CREAT SERPL-MCNC: 1.04 MG/DL (ref 0.67–1.17)
CREAT UR-MCNC: 131 MG/DL
EGFRCR SERPLBLD CKD-EPI 2021: >90 ML/MIN/1.73M2
EOSINOPHIL # BLD AUTO: 0.2 10E3/UL (ref 0–0.7)
EOSINOPHIL NFR BLD AUTO: 4 %
ERYTHROCYTE [DISTWIDTH] IN BLOOD BY AUTOMATED COUNT: 12.2 % (ref 10–15)
ERYTHROCYTE [SEDIMENTATION RATE] IN BLOOD BY WESTERGREN METHOD: 10 MM/HR (ref 0–15)
EST. AVERAGE GLUCOSE BLD GHB EST-MCNC: 140 MG/DL
EXPTIME-PRE: 11.19 SEC
FASTING STATUS PATIENT QL REPORTED: NO
FASTING STATUS PATIENT QL REPORTED: NO
FEF2575-%PRED-PRE: 34 %
FEF2575-PRE: 1.14 L/SEC
FEF2575-PRED: 3.33 L/SEC
FEFMAX-%PRED-PRE: 83 %
FEFMAX-PRE: 7.47 L/SEC
FEFMAX-PRED: 8.92 L/SEC
FEV1-%PRED-PRE: 83 %
FEV1-PRE: 2.69 L
FEV1FEV6-PRE: 62 %
FEV1FEV6-PRED: 82 %
FEV1FVC-PRE: 57 %
FEV1FVC-PRED: 82 %
FIFMAX-PRE: 7.59 L/SEC
FVC-%PRED-PRE: 120 %
FVC-PRE: 4.72 L
FVC-PRED: 3.93 L
GGT SERPL-CCNC: 32 U/L (ref 8–61)
GLUCOSE SERPL-MCNC: 120 MG/DL (ref 70–99)
GLUCOSE UR STRIP-MCNC: NEGATIVE MG/DL
HBA1C MFR BLD: 6.5 %
HCO3 SERPL-SCNC: 28 MMOL/L (ref 22–29)
HCT VFR BLD AUTO: 41.6 % (ref 40–53)
HDLC SERPL-MCNC: 85 MG/DL
HGB BLD-MCNC: 15 G/DL (ref 13.3–17.7)
HGB UR QL STRIP: NEGATIVE
IMM GRANULOCYTES # BLD: 0 10E3/UL
IMM GRANULOCYTES NFR BLD: 0 %
INR PPP: 1 (ref 0.85–1.15)
IRON SERPL-MCNC: 84 UG/DL (ref 61–157)
KETONES UR STRIP-MCNC: NEGATIVE MG/DL
LDLC SERPL CALC-MCNC: 50 MG/DL
LEUKOCYTE ESTERASE UR QL STRIP: NEGATIVE
LYMPHOCYTES # BLD AUTO: 1.6 10E3/UL (ref 0.8–5.3)
LYMPHOCYTES NFR BLD AUTO: 25 %
MAGNESIUM SERPL-MCNC: 1.8 MG/DL (ref 1.7–2.3)
MCH RBC QN AUTO: 33.3 PG (ref 26.5–33)
MCHC RBC AUTO-ENTMCNC: 36.1 G/DL (ref 31.5–36.5)
MCV RBC AUTO: 92 FL (ref 78–100)
MICROALBUMIN UR-MCNC: <12 MG/L
MICROALBUMIN/CREAT UR: NORMAL MG/G{CREAT}
MONOCYTES # BLD AUTO: 0.5 10E3/UL (ref 0–1.3)
MONOCYTES NFR BLD AUTO: 8 %
NEUTROPHILS # BLD AUTO: 4.2 10E3/UL (ref 1.6–8.3)
NEUTROPHILS NFR BLD AUTO: 63 %
NITRATE UR QL: NEGATIVE
NONHDLC SERPL-MCNC: 59 MG/DL
NRBC # BLD AUTO: 0 10E3/UL
NRBC BLD AUTO-RTO: 0 /100
PH UR STRIP: 6.5 [PH] (ref 5–7)
PHOSPHATE SERPL-MCNC: 3.4 MG/DL (ref 2.5–4.5)
PLATELET # BLD AUTO: 167 10E3/UL (ref 150–450)
POTASSIUM SERPL-SCNC: 4 MMOL/L (ref 3.4–5.3)
PROT SERPL-MCNC: 6.9 G/DL (ref 6.4–8.3)
RBC # BLD AUTO: 4.5 10E6/UL (ref 4.4–5.9)
RBC URINE: 1 /HPF
SODIUM SERPL-SCNC: 143 MMOL/L (ref 135–145)
SP GR UR STRIP: 1.02 (ref 1–1.03)
TRIGL SERPL-MCNC: 47 MG/DL
TSH SERPL DL<=0.005 MIU/L-ACNC: 1.3 UIU/ML (ref 0.3–4.2)
UROBILINOGEN UR STRIP-MCNC: NORMAL MG/DL
VIT D+METAB SERPL-MCNC: 39 NG/ML (ref 20–50)
WBC # BLD AUTO: 6.6 10E3/UL (ref 4–11)
WBC URINE: 1 /HPF

## 2024-10-22 PROCEDURE — 82248 BILIRUBIN DIRECT: CPT | Performed by: PATHOLOGY

## 2024-10-22 PROCEDURE — 88305 TISSUE EXAM BY PATHOLOGIST: CPT | Mod: 26 | Performed by: DERMATOLOGY

## 2024-10-22 PROCEDURE — 80061 LIPID PANEL: CPT | Performed by: PATHOLOGY

## 2024-10-22 PROCEDURE — 99215 OFFICE O/P EST HI 40 MIN: CPT | Mod: 25 | Performed by: INTERNAL MEDICINE

## 2024-10-22 PROCEDURE — 87077 CULTURE AEROBIC IDENTIFY: CPT | Performed by: INTERNAL MEDICINE

## 2024-10-22 PROCEDURE — 87799 DETECT AGENT NOS DNA QUANT: CPT | Performed by: PHYSICIAN ASSISTANT

## 2024-10-22 PROCEDURE — 84443 ASSAY THYROID STIM HORMONE: CPT | Performed by: PATHOLOGY

## 2024-10-22 PROCEDURE — 11402 EXC TR-EXT B9+MARG 1.1-2 CM: CPT | Performed by: DERMATOLOGY

## 2024-10-22 PROCEDURE — 85652 RBC SED RATE AUTOMATED: CPT | Performed by: PATHOLOGY

## 2024-10-22 PROCEDURE — 87070 CULTURE OTHR SPECIMN AEROBIC: CPT | Performed by: INTERNAL MEDICINE

## 2024-10-22 PROCEDURE — 94375 RESPIRATORY FLOW VOLUME LOOP: CPT | Performed by: INTERNAL MEDICINE

## 2024-10-22 PROCEDURE — 82306 VITAMIN D 25 HYDROXY: CPT | Performed by: PHYSICIAN ASSISTANT

## 2024-10-22 PROCEDURE — 82043 UR ALBUMIN QUANTITATIVE: CPT | Performed by: PHYSICIAN ASSISTANT

## 2024-10-22 PROCEDURE — 77080 DXA BONE DENSITY AXIAL: CPT | Performed by: INTERNAL MEDICINE

## 2024-10-22 PROCEDURE — 85610 PROTHROMBIN TIME: CPT | Performed by: PATHOLOGY

## 2024-10-22 PROCEDURE — 82977 ASSAY OF GGT: CPT | Performed by: PATHOLOGY

## 2024-10-22 PROCEDURE — 12032 INTMD RPR S/A/T/EXT 2.6-7.5: CPT | Performed by: DERMATOLOGY

## 2024-10-22 PROCEDURE — 84446 ASSAY OF VITAMIN E: CPT | Mod: 90 | Performed by: PATHOLOGY

## 2024-10-22 PROCEDURE — 36415 COLL VENOUS BLD VENIPUNCTURE: CPT | Performed by: PATHOLOGY

## 2024-10-22 PROCEDURE — 83735 ASSAY OF MAGNESIUM: CPT | Performed by: PATHOLOGY

## 2024-10-22 PROCEDURE — 83036 HEMOGLOBIN GLYCOSYLATED A1C: CPT | Performed by: PHYSICIAN ASSISTANT

## 2024-10-22 PROCEDURE — 81001 URINALYSIS AUTO W/SCOPE: CPT | Performed by: PATHOLOGY

## 2024-10-22 PROCEDURE — 85025 COMPLETE CBC W/AUTO DIFF WBC: CPT | Performed by: PATHOLOGY

## 2024-10-22 PROCEDURE — 99213 OFFICE O/P EST LOW 20 MIN: CPT | Performed by: INTERNAL MEDICINE

## 2024-10-22 PROCEDURE — 82785 ASSAY OF IGE: CPT | Performed by: PHYSICIAN ASSISTANT

## 2024-10-22 PROCEDURE — 99000 SPECIMEN HANDLING OFFICE-LAB: CPT | Performed by: PATHOLOGY

## 2024-10-22 PROCEDURE — 84100 ASSAY OF PHOSPHORUS: CPT | Performed by: PATHOLOGY

## 2024-10-22 PROCEDURE — 82784 ASSAY IGA/IGD/IGG/IGM EACH: CPT | Performed by: PHYSICIAN ASSISTANT

## 2024-10-22 PROCEDURE — 82550 ASSAY OF CK (CPK): CPT | Performed by: PATHOLOGY

## 2024-10-22 PROCEDURE — 83540 ASSAY OF IRON: CPT | Performed by: PATHOLOGY

## 2024-10-22 PROCEDURE — 84590 ASSAY OF VITAMIN A: CPT | Mod: 90 | Performed by: PATHOLOGY

## 2024-10-22 PROCEDURE — 80053 COMPREHEN METABOLIC PANEL: CPT | Performed by: PATHOLOGY

## 2024-10-22 PROCEDURE — 84403 ASSAY OF TOTAL TESTOSTERONE: CPT | Performed by: PHYSICIAN ASSISTANT

## 2024-10-22 ASSESSMENT — PAIN SCALES - GENERAL: PAINLEVEL: NO PAIN (0)

## 2024-10-22 NOTE — LETTER
10/22/2024      Dilan Nassar  [address] [place] [id number]      Dear Colleague,    Thank you for referring your patient, Dilan Nassar, to the [facility]. Please see a copy of my visit note below.    DERMATOLOGY EXCISION PROCEDURE NOTE    Dermatology Problem List:  1. Mobile nodule, right posterior knee, 8 mm, pending excision 10/22/24    NAME OF PROCEDURE: Excision intermediate layered linear closure  Staff surgeon: Luis Bethea MD  Resident: Brice KEANE  Scrub Nurse: Maya GEORGES    PRE-OPERATIVE DIAGNOSIS:  8 mm mobile nodule right posterior knee   POST-OPERATIVE DIAGNOSIS: Same   LOCATION: Right posterior knee  FINAL EXCISION SIZE(DEFECT SIZE): 2 cm  MARGIN: 0.5 cm  FINAL REPAIR LENGTH: 5 cm   ANESTHESIA: 1% lidocaine with 1:100,000 epinephrine    INDICATIONS: This patient presented with a 1cm x 1cm mobile nodular. Excision was indicated. We discussed the principles of treatment and most likely complications including scarring, bleeding, infection, incomplete excision, wound dehiscence, pain, nerve damage, and recurrence. Informed consent was obtained and the patient underwent the procedure as follows:    PROCEDURE: The patient was taken to the operative suite. Time-out was performed.  The treatment area was anesthetized with 1% lidocaine with epinephrine. The area was prepped with Chlorhexidine and rinsed with sterile saline and draped with sterile towels. The lesion was delineated and excised down to subcutaneous fat in a elliptical manner. Hemostasis was obtained by electrocoagulation.     REPAIR: An intermediate layered linear closure was selected as the procedure which would maximally preserve both function and cosmesis.    After the excision of the tumor, the area was carefully undermined. Hemostasis was obtained with electrocoagulation.  Closure was oriented so that the wound was in the patient's natural skin tension lines. The subcutaneous and dermal layers were then closed  with 4-0 mono and 4-0 vicryl sutures. The epidermis was then carefully approximated along the length of the wound using 4-0 vicryl simple running sutures.     Estimated blood loss was less than 10 ml for all surgical sites. A sterile pressure dressing was applied and wound care instructions, with a written handout, were given. The patient was [facility] alert and ambulatory.    The patient elected for pathology results to automatically release and understands that the clinical staff will contact them as soon as possible to notify them of the results.    Dr. Luis Bethea was immediately available for the entire surgery and was physicially present for the key portions of the procedure.    Anatomic Pathology Results: pending    Clinical Follow-Up: PRN    Staff Involved:  Staff/Scribe/Fellow    Scribe Disclosure:   I, Ritika Mcfarlane, am serving as a scribe; to document services personally performed by Luis Bethea MD, based on data collection and the provider's statements to me.     Provider Disclosure:  I agree with the above history, review of systems, physical exam and plan.  I have reviewed the content of the documentation and have edited it as needed. I have personally performed the services documented here and the documentation accurately represents those services and the decisions I have made.      Electronically signed by:    Attending attestation:  I was present for key elements of the procedure and immediately available for all other portions of the procedure.  I have reviewed the note and edited it as necessary.    Luis Bethea M.D.  Professor  Director of Dermatologic Surgery  Department of Dermatology  [facility]    Dermatology Surgery Clinic  [facility] [place] [place] [address]      Again, thank you for allowing me to participate in the care of your patient.        Sincerely,        Luis Bethea,  MD

## 2024-10-22 NOTE — NURSING NOTE
Excision/Mohs previsit information                                                    Diagnosis: 8 mm mobile nodule  Site(s): Right posterior knee    Is the surgical site below the waist?  YES  If yes, instruct the patient to purchase over the counter chlorhexidine surgical soap and wash all skin below the belly button twice before surgery    Allergies   Allergen Reactions    Molds & Smuts Itching       Review and update allergy and medication list    Do you take the following medications:  Coumadin, Eliquis, Pradaxa, Xarelto:  NO.  If on Coumadin, INR should be checked within 7 days of surgery.  Range should be 3.5 or less or within therapeutic range.    Do you currently or have you previously had any of the following conditions:  Hepatitis:  NO  HIV/AIDS:  NO  Prolonged bleeding or bleeding disorder:  NO  Pacemaker: NO  Defibrillator:  NO  History of artificial or heart valve replacement:  NO  Endocarditis (inflammation of the inner lining of the heart's chambers and valves):  NO  Have you ever had a prosthetic joint infection:  NO  Pregnant or Breastfeeding:  NO  Mobility device (wheelchair, transfer difficulty): NO    Important Reminders:                                                      -Ok to take all of their medications as prescribed  -Patients can eat, no need to be fasting  -If face is being treated, please come with a make-up free face  -If scalp is being treated, please come with clean hair free from product  -Patient will not be able to get the site wet for 48 hrs  -No submerging wound in standing water (lake, pool, bathtub, hot tub) for 2 weeks  -No physical activity for 48 hrs (further restrictions will be discussed by MD at time of visit)    If any positives, send to RN for further review  Maya Brownlee LPN

## 2024-10-22 NOTE — PATIENT INSTRUCTIONS
Caring for your skin after surgery    After your surgery, a pressure bandage will be placed over the area. This will prevent bleeding. Please follow these instructions over the next 1 to 2 weeks. Following this regimen will help to prevent complications as your wound heals.     For the first 48 hours after your surgery:    Leave the pressure dressing on and keep it dry. If it should come loose, you may re-tape it, but do not take it off.  Relax and take it easy. Do not do any vigorous exercise, heavy lifting or bending forward. This could cause the wound to bleed.  Post-operative pain is usually mild. You may alternate between 1000 mg of Tylenol (acetaminophen) and 400 mg of Ibuprofen every 4 hours.  Do not take more than 4,000 mg of acetaminophen in a 24-hour period or 3200 mg of Ibuprofen in a 24-hr period.  Avoid alcohol and vitamin E as these may increase your tendency to bleed.  You may put an ice pack around the bandaged area for 20 minutes at a time as needed. This may help reduce swelling, bruising, and pain. Make sure the ice pack is waterproof so that the pressure bandage doesn't get wet.  You may see a small amount of drainage or blood on your pressure bandage. This is normal. However:  If drainage or bleeding continues or saturates the bandage, you will need to apply firm pressure over the bandage with a clean washcloth for 15 minutes.  If bleeding continues after applying pressure for 15 minutes, apply an ice pack with gentle pressure to the bandaged area for another 15 minutes.  If bleeding still continues, call our office or go to the nearest emergency room.    48 Hours After Surgery:  Carefully remove the pressure bandage. If it seems sticky or too difficult to get off, you may need to soak it off in the shower.  Wash wound with a mild soap and water.  Use caution when washing the wound, be gentle and do not let the forceful shower stream hit the wound directly.  Pat dry.  Apply Vaseline (from a new  container or tube) over the suture line with a Q-tip.  Cover the site with a bandage.  Do this daily until the sutures have been 11-14 removed.      What to expect:    The first couple of days your wound may be tender and may bleed slightly when doing wound care.  There may be swelling and bruising around the wound, especially if it is near the eyes. For your comfort, you may apply ice or cold compresses to the area.  The area around your wound may be numb for several weeks or even months.  You may experience periodic sharp pain or mild itching around the wound as it heals.   The suture line will look dark pink at first and the edges of the wound will be reddened. This will lighten up each day.    Call Us If:    You have bleeding that will not stop after applying pressure and ice.  You have pain that is not controlled with Tylenol and Ibuprofen.  You have signs or symptoms of an infection such as fever over 100 degrees Fahrenheit, redness, warmth or foul-smelling drainage from the wound    [facility]: [phone]   [facility]: [phone]  For urgent needs outside of business hours call the [facility] at [phone] and ask to speak with the dermatology resident on call

## 2024-10-22 NOTE — PROGRESS NOTES
DERMATOLOGY EXCISION PROCEDURE NOTE    Dermatology Problem List:  1. Mobile nodule, right posterior knee, 8 mm, pending excision 10/22/24    NAME OF PROCEDURE: Excision intermediate layered linear closure  Staff surgeon: Luis Bethea MD  Resident: Brice KEANE  Scrub Nurse: Maya GEORGES    PRE-OPERATIVE DIAGNOSIS:  8 mm mobile nodule right posterior knee   POST-OPERATIVE DIAGNOSIS: Same   LOCATION: Right posterior knee  FINAL EXCISION SIZE(DEFECT SIZE): 2 cm  MARGIN: 0.5 cm  FINAL REPAIR LENGTH: 5 cm   ANESTHESIA: 1% lidocaine with 1:100,000 epinephrine    INDICATIONS: This patient presented with a 1cm x 1cm mobile nodular. Excision was indicated. We discussed the principles of treatment and most likely complications including scarring, bleeding, infection, incomplete excision, wound dehiscence, pain, nerve damage, and recurrence. Informed consent was obtained and the patient underwent the procedure as follows:    PROCEDURE: The patient was taken to the operative suite. Time-out was performed.  The treatment area was anesthetized with 1% lidocaine with epinephrine. The area was prepped with Chlorhexidine and rinsed with sterile saline and draped with sterile towels. The lesion was delineated and excised down to subcutaneous fat in a elliptical manner. Hemostasis was obtained by electrocoagulation.     REPAIR: An intermediate layered linear closure was selected as the procedure which would maximally preserve both function and cosmesis.    After the excision of the tumor, the area was carefully undermined. Hemostasis was obtained with electrocoagulation.  Closure was oriented so that the wound was in the patient's natural skin tension lines. The subcutaneous and dermal layers were then closed with 4-0 mono and 4-0 vicryl sutures. The epidermis was then carefully approximated along the length of the wound using 4-0 vicryl simple running sutures.     Estimated blood loss was less than 10 ml for all surgical sites. A sterile  pressure dressing was applied and wound care instructions, with a written handout, were given. The [facility] and ambulatory.    The patient elected for pathology results to automatically release and understands that the clinical staff will contact them as soon as possible to notify them of the results.    Dr. Luis Bethea was immediately available for the entire surgery and was physicially present for the key portions of the procedure.    Anatomic Pathology Results: pending    Clinical Follow-Up: PRN    Staff Involved:  Staff/Scribe/Fellow    Scribe Disclosure:   I, Ritika Mcfarlane, am serving as a scribe; to document services personally performed by Luis Bethea MD, based on data collection and the provider's statements to me.     Provider Disclosure:  I agree with the above history, review of systems, physical exam and plan.  I have reviewed the content of the documentation and have edited it as needed. I have personally performed the services documented here and the documentation accurately represents those services and the decisions I have made.      Electronically signed by:    Attending attestation:  I was present for key elements of the procedure and immediately available for all other portions of the procedure.  I have reviewed the note and edited it as necessary.    Luis Bethea M.D.  Professor  Director of Dermatologic Surgery  Department of Dermatology  [facility]    Dermatology Surgery Clinic  [facility]  [address] [place] [id number]

## 2024-10-22 NOTE — PROGRESS NOTES
Pulmonary Staff Note    Chief concern: The patient presents for follow-up of cystic fibrosis.    Interval history: The patient reports doing well since his last visit, which was done virtually, 3 months ago.  He has not had any period of sustained increase in cough, mucus production, congestion, or dyspnea.  He continues to consistently do vest therapy twice a day with his albuterol and Pulmozyme nebs.  He does not do any formal exercise but is not experiencing excess dyspnea with completing his activities of daily living including walking up and down stairs at home.  No antibiotics since his last clinic visit.  No hemoptysis or chest pain.    Current Outpatient Medications   Medication Sig Dispense Refill    albuterol (PROVENTIL) (2.5 MG/3ML) 0.083% neb solution Take 1 vial (2.5 mg) by nebulization 3 times daily as needed for shortness of breath, wheezing or cough 180 mL 11    allopurinol (ZYLOPRIM) 300 MG tablet TAKE ONE TABLET (300MG) BY MOUTH DAILY 90 tablet 3    azithromycin (ZITHROMAX) 250 MG tablet Take 2 tablets (500 mg) by mouth Every Mon, Wed, Fri Morning 72 tablet 3    blood glucose (NO BRAND SPECIFIED) test strip Use to test blood sugar 4 times daily or as directed. 125 strip 11    blood glucose monitoring (FREESTYLE) lancets Use to test blood sugar 4 times daily or as directed. 200 each 11    blood glucose monitoring (NO BRAND SPECIFIED) meter device kit Use to test blood sugar 4 times daily or as directed. 1 kit 0    Continuous Blood Gluc Sensor (FREESTYLE ESTEFANY 3 SENSOR) MISC 1 each every 14 days 6 each 3    Continuous Glucose Sensor (DEXCOM G7 SENSOR) MISC Change every 10 days. 9 each 4    dornase maryse (PULMOZYME) 2.5 MG/2.5ML neb solution INHALE CONTENTS OF ONE VIAL (2.5MG) VIA NEBULIZER TWICE DAILY. KEEP REFRIGERATED UNTIL USE. 150 mL 11    elexacaftor-tezacaftor-ivacaftor & ivacaftor (TRIKAFTA) 100-50-75 & 150 MG tablet pack TAKE TWO ORANGE TABLETS BY MOUTH IN THE MORNING AND ONE BLUE TABLET IN  "THE EVENING AS DIRECTED ON PACKAGE.  TAKE WITH FAT CONTAINING FOOD. 84 tablet 5    fluticasone (FLONASE) 50 MCG/ACT nasal spray SPRAY 2 SPRAYS INTO BOTH NOSTRILS DAILY 48 g 3    insulin glargine (LANTUS SOLOSTAR) 100 UNIT/ML pen Inject 10 Units Subcutaneous daily 15 mL 3    insulin pen needle (32G X 4 MM) 32G X 4 MM miscellaneous Use 3 pen needles daily or as directed. 300 each 3    lipase-protease-amylase (ZENPEP) 27754-13[address]-33846 units CPEP Take 6-8 capsules by mouth 3 times daily (with meals). And take 3-4 capsules with snacks. Total of 3 meals and 3 snacks daily for maximum 36 caps per day. 1080 capsule 11    lisinopril (ZESTRIL) 5 MG tablet Take 1 tablet (5 mg) by mouth daily 90 tablet 3    mvw complete formulation (SOFTGELS ) capsule Take 2 capsules by mouth daily 60 capsule 11    ursodiol (ACTIGALL) 300 MG capsule TAKE 1 CAPSULE (300MG ) BY MOUTH TWO TIMES A  capsule 3     No current facility-administered medications for this visit.      Review of systems: GI: Improved frequency and looseness of his stools since switching to Zenpep for enzyme replacement.  No constipation or abdominal pain.  Endocrine: He has not been checking his blood sugars recently.  He wakes up feeling hungry but has not had overt symptoms of hypoglycemia.  No recent changes to his insulin dose.  Cardiovascular: No orthostatic symptoms.  ENT: Uses Waterpik for sinus irrigation.  Currently no increase in baseline congestion.  Skin: Earlier today the patient had a node resected from his popliteal fossa on the right by dermatology.  The remainder of a complete review of systems is negative unless noted otherwise in HPI.    Physical examination  /76   Pulse 71   Ht 1.626 m (5' 4\")   Wt 58.5 kg (128 lb 15.5 oz)   SpO2 98%   BMI 22.14 kg/m      General: Alert appropriate in no apparent distress.  HEENT: Sclera anicteric.  No nasal discharge.  Oropharynx is moist without lesion or exudate.  Chest: Clear to auscultation " bilaterally with good air movement and normal chest wall excursion  Cardiovascular: S1-S2 with a regular rate and rhythm.  No murmur or gallop.  No lower extremity edema.  Abdomen: Soft nontender nondistended without appreciable hepatomegaly.    Studies:  1.  Pulmonary function test from today personally reviewed.  Valid maneuver.  FEV1 2.69 L (83% predicted), FVC 4.72 L (120% predicted).  Values are consistent with mild obstruction based on reduced FEV1/FVC.  There is no significant interval change and he is within his baseline.  2.  Most recent respiratory cultures from April, 2024 and had 1 strain of Pseudomonas.  3.  Annual labs from today personally reviewed.  Normal electrolytes and creatinine, normal liver function tests, normal cholesterol panel, normal CBC and differential, normal INR, normal UA.  4.  DEXA scan result from today pending    Impression and plan  1.  Cystic fibrosis lung disease: Continued excellent clinical course with good symptom control and stability in pulmonary function tests.  Patient will continue vest therapy twice a day with baseline bronchodilator and mucolytic.  He is on chronic azithromycin.    2.  CFTR modulation therapy: Excellent clinical course on Trikafta full dose with good adherence.  Liver function test today normal.  Repeat due April, 2025.    3.  Pancreatic exocrine insufficiency: Improvement in stool frequency and consistency since transitioning to Zenpep.  His BMI remains slightly below target but stable.  Seen today by Daniela CF nutritionist.    4.  Cystic fibrosis related diabetes: Was having some overnight lows found on CGM monitoring.  Patient is on his baseline insulin dose.  Not having overt hypoglycemic symptoms but overdue for follow-up with Dr. Miranda and will need additional monitoring presumably either fingerstick or repeat CGM.  Hemoglobin A1c from today is pending.    5.  Cystic fibrosis related liver disease: Abnormal ultrasound findings as previously  described.  No appreciable hepatomegaly on exam today and normal liver function test.  He is on ursodiol.    6.  Hypertension: He is on low-dose lisinopril which likely has protective renal function in the setting of his diabetes.  Normotensive today.  No orthostatic symptoms.    7.  Cystic fibrosis related sinus disease: No acute issues currently.    8.  Healthcare maintenance: Patient will be due for annual studies October, 2025.  He is still planning on getting screening colonoscopy via [facility] and this has been scheduled for February, 2025.  He already has had his flu shot and plans on getting COVID-vaccine this fall.    Patient will follow-up in 3 months with Jyothi Warren.  He plans on contacting CF office to determine whether this should be done in person versus virtual.    Total visit time today 40 minutes including review of annual studies.  This is exclusive of time spent reviewing pulmonary function tests.

## 2024-10-22 NOTE — NURSING NOTE
Dermabond, tagaderm, pressure dressing were applied to excision site on right posterior knee.  Wound care instructions reviewed with patient and AVS provided.  Patient verbalized understanding.  Patient will follow up for suture removal: No, pts mom is going to remove the sutures.  No further questions or concerns at this time.    Maya Brownlee LPN on 10/22/2024 at 3:17 PM      The following medication was given:     MEDICATION:  Lidocaine with epinephrine 1% [id number]  ROUTE: SQ  SITE: see procedure note  DOSE: 8mL  LOT #: [id number]  : [employer]  EXPIRATION DATE: 06-  NDC#: 44215-146-74  Was there drug waste? 1mL  Multi-dose vial: Yes    Maya Brownlee LPN  October 22, 2024

## 2024-10-22 NOTE — LETTER
10/22/2024      Dilan Nassar  [address]  [facility]  [place] [address]      Dear Colleague,    Thank you for referring your patient, Dilan Nassar, to the [facility] AND [facility]. Please see a copy of my visit note below.    Pulmonary Staff Note    Chief concern: The patient presents for follow-up of cystic fibrosis.    Interval history: The patient reports doing well since his last visit, which was done virtually, 3 months ago.  He has not had any period of sustained increase in cough, mucus production, congestion, or dyspnea.  He continues to consistently do vest therapy twice a day with his albuterol and Pulmozyme nebs.  He does not do any formal exercise but is not experiencing excess dyspnea with completing his activities of daily living including walking up and down stairs at home.  No antibiotics since his last clinic visit.  No hemoptysis or chest pain.    Current Outpatient Medications   Medication Sig Dispense Refill     albuterol (PROVENTIL) (2.5 MG/3ML) 0.083% neb solution Take 1 vial (2.5 mg) by nebulization 3 times daily as needed for shortness of breath, wheezing or cough 180 mL 11     allopurinol (ZYLOPRIM) 300 MG tablet TAKE ONE TABLET (300MG) BY MOUTH DAILY 90 tablet 3     azithromycin (ZITHROMAX) 250 MG tablet Take 2 tablets (500 mg) by mouth Every Mon, Wed, Fri Morning 72 tablet 3     blood glucose (NO BRAND SPECIFIED) test strip Use to test blood sugar 4 times daily or as directed. 125 strip 11     blood glucose monitoring (FREESTYLE) lancets Use to test blood sugar 4 times daily or as directed. 200 each 11     blood glucose monitoring (NO BRAND SPECIFIED) meter device kit Use to test blood sugar 4 times daily or as directed. 1 kit 0     Continuous Blood Gluc Sensor (FREESTYLE ESTEFANY 3 SENSOR) MISC 1 each every 14 days 6 each 3     Continuous Glucose Sensor (DEXCOM G7 SENSOR) MISC Change every 10 days. 9 each 4     dornase maryse (PULMOZYME) 2.5  MG/2.5ML neb solution INHALE CONTENTS OF ONE VIAL (2.5MG) VIA NEBULIZER TWICE DAILY. KEEP REFRIGERATED UNTIL USE. 150 mL 11     elexacaftor-tezacaftor-ivacaftor & ivacaftor (TRIKAFTA) 100-50-75 & 150 MG tablet pack TAKE TWO ORANGE TABLETS BY MOUTH IN THE MORNING AND ONE BLUE TABLET IN THE EVENING AS DIRECTED ON PACKAGE.  TAKE WITH FAT CONTAINING FOOD. 84 tablet 5     fluticasone (FLONASE) 50 MCG/ACT nasal spray SPRAY 2 SPRAYS INTO BOTH NOSTRILS DAILY 48 g 3     insulin glargine (LANTUS SOLOSTAR) 100 UNIT/ML pen Inject 10 Units Subcutaneous daily 15 mL 3     insulin pen needle (32G X 4 MM) 32G X 4 MM miscellaneous Use 3 pen needles daily or as directed. 300 each 3     lipase-protease-amylase (ZENPEP) 93705-76077-86057 units CPEP Take 6-8 capsules by mouth 3 times daily (with meals). And take 3-4 capsules with snacks. Total of 3 meals and 3 snacks daily for maximum 36 caps per day. 1080 capsule 11     lisinopril (ZESTRIL) 5 MG tablet Take 1 tablet (5 mg) by mouth daily 90 tablet 3     mvw complete formulation (SOFTGELS ) capsule Take 2 capsules by mouth daily 60 capsule 11     ursodiol (ACTIGALL) 300 MG capsule TAKE 1 CAPSULE (300MG ) BY MOUTH TWO TIMES A  capsule 3     No current facility-administered medications for this visit.      Review of systems: GI: Improved frequency and looseness of his stools since switching to Zenpep for enzyme replacement.  No constipation or abdominal pain.  Endocrine: He has not been checking his blood sugars recently.  He wakes up feeling hungry but has not had overt symptoms of hypoglycemia.  No recent changes to his insulin dose.  Cardiovascular: No orthostatic symptoms.  ENT: Uses Waterpik for sinus irrigation.  Currently no increase in baseline congestion.  Skin: Earlier today the patient had a node resected from his popliteal fossa on the right by dermatology.  The remainder of a complete review of systems is negative unless noted otherwise in HPI.    Physical  "examination  /76   Pulse 71   Ht 1.626 m (5' 4\")   Wt 58.5 kg (128 lb 15.5 oz)   SpO2 98%   BMI 22.14 kg/m      General: Alert appropriate in no apparent distress.  HEENT: Sclera anicteric.  No nasal discharge.  Oropharynx is moist without lesion or exudate.  Chest: Clear to auscultation bilaterally with good air movement and normal chest wall excursion  Cardiovascular: S1-S2 with a regular rate and rhythm.  No murmur or gallop.  No lower extremity edema.  Abdomen: Soft nontender nondistended without appreciable hepatomegaly.    Studies:  1.  Pulmonary function test from today personally reviewed.  Valid maneuver.  FEV1 2.69 L (83% predicted), FVC 4.72 L (120% predicted).  Values are consistent with mild obstruction based on reduced FEV1/FVC.  There is no significant interval change and he is within his baseline.  2.  Most recent respiratory cultures from April, 2024 and had 1 strain of Pseudomonas.  3.  Annual labs from today personally reviewed.  Normal electrolytes and creatinine, normal liver function tests, normal cholesterol panel, normal CBC and differential, normal INR, normal UA.  4.  DEXA scan result from today pending    Impression and plan  1.  Cystic fibrosis lung disease: Continued excellent clinical course with good symptom control and stability in pulmonary function tests.  Patient will continue vest therapy twice a day with baseline bronchodilator and mucolytic.  He is on chronic azithromycin.    2.  CFTR modulation therapy: Excellent clinical course on Trikafta full dose with good adherence.  Liver function test today normal.  Repeat due April, 2025.    3.  Pancreatic exocrine insufficiency: Improvement in stool frequency and consistency since transitioning to Zenpep.  His BMI remains slightly below target but stable.  Seen today by Daniela CF nutritionist.    4.  Cystic fibrosis related diabetes: Was having some overnight lows found on CGM monitoring.  Patient is on his baseline insulin " dose.  Not having overt hypoglycemic symptoms but overdue for follow-up with Dr. Miranda and will need additional monitoring presumably either fingerstick or repeat CGM.  Hemoglobin A1c from today is pending.    5.  Cystic fibrosis related liver disease: Abnormal ultrasound findings as previously described.  No appreciable hepatomegaly on exam today and normal liver function test.  He is on ursodiol.    6.  Hypertension: He is on low-dose lisinopril which likely has protective renal function in the setting of his diabetes.  Normotensive today.  No orthostatic symptoms.    7.  Cystic fibrosis related sinus disease: No acute issues currently.    8.  Healthcare maintenance: Patient will be due for annual studies October, 2025.  He is still planning on getting screening colonoscopy via [employer] and this has been scheduled for February, 2025.  He already has had his flu shot and plans on getting COVID-vaccine this fall.    Patient will follow-up in 3 months with Jyothi Warren.  He plans on contacting CF office to determine whether this should be done in person versus virtual.    Total visit time today 40 minutes including review of annual studies.  This is exclusive of time spent reviewing pulmonary function tests.      Again, thank you for allowing me to participate in the care of your patient.        Sincerely,        Abraham Hines MD

## 2024-10-22 NOTE — NURSING NOTE
Chief Complaint   Patient presents with    RECHECK     Return Cystic Fibrosis     Medications reviewed and vital signs taken.   Howard Rodríguez, CMA

## 2024-10-23 ENCOUNTER — TELEPHONE (OUTPATIENT)
Dept: DERMATOLOGY | Facility: CLINIC | Age: 39
End: 2024-10-23

## 2024-10-23 ENCOUNTER — CLINICAL UPDATE (OUTPATIENT)
Dept: PHARMACY | Facility: CLINIC | Age: 39
End: 2024-10-23
Payer: COMMERCIAL

## 2024-10-23 DIAGNOSIS — K86.81 EXOCRINE PANCREATIC INSUFFICIENCY: ICD-10-CM

## 2024-10-23 DIAGNOSIS — E84.9 CYSTIC FIBROSIS (H): ICD-10-CM

## 2024-10-23 DIAGNOSIS — E84.0 CYSTIC FIBROSIS WITH PULMONARY EXACERBATION (H): Primary | ICD-10-CM

## 2024-10-23 DIAGNOSIS — I10 BENIGN ESSENTIAL HYPERTENSION: ICD-10-CM

## 2024-10-23 LAB
EBV DNA SERPL NAA+PROBE-ACNC: NOT DETECTED IU/ML
IGE SERPL-ACNC: 2 KU/L (ref 0–114)
IGG SERPL-MCNC: 773 MG/DL (ref 610–1616)

## 2024-10-23 PROCEDURE — 99207 PR NO CHARGE LOS: CPT | Performed by: PHARMACIST

## 2024-10-23 RX ORDER — LISINOPRIL 5 MG/1
5 TABLET ORAL DAILY
Qty: 90 TABLET | Refills: 3 | Status: SHIPPED | OUTPATIENT
Start: 2024-10-23

## 2024-10-23 RX ORDER — PEDIATRIC MULTIVIT 61/D3/VIT K 1500-800
2 CAPSULE ORAL DAILY
Qty: 180 CAPSULE | Refills: 3 | Status: SHIPPED | OUTPATIENT
Start: 2024-10-23

## 2024-10-23 RX ORDER — ELEXACAFTOR, TEZACAFTOR, AND IVACAFTOR 100-50-75
KIT ORAL
Qty: 84 TABLET | Refills: 5 | Status: SHIPPED | OUTPATIENT
Start: 2024-10-23

## 2024-10-23 NOTE — TELEPHONE ENCOUNTER
Dlian is 1 day s/p excision on right posterior knee.    What are you doing to manage your pain?  Patient denies pain, but reports mild discomfort.    Are you applying ice? No    Have you had any noticeable bleeding through the bandage?  No, dressing is dry and intact.    Do you have any concerns?  No     Wound care directions reviewed.  Patient declined a post op appointment.  He will follow up in 3 months for a scar evaluation.     Caty Kwon RN

## 2024-10-23 NOTE — PROGRESS NOTES
Clinical Update:                                                    A chart review was conducted for Dilan Nassar.    Reason for Chart Review: Trikafta 6 Month Lab Monitoring     Discussion: Dilan has been on Trikafta since 12/17/2020.  Per chart review, patient continues full dose Trikafta .    Labs were reviewed from  10/22/24  at [facility] . All modulator labs are within normal limits.    Lab Results   Component Value Date    ALT 32 10/22/2024    AST 25 10/22/2024    BILITOTAL 0.6 10/22/2024    DBIL 0.22 10/22/2024     10/22/2024       Plan:  1. Continue Trikafta   2. Recheck hepatic panel and CK in 6 months         Joclein Cuba, PharmD   Cystic Fibrosis MTM Pharmacist  [facility]

## 2024-10-23 NOTE — PROGRESS NOTES
CF Annual Nutrition Assessment     Reason for Assessment  Assessed during CF clinic visit with Dr. Abraham Hines r/t increased nutrition risk with diagnosis of CF per protocol.      Anthropometric Assessment  Height: 163 cm  Weight: 58.5 kg (actual weight)/128 lbs 15.51 oz  Ideal body weight based on BMI 22 for females and 23 for males per CF Foundation recs: 60.8 kg (134 lbs)   Body mass index is 22.14 kg/m .    Wt Readings from Last 5 Encounters:   10/22/24 58.5 kg (128 lb 15.5 oz)   24 56.7 kg (125 lb)   24 59.4 kg (130 lb 14.4 oz)   23 59.7 kg (131 lb 9.8 oz)   23 57 kg (125 lb 10.6 oz)      Pancreatic Enzymes  Prescription changed ~1 month ago from Creon to Zenpep as pt having symptoms of uncontrolled EPI (see RD documentation).  Has now been on the following regimen:    Brand: Zenpep 15,000  Dosin-8 capsules with meals, 3-4 with snacks = 2051 units lipase/kg/meal  Estimated Daily Intake: Est. 28 caps = 7179 units lipase/kg/day     Signs of Malabsorption: Much improved with the switch from Creon to Zenpep.  He feels better overall however may want a larger capsule size to cut down on the number of pills.   Enzyme Program: Interested in signing up for [id number] - information given during visit.      Nutrition-Related Lab & Vitamin/Mineral Supplements  Annual studies completed today, 10/22/2024    Vitamin A- pending  Vitamin D- pending  Vitamin E- pending  Iron- pending  Lipid Panel- pending     DEXA - Today, wnl     Current Vitamin/Mineral Supplements: MVW Complete  - 2 caps per day from [facility]SP     Diet History and Assessment  Diet Preferences/Allergies/Intolerances: Regular    Intake Recall/Comments: Denied recent changes in appetite/PO intake. Typically eats TID meals and protein bar or other snack in afternoon. Also has a high fat HS snack with [employer], usually bag of chips or cheese stick. Overall consumes good variety at meals/balanced, no food insecurity concerns.       Calcium: ~3 servings/day with milk and cheese  Salt: normal intake, no sx of deficiency per  Hydration: not discussed this visit  Supplements: Yes - protein bars     CF Related Diabetes Evaluation  Hgb A1C: 6.5% on 10/22/2024  Insulin: Basaglar 10 units  Continues following with Dr. Miranda, visit with CDE in April. Short course using CGMs, feels he has a good regimen right now.     NUTRITION DIAGNOSIS  Impaired nutrient utilization related to CF pancreatic insufficiency as evidenced by requires pancreatic enzyme replacement therapy, vitamin/mineral supplementation, and higher kcal/protein diet in order to maintain ideal body weight and nutrition status.       INTERVENTIONS/RECOMMENDATIONS   1) Oral Intake/Weight:   BEE: ~1400  Calorie Goals- 5215-6746 kcals/day (175-200% BEE) +500 kcals/day for weight gain  Protein Goals-  grams/day (1.5-2 g/kg)     Reviewed recent nutrition history and weight trends. Encouraged ongoing good PO, goal for ongoing slight gains vs maintenance.       2) GI/Enzymes:  Plan to continue with Zenpep but pt states he may like a larger capsule.  Capsule sizes reviewed using education kit, patient most interested in Zenpep 25,000.  Does not want a new prescription today but asked that he could call in later and request one.  Recommendations for new Rx as follows:    Zenpep 25,000 4-5 capsules with meals, 2-3 capsules with snacks.  Suggested 720 capsules per fill (1 month supply) with 11 available refills.  Pharmacy selection per pt.      3) Vitamin/Mineral Supplementation:  Vitamin levels pending at the time of this visit.  If results warrant a change in his regimen we agreed to communicate via phone.     4) Diabetes:  Encouraged maintenance of current plan of care as per CF endocrine team.        GOALS:  1) Weight maintenance, ideally with gains towards BMI 23 kg/m2.   2) Good adherence with recommended enzyme replacement, vitamin/mineral supplements, and insulin.       FOLLOW-UP/MONITORING:  Visit patient within 12 months for annual nutrition reassessment.      Time spent in face-to-face interaction: 15 minutes (MNT time with diabetes)      Daniela Whitt MS, RD, LD, CACFD   Cystic Fibrosis/CF Lung Transplant Dietitian      -Available Mon-Thurs 8 AM-4:30 PM. Contact via ScribbleLive or Epic Inbasket.      -On Fridays please contact Tamika Perez RD and on weekends/holidays contact coverage dietitian via [employer] (inpatient use only).

## 2024-10-24 ENCOUNTER — TELEPHONE (OUTPATIENT)
Dept: DERMATOLOGY | Facility: CLINIC | Age: 39
End: 2024-10-24
Payer: COMMERCIAL

## 2024-10-24 LAB
A-TOCOPHEROL VIT E SERPL-MCNC: 8.2 MG/L
ANNOTATION COMMENT IMP: NORMAL
BETA+GAMMA TOCOPHEROL SERPL-MCNC: 0.4 MG/L
PATH REPORT.COMMENTS IMP SPEC: NORMAL
PATH REPORT.COMMENTS IMP SPEC: NORMAL
PATH REPORT.FINAL DX SPEC: NORMAL
PATH REPORT.GROSS SPEC: NORMAL
PATH REPORT.MICROSCOPIC SPEC OTHER STN: NORMAL
PATH REPORT.RELEVANT HX SPEC: NORMAL
RETINYL PALMITATE SERPL-MCNC: <0.02 MG/L
TESTOST SERPL-MCNC: 442 NG/DL (ref 240–950)
VIT A SERPL-MCNC: 0.52 MG/L

## 2024-10-24 NOTE — TELEPHONE ENCOUNTER
Guillermo called to report that when he was removing his pressure bandage that a corner of the Tegaderm lifted up.  He cleaned under it with rubbing alcohol and taped it back down.  He asked if he could leave it on for 2 weeks.      I advised him that since the Tegaderm was compromised we do recommend he remove the entire dressing and switch to daily wound care.    Patient asked if there would be any harm in leaving the Tegaderm on and I said that risk of infection is a concern since the original bandage was compromised.    Patient verbalized understanding.  I reviewed the signs of infection with him.    Caty Kwon RN

## 2024-10-27 LAB
BACTERIA SPEC CULT: ABNORMAL

## 2024-10-29 ENCOUNTER — MYC MEDICAL ADVICE (OUTPATIENT)
Dept: ENDOCRINOLOGY | Facility: CLINIC | Age: 39
End: 2024-10-29
Payer: COMMERCIAL

## 2024-10-29 ENCOUNTER — TELEPHONE (OUTPATIENT)
Dept: DERMATOLOGY | Facility: CLINIC | Age: 39
End: 2024-10-29
Payer: COMMERCIAL

## 2024-10-29 NOTE — TELEPHONE ENCOUNTER
"Pt read [employer] message and will call the clinic with any questions or concerns.   Maryellen Fair RN on 10/29/2024 at 8:13 AM      Final Diagnosis  Right posterior knee:  - Dermatofibroma - (see description)    Electronically signed by Pool Mccray MD on 10/24/2024 at 11:48 PM      James Li,     Please see the message from Dr. Bethea regarding your results:  \"the result was benign.\"  Written by Scot Cuevas on 10/27/2024  9:11 AM CDT  Seen by patient Dilan Nassar on 10/29/2024  7:53 AM  "

## 2024-10-31 ENCOUNTER — TELEPHONE (OUTPATIENT)
Dept: ENDOCRINOLOGY | Facility: CLINIC | Age: 39
End: 2024-10-31
Payer: COMMERCIAL

## 2024-10-31 NOTE — TELEPHONE ENCOUNTER
Patient confirmed scheduled appointment:  Date: 11/19  Time: 8:30  Visit type: return cystic fibrosis  Provider: Ruben  Location: virtual  Testing/imaging:   Additional notes: Patient confirmed he should be in [place] for the visit       Guillermo FOSTER [facility] (supporting Edison Miranda MD)2 days ago       Hello,     I need to schedule an appointment with Dr. Miranda.  I prefer to schedule an online appointment.  Please let me know available dates and times.    Nadya Ramon on 10/31/2024 at 3:02 PM  
Attending with

## 2024-11-05 DIAGNOSIS — E84.9 CYSTIC FIBROSIS (H): Primary | ICD-10-CM

## 2024-11-05 DIAGNOSIS — K86.81 EXOCRINE PANCREATIC INSUFFICIENCY: ICD-10-CM

## 2024-11-05 NOTE — PROGRESS NOTES
Nutrition Note    Received Mychart communication from pt that he is now interested in switching from Zenpep 15,000 to Zenpep 25,000 capsule size. This was discussed with RD during previous clinic visit 10/22/24.     Updated prescription.     Tamika Perez RD, LD, CACFD  Cystic Fibrosis/Lung Transplant Dietitian  Available via Syn[employer]

## 2024-11-14 ENCOUNTER — MYC REFILL (OUTPATIENT)
Dept: PULMONOLOGY | Facility: CLINIC | Age: 39
End: 2024-11-14
Payer: COMMERCIAL

## 2024-11-14 DIAGNOSIS — E84.0 CYSTIC FIBROSIS WITH PULMONARY MANIFESTATIONS (H): ICD-10-CM

## 2024-11-14 DIAGNOSIS — K86.81 EXOCRINE PANCREATIC INSUFFICIENCY: ICD-10-CM

## 2024-11-14 DIAGNOSIS — A49.02 MRSA INFECTION: ICD-10-CM

## 2024-11-14 RX ORDER — FLUTICASONE PROPIONATE 50 MCG
SPRAY, SUSPENSION (ML) NASAL
Qty: 48 G | Refills: 3 | Status: SHIPPED | OUTPATIENT
Start: 2024-11-14

## 2024-12-02 NOTE — PROGRESS NOTES
PER [employer] MESSAGE:  Hello,     Here are my recent recorded glucose readings for my 12/03/24 appointment with Dr. Miranda.  The continuous monitor I intended to use would not pair with my iPhone.     11/19/24  Breakfast   129 (2.5 hours after eating)  *eggs, cheese, pate, whole milk  Lunch   188 (2.75 hours after eating)  *turkey/cheddar sandwich, mayen, mustard, potato chips  Dinner   253 (2.5 hours after eating)  *breaded chicken, 57 sauce, mac n cheese, buttered roll     11/20/24  AM   116  Breakfast   119 (2 hours, 40 minutes after eating)  *eggs, cheese, pate, whole milk  Lunch   115 (2.5 hours after eating)  *2 pieces of pizza, 2 breadsticks, marinara  Dinner   73 (2.5 hours after eating/2 hours of snow clearing)  *hunter steak, potatoes     11/21/24  AM   90  Breakfast   162 (2.5 hours after eating)  *eggs, cheese, pate, whole milk  Lunch   118 (2.75 hours after eating)  *hunter steak, potatoes  Dinner   268 (2.5 hours after eating)  *bratwurst, bun, sweet potato fries, baked beans, ketchup, bbq sauce, ranch dressing     11/25/24  Lunch   78 (2.5 hours after eating)  *[employer] fast food (2 had shell tacos, grilled chicken burrito)  Dinner   140 (2.75 hours after eating)  *chicken cordon graham, asparagus, squash     11/26/24  AM   98  Breakfast   107 (2.5 hours after eating)  *eggs, pate, whole milk  Lunch   270 (2.5 hours after eating)  *turkey/cheddar sandwich, mayen, mustard, potato chips, cherry yogurt  Dinner   121 (2.5 hours after eating)  *salmon, honey mustard, broccoli, potatoes    11/27/24  AM   110  Breakfast   115 (2.5 hours after eating)  *eggs, pate, whole milk  Lunch   195 (2.5 hours after eating)  *turkey/cheddar sandwich, mayen, mustard, potato chips, blueberry yogurt  Dinner   261 (2.5 hours after eating)  *[employer] fast food (pate/bbq cheese burger, fries, onion rings, fried pickles, ketchup, ranch dressing     11/28/24  AM   109

## 2024-12-03 ENCOUNTER — VIRTUAL VISIT (OUTPATIENT)
Dept: ENDOCRINOLOGY | Facility: CLINIC | Age: 39
End: 2024-12-03
Attending: INTERNAL MEDICINE
Payer: COMMERCIAL

## 2024-12-03 DIAGNOSIS — E84.9 DIABETES MELLITUS DUE TO CYSTIC FIBROSIS (H): Primary | ICD-10-CM

## 2024-12-03 DIAGNOSIS — E08.9 DIABETES MELLITUS DUE TO CYSTIC FIBROSIS (H): Primary | ICD-10-CM

## 2024-12-03 PROCEDURE — 99214 OFFICE O/P EST MOD 30 MIN: CPT | Mod: 95 | Performed by: INTERNAL MEDICINE

## 2024-12-03 NOTE — LETTER
12/3/2024       RE: Dilan Nassar  [address]  [place] [address]     Dear Colleague,    Thank you for referring your patient, Dilan Nassar, to the [facility] [place] at [facility]. Please see a copy of my visit note below.    PER [employer] MESSAGE:  Hello,     Here are my recent recorded glucose readings for my 12/03/24 appointment with Dr. Miranda.  The continuous monitor I intended to use would not pair with my iPhone.     11/19/24  Breakfast   129 (2.5 hours after eating)  *eggs, cheese, pate, whole milk  Lunch   188 (2.75 hours after eating)  *turkey/cheddar sandwich, mayen, mustard, potato chips  Dinner   253 (2.5 hours after eating)  *breaded chicken, 57 sauce, mac n cheese, buttered roll     11/20/24  AM   116  Breakfast   119 (2 hours, 40 minutes after eating)  *eggs, cheese, pate, whole milk  Lunch   115 (2.5 hours after eating)  *2 pieces of pizza, 2 breadsticks, marinara  Dinner   73 (2.5 hours after eating/2 hours of snow clearing)  *hunter steak, potatoes     11/21/24  AM   90  Breakfast   162 (2.5 hours after eating)  *eggs, cheese, pate, whole milk  Lunch   118 (2.75 hours after eating)  *hunter steak, potatoes  Dinner   268 (2.5 hours after eating)  *bratwurst, bun, sweet potato fries, baked beans, ketchup, bbq sauce, ranch dressing     11/25/24  Lunch   78 (2.5 hours after eating)  *[employer] fast food (2 had shell tacos, grilled chicken burrito)  Dinner   140 (2.75 hours after eating)  *chicken cordon graham, asparagus, squash     11/26/24  AM   98  Breakfast   107 (2.5 hours after eating)  *eggs, pate, whole milk  Lunch   270 (2.5 hours after eating)  *turkey/cheddar sandwich, mayen, mustard, potato chips, cherry yogurt  Dinner   121 (2.5 hours after eating)  *salmon, honey mustard, broccoli, potatoes    11/27/24  AM   110  Breakfast   115 (2.5 hours after eating)  *eggs, pate, whole milk  Lunch   195 (2.5  hours after eating)  *turkey/cheddar sandwich, mayen, mustard, potato chips, blueberry yogurt  Dinner   261 (2.5 hours after eating)  *[employer] fast food (pate/bbq cheese burger, fries, onion rings, fried pickles, ketchup, ranch dressing     11/28/24  AM   109        Dilan Nassar  is being evaluated via a billable video visit.      CF Endocrinology Return Consultation:  Diabetes  :   Patient: Dilan Nassar MRN# [id number]   YOB: 1985 Age: 39 year old   Date of Visit: 12/03/2024  Dear Dr. Atilio Nieto:    I had the pleasure of seeing your patient, Dilan Nassar in the CF Endocrinology Clinic, [facility], for a return consultation .           Problem list:   MRSA  Cystic fibrosis  Diabetes mellitus  Exocrine pancreatic insufficiency  Vitamin D deficiency  Gout  Intestinal malabsorption         HPI:     Dilan is a 39 year old male with Cystic Fibrosis Related Diabetes Mellitus (CFRD).    I have reviewed the available past laboratory evaluations, imaging studies, and medical records available to me at this visit.   History was obtained from the patient and the medical record.  I have reviewed the notes of the pulmonary care team entered into the medical record since I last saw the patient.    Overall feels well, denies specific complaints or concerns.    Overall doing well  CGM has not been covered by insurance  Did fingerstick glucose and shared via [employer]  Home glucose data reviewed with patient  Fasting glucose readings are in range, postprandial readings particularly after dinner above 200  Had symptoms of low after shoveling snow/physical activity  Denies any other symptomatic hypoglycemia    Bone density on recent DEXA was within normal range  Hemoglobin A1C   Date Value Ref Range Status   10/22/2024 6.5 (H) <5.7 % Final     Comment:     Normal <5.7%   Prediabetes 5.7-6.4%    Diabetes 6.5% or higher     Note: Adopted from ADA consensus guidelines.   09/17/2020 5.7 (H) 0  - 5.6 % Final     Comment:     Normal <5.7% Prediabetes 5.7-6.4%  Diabetes 6.5% or higher - adopted from ADA   consensus guidelines.       Current insulin regimen:   Glargine 10 unit(s) daily 10 AM          Past Medical History:     Active Ambulatory Problems     Diagnosis Date Noted     Cystic fibrosis with pulmonary manifestations      Exocrine pancreatic insufficiency 02/13/2015     Vitamin D deficiency 02/13/2015     Gout 02/13/2015     Allergic rhinitis 02/13/2015     Intestinal malabsorption 02/13/2015     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) 10/16/2015     Diabetes mellitus related to cystic fibrosis (H) 10/16/2015     Cystic fibrosis with pulmonary exacerbation (H) 10/23/2015     Methicillin resistant Staphylococcus aureus infection 08/16/2016     Resolved Ambulatory Problems     Diagnosis Date Noted     Type 1 diabetes mellitus (H) 02/13/2015     Past Medical History:   Diagnosis Date     Diabetes mellitus related to CF (cystic fibrosis) (H)           Past Surgical History:   Appendectomy  Sinus surgery            Social History:   Unmarried  Non smoker  Former smokeless tobacco user           Family History:   Mother: liver disease  Paternal grandfather: prostate cancer  Maternal aunt: diabetes mellitus  Maternal uncle: diabetes mellitus  Maternal grandmother: diabetes mellitus  Paternal grandmother: coronary artery disease         Allergies:     Allergies   Allergen Reactions     Molds & Smuts Itching          Medications:     Current Outpatient Medications:      albuterol (PROVENTIL) (2.5 MG/3ML) 0.083% neb solution, Take 1 vial (2.5 mg) by nebulization 3 times daily as needed for shortness of breath, wheezing or cough, Disp: 180 mL, Rfl: 11     allopurinol (ZYLOPRIM) 300 MG tablet, TAKE ONE TABLET (300MG) BY MOUTH DAILY, Disp: 90 tablet, Rfl: 3     azithromycin (ZITHROMAX) 250 MG tablet, Take 2 tablets (500 mg) by mouth Every Mon, Wed, Fri Morning, Disp: 72 tablet, Rfl: 3     blood glucose (NO  BRAND SPECIFIED) test strip, Use to test blood sugar 4 times daily or as directed., Disp: 125 strip, Rfl: 11     blood glucose monitoring (FREESTYLE) lancets, Use to test blood sugar 4 times daily or as directed., Disp: 200 each, Rfl: 11     blood glucose monitoring (NO BRAND SPECIFIED) meter device kit, Use to test blood sugar 4 times daily or as directed., Disp: 1 kit, Rfl: 0     Continuous Blood Gluc Sensor (FREESTYLE ESTEFANY 3 SENSOR) MISC, 1 each every 14 days, Disp: 6 each, Rfl: 3     Continuous Glucose Sensor (DEXCOM G7 SENSOR) MISC, Change every 10 days., Disp: 9 each, Rfl: 4     dornase maryse (PULMOZYME) 2.5 MG/2.5ML neb solution, INHALE CONTENTS OF ONE VIAL (2.5MG) VIA NEBULIZER TWICE DAILY. KEEP REFRIGERATED UNTIL USE., Disp: 150 mL, Rfl: 11     elexacaftor-tezacaftor-ivacaftor & ivacaftor (TRIKAFTA) 100-50-75 & 150 MG tablet pack, TAKE TWO ORANGE TABLETS BY MOUTH IN THE MORNING AND ONE BLUE TABLET IN THE EVENING AS DIRECTED ON PACKAGE.  TAKE WITH FAT CONTAINING FOOD., Disp: 84 tablet, Rfl: 5     fluticasone (FLONASE) 50 MCG/ACT nasal spray, SPRAY 2 SPRAYS INTO BOTH NOSTRILS DAILY, Disp: 48 g, Rfl: 3     insulin glargine (LANTUS SOLOSTAR) 100 UNIT/ML pen, Inject 10 Units Subcutaneous daily, Disp: 15 mL, Rfl: 3     insulin pen needle (32G X 4 MM) 32G X 4 MM miscellaneous, Use 3 pen needles daily or as directed., Disp: 300 each, Rfl: 3     lipase-protease-amylase (ZENPEP) 73968-70750-387456 units CPEP, Take 4-5 capsules (100,000-125,000 Units) by mouth 3 times daily (with meals). And take 2-3 capsules with snacks. Total 3 meals and 3 snacks daily for maximum 24 per day., Disp: 700 capsule, Rfl: 11     lisinopril (ZESTRIL) 5 MG tablet, Take 1 tablet (5 mg) by mouth daily., Disp: 90 tablet, Rfl: 3     mvw complete formulation (SOFTGELS ) capsule, Take 2 capsules by mouth daily., Disp: 180 capsule, Rfl: 3     ursodiol (ACTIGALL) 300 MG capsule, TAKE 1 CAPSULE (300MG ) BY MOUTH TWO TIMES A DAY, Disp: 180  capsule, Rfl: 3           Review of Systems:              Physical Exam:   There were no vitals taken for this visit.  Height: Data Unavailable, Facility age limit for growth %deana is 20 years.  Weight: 0 lbs 0 oz, Facility age limit for growth %deana is 20 years.  BMI: There is no height or weight on file to calculate BMI., No height and weight on file for this encounter.      GENERAL: Healthy, alert and no distress  EYES: Eyes grossly normal to inspection.  No discharge or erythema, or obvious scleral/conjunctival abnormalities.  RESP: No audible wheeze, cough, or visible cyanosis.  No visible retractions or increased work of breathing.    NEURO:  Mentation and speech appropriate for age.  PSYCH: affect normal/bright, judgement and insight intact, normal speech and appearance well-groomed.        Laboratory results:     TSH   Date Value Ref Range Status   10/22/2024 1.30 0.30 - 4.20 uIU/mL Final   09/14/2021 2.37 0.40 - 4.00 mU/L Final   09/17/2020 1.96 0.40 - 4.00 mU/L Final     Testosterone Total   Date Value Ref Range Status   10/22/2024 442 240 - 950 ng/dL Final   09/17/2020 416 240 - 950 ng/dL Final     Comment:     This test was developed and its performance characteristics determined by the   [facility].   It has not been cleared or approved by the FDA. The laboratory is regulated   under CLIA as qualified to perform high-complexity testing. This test is used   for clinical purposes. It should not be regarded as investigational or for   research.       Cholesterol   Date Value Ref Range Status   10/22/2024 144 <200 mg/dL Final   09/17/2020 134 <200 mg/dL Final     Albumin Urine mg/L   Date Value Ref Range Status   10/22/2024 <12.0 mg/L Final     Comment:     The reference ranges have not been established in urine albumin. The results should be integrated into the clinical context for interpretation.   01/11/2022 35 mg/L Final   09/17/2020 12  mg/L Final     Triglycerides   Date Value Ref Range Status   10/22/2024 47 <150 mg/dL Final   09/17/2020 91 <150 mg/dL Final     HDL Cholesterol   Date Value Ref Range Status   09/17/2020 83 >39 mg/dL Final     Direct Measure HDL   Date Value Ref Range Status   10/22/2024 85 >=40 mg/dL Final     LDL Cholesterol Calculated   Date Value Ref Range Status   10/22/2024 50 <100 mg/dL Final   09/17/2020 33 <100 mg/dL Final     Comment:     Desirable:       <100 mg/dl     Non HDL Cholesterol   Date Value Ref Range Status   10/22/2024 59 <130 mg/dL Final   09/17/2020 51 <130 mg/dL Final         CF  Diabetes Health Maintenance    Date of Diabetes Diagnosis: ~ 2012     Special Notes (if any):      Date Last Eye Exam:   Spring 2023     Date Last Dental Appointment:     Dates of Episodes Severe* Hypoglycemia (month/year, cumulative, ongoing, assess each visit):    *Severe=patient unconscious, seizure, unable to help self    Last 25-Vitamin D (every year): 39      Last DXA, lowest Z-score (every 2 years):  -1.2 (2024)    Last Testosterone:   Testosterone Total   Date Value Ref Range Status   10/22/2024 442 240 - 950 ng/dL Final   09/17/2020 416 240 - 950 ng/dL Final     Comment:     This test was developed and its performance characteristics determined by the   [facility].   It has not been cleared or approved by the FDA. The laboratory is regulated   under CLIA as qualified to perform high-complexity testing. This test is used   for clinical purposes. It should not be regarded as investigational or for   research.              Assessment and Plan:   Dilan is a 39 year old male with cystic fibrosis related diabetes    CFRD: Overall good control with A1c of 6.5%  Pattern of postprandial highs  Reduce dose of Lantus to 8 units daily  Discussed spreading out carbs intake throughout the day to avoid postprandial highs after large carb meals  Discussed that if he  continues to have postprandial highs may need to consider short acting insulin with meals  Overall BMI is just below target range  Plan to get diagnostic or sample CGM when he comes in for his next pulmonary visit    Bone density within normal range on recent DEXA  Vitamin D and TSH were normal on recent annual labs    Previously positive microalbuminuria, on lisinopril.  Microalbumin was normal on most recent annual labs  Blood pressure in target range at other recent clinic visits    Return to clinic in 6 months     JOSE Headley  Note: Chart documentation done in part with Dragon Voice Recognition software. Although reviewed after completion, some word and grammatical errors may remain.  Please consider this when interpreting information in this chart     Video-Visit Details    Type of service:  Video Visit done using [employer]    Video visit start time 8:57 AM, video visit end time 9:12 AM    Originating Location (pt. Location): Home    Distant Location (provider location): On-site    Platform used for Video Visit: Shipzi                    Again, thank you for allowing me to participate in the care of your patient.      Sincerely,    Edison Miranda MD

## 2024-12-03 NOTE — PROGRESS NOTES
Dilan Nassar  is being evaluated via a billable video visit.      CF Endocrinology Return Consultation:  Diabetes  :   Patient: Dilan Nassar MRN# [id number]   YOB: 1985 Age: 39 year old   Date of Visit: 12/03/2024  Dear Dr. Atilio Nieto:    I had the pleasure of seeing your patient, Dilan Nassar in the CF Endocrinology Clinic, [facility], for a return consultation .           Problem list:   MRSA  Cystic fibrosis  Diabetes mellitus  Exocrine pancreatic insufficiency  Vitamin D deficiency  Gout  Intestinal malabsorption         HPI:     Dilan is a 39 year old male with Cystic Fibrosis Related Diabetes Mellitus (CFRD).    I have reviewed the available past laboratory evaluations, imaging studies, and medical records available to me at this visit.   History was obtained from the patient and the medical record.  I have reviewed the notes of the pulmonary care team entered into the medical record since I last saw the patient.    Overall feels well, denies specific complaints or concerns.    Overall doing well  CGM has not been covered by insurance  Did fingerstick glucose and shared via Fetchmob[employer]  Home glucose data reviewed with patient  Fasting glucose readings are in range, postprandial readings particularly after dinner above 200  Had symptoms of low after shoveling snow/physical activity  Denies any other symptomatic hypoglycemia    Bone density on recent DEXA was within normal range  Hemoglobin A1C   Date Value Ref Range Status   10/22/2024 6.5 (H) <5.7 % Final     Comment:     Normal <5.7%   Prediabetes 5.7-6.4%    Diabetes 6.5% or higher     Note: Adopted from ADA consensus guidelines.   09/17/2020 5.7 (H) 0 - 5.6 % Final     Comment:     Normal <5.7% Prediabetes 5.7-6.4%  Diabetes 6.5% or higher - adopted from ADA   consensus guidelines.       Current insulin regimen:   Glargine 10 unit(s) daily 10 AM          Past Medical History:     Active Ambulatory Problems      Diagnosis Date Noted    Cystic fibrosis with pulmonary manifestations     Exocrine pancreatic insufficiency 02/13/2015    Vitamin D deficiency 02/13/2015    Gout 02/13/2015    Allergic rhinitis 02/13/2015    Intestinal malabsorption 02/13/2015    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) 10/16/2015    Diabetes mellitus related to cystic fibrosis (H) 10/16/2015    Cystic fibrosis with pulmonary exacerbation (H) 10/23/2015    Methicillin resistant Staphylococcus aureus infection 08/16/2016     Resolved Ambulatory Problems     Diagnosis Date Noted    Type 1 diabetes mellitus (H) 02/13/2015     Past Medical History:   Diagnosis Date    Diabetes mellitus related to CF (cystic fibrosis) (H)           Past Surgical History:   Appendectomy  Sinus surgery            Social History:   Unmarried  Non smoker  Former smokeless tobacco user           Family History:   Mother: liver disease  Paternal grandfather: prostate cancer  Maternal aunt: diabetes mellitus  Maternal uncle: diabetes mellitus  Maternal grandmother: diabetes mellitus  Paternal grandmother: coronary artery disease         Allergies:     Allergies   Allergen Reactions    Molds & Smuts Itching          Medications:     Current Outpatient Medications:     albuterol (PROVENTIL) (2.5 MG/3ML) 0.083% neb solution, Take 1 vial (2.5 mg) by nebulization 3 times daily as needed for shortness of breath, wheezing or cough, Disp: 180 mL, Rfl: 11    allopurinol (ZYLOPRIM) 300 MG tablet, TAKE ONE TABLET (300MG) BY MOUTH DAILY, Disp: 90 tablet, Rfl: 3    azithromycin (ZITHROMAX) 250 MG tablet, Take 2 tablets (500 mg) by mouth Every Mon, Wed, Fri Morning, Disp: 72 tablet, Rfl: 3    blood glucose (NO BRAND SPECIFIED) test strip, Use to test blood sugar 4 times daily or as directed., Disp: 125 strip, Rfl: 11    blood glucose monitoring (FREESTYLE) lancets, Use to test blood sugar 4 times daily or as directed., Disp: 200 each, Rfl: 11    blood glucose monitoring (NO BRAND  SPECIFIED) meter device kit, Use to test blood sugar 4 times daily or as directed., Disp: 1 kit, Rfl: 0    Continuous Blood Gluc Sensor (FREESTYLE ESTEFANY 3 SENSOR) MISC, 1 each every 14 days, Disp: 6 each, Rfl: 3    Continuous Glucose Sensor (DEXCOM G7 SENSOR) MISC, Change every 10 days., Disp: 9 each, Rfl: 4    dornase maryse (PULMOZYME) 2.5 MG/2.5ML neb solution, INHALE CONTENTS OF ONE VIAL (2.5MG) VIA NEBULIZER TWICE DAILY. KEEP REFRIGERATED UNTIL USE., Disp: 150 mL, Rfl: 11    elexacaftor-tezacaftor-ivacaftor & ivacaftor (TRIKAFTA) 100-50-75 & 150 MG tablet pack, TAKE TWO ORANGE TABLETS BY MOUTH IN THE MORNING AND ONE BLUE TABLET IN THE EVENING AS DIRECTED ON PACKAGE.  TAKE WITH FAT CONTAINING FOOD., Disp: 84 tablet, Rfl: 5    fluticasone (FLONASE) 50 MCG/ACT nasal spray, SPRAY 2 SPRAYS INTO BOTH NOSTRILS DAILY, Disp: 48 g, Rfl: 3    insulin glargine (LANTUS SOLOSTAR) 100 UNIT/ML pen, Inject 10 Units Subcutaneous daily, Disp: 15 mL, Rfl: 3    insulin pen needle (32G X 4 MM) 32G X 4 MM miscellaneous, Use 3 pen needles daily or as directed., Disp: 300 each, Rfl: 3    lipase-protease-amylase (ZENPEP) 50599-36204-064358 units CPEP, Take 4-5 capsules (100,000-125,000 Units) by mouth 3 times daily (with meals). And take 2-3 capsules with snacks. Total 3 meals and 3 snacks daily for maximum 24 per day., Disp: 700 capsule, Rfl: 11    lisinopril (ZESTRIL) 5 MG tablet, Take 1 tablet (5 mg) by mouth daily., Disp: 90 tablet, Rfl: 3    mvw complete formulation (SOFTGELS ) capsule, Take 2 capsules by mouth daily., Disp: 180 capsule, Rfl: 3    ursodiol (ACTIGALL) 300 MG capsule, TAKE 1 CAPSULE (300MG ) BY MOUTH TWO TIMES A DAY, Disp: 180 capsule, Rfl: 3           Review of Systems:              Physical Exam:   There were no vitals taken for this visit.  Height: Data Unavailable, Facility age limit for growth %deana is 20 years.  Weight: 0 lbs 0 oz, Facility age limit for growth %deana is 20 years.  BMI: There is no height or  weight on file to calculate BMI., No height and weight on file for this encounter.      GENERAL: Healthy, alert and no distress  EYES: Eyes grossly normal to inspection.  No discharge or erythema, or obvious scleral/conjunctival abnormalities.  RESP: No audible wheeze, cough, or visible cyanosis.  No visible retractions or increased work of breathing.    NEURO:  Mentation and speech appropriate for age.  PSYCH: affect normal/bright, judgement and insight intact, normal speech and appearance well-groomed.        Laboratory results:     TSH   Date Value Ref Range Status   10/22/2024 1.30 0.30 - 4.20 uIU/mL Final   09/14/2021 2.37 0.40 - 4.00 mU/L Final   09/17/2020 1.96 0.40 - 4.00 mU/L Final     Testosterone Total   Date Value Ref Range Status   10/22/2024 442 240 - 950 ng/dL Final   09/17/2020 416 240 - 950 ng/dL Final     Comment:     This test was developed and its performance characteristics determined by the   [facility].   It has not been cleared or approved by the FDA. The laboratory is regulated   under CLIA as qualified to perform high-complexity testing. This test is used   for clinical purposes. It should not be regarded as investigational or for   research.       Cholesterol   Date Value Ref Range Status   10/22/2024 144 <200 mg/dL Final   09/17/2020 134 <200 mg/dL Final     Albumin Urine mg/L   Date Value Ref Range Status   10/22/2024 <12.0 mg/L Final     Comment:     The reference ranges have not been established in urine albumin. The results should be integrated into the clinical context for interpretation.   01/11/2022 35 mg/L Final   09/17/2020 12 mg/L Final     Triglycerides   Date Value Ref Range Status   10/22/2024 47 <150 mg/dL Final   09/17/2020 91 <150 mg/dL Final     HDL Cholesterol   Date Value Ref Range Status   09/17/2020 83 >39 mg/dL Final     Direct Measure HDL   Date Value Ref Range Status   10/22/2024 85 >=40 mg/dL Final      LDL Cholesterol Calculated   Date Value Ref Range Status   10/22/2024 50 <100 mg/dL Final   09/17/2020 33 <100 mg/dL Final     Comment:     Desirable:       <100 mg/dl     Non HDL Cholesterol   Date Value Ref Range Status   10/22/2024 59 <130 mg/dL Final   09/17/2020 51 <130 mg/dL Final         CF  Diabetes Health Maintenance    Date of Diabetes Diagnosis: ~ 2012     Special Notes (if any):      Date Last Eye Exam:   Spring 2023     Date Last Dental Appointment:     Dates of Episodes Severe* Hypoglycemia (month/year, cumulative, ongoing, assess each visit):    *Severe=patient unconscious, seizure, unable to help self    Last 25-Vitamin D (every year): 39      Last DXA, lowest Z-score (every 2 years):  -1.2 (2024)    Last Testosterone:   Testosterone Total   Date Value Ref Range Status   10/22/2024 442 240 - 950 ng/dL Final   09/17/2020 416 240 - 950 ng/dL Final     Comment:     This test was developed and its performance characteristics determined by the   [facility].   It has not been cleared or approved by the FDA. The laboratory is regulated   under CLIA as qualified to perform high-complexity testing. This test is used   for clinical purposes. It should not be regarded as investigational or for   research.              Assessment and Plan:   Dilan is a 39 year old male with cystic fibrosis related diabetes    CFRD: Overall good control with A1c of 6.5%  Pattern of postprandial highs  Reduce dose of Lantus to 8 units daily  Discussed spreading out carbs intake throughout the day to avoid postprandial highs after large carb meals  Discussed that if he continues to have postprandial highs may need to consider short acting insulin with meals  Overall BMI is just below target range  Plan to get diagnostic or sample CGM when he comes in for his next pulmonary visit    Bone density within normal range on recent DEXA  Vitamin D and TSH were normal  on recent annual labs    Previously positive microalbuminuria, on lisinopril.  Microalbumin was normal on most recent annual labs  Blood pressure in target range at other recent clinic visits    Return to clinic in 6 months     JOSE Headley  Note: Chart documentation done in part with Dragon Voice Recognition software. Although reviewed after completion, some word and grammatical errors may remain.  Please consider this when interpreting information in this chart     Video-Visit Details    Type of service:  Video Visit done using [employer]    Video visit start time 8:57 AM, video visit end time 9:12 AM    Originating Location (pt. Location): Home    Distant Location (provider location): On-site    Platform used for Video Visit: Mendor

## 2025-01-03 ENCOUNTER — TELEPHONE (OUTPATIENT)
Dept: PULMONOLOGY | Facility: CLINIC | Age: 40
End: 2025-01-03

## 2025-01-03 NOTE — TELEPHONE ENCOUNTER
"Recevied email from [facility]  requesting new rx's.    [facility] has Trikafta and Pulmozyme in adjudication \"Coverage Term'd 12/31/24\".    LMPTCB and sent MyC.  "

## 2025-01-03 NOTE — TELEPHONE ENCOUNTER
Patient called back. Has new insurance through wife's employer effective 1/13/25. Was told by new insurance he's restricted to [facility].    Should be okay on Zenpep, Pulmozyme and Trikafta til after the 13th but will reach out if needing a bridge supply.    New Ins - Effective 1/13  Rx BIN: [id number]  Rx PCN:   ID: [id number]  Rx Grp: [id number]

## 2025-01-13 ENCOUNTER — TELEPHONE (OUTPATIENT)
Dept: PULMONOLOGY | Facility: CLINIC | Age: 40
End: 2025-01-13
Payer: COMMERCIAL

## 2025-01-13 DIAGNOSIS — E84.0 CYSTIC FIBROSIS WITH PULMONARY MANIFESTATIONS (H): ICD-10-CM

## 2025-01-13 DIAGNOSIS — E84.9 CYSTIC FIBROSIS (H): ICD-10-CM

## 2025-01-13 NOTE — TELEPHONE ENCOUNTER
[facility] Prior Authorization Team   Phone: [phone]  Fax: [phone]    PA Needed    Medication: Pulmozyme PA Needed  QTY/DS:150 per 30 days  NEW INS:[employer]  Insurance Company:    Pharmacy Filling the Rx: [facility] - [place], [place] - [address]  PA :    Date of last fill:12/10/2024

## 2025-01-13 NOTE — TELEPHONE ENCOUNTER
[facility] Prior Authorization Team   Phone: [phone]  Fax: [phone]    PA Needed    Medication: Trikafta PA Needed  QTY/DS:84 per 28days  NEW INS:[employer]  Insurance Company:    Pharmacy Filling the Rx: [facility] - [place], [place] - [address]  PA :    Date of last fill:12/10/2024

## 2025-01-14 NOTE — TELEPHONE ENCOUNTER
PA Initiation    Medication: TRIKAFTA 100-50-75 & 150 MG PO TBPK  Insurance Company: [employer] - Phone [phone] Fax [phone]  Pharmacy Filling the Rx: [facility] - [place], [place] - [address]  Filling Pharmacy Phone: [phone]  Filling Pharmacy Fax: [phone]  Start Date: 1/14/2025    Key: [id number]

## 2025-01-14 NOTE — TELEPHONE ENCOUNTER
PA Initiation    Medication: PULMOZYME 2.5 MG/2.5ML IN SOLN  Insurance Company: [employer] - Phone [phone] Fax [phone]  [facility] the Rx: [facility] - [address] [facility]  Filling Pharmacy Phone:    Filling Pharmacy Fax:    Start Date: 1/14/2025

## 2025-01-14 NOTE — TELEPHONE ENCOUNTER
Prior Authorization Approval    Medication: PULMOZYME 2.5 MG/2.5ML IN SOLN  Authorization Effective Date: 1/13/2025  Authorization Expiration Date: 1/14/2026  Approved Dose/Quantity: #150mLs per 30 days  Reference #: Key: [id number]   Insurance Company: [employer] - Phone [phone] Fax [phone]  Expected CoPay: $    CoPay Card Available: Yes    Financial Assistance Needed: Has co-pay card  Which Pharmacy is filling the prescription: [facility] - [address] [place] [address]  Pharmacy Notified: Requested new rx  Patient Notified: Sent MyC    Requested new rx, will email  when received

## 2025-01-14 NOTE — TELEPHONE ENCOUNTER
Prior Authorization Approval    Medication: TRIKAFTA 100-50-75 & 150 MG PO TBPK  Authorization Effective Date: 1/13/2025  Authorization Expiration Date: 1/14/2026  Approved Dose/Quantity: #84 per 28 days  Reference #: Key: [id number]   Insurance Company: [employer] - Phone [phone] Fax [phone]  Expected CoPay: $    CoPay Card Available:      Financial Assistance Needed: Has co-pay card  Which [facility] is filling the prescription[facility] [place] - [address] [place] [address]  Pharmacy Notified: Requested new rx  Patient Notified: Sent MyC    Requested new rx, updated [employer] Portal, will send  an email when new rx's are sent.

## 2025-01-15 RX ORDER — ELEXACAFTOR, TEZACAFTOR, AND IVACAFTOR 100-50-75
KIT ORAL
Qty: 84 TABLET | Refills: 2 | Status: SHIPPED | OUTPATIENT
Start: 2025-01-15

## 2025-01-15 RX ORDER — ELEXACAFTOR, TEZACAFTOR, AND IVACAFTOR 100-50-75
KIT ORAL
Qty: 84 TABLET | Refills: 2 | Status: SHIPPED | OUTPATIENT
Start: 2025-01-15 | End: 2025-01-15

## 2025-01-15 NOTE — TELEPHONE ENCOUNTER
COPAY CARD OBTAINED    Medication: PULMOZYME 2.5 MG/2.5ML IN SOLN  Sponsor: Antonio  Member ID: [id number]  BIN: [id number]  PCN: [id number]  Group: [id number]  Expected Copay: $    Copay card Monthly Max Amount: $ 10,000  Copay card Annual Amount: $ 120,000    Relayed co-pay card info to [facility].

## 2025-01-15 NOTE — TELEPHONE ENCOUNTER
COPAY CARD OBTAINED    Medication: TRIKAFTA 100-50-75 & 150 MG PO TBPK  Sponsor: Stephie  Member ID: [id number]  BIN: [id number]  PCN: [id number]  Group: [id number]  Expected Copay: $    Copay card Monthly Max Amount: $ 1,666.67  Copay card Annual Amount: $ 20,000    Relayed co-pay card info to [facility]

## 2025-02-03 NOTE — PROGRESS NOTES
Guillermo is a 39 year old who is being evaluated via a billable video visit.    How would you like to obtain your AVS? Gus Nassar is a 39 year old male with cystic fibrosis who is seen today for a video visit.     1. CF lung disease: no recent illnesses or symptoms. Did have a cold in December, which resolved on it's own. No formal exercise, doing nebs/vesting 12 times/week.. Home spirometry today with a decline in FEV1 and FVC, FEV1 down 500 ml from his best in 2021, has been trending down.  Previous cultures + for MRSA, Klebsiella, Steno, Ps A and A. Fumigatus. Suspect decline in PFTs may be related to discontinuation of PsA suppression. Patient is not excited to start an additional nebs at this time.   - Resume george podhaler month on/off; will reassess need for aztreonam nebs +/- other anti pseudomonal treatment at next visit  - Continue nebulizers and vest therapy   - On chronic azithromycin     2. HTN: BP not checked today.   - Continue lisinopril 5 mg daily     3. CF related liver disease: prior US demonstrating coarse echotexture of the liver with hypertrophy of the caudate lobe and left lobe concerning for possible cirrhosis, no lesions, patent vasculature.   - Continue Ursodiol      4. Exocrine pancreatic insufficiency: feels the change to Zenpep has helped with the frequency of his stools. No s/s of malabsorption.   - Continue enzymes and vitamin supplementation      5. CFRD: AIC of 6.5 on his last annuals. No current issues. Following with Dr. Miranda.  - Continue Lantus 8 U      6. CF related sinus disease: no complaints today.   - Continue Flonase      7. CFTR: modulation: on Trikafta and is doing well.   - Labs with next visit  - Continue Trikafta     RTC: 6-8 weeks in person with labs  Annual studies due: October 2025 (DEXA > October 2026); colonoscopy due (plans to get at [place])--will call and make sure order is there  Vaccinations: TETO Warren PA-C  Pulmonary, Allergy, Critical  Care and Sleep Medicine    Interval: since his last visit with Dr. Hines, things have been going well. Went out to [place] for [place], brother never had to evacuate. 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 Doing some outreach. No recent illnesses, Guillermo had a cold just before their trip. No cough or shortness of breath. Doing nebs and vesting about 12 times/week. Switched to Zenpep and feels like his stools have improved, less frequent, but consistently is the same. Not fatty or floating.     Physical Exam   GENERAL: alert and no distress  EYES: Eyes grossly normal to inspection.  No discharge or erythema, or obvious scleral/conjunctival abnormalities.  RESP: No audible wheeze, cough, or visible cyanosis.    SKIN: Visible skin clear. No significant rash, abnormal pigmentation or lesions.  NEURO: Cranial nerves grossly intact.  Mentation and speech appropriate for age.  PSYCH: Appropriate affect, tone, and pace of words    Video-Visit Details    Type of service:  Video Visit   Originating Location (pt. Location): Home    Distant Location (provider location):  On-site  Platform used for Video Visit: Mamta  Signed Electronically by: Jyothi Warren PA-C

## 2025-02-05 ENCOUNTER — TELEPHONE (OUTPATIENT)
Dept: ENDOCRINOLOGY | Facility: CLINIC | Age: 40
End: 2025-02-05
Payer: COMMERCIAL

## 2025-02-05 ENCOUNTER — EXTERNAL ORDER RESULTS (OUTPATIENT)
Dept: PULMONOLOGY | Facility: CLINIC | Age: 40
End: 2025-02-05
Payer: COMMERCIAL

## 2025-02-05 LAB
FEV-1: 2.59
FEV1/FVC: 0.58
FVC: 4.43

## 2025-02-05 NOTE — TELEPHONE ENCOUNTER
Left Voicemail (1st Attempt) for the patient to call back and schedule the following:    Appointment type: F  Provider: RUSSELL  Return date: 06/03/2025  Specialty phone number: [phone]  Additional appointment(s) needed: N/A  Additonal Notes: N/A

## 2025-02-06 ENCOUNTER — VIRTUAL VISIT (OUTPATIENT)
Dept: PULMONOLOGY | Facility: CLINIC | Age: 40
End: 2025-02-06
Attending: PHYSICIAN ASSISTANT
Payer: COMMERCIAL

## 2025-02-06 VITALS — BODY MASS INDEX: 21.34 KG/M2 | WEIGHT: 125 LBS | HEIGHT: 64 IN

## 2025-02-06 DIAGNOSIS — E08.9 DIABETES MELLITUS RELATED TO CF (CYSTIC FIBROSIS) (H): ICD-10-CM

## 2025-02-06 DIAGNOSIS — E84.8 DIABETES MELLITUS RELATED TO CF (CYSTIC FIBROSIS) (H): ICD-10-CM

## 2025-02-06 DIAGNOSIS — E84.9 CF (CYSTIC FIBROSIS) (H): ICD-10-CM

## 2025-02-06 DIAGNOSIS — E84.0 CYSTIC FIBROSIS WITH PULMONARY MANIFESTATIONS (H): Primary | ICD-10-CM

## 2025-02-06 RX ORDER — AZITHROMYCIN 250 MG/1
500 TABLET, FILM COATED ORAL
Qty: 72 TABLET | Refills: 3 | Status: SHIPPED | OUTPATIENT
Start: 2025-02-07

## 2025-02-06 RX ORDER — TOBRAMYCIN 28 MG/1
4 CAPSULE ORAL; RESPIRATORY (INHALATION) 2 TIMES DAILY
Qty: 224 CAPSULE | Refills: 11 | OUTPATIENT
Start: 2025-02-06

## 2025-02-06 RX ORDER — INSULIN GLARGINE 100 [IU]/ML
8 INJECTION, SOLUTION SUBCUTANEOUS DAILY
Status: SHIPPED
Start: 2025-02-06

## 2025-02-06 RX ORDER — ALLOPURINOL 300 MG/1
TABLET ORAL
Qty: 90 TABLET | Refills: 3 | Status: SHIPPED | OUTPATIENT
Start: 2025-02-06

## 2025-02-06 RX ORDER — INSULIN GLARGINE 100 [IU]/ML
10 INJECTION, SOLUTION SUBCUTANEOUS DAILY
Qty: 15 ML | Refills: 3 | Status: SHIPPED | OUTPATIENT
Start: 2025-02-06 | End: 2025-02-06

## 2025-02-06 ASSESSMENT — PAIN SCALES - GENERAL: PAINLEVEL_OUTOF10: NO PAIN (0)

## 2025-02-06 NOTE — LETTER
2/6/2025      Dilan Nassar  [address]  [facility] [address]      Dear Colleague,    Thank you for referring your patient, Dilan Nassar, to the [facility] FOR LUNG SCIENCE AND [facility] [place]. Please see a copy of my visit note below.    Guillermo is a 39 year old who is being evaluated via a billable video visit.    How would you like to obtain your AVS? Tiffaniebev      Guillermo Nassar is a 39 year old male with cystic fibrosis who is seen today for a video visit.     1. CF lung disease: no recent illnesses or symptoms. Did have a cold in December, which resolved on it's own. No formal exercise, doing nebs/vesting 12 times/week.. Home spirometry today with a decline in FEV1 and FVC, FEV1 down 500 ml from his best in 2021, has been trending down.  Previous cultures + for MRSA, Klebsiella, Steno, Ps A and A. Fumigatus. Suspect decline in PFTs may be related to discontinuation of PsA suppression. Patient is not excited to start an additional nebs at this time.   - Resume george podhaler month on/off; will reassess need for aztreonam nebs +/- other anti pseudomonal treatment at next visit  - Continue nebulizers and vest therapy   - On chronic azithromycin     2. HTN: BP not checked today.   - Continue lisinopril 5 mg daily     3. CF related liver disease: prior US demonstrating coarse echotexture of the liver with hypertrophy of the caudate lobe and left lobe concerning for possible cirrhosis, no lesions, patent vasculature.   - Continue Ursodiol      4. Exocrine pancreatic insufficiency: feels the change to Zenpep has helped with the frequency of his stools. No s/s of malabsorption.   - Continue enzymes and vitamin supplementation      5. CFRD: AIC of 6.5 on his last annuals. No current issues. Following with Dr. Miranda.  - Continue Lantus 8 U      6. CF related sinus disease: no complaints today.   - Continue Flonase      7. CFTR: modulation: on Trikafta and is doing well.   - Labs with next visit  -  Continue Trikafta     RTC: 6-8 weeks in person with labs  Annual studies due: October 2025 (DEXA > October 2026); colonoscopy due (plans to get at [place])--will call and make sure order is there  Vaccinations: TETO Warren PA-C  Pulmonary, Allergy, Critical Care and Sleep Medicine    Interval: since his last visit with Dr. Hines, things have been going well. Went out to [place] for [place], brother never had to evacuate. 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 Doing some outreach. No recent illnesses, Guillermo had a cold just before their trip. No cough or shortness of breath. Doing nebs and vesting about 12 times/week. Switched to Zenpep and feels like his stools have improved, less frequent, but consistently is the same. Not fatty or floating.     Physical Exam   GENERAL: alert and no distress  EYES: Eyes grossly normal to inspection.  No discharge or erythema, or obvious scleral/conjunctival abnormalities.  RESP: No audible wheeze, cough, or visible cyanosis.    SKIN: Visible skin clear. No significant rash, abnormal pigmentation or lesions.  NEURO: Cranial nerves grossly intact.  Mentation and speech appropriate for age.  PSYCH: Appropriate affect, tone, and pace of words    Video-Visit Details    Type of service:  Video Visit   Originating Location (pt. Location): Home    Distant Location (provider location):  On-site  Platform used for Video Visit: Mamta  Signed Electronically by: Jyothi Warren PA-C         Again, thank you for allowing me to participate in the care of your patient.        Sincerely,        Jyothi Warren PA-C    Electronically signed

## 2025-02-06 NOTE — PATIENT INSTRUCTIONS
"Cystic Fibrosis Self-Care Plan       Patient: Dilan Nassar   MRN: [id number]   Clinic Date: February 6, 2025     RECOMMENDATIONS:  1. Continue nebulizers and vest therapy.   2. Resume george pod haler month on/off.  3. Start exercising, goal is 150 minutes per week.     Annual Studies:   IGG   Date Value Ref Range Status   09/17/2020 1,153 610 - 1,616 mg/dL Final     Immunoglobulin G   Date Value Ref Range Status   10/22/2024 773 610 - 1,616 mg/dL Final     No results found for: \"INS\"  There are no preventive care reminders to display for this patient.    Pulmonary Function Tests  FEV1: amount of air you can blow out in 1 second  FVC: total amount of air you can take in and blow out    Your Goals:             Latest Ref Rng & Units 10/22/2024     3:34 PM   PFT   FVC L 4.72  P   FEV1 L 2.69  P   FVC% % 120  P   FEV1% % 83  P      P Preliminary result          Airway Clearance: The Most Important Way to Keep Your Lungs Healthy  Vest Settings:   Hill-Rom Frequencies: 8, 9, 10 Pressure 10 Then, Frequencies 18, 19, 20 Pressure 6     RespirTech: Quick Start with Pressure of     Do each frequency for 5 minutes; Deflate vest after each frequency & cough 3 times before beginning the next setting.    Vest and Neb Therapy should be done 2 times/day.    Good Nutrition Can Improve Lung Function and Overall Health    Take ALL of your vitamins with food    Take 1/2 of your enzymes before EVERY meal/snack and the other 1/2 mid-meal/snack    Wt Readings from Last 3 Encounters:   02/06/25 56.7 kg (125 lb)   10/22/24 58.5 kg (128 lb 15.5 oz)   07/22/24 56.7 kg (125 lb)       Body mass index is 21.46 kg/m .         National CF Foundation Recommendations for BMI in CF Adults: Women: at least 22 Men: at least 23        Controlling Blood Sugars Helps Prevent Lung Infections & Improves Nutrition  Test blood sugar:    In the morning before eating (goal is )    2 hours after a meal (goal is less than 150)    When pre-meal glucose " is greater than 150 add correction    At bedtime (if less than 100 eat a snack with 15 grams of carbohydrates  Last A1C Results:   Hemoglobin A1C   Date Value Ref Range Status   10/22/2024 6.5 (H) <5.7 % Final     Comment:     Normal <5.7%   Prediabetes 5.7-6.4%    Diabetes 6.5% or higher     Note: Adopted from ADA consensus guidelines.   09/17/2020 5.7 (H) 0 - 5.6 % Final     Comment:     Normal <5.7% Prediabetes 5.7-6.4%  Diabetes 6.5% or higher - adopted from ADA   consensus guidelines.           If diabetic, measure A1C every 6 months. Goal is under 7%.    Staying Healthy  Research: If you are interested in learning about research opportunities or have questions, please contact Sonali Altamirano at [phone] or [email or web address].     Foundation: Compass is a personalized resource service to help you with the insurance, financial, legal and other issues you are facing.  It's free, confidential and available to anyone with CF.  Ask your  for more information or contact Compass directly at [facility] ([phone]) or compass@cff.org, or learn more at cff.org/compass.       CF Nurse Line: Savannah Kang and KJ: [phone]  Steffi Awad or Viviana Harden RT: [phone]    Daniela Whitt and Tamika Perez , Dieticians: [phone]    Lisset Root, Diabetes Nurse: [phone]   Yuridia Ortega: [phone] or Kailey Bryant at [phone], Social Workers  [email or web address]

## 2025-02-06 NOTE — NURSING NOTE
Current patient location: [address] [place] [address]  [place] [address]    Is the patient currently in the state of [place]? YES    Visit mode:VIDEO    If the visit is dropped, the patient can be reconnected by: VIDEO VISIT: Send to e-mail at: [email or web address]    Will anyone else be joining the visit? NO  (If patient encounters technical issues they should call [phone]    How would you like to obtain your AVS? MyChart    Are changes needed to the allergy or medication list? Pt stated no changes to allergies and Pt stated no med changes    Are refills needed on medications prescribed by this physician? NO    Rooming Documentation:  Questionnaire(s) completed    Reason for visit: RECHECK     Annabel PHILIP

## 2025-02-10 ENCOUNTER — TELEPHONE (OUTPATIENT)
Dept: PHARMACY | Facility: CLINIC | Age: 40
End: 2025-02-10
Payer: COMMERCIAL

## 2025-02-10 NOTE — TELEPHONE ENCOUNTER
"[facility] \"Please clarify drug cycling. Please clarify if the Tobramycin is to be used continuously or cycled 28 days on 28 days off\". Spoke with Po Alexandra - confirmed Guillermo should cycle Jameel podhaler 28 days on/off per visit with Jyothi Warren PA-C 2/6/25 \"Resume jameel podhaler month on/off\".       Jocelin Cuba, PharmD   Cystic Fibrosis MTM Pharmacist  [facility]    "

## 2025-02-23 ENCOUNTER — TELEPHONE (OUTPATIENT)
Dept: PULMONOLOGY | Facility: CLINIC | Age: 40
End: 2025-02-23
Payer: COMMERCIAL

## 2025-02-23 DIAGNOSIS — E84.0 CYSTIC FIBROSIS WITH PULMONARY MANIFESTATIONS (H): ICD-10-CM

## 2025-02-23 DIAGNOSIS — E84.9 CYSTIC FIBROSIS (H): ICD-10-CM

## 2025-02-24 RX ORDER — ELEXACAFTOR, TEZACAFTOR, AND IVACAFTOR 100-50-75
KIT ORAL
Qty: 84 TABLET | Refills: 1 | Status: SHIPPED | OUTPATIENT
Start: 2025-02-24 | End: 2025-02-26

## 2025-02-24 NOTE — TELEPHONE ENCOUNTER
PA Initiation    Medication: TRIKAFTA 100-50-75 & 150 MG PO TBPK  Insurance Company: [facility] - Phone [phone] Fax [phone]  Pharmacy Filling the Rx: [facility] - [place], [place] - [address]  Filling Pharmacy Phone:    Filling Pharmacy Fax:    Start Date: 2/23/2025    Key: [id number]

## 2025-02-24 NOTE — TELEPHONE ENCOUNTER
Prior Authorization Approval    Medication: TRIKAFTA 100-50-75 & 150 MG PO TBPK  Authorization Effective Date: 1/24/2025  Authorization Expiration Date: 2/23/2026  Approved Dose/Quantity: 84 tabs per 28 days  Reference #: Key: [id number]   Ins[facility]: [facility] - Phone [phone] Fax [phone]  Expected CoPay: $ 2,600  CoPay Card Available: Yes    Financial Assistance Needed: Enrolled  Which Pharmacy is filling the prescription: [facility] - [place], [place] - [address]  Pharmacy Notified: When new RX is sent  Patient Notified: When new RX is sent

## 2025-02-24 NOTE — TELEPHONE ENCOUNTER
Prior Authorization Not Needed per Insurance    Medication: ZENPEP [id number] UNITS PO CPEP  Insurance Company: [facility] - Phone [phone] Fax [phone]  Expected CoPay: $ 2,600  Pharmacy Filling the Rx:    Pharmacy Notified:   Patient Notified:     Test claim shows no PA required, $2600 co-pay. Would patient like to fill with [facility]? Does patient have a co-pay card? Set reminder out with other meds to only contact patient once.

## 2025-02-26 DIAGNOSIS — E84.0 CYSTIC FIBROSIS WITH PULMONARY MANIFESTATIONS (H): ICD-10-CM

## 2025-02-26 DIAGNOSIS — E84.9 CYSTIC FIBROSIS (H): ICD-10-CM

## 2025-02-26 RX ORDER — ELEXACAFTOR, TEZACAFTOR, AND IVACAFTOR 100-50-75
KIT ORAL
Qty: 84 TABLET | Refills: 1 | Status: SHIPPED | OUTPATIENT
Start: 2025-02-26

## 2025-02-26 RX ORDER — TOBRAMYCIN 28 MG/1
4 CAPSULE ORAL; RESPIRATORY (INHALATION) 2 TIMES DAILY
Qty: 224 CAPSULE | Refills: 11 | Status: SHIPPED | OUTPATIENT
Start: 2025-02-26 | End: 2025-02-27

## 2025-02-27 ENCOUNTER — TELEPHONE (OUTPATIENT)
Dept: PHARMACY | Facility: CLINIC | Age: 40
End: 2025-02-27
Payer: COMMERCIAL

## 2025-02-27 DIAGNOSIS — E84.0 CYSTIC FIBROSIS WITH PULMONARY MANIFESTATIONS (H): ICD-10-CM

## 2025-02-27 RX ORDER — TOBRAMYCIN 28 MG/1
4 CAPSULE ORAL; RESPIRATORY (INHALATION) 2 TIMES DAILY
Qty: 224 CAPSULE | Refills: 11 | Status: SHIPPED | OUTPATIENT
Start: 2025-02-27

## 2025-02-27 NOTE — TELEPHONE ENCOUNTER
Spoke with Lien Sylvester, for clarification Jameel podhaler to be cycled 1 month on/off      Jocelin Cuba, PharmD   Cystic Fibrosis MTM Pharmacist  [facility]

## 2025-03-13 ENCOUNTER — VIRTUAL VISIT (OUTPATIENT)
Dept: PHARMACY | Facility: CLINIC | Age: 40
End: 2025-03-13
Payer: COMMERCIAL

## 2025-03-13 DIAGNOSIS — A49.8 PSEUDOMONAS AERUGINOSA INFECTION: ICD-10-CM

## 2025-03-13 DIAGNOSIS — E84.0 CYSTIC FIBROSIS WITH PULMONARY EXACERBATION (H): Primary | ICD-10-CM

## 2025-03-13 NOTE — PROGRESS NOTES
Disease State Management Encounter:                          Guillermo Nassar is a 39 year old male seen for a follow-up visit.      Reason for visit: Marlen podhaler.    CF/PsA  Marlen podhaler 4 capsules twice daily (cycle 1 month on/off)  He was previously taking Marlen podhaler since 2015. Recently restarted therapy one week ago.  Insurance changed this month, prior insurance ([facility]) covered Marlen podhaler, current plan ([facility]) does not. Submitted for prior authorization and appeal, denied.   Cultures (last growth): sputum cultures grow PSA (10/27/24)    Assessment/Plan:    Per discussion with Jyothi Warren PA-C, Guillermo to switch from Marlen podhaler to nebs due to insurance restrictions. However, Guillermo prefers to finish home supply of Marlen podhaler and discuss options at upcoming visit with Jyothi Warren PA-C on 4/10. Discussed PsA and treatment options including Marlen podhaler, tobramycin nebs, [place], Aztreonam. Per insurance requirements, will likely need to pursue [place] or tobramycin nebs.     Follow-up: 4/10 visit with Jyothi Warren PA-C     I spent 8 minutes with this patient today. All changes were made via verbal approval with Jyothi Warren PA-C. A copy of the visit note was provided to the patient's provider(s).    A summary of these recommendations was declined by the patient.    Jocelin Cuba, PharmD   Cystic Fibrosis [place] Pharmacist.  [facility]       Medication Therapy Recommendations  Cystic fibrosis with pulmonary exacerbation (H)   1 Current Medication: tobramycin (MARLEN PODHALER) 28 MG in caps   Current Medication Sig: Inhale 4 capsules (112 mg) into the lungs 2 times daily.   Rationale: Cannot afford medication product - Cost - Adherence   Recommendation: Change Medication - tobramycin (PF) 300 MG/5ML neb solution   Status: Declined per Patient   Identified Date: 3/13/2025 Completed Date: 3/13/2025

## 2025-04-07 ENCOUNTER — CLINICAL UPDATE (OUTPATIENT)
Dept: PHARMACY | Facility: CLINIC | Age: 40
End: 2025-04-07
Payer: COMMERCIAL

## 2025-04-07 DIAGNOSIS — E84.0 CYSTIC FIBROSIS WITH PULMONARY EXACERBATION (H): Primary | ICD-10-CM

## 2025-04-07 PROCEDURE — 99207 PR NO CHARGE LOS: CPT | Performed by: PHARMACIST

## 2025-04-07 NOTE — PROGRESS NOTES
Clinical Update:                                                    A chart review was conducted for Dilan Nassar.    Reason for Chart Review: Trikafta 6 Month Lab Monitoring    Discussion: Dilan has been on Trikafta since 12/17/2020.  Per chart review, patient continues full dose Trikafta .  Patient has not recently reported side effects     Labs were reviewed from 4/7/2025 at  [facility] . All modulator labs are within normal limits.        Plan:  1. Continue Trikafta   2. Recheck hepatic panel and CK in 6 months  with annual studies        Jocelin Cuba, PharmD   Cystic Fibrosis MTM Pharmacist  [facility]

## 2025-04-09 NOTE — PROGRESS NOTES
[facility]  April 10, 2025         Assessment and Plan:   Guillermo Nassar is a 39 year old male with cystic fibrosis who is seen today for a video visit.     1. CF lung disease: no new pulmonary complaints, didn't feel any different on the one month of a podhaler. Has joined a gym. Sating 96% on room air; vesting 10-12 times/week. PFTs today improved from prior, at the low end of his baseline (2.6-2.7), but below his best. Previous history of Ps.A  - Will discuss aztreonam nebs with Jocelin re: insurance coverage; would do month on/off X 6 months  - Continue nebulizers and vest therapy   - On chronic azithromycin     2. HTN: BP is 136/79.   - Continue lisinopril 5 mg daily     3. CF related liver disease: prior US demonstrating coarse echotexture of the liver with hypertrophy of the caudate lobe and left lobe concerning for possible cirrhosis, no lesions, patent vasculature.   - Continue Ursodiol      4. Exocrine pancreatic insufficiency: feels the change to Zenpep has helped with the frequency of his stools, but still has 2-3 in the am and many are floating. BMI < CFF goal. GAURAV Lundberg, to see today.   - Continue enzymes and vitamin supplementation      5. CFRD: AIC of 6.5 on his last annuals. Plans to get a CGM today. Following with Dr. Miranda.  - Continue Lantus 8 U      6. CF related sinus disease: no complaints today.   - Continue Flonase      7. CFTR: modulation: on Trikafta and is doing well. Labs on Monday WNL.   - Continue Trikafta     RTC: video visit with home spirometry in 3 months  Annual studies due: October 2025 (DEXA > October 2026); colonoscopy due, rescheduled to October  Vaccinations: UTD    Jyothi Warren PA-C  Pulmonary, Allergy, Critical Care and Sleep Medicine        Interval History:     Thinking about getting back into politics, but isn't sure yet. Planning to put in a deck this summer. No recent illnesses. Did got back on george for a month  (podhaler), but now his BCBS won't cover the podhaler. Didn't notice a difference on the george podhaler. Cough is baseline, no congested or tightness overall, no new shortness of breath. Doing 10-12 times/week airway clearance. No bloating or gas, stools are loose, floating. Zenpep has helped some, but not entirely better. Probiotic wasn't helpful. Going 2-3 times/day mostly in the am. Joined a gym,          Review of Systems:   Please see HPI. Otherwise, complete 10 point ROS negative.           Past Medical and Surgical History:     Past Medical History:   Diagnosis Date    Cystic fibrosis with pulmonary manifestations (H)     /[id number]    Diabetes mellitus related to CF (cystic fibrosis) (H)      Past Surgical History:   Procedure Laterality Date    APPENDECTOMY OPEN  1999    Appendicitis     SINUS SURGERY  1990's    h/o many sinus infections           Family History:     Family History   Problem Relation Age of Onset    Diabetes Mother         Unknown type    Diabetes Maternal Grandmother         unknown type    Coronary Artery Disease Paternal Grandmother     Prostate Cancer Paternal Grandfather     Diabetes Maternal Aunt         unknown type    Diabetes Maternal Uncle         unknown type    LUNG DISEASE Other         Negative    Skin Cancer No family hx of     Melanoma No family hx of             Social History:     Social History     Socioeconomic History    Marital status: Single     Spouse name: Not on file    Number of children: Not on file    Years of education: Not on file    Highest education level: Not on file   Occupational History    Occupation: State Representative     Employer: [employer]    Occupation: Financial Counselor   Tobacco Use    Smoking status: Never    Smokeless tobacco: Former   Vaping Use    Vaping status: Never Used   Substance and Sexual Activity    Alcohol use: Yes     Alcohol/week: 10.0 - 14.0 standard drinks of alcohol     Types: 10 - 14 Standard drinks or equivalent  per week     Comment: 2 drinks per night 5X/wk    Drug use: No    Sexual activity: Yes     Partners: Female     Birth control/protection: Pull-out method, Condom   Other Topics Concern    Parent/sibling w/ CABG, MI or angioplasty before 65F 55M? Yes   Social History Narrative    Owns a home in [place], [place] with his girlfriend of ~7 years. 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     Social Drivers of Health     Financial Resource Strain: Low Risk  (4/16/2024)    Financial Resource Strain     Within the past 12 months, have you or your family members you live with been unable to get utilities (heat, electricity) when it was really needed?: No   Food Insecurity: Low Risk  (4/16/2024)    Food Insecurity     Within the past 12 months, did you worry that your food would run out before you got money to buy more?: No     Within the past 12 months, did the food you bought just not last and you didn t have money to get more?: No   Transportation Needs: Low Risk  (4/16/2024)    Transportation Needs     Within the past 12 months, has lack of transportation kept you from medical appointments, getting your medicines, non-medical meetings or appointments, work, or from getting things that you need?: No   Physical Activity: Not on file   Stress: Not on file   Social Connections: Not on file   Interpersonal Safety: Low Risk  (4/16/2024)    Interpersonal Safety     Do you feel physically and emotionally safe where you currently live?: Yes     Within the past 12 months, have you been hit, slapped, kicked or otherwise physically hurt by someone?: No     Within the past 12 months, have you been humiliated or emotionally abused in other ways by your partner or ex-partner?: No   Housing Stability: Low Risk  (4/16/2024)    Housing Stability     Do you have housing? : Yes     Are you worried about losing your housing?: No            Medications:     Current Outpatient Medications   Medication Sig Dispense Refill     elexacaftor-tezacaftor-ivacaftor & ivacaftor (TRIKAFTA) 100-50-75 & 150 MG tablet pack TAKE TWO ORANGE TABLETS BY MOUTH IN THE MORNING AND ONE BLUE TABLET IN THE EVENING AS DIRECTED ON PACKAGE.  TAKE WITH FAT CONTAINING FOOD. 2/24: please schedule appt for additional refills 84 tablet 5    albuterol (PROVENTIL) (2.5 MG/3ML) 0.083% neb solution Take 1 vial (2.5 mg) by nebulization 3 times daily as needed for shortness of breath, wheezing or cough 180 mL 11    allopurinol (ZYLOPRIM) 300 MG tablet TAKE ONE TABLET (300MG) BY MOUTH DAILY 90 tablet 3    azithromycin (ZITHROMAX) 250 MG tablet Take 2 tablets (500 mg) by mouth Every Mon, Wed, Fri Morning. 72 tablet 3    blood glucose (NO BRAND SPECIFIED) test strip Use to test blood sugar 4 times daily or as directed. 125 strip 11    blood glucose monitoring (FREESTYLE) lancets Use to test blood sugar 4 times daily or as directed. 200 each 11    blood glucose monitoring (NO BRAND SPECIFIED) meter device kit Use to test blood sugar 4 times daily or as directed. 1 kit 0    Continuous Blood Gluc Sensor (FREESTYLE ESTEFANY 3 SENSOR) MISC 1 each every 14 days 6 each 3    Continuous Glucose Sensor (DEXCOM G7 SENSOR) MISC Change every 10 days. 9 each 4    dornase maryse (PULMOZYME) 2.5 MG/2.5ML neb solution INHALE CONTENTS OF ONE VIAL (2.5MG) VIA NEBULIZER TWICE DAILY. KEEP REFRIGERATED UNTIL USE. 150 mL 11    fluticasone (FLONASE) 50 MCG/ACT nasal spray SPRAY 2 SPRAYS INTO BOTH NOSTRILS DAILY 48 g 3    insulin glargine (LANTUS SOLOSTAR) 100 UNIT/ML pen Inject 8 Units subcutaneously daily. See note on substitutes if needed      insulin pen needle (32G X 4 MM) 32G X 4 MM miscellaneous Use 3 pen needles daily or as directed. 300 each 3    lipase-protease-amylase (ZENPEP) 57473-05210-871791 units CPEP Take 4-5 capsules (100,000-125,000 Units) by mouth 3 times daily (with meals). And take 2-3 capsules with snacks. Total 3 meals and 3 snacks daily for maximum 24 per day. 700 capsule 11     "lisinopril (ZESTRIL) 5 MG tablet Take 1 tablet (5 mg) by mouth daily. 90 tablet 3    mvw complete formulation (SOFTGELS ) capsule Take 2 capsules by mouth daily. 180 capsule 3    ursodiol (ACTIGALL) 300 MG capsule TAKE 1 CAPSULE (300MG ) BY MOUTH TWO TIMES A  capsule 3     No current facility-administered medications for this visit.            Physical Exam:   /79 (BP Location: Right arm, Patient Position: Chair, Cuff Size: Adult Regular)   Pulse 66   Ht 1.645 m (5' 4.76\")   Wt 59.6 kg (131 lb 6.3 oz)   SpO2 96%   BMI 22.02 kg/m      GENERAL: alert, NAD  HEENT: NCAT, EOMI, anicteric sclera, no oral mucosal edema or erythema  Neck: no cervical or supraclavicular adenopathy  Respiratory: good air flow, mainly clear  CV: RRR, S1S2, no murmurs noted  Abdomen: normoactive BS, soft   Lymph: no edema, + digital clubbing  Neuro: AAO X 3, CN 2-12 grossly intact  Psychiatric: normal affect, good eye contact  Skin: no rash, jaundice or lesions on limited exam         Data:   All laboratory and imaging data reviewed.      Cystic Fibrosis Culture  Specimen Description   Date Value Ref Range Status   06/15/2021 Sputum  Final   09/17/2020 Sputum  Final   12/19/2019 Sputum  Final    Culture Micro   Date Value Ref Range Status   06/15/2021 Heavy growth  Normal mami    Final   06/15/2021 (A)  Final    Light growth  Pseudomonas aeruginosa, mucoid strain     06/15/2021 Light growth  Pseudomonas aeruginosa   (A)  Final   06/15/2021 Light growth  Strain 2  Pseudomonas aeruginosa   (A)  Final        PFT interpretation:  Maneuver: valid and meets ATS guidelines  Normal spirometry      "

## 2025-04-10 ENCOUNTER — ALLIED HEALTH/NURSE VISIT (OUTPATIENT)
Dept: CARE COORDINATION | Facility: CLINIC | Age: 40
End: 2025-04-10

## 2025-04-10 ENCOUNTER — TELEPHONE (OUTPATIENT)
Dept: PULMONOLOGY | Facility: CLINIC | Age: 40
End: 2025-04-10

## 2025-04-10 ENCOUNTER — OFFICE VISIT (OUTPATIENT)
Dept: PHARMACY | Facility: CLINIC | Age: 40
End: 2025-04-10

## 2025-04-10 ENCOUNTER — OFFICE VISIT (OUTPATIENT)
Dept: PULMONOLOGY | Facility: CLINIC | Age: 40
End: 2025-04-10
Attending: PHYSICIAN ASSISTANT
Payer: COMMERCIAL

## 2025-04-10 VITALS
SYSTOLIC BLOOD PRESSURE: 136 MMHG | BODY MASS INDEX: 21.89 KG/M2 | WEIGHT: 131.39 LBS | HEIGHT: 65 IN | OXYGEN SATURATION: 96 % | HEART RATE: 66 BPM | DIASTOLIC BLOOD PRESSURE: 79 MMHG

## 2025-04-10 DIAGNOSIS — E84.9 CYSTIC FIBROSIS (H): Primary | ICD-10-CM

## 2025-04-10 DIAGNOSIS — E84.0 CYSTIC FIBROSIS WITH PULMONARY EXACERBATION (H): Primary | ICD-10-CM

## 2025-04-10 DIAGNOSIS — A49.8 PSEUDOMONAS AERUGINOSA INFECTION: ICD-10-CM

## 2025-04-10 DIAGNOSIS — Z71.9 ENCOUNTER FOR COUNSELING: Primary | ICD-10-CM

## 2025-04-10 DIAGNOSIS — Z13.9 RISK AND FUNCTIONAL ASSESSMENT: Primary | ICD-10-CM

## 2025-04-10 LAB
EXPTIME-PRE: 11.2 SEC
FEF2575-%PRED-PRE: 35 %
FEF2575-PRE: 1.2 L/SEC
FEF2575-PRED: 3.35 L/SEC
FEFMAX-%PRED-PRE: 78 %
FEFMAX-PRE: 7.07 L/SEC
FEFMAX-PRED: 8.99 L/SEC
FEV1-%PRED-PRE: 81 %
FEV1-PRE: 2.67 L
FEV1FEV6-PRE: 62 %
FEV1FEV6-PRED: 82 %
FEV1FVC-PRE: 58 %
FEV1FVC-PRED: 82 %
FIFMAX-PRE: 7.57 L/SEC
FVC-%PRED-PRE: 116 %
FVC-PRE: 4.64 L
FVC-PRED: 3.98 L

## 2025-04-10 PROCEDURE — 87070 CULTURE OTHR SPECIMN AEROBIC: CPT | Performed by: PHYSICIAN ASSISTANT

## 2025-04-10 PROCEDURE — 99213 OFFICE O/P EST LOW 20 MIN: CPT | Performed by: PHYSICIAN ASSISTANT

## 2025-04-10 PROCEDURE — 99207 PR NO CHARGE LOS: CPT | Performed by: PHARMACIST

## 2025-04-10 RX ORDER — NEBULIZER
1 EACH MISCELLANEOUS 3 TIMES DAILY
Qty: 2 EACH | Refills: 5 | OUTPATIENT
Start: 2025-04-10

## 2025-04-10 RX ORDER — AZTREONAM 75 MG/ML
75 KIT INHALATION 3 TIMES DAILY
Qty: 84 ML | Refills: 2 | OUTPATIENT
Start: 2025-04-10

## 2025-04-10 RX ORDER — ELEXACAFTOR, TEZACAFTOR, AND IVACAFTOR 100-50-75
KIT ORAL
Qty: 84 TABLET | Refills: 5 | Status: SHIPPED | OUTPATIENT
Start: 2025-04-10

## 2025-04-10 RX ORDER — NEBULIZER
1 EACH MISCELLANEOUS 3 TIMES DAILY
Qty: 1 EACH | Refills: 0 | OUTPATIENT
Start: 2025-04-10

## 2025-04-10 ASSESSMENT — PATIENT HEALTH QUESTIONNAIRE - PHQ9
SUM OF ALL RESPONSES TO PHQ QUESTIONS 1-9: 2
5. POOR APPETITE OR OVEREATING: NOT AT ALL

## 2025-04-10 ASSESSMENT — ANXIETY QUESTIONNAIRES
GAD7 TOTAL SCORE: 0
5. BEING SO RESTLESS THAT IT IS HARD TO SIT STILL: NOT AT ALL
GAD7 TOTAL SCORE: 0
1. FEELING NERVOUS, ANXIOUS, OR ON EDGE: NOT AT ALL
6. BECOMING EASILY ANNOYED OR IRRITABLE: NOT AT ALL
3. WORRYING TOO MUCH ABOUT DIFFERENT THINGS: NOT AT ALL
2. NOT BEING ABLE TO STOP OR CONTROL WORRYING: NOT AT ALL
7. FEELING AFRAID AS IF SOMETHING AWFUL MIGHT HAPPEN: NOT AT ALL

## 2025-04-10 ASSESSMENT — PAIN SCALES - GENERAL: PAINLEVEL_OUTOF10: NO PAIN (0)

## 2025-04-10 NOTE — NURSING NOTE
Chief Complaint   Patient presents with    Follow Up     Return CF        Vitals were taken, medications reconciled.    Laura Mclaughlin, Clinic Assistant   11:20 AM

## 2025-04-10 NOTE — TELEPHONE ENCOUNTER
Order for rifaximin 550 mg TID x 14 days entered per PATRICIO Warren BSN, RN  RN Care Coordinator Adult Cystic Fibrosis Clinic

## 2025-04-10 NOTE — PROGRESS NOTES
Dilan Nassar comes into clinic today at the request of Jyothi BEST Ordering Provider for spirometry     Casey Rice, RRT

## 2025-04-10 NOTE — PROGRESS NOTES
"Adult Cystic Fibrosis Program  Annual Psychosocial Assessment    Presenting Information:  ALEXANDRIA is a 39-year-old man with cystic fibrosis, presenting in CF clinic for a regular follow up with CF provider, Jyothi Navarro. Met with Guillermo for annual psychosocial assessment. No one accompanied him to clinic today.      Living situation:  Guillermo lives in [place], [place] with his fiance. He purchased a home in November 2015. They have one dog (a 10 yo lab mix). He denies any concern about his current living situation.      Family Constellation:  Guillermo was raised by his biological parents who are still  and live in [place], [place]. He has 1 sibling(s): an older sister who lives in [place], [place]. He reports his family members are all doing well. His sister does not have CF.    Guillermo and his wife got  last May. They had a small wedding with immediate family.      Social Support:  Guillermo reports \" good\" social support. He has been in a relationship with his now wife for several years and they got  last May. He gets along well with family members and draws additional support from friends. He does not have any connections in the CF Community.     Adjustment to Illness:  Guillermo was diagnosed with CF in infancy.  He was \"pretty stable\" as a kid with some medical complications. He was hospitalized 3x: 2x for sinoscopies and 1x to have his appendix removed. He notes that in adulthood he was hospitalized in 2013 for a pulmonary exacerbation.     Guillermo describes his current health status as \"good-stable\". Guillermo continues to take trikafta which is going well. Clinically, he has significant lung disease, pancreatic insufficiency, and CFRD. He typically does 2 vest treatments per day. He goes for walks daily and has been trying to get a workout routine down through lifting weights and running.      He reports that he is open about his CF with friends and family, but not with coworkers and acquaintances. He also notes he was pretty " "private about CF even as a kid.     Health Care Directive:  Guillermo has previously received Health Care Directive education. Guillermo is comfortable with his default decision makers (his wife) at this time. He denies the need for forms at this time.      Education:  Guillermo completed a Bachelors Degree in Political Science at [employer]. He briefly did an accounting program last year through [employer] but did not finish the coursework. He may be eventually interested in financial planning, but has no current plans for additional education.      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 He denies any current employment concerns and he works exclusively from home. He is somewhat interested in getting back into politics.     Guillermo's girlfriend works full time as well for the [employer] in [place], [place].     Finances:  Guillermo receives income from wages and is able to meet daily living expenses. He also has a pension with the state from his previous job. He denied any related concerns today.      Insurance:  Guillermo is insured by [employer] through his employer. He denied insurance related concerns today.      Mental Health/Coping:  Guillermo denies any current or past symptoms indicative of mood, anxiety, eating, learning or other mental health disorder. He describes his mood recently as \"good\". He did not that he is having issues with fatigue and that it has been hard to get out of bed in the morning, and he is tired throughout the day. He feels like this started at his job change. He was open to SW discussing this with the team. Encouraged Guillermo to keep and eye on this and discuss it at his next appointment if it persists. Guillermo does not see a therapist or take medication for his mental health.     UBALDO-7 score: 1, indicating minimal anxiety symptoms (as described as \"not difficult\").  PHQ-9 score: 2, indicating minimal depressive " "symptoms (negative for SI, as described as \"not difficult\").    Guillermo reports his stress levels are manageable. To cope with stress he will process with his girlfriend or his parents.      He identifies spirituality as important in his life, but he does not identify as a Mandaen person.     Chemical Health:  Guillermo denies tobacco/vaping use, regular exposure to second hand smoke or illicit drug use. He drinks alcohol, consuming a couple of drinks per weekend. He denies any current/past psychosocial impairment caused by alcohol use.       Leisure Activities/Interests:   Guillermo enjoys going for walks, stock trading, listening to and producing music, and spending time with family and friends.     Intervention:  -Psychosocial Assessment  -Health Care Directive education  -Supportive counseling    Assessment:  Guillermo was friendly and receptive to SW visit. He appeared to be open in his responses. He got  last year. He appears to have adequate social support. He continues to have stable employment. No financial or insurance concerns. Guillermo reports his mental health is stable, but he has been dealing with some fatigue. No CD concerns.     Guillermo seems to be psychosocially stable overall, with access to relevant resources and supports. No concerns expressed/noted.    Plan:  Re-consult for any psychosocial needs that may arise.    Complete psychosocial assessment annually.  Continue to follow for regular clinic consult.    Kailey Bryant, [facility]  Adult Cystic Fibrosis   Ph: [phone]    Message me securely with Marcelo        "

## 2025-04-10 NOTE — PATIENT INSTRUCTIONS
"Cystic Fibrosis Self-Care Plan       Patient: Dilan Nassar   MRN: [id number]   Clinic Date: April 10, 2025     RECOMMENDATIONS:  1. Continue nebulizers and vest therapy.   2. Exercise with goal of 150 minutes per week!  3. Make sure to do home spirometry prior to your next visit.     Annual Studies:   IGG   Date Value Ref Range Status   09/17/2020 1,153 610 - 1,616 mg/dL Final     Immunoglobulin G   Date Value Ref Range Status   10/22/2024 773 610 - 1,616 mg/dL Final     No results found for: \"INS\"  There are no preventive care reminders to display for this patient.    Pulmonary Function Tests  FEV1: amount of air you can blow out in 1 second  FVC: total amount of air you can take in and blow out    Your Goals:             Latest Ref Rng & Units 4/10/2025    10:58 AM   PFT   FVC L 4.64  P   FEV1 L 2.67  P   FVC% % 116  P   FEV1% % 81  P      P Preliminary result          Airway Clearance: The Most Important Way to Keep Your Lungs Healthy  Vest Settings:   Hill-Rom Frequencies: 8, 9, 10 Pressure 10 Then, Frequencies 18, 19, 20 Pressure 6     RespirTech: Quick Start with Pressure of     Do each frequency for 5 minutes; Deflate vest after each frequency & cough 3 times before beginning the next setting.    Vest and Neb Therapy should be done 2 times/day.    Good Nutrition Can Improve Lung Function and Overall Health    Take ALL of your vitamins with food    Take 1/2 of your enzymes before EVERY meal/snack and the other 1/2 mid-meal/snack    Wt Readings from Last 3 Encounters:   04/10/25 59.6 kg (131 lb 6.3 oz)   02/06/25 56.7 kg (125 lb)   10/22/24 58.5 kg (128 lb 15.5 oz)       Body mass index is 22.02 kg/m .         National CF Foundation Recommendations for BMI in CF Adults: Women: at least 22 Men: at least 23        Controlling Blood Sugars Helps Prevent Lung Infections & Improves Nutrition  Test blood sugar:    In the morning before eating (goal is )    2 hours after a meal (goal is less than 150)    " When pre-meal glucose is greater than 150 add correction    At bedtime (if less than 100 eat a snack with 15 grams of carbohydrates  Last A1C Results:   Hemoglobin A1C   Date Value Ref Range Status   10/22/2024 6.5 (H) <5.7 % Final     Comment:     Normal <5.7%   Prediabetes 5.7-6.4%    Diabetes 6.5% or higher     Note: Adopted from ADA consensus guidelines.   09/17/2020 5.7 (H) 0 - 5.6 % Final     Comment:     Normal <5.7% Prediabetes 5.7-6.4%  Diabetes 6.5% or higher - adopted from ADA   consensus guidelines.           If diabetic, measure A1C every 6 months. Goal is under 7%.    Staying Healthy  Research: If you are interested in learning about research opportunities or have questions, please contact Sonali Altamirano at [phone] or [email or web address].     Foundation: Compass is a personalized resource service to help you with the insurance, financial, legal and other issues you are facing.  It's free, confidential and available to anyone with CF.  Ask your  for more information or contact Compass directly at 729-COMPASS ([phone]) or compass@cff.org, or learn more at cff.org/compass.       CF Nurse Line: Savannah Kang and KJ: [phone]  Steffi Awad or Viviana Harden RT: [phone]    Daniela Whitt and Tamika Perez , Dieticians: [phone]    Lisset Root, Diabetes Nurse: [phone]   Yuridia Ortega: [phone] or Kailey Bryant at [phone], Social Workers  [email or web address]

## 2025-04-10 NOTE — PROGRESS NOTES
Disease State Management Encounter:                          Guillermo Nassar is a 39 year old male seen for a follow-up visit. Today's visit is a co-visit with Jyothi Warren PA-C.     Reason for visit: Annual CF Medication Review.    Medication Access: Patient fills medication at [facility] and [place] pharmacy. Patient expresses no concern regarding cost of medications.      CF   Inhaled medications:   Bronchodilator: albuterol nebs 0.083% twice daily  Mucolytic: Pulmozyme 2.5 mg once twice daily  Oral medications:  Azithromycin: 250 mg daily  CFTR modulator: Trikafta (full dose), taking with fat-containing food.  Hepatic: ursodiol 300 mg twice daily  Other: fluticasone (Flonase) 50 mcg/act 2 sprays once daily  Denies CF medication side effects. Stomach upset with Trikafta resolved shortly after starting.  Cultures (last growth): sputum cultures grow PSA (10/22/24)  Genotype: [id number]    Pancreatic Insufficiency/Nutrition  Zenpep 25,000 units taking 4-5 capsules with meals and 2-3 with snacks  Vitamins include: MVW complete  2 capsules daily  Patient is not experiencing sign/symptoms of malabsorption      CFRD  Lantus 8 units daily  SMBG: Accu-Chek meter and supplies.   Patient is not experiencing hypoglycemia  Recent symptoms of high blood sugar? none  Patient follows with Dr. Miranda for endocrinology    Assessment/Plan:    *** [facility] - twice daily warm hand off to Elia. 28 days on/off ***  ***. Review of Alyftrek today, shared decision not interested in trialing Alyftrek at this time due to [facility]}. Patient will reach out if interested in revisiting in the future    Follow-up: {followuptest2:234062}    I spent *** minutes with this patient today. All changes were made via verbal approval with Jyothi Warren PA-C.     A summary of these recommendations was given to the patient (see AVS from today's appointment with Jyothi Warren PA-C).    Jocelin  Leipold, PharmD   Cystic Fibrosis MTM Pharmacist  [place] [facility] [place]     Medication Therapy Recommendations  No medication therapy recommendations to display

## 2025-04-11 ENCOUNTER — TELEPHONE (OUTPATIENT)
Dept: PULMONOLOGY | Facility: CLINIC | Age: 40
End: 2025-04-11
Payer: COMMERCIAL

## 2025-04-14 ENCOUNTER — TELEPHONE (OUTPATIENT)
Dept: PULMONOLOGY | Facility: CLINIC | Age: 40
End: 2025-04-14
Payer: COMMERCIAL

## 2025-04-14 DIAGNOSIS — E84.9 CYSTIC FIBROSIS (H): Primary | ICD-10-CM

## 2025-04-14 NOTE — TELEPHONE ENCOUNTER
PA Initiation    Medication: CAYSTON 75 MG IN SOLR  Insurance Company: [employer] [place] - Phone [phone] Fax [phone]  Pharmacy Filling the Rx:    Filling Pharmacy Phone:    Filling Pharmacy Fax:    Start Date: 4/14/2025    Key: [id number]        Test claim for PA resulted in paid claim and no co-pay. No details about PA being required and/or transition fill - but due to suspicion and [facility] 2025 Formulary stating PA required, liaison initiated PA anyway.

## 2025-04-14 NOTE — TELEPHONE ENCOUNTER
Prior Authorization Retail Medication Request - Tiering Exception     Medication/Dose: Rifaximin - covered by insurance but copay $600 for 2 week supply. Can we pursue a tiering exception?  Rationale:  Rifaximin is an appropriate course of therapy. It's important to note that often times SIBO is associated with intestine dysmotiliy in cystic fibrosis. Delayed intestinal transit may challenge the interpretation of the breath test. Additionally, in CF patients with progressive lung disease, breath tests may be of limited value due to gas retention caused by mucus plugged airways and gas trapping.Given the limitations of testing for SIBO, empiric treatment is a reasonable approach and is widely practiced. Resolution of clinical symptoms certainly confirms the contribution of SIBO to the symptoms. The therapy for SIBO is based on antibiotics typically directed towards gram negative and anaerobic bacteria which includes rifaximin.      Jocelin Cuba, PharmD   Cystic Fibrosis MTM Pharmacist  [facility] [facility] [place]

## 2025-04-15 LAB
BACTERIA SPT CULT: ABNORMAL

## 2025-04-15 NOTE — TELEPHONE ENCOUNTER
Prior Authorization Approval    Medication: CAYSTON 75 MG IN SOLR  Authorization Effective Date: 3/16/2025  Authorization Expiration Date: 4/15/2026  Approved Dose/Quantity: #84 per 56 (ins only allows 28 ds)  Reference #: Key: [id number]   Insurance Company: [employer] [place] - Phone [phone] Fax [phone]  Expected CoPay: $ 0  CoPay Card Available: Yes    Financial Assistance Needed: No, enrolling in [facility]  Which Pharmacy is filling the prescription: [facility] - [address] [place] [address]  Pharmacy Notified: Released rx  Patient Notified: Sent MyC

## 2025-04-15 NOTE — TELEPHONE ENCOUNTER
Per OneNote page, all patients must enroll in CAP. Both patient and provider portion scanned into media tab. Emailed/sent MyC to patient with form. And messaged care team requesting they pull form for provider from media tab to complete. Awaiting receipt of both completed portions before sending in.

## 2025-04-16 NOTE — TELEPHONE ENCOUNTER
Patient emailed asking if he can fill the Cayston with [facility] instead of [facility].  Informed patient that Cayston is a limited distribution drug and unfortunately [facility] does not have access.  Also, reminded to let us know when he has completed his portion of [employer] Application.

## 2025-04-16 NOTE — PATIENT INSTRUCTIONS
See provider AVS for a summary of recommendations from today's visit.  Jocelin Cuba, PharmD  Cystic Fibrosis MTM Pharmacist  [facility] [place]

## 2025-04-16 NOTE — TELEPHONE ENCOUNTER
Spoke with Zack GOLDEN At [facility], the pt's plan does not offer a tier exception/ cost reduction. Patient has a high copay due to deductible.  Once deductible and out of pocket max are met, then this medication is covered by insurance with $0 copay. If pt has questions regarding copay, he may contact insurance.

## 2025-04-25 RX ORDER — METRONIDAZOLE 250 MG/1
250 TABLET ORAL 3 TIMES DAILY
Qty: 30 TABLET | Refills: 0 | Status: SHIPPED | OUTPATIENT
Start: 2025-04-25 | End: 2025-05-05

## 2025-04-25 NOTE — TELEPHONE ENCOUNTER
RN sent metronidazole to local pharmacy. Message sent to team, plan for Tamika to follow up with patient

## 2025-05-04 ENCOUNTER — MYC MEDICAL ADVICE (OUTPATIENT)
Dept: PULMONOLOGY | Facility: CLINIC | Age: 40
End: 2025-05-04
Payer: COMMERCIAL

## 2025-05-05 DIAGNOSIS — E08.9 DIABETES MELLITUS RELATED TO CF (CYSTIC FIBROSIS) (H): Primary | ICD-10-CM

## 2025-05-05 DIAGNOSIS — E84.8 DIABETES MELLITUS RELATED TO CF (CYSTIC FIBROSIS) (H): Primary | ICD-10-CM

## 2025-05-05 DIAGNOSIS — E84.9 CF (CYSTIC FIBROSIS) (H): ICD-10-CM

## 2025-05-05 RX ORDER — ALLOPURINOL 300 MG/1
TABLET ORAL
Qty: 90 TABLET | Refills: 1 | Status: SHIPPED | OUTPATIENT
Start: 2025-05-05

## 2025-05-07 RX ORDER — INSULIN GLARGINE 100 [IU]/ML
INJECTION, SOLUTION SUBCUTANEOUS
Qty: 15 ML | Refills: 0 | Status: SHIPPED | OUTPATIENT
Start: 2025-05-07

## 2025-05-07 NOTE — TELEPHONE ENCOUNTER
Last Written Prescription:  insulin glargine (LANTUS SOLOSTAR) 100 UNIT/ML pen  8 Units, DAILY           Summary: Inject 8 Units subcutaneously daily. See note on substitutes if needed, No Print Out If Lantus is not covered by insurance, may substitute Basaglar or Semglee or other insulin glargine product per insurance preference at same dose and frequency.   Dose, Route, Frequency: 8 Units, Subcutaneous, DAILYStart: 02/06/2025Ordered On: 02/06/2025ReportDx Associated: Taking: Long-term: Med Note:                Change       ----------------------  Last Visit Date: 12/03/2024 Virtual Visit MEGHA Mitchell   Future Visit Date: 6/10/25  ----------------------      Refill decision: Medication refilled per  Medication Refill in Ambulatory Care  policy.   []  If no future appointment scheduled: Route to Clinic Coordinators to contact the pt for appointment.      Refill decision: Medication unable to be refilled by RN due to: Medication not included in refill protocol policy  and Review Needed: New med; Med adjusted within <= 30 days; Safety Alert; Lab monitoring required    Insulin Protocol Pwlxin5705/05/2025 12:14 AM   Protocol Details HgbA1C in past 3 or 6 months    Medication is active on med list and the sig matches. RN to manually verify dose and sig if red X/fail.    Medication indicated for associated diagnosis    Chart review required            Request from pharmacy:  Requested Prescriptions   Pending Prescriptions Disp Refills    INSULIN GLARGINE 100 UNIT/ML pen [Pharmacy Med Name: BASAGLAR 100 UNIT/ML KWIKPEN]       Sig: INJECT 10 UNITS UNDER THE SKIN ONCE DAILY       Insulin Protocol Failed - 5/7/2025  9:09 AM        Failed - HgbA1C in past 3 or 6 months     If HgbA1C is 8 or greater, it needs to be on file within the past 3 months.  If less than 8, must be on file within the past 6 months.     Recent Labs   Lab Test 10/22/24  1524 09/09/19  0836 06/04/19  0000   A1C 6.5*   < >  --    HEMOGLOBINA1  --   --   6.0    < > = values in this interval not displayed.             Failed - Medication is active on med list and the sig matches. RN to manually verify dose and sig if red X/fail.     If the protocol passes (green check), you do not need to verify med dose and sig.    A prescription matches if they are the same clinical intention.    For Example: once daily and every morning are the same.    The protocol can not identify upper and lower case letters as matching and will fail.     For Example: Take 1 tablet (50 mg) by mouth daily     TAKE 1 TABLET (50 MG) BY MOUTH DAILY    For all fails (red x), verify dose and sig.    If the refill does match what is on file, the RN can still proceed to approve the refill request.       If they do not match, route to the appropriate provider.             Failed - Medication indicated for associated diagnosis     Medication is associated with one or more of the following diagnoses:   - Type 1 diabetes mellitus  - Type 2 diabetes mellitus  - Diabetic nephropathy; Prophylaxis  - Neuropathy due to diabetes mellitus; Prophylaxis  - Retinopathy due to diabetes mellitus; Prophylaxis  - Diabetes mellitus associated with cystic fibrosis  - Disorder of cardiovascular system; Prophylaxis - Type 1 diabetes mellitus   - Disorder of cardiovascular system; Prophylaxis - Type 2 diabetes mellitus            Failed - Chart review required     Review Chart.    Do not approve if insulin is used in a pump.  Instead, direct refill request to the patient's endocrinologist.  If the patient doesn't have an endocrinologist, then send the refill to the patient's PCP for review            Passed - Recent (6 mo) or future (90 days) visit within the authorizing provider's specialty     The patient must have completed an in-person or virtual visit within the past 6 months or has a future visit scheduled within the next 90 days with the authorizing provider s specialty.  Urgent care and e-visits do not quality as an  office visit for this protocol.          Passed - Patient is 18 years of age or older         Roma B, RN  P Central Nursing/Red Flag Triage & Med Refill Team

## 2025-05-23 NOTE — PROGRESS NOTES
Dilan Nassar  is being evaluated via a billable video visit.      CF Endocrinology Return Consultation:  Diabetes  :   Patient: Dilan Nassar MRN# [id number]   YOB: 1985 Age: 39 year old   Date of Visit: 06/10/2025  Dear Dr. Atilio Nieto:    I had the pleasure of seeing your patient, Dilan Nassar in the CF Endocrinology Clinic, [facility], for a return consultation .           Problem list:   MRSA  Cystic fibrosis  Diabetes mellitus  Exocrine pancreatic insufficiency  Vitamin D deficiency  Gout  Intestinal malabsorption         HPI:     Dilan is a 39 year old male with Cystic Fibrosis Related Diabetes Mellitus (CFRD).    I have reviewed the available past laboratory evaluations, imaging studies, and medical records available to me at this visit.   History was obtained from the patient and the medical record.  I have reviewed the notes of the pulmonary care team entered into the medical record since I last saw the patient.    Overall feels well, denies specific complaints or concerns.    Diabetes is currently managed with basal insulin only  Uses CGM prior to clinic appointments  Recent data reviewed average glucose 152, time in range 78%, 0% low, GMI 6.9%  Pattern of postprandial highs  CGM was not covered by insurance, uses samples through diabetes education office  BMI 22  On triple modulator therapy    Hemoglobin A1C   Date Value Ref Range Status   10/22/2024 6.5 (H) <5.7 % Final     Comment:     Normal <5.7%   Prediabetes 5.7-6.4%    Diabetes 6.5% or higher     Note: Adopted from ADA consensus guidelines.   09/17/2020 5.7 (H) 0 - 5.6 % Final     Comment:     Normal <5.7% Prediabetes 5.7-6.4%  Diabetes 6.5% or higher - adopted from ADA   consensus guidelines.       Current insulin regimen:   Glargine 8 unit(s) daily 10 AM          Past Medical History:     Active Ambulatory Problems     Diagnosis Date Noted    Cystic fibrosis with pulmonary manifestations     Exocrine  pancreatic insufficiency 02/13/2015    Vitamin D deficiency 02/13/2015    Gout 02/13/2015    Allergic rhinitis 02/13/2015    Intestinal malabsorption 02/13/2015    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) 10/16/2015    Diabetes mellitus related to cystic fibrosis (H) 10/16/2015    Cystic fibrosis with pulmonary exacerbation (H) 10/23/2015    Methicillin resistant Staphylococcus aureus infection 08/16/2016     Resolved Ambulatory Problems     Diagnosis Date Noted    Type 1 diabetes mellitus (H) 02/13/2015     Past Medical History:   Diagnosis Date    Diabetes mellitus related to CF (cystic fibrosis) (H)           Past Surgical History:   Appendectomy  Sinus surgery            Social History:   Unmarried  Non smoker  Former smokeless tobacco user           Family History:   Mother: liver disease  Paternal grandfather: prostate cancer  Maternal aunt: diabetes mellitus  Maternal uncle: diabetes mellitus  Maternal grandmother: diabetes mellitus  Paternal grandmother: coronary artery disease         Allergies:     Allergies   Allergen Reactions    Molds & Smuts Itching          Medications:     Current Outpatient Medications:     albuterol (PROVENTIL) (2.5 MG/3ML) 0.083% neb solution, Take 1 vial (2.5 mg) by nebulization 3 times daily as needed for shortness of breath, wheezing or cough, Disp: 180 mL, Rfl: 11    allopurinol (ZYLOPRIM) 300 MG tablet, TAKE ONE TABLET (300MG) BY MOUTH DAILY, Disp: 90 tablet, Rfl: 1    azithromycin (ZITHROMAX) 250 MG tablet, Take 2 tablets (500 mg) by mouth Every Mon, Wed, Fri Morning., Disp: 72 tablet, Rfl: 3    aztreonam lysine (CAYSTON) 75 MG SOLR, Take 1 mL (75 mg) by nebulization 3 times daily. Rotating 28 days and 28 days off., Disp: 84 mL, Rfl: 2    blood glucose (NO BRAND SPECIFIED) test strip, Use to test blood sugar 4 times daily or as directed., Disp: 125 strip, Rfl: 11    blood glucose monitoring (FREESTYLE) lancets, Use to test blood sugar 4 times daily or as directed., Disp:  200 each, Rfl: 11    blood glucose monitoring (NO BRAND SPECIFIED) meter device kit, Use to test blood sugar 4 times daily or as directed., Disp: 1 kit, Rfl: 0    Continuous Glucose Sensor (DEXCOM G7 SENSOR) MISC, Change every 10 days., Disp: 9 each, Rfl: 4    dornase maryse (PULMOZYME) 2.5 MG/2.5ML neb solution, INHALE CONTENTS OF ONE VIAL (2.5MG) VIA NEBULIZER TWICE DAILY. KEEP REFRIGERATED UNTIL USE., Disp: 150 mL, Rfl: 11    elexacaftor-tezacaftor-ivacaftor & ivacaftor (TRIKAFTA) 100-50-75 & 150 MG tablet pack, TAKE TWO ORANGE TABLETS BY MOUTH IN THE MORNING AND ONE BLUE TABLET IN THE EVENING AS DIRECTED ON PACKAGE.  TAKE WITH FAT CONTAINING FOOD. 2/24: please schedule appt for additional refills, Disp: 84 tablet, Rfl: 5    fluticasone (FLONASE) 50 MCG/ACT nasal spray, SPRAY 2 SPRAYS INTO BOTH NOSTRILS DAILY, Disp: 48 g, Rfl: 3    insulin glargine (LANTUS SOLOSTAR) 100 UNIT/ML pen, Inject 8 Units subcutaneously daily. See note on substitutes if needed, Disp: , Rfl:     insulin glargine 100 UNIT/ML pen, INJECT 10 UNITS UNDER THE SKIN ONCE DAILY, Disp: 15 mL, Rfl: 0    insulin pen needle (32G X 4 MM) 32G X 4 MM miscellaneous, Use 3 pen needles daily or as directed., Disp: 300 each, Rfl: 3    lipase-protease-amylase (ZENPEP) 22579-40912-629247 units CPEP, Take 4-5 capsules (100,000-125,000 Units) by mouth 3 times daily (with meals). And take 2-3 capsules with snacks. Total 3 meals and 3 snacks daily for maximum 24 per day., Disp: 700 capsule, Rfl: 11    lisinopril (ZESTRIL) 5 MG tablet, Take 1 tablet (5 mg) by mouth daily., Disp: 90 tablet, Rfl: 3    mvw complete formulation (SOFTGELS ) capsule, Take 2 capsules by mouth daily., Disp: 180 capsule, Rfl: 3    Ruby Altera Nebulizer System MISC, 1 Device 3 times daily. For administration of [place], Disp: 1 each, Rfl: 0    Respiratory Therapy Supplies (RUBY ALTERA NEBULIZER HANDSET) MISC, 1 Device 3 times daily. For administration of [place], Disp: 2 each, Rfl: 5     ursodiol (ACTIGALL) 300 MG capsule, TAKE 1 CAPSULE (300MG ) BY MOUTH TWO TIMES A DAY, Disp: 180 capsule, Rfl: 3    rifaximin (XIFAXAN) 550 MG TABS tablet, Take 1 tablet (550 mg) by mouth 3 times daily. (Patient not taking: Reported on 6/10/2025), Disp: 42 tablet, Rfl: 0           Review of Systems:              Physical Exam:   There were no vitals taken for this visit.  Height: Data Unavailable, Facility age limit for growth %deana is 20 years.  Weight: 0 lbs 0 oz, Facility age limit for growth %deana is 20 years.  BMI: There is no height or weight on file to calculate BMI., No height and weight on file for this encounter.      GENERAL: Healthy, alert and no distress  RESP: No audible wheeze, cough, or visible cyanosis.  No visible retractions or increased work of breathing.    NEURO:  Mentation and speech appropriate for age.  PSYCH: affect normal/bright, judgement and insight intact, normal speech and appearance well-groomed.        Laboratory results:     TSH   Date Value Ref Range Status   10/22/2024 1.30 0.30 - 4.20 uIU/mL Final   09/14/2021 2.37 0.40 - 4.00 mU/L Final   09/17/2020 1.96 0.40 - 4.00 mU/L Final     Testosterone Total   Date Value Ref Range Status   10/22/2024 442 240 - 950 ng/dL Final   09/17/2020 416 240 - 950 ng/dL Final     Comment:     This test was developed and its performance characteristics determined by the   [facility].   It has not been cleared or approved by the FDA. The laboratory is regulated   under CLIA as qualified to perform high-complexity testing. This test is used   for clinical purposes. It should not be regarded as investigational or for   research.       Cholesterol   Date Value Ref Range Status   10/22/2024 144 <200 mg/dL Final   09/17/2020 134 <200 mg/dL Final     Albumin Urine mg/L   Date Value Ref Range Status   10/22/2024 <12.0 mg/L Final     Comment:     The reference ranges have not been established  in urine albumin. The results should be integrated into the clinical context for interpretation.   01/11/2022 35 mg/L Final   09/17/2020 12 mg/L Final     Triglycerides   Date Value Ref Range Status   10/22/2024 47 <150 mg/dL Final   09/17/2020 91 <150 mg/dL Final     HDL Cholesterol   Date Value Ref Range Status   09/17/2020 83 >39 mg/dL Final     Direct Measure HDL   Date Value Ref Range Status   10/22/2024 85 >=40 mg/dL Final     LDL Cholesterol Calculated   Date Value Ref Range Status   10/22/2024 50 <100 mg/dL Final   09/17/2020 33 <100 mg/dL Final     Comment:     Desirable:       <100 mg/dl     Non HDL Cholesterol   Date Value Ref Range Status   10/22/2024 59 <130 mg/dL Final   09/17/2020 51 <130 mg/dL Final         CF  Diabetes Health Maintenance    Date of Diabetes Diagnosis: ~ 2012     Special Notes (if any):      Date Last Eye Exam:   2025     Date Last Dental Appointment:     Dates of Episodes Severe* Hypoglycemia (month/year, cumulative, ongoing, assess each visit):    *Severe=patient unconscious, seizure, unable to help self    Last 25-Vitamin D (every year): 39      Last DXA, lowest Z-score (every 2 years):  -1.2 (2024)    Last Testosterone:   Testosterone Total   Date Value Ref Range Status   10/22/2024 442 240 - 950 ng/dL Final   09/17/2020 416 240 - 950 ng/dL Final     Comment:     This test was developed and its performance characteristics determined by the   [facility].   It has not been cleared or approved by the FDA. The laboratory is regulated   under CLIA as qualified to perform high-complexity testing. This test is used   for clinical purposes. It should not be regarded as investigational or for   research.              Assessment and Plan:   Dilan is a 39 year old male with cystic fibrosis related diabetes    CFRD: Overall good control with time in range 78% and GMI of 6.9%  No recent A1c  Pattern of postprandial  highs  Continue Lantus to 8 units daily  Discussed option of no change, adding meal insulin with the largest meal of the day or doing a trial of DPP 4 inhibitors like sitagliptin  Discussed sitagliptin side effects and that it is weight neutral  He would like to do a trial of sitagliptin  Discussed risk of hypoglycemia with insulin use  We will also send a prescription for judy CGM to see if it is covered by insurance  Recommended monitoring glucose via CGM or fingerstick after starting sitagliptin    Previously positive microalbuminuria, on lisinopril.  Microalbumin was normal on most recent annual labs  Will get A1c with next annual labs    Return to clinic in 6 months   JOSE Headley  Note: Chart documentation done in part with Dragon Voice Recognition software. Although reviewed after completion, some word and grammatical errors may remain.  Please consider this when interpreting information in this chart       Virtual Visit Details    Type of service:  Video Visit   Video visit start time 10:01 AM  Video visit end time 10:19 AM  Originating Location (pt. Location): Home  Distant Location (provider location):  Off-site  Platform used for Video Visit: Mamta    Outcome for 05/23/25 4:15 PM: [employer] message sent  Yuliya Ferrara MA  Outcome for 06/06/25 2:06 PM: Data obtained via Dexcom website  Yuliya Ferrara MA              The numbers are pretty good so is not

## 2025-05-27 NOTE — TELEPHONE ENCOUNTER
RT NOTE:     Follow-up call with patient today relating to recent Cay[place] test dose at [facility].     Per Gertrudis at [place], test dose was complete on Friday, and tolerated well.     Per patient, initiated therapy after test dose and is tolerating well. Side effects reviewed with patient today, no concerns noted.     Will follow with patient and next visit to assess any adherence concerns.

## 2025-06-10 ENCOUNTER — VIRTUAL VISIT (OUTPATIENT)
Dept: ENDOCRINOLOGY | Facility: CLINIC | Age: 40
End: 2025-06-10
Attending: INTERNAL MEDICINE
Payer: COMMERCIAL

## 2025-06-10 DIAGNOSIS — E84.9 DIABETES MELLITUS DUE TO CYSTIC FIBROSIS (H): ICD-10-CM

## 2025-06-10 DIAGNOSIS — E08.9 DIABETES MELLITUS DUE TO CYSTIC FIBROSIS (H): ICD-10-CM

## 2025-06-10 RX ORDER — HYDROCHLOROTHIAZIDE 12.5 MG/1
1 CAPSULE ORAL SEE ADMIN INSTRUCTIONS
Qty: 6 EACH | Refills: 1 | Status: SHIPPED | OUTPATIENT
Start: 2025-06-10

## 2025-06-10 NOTE — LETTER
6/10/2025       RE: Dilan Nassar  [address]  [place] [facility] [place] [id number]     Dear Colleague,    Thank you for referring your patient, Dilan Nassar, to the [facility] [place] [facility]. Please see a copy of my visit note below.      Dilan Nassar  is being evaluated via a billable video visit.      CF Endocrinology Return Consultation:  Diabetes  :   Patient: Dilan Nassar MRN# [id number]   YOB: 1985 Age: 39 year old   Date of Visit: 06/10/2025  Dear Dr. Atilio Nieto:    I had the pleasure of seeing your patient, Dilan Nassar in the CF Endocrinology Clinic, [facility], for a return consultation .           Problem list:   MRSA  Cystic fibrosis  Diabetes mellitus  Exocrine pancreatic insufficiency  Vitamin D deficiency  Gout  Intestinal malabsorption         HPI:     Dilan is a 39 year old male with Cystic Fibrosis Related Diabetes Mellitus (CFRD).    I have reviewed the available past laboratory evaluations, imaging studies, and medical records available to me at this visit.   History was obtained from the patient and the medical record.  I have reviewed the notes of the pulmonary care team entered into the medical record since I last saw the patient.    Overall feels well, denies specific complaints or concerns.    Diabetes is currently managed with basal insulin only  Uses CGM prior to clinic appointments  Recent data reviewed average glucose 152, time in range 78%, 0% low, GMI 6.9%  Pattern of postprandial highs  CGM was not covered by insurance, uses samples through diabetes education office  BMI 22  On triple modulator therapy    Hemoglobin A1C   Date Value Ref Range Status   10/22/2024 6.5 (H) <5.7 % Final     Comment:     Normal <5.7%   Prediabetes 5.7-6.4%    Diabetes 6.5% or higher     Note: Adopted from ADA consensus guidelines.   09/17/2020 5.7 (H) 0 - 5.6 % Final     Comment:     Normal  <5.7% Prediabetes 5.7-6.4%  Diabetes 6.5% or higher - adopted from ADA   consensus guidelines.       Current insulin regimen:   Glargine 8 unit(s) daily 10 AM          Past Medical History:     Active Ambulatory Problems     Diagnosis Date Noted     Cystic fibrosis with pulmonary manifestations      Exocrine pancreatic insufficiency 02/13/2015     Vitamin D deficiency 02/13/2015     Gout 02/13/2015     Allergic rhinitis 02/13/2015     Intestinal malabsorption 02/13/2015     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) 10/16/2015     Diabetes mellitus related to cystic fibrosis (H) 10/16/2015     Cystic fibrosis with pulmonary exacerbation (H) 10/23/2015     Methicillin resistant Staphylococcus aureus infection 08/16/2016     Resolved Ambulatory Problems     Diagnosis Date Noted     Type 1 diabetes mellitus (H) 02/13/2015     Past Medical History:   Diagnosis Date     Diabetes mellitus related to CF (cystic fibrosis) (H)           Past Surgical History:   Appendectomy  Sinus surgery            Social History:   Unmarried  Non smoker  Former smokeless tobacco user           Family History:   Mother: liver disease  Paternal grandfather: prostate cancer  Maternal aunt: diabetes mellitus  Maternal uncle: diabetes mellitus  Maternal grandmother: diabetes mellitus  Paternal grandmother: coronary artery disease         Allergies:     Allergies   Allergen Reactions     Molds & Smuts Itching          Medications:     Current Outpatient Medications:      albuterol (PROVENTIL) (2.5 MG/3ML) 0.083% neb solution, Take 1 vial (2.5 mg) by nebulization 3 times daily as needed for shortness of breath, wheezing or cough, Disp: 180 mL, Rfl: 11     allopurinol (ZYLOPRIM) 300 MG tablet, TAKE ONE TABLET (300MG) BY MOUTH DAILY, Disp: 90 tablet, Rfl: 1     azithromycin (ZITHROMAX) 250 MG tablet, Take 2 tablets (500 mg) by mouth Every Mon, Wed, Fri Morning., Disp: 72 tablet, Rfl: 3     aztreonam lysine (CAYSTON) 75 MG SOLR, Take 1 mL (75 mg)  by nebulization 3 times daily. Rotating 28 days and 28 days off., Disp: 84 mL, Rfl: 2     blood glucose (NO BRAND SPECIFIED) test strip, Use to test blood sugar 4 times daily or as directed., Disp: 125 strip, Rfl: 11     blood glucose monitoring (FREESTYLE) lancets, Use to test blood sugar 4 times daily or as directed., Disp: 200 each, Rfl: 11     blood glucose monitoring (NO BRAND SPECIFIED) meter device kit, Use to test blood sugar 4 times daily or as directed., Disp: 1 kit, Rfl: 0     Continuous Glucose Sensor (DEXCOM G7 SENSOR) MISC, Change every 10 days., Disp: 9 each, Rfl: 4     dornase maryse (PULMOZYME) 2.5 MG/2.5ML neb solution, INHALE CONTENTS OF ONE VIAL (2.5MG) VIA NEBULIZER TWICE DAILY. KEEP REFRIGERATED UNTIL USE., Disp: 150 mL, Rfl: 11     elexacaftor-tezacaftor-ivacaftor & ivacaftor (TRIKAFTA) 100-50-75 & 150 MG tablet pack, TAKE TWO ORANGE TABLETS BY MOUTH IN THE MORNING AND ONE BLUE TABLET IN THE EVENING AS DIRECTED ON PACKAGE.  TAKE WITH FAT CONTAINING FOOD. 2/24: please schedule appt for additional refills, Disp: 84 tablet, Rfl: 5     fluticasone (FLONASE) 50 MCG/ACT nasal spray, SPRAY 2 SPRAYS INTO BOTH NOSTRILS DAILY, Disp: 48 g, Rfl: 3     insulin glargine (LANTUS SOLOSTAR) 100 UNIT/ML pen, Inject 8 Units subcutaneously daily. See note on substitutes if needed, Disp: , Rfl:      insulin glargine 100 UNIT/ML pen, INJECT 10 UNITS UNDER THE SKIN ONCE DAILY, Disp: 15 mL, Rfl: 0     insulin pen needle (32G X 4 MM) 32G X 4 MM miscellaneous, Use 3 pen needles daily or as directed., Disp: 300 each, Rfl: 3     lipase-protease-amylase (ZENPEP) [id number] units CPEP, Take 4-5 capsules (100,000-125,000 Units) by mouth 3 times daily (with meals). And take 2-3 capsules with snacks. Total 3 meals and 3 snacks daily for maximum 24 per day., Disp: 700 capsule, Rfl: 11     lisinopril (ZESTRIL) 5 MG tablet, Take 1 tablet (5 mg) by mouth daily., Disp: 90 tablet, Rfl: 3     mvw complete formulation (SOFTGELS  ) capsule, Take 2 capsules by mouth daily., Disp: 180 capsule, Rfl: 3     Ruby Altera Nebulizer System MISC, 1 Device 3 times daily. For administration of [place], Disp: 1 each, Rfl: 0     Respiratory Therapy Supplies (RUBY ALTERA NEBULIZER HANDSET) MISC, 1 Device 3 times daily. For administration of [place], Disp: 2 each, Rfl: 5     ursodiol (ACTIGALL) 300 MG capsule, TAKE 1 CAPSULE (300MG ) BY MOUTH TWO TIMES A DAY, Disp: 180 capsule, Rfl: 3     rifaximin (XIFAXAN) 550 MG TABS tablet, Take 1 tablet (550 mg) by mouth 3 times daily. (Patient not taking: Reported on 6/10/2025), Disp: 42 tablet, Rfl: 0           Review of Systems:              Physical Exam:   There were no vitals taken for this visit.  Height: Data Unavailable, Facility age limit for growth %deana is 20 years.  Weight: 0 lbs 0 oz, Facility age limit for growth %deana is 20 years.  BMI: There is no height or weight on file to calculate BMI., No height and weight on file for this encounter.      GENERAL: Healthy, alert and no distress  RESP: No audible wheeze, cough, or visible cyanosis.  No visible retractions or increased work of breathing.    NEURO:  Mentation and speech appropriate for age.  PSYCH: affect normal/bright, judgement and insight intact, normal speech and appearance well-groomed.        Laboratory results:     TSH   Date Value Ref Range Status   10/22/2024 1.30 0.30 - 4.20 uIU/mL Final   09/14/2021 2.37 0.40 - 4.00 mU/L Final   09/17/2020 1.96 0.40 - 4.00 mU/L Final     Testosterone Total   Date Value Ref Range Status   10/22/2024 442 240 - 950 ng/dL Final   09/17/2020 416 240 - 950 ng/dL Final     Comment:     This test was developed and its performance characteristics determined by the   [facility].   It has not been cleared or approved by the FDA. The laboratory is regulated   under CLIA as qualified to perform high-complexity testing. This test is used   for  clinical purposes. It should not be regarded as investigational or for   research.       Cholesterol   Date Value Ref Range Status   10/22/2024 144 <200 mg/dL Final   09/17/2020 134 <200 mg/dL Final     Albumin Urine mg/L   Date Value Ref Range Status   10/22/2024 <12.0 mg/L Final     Comment:     The reference ranges have not been established in urine albumin. The results should be integrated into the clinical context for interpretation.   01/11/2022 35 mg/L Final   09/17/2020 12 mg/L Final     Triglycerides   Date Value Ref Range Status   10/22/2024 47 <150 mg/dL Final   09/17/2020 91 <150 mg/dL Final     HDL Cholesterol   Date Value Ref Range Status   09/17/2020 83 >39 mg/dL Final     Direct Measure HDL   Date Value Ref Range Status   10/22/2024 85 >=40 mg/dL Final     LDL Cholesterol Calculated   Date Value Ref Range Status   10/22/2024 50 <100 mg/dL Final   09/17/2020 33 <100 mg/dL Final     Comment:     Desirable:       <100 mg/dl     Non HDL Cholesterol   Date Value Ref Range Status   10/22/2024 59 <130 mg/dL Final   09/17/2020 51 <130 mg/dL Final         CF  Diabetes Health Maintenance    Date of Diabetes Diagnosis: ~ 2012     Special Notes (if any):      Date Last Eye Exam:   2025     Date Last Dental Appointment:     Dates of Episodes Severe* Hypoglycemia (month/year, cumulative, ongoing, assess each visit):    *Severe=patient unconscious, seizure, unable to help self    Last 25-Vitamin D (every year): 39      Last DXA, lowest Z-score (every 2 years):  -1.2 (2024)    Last Testosterone:   Testosterone Total   Date Value Ref Range Status   10/22/2024 442 240 - 950 ng/dL Final   09/17/2020 416 240 - 950 ng/dL Final     Comment:     This test was developed and its performance characteristics determined by the   [facility].   It has not been cleared or approved by the FDA. The laboratory is regulated   under CLIA as qualified to perform  high-complexity testing. This test is used   for clinical purposes. It should not be regarded as investigational or for   research.              Assessment and Plan:   Dilan is a 39 year old male with cystic fibrosis related diabetes    CFRD: Overall good control with time in range 78% and GMI of 6.9%  No recent A1c  Pattern of postprandial highs  Continue Lantus to 8 units daily  Discussed option of no change, adding meal insulin with the largest meal of the day or doing a trial of DPP 4 inhibitors like sitagliptin  Discussed sitagliptin side effects and that it is weight neutral  He would like to do a trial of sitagliptin  Discussed risk of hypoglycemia with insulin use  We will also send a prescription for judy CGM to see if it is covered by insurance  Recommended monitoring glucose via CGM or fingerstick after starting sitagliptin    Previously positive microalbuminuria, on lisinopril.  Microalbumin was normal on most recent annual labs  Will get A1c with next annual labs    Return to clinic in 6 months   JOSE Headley  Note: Chart documentation done in part with Dragon Voice Recognition software. Although reviewed after completion, some word and grammatical errors may remain.  Please consider this when interpreting information in this chart       Virtual Visit Details    Type of service:  Video Visit   Video visit start time 10:01 AM  Video visit end time 10:19 AM  Originating Location (pt. Location): Home  Distant Location (provider location):  Off-site  Platform used for Video Visit: [email or web address]    Outcome for 05/23/25 4:15 PM: [employer] message sent  Yuliya Ferrara MA  Outcome for 06/06/25 2:06 PM: Data obtained via Dexcom website  Yuliya Ferrara MA              The numbers are pretty good so is not    Again, thank you for allowing me to participate in the care of your patient.      Sincerely,    Edison Miranda MD

## 2025-06-10 NOTE — NURSING NOTE
Current patient location: [address] [place] [id number]    Is the patient currently in the state of [place]? YES    Visit mode: VIDEO    If the visit is dropped, the patient can be reconnected by:VIDEO VISIT: Text to cell phone:   Telephone Information:   Mobile [phone]       Will anyone else be joining the visit? NO  (If patient encounters technical issues they should call [phone]    Are changes needed to the allergy or medication list? Yes pt has a medication he is not taking     Are refills needed on medications prescribed by this physician? YES- pending pen needles     Rooming Documentation:  Not applicable    Reason for visit: DIXON SMITHF

## 2025-06-25 ENCOUNTER — DOCUMENTATION ONLY (OUTPATIENT)
Dept: PULMONOLOGY | Facility: CLINIC | Age: 40
End: 2025-06-25
Payer: COMMERCIAL

## 2025-07-03 ENCOUNTER — NURSE TRIAGE (OUTPATIENT)
Dept: PULMONOLOGY | Facility: CLINIC | Age: 40
End: 2025-07-03
Payer: COMMERCIAL

## 2025-07-03 ENCOUNTER — TELEPHONE (OUTPATIENT)
Dept: PULMONOLOGY | Facility: CLINIC | Age: 40
End: 2025-07-03
Payer: COMMERCIAL

## 2025-07-03 NOTE — TELEPHONE ENCOUNTER
Patient transferred to [facility] triage RN to review best next steps  Reports cutting foot on a kalee nail last evening  Has washed the are with soap and water, hydrogen peroxide   Bleeding stopped last evening   Denies redness, warmth, all other symptoms  Patient inquiring if tetanus vaccine booster is recommended  Appears last TDaP administered 09/14/2021  Does not require booster according to protocol   However, patient considers pulmonology clinic Jyothi Warren as PCP  Hx CF  Patient plans to call the clinic to also request review and recommendation for tetanus booster today  Provided telephone number and offered to transfer  Patient plans to call the Pulmonology Clinic later today   Thanks,  Belkys HERCULES RN    Reason for Disposition   Minor cut or scratch    Additional Information   Negative: Major bleeding (e.g., actively dripping or spurting) and can't be stopped   Negative: Amputation   Negative: Shock suspected (e.g., cold/pale/clammy skin, too weak to stand, low BP, rapid pulse)   Negative: Knife wound (or other possibly deep cut) and to chest, abdomen, back, neck, or head   Negative: Self-injury (e.g., cutting, self-harm) and suicidal or out-of-control   Negative: Sounds like a life-threatening emergency to the triager   Negative: Bleeding and won't stop after 10 minutes of direct pressure (using correct technique)   Negative: Skin is split open or gaping (or length > 1/2 inch or 12 mm on the skin, 1/4 inch or 6 mm on the face)   Negative: Deep cut and can see bone or tendons   Negative: Cut over knuckle (MCP joint)   Negative: Sensation of something in the wound (i.e., retained object in wound)   Negative: Dirt in the wound and not removed with 15 minutes of scrubbing   Negative: Wound causes numbness (i.e., loss of sensation)   Negative: Wound causes weakness (i.e., decreased ability to move hand, finger, toe)   Negative: SEVERE pain and not improved 2 hours after pain medicine    Negative: Looks infected and large red area (> 2 inches or 5 cm) or streak   Negative: Fever and spreading red area or streak   Negative: Looks infected (e.g., spreading redness, pus) and no fever   Negative: No prior tetanus shots (or is not fully vaccinated)   Negative: HIV positive or severe immunodeficiency (severely weak immune system) and DIRTY cut   Negative: Patient wants to be seen   Negative: Suspicious history for the injury   Negative: Last tetanus shot > 5 years ago and DIRTY cut   Negative: Last tetanus shot > 10 years ago and CLEAN cut   Negative: After 14 days and wound isn't healed   Negative: Diabetes mellitus and minor cut or scratch on foot   Negative: Minor cut or scratch and from self-injury (e.g., cutting, self-harm) and stable (i.e., not suicidal, not out of control)    Protocols used: Cuts and [id number]

## 2025-07-03 NOTE — TELEPHONE ENCOUNTER
Patient called  voicemail to follow up on a tetanus related question. States he scraped the right side of his foot yesterday with a kalee nail. Reports small amount of bleeding which stopped after direct pressure. Slight redness but denies fever, swelling, heat, or drainage from wound. He cleaned the area with soapy water and peroxide. Last night, state he noticed leg muscle cramps in both legs. Patient last had tetanus in 2021.    RN discussed with Dr. Sinclair. Recommended patient watch for signs of infection and follow up with PCP for tetanus booster. Tetanus booster not harmful since it's been over a year since he last had a tetanus shot.    RN relayed recommendation and advised patient follow up with PCP for evaluation and tetanus booster if appropriate.     Patient states he plans to go to walk-in clinic today.    ART Nuñez

## 2025-07-15 ENCOUNTER — EXTERNAL ORDER RESULTS (OUTPATIENT)
Dept: PULMONOLOGY | Facility: CLINIC | Age: 40
End: 2025-07-15
Payer: COMMERCIAL

## 2025-07-15 LAB
FEV-1: 2.75
FEV1/FVC: 0.59
FVC: 4.64

## 2025-07-16 NOTE — PROGRESS NOTES
Virtual Visit Details    Type of service:  Video Visit     Originating Location (pt. Location): Home    Distant Location (provider location):  On-site  Platform used for Video Visit: Mamta Nassar is a 39 year old male with cystic fibrosis who is seen today for a video visit.     1. CF lung disease: notes some mild dyspnea on his [place] months, but otherwise is feeling well with improving endurance. Now can run a full mile with improved leg strength as well. Sating 96% on room air; vesting 10-12 times/week. Home PFTs today are at the upper end of his baseline. Previous history of Ps.A  - Continue nebulizers and vest therapy   - On chronic azithromycin; will continue [place] for a total of 6 months and reassess     2. HTN: no information today.   - Continue lisinopril 5 mg daily     3. CF related liver disease: prior US demonstrating coarse echotexture of the liver with hypertrophy of the caudate lobe and left lobe concerning for possible cirrhosis, no lesions, patent vasculature.   - Continue Ursodiol      4. Exocrine pancreatic insufficiency:  Possible SIBO: notes the 10 days of rifaximin seemed to help with the looseness of his stools. Still having 3 or so stools each morning. Unsure is he would want to repeat an antibiotic course. Would like to see how his colonoscopy goes in October.  - Continue enzymes and vitamin supplementation  - Colonoscopy as scheduled  - Will have Tamika touch base with him in October as well.       5. CFRD: AIC of 6.5 on his last annuals. Completed a CGM and had follow up with Dr. Miranda with plan to start Januvia.  - Does have a prescription for CGM that he plans to wear prior to his next visit  - Continue Lantus 8 U      6. CF related sinus disease: no complaints today.   - Continue Flonase      7. CFTR: modulation: on Trikafta and is doing well. 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 Tearing out his deck and doing some patio work, still going to the gym about 3 days per week. NO recent illnesses, no seasonal allergy complaints. Cough is stable, nothing at night, more clearly of his throat. No congestion or tightness. No new shortness of breath. Starts [employer] again on Monday, but notes he is having some shortness of breath with the [employer]imb[employer]. 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 Now able to run a full mile.     GENERAL: alert and no distress  EYES: Eyes grossly normal to inspection.  No discharge or erythema, or obvious scleral/conjunctival abnormalities.  RESP: No audible wheeze, cough, or visible cyanosis.    SKIN: Visible skin clear. No significant rash, abnormal pigmentation or lesions.  NEURO: Cranial nerves grossly intact.  Mentation and speech appropriate for age.  PSYCH: Appropriate affect, tone, and pace of words

## 2025-07-17 ENCOUNTER — VIRTUAL VISIT (OUTPATIENT)
Dept: PULMONOLOGY | Facility: CLINIC | Age: 40
End: 2025-07-17
Attending: PHYSICIAN ASSISTANT
Payer: COMMERCIAL

## 2025-07-17 VITALS — WEIGHT: 131 LBS | BODY MASS INDEX: 21.96 KG/M2

## 2025-07-17 DIAGNOSIS — K86.81 EXOCRINE PANCREATIC INSUFFICIENCY: ICD-10-CM

## 2025-07-17 DIAGNOSIS — E84.0 CYSTIC FIBROSIS WITH PULMONARY MANIFESTATIONS (H): ICD-10-CM

## 2025-07-17 DIAGNOSIS — E84.9 DIABETES MELLITUS DUE TO CYSTIC FIBROSIS (H): ICD-10-CM

## 2025-07-17 DIAGNOSIS — A49.02 MRSA INFECTION: ICD-10-CM

## 2025-07-17 DIAGNOSIS — E84.9 CYSTIC FIBROSIS (H): ICD-10-CM

## 2025-07-17 DIAGNOSIS — E08.9 DIABETES MELLITUS DUE TO CYSTIC FIBROSIS (H): ICD-10-CM

## 2025-07-17 DIAGNOSIS — E84.9 CF (CYSTIC FIBROSIS) (H): Primary | ICD-10-CM

## 2025-07-17 PROCEDURE — 1125F AMNT PAIN NOTED PAIN PRSNT: CPT | Mod: 95 | Performed by: PHYSICIAN ASSISTANT

## 2025-07-17 PROCEDURE — 98006 SYNCH AUDIO-VIDEO EST MOD 30: CPT | Performed by: PHYSICIAN ASSISTANT

## 2025-07-17 RX ORDER — URSODIOL 300 MG/1
CAPSULE ORAL
Qty: 180 CAPSULE | Refills: 3 | Status: SHIPPED | OUTPATIENT
Start: 2025-07-17

## 2025-07-17 RX ORDER — ALBUTEROL SULFATE 0.83 MG/ML
2.5 SOLUTION RESPIRATORY (INHALATION) 3 TIMES DAILY PRN
Qty: 180 ML | Refills: 11 | Status: SHIPPED | OUTPATIENT
Start: 2025-07-17

## 2025-07-17 ASSESSMENT — PAIN SCALES - GENERAL: PAINLEVEL_OUTOF10: MODERATE PAIN (6)

## 2025-07-17 NOTE — NURSING NOTE
Is the patient currently in the state of [place]? YES    Current patient location: [address] [place] [id number]    Visit mode:Video    If the visit is dropped, the patient can be reconnected by: VIDEO VISIT: Text to cell phone:   Telephone Information:   Mobile [phone]       Will anyone else be joining the visit? No  (If patient encounters technical issues they should call [phone])    Are changes needed to the allergy or medication list? No    Are refills needed on medications prescribed by this physician? Discuss with Provider    Rooming Documentation: Unable to complete qnrs due to time.    Reason for visit: RECHECK     TAMERA Ledesma

## 2025-07-17 NOTE — LETTER
7/17/2025      Dilan Nassar  [address]  [facility] [address]      Dear Colleague,    Thank you for referring your patient, Dilan Nassar, to the [facility]G [facility] AND [facility]. Please see a copy of my visit note below.    Virtual Visit Details    Type of service:  Video Visit     Originating Location (pt. Location): Home    Distant Location (provider location):  On-site  Platform used for Video Visit: Mamta Nassar is a 39 year old male with cystic fibrosis who is seen today for a video visit.     1. CF lung disease: notes some mild dyspnea on his [place] months, but otherwise is feeling well with improving endurance. Now can run a full mile with improved leg strength as well. Sating 96% on room air; vesting 10-12 times/week. Home PFTs today are at the upper end of his baseline. Previous history of Ps.A  - Continue nebulizers and vest therapy   - On chronic azithromycin; will continue [place] for a total of 6 months and reassess     2. HTN: no information today.   - Continue lisinopril 5 mg daily     3. CF related liver disease: prior US demonstrating coarse echotexture of the liver with hypertrophy of the caudate lobe and left lobe concerning for possible cirrhosis, no lesions, patent vasculature.   - Continue Ursodiol      4. Exocrine pancreatic insufficiency:  Possible SIBO: notes the 10 days of rifaximin seemed to help with the looseness of his stools. Still having 3 or so stools each morning. Unsure is he would want to repeat an antibiotic course. Would like to see how his colonoscopy goes in October.  - Continue enzymes and vitamin supplementation  - Colonoscopy as scheduled  - Will have Tamika touch base with him in October as well.       5. CFRD: AIC of 6.5 on his last annuals. Completed a CGM and had follow up with Dr. Miranda with plan to start Januvia.  - Does have a prescription for CGM that he plans to wear prior to his next visit  - Continue Lantus  8 U      6. CF related sinus disease: no complaints today.   - Continue Flonase      7. CFTR: modulation: on Trikafta and is doing well. 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 Tearing out his deck and doing some patio work, still going to the gym about 3 days per week. NO recent illnesses, no seasonal allergy complaints. Cough is stable, nothing at night, more clearly of his throat. No congestion or tightness. No new shortness of breath. Starts [employer] again on Monday, but notes he is having some shortness of breath with the [employer]. 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 Now able to run a full mile.     GENERAL: alert and no distress  EYES: Eyes grossly normal to inspection.  No discharge or erythema, or obvious scleral/conjunctival abnormalities.  RESP: No audible wheeze, cough, or visible cyanosis.    SKIN: Visible skin clear. No significant rash, abnormal pigmentation or lesions.  NEURO: Cranial nerves grossly intact.  Mentation and speech appropriate for age.  PSYCH: Appropriate affect, tone, and pace of words     Again, thank you for allowing me to participate in the care of your patient.        Sincerely,        Jyothi Warren PA-C    Electronically signed

## 2025-07-17 NOTE — PATIENT INSTRUCTIONS
"Cystic Fibrosis Self-Care Plan       Patient: Dilan Nassar   MRN: [id number]   Clinic Date: July 17, 2025     RECOMMENDATIONS:  1. Continue nebulizers and vest therapy.   2. Plan to remain on Cayston every other month X 6 total months.  3. Annual labs and colonoscopy in October. Will plan to have GAURAV Lundberg, see you as well.    Annual Studies:   IGG   Date Value Ref Range Status   09/17/2020 1,153 610 - 1,616 mg/dL Final     Immunoglobulin G   Date Value Ref Range Status   10/22/2024 773 610 - 1,616 mg/dL Final     No results found for: \"INS\"  There are no preventive care reminders to display for this patient.    Pulmonary Function Tests  FEV1: amount of air you can blow out in 1 second  FVC: total amount of air you can take in and blow out    Your Goals:             Latest Ref Rng & Units 4/10/2025    10:58 AM   PFT   FVC L 4.64  P   FEV1 L 2.67  P   FVC% % 116  P   FEV1% % 81  P      P Preliminary result          Airway Clearance: The Most Important Way to Keep Your Lungs Healthy  Vest Settings:   Hill-Rom Frequencies: 8, 9, 10 Pressure 10 Then, Frequencies 18, 19, 20 Pressure 6     [employer]pirTech: Quick Start with Pressure of     Do each frequency for 5 minutes; Deflate vest after each frequency & cough 3 times before beginning the next setting.    Vest and Neb Therapy should be done 2 times/day.    Good Nutrition Can Improve Lung Function and Overall Health    Take ALL of your vitamins with food    Take 1/2 of your enzymes before EVERY meal/snack and the other 1/2 mid-meal/snack    Wt Readings from Last 3 Encounters:   07/17/25 59.4 kg (131 lb)   04/10/25 59.6 kg (131 lb 6.3 oz)   02/06/25 56.7 kg (125 lb)       Body mass index is 21.96 kg/m .         National CF Foundation Recommendations for BMI in CF Adults: Women: at least 22 Men: at least 23        Controlling Blood Sugars Helps Prevent Lung Infections & Improves Nutrition  Test blood sugar:    In the morning before eating (goal is )    2 hours " after a meal (goal is less than 150)    When pre-meal glucose is greater than 150 add correction    At bedtime (if less than 100 eat a snack with 15 grams of carbohydrates  Last A1C Results:   Hemoglobin A1C   Date Value Ref Range Status   10/22/2024 6.5 (H) <5.7 % Final     Comment:     Normal <5.7%   Prediabetes 5.7-6.4%    Diabetes 6.5% or higher     Note: Adopted from ADA consensus guidelines.   09/17/2020 5.7 (H) 0 - 5.6 % Final     Comment:     Normal <5.7% Prediabetes 5.7-6.4%  Diabetes 6.5% or higher - adopted from ADA   consensus guidelines.           If diabetic, measure A1C every 6 months. Goal is under 7%.    Staying Healthy  Research: If you are interested in learning about research opportunities or have questions, please contact Sonali Altamirano at [phone] or [email or web address].     Foundation: [employer] is a personalized resource service to help you with the insurance, financial, legal and other issues you are facing.  It's free, confidential and available to anyone with CF.  Ask your  for more information or contact [employer] directly at [facility] ([phone]) or compass@cff.org, or learn more at cff.org/compass.       CF Nurse Line: Savannah Kang and KJ: [phone]  Steffi Awad or Viviana Harden RT: [phone]    Daniela Whitt and Tamika Perez , Dieticians: [phone]    Lisset Root, Diabetes Nurse: [phone]   Yuridia Ortega: [phone] or Kailey Bryant at [phone], Social Workers  [email or web address]

## 2025-07-19 ENCOUNTER — HEALTH MAINTENANCE LETTER (OUTPATIENT)
Age: 40
End: 2025-07-19

## 2025-07-20 ENCOUNTER — MYC REFILL (OUTPATIENT)
Dept: PULMONOLOGY | Facility: CLINIC | Age: 40
End: 2025-07-20
Payer: COMMERCIAL

## 2025-07-20 ENCOUNTER — MYC REFILL (OUTPATIENT)
Dept: ENDOCRINOLOGY | Facility: CLINIC | Age: 40
End: 2025-07-20
Payer: COMMERCIAL

## 2025-07-20 DIAGNOSIS — K86.81 EXOCRINE PANCREATIC INSUFFICIENCY: ICD-10-CM

## 2025-07-20 DIAGNOSIS — E84.0 CYSTIC FIBROSIS WITH PULMONARY MANIFESTATIONS (H): ICD-10-CM

## 2025-07-20 DIAGNOSIS — E84.9 CF (CYSTIC FIBROSIS) (H): ICD-10-CM

## 2025-07-20 DIAGNOSIS — E84.9 DIABETES MELLITUS DUE TO CYSTIC FIBROSIS (H): ICD-10-CM

## 2025-07-20 DIAGNOSIS — E08.9 DIABETES MELLITUS DUE TO CYSTIC FIBROSIS (H): ICD-10-CM

## 2025-07-20 DIAGNOSIS — E08.9 DIABETES MELLITUS RELATED TO CF (CYSTIC FIBROSIS) (H): ICD-10-CM

## 2025-07-20 DIAGNOSIS — E84.8 DIABETES MELLITUS RELATED TO CF (CYSTIC FIBROSIS) (H): ICD-10-CM

## 2025-07-20 DIAGNOSIS — E84.9 CYSTIC FIBROSIS (H): ICD-10-CM

## 2025-07-20 DIAGNOSIS — A49.02 MRSA INFECTION: ICD-10-CM

## 2025-07-21 RX ORDER — INSULIN GLARGINE 100 [IU]/ML
INJECTION, SOLUTION SUBCUTANEOUS
Qty: 15 ML | Refills: 1 | Status: SHIPPED | OUTPATIENT
Start: 2025-07-21

## 2025-07-22 RX ORDER — ALLOPURINOL 300 MG/1
TABLET ORAL
Qty: 90 TABLET | Refills: 0 | Status: SHIPPED | OUTPATIENT
Start: 2025-07-22

## 2025-07-22 RX ORDER — URSODIOL 300 MG/1
CAPSULE ORAL
Qty: 180 CAPSULE | Refills: 3 | OUTPATIENT
Start: 2025-07-22

## 2025-08-19 ENCOUNTER — TELEPHONE (OUTPATIENT)
Dept: PULMONOLOGY | Facility: CLINIC | Age: 40
End: 2025-08-19
Payer: COMMERCIAL

## 2025-08-28 ENCOUNTER — TELEPHONE (OUTPATIENT)
Dept: ENDOCRINOLOGY | Facility: CLINIC | Age: 40
End: 2025-08-28
Payer: COMMERCIAL